# Patient Record
Sex: FEMALE | Race: WHITE | NOT HISPANIC OR LATINO | Employment: OTHER | URBAN - METROPOLITAN AREA
[De-identification: names, ages, dates, MRNs, and addresses within clinical notes are randomized per-mention and may not be internally consistent; named-entity substitution may affect disease eponyms.]

---

## 2017-01-30 ENCOUNTER — OFFICE VISIT (OUTPATIENT)
Dept: MEDICAL GROUP | Facility: MEDICAL CENTER | Age: 77
End: 2017-01-30
Payer: MEDICARE

## 2017-01-30 VITALS
HEIGHT: 62 IN | DIASTOLIC BLOOD PRESSURE: 74 MMHG | WEIGHT: 151 LBS | TEMPERATURE: 98.4 F | HEART RATE: 74 BPM | BODY MASS INDEX: 27.79 KG/M2 | SYSTOLIC BLOOD PRESSURE: 110 MMHG | OXYGEN SATURATION: 94 %

## 2017-01-30 DIAGNOSIS — H61.21 IMPACTED CERUMEN OF RIGHT EAR: ICD-10-CM

## 2017-01-30 DIAGNOSIS — H92.01 RIGHT EAR PAIN: ICD-10-CM

## 2017-01-30 DIAGNOSIS — H66.001 ACUTE SUPPURATIVE OTITIS MEDIA OF RIGHT EAR WITHOUT SPONTANEOUS RUPTURE OF TYMPANIC MEMBRANE, RECURRENCE NOT SPECIFIED: ICD-10-CM

## 2017-01-30 PROCEDURE — 1100F PTFALLS ASSESS-DOCD GE2>/YR: CPT | Performed by: FAMILY MEDICINE

## 2017-01-30 PROCEDURE — G8420 CALC BMI NORM PARAMETERS: HCPCS | Performed by: FAMILY MEDICINE

## 2017-01-30 PROCEDURE — 4040F PNEUMOC VAC/ADMIN/RCVD: CPT | Performed by: FAMILY MEDICINE

## 2017-01-30 PROCEDURE — G8432 DEP SCR NOT DOC, RNG: HCPCS | Performed by: FAMILY MEDICINE

## 2017-01-30 PROCEDURE — 0518F FALL PLAN OF CARE DOCD: CPT | Mod: 8P | Performed by: FAMILY MEDICINE

## 2017-01-30 PROCEDURE — 3288F FALL RISK ASSESSMENT DOCD: CPT | Performed by: FAMILY MEDICINE

## 2017-01-30 PROCEDURE — G8484 FLU IMMUNIZE NO ADMIN: HCPCS | Performed by: FAMILY MEDICINE

## 2017-01-30 PROCEDURE — 1036F TOBACCO NON-USER: CPT | Performed by: FAMILY MEDICINE

## 2017-01-30 PROCEDURE — 99213 OFFICE O/P EST LOW 20 MIN: CPT | Performed by: FAMILY MEDICINE

## 2017-01-30 RX ORDER — AMOXICILLIN 500 MG/1
1000 CAPSULE ORAL 2 TIMES DAILY
Qty: 28 CAP | Refills: 0 | Status: SHIPPED | OUTPATIENT
Start: 2017-01-30 | End: 2017-02-16

## 2017-01-30 NOTE — ASSESSMENT & PLAN NOTE
Patient is complaining of right ear pain for the last 6 weeks. She reports that the ear feels congested and she has decreased hearing. She recently went to [place] for a ski trip and caught a head cold. She says her symptoms have worsened since then with more pain. She's had nasal congestion and postnasal drainage. No fever or chills. No cough. She has had something similar to this in the past.

## 2017-01-30 NOTE — PROGRESS NOTES
Subjective:     Chief Complaint   Patient presents with   • Ear Fullness     right side    Katerina is a regular patient of Dr. Pickett.    Katerina Torres is a 76 y.o. female here today for evaluation and management of:    Right ear pain  Patient is complaining of right ear pain for the last 6 weeks. She reports that the ear feels congested and she has decreased hearing. She recently went to [place] for a ski trip and caught a head cold. She says her symptoms have worsened since then with more pain. She's had nasal congestion and postnasal drainage. No fever or chills. No cough. She has had something similar to this in the past.         Allergies   Allergen Reactions   • Vicodin [Hydrocodone-Acetaminophen] Anaphylaxis     Nausea, dizziness       Current medicines (including changes today)  Current Outpatient Prescriptions   Medication Sig Dispense Refill   • amoxicillin (AMOXIL) 500 MG Cap Take 2 Caps by mouth 2 times a day. 28 Cap 0   • aspirin EC (ECOTRIN) 81 MG Tablet Delayed Response Take 81 mg by mouth every day.     • vitamin D (CHOLECALCIFEROL) 1000 UNIT Tab Take 1,000 Units by mouth every day.     • Magnesium 500 MG Tab Take  by mouth.     • Bimatoprost (LUMIGAN) 0.01 % SOLN 1 Drop by Ophthalmic route every day. (Patient taking differently: 1 Drop by Ophthalmic route every evening. Indications: Wide-Angle Glaucoma) 1 Application 6   • predniSONE (DELTASONE) 10 MG Tab Take 1 Tab by mouth every day. 30 Tab 0     No current facility-administered medications for this visit.       She  has a past medical history of breast cancer (6/3/2009); Osteopenia (6/3/2009); Dyslipidemia (6/3/2009); Neutropenia (6/3/2009); Preventative health care (6/3/2009); Cardiac murmur (6/3/2009); [place] [place] [place]; Breath shortness; Unspecified cataract; Chronic kidney disease, stage III (moderate) (8/20/2015); Hepatitis A; Cold; Snoring; Cancer (CMS-HCC); and Glaucoma. She also has no past medical  "history of Congestive heart failure (CMS-HCC), CAD (coronary artery disease), Seizure disorder (CMS-HCC), Liver disease, Psychiatric disorder, COPD, Diabetes, Stroke (CMS-HCC), Hypertension, or ASTHMA.    Patient Active Problem List    Diagnosis Date Noted   • Right ear pain 01/30/2017   • Acute suppurative otitis media of right ear without spontaneous rupture of tympanic membrane 01/30/2017   • Chronic kidney disease, stage III (moderate) 08/20/2015   • History of polymyalgia rheumatica 07/28/2015   • Dyspnea 07/29/2014   • Abnormal cardiovascular stress test 03/24/2014   • MEDICAL HOME    • Knee pain, left 08/01/2013   • Glaucoma 06/29/2010   • Skin cancer, basal cell 06/29/2010   • History of breast cancer 06/03/2009   • Osteopenia 06/03/2009   • Dyslipidemia 06/03/2009   • Chronic neutropenia (CMS-HCC) 06/03/2009   • Preventative health care 06/03/2009   • Aortic stenosis 06/03/2009       ROS   No fever or chills.  No nausea or vomiting.  No chest pain or palpitations.  No cough or SOB.  No pain with urination or hematuria.  No black or bloody stools.       Objective:     Blood pressure 110/74, pulse 74, temperature 36.9 °C (98.4 °F), height 1.575 m (5' 2.01\"), weight 68.493 kg (151 lb), SpO2 94 %, not currently breastfeeding. Body mass index is 27.61 kg/(m^2).   Physical Exam:  Well developed, well nourished.  Alert, oriented in no acute distress.  Eye contact is good, speech goal directed, affect calm  Eyes: conjunctiva non-injected, sclera non-icteric.  Ears: Pinna normal. Right canal with impacted cerumen. After ear wash TM is visualized and is erythematous with purulent matter behind the eardrum.  Nose: Nares are patent. Erythematous mucosa with yellow discharge  Mouth: Oral mucous membranes pink and moist with no lesions. Yellow postnasal drainage  Neck Supple.  No adenopathy or masses in the neck or supraclavicular regions. No thyromegaly  Lungs: clear to auscultation bilaterally with good excursion. No " wheezes or rhonchi  CV: regular rate and rhythm. 3/6 DOROTEO            Assessment and Plan:   The following treatment plan was discussed    1. Right ear pain  Secondary to #2 and 3    2. Impacted cerumen of right ear  Cleared with ear wash.    3. Acute suppurative otitis media of right ear without spontaneous rupture of tympanic membrane, recurrence not specified  Amoxicillin as directed. Increase fluids and rest. Return if not improving.  - amoxicillin (AMOXIL) 500 MG Cap; Take 2 Caps by mouth 2 times a day.  Dispense: 28 Cap; Refill: 0    Any change or worsening of signs or symptoms, patient encouraged to follow-up or report to the emergency room for further evaluation. Patient understands and agrees.    Followup: Return if symptoms worsen or fail to improve.

## 2017-01-30 NOTE — MR AVS SNAPSHOT
"Katerina Graykamar   2017 10:40 AM   Office Visit   MRN: [id number]    Department:  [facility]   Dept Phone:  [phone]    Description:  Female : 1940   Provider:  Gabriela Corbin M.D.           Reason for Visit     Ear Fullness right side      Allergies as of 2017     Allergen Noted Reactions    Vicodin [Hydrocodone-Acetaminophen] 2015   Anaphylaxis    Nausea, dizziness      You were diagnosed with     Right ear pain   [214158]       Impacted cerumen of right ear   [[id number]]       Acute suppurative otitis media of right ear without spontaneous rupture of tympanic membrane, recurrence not specified   [[id number]]         Vital Signs     Blood Pressure Pulse Temperature Height Weight Body Mass Index    110/74 mmHg 74 36.9 °C (98.4 °F) 1.575 m (5' 2.01\") 68.493 kg (151 lb) 27.61 kg/m2    Oxygen Saturation Breastfeeding? Smoking Status             94% No Former Smoker         Basic Information     Date Of Birth Sex Race Ethnicity Preferred Language    1940 Female White Non- English      Problem List              ICD-10-CM Priority Class Noted - Resolved    History of breast cancer Z85.3   6/3/2009 - Present    Osteopenia (Chronic) M85.80   6/3/2009 - Present    Dyslipidemia (Chronic) E78.5   6/3/2009 - Present    Chronic neutropenia (CMS-HCC) (Chronic) D70.9   6/3/2009 - Present    Preventative health care (Chronic) Z00.00   6/3/2009 - Present    Aortic stenosis (Chronic) I35.0   6/3/2009 - Present    Glaucoma H40.9   2010 - Present    Skin cancer, basal cell C44.91   2010 - Present    Knee pain, left M25.562   2013 - Present    MEDICAL HOME    Unknown - Present    Abnormal cardiovascular stress test (Chronic) R94.39   3/24/2014 - Present    Dyspnea R06.00   2014 - Present    History of polymyalgia rheumatica Z87.39   2015 - Present    Chronic kidney disease, stage III (moderate) (Chronic) N18.3   2015 - Present    Right ear pain " H92.01   1/30/2017 - Present    Acute suppurative otitis media of right ear without spontaneous rupture of tympanic membrane H66.001   1/30/2017 - Present      Health Maintenance        Date Due Completion Dates    IMM INFLUENZA (1) 9/1/2016 ---    COLONOSCOPY 11/14/2016 11/14/2006 (Done)    Override on 11/14/2006: Done ([facility])    MAMMOGRAM 8/31/2017 8/31/2016, 8/19/2015, 8/18/2014, 8/6/2013, 8/2/2012, 7/22/2011, 7/8/2010, 7/8/2010, 6/25/2009, 6/25/2009, 3/11/2008, 3/11/2008, 9/28/2006, 9/28/2006, 9/1/2004    BONE DENSITY 8/19/2020 8/19/2015, 8/6/2013, 7/22/2011, 6/25/2009, 9/28/2006, 9/1/2004    IMM DTaP/Tdap/Td Vaccine (2 - Td) 7/23/2022 7/23/2012            Current Immunizations     13-VALENT PCV PREVNAR 8/6/2015    Pneumococcal polysaccharide vaccine (PPSV-23) 1/10/2013    SHINGLES VACCINE 7/29/2014    Tdap Vaccine 7/23/2012      Below and/or attached are the medications your provider expects you to take. Review all of your home medications and newly ordered medications with your provider and/or pharmacist. Follow medication instructions as directed by your provider and/or pharmacist. Please keep your medication list with you and share with your provider. Update the information when medications are discontinued, doses are changed, or new medications (including over-the-counter products) are added; and carry medication information at all times in the event of emergency situations     Allergies:  VICODIN - Anaphylaxis               Medications  Valid as of: January 30, 2017 - 11:15 AM    Generic Name Brand Name Tablet Size Instructions for use    Amoxicillin (Cap) AMOXIL 500 MG Take 2 Caps by mouth 2 times a day.        Aspirin (Tablet Delayed Response) ECOTRIN 81 MG Take 81 mg by mouth every day.        Bimatoprost (Solution) LUMIGAN 0.01 % 1 Drop by Ophthalmic route every day.        Cholecalciferol (Tab) cholecalciferol 1000 UNIT Take 1,000 Units by mouth every day.        Magnesium (Tab) Magnesium 500 MG Take   by mouth.        PredniSONE (Tab) DELTASONE 10 MG Take 1 Tab by mouth every day.        .                 Medicines prescribed today were sent to:     [facility] #[id number] - [place], [place] - [address]    [address] [place] [address]    Phone: [phone] Fax: [phone]    Open 24 Hours?: No      Medication refill instructions:       If your prescription bottle indicates you have medication refills left, it is not necessary to call your provider’s office. Please contact your pharmacy and they will refill your medication.    If your prescription bottle indicates you do not have any refills left, you may request refills at any time through one of the following ways: The online ProtoStar system (except Urgent Care), by calling your provider’s office, or by asking your pharmacy to contact your provider’s office with a refill request. Medication refills are processed only during regular business hours and may not be available until the next business day. Your provider may request additional information or to have a follow-up visit with you prior to refilling your medication.   *Please Note: Medication refills are assigned a new Rx number when refilled electronically. Your pharmacy may indicate that no refills were authorized even though a new prescription for the same medication is available at the pharmacy. Please request the medicine by name with the pharmacy before contacting your provider for a refill.        Other Notes About Your Plan       12/22/16 echo   12/22/16 colon cancer screening referral made                       ProtoStar Access Code: Activation code not generated  Current ProtoStar Status: Active

## 2017-02-01 PROBLEM — C44.319 BASAL CELL CARCINOMA OF SKIN OF OTHER PARTS OF FACE: Status: ACTIVE | Noted: 2017-02-01

## 2017-02-16 ENCOUNTER — OFFICE VISIT (OUTPATIENT)
Dept: MEDICAL GROUP | Facility: MEDICAL CENTER | Age: 77
End: 2017-02-16
Payer: MEDICARE

## 2017-02-16 ENCOUNTER — HOSPITAL ENCOUNTER (OUTPATIENT)
Dept: RADIOLOGY | Facility: MEDICAL CENTER | Age: 77
End: 2017-02-16
Attending: FAMILY MEDICINE
Payer: MEDICARE

## 2017-02-16 VITALS
RESPIRATION RATE: 12 BRPM | WEIGHT: 151.46 LBS | SYSTOLIC BLOOD PRESSURE: 126 MMHG | HEART RATE: 60 BPM | OXYGEN SATURATION: 98 % | HEIGHT: 62 IN | TEMPERATURE: 97.6 F | DIASTOLIC BLOOD PRESSURE: 70 MMHG | BODY MASS INDEX: 27.87 KG/M2

## 2017-02-16 DIAGNOSIS — M71.21 BAKER'S CYST, RIGHT: ICD-10-CM

## 2017-02-16 DIAGNOSIS — M25.561 ACUTE PAIN OF RIGHT KNEE: ICD-10-CM

## 2017-02-16 PROCEDURE — 3288F FALL RISK ASSESSMENT DOCD: CPT | Performed by: FAMILY MEDICINE

## 2017-02-16 PROCEDURE — 1036F TOBACCO NON-USER: CPT | Performed by: FAMILY MEDICINE

## 2017-02-16 PROCEDURE — G8484 FLU IMMUNIZE NO ADMIN: HCPCS | Performed by: FAMILY MEDICINE

## 2017-02-16 PROCEDURE — G8420 CALC BMI NORM PARAMETERS: HCPCS | Performed by: FAMILY MEDICINE

## 2017-02-16 PROCEDURE — 0518F FALL PLAN OF CARE DOCD: CPT | Mod: 8P | Performed by: FAMILY MEDICINE

## 2017-02-16 PROCEDURE — 73564 X-RAY EXAM KNEE 4 OR MORE: CPT | Mod: RT

## 2017-02-16 PROCEDURE — G8432 DEP SCR NOT DOC, RNG: HCPCS | Performed by: FAMILY MEDICINE

## 2017-02-16 PROCEDURE — 1100F PTFALLS ASSESS-DOCD GE2>/YR: CPT | Performed by: FAMILY MEDICINE

## 2017-02-16 PROCEDURE — 99214 OFFICE O/P EST MOD 30 MIN: CPT | Performed by: FAMILY MEDICINE

## 2017-02-16 PROCEDURE — 4040F PNEUMOC VAC/ADMIN/RCVD: CPT | Performed by: FAMILY MEDICINE

## 2017-02-16 NOTE — MR AVS SNAPSHOT
"        Katerina Sims Brian   2017 11:40 AM   Office Visit   MRN: [id number]    Department:  [place] [address]   Dept Phone:  [phone]    Description:  Female : 1940   Provider:  Radha Barfield M.D.           Reason for Visit     Knee Pain x2-3 wk      Allergies as of 2017     Allergen Noted Reactions    Vicodin [Hydrocodone-Acetaminophen] 2015   Anaphylaxis    Nausea, dizziness      You were diagnosed with     Acute pain of right knee   [[id number]]       Baker's cyst, right   [[id number]]         Vital Signs     Blood Pressure Pulse Temperature Respirations Height Weight    126/70 mmHg 60 36.4 °C (97.6 °F) 12 1.575 m (5' 2\") 68.7 kg (151 lb 7.3 oz)    Body Mass Index Oxygen Saturation Smoking Status             27.69 kg/m2 98% Former Smoker         Basic Information     Date Of Birth Sex Race Ethnicity Preferred Language    1940 Female White Non- English      Problem List              ICD-10-CM Priority Class Noted - Resolved    History of breast cancer Z85.3   6/3/2009 - Present    Osteopenia (Chronic) M85.80   6/3/2009 - Present    Dyslipidemia (Chronic) E78.5   6/3/2009 - Present    Chronic neutropenia (CMS-HCC) (Chronic) D70.9   6/3/2009 - Present    Preventative health care (Chronic) Z00.00   6/3/2009 - Present    Aortic stenosis (Chronic) I35.0   6/3/2009 - Present    Glaucoma H40.9   2010 - Present    Skin cancer, basal cell C44.91   2010 - Present    Knee pain, left M25.562   2013 - Present    MEDICAL HOME    Unknown - Present    Abnormal cardiovascular stress test (Chronic) R94.39   3/24/2014 - Present    Dyspnea R06.00   2014 - Present    History of polymyalgia rheumatica Z87.39   2015 - Present    Chronic kidney disease, stage III (moderate) (Chronic) N18.3   2015 - Present    Right ear pain H92.01   2017 - Present    Acute suppurative otitis media of right ear without spontaneous rupture of tympanic membrane H66.001   " 1/30/2017 - Present      Health Maintenance        Date Due Completion Dates    IMM INFLUENZA (1) 9/1/2016 ---    COLONOSCOPY 11/14/2016 11/14/2006 (Done)    Override on 11/14/2006: Done ([facility])    MAMMOGRAM 8/31/2017 8/31/2016, 8/19/2015, 8/18/2014, 8/6/2013, 8/2/2012, 7/22/2011, 7/8/2010, 7/8/2010, 6/25/2009, 6/25/2009, 3/11/2008, 3/11/2008, 9/28/2006, 9/28/2006, 9/1/2004    BONE DENSITY 8/19/2020 8/19/2015, 8/6/2013, 7/22/2011, 6/25/2009, 9/28/2006, 9/1/2004    IMM DTaP/Tdap/Td Vaccine (2 - Td) 7/23/2022 7/23/2012            Current Immunizations     13-VALENT PCV PREVNAR 8/6/2015    Pneumococcal polysaccharide vaccine (PPSV-23) 1/10/2013    SHINGLES VACCINE 7/29/2014    Tdap Vaccine 7/23/2012      Below and/or attached are the medications your provider expects you to take. Review all of your home medications and newly ordered medications with your provider and/or pharmacist. Follow medication instructions as directed by your provider and/or pharmacist. Please keep your medication list with you and share with your provider. Update the information when medications are discontinued, doses are changed, or new medications (including over-the-counter products) are added; and carry medication information at all times in the event of emergency situations     Allergies:  VICODIN - Anaphylaxis               Medications  Valid as of: February 16, 2017 - 11:53 AM    Generic Name Brand Name Tablet Size Instructions for use    Aspirin (Tablet Delayed Response) ECOTRIN 81 MG Take 81 mg by mouth every day.        Bimatoprost (Solution) LUMIGAN 0.01 % 1 Drop by Ophthalmic route every day.        Cholecalciferol (Tab) cholecalciferol 1000 UNIT Take 1,000 Units by mouth every day.        Magnesium (Tab) Magnesium 500 MG Take  by mouth.        .                 Medicines prescribed today were sent to:     [facility] #[id number] - [place], [place] - [address]    [address] [place] [address]    Phone: [phone]  Fax: [phone]    Open 24 Hours?: No      Medication refill instructions:       If your prescription bottle indicates you have medication refills left, it is not necessary to call your provider’s office. Please contact your pharmacy and they will refill your medication.    If your prescription bottle indicates you do not have any refills left, you may request refills at any time through one of the following ways: The online Popcorn5 system (except Urgent Care), by calling your provider’s office, or by asking your pharmacy to contact your provider’s office with a refill request. Medication refills are processed only during regular business hours and may not be available until the next business day. Your provider may request additional information or to have a follow-up visit with you prior to refilling your medication.   *Please Note: Medication refills are assigned a new Rx number when refilled electronically. Your pharmacy may indicate that no refills were authorized even though a new prescription for the same medication is available at the pharmacy. Please request the medicine by name with the pharmacy before contacting your provider for a refill.        Your To Do List     Future Labs/Procedures Complete By Expires    DX-KNEE COMPLETE 4+ RIGHT  As directed 8/19/2017      Other Notes About Your Plan       12/22/16 echo   12/22/16 colon cancer screening referral made                       Popcorn5 Access Code: Activation code not generated  Current Popcorn5 Status: Active

## 2017-02-16 NOTE — PROGRESS NOTES
Subjective:   Katerina Torres is a 76 y.o. female here today for   Chief Complaint   Patient presents with   • Knee Pain     x2-3 wk       1. Acute pain of right knee  This is a new problem. It started about 3 months ago. She does not remember an injury occurring. She does ski regularly. The pain is worse when going up and down stairs or getting out of a car. Skiing does not hurt the knee significantly. It does not give out on her. It has become swollen and feels tight behind the knee. There is some clicking deep in the knee. She has never had surgery on the knee. She did have an injury about 55 years ago while skiing and never had this repaired. She is unsure of what happened exactly. The pain also worsens with internal rotation of the knee. She does not have any hip pain or ankle pain. There is no redness, fevers, chills, myalgias. She does have a history of polymyalgia rheumatica treated with steroids which the patient is no longer on. She is not taking any other medication for pain other than Tylenol. Her last GFR was 50      Current medicines (including changes today)  Current Outpatient Prescriptions   Medication Sig Dispense Refill   • aspirin EC (ECOTRIN) 81 MG Tablet Delayed Response Take 81 mg by mouth every day.     • vitamin D (CHOLECALCIFEROL) 1000 UNIT Tab Take 1,000 Units by mouth every day.     • Magnesium 500 MG Tab Take  by mouth.     • Bimatoprost (LUMIGAN) 0.01 % SOLN 1 Drop by Ophthalmic route every day. (Patient taking differently: 1 Drop by Ophthalmic route every evening. Indications: Wide-Angle Glaucoma) 1 Application 6     No current facility-administered medications for this visit.     She  has a past medical history of breast cancer (6/3/2009); Osteopenia (6/3/2009); Dyslipidemia (6/3/2009); Neutropenia (6/3/2009); Preventative health care (6/3/2009); Cardiac murmur (6/3/2009); Breast cancer (CMS-[facility]); MEDICAL HOME; Breath shortness; Unspecified cataract; Chronic kidney disease,  "stage III (moderate) (8/20/2015); Hepatitis A; Cold; Snoring; Cancer (CMS-HCC); and Glaucoma. She also has no past medical history of Congestive heart failure (CMS-HCC), CAD (coronary artery disease), Seizure disorder (CMS-HCC), Liver disease, Psychiatric disorder, COPD, Diabetes, Stroke (CMS-[facility]), Hypertension, or ASTHMA.    ROS   No fevers  No bowel changes  No LE edema       Objective:     Blood pressure 126/70, pulse 60, temperature 36.4 °C (97.6 °F), resp. rate 12, height 1.575 m (5' 2\"), weight 68.7 kg (151 lb 7.3 oz), SpO2 98 %. Body mass index is 27.69 kg/(m^2).   Physical Exam:  Constitutional: Alert, no distress.  Skin: Warm, dry, good turgor, no rashes in visible areas.  Eye: conjunctiva clear, lids normal.  ENMT: Lips without lesions, good dentition.  Neck: Trachea midline  Respiratory: Unlabored respiratory effort  Cardiovascular: Regular rate, no edema.  Psych: Alert and oriented x3, normal affect and mood.  MSK: There is significant fluid collection around the right knee with positive ballottement. There is no joint line tenderness. The integrity of the joint is intact with possible slight laxity with varus stress but is not associated with pain. There is a Baker's cyst on the right side. There is no erythema or warmth of the knee There is full range of motion of the knee without significant crepitus. Hip exam is negative for fevers and exhibits full range of motion without pain.      Assessment and Plan:   The following treatment plan was discussed    1. Acute pain of right knee  2. Baker's cyst, right  This is a new problem. I do suspect that there is likely arthritis and a possible ligamentous injury and that is causing the swelling although she did not have any acute injury. We will start with x-ray and if no significant findings on this, we will proceed with MRI. We did discuss the use of NSAIDs. Her GFR is 50 and thus we should limit the use, but I did inform her that it would be okay to take up " to 800 mg of ibuprofen daily. Follow-up in 2-4 weeks  - DX-KNEE COMPLETE 4+ RIGHT; Future          Followup: Return in about 4 weeks (around 3/16/2017) for knee pain .       This note was created using voice recognition software. I have made every reasonable attempt to correct errors, however, I do anticipate some grammatical errors.

## 2017-03-03 ENCOUNTER — HOSPITAL ENCOUNTER (OUTPATIENT)
Dept: RADIOLOGY | Facility: MEDICAL CENTER | Age: 77
End: 2017-03-03
Attending: FAMILY MEDICINE
Payer: MEDICARE

## 2017-03-03 DIAGNOSIS — M71.21 BAKER'S CYST, RIGHT: ICD-10-CM

## 2017-03-03 DIAGNOSIS — M25.561 ACUTE PAIN OF RIGHT KNEE: ICD-10-CM

## 2017-03-03 PROCEDURE — 73721 MRI JNT OF LWR EXTRE W/O DYE: CPT | Mod: RT

## 2017-03-05 DIAGNOSIS — S83.209A MENISCUS TEAR: ICD-10-CM

## 2017-03-07 PROBLEM — S83.209A MENISCUS TEAR: Status: RESOLVED | Noted: 2017-03-05 | Resolved: 2017-03-07

## 2017-03-07 PROBLEM — H92.01 RIGHT EAR PAIN: Status: RESOLVED | Noted: 2017-01-30 | Resolved: 2017-03-07

## 2017-03-07 PROBLEM — H66.001 ACUTE SUPPURATIVE OTITIS MEDIA OF RIGHT EAR WITHOUT SPONTANEOUS RUPTURE OF TYMPANIC MEMBRANE: Status: RESOLVED | Noted: 2017-01-30 | Resolved: 2017-03-07

## 2017-03-13 PROBLEM — K63.5 COLON POLYP: Status: ACTIVE | Noted: 2017-03-13

## 2017-06-14 ENCOUNTER — PATIENT OUTREACH (OUTPATIENT)
Dept: HEALTH INFORMATION MANAGEMENT | Facility: OTHER | Age: 77
End: 2017-06-14

## 2017-06-14 NOTE — PROGRESS NOTES
Attempt #:1    WebIZ Checked & Epic Updated: no  HealthConnect Verified: yes  Verify PCP: yes    Communication Preference Obtained: yes     Review Care Team: yes    Annual Wellness Visit Scheduling  1. Scheduling Status:Scheduled          Care Gap Scheduling (Attempt to Schedule EACH Overdue Care Gap!)     There are no preventive care reminders to display for this patient.   Unable to offer online tools. Pt. Was in rush to get phone.      [employer] Activation: already active  [employer] Huong: no  Virtual Visits: no  Opt In to Text Messages: no

## 2017-06-20 ENCOUNTER — OFFICE VISIT (OUTPATIENT)
Dept: MEDICAL GROUP | Facility: MEDICAL CENTER | Age: 77
End: 2017-06-20
Payer: MEDICARE

## 2017-06-20 VITALS
BODY MASS INDEX: 25.1 KG/M2 | HEIGHT: 64 IN | WEIGHT: 147 LBS | TEMPERATURE: 98.4 F | SYSTOLIC BLOOD PRESSURE: 138 MMHG | DIASTOLIC BLOOD PRESSURE: 74 MMHG | HEART RATE: 75 BPM | OXYGEN SATURATION: 98 %

## 2017-06-20 DIAGNOSIS — I35.0 AORTIC VALVE STENOSIS, UNSPECIFIED ETIOLOGY: Chronic | ICD-10-CM

## 2017-06-20 DIAGNOSIS — M25.561 RIGHT KNEE PAIN, UNSPECIFIED CHRONICITY: ICD-10-CM

## 2017-06-20 DIAGNOSIS — Z00.00 MEDICARE ANNUAL WELLNESS VISIT, SUBSEQUENT: ICD-10-CM

## 2017-06-20 DIAGNOSIS — D70.9 CHRONIC NEUTROPENIA (HCC): Chronic | ICD-10-CM

## 2017-06-20 DIAGNOSIS — E78.5 DYSLIPIDEMIA: Chronic | ICD-10-CM

## 2017-06-20 DIAGNOSIS — R94.4 DECREASED GFR: ICD-10-CM

## 2017-06-20 DIAGNOSIS — K63.5 POLYP OF COLON, UNSPECIFIED PART OF COLON, UNSPECIFIED TYPE: ICD-10-CM

## 2017-06-20 DIAGNOSIS — C44.91 SKIN CANCER, BASAL CELL: ICD-10-CM

## 2017-06-20 DIAGNOSIS — Z87.39 HISTORY OF POLYMYALGIA RHEUMATICA: ICD-10-CM

## 2017-06-20 DIAGNOSIS — M85.80 OSTEOPENIA, UNSPECIFIED LOCATION: Chronic | ICD-10-CM

## 2017-06-20 DIAGNOSIS — E55.9 HYPOVITAMINOSIS D: ICD-10-CM

## 2017-06-20 PROCEDURE — G0439 PPPS, SUBSEQ VISIT: HCPCS | Performed by: INTERNAL MEDICINE

## 2017-06-20 ASSESSMENT — PATIENT HEALTH QUESTIONNAIRE - PHQ9: CLINICAL INTERPRETATION OF PHQ2 SCORE: 0

## 2017-06-20 NOTE — MR AVS SNAPSHOT
"Katerina Torres   2017 1:20 PM   Office Visit   MRN: [id number]    Department:  [facility]   Dept Phone:  [phone]    Description:  Female : 1940   Provider:  Master Suarez M.D.; [facility]            Reason for Visit     Annual Wellness Visit           Allergies as of 2017     Allergen Noted Reactions    Vicodin [Hydrocodone-Acetaminophen] 2015   Anaphylaxis    Nausea, dizziness      You were diagnosed with     Right knee pain, unspecified chronicity   [0110347]       Osteopenia, unspecified location   [7939360]       Dyslipidemia   [597408]       Chronic neutropenia (CMS-HCC)   [545861]       Aortic valve stenosis, unspecified etiology   [0226653]       Skin cancer, basal cell   [073945]       History of polymyalgia rheumatica   [098469]       Decreased GFR   [352313]       Polyp of colon, unspecified part of colon, unspecified type   [7030671]       Hypovitaminosis D   [918956]       Medicare annual wellness visit, subsequent   [220750]         Vital Signs     Blood Pressure Pulse Temperature Height Weight Body Mass Index    138/74 mmHg 75 36.9 °C (98.4 °F) 1.626 m (5' 4.02\") 66.679 kg (147 lb) 25.22 kg/m2    Oxygen Saturation Smoking Status                98% Former Smoker          Basic Information     Date Of Birth Sex Race Ethnicity Preferred Language    1940 Female White Non- English      Problem List              ICD-10-CM Priority Class Noted - Resolved    History of breast cancer Z85.3   6/3/2009 - Present    Osteopenia (Chronic) M85.80   6/3/2009 - Present    Dyslipidemia (Chronic) E78.5   6/3/2009 - Present    Chronic neutropenia (CMS-HCC) (Chronic) D70.9   6/3/2009 - Present    Preventative health care (Chronic) Z00.00   6/3/2009 - Present    Aortic stenosis (Chronic) I35.0   6/3/2009 - Present    Glaucoma H40.9   2010 - Present    Skin cancer, basal cell C44.91   2010 - Present    Knee pain, left M25.562   " 8/1/2013 - Present    MEDICAL HOME    Unknown - Present    Abnormal cardiovascular stress test (Chronic) R94.39   3/24/2014 - Present    Dyspnea R06.00   7/29/2014 - Present    History of polymyalgia rheumatica Z87.39   7/28/2015 - Present    Decreased GFR R94.4   8/20/2015 - Present    Colon polyp K63.5   3/13/2017 - Present    Knee pain, right M25.561   6/20/2017 - Present      Health Maintenance        Date Due Completion Dates    MAMMOGRAM 8/31/2017 8/31/2016, 8/19/2015, 8/18/2014, 8/6/2013, 8/2/2012, 7/22/2011, 7/8/2010, 7/8/2010, 6/25/2009, 6/25/2009, 3/11/2008, 3/11/2008, 9/28/2006, 9/28/2006, 9/1/2004    BONE DENSITY 8/19/2020 8/19/2015, 8/6/2013, 7/22/2011, 6/25/2009, 9/28/2006, 9/1/2004    COLONOSCOPY 3/7/2022 3/7/2017    IMM DTaP/Tdap/Td Vaccine (2 - Td) 7/23/2022 7/23/2012            Current Immunizations     13-VALENT PCV PREVNAR 8/6/2015    Pneumococcal polysaccharide vaccine (PPSV-23) 1/10/2013    SHINGLES VACCINE 7/29/2014    Tdap Vaccine 7/23/2012      Below and/or attached are the medications your provider expects you to take. Review all of your home medications and newly ordered medications with your provider and/or pharmacist. Follow medication instructions as directed by your provider and/or pharmacist. Please keep your medication list with you and share with your provider. Update the information when medications are discontinued, doses are changed, or new medications (including over-the-counter products) are added; and carry medication information at all times in the event of emergency situations     Allergies:  VICODIN - Anaphylaxis               Medications  Valid as of: June 20, 2017 -  2:29 PM    Generic Name Brand Name Tablet Size Instructions for use    Aspirin (Tablet Delayed Response) ECOTRIN 81 MG Take 81 mg by mouth every day.        Bimatoprost (Solution) LUMIGAN 0.01 % 1 Drop by Ophthalmic route every day.        Cholecalciferol (Tab) cholecalciferol 1000 UNIT Take 1,000 Units by  mouth every day.        Magnesium (Tab) Magnesium 500 MG Take  by mouth.        .                 Medicines prescribed today were sent to:     [facility] #[id number] - [place], [place] - [address]    [address] [place] [address]    Phone: [phone] Fax: [phone]    Open 24 Hours?: No      Medication refill instructions:       If your prescription bottle indicates you have medication refills left, it is not necessary to call your provider’s office. Please contact your pharmacy and they will refill your medication.    If your prescription bottle indicates you do not have any refills left, you may request refills at any time through one of the following ways: The online [facility] system (except Urgent Care), by calling your provider’s office, or by asking your pharmacy to contact your provider’s office with a refill request. Medication refills are processed only during regular business hours and may not be available until the next business day. Your provider may request additional information or to have a follow-up visit with you prior to refilling your medication.   *Please Note: Medication refills are assigned a new Rx number when refilled electronically. Your pharmacy may indicate that no refills were authorized even though a new prescription for the same medication is available at the pharmacy. Please request the medicine by name with the pharmacy before contacting your provider for a refill.        Your To Do List     Future Labs/Procedures Complete By Expires    CBC WITH DIFFERENTIAL  As directed 6/21/2018    COMP METABOLIC PANEL  As directed 6/21/2018    CREATINE KINASE  As directed 6/21/2018    CRP QUANTITIVE (NON-CARDIAC)  As directed 6/21/2018    ECHOCARDIOGRAM COMP W/O CONT  As directed 6/21/2018    LIPID PROFILE  As directed 6/21/2018    TSH  As directed 6/21/2018    VITAMIN D,25 HYDROXY  As directed 6/21/2018    WESTERGREN SED RATE  As directed 6/21/2018      Other Notes About Your  Plan     9/17 need dexa and mammogram  6/20/17 echo and labs ordered                             MyChart Access Code: Activation code not generated  Current MyChart Status: Active

## 2017-06-20 NOTE — PROGRESS NOTES
Chief Complaint   Patient presents with   • Annual Wellness Visit         HPI:  Katerina Torres is a 76 y.o. female here for Medicare Annual Wellness Visit  Eye exam every four months  no vision changes  Followed by Dr. Tidwell orthopedics right knee meniscus tear has had injections  Sees dermatology, uses sunscreen  No falls, no cane or walker for ambulation  Medications, allergies, medical history, surgical history, social history, family history reviewed and updated  No tobacco  Social history     No anxiety, no depression  No insomnia  No memory loss      Medications, allergies, medical history, surgical history, social history, family history reviewed and updated    Patient Active Problem List    Diagnosis Date Noted   • Colon polyp 03/13/2017   • Decreased GFR 08/20/2015   • History of polymyalgia rheumatica 07/28/2015   • Dyspnea 07/29/2014   • Abnormal cardiovascular stress test 03/24/2014   • MEDICAL HOME    • Knee pain, left 08/01/2013   • Glaucoma 06/29/2010   • Skin cancer, basal cell 06/29/2010   • History of breast cancer 06/03/2009   • Osteopenia 06/03/2009   • Dyslipidemia 06/03/2009   • Chronic neutropenia (CMS-HCC) 06/03/2009   • Preventative health care 06/03/2009   • Aortic stenosis 06/03/2009       Current Outpatient Prescriptions   Medication Sig Dispense Refill   • aspirin EC (ECOTRIN) 81 MG Tablet Delayed Response Take 81 mg by mouth every day.     • vitamin D (CHOLECALCIFEROL) 1000 UNIT Tab Take 1,000 Units by mouth every day.     • Magnesium 500 MG Tab Take  by mouth.     • Bimatoprost (LUMIGAN) 0.01 % SOLN 1 Drop by Ophthalmic route every day. (Patient taking differently: 1 Drop by Ophthalmic route every evening. Indications: Wide-Angle Glaucoma) 1 Application 6     No current facility-administered medications for this visit.        Patient is taking medications as noted in medication list.  Current supplements as per medication list.   Chronic narcotic pain medicines:  no    Allergies: Vicodin    Current social contact/activities: Pt keeps herself busy with daily activities.     Is patient current with immunizations?  Yes.     She  reports that she quit smoking about 23 years ago. Her smoking use included Cigarettes. She has never used smokeless tobacco. She reports that she drinks alcohol. She reports that she does not use illicit drugs.  Counseling given: Not Answered      Skin cancer basal cell    Preventative health  7/23/12 tdap   1/10/13 pneumovax  7/29/14 zoster vaccine  8/6/14 FIT negative  8/6/15 [facility]nar at [facility]  8/19/15 dexa LS -1.4,hip -1.3;FRAX 40% major,27% hip; only on prednisone one month does not need bisphosphonate therapy  8/19/15 vit d 43  6/21/16 vit d 50  9/1/16 mammogram heterogeneously dense breast tissue recommend screening breast ultrasound  9/1/16 sonocine negative  3/7/17 colon per [facility] 2 polyps, grade 2 internal hemorrhoids, angioectasias ascending colon,pathology one hyperplastic polyp and onesessile serrated polyp, repeat colonoscopy 5 years     Osteopenia  9/06 dexa LS -1.2,hip -1.1  6/09 dexa LS -1.4,hip -1.0  7/11 vit d 43  7/11 dexa LS -1.1,hip -0.9  712 vit d 30 start 1000 units  7/24/13 vit d 72 on 1000 units  8/6/13 dexa LS -1.1,hip -1.1  8/5/14 vit d 67  8/19/15 dexa LS -1.4,hip -1.3;FRAX 40% major,27% hip only on prednisone one month, does not need bisphosphonate therapy  8/19/15 vit d 43  6/16/16 off prednisone x 3 weeks  6/21/16 vit d 50    Knee pain  8/1/13 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, lateral meniscus mildly extruded, ruptured hopper's cyst  8/12/13  ortho note pain improved on followup, consider injection if pain recurs    History polymyalgia  4/20/15 physical therapy, trial celebrex and zanaflex  5/7/15 physical therapy evaluation today recommended right hip x-ray and pelvic x-ray, ordered in computer  5/8/15 xray hip negative  6/29/15 seen by bridgette diagnosed with polymyalgia rheumatica  6/29/15 CRP  9.4,ESR 32 started on prednisone 20 mg    7/28/15 on prednisone 10 mg will continue 10 mg x 2 weeks, then decrease to 5 mg daily  8/19/15 hgb 14,hct 43, CRP 0.3, ESR 14, tapered off prednisone but developed mouth irritation, has resumed prednisone 5 mg daily, will continue x2 weeks then taper  11/17/15 refill prednisone 5 mg #30 tabs x one  6/21/16 off prednisone; CRP 0.1,ESR 17    History of breast cancer  1980s left sided s/p lumpectomy and radiation therapy, no old records  7/11 mammogram  8/12 mammogram  8/13 mammogram  8/18/14 mammogram  9/1/16 mammogram heterogeneously dense breast tissue recommend screening breast ultrasound  9/1/16 sonocine negative    Glaucoma    Dyspnea  7/24/13 normal LV function, EF 60%, mild LVH, mild aortic stenosis, peak gradient 25  3/20/14 cxr negative  3/31/14 cardiac catheterization; left main mild lacking, LAD patent, circumflex free of disease, RCA free of disease, normal LV function, mild aortic stenosis  8/1/14 PFT FEV 2.8 L (144% predicted), FVC 3.6 (138% predicted), FEV/FVC 78, no bronchodilator response, DLCO 119% predicted    Dyslipidemia  3/08 chol 175,trig 73,hdl 45,ldl 115  6/09 chol 174,trig 89,hdl 47,ldl 109  7/10 chol 182,trig 61,hdl 55,ldl 114  7/11 chol 175,trig 69,hdl 45,ldl 116  7/12 chol 164,trig 64,hdl 43,ldl 108  7/24/13 chol 186,trig 66,hdl 54,ldl 119 no meds  8/5/14 chol 165,trig 93,hdl 48,ldl 98  8/19/15 chol 192,trig 109,hdl 58,ldl 112; 10 year risk 20% declines statin therapy  6/21/16 chol 137,trig 75,hdl 47,ldl 75     Decrease GFR  7/7/11 bun 16,creat 1.0,GFR 50  7/24/13 bun 14,creat 1.1,GFR 45  3/20/14 bun 15,creat 0.9,GFR 59  8/5/14 bun 14,creat 1.1,GFR 46  2/19/15 bun 13,creat 0.8,GFR >60  8/19/15 bun 10,creat 1.1,GFR 48  6/21/16 bun 19,creat 1.0,GFR 50    Chronic neutropenia  8/06 wbc 3.4  3/08 wbc 3.9  6/09 wbc 3.9  7/10 wbc 4.4  7/11 wbc 4.5  7/12 wbc 3.8 (55%N,35%L)  7/24/13 wbc 4.3  3/20/14 wbc 3.1 (52%N,36%L)  3/31/14 wbc 3.7  8/19/15 wbc  5.3  6/21/16 wbc 4.4 (50%N,40%L)    Aortic stenosis  10/06 echo mild mr, normal LV  6/09 stress echo negative  8/12 echo normal LV function, EF 65%, mild aortic stenosis, peak gradient 24  7/24/13 normal LV function, EF 60%, mild LVH, mild aortic stenosis, peak gradient 25  3/31/14 cardiac catheterization; left main mild lacking, LAD patent, circumflex free of disease, RCA free of disease, normal LV function, mild aortic stenosis    Abnormal cardiovascular test  10/06 echo mild mitral regurgitation, normal LV  6/09 stress echo negative  8/12 echo normal LV function, EF 65%, mild aortic stenosis, peak gradient 24  7/24/13 normal LV function, EF 60%, mild LVH, mild aortic stenosis, peak gradient 25  3/20/14 seen ER palpitations, atypical chest pain  3/24/14 exercise treadmill carla protocol 5 minutes 5 seconds, 1 mm mild upsloping ST segment depression in V6, flat ST segment depression V4 and V5 compatible with ischemia, no arrhythmia noted  3/28/14 cardiology note, scheduled for cardiac catheterization, cont asa, metoprolol 25 mg bid, crestor 5 mg samples  3/31/14 cardiac catheterization; left main mild plaquing, LAD patent, circumflex free of disease, RCA free of disease, normal LV function, mild aortic stenosis      DPA/Advanced Directive:  Patient does not have an Advanced Directive.  A packet and workshop information was given on Advanced Directives.    ROS:    Gait: Uses no assistive device    Ostomy: no    Other tubes: no    Amputations: no    Chronic oxygen use: no    Last eye exam: 3 weeks ago    Wears hearing aids: no    : Denies incontinence.         Depression Screening    Little interest or pleasure in doing things?  0 - not at all  Feeling down, depressed, or hopeless?  0 - not at all  Patient Health Questionnaire Score: 0  If depressive symptoms identified deferred to follow up visit unless specifically addressed in assessment and plan.    Interpretation of PHQ-9 Total Score   Score Severity   1-4  Minimal Depression   5-9 Mild Depression   10-14 Moderate Depression   15-19 Moderately Severe Depression   20-27 Severe Depression    Screening for Cognitive Impairment    Three Minute Recall (banana, sunrise, fence)  3 object recall 2/3   Draw clock face with all 12 numbers set to the hand to show 10 minutes past 11 o'clock  1 3/5  If cognitive concerns identified deferred to follow up visit unless specifically addressed in assessment and plan.    Fall Risk Assessment    Has the patient had two or more falls in the last year or any fall with injury in the last year?  No  If Fall Risk identified deferred to follow up visit unless specifically addressed in assessment and plan.    Safety Assessment    Throw rugs on floor.  Yes  Handrails on all stairs.  Yes  Good lighting in all hallways.  Yes  Difficulty hearing.  No  Patient counseled about all safety risks that were identified.    Functional Assessment ADLs    Are there any barriers preventing you from cooking for yourself or meeting nutritional needs?  No.    Are there any barriers preventing you from driving safely or obtaining transportation?  No.    Are there any barriers preventing you from using a telephone or calling for help?  No.    Are there any barriers preventing you from shopping?  No.    Are there any barriers preventing you from taking care of your own finances?  No.    Are there any barriers preventing you from managing your medications?  No.    Are currently engaging any exercise or physical activity?  No.       Health Maintenance Summary                Annual Wellness Visit Overdue 6/17/2017      Done 6/16/2016 Visit Dx: Medicare annual wellness visit, subsequent     Patient has more history with this topic...    MAMMOGRAM Next Due 8/31/2017      Done 8/31/2016 MA-SCREEN MAMMO W/CAD-BILAT     Patient has more history with this topic...    BONE DENSITY Next Due 8/19/2020      Done 8/19/2015 DS-BONE DENSITY STUDY (DEXA)     Patient has more history  with this topic...    COLONOSCOPY Next Due 3/7/2022      Done 3/7/2017 REFERRAL TO GI FOR COLONOSCOPY    IMM DTaP/Tdap/Td Vaccine Next Due 7/23/2022      Done 7/23/2012 Imm Admin: Tdap Vaccine          Patient Care Team:  Master Suarez M.D. as PCP - General  Gera Breaux M.D. as Consulting Physician (Ophthalmology)  Amanda Rodriguez M.D. as Consulting Physician (Ophthalmology)  Manoj Tidwell M.D. as Consulting Physician (Orthopaedics)    Social History   Substance Use Topics   • Smoking status: Former Smoker     Types: Cigarettes     Quit date: 06/16/1994   • Smokeless tobacco: Never Used   • Alcohol Use: 0.0 oz/week     0 Standard drinks or equivalent per week      Comment: 1 glass wine/day     Family History   Problem Relation Age of Onset   • Heart Disease Father      CAD MI   • Stroke Father    • Cancer Sister      breast   • Cancer Mother      breast     She  has a past medical history of breast cancer (6/3/2009); Osteopenia (6/3/2009); Dyslipidemia (6/3/2009); Neutropenia (6/3/2009); Preventative health care (6/3/2009); Cardiac murmur (6/3/2009); Breast cancer (CMS-HCC); MEDICAL HOME; Breath shortness; Unspecified cataract; Chronic kidney disease, stage III (moderate) (8/20/2015); Hepatitis A; Cold; Snoring; Cancer (CMS-HCC); and Glaucoma. She also has no past medical history of Congestive heart failure (CMS-HCC), CAD (coronary artery disease), Seizure disorder (CMS-HCC), Liver disease, Psychiatric disorder, COPD, Diabetes, Stroke (CMS-HCC), Hypertension, or ASTHMA.   Past Surgical History   Procedure Laterality Date   • Other     • Cataract phaco with iol  5/14/2009     Performed by GERA BREAUX at [facility]   • Cataract phaco with iol  5/28/2009     Performed by GERA BREAUX at [facility]   • Lumpectomy     • Pr radiation therapy plan simple     • Recovery  3/31/2014     Performed by Cath-Recovery Surgery at [facility]   • Vitrectomy  posterior Left 10/25/2016     Procedure: VITRECTOMY POSTERIOR membrane peel cryotherapy infusion of SF6 intraocular gas;  Surgeon: Amanda Rodriguez M.D.;  [facility]: [facility];  Service:            Exam:      Hearing fair  Dentition fair  Alert, oriented in no acute distress.  Eye contact is good, speech goal directed, affect calm  Lungs clear  Cv s1s2 with systolic ejection murmur  Lower extremity is no edema  Right knee mild edema, no tenderness to palpation, normal flexion and extension    Assessment and Plan. The following treatment and monitoring plan is recommended:    Assessment  #1 wellness assessment    #2 Osteopenia 8/19/15 dexa LS -1.4,hip -1.3;FRAX 40% major,27% hip, off prednisone, does not need bisphosphonate therapy, vitamin D level 50     #3 History polymyalgia 6/21/16 off prednisone; CRP 0.1,ESR 17 no recurrence of symptoms off prednisone    #4 History of breast cancer 1980s left sided s/p lumpectomy and radiation therapy, no old records regarding treatment, last mammogram one year ago negative no evidence of recurrence    #5 Glaucoma Followed by ophthalmology    #6 Dyslipidemia 6/21/16 chol 137,trig 75,hdl 47,ldl 75 diet-controlled     #7 Decrease GFR 6/21/16 bun 19,creat 1.0,GFR 50 stable, no anti-inflammatories, no fluid overload    #8 Chronic neutropenia 6/21/16 wbc 4.4 (50%N,40%L) no recurrent infections, stable, no anemia, no thrombocytopenia    #9 Aortic stenosis 7/24/13 normal LV function, EF 60%, mild LVH, mild aortic stenosis, peak gradient 25 and cath done 3/31/14 cardiac catheterization; left main mild lacking, LAD patent, circumflex free of disease, RCA free of disease, normal LV function, mild aortic stenosis    #10 Abnormal cardiovascular test 3/31/14 cardiac catheterization; left main mild plaquing, LAD patent, circumflex free of disease, RCA free of disease, normal LV function, mild aortic stenosis    #11 Knee pain followed by orthopedics, most recent MRI 3/3/17  MRI right knee abnormal right lateral meniscus with peripheral extrusion, maceration, and complex tear involving primarily the posterior horn and body but also the anterior horn, abnormal medial meniscus with vertical tear of the peripheral zone of body and posterior horn, probable meniscal root tear, and degenerative fraying of the free edge, severe lateral femorotibial compartment osteoarthritis with significant cartilage loss and moderate to severe subchondral edema within the lateral femoral condyle and lateral tibial plateau, mild medial femorotibial and the patellofemoral compartment osteoarthritis, moderate sized joint effusion with associated popliteal cyst, no NSAIDs, no falls    #12 Skin cancer basal cell followed by dermatology    #13 Preventative health  7/23/12 tdap   1/10/13 pneumovax  7/29/14 zoster vaccine  8/6/15 prevnar at [facility]  8/19/15 dexa LS -1.4,hip -1.3;FRAX 40% major,27% hip; only on prednisone one month does not need bisphosphonate therapy  6/21/16 vit d 50  9/1/16 mammogram heterogeneously dense breast tissue recommend screening breast ultrasound  9/1/16 sonocine negative  3/7/17 colon per [facility] 2 polyps, grade 2 internal hemorrhoids, angioectasias ascending colon,pathology one hyperplastic polyp and onesessile serrated polyp, repeat colonoscopy 5 years     Services suggested:   Health Care Screening recommendations as per orders if indicated.  Referrals offered: PT/OT/Nutrition counseling/Behavioral Health/Smoking cessation as per orders if indicated.    Discussion today about general wellness and lifestyle habits:    · Prevent falls and reduce trip hazards; Cautioned about securing or removing rugs.  · Have a working fire alarm and carbon monoxide detector;   · Engage in regular physical activity and social activities       Follow-up  Plan  #! Health maintenance reviewed and updated, has had influenza vaccine last year, pneumococcal 13, 23, shingles, tdap, most recent colonoscopy in  March repeat 5 years    #2 mammogram and dexa in september    #3 labs    #4 echo follow-up uric stenosis    #5 fall precautions knee pain    #6 declines physical therapy for knee pain    #7 declines hearing test    #8 follow up orthopedics  for knee pain    #9 follow up 6 months    #10 use sunscreen, follow-up dermatology, follow-up ophthalmology    #11 continue no NSAIDs if possible ckd    #12 follow-up 6 months    #13 no further workup for chronic neutropenia, no further prednisone for history polymyalgia since no recurrence

## 2017-06-27 ENCOUNTER — TELEPHONE (OUTPATIENT)
Dept: MEDICAL GROUP | Facility: MEDICAL CENTER | Age: 77
End: 2017-06-27

## 2017-06-27 ENCOUNTER — HOSPITAL ENCOUNTER (OUTPATIENT)
Dept: LAB | Facility: MEDICAL CENTER | Age: 77
End: 2017-06-27
Attending: INTERNAL MEDICINE
Payer: MEDICARE

## 2017-06-27 DIAGNOSIS — E78.5 DYSLIPIDEMIA: Chronic | ICD-10-CM

## 2017-06-27 DIAGNOSIS — E55.9 HYPOVITAMINOSIS D: ICD-10-CM

## 2017-06-27 DIAGNOSIS — Z87.39 HISTORY OF POLYMYALGIA RHEUMATICA: ICD-10-CM

## 2017-06-27 LAB
25(OH)D3 SERPL-MCNC: 48 NG/ML (ref 30–100)
ALBUMIN SERPL BCP-MCNC: 3.8 G/DL (ref 3.2–4.9)
ALBUMIN/GLOB SERPL: 1.5 G/DL
ALP SERPL-CCNC: 44 U/L (ref 30–99)
ALT SERPL-CCNC: 14 U/L (ref 2–50)
ANION GAP SERPL CALC-SCNC: 5 MMOL/L (ref 0–11.9)
AST SERPL-CCNC: 19 U/L (ref 12–45)
BASOPHILS # BLD AUTO: 0.7 % (ref 0–1.8)
BASOPHILS # BLD: 0.03 K/UL (ref 0–0.12)
BILIRUB SERPL-MCNC: 0.7 MG/DL (ref 0.1–1.5)
BUN SERPL-MCNC: 14 MG/DL (ref 8–22)
CALCIUM SERPL-MCNC: 9.2 MG/DL (ref 8.5–10.5)
CHLORIDE SERPL-SCNC: 108 MMOL/L (ref 96–112)
CHOLEST SERPL-MCNC: 153 MG/DL (ref 100–199)
CK SERPL-CCNC: 66 U/L (ref 0–154)
CO2 SERPL-SCNC: 29 MMOL/L (ref 20–33)
CREAT SERPL-MCNC: 1.05 MG/DL (ref 0.5–1.4)
CRP SERPL HS-MCNC: 0.09 MG/DL (ref 0–0.75)
EOSINOPHIL # BLD AUTO: 0.07 K/UL (ref 0–0.51)
EOSINOPHIL NFR BLD: 1.7 % (ref 0–6.9)
ERYTHROCYTE [DISTWIDTH] IN BLOOD BY AUTOMATED COUNT: 45 FL (ref 35.9–50)
ERYTHROCYTE [SEDIMENTATION RATE] IN BLOOD BY WESTERGREN METHOD: 12 MM/HOUR (ref 0–30)
GFR SERPL CREATININE-BSD FRML MDRD: 51 ML/MIN/1.73 M 2
GLOBULIN SER CALC-MCNC: 2.5 G/DL (ref 1.9–3.5)
GLUCOSE SERPL-MCNC: 100 MG/DL (ref 65–99)
HCT VFR BLD AUTO: 41.9 % (ref 37–47)
HDLC SERPL-MCNC: 54 MG/DL
HGB BLD-MCNC: 13.9 G/DL (ref 12–16)
IMM GRANULOCYTES # BLD AUTO: 0.01 K/UL (ref 0–0.11)
IMM GRANULOCYTES NFR BLD AUTO: 0.2 % (ref 0–0.9)
LDLC SERPL CALC-MCNC: 80 MG/DL
LYMPHOCYTES # BLD AUTO: 1.48 K/UL (ref 1–4.8)
LYMPHOCYTES NFR BLD: 35.2 % (ref 22–41)
MCH RBC QN AUTO: 31.3 PG (ref 27–33)
MCHC RBC AUTO-ENTMCNC: 33.2 G/DL (ref 33.6–35)
MCV RBC AUTO: 94.4 FL (ref 81.4–97.8)
MONOCYTES # BLD AUTO: 0.29 K/UL (ref 0–0.85)
MONOCYTES NFR BLD AUTO: 6.9 % (ref 0–13.4)
NEUTROPHILS # BLD AUTO: 2.33 K/UL (ref 2–7.15)
NEUTROPHILS NFR BLD: 55.3 % (ref 44–72)
NRBC # BLD AUTO: 0 K/UL
NRBC BLD AUTO-RTO: 0 /100 WBC
PLATELET # BLD AUTO: 197 K/UL (ref 164–446)
PMV BLD AUTO: 10.8 FL (ref 9–12.9)
POTASSIUM SERPL-SCNC: 4.1 MMOL/L (ref 3.6–5.5)
PROT SERPL-MCNC: 6.3 G/DL (ref 6–8.2)
RBC # BLD AUTO: 4.44 M/UL (ref 4.2–5.4)
SODIUM SERPL-SCNC: 142 MMOL/L (ref 135–145)
TRIGL SERPL-MCNC: 94 MG/DL (ref 0–149)
TSH SERPL DL<=0.005 MIU/L-ACNC: 1.1 UIU/ML (ref 0.3–3.7)
WBC # BLD AUTO: 4.2 K/UL (ref 4.8–10.8)

## 2017-06-27 PROCEDURE — 80053 COMPREHEN METABOLIC PANEL: CPT

## 2017-06-27 PROCEDURE — 82306 VITAMIN D 25 HYDROXY: CPT

## 2017-06-27 PROCEDURE — 82550 ASSAY OF CK (CPK): CPT

## 2017-06-27 PROCEDURE — 84443 ASSAY THYROID STIM HORMONE: CPT

## 2017-06-27 PROCEDURE — 36415 COLL VENOUS BLD VENIPUNCTURE: CPT

## 2017-06-27 PROCEDURE — 85652 RBC SED RATE AUTOMATED: CPT

## 2017-06-27 PROCEDURE — 86140 C-REACTIVE PROTEIN: CPT

## 2017-06-27 PROCEDURE — 85025 COMPLETE CBC W/AUTO DIFF WBC: CPT

## 2017-06-27 PROCEDURE — 80061 LIPID PANEL: CPT

## 2017-07-09 ENCOUNTER — RESOLUTE PROFESSIONAL BILLING HOSPITAL PROF FEE (OUTPATIENT)
Dept: HOSPITALIST | Facility: MEDICAL CENTER | Age: 77
End: 2017-07-09
Payer: MEDICARE

## 2017-07-09 ENCOUNTER — APPOINTMENT (OUTPATIENT)
Dept: RADIOLOGY | Facility: MEDICAL CENTER | Age: 77
End: 2017-07-09
Attending: INTERNAL MEDICINE
Payer: MEDICARE

## 2017-07-09 ENCOUNTER — APPOINTMENT (OUTPATIENT)
Dept: RADIOLOGY | Facility: MEDICAL CENTER | Age: 77
End: 2017-07-09
Attending: EMERGENCY MEDICINE
Payer: MEDICARE

## 2017-07-09 ENCOUNTER — HOSPITAL ENCOUNTER (OUTPATIENT)
Facility: MEDICAL CENTER | Age: 77
End: 2017-07-10
Attending: EMERGENCY MEDICINE | Admitting: HOSPITALIST
Payer: MEDICARE

## 2017-07-09 PROBLEM — R07.9 CHEST PAIN: Status: ACTIVE | Noted: 2017-07-09

## 2017-07-09 PROBLEM — R13.10 DYSPHAGIA: Status: ACTIVE | Noted: 2017-07-09

## 2017-07-09 LAB
ALBUMIN SERPL BCP-MCNC: 4.1 G/DL (ref 3.2–4.9)
ALBUMIN/GLOB SERPL: 1.5 G/DL
ALP SERPL-CCNC: 42 U/L (ref 30–99)
ALT SERPL-CCNC: 17 U/L (ref 2–50)
ANION GAP SERPL CALC-SCNC: 9 MMOL/L (ref 0–11.9)
AST SERPL-CCNC: 24 U/L (ref 12–45)
BASOPHILS # BLD AUTO: 0.6 % (ref 0–1.8)
BASOPHILS # BLD: 0.03 K/UL (ref 0–0.12)
BILIRUB SERPL-MCNC: 0.6 MG/DL (ref 0.1–1.5)
BUN SERPL-MCNC: 17 MG/DL (ref 8–22)
CALCIUM SERPL-MCNC: 9.1 MG/DL (ref 8.4–10.2)
CHLORIDE SERPL-SCNC: 104 MMOL/L (ref 96–112)
CHOLEST SERPL-MCNC: 173 MG/DL (ref 100–199)
CO2 SERPL-SCNC: 25 MMOL/L (ref 20–33)
CREAT SERPL-MCNC: 1.11 MG/DL (ref 0.5–1.4)
EOSINOPHIL # BLD AUTO: 0.11 K/UL (ref 0–0.51)
EOSINOPHIL NFR BLD: 2.2 % (ref 0–6.9)
ERYTHROCYTE [DISTWIDTH] IN BLOOD BY AUTOMATED COUNT: 45.1 FL (ref 35.9–50)
GFR SERPL CREATININE-BSD FRML MDRD: 47 ML/MIN/1.73 M 2
GLOBULIN SER CALC-MCNC: 2.8 G/DL (ref 1.9–3.5)
GLUCOSE SERPL-MCNC: 121 MG/DL (ref 65–99)
HCT VFR BLD AUTO: 42.1 % (ref 37–47)
HDLC SERPL-MCNC: 57 MG/DL
HGB BLD-MCNC: 14.3 G/DL (ref 12–16)
IMM GRANULOCYTES # BLD AUTO: 0.02 K/UL (ref 0–0.11)
IMM GRANULOCYTES NFR BLD AUTO: 0.4 % (ref 0–0.9)
LDLC SERPL CALC-MCNC: 101 MG/DL
LV EJECT FRACT  99904: 60
LV EJECT FRACT MOD 2C 99903: 71.03
LV EJECT FRACT MOD 4C 99902: 67.65
LV EJECT FRACT MOD BP 99901: 70.73
LYMPHOCYTES # BLD AUTO: 2.17 K/UL (ref 1–4.8)
LYMPHOCYTES NFR BLD: 43 % (ref 22–41)
MAGNESIUM SERPL-MCNC: 2 MG/DL (ref 1.5–2.5)
MCH RBC QN AUTO: 31.7 PG (ref 27–33)
MCHC RBC AUTO-ENTMCNC: 34 G/DL (ref 33.6–35)
MCV RBC AUTO: 93.3 FL (ref 81.4–97.8)
MONOCYTES # BLD AUTO: 0.42 K/UL (ref 0–0.85)
MONOCYTES NFR BLD AUTO: 8.3 % (ref 0–13.4)
NEUTROPHILS # BLD AUTO: 2.3 K/UL (ref 2–7.15)
NEUTROPHILS NFR BLD: 45.5 % (ref 44–72)
NRBC # BLD AUTO: 0 K/UL
NRBC BLD AUTO-RTO: 0 /100 WBC
PLATELET # BLD AUTO: 186 K/UL (ref 164–446)
PMV BLD AUTO: 9.7 FL (ref 9–12.9)
POTASSIUM SERPL-SCNC: 3.8 MMOL/L (ref 3.6–5.5)
PROT SERPL-MCNC: 6.9 G/DL (ref 6–8.2)
RBC # BLD AUTO: 4.51 M/UL (ref 4.2–5.4)
SODIUM SERPL-SCNC: 138 MMOL/L (ref 135–145)
TRIGL SERPL-MCNC: 73 MG/DL (ref 0–149)
TROPONIN I SERPL-MCNC: <0.02 NG/ML (ref 0–0.04)
TSH SERPL DL<=0.005 MIU/L-ACNC: 3.22 UIU/ML (ref 0.35–5.5)
WBC # BLD AUTO: 5.1 K/UL (ref 4.8–10.8)

## 2017-07-09 PROCEDURE — 93306 TTE W/DOPPLER COMPLETE: CPT | Mod: 26 | Performed by: INTERNAL MEDICINE

## 2017-07-09 PROCEDURE — 700102 HCHG RX REV CODE 250 W/ 637 OVERRIDE(OP): Performed by: INTERNAL MEDICINE

## 2017-07-09 PROCEDURE — 85025 COMPLETE CBC W/AUTO DIFF WBC: CPT

## 2017-07-09 PROCEDURE — 83735 ASSAY OF MAGNESIUM: CPT

## 2017-07-09 PROCEDURE — 94760 N-INVAS EAR/PLS OXIMETRY 1: CPT

## 2017-07-09 PROCEDURE — 80053 COMPREHEN METABOLIC PANEL: CPT

## 2017-07-09 PROCEDURE — 700111 HCHG RX REV CODE 636 W/ 250 OVERRIDE (IP): Performed by: INTERNAL MEDICINE

## 2017-07-09 PROCEDURE — G0378 HOSPITAL OBSERVATION PER HR: HCPCS

## 2017-07-09 PROCEDURE — 99220 PR INITIAL OBSERVATION CARE,LEVL III: CPT | Performed by: INTERNAL MEDICINE

## 2017-07-09 PROCEDURE — 700111 HCHG RX REV CODE 636 W/ 250 OVERRIDE (IP)

## 2017-07-09 PROCEDURE — 93005 ELECTROCARDIOGRAM TRACING: CPT | Performed by: EMERGENCY MEDICINE

## 2017-07-09 PROCEDURE — A9270 NON-COVERED ITEM OR SERVICE: HCPCS | Performed by: INTERNAL MEDICINE

## 2017-07-09 PROCEDURE — 80061 LIPID PANEL: CPT

## 2017-07-09 PROCEDURE — 83036 HEMOGLOBIN GLYCOSYLATED A1C: CPT

## 2017-07-09 PROCEDURE — 71010 DX-CHEST-PORTABLE (1 VIEW): CPT

## 2017-07-09 PROCEDURE — 700105 HCHG RX REV CODE 258: Performed by: INTERNAL MEDICINE

## 2017-07-09 PROCEDURE — 84443 ASSAY THYROID STIM HORMONE: CPT

## 2017-07-09 PROCEDURE — 84484 ASSAY OF TROPONIN QUANT: CPT | Mod: 91

## 2017-07-09 PROCEDURE — 93306 TTE W/DOPPLER COMPLETE: CPT

## 2017-07-09 PROCEDURE — A9502 TC99M TETROFOSMIN: HCPCS

## 2017-07-09 PROCEDURE — 99285 EMERGENCY DEPT VISIT HI MDM: CPT

## 2017-07-09 PROCEDURE — 36415 COLL VENOUS BLD VENIPUNCTURE: CPT

## 2017-07-09 RX ORDER — ASPIRIN 81 MG/1
324 TABLET, CHEWABLE ORAL DAILY
Status: DISCONTINUED | OUTPATIENT
Start: 2017-07-09 | End: 2017-07-10 | Stop reason: HOSPADM

## 2017-07-09 RX ORDER — ASPIRIN 81 MG/1
324 TABLET, CHEWABLE ORAL ONCE
Status: DISCONTINUED | OUTPATIENT
Start: 2017-07-09 | End: 2017-07-09

## 2017-07-09 RX ORDER — ONDANSETRON 2 MG/ML
4 INJECTION INTRAMUSCULAR; INTRAVENOUS EVERY 4 HOURS PRN
Status: DISCONTINUED | OUTPATIENT
Start: 2017-07-09 | End: 2017-07-10 | Stop reason: HOSPADM

## 2017-07-09 RX ORDER — ASPIRIN 325 MG
325 TABLET ORAL DAILY
Status: DISCONTINUED | OUTPATIENT
Start: 2017-07-09 | End: 2017-07-10 | Stop reason: HOSPADM

## 2017-07-09 RX ORDER — REGADENOSON 0.08 MG/ML
INJECTION, SOLUTION INTRAVENOUS
Status: COMPLETED
Start: 2017-07-09 | End: 2017-07-09

## 2017-07-09 RX ORDER — HEPARIN SODIUM 5000 [USP'U]/ML
5000 INJECTION, SOLUTION INTRAVENOUS; SUBCUTANEOUS EVERY 8 HOURS
Status: DISCONTINUED | OUTPATIENT
Start: 2017-07-09 | End: 2017-07-10 | Stop reason: HOSPADM

## 2017-07-09 RX ORDER — POLYETHYLENE GLYCOL 3350 17 G/17G
1 POWDER, FOR SOLUTION ORAL
Status: DISCONTINUED | OUTPATIENT
Start: 2017-07-09 | End: 2017-07-10 | Stop reason: HOSPADM

## 2017-07-09 RX ORDER — ONDANSETRON 4 MG/1
4 TABLET, ORALLY DISINTEGRATING ORAL EVERY 4 HOURS PRN
Status: DISCONTINUED | OUTPATIENT
Start: 2017-07-09 | End: 2017-07-10 | Stop reason: HOSPADM

## 2017-07-09 RX ORDER — LATANOPROST 50 UG/ML
1 SOLUTION/ DROPS OPHTHALMIC EVERY EVENING
Status: DISCONTINUED | OUTPATIENT
Start: 2017-07-09 | End: 2017-07-10 | Stop reason: HOSPADM

## 2017-07-09 RX ORDER — ACETAMINOPHEN 500 MG
1000 TABLET ORAL EVERY 6 HOURS PRN
COMMUNITY
End: 2018-04-26

## 2017-07-09 RX ORDER — NITROGLYCERIN 0.4 MG/1
0.4 TABLET SUBLINGUAL
Status: DISCONTINUED | OUTPATIENT
Start: 2017-07-09 | End: 2017-07-10 | Stop reason: HOSPADM

## 2017-07-09 RX ORDER — AMOXICILLIN 250 MG
2 CAPSULE ORAL 2 TIMES DAILY
Status: DISCONTINUED | OUTPATIENT
Start: 2017-07-09 | End: 2017-07-10 | Stop reason: HOSPADM

## 2017-07-09 RX ORDER — SODIUM CHLORIDE 9 MG/ML
INJECTION, SOLUTION INTRAVENOUS CONTINUOUS
Status: ACTIVE | OUTPATIENT
Start: 2017-07-09 | End: 2017-07-10

## 2017-07-09 RX ORDER — BISACODYL 10 MG
10 SUPPOSITORY, RECTAL RECTAL
Status: DISCONTINUED | OUTPATIENT
Start: 2017-07-09 | End: 2017-07-10 | Stop reason: HOSPADM

## 2017-07-09 RX ORDER — LISINOPRIL 5 MG/1
5 TABLET ORAL
Status: DISCONTINUED | OUTPATIENT
Start: 2017-07-09 | End: 2017-07-10 | Stop reason: HOSPADM

## 2017-07-09 RX ORDER — ASPIRIN 600 MG/1
300 SUPPOSITORY RECTAL DAILY
Status: DISCONTINUED | OUTPATIENT
Start: 2017-07-09 | End: 2017-07-10 | Stop reason: HOSPADM

## 2017-07-09 RX ORDER — ACETAMINOPHEN 500 MG
1000 TABLET ORAL EVERY 6 HOURS PRN
Status: DISCONTINUED | OUTPATIENT
Start: 2017-07-09 | End: 2017-07-10 | Stop reason: HOSPADM

## 2017-07-09 RX ADMIN — LATANOPROST 1 DROP: 50 SOLUTION OPHTHALMIC at 22:28

## 2017-07-09 RX ADMIN — SODIUM CHLORIDE: 9 INJECTION, SOLUTION INTRAVENOUS at 09:15

## 2017-07-09 RX ADMIN — HEPARIN SODIUM 5000 UNITS: 5000 INJECTION, SOLUTION INTRAVENOUS; SUBCUTANEOUS at 22:28

## 2017-07-09 RX ADMIN — HEPARIN SODIUM 5000 UNITS: 5000 INJECTION, SOLUTION INTRAVENOUS; SUBCUTANEOUS at 09:16

## 2017-07-09 RX ADMIN — REGADENOSON 0.4 MG: 0.08 INJECTION, SOLUTION INTRAVENOUS at 12:12

## 2017-07-09 ASSESSMENT — LIFESTYLE VARIABLES: EVER_SMOKED: YES

## 2017-07-09 ASSESSMENT — PAIN SCALES - GENERAL
PAINLEVEL_OUTOF10: 5
PAINLEVEL_OUTOF10: 0
PAINLEVEL_OUTOF10: 0
PAINLEVEL_OUTOF10: 2
PAINLEVEL_OUTOF10: 0
PAINLEVEL_OUTOF10: 5

## 2017-07-09 NOTE — ED NOTES
"Chief Complaint   Patient presents with   • Chest Pressure     Started 4 days ago, epigastric region, does not radiate   • Shortness of Breath     With exertion   • Nausea     Describes pressure as \"a brick on my chest.\"       "

## 2017-07-09 NOTE — IP AVS SNAPSHOT
7/10/2017    Katerina Torres  [address]  [place] [address]    Dear Katerina:    [facility] wants to ensure your discharge home is safe and you or your loved ones have had all of your questions answered regarding your care after you leave the hospital.    Below is a list of resources and contact information should you have any questions regarding your hospital stay, follow-up instructions, or active medical symptoms.    Questions or Concerns Regarding… Contact   Medical Questions Related to Your Discharge  (7 days a week, 8am-5pm) Contact a Nurse Care Coordinator   [phone]   Medical Questions Not Related to Your Discharge  (24 hours a day / 7 days a week)  Contact the Nurse Health Line   [phone]    Medications or Discharge Instructions Refer to your discharge packet   or contact your [facility] Primary Care Provider   [phone]   Follow-up Appointment(s) Schedule your appointment via Getonic   or contact Scheduling [phone]   Billing Review your statement via Getonic  or contact Billing [phone]   Medical Records Review your records via Getonic   or contact Medical Records [phone]     You may receive a telephone call within two days of discharge. This call is to make certain you understand your discharge instructions and have the opportunity to have any questions answered. You can also easily access your medical information, test results and upcoming appointments via the Getonic free online health management tool. You can learn more and sign up at [employer]/Getonic. For assistance setting up your Getonic account, please call [phone].    Once again, we want to ensure your discharge home is safe and that you have a clear understanding of any next steps in your care. If you have any questions or concerns, please do not hesitate to contact us, we are here for you. Thank you for choosing [facility] for your healthcare needs.    Sincerely,    Your [facility] Healthcare Team

## 2017-07-09 NOTE — PROGRESS NOTES
Bedside report given to Donald RN. Plan of care discussed. Pt resting comfortably in bed with safety precautions in place.

## 2017-07-09 NOTE — PROGRESS NOTES
"Med rec updated and complete  Allergies reviewed  Pt states \"No antibiotics in the last 30 days\".  Pt states \"My  is going to bring in my LUMIGAN 0.01% drop today (7/9/2017)\".  Pt states \"I take an ASPRIN 81MG every night, I took 1 tablet at 2300 on 7/8/2017\".  \"I was not feeling right so I took 2 more tablets 7/9/2017 @ 0300\".    "

## 2017-07-09 NOTE — IP AVS SNAPSHOT
Takeacoder Access Code: Activation code not generated  Current Takeacoder Status: Active    [employer]  A secure, online tool to manage your health information     [employer]’s Takeacoder® is a secure, online tool that connects you to your personalized health information from the privacy of your home -- day or night - making it very easy for you to manage your healthcare. Once the activation process is completed, you can even access your medical information using the Takeacoder huong, which is available for free in the Apple Huong store or Google Play store.     Takeacoder provides the following levels of access (as shown below):   My Chart Features   [facility] Primary Care Doctor [facility]  Specialists [facility]  Urgent  Care Non-[facility]  Primary Care  Doctor   Email your healthcare team securely and privately 24/7 X X X X   Manage appointments: schedule your next appointment; view details of past/upcoming appointments X      Request prescription refills. X      View recent personal medical records, including lab and immunizations X X X X   View health record, including health history, allergies, medications X X X X   Read reports about your outpatient visits, procedures, consult and ER notes X X X X   See your discharge summary, which is a recap of your hospital and/or ER visit that includes your diagnosis, lab results, and care plan. X X       How to register for Takeacoder:  1. Go to  [email or web address].  2. Click on the Sign Up Now box, which takes you to the New Member Sign Up page. You will need to provide the following information:  a. Enter your Takeacoder Access Code exactly as it appears at the top of this page. (You will not need to use this code after you’ve completed the sign-up process. If you do not sign up before the expiration date, you must request a new code.)   b. Enter your date of birth.   c. Enter your home email address.   d. Click Submit, and follow the next screen’s instructions.  3. Create a Takeacoder ID. This will  be your DooBop login ID and cannot be changed, so think of one that is secure and easy to remember.  4. Create a DooBop password. You can change your password at any time.  5. Enter your Password Reset Question and Answer. This can be used at a later time if you forget your password.   6. Enter your e-mail address. This allows you to receive e-mail notifications when new information is available in DooBop.  7. Click Sign Up. You can now view your health information.    For assistance activating your DooBop account, call [phone]

## 2017-07-09 NOTE — ED PROVIDER NOTES
ED Provider Note    CHIEF COMPLAINT  Chief Complaint   Patient presents with   • Chest Pressure     Started 4 days ago, epigastric region, does not radiate   • Shortness of Breath     With exertion   • Nausea       HPI  Katerina Torres is a 77 y.o. female who presents here for evaluation of chest pressure. The patient states that her symptoms began 4 days ago. The patient describes them as intermittent in nature, feeling as though she has a large stone sitting on top of her chest. The patient states that these symptoms do not necessarily correlate well with exertion, but the patient states that she has been intermittently ignoring the symptoms over the last 4 days but secondary to waking up with it this evening at 2:00 this morning, the patient was concerned, and secondary to this decided to present here for evaluation. She describes some associated shortness of breath, but describes it more as a chest tightness making it feel as though it's difficult to breathe though she does feel like she can get a full breath. The patient denies association of the pain with radiation to any extremity, jaw, neck, or other location however she does endorse associated nausea without emesis.    REVIEW OF SYSTEMS   Patient denies fever, vision change, sore throat, endorses chest pain, shortness of breath, nausea, denies dysuria, focal muscle pain, rashes, or neurologic deficits     PAST MEDICAL HISTORY   has a past medical history of breast cancer (6/3/2009); Osteopenia (6/3/2009); Dyslipidemia (6/3/2009); Neutropenia (6/3/2009); Preventative health care (6/3/2009); Cardiac murmur (6/3/2009); [place] [place]; Breath shortness; Unspecified cataract; Chronic kidney disease, stage III (moderate) (8/20/2015); Hepatitis A; Cold; Snoring; Cancer ([facility]); and Glaucoma.    SOCIAL HISTORY  Social History     Social History Main Topics   • Smoking status: Former Smoker     Types: Cigarettes     Quit date:  "06/16/1994   • Smokeless tobacco: Never Used   • Alcohol Use: 0.0 oz/week     0 Standard drinks or equivalent per week      Comment: 1 glass wine/day   • Drug Use: No   • Sexual Activity: Not Currently       SURGICAL HISTORY   has past surgical history that includes other; cataract phaco with iol (5/14/2009); cataract phaco with iol (5/28/2009); lumpectomy; radiation therapy plan simple; recovery (3/31/2014); and vitrectomy posterior (Left, 10/25/2016).    CURRENT MEDICATIONS  Home Medications     Reviewed by Sumi Vallecillo R.N. (Registered Nurse) on 07/09/17 at 0431  Med List Status: Complete    Medication Last Dose Status    aspirin EC (ECOTRIN) 81 MG Tablet Delayed Response 7/9/2017 Active    Bimatoprost (LUMIGAN) 0.01 % SOLN 7/8/2017 Active    Magnesium 500 MG Tab 7/8/2017 Active    vitamin D (CHOLECALCIFEROL) 1000 UNIT Tab 7/8/2017 Active                ALLERGIES  Allergies   Allergen Reactions   • Vicodin [Hydrocodone-Acetaminophen] Anaphylaxis     Nausea, dizziness       PHYSICAL EXAM  VITAL SIGNS: /85 mmHg  Pulse 62  Temp(Src) 36.2 °C (97.1 °F)  Resp 16  Ht 1.6 m (5' 3\")  Wt 67.4 kg (148 lb 9.4 oz)  BMI 26.33 kg/m2  SpO2 96%   Pulse ox interpretation: I interpret this pulse ox as normal.  Constitutional: Alert in no apparent distress.  HENT: Head normocephalic, atraumatic, Bilateral external ears normal, Nose normal.  Eyes: Pupils are equal, extraocular movements intact, Conjunctiva normal, Non-icteric.   Neck: Normal range of motion, Supple, No stridor.   Lymphatic: No lymphadenopathy noted.   Cardiovascular: Regular rate and rhythm, holosystolic heard best at the left 2nd intercostal space   Thorax & Lungs: Lungs clear to auscultation bilaterally, No acute respiratory distress, No wheezing, No increased work of breathing.   Abdomen: Non-distended   Skin: Warm, Dry, No erythema, No rash.   Back: No bony tenderness, No CVA tenderness.   Extremities: Intact distal pulses, No edema, No " tenderness, No cyanosis   Musculoskeletal: Good range of motion in all major joints. No tenderness to palpation or major deformities noted.   Neurologic: Alert , Normal motor function, Normal sensory function, No focal deficits noted.   Psychiatric: Affect normal, Judgment normal, Mood normal.       DIAGNOSTIC STUDIES / PROCEDURES    EKG  First-degree heart block with a ND interval of 212, otherwise sinus rhythm, otherwise normal intervals, no ST or T-wave changes consistent with ischemia  General impression: First-degree heart block otherwise normal    LABS  Labs Reviewed   CBC WITH DIFFERENTIAL - Abnormal; Notable for the following:     Lymphocytes 43.00 (*)     All other components within normal limits   COMP METABOLIC PANEL - Abnormal; Notable for the following:     Glucose 121 (*)     All other components within normal limits   ESTIMATED GFR - Abnormal; Notable for the following:     GFR If  57 (*)     GFR If Non  47 (*)     All other components within normal limits   TROPONIN       RADIOLOGY  DX-CHEST-PORTABLE (1 VIEW)   Final Result      No acute cardiopulmonary abnormality identified.          COURSE & MEDICAL DECISION MAKING  Pertinent Labs & Imaging studies reviewed. (See chart for details)    5:20 AM Spoke with Dr. Collins regarding the patient he will accept the patient for admission.     Patient presenting here for evaluation of chest pain and shortness of breath. The patient here is fairly well-appearing however considerations included acute MI, pericarditis, gastritis, and costochondritis. Patient has no pain with palpation of the sternum making costochondritis less likely, and the patient has no ST segment elevation on EKG to suggest pericarditis. The patient may have gastritis however given the fact that I cannot at this time rule out unstable angina, I think the patient would benefit from admission for further troponin trending and provocative testing. The patient  here has a heart score of 5, making her risk of major adverse cardiac event in the next 6 weeks not insignificant. The patient here however has a EKG and troponin within normal limits, though again think that she would benefit from the aforementioned workup. Given this, the patient will be admitted for further troponin trending and provocative testing on an inpatient basis.    The patient will return for worsening symptoms and is stable at the time of discharge. The patient verbalizes understanding.    The patient is referred to a primary physician for blood pressure management, diabetic screening, and for all other preventative health concerns should they be present.     FINAL IMPRESSION  1. Chest pain, other  2.   3.          Electronically signed by: Mo Car, 7/9/2017 4:37 AM    This record was made with a voice recognition software. I have tried to correct any grammar, spelling or context errors to the best of my ability, but errors may still remain. Interpretation of this chart should be taken in this context.

## 2017-07-09 NOTE — PROGRESS NOTES
Nursing care plan includes knowledge deficit, potential for discomfort, potential for compromised cardiac output. POC includes teaching, comfort measures and reassurance, and access to code cart, cardiology stand by and availability of rapid response team. Pt verbalizes good understanding of benefits and risks of pharmacological cardiac stress test. Informed consent obtained. Lexiscan given, pt developed the following symptoms light headedness and HA. VS stable, major symptoms resolved. To waiting room, caffeinated fluids and/or snacks given, awaiting second scan. Nursing goals met.

## 2017-07-09 NOTE — IP AVS SNAPSHOT
" <p align=\"LEFT\"><IMG SRC=\"//[email or web address]$/[email or web address]\" alt=\"Image\" WIDTH=\"50%\" HEIGHT=\"200\" BORDER=\"\"></p>                   Name:Katerina Torres  Medical Record Number:[id number]  CSN: [id number]    YOB: 1940   Age: 77 y.o.  Sex: female  HT:1.6 m (5' 3\") WT: 67.4 kg (148 lb 9.4 oz)          Admit Date: 7/9/2017     Discharge Date:   Today's Date: 7/10/2017  Attending Doctor:  Beltran Perrin M.D.                  Allergies:  Vicodin          Your appointments     Jul 13, 2017  9:30 AM   CARE MANAGER TELEPHONE VISIT with CARE MANAGER   [address] [address] [phone]           ***IMPORTANT**** This is a phone visit only. Do not come into the office. The Care Manager will call you at the scheduled time for Care Manager Telephone Visit.            Jul 14, 2017  1:00 PM   Established Patient with ELVIS Wilkinson   [address] [id number] [phone]           You will be receiving a confirmation call a few days before your appointment from our automated call confirmation system.            Jul 26, 2017  9:45 AM   ECHO with ECHO [facility]   ECHOCARDIOLOGY [facility])    [address] [facility] [id number] [phone]              Follow-up Information     1. Follow up with Master Suarez M.D. In 2 weeks.    Specialty:  Internal Medicine    Why:  Admission for chest pain and swallowing issues    Contact information    [address]  [facility] [id number] [phone]           Medication List      Take these Medications        Instructions    acetaminophen 500 MG Tabs   Commonly known as:  TYLENOL    Take 1,000 mg by mouth every 6 hours as needed for Moderate Pain.   Dose:  1000 mg       aspirin EC 81 MG Tbec   Commonly known as:  ECOTRIN    Take 81 mg by mouth every day.   Dose:  81 mg       lisinopril 5 MG Tabs   Commonly known as:  " PRINIVIL    Take 1 Tab by mouth every day.   Dose:  5 mg       LUMIGAN 0.01 % Soln   Generic drug:  bimatoprost    Place 1 Drop in both eyes every bedtime. Indications: Wide-Angle Glaucoma   Dose:  1 Drop       Magnesium 500 MG Tabs    Take 1 Cap by mouth every day.   Dose:  1 Cap       nitroglycerin 0.4 MG Subl   Commonly known as:  NITROSTAT    Place 1 Tab under tongue as needed for Chest Pain.   Dose:  0.4 mg       vitamin D 1000 UNIT Tabs   Commonly known as:  cholecalciferol    Take 1,000 Units by mouth every day.   Dose:  1000 Units

## 2017-07-09 NOTE — IP AVS SNAPSHOT
" Home Care Instructions                                                                                                                  Name:Katerina Torres  Medical Record Number:[id number]  CSN: [id number]    YOB: 1940   Age: 77 y.o.  Sex: female  HT:1.6 m (5' 3\") WT: 67.4 kg (148 lb 9.4 oz)          Admit Date: 7/9/2017     Discharge Date:   Today's Date: 7/10/2017  Attending Doctor:  Beltran Perrin M.D.                  Allergies:  Vicodin            Discharge Instructions       Discharge Instructions    Discharged to home by car with relative. Discharged via walking, hospital escort: Yes.  Special equipment needed: Not Applicable    Be sure to schedule a follow-up appointment with your primary care doctor or any specialists as instructed.     Discharge Plan:   Diet Plan: Discussed  Activity Level: Discussed  Confirmed Follow up Appointment: Patient to Call and Schedule Appointment  Confirmed Symptoms Management: Discussed  Medication Reconciliation Updated: Yes    I understand that a diet low in cholesterol, fat, and sodium is recommended for good health. Unless I have been given specific instructions below for another diet, I accept this instruction as my diet prescription.   Other diet: keep yourself hydrated.    Special Instructions:   Managing Your High Blood Pressure  Blood pressure is a measurement of how forceful your blood is pressing against the walls of the arteries. Arteries are muscular tubes within the circulatory system. Blood pressure does not stay the same. Blood pressure rises when you are active, excited, or nervous; and it lowers during sleep and relaxation. If the numbers measuring your blood pressure stay above normal most of the time, you are at risk for health problems. High blood pressure (hypertension) is a long-term (chronic) condition in which blood pressure is elevated.  A blood pressure reading is recorded as two numbers, such as 120 over 80 (or 120/80). The " first, higher number is called the systolic pressure. It is a measure of the pressure in your arteries as the heart beats. The second, lower number is called the diastolic pressure. It is a measure of the pressure in your arteries as the heart relaxes between beats.   Keeping your blood pressure in a normal range is important to your overall health and prevention of health problems, such as heart disease and stroke. When your blood pressure is uncontrolled, your heart has to work harder than normal. High blood pressure is a very common condition in adults because blood pressure tends to rise with age. Men and women are equally likely to have hypertension but at different times in life. Before age 45, men are more likely to have hypertension. After 65 years of age, women are more likely to have it. Hypertension is especially common in  Americans. This condition often has no signs or symptoms. The cause of the condition is usually not known. Your caregiver can help you come up with a plan to keep your blood pressure in a normal, healthy range.  BLOOD PRESSURE STAGES  Blood pressure is classified into four stages: normal, prehypertension, stage 1, and stage 2. Your blood pressure reading will be used to determine what type of treatment, if any, is necessary. Appropriate treatment options are tied to these four stages:   Normal  · Systolic pressure (mm Hg): below 120.  · Diastolic pressure (mm Hg): below 80.  Prehypertension  · Systolic pressure (mm Hg): 120 to 139.  · Diastolic pressure (mm Hg): 80 to 89.  Stage 1  · Systolic pressure (mm Hg): 140 to 159.  · Diastolic pressure (mm Hg): 90 to 99.  Stage 2  · Systolic pressure (mm Hg): 160 or above.  · Diastolic pressure (mm Hg): 100 or above.  RISKS RELATED TO HIGH BLOOD PRESSURE  Managing your blood pressure is an important responsibility. Uncontrolled high blood pressure can lead to:  · A heart attack.  · A stroke.  · A weakened blood vessel  (aneurysm).  · Heart failure.  · Kidney damage.  · Eye damage.  · Metabolic syndrome.  · Memory and concentration problems.  HOW TO MANAGE YOUR BLOOD PRESSURE  Blood pressure can be managed effectively with lifestyle changes and medicines (if needed). Your caregiver will help you come up with a plan to bring your blood pressure within a normal range. Your plan should include the following:  Education  · Read all information provided by your caregivers about how to control blood pressure.  · Educate yourself on the latest guidelines and treatment recommendations. New research is always being done to further define the risks and treatments for high blood pressure.  Lifestyle changes  · Control your weight.  · Avoid smoking.  · Stay physically active.  · Reduce the amount of salt in your diet.  · Reduce stress.  · Control any chronic conditions, such as high cholesterol or diabetes.  · Reduce your alcohol intake.  Medicines  · Several medicines (antihypertensive medicines) are available, if needed, to bring blood pressure within a normal range.  Communication  · Review all the medicines you take with your caregiver because there may be side effects or interactions.  · Talk with your caregiver about your diet, exercise habits, and other lifestyle factors that may be contributing to high blood pressure.  · See your caregiver regularly. Your caregiver can help you create and adjust your plan for managing high blood pressure.  RECOMMENDATIONS FOR TREATMENT AND FOLLOW-UP   The following recommendations are based on current guidelines for managing high blood pressure in nonpregnant adults. Use these recommendations to identify the proper follow-up period or treatment option based on your blood pressure reading. You can discuss these options with your caregiver.  · Systolic pressure of 120 to 139 or diastolic pressure of 80 to 89: Follow up with your caregiver as directed.  · Systolic pressure of 140 to 160 or diastolic  pressure of 90 to 100: Follow up with your caregiver within 2 months.  · Systolic pressure above 160 or diastolic pressure above 100: Follow up with your caregiver within 1 month.  · Systolic pressure above 180 or diastolic pressure above 110: Consider antihypertensive therapy; follow up with your caregiver within 1 week.  · Systolic pressure above 200 or diastolic pressure above 120: Begin antihypertensive therapy; follow up with your caregiver within 1 week.     This information is not intended to replace advice given to you by your health care provider. Make sure you discuss any questions you have with your health care provider.     Document Released: 09/11/2013 Document Reviewed: 09/11/2013  [employer] Interactive Patient Education ©2016 [employer]    Please purchase a blood pressure cuff and take your blood pressure twice a day (once in the morning and once at bedtime) and bring this record to your appointment.    Modified Barium Swallow  A modified barium swallow, which is also referred to as a swallowing study, is an X-ray exam that is used to help find the cause of swallowing problems. For this exam, you will eat or drink various forms of food that are mixed with a white chalky substance called barium. While you are eating these foods, X-ray images are used to view the areas of your body that are involved in the process of chewing and swallowing. The barium that is mixed with the foods makes it easier for your health care provider to see possible problems on the X-rays. For example, the health care provider can see if any food or liquid is inhaled (aspirated) into your windpipe.  A modified barium swallow may be performed to help diagnose various medical conditions that can cause swallowing problems. By identifying problems with specific parts of the swallowing process, the procedure can also be used to help determine the best treatment for swallowing disorders. Based on the results, your health care  provider may also recommend changes to your techniques for chewing and swallowing.  LET YOUR HEALTH CARE PROVIDER KNOW ABOUT:  · Any allergies you have, especially allergies to contrast material.  · All medicines you are taking, including vitamins, herbs, eye drops, creams, and over-the-counter medicines.  · Any blood disorders you have.  · Previous surgeries you have had.  · Any medical conditions you may have.  · If you are pregnant or you think that you may be pregnant.  RISKS AND COMPLICATIONS  Generally, this is a safe procedure. However, problems may occur, including:  · Constipation.  · Fecal impaction.  · Exposure to radiation (a small amount).  · Allergic reaction to the barium. This is rare.  BEFORE THE PROCEDURE  · Follow your health care provider's instructions about eating or drinking restrictions.  · Ask your health care provider about changing or stopping your regular medicines. This is especially important if you are taking diabetes medicines or blood thinners.  PROCEDURE  · You will be positioned sitting upright in a chair or you will be standing.  · You will be given different foods and liquids to chew and swallow. Each food item will be covered with or contain barium.  · You may be asked to turn your head, sit back, hold your breath, cough, or take small bites during the test.  · Using a type of X-ray called videofluoroscopy, the health care provider will watch the act of swallowing as you eat the food items. Video images of the swallowing process will be displayed on a monitor and will be stored for later viewing.  The procedure may vary among health care providers and hospitals.  AFTER THE PROCEDURE  · Return to your normal activities and your normal diet as directed by your health care provider.  · Your stool (feces) may be white or gray for 2-3 days until all of the barium has passed out of your body in your stool. You may be given a laxative to take to help remove the barium from your  body.  · Your health care provider may recommend other things to help prevent constipation after this procedure, including:  ¨ Drinking enough fluid to keep your urine clear or pale yellow.  ¨ Eating foods that are high in fiber, such as fruits, vegetables, whole grains, and beans.  · Call your health care provider if:  ¨ You have difficulty having bowel movements, or you are unable to have a bowel movement or to pass gas.  ¨ You have belly (abdominal) pain or swelling of your abdomen.  ¨ You have a fever.  · It is your responsibility to obtain your test results. Ask your health care provider or the department performing the test when and how you will get your results.     This information is not intended to replace advice given to you by your health care provider. Make sure you discuss any questions you have with your health care provider.     Document Released: 05/06/2008 Document Revised: 01/08/2016 Document Reviewed: 09/29/2015  [employer] Interactive Patient Education ©2016 [employer]    · Is patient discharged on Warfarin / Coumadin?   No     · Is patient Post Blood Transfusion?  No    Depression / Suicide Risk    As you are discharged from this [facility] facility, it is important to learn how to keep safe from harming yourself.    Recognize the warning signs:  · Abrupt changes in personality, positive or negative- including increase in energy   · Giving away possessions  · Change in eating patterns- significant weight changes-  positive or negative  · Change in sleeping patterns- unable to sleep or sleeping all the time   · Unwillingness or inability to communicate  · Depression  · Unusual sadness, discouragement and loneliness  · Talk of wanting to die  · Neglect of personal appearance   · Rebelliousness- reckless behavior  · Withdrawal from people/activities they love  · Confusion- inability to concentrate     If you or a loved one observes any of these behaviors or has concerns about self-harm, here's  what you can do:  · Talk about it- your feelings and reasons for harming yourself  · Remove any means that you might use to hurt yourself (examples: pills, rope, extension cords, firearm)  · Get professional help from the community (Mental Health, Substance Abuse, psychological counseling)  · Do not be alone:Call your Safe Contact- someone whom you trust who will be there for you.  · Call your local CRISIS HOTLINE 245-6034 or 703-860-2751  · Call your local Children's Mobile Crisis Response Team [place] (964) 123-5230 or [employer]  · Call the toll free National Suicide Prevention Hotlines   · National Suicide Prevention Lifeline 952-446-HRKZ (0604)  · [employer] 800-SUICIDE (438-3093)        Your appointments     Jul 13, 2017  9:30 AM   CARE MANAGER TELEPHONE VISIT with CARE MANAGER   [address]  [facility] [address] [phone]           ***IMPORTANT**** This is a phone visit only. Do not come into the office. The Care Manager will call you at the scheduled time for Care Manager Telephone Visit.            Jul 14, 2017  1:00 PM   Established Patient with ELVIS Wilkinson   [facility]    [address] [place] [address] [address]  [facility] [address] [phone]           You will be receiving a confirmation call a few days before your appointment from our automated call confirmation system.            Jul 26, 2017  9:45 AM   ECHO with ECHO [facility]   ECHOCARDIOLOGY [facility])    [address] [facility] [place] [id number] [phone]              Follow-up Information     1. Follow up with Master Suarez M.D. In 2 weeks.    Specialty:  Internal Medicine    Why:  Admission for chest pain and swallowing issues    Contact information    [address] [facility] [id number]  [facility] [id number] [phone]           Discharge Medication Instructions:    Below are the medications your physician expects you to take upon  discharge:    Review all your home medications and newly ordered medications with your doctor and/or pharmacist. Follow medication instructions as directed by your doctor and/or pharmacist.    Please keep your medication list with you and share with your physician.               Medication List      START taking these medications        Instructions    Morning Afternoon Evening Bedtime    lisinopril 5 MG Tabs   Last time this was given:  5 mg on 7/10/2017 10:57 AM   Commonly known as:  PRINIVIL        Take 1 Tab by mouth every day.   Dose:  5 mg                        nitroglycerin 0.4 MG Subl   Commonly known as:  NITROSTAT        Place 1 Tab under tongue as needed for Chest Pain.   Dose:  0.4 mg                          CONTINUE taking these medications        Instructions    Morning Afternoon Evening Bedtime    acetaminophen 500 MG Tabs   Commonly known as:  TYLENOL        Take 1,000 mg by mouth every 6 hours as needed for Moderate Pain.   Dose:  1000 mg                        aspirin EC 81 MG Tbec   Commonly known as:  ECOTRIN        Take 81 mg by mouth every day.   Dose:  81 mg                        LUMIGAN 0.01 % Soln   Generic drug:  bimatoprost        Place 1 Drop in both eyes every bedtime. Indications: Wide-Angle Glaucoma   Dose:  1 Drop                        Magnesium 500 MG Tabs        Take 1 Cap by mouth every day.   Dose:  1 Cap                        vitamin D 1000 UNIT Tabs   Last time this was given:  1,000 Units on 7/10/2017 10:57 AM   Commonly known as:  cholecalciferol        Take 1,000 Units by mouth every day.   Dose:  1000 Units                             Where to Get Your Medications      These medications were sent to \[facility]\"" [facility] #[id number] - [place] - [address]  [address] [facility] [place] [address]     Phone:  [phone]    - lisinopril 5 MG Tabs  - nitroglycerin 0.4 MG Subl            Instructions           Diet / Nutrition:    Follow any diet instructions given  to you by your doctor or the dietician, including how much salt (sodium) you are allowed each day.    If you are overweight, talk to your doctor about a weight reduction plan.    Activity:    Remain physically active following your doctor's instructions about exercise and activity.    Rest often.     Any time you become even a little tired or short of breath, SIT DOWN and rest.    Worsening Symptoms:    Report any of the following signs and symptoms to the doctor's office immediately:    *Pain of jaw, arm, or neck  *Chest pain not relieved by medication                               *Dizziness or loss of consciousness  *Difficulty breathing even when at rest   *More tired than usual                                       *Bleeding drainage or swelling of surgical site  *Swelling of feet, ankles, legs or stomach                 *Fever (>100ºF)  *Pink or blood tinged sputum  *Weight gain (3lbs/day or 5lbs /week)           *Shock from internal defibrillator (if applicable)  *Palpitations or irregular heartbeats                *Cool and/or numb extremities    Stroke Awareness    Common Risk Factors for Stroke include:    Age  Atrial Fibrillation  Carotid Artery Stenosis  Diabetes Mellitus  Excessive alcohol consumption  High blood pressure  Overweight   Physical inactivity  Smoking    Warning signs and symptoms of a stroke include:    *Sudden numbness or weakness of the face, arm or leg (especially on one side of the body).  *Sudden confusion, trouble speaking or understanding.  *Sudden trouble seeing in one or both eyes.  *Sudden trouble walking, dizziness, loss of balance or coordination.Sudden severe headache with no known cause.    It is very important to get treatment quickly when a stroke occurs. If you experience any of the above warning signs, call 911 immediately.                   Disclaimer         Quit Smoking / Tobacco Use:    I understand the use of any tobacco products increases my chance of suffering from  future heart disease or stroke and could cause other illnesses which may shorten my life. Quitting the use of tobacco products is the single most important thing I can do to improve my health. For further information on smoking / tobacco cessation call a Toll Free Quit Line at 1-458.923.6114 (*National Cancer [place]) or 1-974.763.4628 (American Lung Association) or you can access the web based program at www.lungusa.org.    [place] Tobacco Users Help Line:  (323) 279-5534       Toll Free: 1-410.350.6844  Quit Tobacco Program [facility] (146)693-9533    Crisis Hotline:    [place] Crisis Hotline:  8-810-LEXOSQF or 1-827.450.3856    [place] Crisis Hotline:    1-474.276.5598 or 396-535-8484    Discharge Survey:   Thank you for choosing [facility]. We hope we did everything we could to make your hospital stay a pleasant one. You may be receiving a phone survey and we would appreciate your time and participation in answering the questions. Your input is very valuable to us in our efforts to improve our service to our patients and their families.        My signature on this form indicates that:    1. I have reviewed and understand the above information.  2. My questions regarding this information have been answered to my satisfaction.  3. I have formulated a plan with my discharge nurse to obtain my prescribed medications for home.                  Disclaimer         __________________________________                     __________       ________                       Patient Signature                                                 Date                    Time

## 2017-07-09 NOTE — DISCHARGE PLANNING
Patient discussed in morning rounds.  Patient reportedly lives at home with her spouse and she plans to return home when medically able.  No current SS needs noted.

## 2017-07-09 NOTE — H&P
"PCP: Master Suarez M.D.    CC:  Chest pain and pressure    HPI: This is a 77 y.o. female with h/o CP and anxiety presents with above chief complaint. Patient has had on and off chest pain/pressure for the last 4 days. She describes it as a \"brick sitting on her chest\" over the mid sternal region that does not radiate. Each episode lasts several hours and she is unsure what makes it worst or better. She has tried extra strength APAP and full dose ASA without any improvement. It got so bad last night that she got concerned and finally came to the hospital for further evaluation. She has some mild associated nausea, but no vomiting, diaphoresis, dizziness or near syncope. She has never had symptoms like this before. Denies any positional nature to it. Of note she has similar sensations when she eats apples and feels difficulty in swallowing them sometimes. Claims it does not get worse with exertion. She currently has no chest pain. Of note, she is remodeling her kitchen at home, which has been very stressful and she recently mowed the grass since she didn't like how her  was doing and might have \"over strained\" herself while doing so.     ROS: As above. The remainder of a complete review of systems is negative in all systems except as noted.    PMHx:  Active Ambulatory Problems     Diagnosis Date Noted   • History of breast cancer 06/03/2009   • Osteopenia 06/03/2009   • Dyslipidemia 06/03/2009   • Chronic neutropenia (CMS-HCC) 06/03/2009   • Preventative health care 06/03/2009   • Aortic stenosis 06/03/2009   • Glaucoma 06/29/2010   • Skin cancer, basal cell 06/29/2010   • Knee pain, left 08/01/2013   • [facility]    • Abnormal cardiovascular stress test 03/24/2014   • Dyspnea 07/29/2014   • History of polymyalgia rheumatica 07/28/2015   • Decreased GFR 08/20/2015   • Colon polyp 03/13/2017   • Knee pain, right 06/20/2017     Resolved Ambulatory Problems     Diagnosis Date Noted   • MEDICAL HOME 08/29/2012 "   • Angina, preinfarctional (CMS-HCC) 03/28/2014   • Other nonspecific abnormal cardiovascular system function study 03/31/2014   • Bilateral chronic knee pain 06/29/2015   • Chronic pain of both shoulders 06/29/2015   • Left epiretinal membrane 10/25/2016   • Right ear pain 01/30/2017   • Acute suppurative otitis media of right ear without spontaneous rupture of tympanic membrane 01/30/2017   • Meniscus tear 03/05/2017     Past Medical History   Diagnosis Date   • Hx of breast cancer 6/3/2009   • Neutropenia 6/3/2009   • Cardiac murmur 6/3/2009   • Breast cancer (CMS-HCC)    • Breath shortness    • Unspecified cataract    • Chronic kidney disease, stage III (moderate) 8/20/2015   • Hepatitis A    • Cold    • Snoring    • Cancer (CMS-HCC)        SHx:  Social History     Social History   • Marital Status:      Spouse Name: N/A   • Number of Children: N/A   • Years of Education: N/A     Occupational History   • Not on file.     Social History Main Topics   • Smoking status: Former Smoker     Types: Cigarettes     Quit date: 06/16/1994   • Smokeless tobacco: Never Used   • Alcohol Use: 0.0 oz/week     0 Standard drinks or equivalent per week      Comment: 1 glass wine/day   • Drug Use: No   • Sexual Activity: Not Currently     Other Topics Concern   • Not on file     Social History Narrative   1 glass wine nightly, no T/D    FHx:  family history includes Cancer in her mother and sister; Heart Disease in her father; Stroke in her father.    Allergies:  Allergies   Allergen Reactions   • Vicodin [Hydrocodone-Acetaminophen] Nausea     Nausea, dizziness       Medications:  No current facility-administered medications on file prior to encounter.     Current Outpatient Prescriptions on File Prior to Encounter   Medication Sig Dispense Refill   • aspirin EC (ECOTRIN) 81 MG Tablet Delayed Response Take 81 mg by mouth every day.     • vitamin D (CHOLECALCIFEROL) 1000 UNIT Tab Take 1,000 Units by mouth every day.     •  "Magnesium 500 MG Tab Take 1 Cap by mouth every day.         Objective Exam:  Filed Vitals:    07/09/17 0433 07/09/17 0458 07/09/17 0558 07/09/17 0629   BP: 197/85   150/72   Pulse: 62 56 57 56   Temp: 36.2 °C (97.1 °F)   36.4 °C (97.6 °F)   Resp: 16 18 18 18   Height: 1.6 m (5' 3\")      Weight: 67.4 kg (148 lb 9.4 oz)      SpO2: 96% 96% 92% 92%     General: NAD, very pleasant, appears younger than stated age  HEENT: PERRL, EOMI, MMM, no cervical or supraclavicular LAD, supple, no TM  Chest: CTAB, no c/w, good effort, good air entry, mild pain that is TTP over the lower sternal region  CV: RRR no r/g, 3/6 DOROTEO over the RUSB, No heave. No bruits.  Abd: S/NT/ND/+BS no HSM no mass  Ext: No c/c/e. 2+ pulses.   MSK-FROM all extremities, 5/5 strength throughout  Neuro: A&O x 4. No sensory deficits. No focal deficits. CNII-XII intact.  Skin: Warm/dry, no rashes/lesions/excoriations  Psych-appropriate affect, good mood, interactive    Laboratory--reviewed personally and are as follows:  Lab Results   Component Value Date/Time    WBC 5.1 07/09/2017 04:33 AM    RBC 4.51 07/09/2017 04:33 AM    HEMOGLOBIN 14.3 07/09/2017 04:33 AM    HEMATOCRIT 42.1 07/09/2017 04:33 AM    MCV 93.3 07/09/2017 04:33 AM    MCH 31.7 07/09/2017 04:33 AM    MCHC 34.0 07/09/2017 04:33 AM    MPV 9.7 07/09/2017 04:33 AM    NEUTROPHILS-POLYS 45.50 07/09/2017 04:33 AM    LYMPHOCYTES 43.00* 07/09/2017 04:33 AM    MONOCYTES 8.30 07/09/2017 04:33 AM    EOSINOPHILS 2.20 07/09/2017 04:33 AM    BASOPHILS 0.60 07/09/2017 04:33 AM      Lab Results   Component Value Date/Time    SODIUM 138 07/09/2017 04:33 AM    POTASSIUM 3.8 07/09/2017 04:33 AM    CHLORIDE 104 07/09/2017 04:33 AM    CO2 25 07/09/2017 04:33 AM    GLUCOSE 121* 07/09/2017 04:33 AM    BUN 17 07/09/2017 04:33 AM    CREATININE 1.11 07/09/2017 04:33 AM    BUN-CREATININE RATIO 11 07/02/2010 08:56 AM    GLOM FILT RATE, EST 50* 07/02/2010 08:56 AM      Lab Results   Component Value Date/Time    PT 13.1 " 03/31/2014 08:05 AM    INR 1.00 03/31/2014 08:05 AM        Radiology  CXR-  No acute cardiopulmonary abnormality identified.    EKG-personally reviewed by me, SR, HR 61, no e/o ST segment changes, good R-wave progression    MDM:  77 y.o. female here with CP who be admitted and observed for workup and therapy for CP    Assessment/Plan:  76 y/o female with CP    CP-unclear etiology, differential includes cardiac, esophageal, MSK, somewhat atypical, but does have multiple risk factors, CATH 2013 was negative for obstructive disease  -admit to tele  -trend trops  -NM stress test  -ECHO given h/o AS and strong sounding murmur on exam  -possible mSK as well  -start low dose ACE given elevated BP  - mg daily  -nitro tabs PRN    Dysphagia-only to apples  -get barium swallow    H/O PMR-well controlled  -no steroids for now    H/O breast cancer-no obvious recurrence at this time  -monitor    AS-as above  -ECHO    DLD-check lipids, no statin for now  -monitor    Osteopenia-vit D/CA++ supplementation    DVT-heparin 5K units Q8 hours  Code-FC/FC  Diet-NPO for NM stress test    Core Measures

## 2017-07-09 NOTE — ED NOTES
Pt assessed, chart reviewed. ERP at BS. Call light within reach. EKG complete, IV in place and blood sent to lab.

## 2017-07-10 ENCOUNTER — PATIENT OUTREACH (OUTPATIENT)
Dept: HEALTH INFORMATION MANAGEMENT | Facility: OTHER | Age: 77
End: 2017-07-10

## 2017-07-10 ENCOUNTER — APPOINTMENT (OUTPATIENT)
Dept: RADIOLOGY | Facility: MEDICAL CENTER | Age: 77
End: 2017-07-10
Attending: INTERNAL MEDICINE
Payer: MEDICARE

## 2017-07-10 VITALS
HEIGHT: 63 IN | OXYGEN SATURATION: 98 % | HEART RATE: 58 BPM | RESPIRATION RATE: 18 BRPM | TEMPERATURE: 97.8 F | WEIGHT: 148.59 LBS | BODY MASS INDEX: 26.33 KG/M2 | SYSTOLIC BLOOD PRESSURE: 157 MMHG | DIASTOLIC BLOOD PRESSURE: 57 MMHG

## 2017-07-10 PROBLEM — R07.9 CHEST PAIN: Status: RESOLVED | Noted: 2017-07-09 | Resolved: 2017-07-10

## 2017-07-10 LAB
ANION GAP SERPL CALC-SCNC: 9 MMOL/L (ref 0–11.9)
BASOPHILS # BLD AUTO: 0.8 % (ref 0–1.8)
BASOPHILS # BLD: 0.03 K/UL (ref 0–0.12)
BUN SERPL-MCNC: 10 MG/DL (ref 8–22)
CALCIUM SERPL-MCNC: 8.7 MG/DL (ref 8.4–10.2)
CHLORIDE SERPL-SCNC: 109 MMOL/L (ref 96–112)
CO2 SERPL-SCNC: 25 MMOL/L (ref 20–33)
CREAT SERPL-MCNC: 1.11 MG/DL (ref 0.5–1.4)
EOSINOPHIL # BLD AUTO: 0.12 K/UL (ref 0–0.51)
EOSINOPHIL NFR BLD: 3 % (ref 0–6.9)
ERYTHROCYTE [DISTWIDTH] IN BLOOD BY AUTOMATED COUNT: 45.6 FL (ref 35.9–50)
EST. AVERAGE GLUCOSE BLD GHB EST-MCNC: 108 MG/DL
GFR SERPL CREATININE-BSD FRML MDRD: 47 ML/MIN/1.73 M 2
GLUCOSE SERPL-MCNC: 93 MG/DL (ref 65–99)
HBA1C MFR BLD: 5.4 % (ref 0–5.6)
HCT VFR BLD AUTO: 39.7 % (ref 37–47)
HGB BLD-MCNC: 13.2 G/DL (ref 12–16)
IMM GRANULOCYTES # BLD AUTO: 0 K/UL (ref 0–0.11)
IMM GRANULOCYTES NFR BLD AUTO: 0 % (ref 0–0.9)
LYMPHOCYTES # BLD AUTO: 1.5 K/UL (ref 1–4.8)
LYMPHOCYTES NFR BLD: 37.8 % (ref 22–41)
MCH RBC QN AUTO: 31.2 PG (ref 27–33)
MCHC RBC AUTO-ENTMCNC: 33.2 G/DL (ref 33.6–35)
MCV RBC AUTO: 93.9 FL (ref 81.4–97.8)
MONOCYTES # BLD AUTO: 0.36 K/UL (ref 0–0.85)
MONOCYTES NFR BLD AUTO: 9.1 % (ref 0–13.4)
NEUTROPHILS # BLD AUTO: 1.96 K/UL (ref 2–7.15)
NEUTROPHILS NFR BLD: 49.3 % (ref 44–72)
NRBC # BLD AUTO: 0 K/UL
NRBC BLD AUTO-RTO: 0 /100 WBC
PLATELET # BLD AUTO: 173 K/UL (ref 164–446)
PMV BLD AUTO: 10.7 FL (ref 9–12.9)
POTASSIUM SERPL-SCNC: 4.5 MMOL/L (ref 3.6–5.5)
RBC # BLD AUTO: 4.23 M/UL (ref 4.2–5.4)
SODIUM SERPL-SCNC: 143 MMOL/L (ref 135–145)
WBC # BLD AUTO: 4 K/UL (ref 4.8–10.8)

## 2017-07-10 PROCEDURE — 700112 HCHG RX REV CODE 229: Performed by: INTERNAL MEDICINE

## 2017-07-10 PROCEDURE — A9270 NON-COVERED ITEM OR SERVICE: HCPCS | Performed by: INTERNAL MEDICINE

## 2017-07-10 PROCEDURE — 99217 PR OBSERVATION CARE DISCHARGE: CPT | Performed by: INTERNAL MEDICINE

## 2017-07-10 PROCEDURE — 700101 HCHG RX REV CODE 250: Performed by: INTERNAL MEDICINE

## 2017-07-10 PROCEDURE — 85025 COMPLETE CBC W/AUTO DIFF WBC: CPT

## 2017-07-10 PROCEDURE — 700105 HCHG RX REV CODE 258: Performed by: INTERNAL MEDICINE

## 2017-07-10 PROCEDURE — G0378 HOSPITAL OBSERVATION PER HR: HCPCS

## 2017-07-10 PROCEDURE — 700102 HCHG RX REV CODE 250 W/ 637 OVERRIDE(OP): Performed by: INTERNAL MEDICINE

## 2017-07-10 PROCEDURE — 74220 X-RAY XM ESOPHAGUS 1CNTRST: CPT

## 2017-07-10 PROCEDURE — 80048 BASIC METABOLIC PNL TOTAL CA: CPT

## 2017-07-10 RX ORDER — NITROGLYCERIN 0.4 MG/1
0.4 TABLET SUBLINGUAL PRN
Qty: 25 TAB | Refills: 0 | Status: SHIPPED | OUTPATIENT
Start: 2017-07-10 | End: 2018-04-26

## 2017-07-10 RX ORDER — LISINOPRIL 5 MG/1
5 TABLET ORAL DAILY
Qty: 30 TAB | Refills: 1 | Status: SHIPPED | OUTPATIENT
Start: 2017-07-10 | End: 2017-07-18

## 2017-07-10 RX ADMIN — VITAMIN D, TAB 1000IU (100/BT) 1000 UNITS: 25 TAB at 10:57

## 2017-07-10 RX ADMIN — ANTACID/ANTIFLATULENT 1 PACKET: 380; 550; 10; 10 GRANULE, EFFERVESCENT ORAL at 09:20

## 2017-07-10 RX ADMIN — ASPIRIN 325 MG ORAL TABLET 325 MG: 325 PILL ORAL at 10:58

## 2017-07-10 RX ADMIN — LISINOPRIL 5 MG: 5 TABLET ORAL at 10:57

## 2017-07-10 RX ADMIN — SODIUM CHLORIDE: 9 INJECTION, SOLUTION INTRAVENOUS at 05:09

## 2017-07-10 RX ADMIN — BARIUM SULFATE 700 MG: 700 TABLET ORAL at 09:20

## 2017-07-10 ASSESSMENT — PAIN SCALES - GENERAL: PAINLEVEL_OUTOF10: 2

## 2017-07-10 NOTE — PROGRESS NOTES
"Received report from day shift RN.  Patient states mid chest pressure is \"Better,\" states it is now 5/10 and it \"comes and goes.\"  When asked \"How bad was the pain before from 0 to 10?\"  Patient stated \"Well..\" and did not answer.  Patient refusing any pain medication at the moment.  Patient stating \"My mouth is so dry.\"  Offered patient mouth swabs and mouth moisturizer.  Patient communicated fear of getting dehydrated blessing to NPO status.  Provided patient comfort and reminded patient she is receiving IV fluids.  Patient alert and oriented, respirations regular and unlabored, and clear.    "

## 2017-07-10 NOTE — DISCHARGE INSTRUCTIONS
Discharge Instructions    Discharged to home by car with relative. Discharged via walking, hospital escort: Yes.  Special equipment needed: Not Applicable    Be sure to schedule a follow-up appointment with your primary care doctor or any specialists as instructed.     Discharge Plan:   Diet Plan: Discussed  Activity Level: Discussed  Confirmed Follow up Appointment: Patient to Call and Schedule Appointment  Confirmed Symptoms Management: Discussed  Medication Reconciliation Updated: Yes    I understand that a diet low in cholesterol, fat, and sodium is recommended for good health. Unless I have been given specific instructions below for another diet, I accept this instruction as my diet prescription.   Other diet: keep yourself hydrated.    Special Instructions:   Managing Your High Blood Pressure  Blood pressure is a measurement of how forceful your blood is pressing against the walls of the arteries. Arteries are muscular tubes within the circulatory system. Blood pressure does not stay the same. Blood pressure rises when you are active, excited, or nervous; and it lowers during sleep and relaxation. If the numbers measuring your blood pressure stay above normal most of the time, you are at risk for health problems. High blood pressure (hypertension) is a long-term (chronic) condition in which blood pressure is elevated.  A blood pressure reading is recorded as two numbers, such as 120 over 80 (or 120/80). The first, higher number is called the systolic pressure. It is a measure of the pressure in your arteries as the heart beats. The second, lower number is called the diastolic pressure. It is a measure of the pressure in your arteries as the heart relaxes between beats.   Keeping your blood pressure in a normal range is important to your overall health and prevention of health problems, such as heart disease and stroke. When your blood pressure is uncontrolled, your heart has to work harder than normal. High  blood pressure is a very common condition in adults because blood pressure tends to rise with age. Men and women are equally likely to have hypertension but at different times in life. Before age 45, men are more likely to have hypertension. After 65 years of age, women are more likely to have it. Hypertension is especially common in  Americans. This condition often has no signs or symptoms. The cause of the condition is usually not known. Your caregiver can help you come up with a plan to keep your blood pressure in a normal, healthy range.  BLOOD PRESSURE STAGES  Blood pressure is classified into four stages: normal, prehypertension, stage 1, and stage 2. Your blood pressure reading will be used to determine what type of treatment, if any, is necessary. Appropriate treatment options are tied to these four stages:   Normal  · Systolic pressure (mm Hg): below 120.  · Diastolic pressure (mm Hg): below 80.  Prehypertension  · Systolic pressure (mm Hg): 120 to 139.  · Diastolic pressure (mm Hg): 80 to 89.  Stage 1  · Systolic pressure (mm Hg): 140 to 159.  · Diastolic pressure (mm Hg): 90 to 99.  Stage 2  · Systolic pressure (mm Hg): 160 or above.  · Diastolic pressure (mm Hg): 100 or above.  RISKS RELATED TO HIGH BLOOD PRESSURE  Managing your blood pressure is an important responsibility. Uncontrolled high blood pressure can lead to:  · A heart attack.  · A stroke.  · A weakened blood vessel (aneurysm).  · Heart failure.  · Kidney damage.  · Eye damage.  · Metabolic syndrome.  · Memory and concentration problems.  HOW TO MANAGE YOUR BLOOD PRESSURE  Blood pressure can be managed effectively with lifestyle changes and medicines (if needed). Your caregiver will help you come up with a plan to bring your blood pressure within a normal range. Your plan should include the following:  Education  · Read all information provided by your caregivers about how to control blood pressure.  · Educate yourself on the latest  guidelines and treatment recommendations. New research is always being done to further define the risks and treatments for high blood pressure.  Lifestyle changes  · Control your weight.  · Avoid smoking.  · Stay physically active.  · Reduce the amount of salt in your diet.  · Reduce stress.  · Control any chronic conditions, such as high cholesterol or diabetes.  · Reduce your alcohol intake.  Medicines  · Several medicines (antihypertensive medicines) are available, if needed, to bring blood pressure within a normal range.  Communication  · Review all the medicines you take with your caregiver because there may be side effects or interactions.  · Talk with your caregiver about your diet, exercise habits, and other lifestyle factors that may be contributing to high blood pressure.  · See your caregiver regularly. Your caregiver can help you create and adjust your plan for managing high blood pressure.  RECOMMENDATIONS FOR TREATMENT AND FOLLOW-UP   The following recommendations are based on current guidelines for managing high blood pressure in nonpregnant adults. Use these recommendations to identify the proper follow-up period or treatment option based on your blood pressure reading. You can discuss these options with your caregiver.  · Systolic pressure of 120 to 139 or diastolic pressure of 80 to 89: Follow up with your caregiver as directed.  · Systolic pressure of 140 to 160 or diastolic pressure of 90 to 100: Follow up with your caregiver within 2 months.  · Systolic pressure above 160 or diastolic pressure above 100: Follow up with your caregiver within 1 month.  · Systolic pressure above 180 or diastolic pressure above 110: Consider antihypertensive therapy; follow up with your caregiver within 1 week.  · Systolic pressure above 200 or diastolic pressure above 120: Begin antihypertensive therapy; follow up with your caregiver within 1 week.     This information is not intended to replace advice given to you  by your health care provider. Make sure you discuss any questions you have with your health care provider.     Document Released: 09/11/2013 Document Reviewed: 09/11/2013  [employer] Interactive Patient Education ©2016 [employer]    Please purchase a blood pressure cuff and take your blood pressure twice a day (once in the morning and once at bedtime) and bring this record to your appointment.    Modified Barium Swallow  A modified barium swallow, which is also referred to as a swallowing study, is an X-ray exam that is used to help find the cause of swallowing problems. For this exam, you will eat or drink various forms of food that are mixed with a white chalky substance called barium. While you are eating these foods, X-ray images are used to view the areas of your body that are involved in the process of chewing and swallowing. The barium that is mixed with the foods makes it easier for your health care provider to see possible problems on the X-rays. For example, the health care provider can see if any food or liquid is inhaled (aspirated) into your windpipe.  A modified barium swallow may be performed to help diagnose various medical conditions that can cause swallowing problems. By identifying problems with specific parts of the swallowing process, the procedure can also be used to help determine the best treatment for swallowing disorders. Based on the results, your health care provider may also recommend changes to your techniques for chewing and swallowing.  LET YOUR HEALTH CARE PROVIDER KNOW ABOUT:  · Any allergies you have, especially allergies to contrast material.  · All medicines you are taking, including vitamins, herbs, eye drops, creams, and over-the-counter medicines.  · Any blood disorders you have.  · Previous surgeries you have had.  · Any medical conditions you may have.  · If you are pregnant or you think that you may be pregnant.  RISKS AND COMPLICATIONS  Generally, this is a safe  procedure. However, problems may occur, including:  · Constipation.  · Fecal impaction.  · Exposure to radiation (a small amount).  · Allergic reaction to the barium. This is rare.  BEFORE THE PROCEDURE  · Follow your health care provider's instructions about eating or drinking restrictions.  · Ask your health care provider about changing or stopping your regular medicines. This is especially important if you are taking diabetes medicines or blood thinners.  PROCEDURE  · You will be positioned sitting upright in a chair or you will be standing.  · You will be given different foods and liquids to chew and swallow. Each food item will be covered with or contain barium.  · You may be asked to turn your head, sit back, hold your breath, cough, or take small bites during the test.  · Using a type of X-ray called videofluoroscopy, the health care provider will watch the act of swallowing as you eat the food items. Video images of the swallowing process will be displayed on a monitor and will be stored for later viewing.  The procedure may vary among health care providers and hospitals.  AFTER THE PROCEDURE  · Return to your normal activities and your normal diet as directed by your health care provider.  · Your stool (feces) may be white or gray for 2-3 days until all of the barium has passed out of your body in your stool. You may be given a laxative to take to help remove the barium from your body.  · Your health care provider may recommend other things to help prevent constipation after this procedure, including:  ¨ Drinking enough fluid to keep your urine clear or pale yellow.  ¨ Eating foods that are high in fiber, such as fruits, vegetables, whole grains, and beans.  · Call your health care provider if:  ¨ You have difficulty having bowel movements, or you are unable to have a bowel movement or to pass gas.  ¨ You have belly (abdominal) pain or swelling of your abdomen.  ¨ You have a fever.  · It is your  responsibility to obtain your test results. Ask your health care provider or the department performing the test when and how you will get your results.     This information is not intended to replace advice given to you by your health care provider. Make sure you discuss any questions you have with your health care provider.     Document Released: 05/06/2008 Document Revised: 01/08/2016 Document Reviewed: 09/29/2015  [employer] Interactive Patient Education ©2016 [employer]    · Is patient discharged on Warfarin / Coumadin?   No     · Is patient Post Blood Transfusion?  No    Depression / Suicide Risk    As you are discharged from this [facility] facility, it is important to learn how to keep safe from harming yourself.    Recognize the warning signs:  · Abrupt changes in personality, positive or negative- including increase in energy   · Giving away possessions  · Change in eating patterns- significant weight changes-  positive or negative  · Change in sleeping patterns- unable to sleep or sleeping all the time   · Unwillingness or inability to communicate  · Depression  · Unusual sadness, discouragement and loneliness  · Talk of wanting to die  · Neglect of personal appearance   · Rebelliousness- reckless behavior  · Withdrawal from people/activities they love  · Confusion- inability to concentrate     If you or a loved one observes any of these behaviors or has concerns about self-harm, here's what you can do:  · Talk about it- your feelings and reasons for harming yourself  · Remove any means that you might use to hurt yourself (examples: pills, rope, extension cords, firearm)  · Get professional help from the community (Mental Health, Substance Abuse, psychological counseling)  · Do not be alone:Call your Safe Contact- someone whom you trust who will be there for you.  · Call your local CRISIS HOTLINE 130-2724 or 916-657-9309  · Call your local Children's Mobile Crisis Response Team [facility] (179)  013-6444 or www.[employer].Targovax  · Call the toll free National Suicide Prevention Hotlines   · National Suicide Prevention Lifeline 960-622-RDNM (1036)  · National [employer] Line Network 800-SUICIDE (755-8457)

## 2017-07-10 NOTE — PROGRESS NOTES
"Patient reporting she has had difficulty taking deep breaths since earlier today, states she might be a little anxious ; states her kitchen is currently being remodeled and she is nervous.  Per patient: has never taken any medications for anxiety.  Patient put on 1 L NC, patient stated \"It feels good.\"  Provided comfort to patient and .  Patient applied mouth moisturizer, states \"It feels much better.\"  Instructed patient to notify staff immediately if she begins to have chest pain, patient stated verbal understanding.  Will continue to monitor.   "

## 2017-07-10 NOTE — PROGRESS NOTES
Spoke with Coral from x-ray regarding patient's barium swallow.  Patient to be picked up at 0900 for barium swallow.  Patient was updated; states she will wait until after the barium swallow to eat anything.

## 2017-07-10 NOTE — PROGRESS NOTES
"Patient reporting she is frustrated because she has not had anything to eat.  Patient denies any pain at the moment.  Patient stating if she has to wait until 10 am for the barium swallow without eating, she will want to leave.  Patient requesting to have something to eat, states \"I am very frustrated.\"  Patient has been explained about the barium swallow and its significance several times.  Patient requesting to have barium swallow done as soon as possible.  Patient was notified nuc med is not here yet. Will notify hospitalist.    "

## 2017-07-10 NOTE — CARE PLAN
Problem: Safety  Goal: Will remain free from injury  Intervention: Provide assistance with mobility  Encouraged patient to call for assistance with call light   Call light and belongings within reach          Problem: Knowledge Deficit  Goal: Knowledge of the prescribed therapeutic regimen will improve  Intervention: Discuss information regarding therpeutic regimen and document in education  Implemented medication teaching, patient states verbal understanding          Problem: Pain Management  Goal: Pain level will decrease to patient’s comfort goal  Intervention: Follow pain managment plan developed in collaboration with patient and Interdisciplinary Team  Work with pt on comfort goal; pain management per MD orders.

## 2017-07-10 NOTE — PROGRESS NOTES
Notified Dr. Collins regarding patient's request to eat; patient has difficulty swallowing apples.  Patient states she has only had issues with swallowing apples and nothing else.  Per Dr. Clolins: Ok for patient to have a regular snack now and let day shift decide what kind of diet they want to put her on.  Patient updated, also reminded that barium swallow will detect if aspiration is an issue.  Patient stating she will wait until [facility] med comes in and once she is notified of an exact time for the swallow procedure she will decide if she will have the snack or not.

## 2017-07-10 NOTE — PROGRESS NOTES
Patient reporting she feels fine, denies any pain.  Patient removed NC, states she already has enough tubing around her.  Patient resting in bed, no distress noted.  Patient respirations regular and unlabored.  Bed rails up X 2, call light and belongings within reach, patient encouraged to call for assistance.  No needs at this time.  Will continue to monitor.

## 2017-07-10 NOTE — PROGRESS NOTES
Test results up about 1330 and texted [facility] hospitalist RN.  Dr. Perrin wanted to talk to her about the results before discharge.  Dr. Perrin spoke to her in the hallways and gave the ok to discharge pt.  Discharging Patient home per physician order.  Discharged with neighbor.  Demonstrated understanding of discharge instructions, follow up appointments, home medications, prescriptions, and nursing care instructions for high blood pressure.  Ambulating without assistance, voiding without difficulty, pain well controlled, tolerating oral medications, oxygen saturation greater than 90% , tolerating diet.   Educational handouts  given and discussed.  Verbalized understanding of discharge instructions and educational handouts.  All questions answered.  Belongings with patient at time of discharge.

## 2017-07-10 NOTE — PROGRESS NOTES
Report received at bedside with rafael May very anxious about her barium swallow as she has been npo for about 2 days now.  Transport at bedside about 0855 to take pt for her barium swallow.  Pt returned about 1015 and Dr. Perrin spoke to her and her , diet was advanced and kitchen called but no tray arrived.  Doctor also is going to discharge her once her barium swallow test results are back.  Pt had yogurt in the fridge and pt ate about 1/2.  Morning medications given and spoke to her that she had 2 prescriptions sent electronically to [facility].  Pt complains of upper epigastric pain that comes and goes.  Called radiology about 1130 to ask when results would be back and the tech said its on his workstation to do the report but he has been busy with other test this morning.  Pt very anxious about discharge and has moved her belongings to the family room and is dressed ready to go.

## 2017-07-10 NOTE — DISCHARGE SUMMARY
CHIEF COMPLAINT ON ADMISSION  Chief Complaint   Patient presents with   • Chest Pressure     Started 4 days ago, epigastric region, does not radiate   • Shortness of Breath     With exertion   • Nausea       CODE STATUS  Full Code    HPI & HOSPITAL COURSE  This is a 77 y.o. female here with chest pain and mild dysphagia. Patient was admitted to the telemetry unit. Cardiac markers were negative times three. Cardiac NM stress test was negative as well. Her ECHO showed mild worsening of her underlying aortic stenosis, which is now moderate in severity. Patient had a barium swallow which showed moderate esophageal dysmotility and small silent aspiration. Her chest pain resolved and she was ambulating the hallway without issue. Patient will need to see a GI doctor and undergo likely EGD and possible esophageal manometry. She can swallow food without difficulty. Unclear etiology at this time. Her lipids are well controlled at this time.    Therefore, she is discharged in fair and stable condition with close outpatient follow-up.    SPECIFIC OUTPATIENT FOLLOW-UP  -F/U with her PCP and receive referral to the GI doctor for her dysphagia    DISCHARGE PROBLEM LIST  Principal Problem (Resolved):    Chest pain POA: Yes  Active Problems:    Osteopenia (Chronic) POA: Yes      Overview: 9/06 dexa LS -1.2,hip -1.1      6/09 dexa LS -1.4,hip -1.0      7/11 vit d 43      7/11 dexa LS -1.1,hip -0.9      712 vit d 30 start 1000 units      7/24/13 vit d 72 on 1000 units      8/6/13 dexa LS -1.1,hip -1.1      8/5/14 vit d 67      8/19/15 dexa LS -1.4,hip -1.3;FRAX 40% major,27% hip only on prednisone       one month, does not need bisphosphonate therapy      8/19/15 vit d 43      6/16/16 off prednisone x 3 weeks      6/21/16 vit d 50      6/27/17 vit d 48          Dyslipidemia (Chronic) POA: Yes      Overview: 3/08 chol 175,trig 73,hdl 45,ldl 115      6/09 chol 174,trig 89,hdl 47,ldl 109      7/10 chol 182,trig 61,hdl 55,ldl 114       7/11 chol 175,trig 69,hdl 45,ldl 116      7/12 chol 164,trig 64,hdl 43,ldl 108      7/24/13 chol 186,trig 66,hdl 54,ldl 119 no meds      8/5/14 chol 165,trig 93,hdl 48,ldl 98      8/19/15 chol 192,trig 109,hdl 58,ldl 112; 10 year risk 20% declines statin       therapy      6/21/16 chol 137,trig 75,hdl 47,ldl 75       6/27/17 hcol 153,trig 94,hdl 54,ldl 80                      Chronic neutropenia (CMS-HCC) (Chronic) POA: Yes      Overview: 8/06 wbc 3.4      3/08 wbc 3.9      6/09 wbc 3.9      7/10 wbc 4.4      7/11 wbc 4.5      7/12 wbc 3.8 (55%N,35%L)      7/24/13 wbc 4.3      3/20/14 wbc 3.1 (52%N,36%L)      3/31/14 wbc 3.7      8/19/15 wbc 5.3      6/21/16 wbc 4.4 (50%N,40%L)      6/27/17 wbc 4.2    Aortic stenosis (Chronic) POA: Yes      Overview: 10/06 echo mild mr, normal LV      6/09 stress echo negative      8/12 echo normal LV function, EF 65%, mild aortic stenosis, peak gradient       24      7/24/13 normal LV function, EF 60%, mild LVH, mild aortic stenosis, peak       gradient 25      3/31/14 cardiac catheterization; left main mild lacking, LAD patent,       circumflex free of disease, RCA free of disease, normal LV function, mild       aortic stenosis    History of polymyalgia rheumatica POA: Yes      Overview: 4/20/15 physical therapy, trial celebrex and zanaflex      5/7/15 physical therapy evaluation today recommended right hip x-ray and       pelvic x-ray, ordered in computer      5/8/15 xray hip negative      6/29/15 seen by bridgette diagnosed with polymyalgia rheumatica      6/29/15 CRP 9.4,ESR 32 started on prednisone 20 mg       7/28/15 on prednisone 10 mg will continue 10 mg x 2 weeks, then decrease       to 5 mg daily      8/19/15 hgb 14,hct 43, CRP 0.3, ESR 14, tapered off prednisone but       developed mouth irritation, has resumed prednisone 5 mg daily, will       continue x2 weeks then taper      11/17/15 refill prednisone 5 mg #30 tabs x one      6/21/16 off prednisone; CRP 0.1,ESR 17       6/27/17 CRP 0.09,ESR 12,cpk 66                      Dysphagia POA: Yes  Resolved Problems:    History of breast cancer POA: Yes      Overview: 1980s left sided s/p lumpectomy and radiation therapy, no old records      7/11 mammogram      8/12 mammogram      8/13 mammogram      8/18/14 mammogram      9/1/16 mammogram heterogeneously dense breast tissue recommend screening       breast ultrasound      9/1/16 sonocine negative      FOLLOW UP  Future Appointments  Date Time Provider Department Center   7/13/2017 9:30 AM CARE MANAGER [facility]   7/14/2017 1:00 PM ELVIS Wilkinson [facility]   7/26/2017 9:45 AM ECHO [facility] [address] CYRUS [address]     Master BONIFACIO Suarez M.D.  [address]  [place] [address]  [phone]    In 2 weeks  Admission for chest pain and swallowing issues      MEDICATIONS ON DISCHARGE   Katerina Torres   Home Medication Instructions KAITY:[id number]    Printed on:07/10/17 1503   Medication Information                      acetaminophen (TYLENOL) 500 MG Tab  Take 1,000 mg by mouth every 6 hours as needed for Moderate Pain.             aspirin EC (ECOTRIN) 81 MG Tablet Delayed Response  Take 81 mg by mouth every day.             bimatoprost (LUMIGAN) 0.01 % Solution  Place 1 Drop in both eyes every bedtime. Indications: Wide-Angle Glaucoma             lisinopril (PRINIVIL) 5 MG Tab  Take 1 Tab by mouth every day.             Magnesium 500 MG Tab  Take 1 Cap by mouth every day.             nitroglycerin (NITROSTAT) 0.4 MG SL Tab  Place 1 Tab under tongue as needed for Chest Pain.             vitamin D (CHOLECALCIFEROL) 1000 UNIT Tab  Take 1,000 Units by mouth every day.                 DIET  Orders Placed This Encounter   Procedures   • DIET ORDER     Standing Status: Standing      Number of Occurrences: 1      Standing Expiration Date:      Order Specific Question:  Diet:     Answer:  Cardiac [6]       ACTIVITY  As tolerated.  Weight bearing as  tolerated      CONSULTATIONS  -None    PROCEDURES  NM stress test-  A small, mild fixed perfusion abnormality is noted in the distal anterior and      anteroapical segments. No ischemia is noted and wall motion is normal. This    could represent a mild amount of prior infarction or be a variant of normal    apical thinning.   Normal left ventricular size, ejection fraction, and wall motion.   ECG INTERPRETATION   Negative stress ECG for ischemia.    ECHO-  Normal left ventricular chamber size.  Mild concentric left ventricular hypertrophy.  Left ventricular ejection fraction is visually estimated to be 60%.  Grade I diastolic dysfunction.  Enlarged right atrium.  Calcified aortic valve leaflets.  Moderate aortic stenosis.    Moderate aortic insufficiency.  Mitral annular calcification.  Mild tricuspid regurgitation.  Trace pulmonic insufficiency.  Normal pericardium without effusion.    Barium swallow-    1.  Moderate esophageal dysmotility    2.  Small volume silent aspiration    CXR-  No acute cardiopulmonary abnormality identified.    LABORATORY  Lab Results   Component Value Date/Time    SODIUM 143 07/10/2017 04:52 AM    POTASSIUM 4.5 07/10/2017 04:52 AM    CHLORIDE 109 07/10/2017 04:52 AM    CO2 25 07/10/2017 04:52 AM    GLUCOSE 93 07/10/2017 04:52 AM    BUN 10 07/10/2017 04:52 AM    CREATININE 1.11 07/10/2017 04:52 AM    GLOM FILT RATE, EST 50* 07/02/2010 08:56 AM        Lab Results   Component Value Date/Time    WBC 4.0* 07/10/2017 04:52 AM    HEMOGLOBIN 13.2 07/10/2017 04:52 AM    HEMATOCRIT 39.7 07/10/2017 04:52 AM    PLATELET COUNT 173 07/10/2017 04:52 AM        Total time of the discharge process exceeds 40 minutes

## 2017-07-10 NOTE — DISCHARGE PLANNING
Care Transition Team Assessment    Information Source  Orientation : Oriented x 4  Who is responsible for making decisions for patient? : Patient    Readmission Evaluation  Is this a readmission?: No    Elopement Risk  Legal Hold: No  Ambulatory or Self Mobile in Wheelchair: Yes  Disoriented: No  Psychiatric Symptoms: None  History of Wandering: No  Elopement this Admit: No  Vocalizing Wanting to Leave: No  Displays Behaviors, Body Language Wanting to Leave: No-Not at Risk for Elopement  Elopement Risk: Not at Risk for Elopement    Interdisciplinary Discharge Planning  Primary Care Physician: Master Suarez  Lives with - Patient's Self Care Capacity: Spouse  Mobility Issues: No    Discharge Preparedness  What is your plan after discharge?: Home with help  What are your discharge supports?: Spouse  Prior Functional Level: Independent with Activities of Daily Living    Functional Assesment  Prior Functional Level: Independent with Activities of Daily Living    Finances  Financial Barriers to Discharge: No  Prescription Coverage: Yes    Vision / Hearing Impairment  Right Eye Vision: Wears Glasses  Left Eye Vision: Wears Glasses         Advance Directive  Advance Directive?: None    Domestic Abuse  Have you ever been the victim of abuse or violence?: No    Psychological Assessment  History of Substance Abuse: None  History of Psychiatric Problems: No    Discharge Risks or Barriers  Discharge risks or barriers?: No    Anticipated Discharge Information  Anticipated discharge disposition: Home

## 2017-07-10 NOTE — PROGRESS NOTES
Tele Note:    Rhythm: Sinus rhythm/ sinus shan  Rate: 50-60s  AL: 0.18  QRS: 0.08  QT: 0.40  Ectopy: Rare PVCs    Primary pt care not given. Interpretation of strip only.

## 2017-07-15 ENCOUNTER — TELEPHONE (OUTPATIENT)
Dept: MEDICAL GROUP | Facility: MEDICAL CENTER | Age: 77
End: 2017-07-15

## 2017-07-15 PROBLEM — R13.10 DYSPHAGIA: Status: RESOLVED | Noted: 2017-07-09 | Resolved: 2017-07-15

## 2017-07-18 ENCOUNTER — OFFICE VISIT (OUTPATIENT)
Dept: MEDICAL GROUP | Facility: MEDICAL CENTER | Age: 77
End: 2017-07-18
Payer: MEDICARE

## 2017-07-18 VITALS
TEMPERATURE: 98.2 F | OXYGEN SATURATION: 97 % | SYSTOLIC BLOOD PRESSURE: 138 MMHG | BODY MASS INDEX: 25.69 KG/M2 | WEIGHT: 145 LBS | HEIGHT: 63 IN | DIASTOLIC BLOOD PRESSURE: 72 MMHG | HEART RATE: 75 BPM

## 2017-07-18 DIAGNOSIS — T17.908D ASPIRATION INTO AIRWAY, SUBSEQUENT ENCOUNTER: ICD-10-CM

## 2017-07-18 DIAGNOSIS — K22.4 ESOPHAGEAL DYSMOTILITY: ICD-10-CM

## 2017-07-18 DIAGNOSIS — R07.9 CHEST PAIN AT REST: ICD-10-CM

## 2017-07-18 DIAGNOSIS — I35.0 AORTIC VALVE STENOSIS, UNSPECIFIED ETIOLOGY: Chronic | ICD-10-CM

## 2017-07-18 DIAGNOSIS — R07.89 ATYPICAL CHEST PAIN: ICD-10-CM

## 2017-07-18 DIAGNOSIS — I10 ESSENTIAL HYPERTENSION: Chronic | ICD-10-CM

## 2017-07-18 DIAGNOSIS — R94.39 ABNORMAL CARDIOVASCULAR STRESS TEST: Chronic | ICD-10-CM

## 2017-07-18 PROCEDURE — 99214 OFFICE O/P EST MOD 30 MIN: CPT | Performed by: INTERNAL MEDICINE

## 2017-07-18 RX ORDER — DILTIAZEM HYDROCHLORIDE 120 MG/1
120 CAPSULE, EXTENDED RELEASE ORAL DAILY
Qty: 30 CAP | Refills: 6 | Status: SHIPPED | OUTPATIENT
Start: 2017-07-18 | End: 2018-04-26

## 2017-07-18 NOTE — PROGRESS NOTES
Subjective:      Katerina Torres is a 77 y.o. female who presents with Hospital Follow-up            HPI     Here for follow up hospital admit chest pain, had chest pain off and on prior to admit, typically occurring at night, was anterior chest wall no radiation with occasional nausea, and some palpitations, no melena or hematochezia, no cough or sob went to ER for evaluation. Hospital records reveiwed    7/9/17 hospital admission, 7/10/17 hospital discharge admission chest pain, troponins negative, stress test negative, echo mild worsening aortic stenosis which is now moderate in severity, barium swallow moderate esophageal dysmotility and small silent aspiration will need follow-up GI  7/9/17 cxr negative  7/9/17 echo normal LV size and function, EF 60%, grade 1 diastolic dysfunction, moderate aortic stenosis  7/9/17 persantine thallium small fixed perfusion abnormality distal anterior and anteroapical segments, no ischemia is noted to represent mild prior infarct or variant normal apical thinning  7/9/17 barium swallow moderate esophageal dysmotility, small volume silent aspiration  7/9/17 chol 173,trig 73,hdl 57,ldl 101  7/9/17 A1c 5.4%    Since discharge, provided lisinopril for elevated blood pressure but she did not take medication.  Has noticed more pain and food sticking with swallowing apples does not occur with other foods, not occur with liquids, no abdominal pain, no gerd symptoms, no weight loss  Leg cramps at night only, relieved with movement and stretching, no low back pain, no sensory changes, no weakness of strength feet and legs, no diabetes  No history of aspiration, no fevers, chills, cough, productive sputum, shortness of breath  No recurrent chest pain since discharge  Not worse with coffee, or etoh  No tobacco, no etoh   No nsaids         Current Outpatient Prescriptions   Medication Sig Dispense Refill   • nitroglycerin (NITROSTAT) 0.4 MG SL Tab Place 1 Tab under tongue as needed  for Chest Pain. 25 Tab 0   • acetaminophen (TYLENOL) 500 MG Tab Take 1,000 mg by mouth every 6 hours as needed for Moderate Pain.     • bimatoprost (LUMIGAN) 0.01 % Solution Place 1 Drop in both eyes every bedtime. Indications: Wide-Angle Glaucoma     • aspirin EC (ECOTRIN) 81 MG Tablet Delayed Response Take 81 mg by mouth every day.     • vitamin D (CHOLECALCIFEROL) 1000 UNIT Tab Take 1,000 Units by mouth every day.     • Magnesium 500 MG Tab Take 1 Cap by mouth every day.     • lisinopril (PRINIVIL) 5 MG Tab Take 1 Tab by mouth every day. 30 Tab 1     No current facility-administered medications for this visit.     Patient Active Problem List    Diagnosis Date Noted   • Knee pain, right 06/20/2017   • Colon polyp 03/13/2017   • Decreased GFR 08/20/2015   • History of polymyalgia rheumatica 07/28/2015   • Dyspnea 07/29/2014   • Abnormal cardiovascular stress test 03/24/2014   • MEDICAL HOME    • Knee pain, left 08/01/2013   • Glaucoma 06/29/2010   • Skin cancer, basal cell 06/29/2010   • Osteopenia 06/03/2009   • Dyslipidemia 06/03/2009   • Chronic neutropenia (CMS-HCC) 06/03/2009   • Preventative health care 06/03/2009   • Aortic stenosis 06/03/2009       Skin cancer basal cell    Preventative health  7/23/12 tdap    1/10/13 pneumovax  7/29/14 zoster vaccine  8/6/14 FIT negative  8/6/15 [facility]nar at [facility]  8/19/15 dexa LS -1.4,hip -1.3;FRAX 40% major,27% hip; only on prednisone one month does not need bisphosphonate therapy  8/19/15 vit d 43  6/21/16 vit d 50  9/1/16 mammogram heterogeneously dense breast tissue recommend screening breast ultrasound  9/1/16 sonocine negative  3/7/17 colon per [facility] 2 polyps, grade 2 internal hemorrhoids, angioectasias ascending colon,pathology one hyperplastic polyp and onesessile serrated polyp, repeat colonoscopy 5 years    6/27/17 vit d 48    Osteopenia  9/06 dexa LS -1.2,hip -1.1  6/09 dexa LS -1.4,hip -1.0  7/11 vit d 43  7/11 dexa LS -1.1,hip -0.9  712 vit d 30 start 1000  units  7/24/13 vit d 72 on 1000 units  8/6/13 dexa LS -1.1,hip -1.1  8/5/14 vit d 67  8/19/15 dexa LS -1.4,hip -1.3;FRAX 40% major,27% hip only on prednisone one month, does not need bisphosphonate therapy  8/19/15 vit d 43  6/16/16 off prednisone x 3 weeks  6/21/16 vit d 50  6/27/17 vit d 48    Knee pain  8/1/13 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, lateral meniscus mildly extruded, ruptured hopper's cyst  8/12/13  ortho note pain improved on followup, consider injection if pain recurs  3/3/17 MRI right knee abnormal right lateral meniscus with peripheral extrusion, maceration, and complex tear involving primarily the posterior horn and body but also the anterior horn, abnormal medial meniscus with vertical tear of the peripheral zone of body and posterior horn, probable meniscal root tear, and degenerative fraying of the free edge, severe lateral femorotibial compartment osteoarthritis with significant cartilage loss and moderate to severe subchondral edema within the lateral femoral condyle and lateral tibial plateau, mild medial femorotibial and the patellofemoral compartment osteoarthritis, moderate sized joint effusion with associated popliteal cyst  6/20/17 sees  orthopedics    History polymyalgia  4/20/15 physical therapy, trial celebrex and zanaflex  5/7/15 physical therapy evaluation today recommended right hip x-ray and pelvic x-ray, ordered in computer  5/8/15 xray hip negative  6/29/15 seen by bridgette diagnosed with polymyalgia rheumatica  6/29/15 CRP 9.4,ESR 32 started on prednisone 20 mg    7/28/15 on prednisone 10 mg will continue 10 mg x 2 weeks, then decrease to 5 mg daily  8/19/15 hgb 14,hct 43, CRP 0.3, ESR 14, tapered off prednisone but developed mouth irritation, has resumed prednisone 5 mg daily, will continue x2 weeks then taper  11/17/15 refill prednisone 5 mg #30 tabs x one  6/21/16 off prednisone; CRP 0.1,ESR 17  6/27/17 CRP 0.09,ESR 12,cpk 66    History of  breast cancer  1980s left sided s/p lumpectomy and radiation therapy, no old records  7/11 mammogram  8/12 mammogram  8/13 mammogram  8/18/14 mammogram  9/1/16 mammogram heterogeneously dense breast tissue recommend screening breast ultrasound  9/1/16 sonocine negative    Glaucoma    Dyspnea  7/24/13 normal LV function, EF 60%, mild LVH, mild aortic stenosis, peak gradient 25  3/20/14 cxr negative  3/31/14 cardiac catheterization; left main mild lacking, LAD patent, circumflex free of disease, RCA free of disease, normal LV function, mild aortic stenosis  8/1/14 PFT FEV 2.8 L (144% predicted), FVC 3.6 (138% predicted), FEV/FVC 78, no bronchodilator response, DLCO 119% predicted    Dyslipidemia  3/08 chol 175,trig 73,hdl 45,ldl 115  6/09 chol 174,trig 89,hdl 47,ldl 109  7/10 chol 182,trig 61,hdl 55,ldl 114  7/11 chol 175,trig 69,hdl 45,ldl 116  7/12 chol 164,trig 64,hdl 43,ldl 108  7/24/13 chol 186,trig 66,hdl 54,ldl 119 no meds  8/5/14 chol 165,trig 93,hdl 48,ldl 98  8/19/15 chol 192,trig 109,hdl 58,ldl 112; 10 year risk 20% declines statin therapy  6/21/16 chol 137,trig 75,hdl 47,ldl 75   6/27/17 hcol 153,trig 94,hdl 54,ldl 80    Decrease GFR  7/7/11 bun 16,creat 1.0,GFR 50  7/24/13 bun 14,creat 1.1,GFR 45  3/20/14 bun 15,creat 0.9,GFR 59  8/5/14 bun 14,creat 1.1,GFR 46  2/19/15 bun 13,creat 0.8,GFR >60  8/19/15 bun 10,creat 1.1,GFR 48  6/21/16 bun 19,creat 1.0,GFR 50  6/27/17 bun 14,creat 0.5,GFR 51    Chronic neutropenia  8/06 wbc 3.4  3/08 wbc 3.9  6/09 wbc 3.9  7/10 wbc 4.4  7/11 wbc 4.5  7/12 wbc 3.8 (55%N,35%L)  7/24/13 wbc 4.3  3/20/14 wbc 3.1 (52%N,36%L)  3/31/14 wbc 3.7  8/19/15 wbc 5.3  6/21/16 wbc 4.4 (50%N,40%L)  6/27/17 wbc 4.2     Aortic stenosis  10/06 echo mild mr, normal LV  6/09 stress echo negative  8/12 echo normal LV function, EF 65%, mild aortic stenosis, peak gradient 24  7/24/13 normal LV function, EF 60%, mild LVH, mild aortic stenosis, peak gradient 25  3/31/14 cardiac catheterization;  "left main mild lacking, LAD patent, circumflex free of disease, RCA free of disease, normal LV function, mild aortic stenosis    Abnormal cardiovascular test  10/06 echo mild mitral regurgitation, normal LV  6/09 stress echo negative  8/12 echo normal LV function, EF 65%, mild aortic stenosis, peak gradient 24  7/24/13 normal LV function, EF 60%, mild LVH, mild aortic stenosis, peak gradient 25  3/20/14 seen ER palpitations, atypical chest pain  3/24/14 exercise treadmill carla protocol 5 minutes 5 seconds, 1 mm mild upsloping ST segment depression in V6, flat ST segment depression V4 and V5 compatible with ischemia, no arrhythmia noted  3/28/14 cardiology note, scheduled for cardiac catheterization, cont asa, metoprolol 25 mg bid, crestor 5 mg samples  3/31/14 cardiac catheterization; left main mild plaquing, LAD patent, circumflex free of disease, RCA free of disease, normal LV function, mild aortic stenosis  7/9/17 persantine thallium small fixed perfusion abnormality distal anterior and anteroapical segments, no ischemia is noted to represent mild prior infarct or variant normal apical thinning              ROS       Objective:     /72 mmHg  Pulse 75  Temp(Src) 36.8 °C (98.2 °F)  Ht 1.6 m (5' 3\")  Wt 65.772 kg (145 lb)  BMI 25.69 kg/m2  SpO2 97%     Physical Exam   Constitutional: She appears well-developed and well-nourished. No distress.   HENT:   Head: Normocephalic and atraumatic.   Eyes: Conjunctivae are normal. Right eye exhibits no discharge. Left eye exhibits no discharge.   Neck: Neck supple. No JVD present.   Cardiovascular: Normal rate and regular rhythm.    No murmur heard.  Pulmonary/Chest: No respiratory distress. She has no wheezes.   Abdominal: She exhibits no distension.   Musculoskeletal: She exhibits no edema.   Neurological: She is alert.   Skin: Skin is warm. She is not diaphoretic.   Psychiatric: She has a normal mood and affect. Her behavior is normal.   Nursing note and vitals " reviewed.              Assessment/Plan:     Assessment  #1 atypical chest pain recent hospitalization, troponins negative, negative Persantine thallium, chest x-ray and echo negative, likely related to esophageal dysmotility    #2 esophageal dysmotility 7/9/17 barium swallow moderate esophageal dysmotility, small volume silent aspiration, does have some dysphagia with apples    #3 small amount of aspiration seen on barium swallow, patient denies cough, productive sputum, shortness of breath, or previous aspiration    #4 elevated blood pressure was given lisinopril on discharge, did not start medication    #5 aortic stenosis 7/9/17 echo normal LV size and function, EF 60%, grade 1 diastolic dysfunction, moderate aortic stenosis, no current first of breath, chest pain, lightheadedness, palpitations      Plan  #1 reviewed hospital records    #2 repeat echo one year since asymptomatic    #3 referral GI evaluation esophageal dysmotility and dysphagia    #4 speech pathology evaluation swallowing evaluation with evidence of small amount of aspiration on barium swallow    #5 start diltiazem 120 mg by mouth daily, check blood pressure daily and record monitor for low blood pressure, lightheadedness, constipation, lower extremity edema, notify us if side effects     #6 aspiration precautions    #7 follow-up 3 months

## 2017-07-18 NOTE — MR AVS SNAPSHOT
"Katerina Torres   2017 2:20 PM   Office Visit   MRN: [id number]    Department:  [facility]   Dept Phone:  [phone]    Description:  Female : 1940   Provider:  Master Suarez M.D.           Reason for Visit     Hospital Follow-up           Allergies as of 2017     Allergen Noted Reactions    Vicodin [Hydrocodone-Acetaminophen] 2015   Nausea    Nausea, dizziness      You were diagnosed with     Aortic valve stenosis, unspecified etiology   [0966950]       Esophageal dysmotility   [437461]       Atypical chest pain   [475294]       Abnormal cardiovascular stress test   [031607]       Chest pain at rest   [743071]       Aspiration into airway, subsequent encounter   [1679513]         Vital Signs     Blood Pressure Pulse Temperature Height Weight Body Mass Index    138/72 mmHg 75 36.8 °C (98.2 °F) 1.6 m (5' 3\") 65.772 kg (145 lb) 25.69 kg/m2    Oxygen Saturation Smoking Status                97% Former Smoker          Basic Information     Date Of Birth Sex Race Ethnicity Preferred Language    1940 Female White Non- English      Problem List              ICD-10-CM Priority Class Noted - Resolved    Osteopenia (Chronic) M85.80   6/3/2009 - Present    Dyslipidemia (Chronic) E78.5   6/3/2009 - Present    Chronic neutropenia (CMS-HCC) (Chronic) D70.9   6/3/2009 - Present    Preventative health care (Chronic) Z00.00   6/3/2009 - Present    Aortic stenosis (Chronic) I35.0   6/3/2009 - Present    Glaucoma H40.9   2010 - Present    Skin cancer, basal cell C44.91   2010 - Present    Knee pain, left M25.562   2013 - Present    MEDICAL HOME    Unknown - Present    Abnormal cardiovascular stress test (Chronic) R94.39   3/24/2014 - Present    Dyspnea R06.00   2014 - Present    History of polymyalgia rheumatica Z87.39   2015 - Present    Decreased GFR R94.4   2015 - Present    Colon polyp K63.5   3/13/2017 - Present    Knee pain, right " M25.561   6/20/2017 - Present    Esophageal dysmotility K22.4   7/18/2017 - Present      Health Maintenance        Date Due Completion Dates    MAMMOGRAM 8/31/2017 8/31/2016, 8/19/2015, 8/18/2014, 8/6/2013, 8/2/2012, 7/22/2011, 7/8/2010, 7/8/2010, 6/25/2009, 6/25/2009, 3/11/2008, 3/11/2008, 9/28/2006, 9/28/2006, 9/1/2004    IMM INFLUENZA (1) 9/1/2017 ---    BONE DENSITY 8/19/2020 8/19/2015, 8/6/2013, 7/22/2011, 6/25/2009, 9/28/2006, 9/1/2004    COLONOSCOPY 3/7/2022 3/7/2017    IMM DTaP/Tdap/Td Vaccine (2 - Td) 7/23/2022 7/23/2012            Current Immunizations     13-VALENT PCV PREVNAR 8/6/2015    Pneumococcal polysaccharide vaccine (PPSV-23) 1/10/2013    SHINGLES VACCINE 7/29/2014    Tdap Vaccine 7/23/2012      Below and/or attached are the medications your provider expects you to take. Review all of your home medications and newly ordered medications with your provider and/or pharmacist. Follow medication instructions as directed by your provider and/or pharmacist. Please keep your medication list with you and share with your provider. Update the information when medications are discontinued, doses are changed, or new medications (including over-the-counter products) are added; and carry medication information at all times in the event of emergency situations     Allergies:  VICODIN - Nausea               Medications  Valid as of: July 18, 2017 -  3:02 PM    Generic Name Brand Name Tablet Size Instructions for use    Acetaminophen (Tab) TYLENOL 500 MG Take 1,000 mg by mouth every 6 hours as needed for Moderate Pain.        Aspirin (Tablet Delayed Response) ECOTRIN 81 MG Take 81 mg by mouth every day.        Bimatoprost (Solution) LUMIGAN 0.01 % Place 1 Drop in both eyes every bedtime. Indications: Wide-Angle Glaucoma        Cholecalciferol (Tab) cholecalciferol 1000 UNIT Take 1,000 Units by mouth every day.        DilTIAZem HCl (CAPSULE SR 24 HR) CARDIZEM  MG Take 1 Cap by mouth every day.        Magnesium  (Tab) Magnesium 500 MG Take 1 Cap by mouth every day.        Nitroglycerin (SL Tab) NITROSTAT 0.4 MG Place 1 Tab under tongue as needed for Chest Pain.        .                 Medicines prescribed today were sent to:     [facility] #[id number] - [place], [place] - [address]    [address] [place] [address]    Phone: [phone] Fax: [phone]    Open 24 Hours?: No      Medication refill instructions:       If your prescription bottle indicates you have medication refills left, it is not necessary to call your provider’s office. Please contact your pharmacy and they will refill your medication.    If your prescription bottle indicates you do not have any refills left, you may request refills at any time through one of the following ways: The online GROU.PS system (except Urgent Care), by calling your provider’s office, or by asking your pharmacy to contact your provider’s office with a refill request. Medication refills are processed only during regular business hours and may not be available until the next business day. Your provider may request additional information or to have a follow-up visit with you prior to refilling your medication.   *Please Note: Medication refills are assigned a new Rx number when refilled electronically. Your pharmacy may indicate that no refills were authorized even though a new prescription for the same medication is available at the pharmacy. Please request the medicine by name with the pharmacy before contacting your provider for a refill.        Referral     A referral request has been sent to our patient care coordination department. Please allow 3-5 business days for us to process this request and contact you either by phone or mail. If you do not hear from us by the 5th business day, please call us at [phone].        Other Notes About Your Plan     9/17 need dexa and mammogram  7/18/17 referral [facility],   7/18 repeat echo                            Syncapse Access Code: Activation code not generated  Current Syncapse Status: Active

## 2017-07-23 LAB — EKG IMPRESSION: NORMAL

## 2017-07-27 DIAGNOSIS — I35.0 AORTIC VALVE STENOSIS, UNSPECIFIED ETIOLOGY: Chronic | ICD-10-CM

## 2017-07-27 DIAGNOSIS — I10 ESSENTIAL HYPERTENSION: Chronic | ICD-10-CM

## 2017-07-28 ENCOUNTER — TELEPHONE (OUTPATIENT)
Dept: CARDIOLOGY | Facility: MEDICAL CENTER | Age: 77
End: 2017-07-28

## 2017-09-25 ENCOUNTER — TELEPHONE (OUTPATIENT)
Dept: MEDICAL GROUP | Facility: MEDICAL CENTER | Age: 77
End: 2017-09-25

## 2017-09-25 ENCOUNTER — PATIENT MESSAGE (OUTPATIENT)
Dept: MEDICAL GROUP | Facility: MEDICAL CENTER | Age: 77
End: 2017-09-25

## 2017-09-25 NOTE — TELEPHONE ENCOUNTER
From: Katerinarenay Torres  To: Master Suarez M.D.  Sent: 9/25/2017 2:52 PM PDT  Subject: Test Result Question    ,  My Wife Katerina is in excruciating pain in her left leg above the knee since 1 month, nothing helps, can you   please advice us.  We are thinking of a herniated Disk, or Syatic Nerve, we would really appreciate your help to relieve Katerina's pain. Hoping to hear from you, Thank you  sincerely  Aristeo Torres

## 2017-09-26 NOTE — TELEPHONE ENCOUNTER
Please call patient, she sent me a my chart message tonight, complaining of leg pain, and I tentatively scheduled an appointment for Friday at 4:00.    Please check to see if there are other sooner availabilities with another provider in our office before Friday, and check with the patient.

## 2017-09-27 ENCOUNTER — OFFICE VISIT (OUTPATIENT)
Dept: MEDICAL GROUP | Facility: MEDICAL CENTER | Age: 77
End: 2017-09-27
Payer: MEDICARE

## 2017-09-27 VITALS
RESPIRATION RATE: 16 BRPM | TEMPERATURE: 98.1 F | DIASTOLIC BLOOD PRESSURE: 64 MMHG | HEIGHT: 63 IN | HEART RATE: 57 BPM | WEIGHT: 145 LBS | SYSTOLIC BLOOD PRESSURE: 106 MMHG | OXYGEN SATURATION: 95 % | BODY MASS INDEX: 25.69 KG/M2

## 2017-09-27 DIAGNOSIS — M53.86 SCIATICA ASSOCIATED WITH DISORDER OF LUMBAR SPINE: ICD-10-CM

## 2017-09-27 PROCEDURE — 99214 OFFICE O/P EST MOD 30 MIN: CPT | Performed by: FAMILY MEDICINE

## 2017-09-27 RX ORDER — METHYLPREDNISOLONE 4 MG/1
TABLET ORAL
Qty: 21 TAB | Refills: 0 | Status: SHIPPED | OUTPATIENT
Start: 2017-09-27 | End: 2018-04-26

## 2017-09-27 NOTE — ASSESSMENT & PLAN NOTE
"Back Pain: Location/quality of pain: left lower leg  Radiation? yes - left thigh  Course of symptoms: They were renovating their kitchen     Initial precipitant of symptoms: 3 weeks ago. Time of day when symptoms are worst: all day. Alleviating factors identifiable by patient: recumbency. Exacerbating factors identifiable by patient: standing, walking. Treatments so far initiated by patient: warm baths and tylenol    Previous back problems: none. Previous workup: none. Previous treatment: none    CURRENT LIMITATIONS OF FUNCTION:  Sitting, standing, walking:   Lifting weights:  Activities limited to this degree since significant    No \"RED FLAGS’ FOR FRACTURE: (Recent major trauma, Minor trauma or strenuous lifting in older or potentially osteoporotic patient, History of chronic corticosteroid therapy)  No \"RED FLAGS\" FOR NEOPLASM OR INFECTION: (Age over 50 or under 20, History of cancer, yenni. breast, lung, prostate, Recent fever/chills, Recent unexplained weight loss,  Recent bacterial infection, current IV drug use, Immunosuppression (from meds, HIV, etc.), Pain worse when supine or at night)  No \"RED FLAGS’ FOR CAUDA EQUINA SYNDROME: (Saddle anesthesia, urine retention, fecal incontinence, Severe or progressive LE neurologic deficit)        "

## 2017-09-27 NOTE — PATIENT INSTRUCTIONS
Back Exercises  Back exercises help treat and prevent back injuries. The goal of back exercises is to increase the strength of your abdominal and back muscles and the flexibility of your back. These exercises should be started when you no longer have back pain. Back exercises include:  · Pelvic Tilt. Lie on your back with your knees bent. Tilt your pelvis until the lower part of your back is against the floor. Hold this position 5 to 10 sec and repeat 5 to 10 times.  · Knee to Chest. Pull first 1 knee up against your chest and hold for 20 to 30 seconds, repeat this with the other knee, and then both knees. This may be done with the other leg straight or bent, whichever feels better.  · Sit-Ups or Curl-Ups. Bend your knees 90 degrees. Start with tilting your pelvis, and do a partial, slow sit-up, lifting your trunk only 30 to 45 degrees off the floor. Take at least 2 to 3 seconds for each sit-up. Do not do sit-ups with your knees out straight. If partial sit-ups are difficult, simply do the above but with only tightening your abdominal muscles and holding it as directed.  · Hip-Lift. Lie on your back with your knees flexed 90 degrees. Push down with your feet and shoulders as you raise your hips a couple inches off the floor; hold for 10 seconds, repeat 5 to 10 times.  · Back arches. Lie on your stomach, propping yourself up on bent elbows. Slowly press on your hands, causing an arch in your low back. Repeat 3 to 5 times. Any initial stiffness and discomfort should lessen with repetition over time.  · Shoulder-Lifts. Lie face down with arms beside your body. Keep hips and torso pressed to floor as you slowly lift your head and shoulders off the floor.  Do not overdo your exercises, especially in the beginning. Exercises may cause you some mild back discomfort which lasts for a few minutes; however, if the pain is more severe, or lasts for more than 15 minutes, do not continue exercises until you see your caregiver.  Improvement with exercise therapy for back problems is slow.   See your caregivers for assistance with developing a proper back exercise program.     This information is not intended to replace advice given to you by your health care provider. Make sure you discuss any questions you have with your health care provider.     Document Released: 01/25/2006 Document Revised: 03/11/2013 Document Reviewed: 02/11/2016  [employer] Interactive Patient Education ©2016 [employer]

## 2017-10-04 NOTE — PROGRESS NOTES
"Subjective:     Chief Complaint   Patient presents with   • Pain     leg       Katerina Torres is a 77 y.o. female here today for evaluation and management of:    Sciatica associated with disorder of lumbar spine  Back Pain: Location/quality of pain: left lower leg  Radiation? yes - left thigh  Course of symptoms: They were renovating their kitchen     Initial precipitant of symptoms: 3 weeks ago. Time of day when symptoms are worst: all day. Alleviating factors identifiable by patient: recumbency. Exacerbating factors identifiable by patient: standing, walking. Treatments so far initiated by patient: warm baths and tylenol    Previous back problems: none. Previous workup: none. Previous treatment: none    CURRENT LIMITATIONS OF FUNCTION:  Sitting, standing, walking:   Lifting weights:  Activities limited to this degree since significant    No \"RED FLAGS’ FOR FRACTURE: (Recent major trauma, Minor trauma or strenuous lifting in older or potentially osteoporotic patient, History of chronic corticosteroid therapy)  No \"RED FLAGS\" FOR NEOPLASM OR INFECTION: (Age over 50 or under 20, History of cancer, yenni. breast, lung, prostate, Recent fever/chills, Recent unexplained weight loss,  Recent bacterial infection, current IV drug use, Immunosuppression (from meds, HIV, etc.), Pain worse when supine or at night)  No \"RED FLAGS’ FOR CAUDA EQUINA SYNDROME: (Saddle anesthesia, urine retention, fecal incontinence, Severe or progressive LE neurologic deficit)             Allergies   Allergen Reactions   • Vicodin [Hydrocodone-Acetaminophen] Nausea     Nausea, dizziness       Current medicines (including changes today)  Current Outpatient Prescriptions   Medication Sig Dispense Refill   • MethylPREDNISolone (MEDROL DOSEPAK) 4 MG Tablet Therapy Pack As directed on the packaging label. 21 Tab 0   • diltiazem (CARDIZEM CD) 120 MG XR capsule Take 1 Cap by mouth every day. 30 Cap 6   • nitroglycerin (NITROSTAT) 0.4 MG SL Tab " Place 1 Tab under tongue as needed for Chest Pain. 25 Tab 0   • acetaminophen (TYLENOL) 500 MG Tab Take 1,000 mg by mouth every 6 hours as needed for Moderate Pain.     • bimatoprost (LUMIGAN) 0.01 % Solution Place 1 Drop in both eyes every bedtime. Indications: Wide-Angle Glaucoma     • aspirin EC (ECOTRIN) 81 MG Tablet Delayed Response Take 81 mg by mouth every day.     • vitamin D (CHOLECALCIFEROL) 1000 UNIT Tab Take 1,000 Units by mouth every day.     • Magnesium 500 MG Tab Take 1 Cap by mouth every day.       No current facility-administered medications for this visit.        She  has a past medical history of Breast cancer (CMS-HCC); Breath shortness; Cancer (CMS-HCC); Cardiac murmur (6/3/2009); Chronic kidney disease, stage III (moderate) (8/20/2015); Cold; Dyslipidemia (6/3/2009); Glaucoma; Hepatitis A; breast cancer (6/3/2009); Hypertension (7/18/2017); MEDICAL HOME; Neutropenia (6/3/2009); Osteopenia (6/3/2009); Preventative health care (6/3/2009); Snoring; and Unspecified cataract. She also has no past medical history of ASTHMA; CAD (coronary artery disease); Congestive heart failure (CMS-HCC); COPD; Diabetes; Liver disease; Psychiatric disorder; Seizure disorder (CMS-HCC); or Stroke (CMS-HCC).    Patient Active Problem List    Diagnosis Date Noted   • Sciatica associated with disorder of lumbar spine 09/27/2017   • Esophageal dysmotility 07/18/2017   • Hypertension 07/18/2017   • Knee pain, right 06/20/2017   • Colon polyp 03/13/2017   • Decreased GFR 08/20/2015   • History of polymyalgia rheumatica 07/28/2015   • Dyspnea 07/29/2014   • Abnormal cardiovascular stress test 03/24/2014   • MEDICAL HOME    • Knee pain, left 08/01/2013   • Glaucoma 06/29/2010   • Skin cancer, basal cell 06/29/2010   • Osteopenia 06/03/2009   • Dyslipidemia 06/03/2009   • Chronic neutropenia (CMS-HCC) 06/03/2009   • Preventative health care 06/03/2009   • Aortic stenosis 06/03/2009       ROS   No fever or chills.  No nausea or  "vomiting.  No chest pain or palpitations.  No cough or SOB.  No pain with urination or hematuria.  No black or bloody stools.       Objective:     Blood pressure 106/64, pulse (!) 57, temperature 36.7 °C (98.1 °F), resp. rate 16, height 1.6 m (5' 3\"), weight 65.8 kg (145 lb), SpO2 95 %. Body mass index is 25.69 kg/m².   Physical Exam:  Well developed, well nourished.  Alert, oriented in no acute distress.  Eye contact is good, speech goal directed, affect calm  Eyes: conjunctiva non-injected, sclera non-icteric.  Neck Supple.  No adenopathy or masses in the neck or supraclavicular regions. No thyromegaly  Lungs: clear to auscultation bilaterally with good excursion. No wheezes or rhonchi  CV: regular rate and rhythm. No murmur  Abdomen: soft, nontender, no masses or organomegaly.  No rebound or guarding  Ext: no edema, color normal, vascularity normal, temperature normal  SPINE: No significant spinal curvature on forward bend. Mild tenderness in paraspinous muscles lumbar spine without current spasm. SLT negative. DTR 2+ patella, 1+ achilles bilaterally. Strength 5/5 proximal and distal LE.  No pain with stress of SI. Full hip ROM. Poor hamstring flexibility. No symptoms with axial loading.           Assessment and Plan:   The following treatment plan was discussed    1. Sciatica associated with disorder of lumbar spine  Refer to physical therapy. Patient education material given on strengthening. Patient does not want pain medications at this time. We will start Medrol to decrease inflammation. She'll follow up with Dr. Suarez is not improving  - MethylPREDNISolone (MEDROL DOSEPAK) 4 MG Tablet Therapy Pack; As directed on the packaging label.  Dispense: 21 Tab; Refill: 0  - REFERRAL TO PHYSICAL THERAPY Reason for Therapy: Eval/Treat/Report    Any change or worsening of signs or symptoms, patient encouraged to follow-up or report to the emergency room for further evaluation. Patient understands and " agrees.    Followup: Return if symptoms worsen or fail to improve.

## 2017-10-05 ENCOUNTER — HOSPITAL ENCOUNTER (OUTPATIENT)
Dept: PHYSICAL THERAPY | Facility: REHABILITATION | Age: 77
End: 2017-10-05
Attending: FAMILY MEDICINE
Payer: MEDICARE

## 2017-10-05 PROCEDURE — 97161 PT EVAL LOW COMPLEX 20 MIN: CPT

## 2017-10-12 ENCOUNTER — PHYSICAL THERAPY (OUTPATIENT)
Dept: PHYSICAL THERAPY | Facility: REHABILITATION | Age: 77
End: 2017-10-12
Attending: FAMILY MEDICINE
Payer: MEDICARE

## 2017-10-12 DIAGNOSIS — M54.42 ACUTE LEFT-SIDED LOW BACK PAIN WITH LEFT-SIDED SCIATICA: ICD-10-CM

## 2017-10-12 PROCEDURE — 97110 THERAPEUTIC EXERCISES: CPT

## 2017-10-12 PROCEDURE — 97012 MECHANICAL TRACTION THERAPY: CPT

## 2017-10-12 PROCEDURE — 97140 MANUAL THERAPY 1/> REGIONS: CPT

## 2017-10-12 ASSESSMENT — ENCOUNTER SYMPTOMS: PAIN SCALE: 5

## 2017-10-12 NOTE — OP THERAPY DAILY TREATMENT
Outpatient Physical Therapy  DAILY TREATMENT     [facility] [address]  [address]  [place] [address]  Phone:  [phone]  Fax:  [phone]    Date: 10/12/2017    Patient: Katerina Torres  YOB: 1940  MRN: [id number]     Time Calculation  Start time: 1345  Stop time: 1440 Time Calculation (min): 55 minutes     Chief Complaint: Low Back Pain    Visit #: 2    Subjective:   History of Present Illness:     Mechanism of injury:  S: overall, slightly less pain and sleeping better. Pt. Still reports pain if pt. Sits > 1hr.  Pain:     Current pain ratin    Location:  Ant. L thigh above the knee      Objective  There ex: x20 min.    Manual Rx : x 15min.   E-stim: 15 min.    Treatments:    Treatment Summary: O:    Palpation: noted sig. Guarding L multifidus and erector group    -prone//abolished thigh pain immediately--> JONATHAN w/ stm to L l/s  -prone L seg stm and jt. Mobs gd;II-III w/ ASIS moment arm  -R s/l cfm to L ILL and s1-L5 a/p mobs gd: III-IV  -R s/l gapping w/ L QL hold/relax// no leg pain  Tall wall--  Core stab level I w/ BFB:   --ball in/out   --marching   --BKFO//better, less leg symptoms              - body mechanics trg. W/ supine-sit(log roll)  Mechanical traction:  70/45/x 60/20 x 15' w/ MHP      Assessment, Response and Plan:   Assessment details:  A:   Poor volitional  core stab and poor hip dissociation .  Abolished symptoms with manual gapping. Noted signficant TTP L ILL  --no pioan in supine but more leg pain after traction  P:   Increase core II, cfm, mods w/ gapping, tx?

## 2017-10-17 ENCOUNTER — PHYSICAL THERAPY (OUTPATIENT)
Dept: PHYSICAL THERAPY | Facility: REHABILITATION | Age: 77
End: 2017-10-17
Attending: FAMILY MEDICINE
Payer: MEDICARE

## 2017-10-17 DIAGNOSIS — M54.42 ACUTE LEFT-SIDED LOW BACK PAIN WITH LEFT-SIDED SCIATICA: ICD-10-CM

## 2017-10-17 PROCEDURE — 97110 THERAPEUTIC EXERCISES: CPT

## 2017-10-17 PROCEDURE — 97014 ELECTRIC STIMULATION THERAPY: CPT

## 2017-10-17 ASSESSMENT — ENCOUNTER SYMPTOMS: PAIN SCALE: 2

## 2017-10-17 NOTE — OP THERAPY DAILY TREATMENT
"Outpatient Physical Therapy  DAILY TREATMENT     [facility] [address]  [address]  [place] [address]  Phone:  [phone]  Fax:  [phone]    Date: 10/17/2017    Patient: Katerina Torres  YOB: 1940  MRN: [id number]     Time Calculation  Start time: 0845  Stop time: 0932 Time Calculation (min): 47 minutes     Chief Complaint: No chief complaint on file.    Visit #: 3    Subjective:   Pain:     Current pain ratin    Location:  Ant. L thigh above the knee    Pain Comments::  \" worse for two days after last visit but now better than last week      Objective    There ex: x30 min.    E-stim: 15 min.    Treatments:    Treatment Summary:     --Core stab level I w/ BFB:   --ball in/out   --marching   --BKFO     --BALL bridges w/ 60\" holds x 3    --ball squats//limited due to R knee pain              - body mechanics trg. W/ supine-sit(log roll)  Stateless bilateral l/s multifidi w/ MHP         Assessment, Response and Plan:   Assessment details:  A:  Hold traction at for today  Continued poor core co-contraction and misunderstanding of posture with patient focussed on TOO Much extension and no abdominals during standing.  Improved awareness with limited hip dissociation--limited low abdominal recruitment  No pain after treatment--feels good  P:   Increase core II, cfm         "

## 2017-10-19 ENCOUNTER — PHYSICAL THERAPY (OUTPATIENT)
Dept: PHYSICAL THERAPY | Facility: REHABILITATION | Age: 77
End: 2017-10-19
Attending: FAMILY MEDICINE
Payer: MEDICARE

## 2017-10-19 DIAGNOSIS — M54.42 ACUTE LEFT-SIDED LOW BACK PAIN WITH LEFT-SIDED SCIATICA: ICD-10-CM

## 2017-10-19 PROCEDURE — 97110 THERAPEUTIC EXERCISES: CPT

## 2017-10-19 PROCEDURE — 97014 ELECTRIC STIMULATION THERAPY: CPT

## 2017-10-19 PROCEDURE — 97140 MANUAL THERAPY 1/> REGIONS: CPT

## 2017-10-19 NOTE — OP THERAPY DAILY TREATMENT
"Outpatient Physical Therapy  DAILY TREATMENT     [facility] [address].  [address]  [place] [address]  Phone:  [phone]  Fax:  [phone]    Date: 10/19/2017    Patient: Katerina Torres  YOB: 1940  MRN: [id number]       Subjective      Time Calculation  Start time: 1320  Stop time: 1410 Time Calculation (min): 50 minutes     Chief Complaint: No chief complaint on file.    Visit #: 3    Subjective: less thigh pain but some L buittock pain  Pain:     Current pain ratin/10 L buttock    Location: L buttock    week      Objective    There ex: x10 min.    Manual: 20  E-stim: 15 min.    Treatments:    Treatment Summary:   Iastm: l/s  l4-s1 mobs w/ asis as lever  Fd:3-4  iastm : R ant tibialis, distal hams insertion and distal itb  //full ball squat w/o pain in R knee     --BALL bridges w/ 60\" holds     --ball bridge hamstring x 5      Maced[place] bilateral l/s multifidi w/ MHP and Mexican R ant tibilais w/ nhp x 15'         Assessment, Response and Plan:   Assessment details:    A:  significant decrease in L leg pain over the past week.  Noted decrease in R knee pain with squat after iastm, no buttock pain        P: increase posterior chain strength, iastm   Increase core II, cfm         Objective    Exercises/Treatment    Assessment/Response/Plan      "

## 2017-10-19 NOTE — OP THERAPY DAILY TREATMENT
"Outpatient Physical Therapy  DAILY TREATMENT     [facility] [address]  [address]  [place] [address]  Phone:  [phone]  Fax:  [phone]    Date: 10/19/2017    Patient: Katerina Torres  YOB: 1940  MRN: [id number]       Subjective      Time Calculation  Start time: 1318  Stop time: 1350 Time Calculation (min): 32 minutes     Chief Complaint: No chief complaint on file.    Visit #: 3    Subjective:\" much better,  less thigh pain but some L buittock pain\"   Pain:     Current pain ratin/10 L buttock    Location: L buttock    week      Objective    There ex: x30 min.    E-stim: 15 min.    Treatments:    Treatment Summary:     --Core stab level I w/ BFB:   --ball in/out   --marching   --BKFO     --BALL bridges w/ 60\" holds x 3    --ball squats//limited due to R knee pain              - body mechanics trg. W/ supine-sit(log roll)  Citizen of [place] bilateral l/s multifidi w/ MHP         Assessment, Response and Plan:   Assessment details:  A:  Hold traction at for today  Continued poor core co-contraction and misunderstanding of posture with patient focussed on TOO Much extension and no abdominals during standing.  Improved awareness with limited hip dissociation--limited low abdominal recruitment  No pain after treatment--feels good  P:   Increase core II, cfm         Objective    Exercises/Treatment    Assessment/Response/Plan      "

## 2017-10-24 ENCOUNTER — APPOINTMENT (OUTPATIENT)
Dept: PHYSICAL THERAPY | Facility: REHABILITATION | Age: 77
End: 2017-10-24
Attending: FAMILY MEDICINE
Payer: MEDICARE

## 2017-10-26 ENCOUNTER — APPOINTMENT (OUTPATIENT)
Dept: PHYSICAL THERAPY | Facility: REHABILITATION | Age: 77
End: 2017-10-26
Attending: FAMILY MEDICINE
Payer: MEDICARE

## 2017-10-31 ENCOUNTER — APPOINTMENT (OUTPATIENT)
Dept: PHYSICAL THERAPY | Facility: REHABILITATION | Age: 77
End: 2017-10-31
Attending: FAMILY MEDICINE
Payer: MEDICARE

## 2017-11-02 ENCOUNTER — APPOINTMENT (OUTPATIENT)
Dept: PHYSICAL THERAPY | Facility: REHABILITATION | Age: 77
End: 2017-11-02
Attending: FAMILY MEDICINE
Payer: MEDICARE

## 2017-11-28 ENCOUNTER — TELEPHONE (OUTPATIENT)
Dept: MEDICAL GROUP | Facility: MEDICAL CENTER | Age: 77
End: 2017-11-28

## 2017-11-28 NOTE — TELEPHONE ENCOUNTER
----- Message from Rut Escalera, Med Ass't sent at 11/28/2017 11:36 AM PST -----  Regarding: Physical Therapy   PT came in today, her knee surgery that was scheduled for Friday 12/1/17 was canceled and she would rather continue physical therapy with Issac Dykes

## 2017-12-12 ENCOUNTER — PHYSICAL THERAPY (OUTPATIENT)
Dept: PHYSICAL THERAPY | Facility: REHABILITATION | Age: 77
End: 2017-12-12
Attending: INTERNAL MEDICINE
Payer: MEDICARE

## 2017-12-12 ENCOUNTER — TELEPHONE (OUTPATIENT)
Dept: MEDICAL GROUP | Facility: MEDICAL CENTER | Age: 77
End: 2017-12-12

## 2017-12-12 DIAGNOSIS — M23.306 MENISCUS DEGENERATION, RIGHT: ICD-10-CM

## 2017-12-12 DIAGNOSIS — G89.29 CHRONIC BILATERAL LOW BACK PAIN WITH LEFT-SIDED SCIATICA: ICD-10-CM

## 2017-12-12 DIAGNOSIS — M54.42 CHRONIC BILATERAL LOW BACK PAIN WITH LEFT-SIDED SCIATICA: ICD-10-CM

## 2017-12-12 DIAGNOSIS — M25.562 LEFT KNEE PAIN, UNSPECIFIED CHRONICITY: ICD-10-CM

## 2017-12-12 PROCEDURE — 97161 PT EVAL LOW COMPLEX 20 MIN: CPT

## 2017-12-12 PROCEDURE — 97140 MANUAL THERAPY 1/> REGIONS: CPT

## 2017-12-12 PROCEDURE — 97014 ELECTRIC STIMULATION THERAPY: CPT

## 2017-12-12 SDOH — ECONOMIC STABILITY: GENERAL: QUALITY OF LIFE: FAIR

## 2017-12-12 ASSESSMENT — ENCOUNTER SYMPTOMS
PAIN SCALE AT LOWEST: 0
PAIN SCALE: 0
PAIN SCALE AT HIGHEST: 6
QUALITY: ACHING
PAIN LOCATION: LATERAL KNEE

## 2017-12-12 NOTE — TELEPHONE ENCOUNTER
1. Caller Name: Bridget @ [facility]                      Call Back Number: [phone]    2. Message: Pt needs new REF for PT on L Knee. Please notify Bridget when done.     3. Patient approves office to leave a detailed voicemail/MyChart message: no

## 2017-12-12 NOTE — OP THERAPY EVALUATION
"  Outpatient Physical Therapy  INITIAL EVALUATION    [facility] Physical Therapy [address].  [address]  [place] [address]  Phone:  [phone]  Fax:  [phone]    Date of Evaluation: 2017    Patient: Katerina Torres  YOB: 1940  MRN: [id number]     Referring Provider: Master Suarez M.D.  [address]  [address]  [place] [address]   Referring Diagnosis Low back pain, unspecified back pain laterality, unspecified chronicity, with sciatica presence unspecified [M54.5]     Time Calculation  Start time: 1122  Stop time: 1430 Time Calculation (min): 188 minutes     Physical Therapy Occurrence Codes    Date of onset of impairment:  8/10/17   Date physical therapy care plan established or reviewed:  10/5/17   Date physical therapy treatment started:  10/5/17          Chief Complaint: No chief complaint on file.    Visit Diagnoses     ICD-10-CM   1. 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 Chronic bilateral low back pain with left-sided sciatica M54.42    G89.29         Subjective:   History of Present Illness:     Mechanism of injury:  6 months R knee pain started hurting after last ski season.  Worse over the summer.  Slowly improving over the past two months. Still limited with activities.  Patient reports that her knee \"pops\" with turning to the right while skiing    Quality of life:  Fair  Prior level of function:  Retired// daily home routine for core strength. pt. skis 2-3/wk during the winter  Pain:     Current pain ratin    At best pain ratin    At worst pain ratin    Location:  Lateral knee    Quality:  Aching    Pain Comments::  AGGS:  Squat to low seat/toilet  In/out car  Steps 1-2  Skiing every turn in R knee pain  Patient Goals:     Patient goals for therapy:  Return to sport/leisure activities    Other patient goals:  Patient wants to ski daily      Past Medical History:   Diagnosis Date   • Breast cancer (CMS-HCC)    • Breath shortness "    • Cancer (CMS-HCC)     breast left side [id number]   • Cardiac murmur 6/3/2009   • Chronic kidney disease, stage III (moderate) 8/20/2015   • Cold    • Dyslipidemia 6/3/2009   • Glaucoma     both eyes   • Hepatitis A    • Hx of breast cancer 6/3/2009   • Hypertension 7/18/2017   • MEDICAL HOME    • Neutropenia 6/3/2009   • Osteopenia 6/3/2009   • Preventative health care 6/3/2009   • Snoring    • Unspecified cataract      Past Surgical History:   Procedure Laterality Date   • VITRECTOMY POSTERIOR Left 10/25/2016    Procedure: VITRECTOMY POSTERIOR membrane peel cryotherapy infusion of SF6 intraocular gas;  Surgeon: Amanda Rodriguez M.D.;  Location: SURGERY SAME DAY [facility];  Service:    • RECOVERY  3/31/2014    Performed by [facility] at SURGERY SAME DAY [facility] ORS   • CATARACT PHACO WITH IOL  5/28/2009    Performed by GERA BREAUX at SURGERY SAME DAY [facility] ORS   • CATARACT PHACO WITH IOL  5/14/2009    Performed by [facility] BREAUX at SURGERY [facility]   • LUMPECTOMY     • OTHER     • PB RADIATION THERAPY PLAN SIMPLE       Social History   Substance Use Topics   • Smoking status: Former Smoker     Types: Cigarettes     Quit date: 6/16/1994   • Smokeless tobacco: Never Used   • Alcohol use 0.0 oz/week      Comment: 1 glass wine/day     Family and Occupational History     Social History   • Marital status:      Spouse name: N/A   • Number of children: N/A   • Years of education: N/A       Objective     Active Range of Motion     Additional Active Range of Motion Details  0-140      Palpation:  TTP anterior tib/VL, FL and lateral patella  R patella  Ballottement sign (+)        Therapeutic Treatments and Modalities:     Therapeutic Treatment and Modalities Summary:     There ex: x2 min.   Manual Rx : x 10min.    E-stim: 15 min. EVAL: 30    IASTM: VL,FL, ant. Tib.  Bhutanese to FL,ant. Tib, ,VL      Assessment, Response and Plan:   Assessment details:  Patient presents w/  PFPS w/ noted TFL,TP ant. Tib, VL and lateral gastroc    0/10  Prognosis: good    Goals:   Short Term Goals:   AGGS:  Squat to low seat/toilet w/o pain  In/out car w/o pain  Steps > 4 w/o pain  Ski 5 minutes w/o pain  LEFS> 55      Long Term Goals:    AGGS:  In/out car  Steps >15 w/o pain  Ski > 45' w/o pain  LEFS > 65    Plan:   Therapy options:  Physical therapy treatment to continue  Planned therapy interventions:  Therapeutic Activities (CPT 99725), Therapeutic Exercise (CPT 39873), Gait Training (CPT 30165), E Stim Unattended (CPT 77384) and Manual Therapy (CPT 91617)  Other planned therapy interventions:  Dry needle  Frequency:  2x week  Duration in weeks:  8  Duration in visits:  16  Plan details:  Posterior chain strenght, BOBYN, IAStm      Referring provider co-signature:  I have reviewed this plan of care and my co-signature certifies the need for services.      Physician Signature: ________________________________ Date: ______________

## 2017-12-13 NOTE — TELEPHONE ENCOUNTER
Physical therapy referral for left knee pain has been placed, please notify physical therapy at [phone]

## 2017-12-14 ENCOUNTER — PHYSICAL THERAPY (OUTPATIENT)
Dept: PHYSICAL THERAPY | Facility: REHABILITATION | Age: 77
End: 2017-12-14
Attending: INTERNAL MEDICINE
Payer: MEDICARE

## 2017-12-14 DIAGNOSIS — G89.29 CHRONIC PAIN OF RIGHT KNEE: ICD-10-CM

## 2017-12-14 DIAGNOSIS — M25.561 CHRONIC PAIN OF RIGHT KNEE: ICD-10-CM

## 2017-12-14 PROCEDURE — 97110 THERAPEUTIC EXERCISES: CPT

## 2017-12-14 PROCEDURE — 97140 MANUAL THERAPY 1/> REGIONS: CPT

## 2017-12-14 PROCEDURE — 97014 ELECTRIC STIMULATION THERAPY: CPT

## 2017-12-14 NOTE — OP THERAPY DAILY TREATMENT
"Outpatient Physical Therapy  DAILY TREATMENT     [facility] Physical [address].  [address]  [place] [address]  Phone:  [phone]  Fax:  [phone]    Date: 2017    Patient: Katerina Torres  YOB: 1940  MRN: [id number]       Subjective      Time Calculation  Start time: 0915  Stop time: 1000 Time Calculation (min): 45 minutes     Chief Complaint: No chief complaint on file.    Visit #: 3    Subjective: no pain with walking but really bad while skiing  Pain:     Current pain ratin/10   Objective    Exercises/Treatment  Objective  Patella ballottement sign(-)  There ex: x10 min.    Manual: 20  E-stim: 15 min.    Treatments:    Treatment Summary:   Iastm: ant. Tibialis, VL and lateral jt. line    full ball squat w/o pain in R knee     --BALL bridges w/ 60\" holds     --ball bridge hamstring x 5  -bridge w/ alt leg lifts x   S[place] Arabian bilateral l/s multifidi w/ MHP and Ivorian R ant tibilais w/ nhp x 15'    Assessment/Response/Plan  Assessment, Response and Plan:   Weak post. Chain but tolerated ex. To fatigue w/o increased pain  Pain limiting skiing but no pain w/ adls    P: increase posterior chain strength, iastm       "

## 2017-12-19 ENCOUNTER — PHYSICAL THERAPY (OUTPATIENT)
Dept: PHYSICAL THERAPY | Facility: REHABILITATION | Age: 77
End: 2017-12-19
Attending: INTERNAL MEDICINE
Payer: MEDICARE

## 2017-12-19 DIAGNOSIS — M25.561 CHRONIC PAIN OF RIGHT KNEE: ICD-10-CM

## 2017-12-19 DIAGNOSIS — G89.29 CHRONIC PAIN OF RIGHT KNEE: ICD-10-CM

## 2017-12-19 PROCEDURE — 97014 ELECTRIC STIMULATION THERAPY: CPT

## 2017-12-19 PROCEDURE — 97140 MANUAL THERAPY 1/> REGIONS: CPT

## 2017-12-19 NOTE — OP THERAPY DAILY TREATMENT
Outpatient Physical Therapy  DAILY TREATMENT     [facility] Physical [address].  [address]  [place] [address]  Phone:  [phone]  Fax:  [phone]    Date: 2017    Patient: Katerina Torres  YOB: 1940  MRN: [id number]       Subjective      Time Calculation  Start time: 0947  Stop time: 1032 Time Calculation (min): 45 minutes     Chief Complaint: No chief complaint on file.    Visit #: 3  Skied ten runs yesterday  w/ very little pain  Pain:     Current pain ratin-1/10   Objective    Exercises/Treatment  Objective  There ex: x2 min.    Manual: 25  E-stim: 15 min.    Treatments:    Treatment Summary:   Iastm: ant. Tibialis, VL and lateral jt. line  --a/p fib mobs at end range flexion--gd 4  --lateral jt line cfm w/ tool  Full squat x 30+//less pain after cfm  Micro current lateral jt line with heat 10 hz (-) 300 ma MHP x 15'  0/10  Assessment/Response/Plan     Assessment, Response and Plan:   Tolerated skiing w/o increased pain--note ttp lateral jt. line    P: increase posterior chain strength, iastm

## 2017-12-21 ENCOUNTER — PHYSICAL THERAPY (OUTPATIENT)
Dept: PHYSICAL THERAPY | Facility: REHABILITATION | Age: 77
End: 2017-12-21
Attending: INTERNAL MEDICINE
Payer: MEDICARE

## 2017-12-21 DIAGNOSIS — M25.561 CHRONIC PAIN OF RIGHT KNEE: ICD-10-CM

## 2017-12-21 DIAGNOSIS — M23.306 MENISCUS DEGENERATION, RIGHT: ICD-10-CM

## 2017-12-21 DIAGNOSIS — G89.29 CHRONIC PAIN OF RIGHT KNEE: ICD-10-CM

## 2017-12-21 PROCEDURE — 97110 THERAPEUTIC EXERCISES: CPT

## 2017-12-21 PROCEDURE — 97140 MANUAL THERAPY 1/> REGIONS: CPT

## 2017-12-21 PROCEDURE — 97014 ELECTRIC STIMULATION THERAPY: CPT

## 2017-12-27 ENCOUNTER — PHYSICAL THERAPY (OUTPATIENT)
Dept: PHYSICAL THERAPY | Facility: REHABILITATION | Age: 77
End: 2017-12-27
Attending: INTERNAL MEDICINE
Payer: MEDICARE

## 2017-12-27 DIAGNOSIS — G89.29 CHRONIC PAIN OF RIGHT KNEE: ICD-10-CM

## 2017-12-27 DIAGNOSIS — M25.561 CHRONIC PAIN OF RIGHT KNEE: ICD-10-CM

## 2017-12-27 PROCEDURE — 97140 MANUAL THERAPY 1/> REGIONS: CPT

## 2017-12-27 PROCEDURE — 97110 THERAPEUTIC EXERCISES: CPT

## 2017-12-27 PROCEDURE — 97014 ELECTRIC STIMULATION THERAPY: CPT

## 2017-12-27 NOTE — OP THERAPY DAILY TREATMENT
"Outpatient Physical Therapy  DAILY TREATMENT     [facility] Physical [address].  [address]  [place] [address]  Phone:  [phone]  Fax:  [phone]    Date: 2017    Patient: Katerina Torres  YOB: 1940  MRN: [id number]       Subjective      Time Calculation  Start time: 0745  Stop time: 0835 Time Calculation (min): 50 minutes     Chief Complaint: No chief complaint on file.    Visit #: 3  No knee pain and sitll haven't skied lately... Walked a couple of hours but no pain  Pain:     Current pain ratin/10   Objective    Exercises/Treatment  Objective  There ex: x10min.    Manual: 16  E-stim: 15 min.    Treatments:    Treatment Summary:   Ball squat//x 10 increased pain lateral knee     CFM: pr ox fibular head and lateral tibia  Iastm: ant. Tibialis, VL and lateral jt. line  Core stab level II:  -ball bridge x 60\"  -ball bridge with hamstring curls  3x 20  --Maltese to vmo/ant tib. 10/10 mhp balloon bounce--microcurrent (-) 1- hz x 300ma   0/10 no pain  Assessment/Response/Plan     Assessment, Response and Plan:   -noted ttp lateral jt. Line, Tolerated post. Chain ex. W/ fair - endurance    P: increase posterior chain strength, iastm , cfm      "

## 2018-01-05 ENCOUNTER — PHYSICAL THERAPY (OUTPATIENT)
Dept: PHYSICAL THERAPY | Facility: REHABILITATION | Age: 78
End: 2018-01-05
Attending: INTERNAL MEDICINE
Payer: MEDICARE

## 2018-01-05 DIAGNOSIS — M25.561 CHRONIC PAIN OF RIGHT KNEE: ICD-10-CM

## 2018-01-05 DIAGNOSIS — G89.29 CHRONIC PAIN OF RIGHT KNEE: ICD-10-CM

## 2018-01-05 DIAGNOSIS — R26.2 DIFFICULTY IN WALKING: ICD-10-CM

## 2018-01-05 PROCEDURE — 97140 MANUAL THERAPY 1/> REGIONS: CPT

## 2018-01-05 PROCEDURE — 97014 ELECTRIC STIMULATION THERAPY: CPT

## 2018-01-05 NOTE — OP THERAPY DAILY TREATMENT
Outpatient Physical Therapy  DAILY TREATMENT     [facility] Physical Therapy [address]  [address]  [place] [address]  Phone:  [phone]  Fax:  [phone]    Date: 2018    Patient: Katerina Torres  YOB: 1940  MRN: [id number]       Subjective      Time Calculation  Start time: 1319  Stop time: 1351 Time Calculation (min): 32 minutes     Visit #: 3  No pain with daily activity but still severe pain with skiing and some with stairs    Current pain ratin/10   Objective    Exercises/Treatment  Objective  Valgus stress--painful in 30 deg flexion, no pain at 0  Manual: 10  E-stim: 15 min.    Treatments:    Treatment Summary:     CFM: pr ox fibular head and lateral tibia  Reviewed HEP--bike 30'/day  --russian to vmo/ant tib. 10/10 mhp balloon bounce--microcurrent (-) 1- hz x 300ma   0/10 no pain  Assessment/Response/Plan   Plateau with treatment--pt. To see dr hyatt for possible surgery  P: d/c

## 2018-01-05 NOTE — OP THERAPY DISCHARGE SUMMARY
Outpatient Physical Therapy  DISCHARGE SUMMARY NOTE      [facility] Physical Therapy [address]  [address]  [place] [address]  Phone:  [phone]  Fax:  [phone]    Date of Visit: 01/05/2018    Patient: Katerina Torres  YOB: 1940  MRN: [id number]     Referring Provider: Master Suarez M.D.  [address]  [address]  [place] [address]   Referring Diagnosis Pain in left knee [M25.562]         Functional Limitation G-Codes and Severity Modifiers   no pain with daily activity but limited with stairs and skiing    Your patient is being discharged from Physical Therapy with the following comments:   · Goals partially met    Comments:   patient has reached a plateau and is independent with her HEP.    Pt. Is scheduled to see dr. Hall 1/19/18 to assess for surgery to R knee      Issac Dykes, PT, DPT, OCS    Date: 1/5/2018

## 2018-04-03 ENCOUNTER — HOSPITAL ENCOUNTER (OUTPATIENT)
Dept: RADIOLOGY | Facility: MEDICAL CENTER | Age: 78
End: 2018-04-03
Attending: INTERNAL MEDICINE
Payer: MEDICARE

## 2018-04-03 ENCOUNTER — APPOINTMENT (OUTPATIENT)
Dept: OTHER | Facility: IMAGING CENTER | Age: 78
End: 2018-04-03

## 2018-04-03 DIAGNOSIS — Z12.31 SCREENING MAMMOGRAM, ENCOUNTER FOR: ICD-10-CM

## 2018-04-03 PROCEDURE — 77063 BREAST TOMOSYNTHESIS BI: CPT

## 2018-04-04 ENCOUNTER — TELEPHONE (OUTPATIENT)
Dept: MEDICAL GROUP | Facility: MEDICAL CENTER | Age: 78
End: 2018-04-04

## 2018-04-05 ENCOUNTER — TELEPHONE (OUTPATIENT)
Dept: MEDICAL GROUP | Facility: MEDICAL CENTER | Age: 78
End: 2018-04-05

## 2018-04-05 NOTE — TELEPHONE ENCOUNTER
----- Message from Master Suarez M.D. sent at 4/4/2018  5:14 PM PDT -----  Please notify the patient that:  (1) her mammogram is negative but does show dense breast tissue which may decrease the accuracy of the mammogram  (2) she can get a screening ultrasound of her breast which may provide further detail  (3) her insurance will not cover a screening breast ultrasound, she would have to pay out of pocket, the cost would be approximately $125  (4) please let me know if she is interested

## 2018-04-24 PROBLEM — M53.86 SCIATICA ASSOCIATED WITH DISORDER OF LUMBAR SPINE: Status: RESOLVED | Noted: 2017-09-27 | Resolved: 2018-04-24

## 2018-04-26 ENCOUNTER — OFFICE VISIT (OUTPATIENT)
Dept: MEDICAL GROUP | Facility: MEDICAL CENTER | Age: 78
End: 2018-04-26
Payer: MEDICARE

## 2018-04-26 ENCOUNTER — APPOINTMENT (OUTPATIENT)
Dept: ADMISSIONS | Facility: MEDICAL CENTER | Age: 78
End: 2018-04-26
Attending: ORTHOPAEDIC SURGERY
Payer: MEDICARE

## 2018-04-26 ENCOUNTER — TELEPHONE (OUTPATIENT)
Dept: MEDICAL GROUP | Facility: MEDICAL CENTER | Age: 78
End: 2018-04-26

## 2018-04-26 VITALS
WEIGHT: 141 LBS | OXYGEN SATURATION: 96 % | SYSTOLIC BLOOD PRESSURE: 112 MMHG | HEART RATE: 70 BPM | HEIGHT: 63 IN | TEMPERATURE: 98.4 F | BODY MASS INDEX: 24.98 KG/M2 | DIASTOLIC BLOOD PRESSURE: 62 MMHG

## 2018-04-26 DIAGNOSIS — I10 ESSENTIAL HYPERTENSION: Chronic | ICD-10-CM

## 2018-04-26 DIAGNOSIS — H40.9 GLAUCOMA, UNSPECIFIED GLAUCOMA TYPE, UNSPECIFIED LATERALITY: ICD-10-CM

## 2018-04-26 DIAGNOSIS — R73.02 IMPAIRED GLUCOSE TOLERANCE: ICD-10-CM

## 2018-04-26 DIAGNOSIS — Z01.812 PRE-OPERATIVE LABORATORY EXAMINATION: ICD-10-CM

## 2018-04-26 DIAGNOSIS — Z01.818 PREOPERATIVE CLEARANCE: ICD-10-CM

## 2018-04-26 DIAGNOSIS — R94.39 ABNORMAL CARDIOVASCULAR STRESS TEST: Chronic | ICD-10-CM

## 2018-04-26 DIAGNOSIS — E78.5 DYSLIPIDEMIA: Chronic | ICD-10-CM

## 2018-04-26 DIAGNOSIS — I35.0 AORTIC VALVE STENOSIS, ETIOLOGY OF CARDIAC VALVE DISEASE UNSPECIFIED: Chronic | ICD-10-CM

## 2018-04-26 DIAGNOSIS — D70.9 CHRONIC NEUTROPENIA (HCC): Chronic | ICD-10-CM

## 2018-04-26 DIAGNOSIS — E55.9 HYPOVITAMINOSIS D: ICD-10-CM

## 2018-04-26 PROBLEM — Z86.79 HISTORY OF HYPERTENSION: Status: ACTIVE | Noted: 2017-07-18

## 2018-04-26 LAB
SCCMEC + MECA PNL NOSE NAA+PROBE: NEGATIVE
SCCMEC + MECA PNL NOSE NAA+PROBE: POSITIVE

## 2018-04-26 PROCEDURE — 87640 STAPH A DNA AMP PROBE: CPT | Mod: 59

## 2018-04-26 PROCEDURE — 99214 OFFICE O/P EST MOD 30 MIN: CPT | Performed by: INTERNAL MEDICINE

## 2018-04-26 PROCEDURE — 87641 MR-STAPH DNA AMP PROBE: CPT

## 2018-04-26 RX ORDER — NITROGLYCERIN 0.4 MG/1
0.4 TABLET SUBLINGUAL
COMMUNITY
End: 2023-09-19

## 2018-04-26 RX ORDER — ACETAMINOPHEN 500 MG
1000 TABLET ORAL EVERY 6 HOURS PRN
COMMUNITY

## 2018-04-26 NOTE — DISCHARGE PLANNING
DISCHARGE PLANNING NOTE - TOTAL JOINT     Procedure: Procedure(s):  KNEE ARTHROPLASTY TOTAL  Procedure Date: 5/8/2018  Insurance:  Payor: [employer]US / Plan: S[employer] PLUS / Product Type: *No Product type* /   Equipment currently available at home? front-wheel walker and shower chair  Steps into the home? 2  Steps within the home? 0  Toilet height? Standard  Type of shower? walk-in shower  Who will be with you during your recovery? spouse  Is Outpatient Physical Therapy set up after surgery? Yes   Did you take the Total Joint Class and where? Yes, at [facility]     Plan:

## 2018-04-26 NOTE — OR NURSING
"Preadmit appointment: \" Preparing for your Procedure information\" sheet given to patient with verbal and written instructions. Patient instructed to continue prescribed medications through the day before surgery, instructed to take the following medications the day of surgery per anesthesia protocol: none.   "

## 2018-04-26 NOTE — TELEPHONE ENCOUNTER
Thank you very much, please make sure the patient understands the date, time, location of the echocardiogram

## 2018-04-26 NOTE — TELEPHONE ENCOUNTER
1. Caller Name: Aristeo                      Call Back Number: [phone] (home)        2. Message: Spoke with Aristeo, advised ECHO appt on Fri @ 10 am [facility].     3. Patient approves office to leave a detailed voicemail/MyChart message: N\A

## 2018-04-26 NOTE — PROGRESS NOTES
Subjective:      Katerina Torres is a 77 y.o. female who presents with Medical Clearance            HPI   Preoperative clearance for right knee arthroplasty upcoming may 8 per  orthopedics. Has had chronic right knee pain, still skiing twenty times this winter, some swelling with skiing and pain using tylenol, no asa, no nsaid. At home keeps active in garden and weeds, does all ADLs but has help at home with .   Has shower bench and walk in shower, no stairs   No chest pain, no sob, no cough, no lightheadedness or dizziness, no leg edema. Blood pressure at home has been stable running 110-120/70 without medication still skiing went 20 times this season with no chest pain, shortness of breath, lightheadedness, syncope, palpitations with skiing.  Knee pain is manageable with skiing.  Also gardens without symptoms.  No previous blood clots and family history of blood clots  Allergies to vicodin due to nausea  No tobacco  SH , no tobacco, no etoh  FH reviewed and updated    Past medical history significant for esophageal dysmotility had been on diltiazem previously but off the medication, aortic stenosis most recent echocardiogram July 2017 and moderate aortic stenosis and aortic regurgitation, last cardiac catheterization 2014 with no significant disease, Persantine thallium July of last year no ischemia noted normal ejection fraction.  Past surgical history  macular pucker surgery  No history of MI, CHF, arrhythmia, TIA or stroke, no history of venous thromboembolic disease, DVT or pulmonary embolism, no history of seizure, no history of diabetes    No family history of venous thromboembolic event      Current Outpatient Prescriptions   Medication Sig Dispense Refill   • bimatoprost (LUMIGAN) 0.01 % Solution Place 1 Drop in both eyes every bedtime. Indications: Wide-Angle Glaucoma     • vitamin D (CHOLECALCIFEROL) 1000 UNIT Tab Take 1,000 Units by mouth every day.     •  Magnesium 500 MG Tab Take 1 Cap by mouth every day.     • MethylPREDNISolone (MEDROL DOSEPAK) 4 MG Tablet Therapy Pack As directed on the packaging label. 21 Tab 0   • diltiazem (CARDIZEM CD) 120 MG XR capsule Take 1 Cap by mouth every day. 30 Cap 6   • nitroglycerin (NITROSTAT) 0.4 MG SL Tab Place 1 Tab under tongue as needed for Chest Pain. 25 Tab 0   • acetaminophen (TYLENOL) 500 MG Tab Take 1,000 mg by mouth every 6 hours as needed for Moderate Pain.     • aspirin EC (ECOTRIN) 81 MG Tablet Delayed Response Take 81 mg by mouth every day.       No current facility-administered medications for this visit.      Patient Active Problem List   Diagnosis   • Osteopenia   • Dyslipidemia   • Chronic neutropenia (CMS-HCC)   • Preventative health care   • Aortic stenosis   • Glaucoma   • Skin cancer, basal cell   • Knee pain, left   • Abnormal cardiovascular stress test   • Dyspnea   • History of polymyalgia rheumatica   • Decreased GFR   • Colon polyp   • Knee pain, right   • Esophageal dysmotility   • Hypertension       Health Maintenance Summary                Annual Wellness Visit Next Due 6/21/2018      Done 6/20/2017 Visit Dx: Medicare annual wellness visit, subsequent     Patient has more history with this topic...    MAMMOGRAM Next Due 4/3/2019      Done 4/3/2018 MA-MAMMO SCREENING BILAT W/TOMOSYNTHESIS W/CAD     Patient has more history with this topic...    BONE DENSITY Next Due 8/19/2020      Done 8/19/2015 DS-BONE DENSITY STUDY (DEXA)     Patient has more history with this topic...    COLONOSCOPY Next Due 3/7/2022      Done 3/7/2017 REFERRAL TO GI FOR COLONOSCOPY    IMM DTaP/Tdap/Td Vaccine Next Due 7/23/2022      Done 7/23/2012 Imm Admin: Tdap Vaccine          Patient Care Team:  Master Suarez M.D. as PCP - General  Chris Bernstein M.D. as Consulting Physician (Ophthalmology)  Amanda Rodriguez M.D. as Consulting Physician (Ophthalmology)  Manoj Tidwell M.D. as Consulting Physician (Orthopaedics)  Issac  NICHO Dykes, PT, DPT, OCS as Physical Therapy (Physical Therapy)      Skin cancer basal cell     Preventative health  7/23/12 tdap   1/10/13 pneumovax  7/29/14 zoster vaccine  8/6/15 prevnar at [facility]  8/19/15 dexa LS -1.4,hip -1.3;FRAX 40% major,27% hip; only on prednisone one month does not need bisphosphonate therapy  9/1/16 sonocine negative  3/7/17 colon per [facility] 2 polyps, grade 2 internal hemorrhoids, angioectasias ascending colon,pathology one hyperplastic polyp and onesessile serrated polyp, repeat colonoscopy 5 years   6/27/17 vit d 48  4/4/18 mammogram heterogeneously dense breast tissue recommend screening breast ultrasound  4/5/18 declines sonocine     Osteopenia  9/06 dexa LS -1.2,hip -1.1  6/09 dexa LS -1.4,hip -1.0  7/11 vit d 43  7/11 dexa LS -1.1,hip -0.9  712 vit d 30 start 1000 units  7/24/13 vit d 72 on 1000 units  8/6/13 dexa LS -1.1,hip -1.1  8/5/14 vit d 67  8/19/15 dexa LS -1.4,hip -1.3;FRAX 40% major,27% hip only on prednisone one month, does not need bisphosphonate therapy  8/19/15 vit d 43  6/16/16 off prednisone x 3 weeks  6/21/16 vit d 50  6/27/17 vit d 48     Knee pain  8/1/13 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, lateral meniscus mildly extruded, ruptured hopper's cyst  8/12/13  ortho note pain improved on followup, consider injection if pain recurs  3/3/17 MRI right knee abnormal right lateral meniscus with peripheral extrusion, maceration, and complex tear involving primarily the posterior horn and body but also the anterior horn, abnormal medial meniscus with vertical tear of the peripheral zone of body and posterior horn, probable meniscal root tear, and degenerative fraying of the free edge, severe lateral femorotibial compartment osteoarthritis with significant cartilage loss and moderate to severe subchondral edema within the lateral femoral condyle and lateral tibial plateau, mild medial femorotibial and the patellofemoral compartment osteoarthritis,  moderate sized joint effusion with associated popliteal cyst  6/20/17 sees  orthopedics  1/18/18  orthopedic note right knee end-stage arthritis, x-rays bone-on-bone right knee arthritis lateral compartment, right knee steroid injection performed, planned for total right knee arthroplasty after winter    hypertension  3/31/14 cardiac catheterization; left main mild plaquing, LAD patent, circumflex free of disease, RCA free of disease, normal LV function, mild aortic stenosis  7/9/17 echo normal LV size and function, EF 60%, grade 1 diastolic dysfunction, moderate aortic stenosis peak gradient 50, mean 29, valve area 0.8-1.0 and aortic regurgitation  7/9/17 persantine thallium small fixed perfusion abnormality distal anterior and anteroapical segments, no ischemia is noted to represent mild prior infarct or variant normal apical thinning  7/18/17 start diltiazem 120 mg daily (also has esophageal dysmotility by barium swallow)  4/26/18 off diltiazem     History polymyalgia  4/20/15 physical therapy, trial celebrex and zanaflex  5/7/15 physical therapy evaluation today recommended right hip x-ray and pelvic x-ray, ordered in computer  5/8/15 xray hip negative  6/29/15 seen by bridgette diagnosed with polymyalgia rheumatica  6/29/15 CRP 9.4,ESR 32 started on prednisone 20 mg    7/28/15 on prednisone 10 mg will continue 10 mg x 2 weeks, then decrease to 5 mg daily  8/19/15 hgb 14,hct 43, CRP 0.3, ESR 14, tapered off prednisone but developed mouth irritation, has resumed prednisone 5 mg daily, will continue x2 weeks then taper  11/17/15 refill prednisone 5 mg #30 tabs x one  6/21/16 off prednisone; CRP 0.1,ESR 17  6/27/17 CRP 0.09,ESR 12,cpk 66     History of breast cancer  1980s left sided s/p lumpectomy and radiation therapy, no old records  7/11 mammogram  8/12 mammogram  8/13 mammogram  8/18/14 mammogram  9/1/16 mammogram heterogeneously dense breast tissue recommend screening breast ultrasound  9/1/16  sonocine negative     Glaucoma    Esophageal dysmotility  7/9/17 barium swallow moderate esophageal dysmotility, small volume silent aspiration  7/18/17 start diltiazem 120 mg daily  7/18/17 referral [facility], speech pathology for esophageal dysmotility, small amount of aspiration on barium swallow, try low-dose diltiazem 120 mg for esophageal dysmotility and elevated blood pressure  7/26/17 [facility] evaluation dyspepsia, obtain cardiology clearance and then proceed EGD, initiate pantoprazole 20 mg, trial of miralax and colace     Dyspnea  7/24/13 normal LV function, EF 60%, mild LVH, mild aortic stenosis, peak gradient 25  3/20/14 cxr negative  3/31/14 cardiac catheterization; left main mild lacking, LAD patent, circumflex free of disease, RCA free of disease, normal LV function, mild aortic stenosis  8/1/14 PFT FEV 2.8 L (144% predicted), FVC 3.6 (138% predicted), FEV/FVC 78, no bronchodilator response, DLCO 119% predicted     Dyslipidemia  3/08 chol 175,trig 73,hdl 45,ldl 115  6/09 chol 174,trig 89,hdl 47,ldl 109  7/10 chol 182,trig 61,hdl 55,ldl 114  7/11 chol 175,trig 69,hdl 45,ldl 116  7/12 chol 164,trig 64,hdl 43,ldl 108  7/24/13 chol 186,trig 66,hdl 54,ldl 119 no meds  8/5/14 chol 165,trig 93,hdl 48,ldl 98  8/19/15 chol 192,trig 109,hdl 58,ldl 112; 10 year risk 20% declines statin therapy  6/21/16 chol 137,trig 75,hdl 47,ldl 75   6/27/17 hcol 153,trig 94,hdl 54,ldl 80     Decrease GFR  7/7/11 bun 16,creat 1.0,GFR 50  7/24/13 bun 14,creat 1.1,GFR 45  3/20/14 bun 15,creat 0.9,GFR 59  8/5/14 bun 14,creat 1.1,GFR 46  2/19/15 bun 13,creat 0.8,GFR >60  8/19/15 bun 10,creat 1.1,GFR 48  6/21/16 bun 19,creat 1.0,GFR 50  6/27/17 bun 14,creat 0.5,GFR 51     Chronic neutropenia  8/06 wbc 3.4  3/08 wbc 3.9  6/09 wbc 3.9  7/10 wbc 4.4  7/11 wbc 4.5  7/12 wbc 3.8 (55%N,35%L)  7/24/13 wbc 4.3  3/20/14 wbc 3.1 (52%N,36%L)  3/31/14 wbc 3.7  8/19/15 wbc 5.3  6/21/16 wbc 4.4 (50%N,40%L)  6/27/17 wbc 4.2      Aortic stenosis  10/06 echo  mild mr, normal LV  6/09 stress echo negative  8/12 echo normal LV function, EF 65%, mild aortic stenosis, peak gradient 24  7/24/13 normal LV function, EF 60%, mild LVH, mild aortic stenosis, peak gradient 25  3/31/14 cardiac catheterization; left main mild lacking, LAD patent, circumflex free of disease, RCA free of disease, normal LV function, mild aortic stenosis  7/9/17 echo normal LV size and function, EF 60%, grade 1 diastolic dysfunction, moderate aortic stenosis peak gradient 50, mean 29, valve area 0.8-1.0 and aortic regurgitation  7/9/17 persantine thallium small fixed perfusion abnormality distal anterior and anteroapical segments, no ischemia is noted to represent mild prior infarct or variant normal apical thinning     Abnormal cardiovascular test  10/06 echo mild mitral regurgitation, normal LV  6/09 stress echo negative  8/12 echo normal LV function, EF 65%, mild aortic stenosis, peak gradient 24  7/24/13 normal LV function, EF 60%, mild LVH, mild aortic stenosis, peak gradient 25  3/20/14 seen ER palpitations, atypical chest pain  3/24/14 exercise treadmill carla protocol 5 minutes 5 seconds, 1 mm mild upsloping ST segment depression in V6, flat ST segment depression V4 and V5 compatible with ischemia, no arrhythmia noted  3/28/14 cardiology note, scheduled for cardiac catheterization, cont asa, metoprolol 25 mg bid, crestor 5 mg samples  3/31/14 cardiac catheterization; left main mild plaquing, LAD patent, circumflex free of disease, RCA free of disease, normal LV function, mild aortic stenosis  7/9/17 echo normal LV size and function, EF 60%, grade 1 diastolic dysfunction, moderate aortic stenosis peak gradient 50, mean 29, valve area 0.8-1.0 and aortic regurgitation  7/9/17 persantine thallium small fixed perfusion abnormality distal anterior and anteroapical segments, no ischemia is noted to represent mild prior infarct or variant normal apical thinning            ROS       Objective:     BP  "112/62   Pulse 70   Temp 36.9 °C (98.4 °F)   Ht 1.6 m (5' 3\")   Wt 64 kg (141 lb)   SpO2 96%   BMI 24.98 kg/m²      Physical Exam   Constitutional: She appears well-developed and well-nourished. No distress.   HENT:   Head: Normocephalic and atraumatic.   Eyes: Conjunctivae are normal. Right eye exhibits no discharge. Left eye exhibits no discharge.   Neck: Neck supple. No JVD present.   Cardiovascular: Normal rate and regular rhythm.    Murmur heard.  Pulmonary/Chest: Effort normal and breath sounds normal. No respiratory distress. She has no wheezes.   Abdominal: She exhibits no distension.   Musculoskeletal: She exhibits no edema.   Skin: Skin is warm. She is not diaphoretic.   Psychiatric: She has a normal mood and affect. Her behavior is normal.   Nursing note and vitals reviewed.     Carotid no bruits bilateral    Extremities no edema    Normal affect, insight, judgment          Assessment/Plan:     Assessment  #1 preoperative clearance for upcoming right knee arthroplasty by  orthopedics, no history of MI, CHF, arrhythmia, TIA, stroke, venous thromboembolic disease, diabetes, renal insufficiency, seizures, asthma, COPD    #2 aortic stenosis moderate nature by echocardiogram in July of last year, peak gradient 50, valve area 0.8-1.0, moderate aortic stenosis with aortic regurgitation, Persantine thallium no ischemia last July, asymptomatic with regards to chest pain, shortness of breath, lightheadedness, syncope, palpitations    #3 history of hypertension stable off diltiazem, has had normal renal function      Plan  #1 Revised cardiac risk index, having knee arthroplasty (not high-risk surgery), no history of ischemic heart disease based upon catheterization and Persantine thallium, no history of heart failure, no history of cerebrovascular disease, no history of diabetes, creatinine has been normal previously    #2 given moderate aortic stenosis by echocardiogram last year although " asymptomatic, recommend repeat echocardiogram, this will be done prior to surgery, imaging was called to schedule the appointment sooner    #3 EKG sinus rhythm    #4 preoperative labs ordered    #5 check blood pressure periodically and record    #6 preoperative clearance pending echocardiogram and labs

## 2018-04-27 ENCOUNTER — HOSPITAL ENCOUNTER (OUTPATIENT)
Dept: RADIOLOGY | Facility: MEDICAL CENTER | Age: 78
End: 2018-04-27
Attending: INTERNAL MEDICINE
Payer: MEDICARE

## 2018-04-27 ENCOUNTER — HOSPITAL ENCOUNTER (OUTPATIENT)
Dept: LAB | Facility: MEDICAL CENTER | Age: 78
End: 2018-04-27
Attending: INTERNAL MEDICINE
Payer: MEDICARE

## 2018-04-27 ENCOUNTER — HOSPITAL ENCOUNTER (OUTPATIENT)
Dept: CARDIOLOGY | Facility: MEDICAL CENTER | Age: 78
End: 2018-04-27
Attending: INTERNAL MEDICINE
Payer: MEDICARE

## 2018-04-27 DIAGNOSIS — I35.0 AORTIC VALVE STENOSIS, ETIOLOGY OF CARDIAC VALVE DISEASE UNSPECIFIED: Chronic | ICD-10-CM

## 2018-04-27 DIAGNOSIS — E55.9 HYPOVITAMINOSIS D: ICD-10-CM

## 2018-04-27 DIAGNOSIS — I10 ESSENTIAL HYPERTENSION: Chronic | ICD-10-CM

## 2018-04-27 DIAGNOSIS — E78.5 DYSLIPIDEMIA: Chronic | ICD-10-CM

## 2018-04-27 DIAGNOSIS — R73.02 IMPAIRED GLUCOSE TOLERANCE: ICD-10-CM

## 2018-04-27 PROBLEM — I35.1 AORTIC REGURGITATION: Status: ACTIVE | Noted: 2018-04-27

## 2018-04-27 LAB
25(OH)D3 SERPL-MCNC: 53 NG/ML (ref 30–100)
ALBUMIN SERPL BCP-MCNC: 3.9 G/DL (ref 3.2–4.9)
ALBUMIN/GLOB SERPL: 1.4 G/DL
ALP SERPL-CCNC: 45 U/L (ref 30–99)
ALT SERPL-CCNC: 12 U/L (ref 2–50)
ANION GAP SERPL CALC-SCNC: 6 MMOL/L (ref 0–11.9)
APPEARANCE UR: ABNORMAL
AST SERPL-CCNC: 18 U/L (ref 12–45)
BACTERIA #/AREA URNS HPF: NEGATIVE /HPF
BASOPHILS # BLD AUTO: 1.1 % (ref 0–1.8)
BASOPHILS # BLD: 0.05 K/UL (ref 0–0.12)
BILIRUB SERPL-MCNC: 0.7 MG/DL (ref 0.1–1.5)
BILIRUB UR QL STRIP.AUTO: NEGATIVE
BUN SERPL-MCNC: 15 MG/DL (ref 8–22)
CALCIUM SERPL-MCNC: 9.3 MG/DL (ref 8.5–10.5)
CHLORIDE SERPL-SCNC: 105 MMOL/L (ref 96–112)
CHOLEST SERPL-MCNC: 162 MG/DL (ref 100–199)
CO2 SERPL-SCNC: 28 MMOL/L (ref 20–33)
COLOR UR: YELLOW
CREAT SERPL-MCNC: 0.91 MG/DL (ref 0.5–1.4)
EOSINOPHIL # BLD AUTO: 0.13 K/UL (ref 0–0.51)
EOSINOPHIL NFR BLD: 3 % (ref 0–6.9)
EPI CELLS #/AREA URNS HPF: NEGATIVE /HPF
ERYTHROCYTE [DISTWIDTH] IN BLOOD BY AUTOMATED COUNT: 47.6 FL (ref 35.9–50)
EST. AVERAGE GLUCOSE BLD GHB EST-MCNC: 108 MG/DL
GLOBULIN SER CALC-MCNC: 2.7 G/DL (ref 1.9–3.5)
GLUCOSE SERPL-MCNC: 105 MG/DL (ref 65–99)
GLUCOSE UR STRIP.AUTO-MCNC: NEGATIVE MG/DL
HBA1C MFR BLD: 5.4 % (ref 0–5.6)
HCT VFR BLD AUTO: 41.8 % (ref 37–47)
HDLC SERPL-MCNC: 52 MG/DL
HGB BLD-MCNC: 13.9 G/DL (ref 12–16)
HYALINE CASTS #/AREA URNS LPF: NORMAL /LPF
IMM GRANULOCYTES # BLD AUTO: 0.01 K/UL (ref 0–0.11)
IMM GRANULOCYTES NFR BLD AUTO: 0.2 % (ref 0–0.9)
KETONES UR STRIP.AUTO-MCNC: NEGATIVE MG/DL
LDLC SERPL CALC-MCNC: 90 MG/DL
LEUKOCYTE ESTERASE UR QL STRIP.AUTO: NEGATIVE
LV EJECT FRACT  99904: 65
LV EJECT FRACT MOD 2C 99903: 63.88
LV EJECT FRACT MOD 4C 99902: 62.43
LV EJECT FRACT MOD BP 99901: 62.68
LYMPHOCYTES # BLD AUTO: 1.45 K/UL (ref 1–4.8)
LYMPHOCYTES NFR BLD: 33.3 % (ref 22–41)
MCH RBC QN AUTO: 32.1 PG (ref 27–33)
MCHC RBC AUTO-ENTMCNC: 33.3 G/DL (ref 33.6–35)
MCV RBC AUTO: 96.5 FL (ref 81.4–97.8)
MICRO URNS: ABNORMAL
MONOCYTES # BLD AUTO: 0.38 K/UL (ref 0–0.85)
MONOCYTES NFR BLD AUTO: 8.7 % (ref 0–13.4)
NEUTROPHILS # BLD AUTO: 2.33 K/UL (ref 2–7.15)
NEUTROPHILS NFR BLD: 53.7 % (ref 44–72)
NITRITE UR QL STRIP.AUTO: NEGATIVE
NRBC # BLD AUTO: 0 K/UL
NRBC BLD-RTO: 0 /100 WBC
PH UR STRIP.AUTO: 5.5 [PH]
PLATELET # BLD AUTO: 215 K/UL (ref 164–446)
PMV BLD AUTO: 10.2 FL (ref 9–12.9)
POTASSIUM SERPL-SCNC: 4.4 MMOL/L (ref 3.6–5.5)
PROT SERPL-MCNC: 6.6 G/DL (ref 6–8.2)
PROT UR QL STRIP: NEGATIVE MG/DL
RBC # BLD AUTO: 4.33 M/UL (ref 4.2–5.4)
RBC # URNS HPF: NORMAL /HPF
RBC UR QL AUTO: NEGATIVE
SODIUM SERPL-SCNC: 139 MMOL/L (ref 135–145)
SP GR UR STRIP.AUTO: 1.03
TRIGL SERPL-MCNC: 98 MG/DL (ref 0–149)
TSH SERPL DL<=0.005 MIU/L-ACNC: 1.11 UIU/ML (ref 0.38–5.33)
UROBILINOGEN UR STRIP.AUTO-MCNC: 0.2 MG/DL
WBC # BLD AUTO: 4.4 K/UL (ref 4.8–10.8)
WBC #/AREA URNS HPF: NORMAL /HPF

## 2018-04-27 PROCEDURE — 80053 COMPREHEN METABOLIC PANEL: CPT

## 2018-04-27 PROCEDURE — 71046 X-RAY EXAM CHEST 2 VIEWS: CPT

## 2018-04-27 PROCEDURE — 81001 URINALYSIS AUTO W/SCOPE: CPT

## 2018-04-27 PROCEDURE — 93306 TTE W/DOPPLER COMPLETE: CPT

## 2018-04-27 PROCEDURE — 83036 HEMOGLOBIN GLYCOSYLATED A1C: CPT

## 2018-04-27 PROCEDURE — 36415 COLL VENOUS BLD VENIPUNCTURE: CPT

## 2018-04-27 PROCEDURE — 82306 VITAMIN D 25 HYDROXY: CPT

## 2018-04-27 PROCEDURE — 80061 LIPID PANEL: CPT

## 2018-04-27 PROCEDURE — 93306 TTE W/DOPPLER COMPLETE: CPT | Mod: 26 | Performed by: INTERNAL MEDICINE

## 2018-04-27 PROCEDURE — 84443 ASSAY THYROID STIM HORMONE: CPT

## 2018-04-27 PROCEDURE — 85025 COMPLETE CBC W/AUTO DIFF WBC: CPT

## 2018-04-28 ENCOUNTER — TELEPHONE (OUTPATIENT)
Dept: MEDICAL GROUP | Facility: MEDICAL CENTER | Age: 78
End: 2018-04-28

## 2018-04-29 NOTE — TELEPHONE ENCOUNTER
Called patient and left message yesterday and today  Chest x-ray negative, echo moderate aortic stenosis and regurgitation no change from previous, Persantine thallium last July no ischemia, asymptomatic, cleared for orthopedic surgery  Letter to orthopedics Dr. Hall  Will fax letter, chest x-ray, EKG to their office

## 2018-05-08 ENCOUNTER — APPOINTMENT (OUTPATIENT)
Dept: RADIOLOGY | Facility: MEDICAL CENTER | Age: 78
End: 2018-05-08
Attending: ORTHOPAEDIC SURGERY
Payer: MEDICARE

## 2018-05-08 ENCOUNTER — HOSPITAL ENCOUNTER (OUTPATIENT)
Facility: MEDICAL CENTER | Age: 78
End: 2018-05-09
Attending: ORTHOPAEDIC SURGERY | Admitting: ORTHOPAEDIC SURGERY
Payer: MEDICARE

## 2018-05-08 DIAGNOSIS — M25.561 CHRONIC PAIN OF RIGHT KNEE: Primary | ICD-10-CM

## 2018-05-08 DIAGNOSIS — G89.29 CHRONIC PAIN OF RIGHT KNEE: Primary | ICD-10-CM

## 2018-05-08 PROCEDURE — A9270 NON-COVERED ITEM OR SERVICE: HCPCS | Performed by: ORTHOPAEDIC SURGERY

## 2018-05-08 PROCEDURE — 502000 HCHG MISC OR IMPLANTS RC 0278: Performed by: ORTHOPAEDIC SURGERY

## 2018-05-08 PROCEDURE — 700102 HCHG RX REV CODE 250 W/ 637 OVERRIDE(OP): Performed by: ORTHOPAEDIC SURGERY

## 2018-05-08 PROCEDURE — 160029 HCHG SURGERY MINUTES - 1ST 30 MINS LEVEL 4: Performed by: ORTHOPAEDIC SURGERY

## 2018-05-08 PROCEDURE — 73560 X-RAY EXAM OF KNEE 1 OR 2: CPT | Mod: RT

## 2018-05-08 PROCEDURE — 160022 HCHG BLOCK: Performed by: ORTHOPAEDIC SURGERY

## 2018-05-08 PROCEDURE — 700101 HCHG RX REV CODE 250

## 2018-05-08 PROCEDURE — 160002 HCHG RECOVERY MINUTES (STAT): Performed by: ORTHOPAEDIC SURGERY

## 2018-05-08 PROCEDURE — 501480 HCHG STOCKINETTE: Performed by: ORTHOPAEDIC SURGERY

## 2018-05-08 PROCEDURE — 700111 HCHG RX REV CODE 636 W/ 250 OVERRIDE (IP)

## 2018-05-08 PROCEDURE — 96374 THER/PROPH/DIAG INJ IV PUSH: CPT

## 2018-05-08 PROCEDURE — 700105 HCHG RX REV CODE 258: Performed by: ORTHOPAEDIC SURGERY

## 2018-05-08 PROCEDURE — 700111 HCHG RX REV CODE 636 W/ 250 OVERRIDE (IP): Performed by: ORTHOPAEDIC SURGERY

## 2018-05-08 PROCEDURE — L8699 PROSTHETIC IMPLANT NOS: HCPCS | Performed by: ORTHOPAEDIC SURGERY

## 2018-05-08 PROCEDURE — 160009 HCHG ANES TIME/MIN: Performed by: ORTHOPAEDIC SURGERY

## 2018-05-08 PROCEDURE — 500423 HCHG DRESSING, ABD COMBINE: Performed by: ORTHOPAEDIC SURGERY

## 2018-05-08 PROCEDURE — 160048 HCHG OR STATISTICAL LEVEL 1-5: Performed by: ORTHOPAEDIC SURGERY

## 2018-05-08 PROCEDURE — 700112 HCHG RX REV CODE 229: Performed by: ORTHOPAEDIC SURGERY

## 2018-05-08 PROCEDURE — 502579 HCHG PACK, TOTAL KNEE: Performed by: ORTHOPAEDIC SURGERY

## 2018-05-08 PROCEDURE — 160036 HCHG PACU - EA ADDL 30 MINS PHASE I: Performed by: ORTHOPAEDIC SURGERY

## 2018-05-08 PROCEDURE — 160035 HCHG PACU - 1ST 60 MINS PHASE I: Performed by: ORTHOPAEDIC SURGERY

## 2018-05-08 PROCEDURE — G0378 HOSPITAL OBSERVATION PER HR: HCPCS

## 2018-05-08 PROCEDURE — 700101 HCHG RX REV CODE 250: Performed by: ORTHOPAEDIC SURGERY

## 2018-05-08 PROCEDURE — A9270 NON-COVERED ITEM OR SERVICE: HCPCS

## 2018-05-08 PROCEDURE — 501838 HCHG SUTURE GENERAL: Performed by: ORTHOPAEDIC SURGERY

## 2018-05-08 PROCEDURE — 700102 HCHG RX REV CODE 250 W/ 637 OVERRIDE(OP)

## 2018-05-08 PROCEDURE — 94760 N-INVAS EAR/PLS OXIMETRY 1: CPT

## 2018-05-08 PROCEDURE — 160041 HCHG SURGERY MINUTES - EA ADDL 1 MIN LEVEL 4: Performed by: ORTHOPAEDIC SURGERY

## 2018-05-08 DEVICE — IMPLANTABLE DEVICE: Type: IMPLANTABLE DEVICE | Site: KNEE | Status: FUNCTIONAL

## 2018-05-08 DEVICE — CEMENT BONE ANTIBIOTIC SMRTST - HV 40G (20EA/CA): Type: IMPLANTABLE DEVICE | Site: KNEE | Status: FUNCTIONAL

## 2018-05-08 RX ORDER — CELECOXIB 200 MG/1
CAPSULE ORAL
Status: COMPLETED
Start: 2018-05-08 | End: 2018-05-08

## 2018-05-08 RX ORDER — ENEMA 19; 7 G/133ML; G/133ML
1 ENEMA RECTAL
Status: DISCONTINUED | OUTPATIENT
Start: 2018-05-08 | End: 2018-05-09 | Stop reason: HOSPADM

## 2018-05-08 RX ORDER — ONDANSETRON 2 MG/ML
4 INJECTION INTRAMUSCULAR; INTRAVENOUS EVERY 4 HOURS PRN
Status: DISCONTINUED | OUTPATIENT
Start: 2018-05-08 | End: 2018-05-09 | Stop reason: HOSPADM

## 2018-05-08 RX ORDER — HALOPERIDOL 5 MG/ML
1 INJECTION INTRAMUSCULAR EVERY 6 HOURS PRN
Status: DISCONTINUED | OUTPATIENT
Start: 2018-05-08 | End: 2018-05-09 | Stop reason: HOSPADM

## 2018-05-08 RX ORDER — SCOLOPAMINE TRANSDERMAL SYSTEM 1 MG/1
PATCH, EXTENDED RELEASE TRANSDERMAL
Status: COMPLETED
Start: 2018-05-08 | End: 2018-05-08

## 2018-05-08 RX ORDER — ACETAMINOPHEN 500 MG
1000 TABLET ORAL EVERY 6 HOURS
Status: DISCONTINUED | OUTPATIENT
Start: 2018-05-08 | End: 2018-05-09 | Stop reason: HOSPADM

## 2018-05-08 RX ORDER — BISACODYL 10 MG
10 SUPPOSITORY, RECTAL RECTAL
Status: DISCONTINUED | OUTPATIENT
Start: 2018-05-08 | End: 2018-05-09 | Stop reason: HOSPADM

## 2018-05-08 RX ORDER — OXYCODONE HYDROCHLORIDE 5 MG/1
2.5 TABLET ORAL EVERY 4 HOURS PRN
Status: DISCONTINUED | OUTPATIENT
Start: 2018-05-08 | End: 2018-05-09 | Stop reason: HOSPADM

## 2018-05-08 RX ORDER — POLYETHYLENE GLYCOL 3350 17 G/17G
1 POWDER, FOR SOLUTION ORAL 2 TIMES DAILY PRN
Status: DISCONTINUED | OUTPATIENT
Start: 2018-05-08 | End: 2018-05-09 | Stop reason: HOSPADM

## 2018-05-08 RX ORDER — DOCUSATE SODIUM 100 MG/1
100 CAPSULE, LIQUID FILLED ORAL 2 TIMES DAILY
Status: DISCONTINUED | OUTPATIENT
Start: 2018-05-08 | End: 2018-05-09 | Stop reason: HOSPADM

## 2018-05-08 RX ORDER — MORPHINE SULFATE 0.5 MG/ML
INJECTION, SOLUTION EPIDURAL; INTRATHECAL; INTRAVENOUS
Status: DISCONTINUED | OUTPATIENT
Start: 2018-05-08 | End: 2018-05-08 | Stop reason: HOSPADM

## 2018-05-08 RX ORDER — AMOXICILLIN 250 MG
1 CAPSULE ORAL NIGHTLY
Status: DISCONTINUED | OUTPATIENT
Start: 2018-05-08 | End: 2018-05-09 | Stop reason: HOSPADM

## 2018-05-08 RX ORDER — OXYCODONE HYDROCHLORIDE 5 MG/1
5 TABLET ORAL EVERY 4 HOURS PRN
Status: DISCONTINUED | OUTPATIENT
Start: 2018-05-08 | End: 2018-05-09 | Stop reason: HOSPADM

## 2018-05-08 RX ORDER — CELECOXIB 200 MG/1
200 CAPSULE ORAL
Status: DISCONTINUED | OUTPATIENT
Start: 2018-05-09 | End: 2018-05-09 | Stop reason: HOSPADM

## 2018-05-08 RX ORDER — ONDANSETRON 2 MG/ML
INJECTION INTRAMUSCULAR; INTRAVENOUS
Status: COMPLETED
Start: 2018-05-08 | End: 2018-05-08

## 2018-05-08 RX ORDER — DIPHENHYDRAMINE HYDROCHLORIDE 50 MG/ML
25 INJECTION INTRAMUSCULAR; INTRAVENOUS EVERY 6 HOURS PRN
Status: DISCONTINUED | OUTPATIENT
Start: 2018-05-08 | End: 2018-05-09 | Stop reason: HOSPADM

## 2018-05-08 RX ORDER — EPINEPHRINE 1 MG/ML(1)
AMPUL (ML) INJECTION
Status: DISCONTINUED | OUTPATIENT
Start: 2018-05-08 | End: 2018-05-08 | Stop reason: HOSPADM

## 2018-05-08 RX ORDER — LIDOCAINE HYDROCHLORIDE 10 MG/ML
INJECTION, SOLUTION EPIDURAL; INFILTRATION; INTRACAUDAL; PERINEURAL
Status: COMPLETED
Start: 2018-05-08 | End: 2018-05-08

## 2018-05-08 RX ORDER — MORPHINE SULFATE 4 MG/ML
2 INJECTION, SOLUTION INTRAMUSCULAR; INTRAVENOUS
Status: DISCONTINUED | OUTPATIENT
Start: 2018-05-08 | End: 2018-05-09 | Stop reason: HOSPADM

## 2018-05-08 RX ORDER — TRAMADOL HYDROCHLORIDE 50 MG/1
100 TABLET ORAL EVERY 6 HOURS
Status: DISCONTINUED | OUTPATIENT
Start: 2018-05-08 | End: 2018-05-09 | Stop reason: HOSPADM

## 2018-05-08 RX ORDER — OXYCODONE HCL 5 MG/5 ML
SOLUTION, ORAL ORAL
Status: COMPLETED
Start: 2018-05-08 | End: 2018-05-08

## 2018-05-08 RX ORDER — ROPIVACAINE HYDROCHLORIDE 5 MG/ML
INJECTION, SOLUTION EPIDURAL; INFILTRATION; PERINEURAL
Status: DISCONTINUED | OUTPATIENT
Start: 2018-05-08 | End: 2018-05-08 | Stop reason: HOSPADM

## 2018-05-08 RX ORDER — DEXAMETHASONE SODIUM PHOSPHATE 4 MG/ML
4 INJECTION, SOLUTION INTRA-ARTICULAR; INTRALESIONAL; INTRAMUSCULAR; INTRAVENOUS; SOFT TISSUE
Status: DISCONTINUED | OUTPATIENT
Start: 2018-05-08 | End: 2018-05-09 | Stop reason: HOSPADM

## 2018-05-08 RX ORDER — NITROGLYCERIN 0.4 MG/1
0.4 TABLET SUBLINGUAL
Status: DISCONTINUED | OUTPATIENT
Start: 2018-05-08 | End: 2018-05-09 | Stop reason: HOSPADM

## 2018-05-08 RX ORDER — SODIUM CHLORIDE 9 MG/ML
INJECTION, SOLUTION INTRAVENOUS CONTINUOUS
Status: DISCONTINUED | OUTPATIENT
Start: 2018-05-08 | End: 2018-05-09 | Stop reason: HOSPADM

## 2018-05-08 RX ORDER — OXYCODONE HCL 10 MG/1
TABLET, FILM COATED, EXTENDED RELEASE ORAL
Status: COMPLETED
Start: 2018-05-08 | End: 2018-05-08

## 2018-05-08 RX ORDER — SCOLOPAMINE TRANSDERMAL SYSTEM 1 MG/1
1 PATCH, EXTENDED RELEASE TRANSDERMAL
Status: DISCONTINUED | OUTPATIENT
Start: 2018-05-08 | End: 2018-05-09 | Stop reason: HOSPADM

## 2018-05-08 RX ORDER — DIPHENHYDRAMINE HYDROCHLORIDE 50 MG/ML
INJECTION INTRAMUSCULAR; INTRAVENOUS
Status: COMPLETED
Start: 2018-05-08 | End: 2018-05-08

## 2018-05-08 RX ORDER — SODIUM CHLORIDE, SODIUM LACTATE, POTASSIUM CHLORIDE, CALCIUM CHLORIDE 600; 310; 30; 20 MG/100ML; MG/100ML; MG/100ML; MG/100ML
INJECTION, SOLUTION INTRAVENOUS
Status: DISCONTINUED | OUTPATIENT
Start: 2018-05-08 | End: 2018-05-09 | Stop reason: HOSPADM

## 2018-05-08 RX ORDER — ACETAMINOPHEN 500 MG
TABLET ORAL
Status: COMPLETED
Start: 2018-05-08 | End: 2018-05-08

## 2018-05-08 RX ORDER — LATANOPROST 50 UG/ML
1 SOLUTION/ DROPS OPHTHALMIC EVERY EVENING
Status: DISCONTINUED | OUTPATIENT
Start: 2018-05-08 | End: 2018-05-09 | Stop reason: HOSPADM

## 2018-05-08 RX ORDER — AMOXICILLIN 250 MG
1 CAPSULE ORAL
Status: DISCONTINUED | OUTPATIENT
Start: 2018-05-08 | End: 2018-05-09 | Stop reason: HOSPADM

## 2018-05-08 RX ADMIN — DIPHENHYDRAMINE HYDROCHLORIDE 12.5 MG: 50 INJECTION, SOLUTION INTRAMUSCULAR; INTRAVENOUS at 12:19

## 2018-05-08 RX ADMIN — ACETAMINOPHEN 1000 MG: 500 TABLET, COATED ORAL at 10:15

## 2018-05-08 RX ADMIN — CELECOXIB 200 MG: 200 CAPSULE ORAL at 10:15

## 2018-05-08 RX ADMIN — ONDANSETRON 4 MG: 2 INJECTION INTRAMUSCULAR; INTRAVENOUS at 12:04

## 2018-05-08 RX ADMIN — ASPIRIN 81 MG: 81 TABLET, COATED ORAL at 21:58

## 2018-05-08 RX ADMIN — DOCUSATE SODIUM 100 MG: 100 CAPSULE, LIQUID FILLED ORAL at 21:58

## 2018-05-08 RX ADMIN — OXYCODONE HYDROCHLORIDE 5 MG: 5 SOLUTION ORAL at 10:30

## 2018-05-08 RX ADMIN — STANDARDIZED SENNA CONCENTRATE AND DOCUSATE SODIUM 1 TABLET: 8.6; 5 TABLET, FILM COATED ORAL at 21:58

## 2018-05-08 RX ADMIN — SCOPALAMINE 1 PATCH: 1 PATCH, EXTENDED RELEASE TRANSDERMAL at 12:10

## 2018-05-08 RX ADMIN — TRAMADOL HYDROCHLORIDE 50 MG: 50 TABLET, COATED ORAL at 17:31

## 2018-05-08 RX ADMIN — LATANOPROST 1 DROP: 50 SOLUTION/ DROPS OPHTHALMIC at 21:58

## 2018-05-08 RX ADMIN — ACETAMINOPHEN 1000 MG: 500 TABLET ORAL at 17:31

## 2018-05-08 RX ADMIN — WATER 2 G: 100 INJECTION, SOLUTION INTRAVENOUS at 17:29

## 2018-05-08 RX ADMIN — LIDOCAINE HYDROCHLORIDE 0.1 ML: 10 INJECTION, SOLUTION EPIDURAL; INFILTRATION; INTRACAUDAL; PERINEURAL at 10:00

## 2018-05-08 RX ADMIN — SODIUM CHLORIDE: 9 INJECTION, SOLUTION INTRAVENOUS at 21:56

## 2018-05-08 ASSESSMENT — LIFESTYLE VARIABLES
ON A TYPICAL DAY WHEN YOU DRINK ALCOHOL HOW MANY DRINKS DO YOU HAVE: 1
EVER_SMOKED: NEVER
HAVE PEOPLE ANNOYED YOU BY CRITICIZING YOUR DRINKING: NO
AVERAGE NUMBER OF DAYS PER WEEK YOU HAVE A DRINK CONTAINING ALCOHOL: 7
TOTAL SCORE: 0
TOTAL SCORE: 0
HAVE YOU EVER FELT YOU SHOULD CUT DOWN ON YOUR DRINKING: NO
EVER HAD A DRINK FIRST THING IN THE MORNING TO STEADY YOUR NERVES TO GET RID OF A HANGOVER: NO
TOTAL SCORE: 0
ALCOHOL_USE: YES
HOW MANY TIMES IN THE PAST YEAR HAVE YOU HAD 5 OR MORE DRINKS IN A DAY: 0
CONSUMPTION TOTAL: NEGATIVE
EVER FELT BAD OR GUILTY ABOUT YOUR DRINKING: NO
EVER_SMOKED: NEVER

## 2018-05-08 ASSESSMENT — PATIENT HEALTH QUESTIONNAIRE - PHQ9
2. FEELING DOWN, DEPRESSED, IRRITABLE, OR HOPELESS: NOT AT ALL
1. LITTLE INTEREST OR PLEASURE IN DOING THINGS: NOT AT ALL
SUM OF ALL RESPONSES TO PHQ9 QUESTIONS 1 AND 2: 0

## 2018-05-08 ASSESSMENT — PAIN SCALES - GENERAL
PAINLEVEL_OUTOF10: ASSUMED PAIN PRESENT
PAINLEVEL_OUTOF10: ASSUMED PAIN PRESENT
PAINLEVEL_OUTOF10: 0
PAINLEVEL_OUTOF10: ASSUMED PAIN PRESENT
PAINLEVEL_OUTOF10: 4

## 2018-05-08 NOTE — OR NURSING
1255 Dozing at intervals. C/O dry mouth, tolerating sips of water without nausea. 1325 Appears comfortable, resting with eyes closed. Pain tolerable, no nausea. Pt meets criteria for discharge and transfer to floor.

## 2018-05-08 NOTE — OR NURSING
Patient to preop, allergies and NPO status verified, home medications reconciled, belongings secured, verbalizes understanding of pain scale, surgical site verified, IV access established, SCDs/ ELENA hose in place.

## 2018-05-08 NOTE — PROGRESS NOTES
Admitted into room 213-2 from PACU via her bed,sleepy but rouses easily when name called.Tolerable pain verbalized,oriented to room and call light,discussed POC and verbalized understanding. at bed side.Instructed to call for any needs call light with in reach.

## 2018-05-08 NOTE — OP REPORT
DATE OF SERVICE:  05/08/2018    PREOPERATIVE DIAGNOSIS:  Right knee end-stage osteoarthritis.    POSTOPERATIVE DIAGNOSIS:  Right knee end-stage osteoarthritis.    PROCEDURE:  Right total knee arthroplasty using a DePuy cemented Attune with a   size 5 femur, size 4 tibia, 6 poly insert, 38 all poly patella.    SURGEON:  Thanh Hall MD    ASSISTANT:  Ganesh Weldon MD    ANESTHESIA:  General plus block.    ANESTHESIOLOGIST:  Arthur Sneed MD    TOURNIQUET TIME:  34 minutes.    BLOOD LOSS:  Minimal.    COMPLICATIONS:  None.    DISPOSITION:  PACU.    CONDITION:  Stable.    INDICATIONS:  Patient is a 77-year-old female with ongoing right knee pain   limiting her activities of daily living.  She has had injections using   over-the-counter medications.  She is nonobese with normal alignment,   continued to have knee pain with failed conservative measures.  We discussed   risks, benefits, rationale of total knee replacement including but not limited   to infection, neurovascular injury, incomplete symptoms, DVT, PE,   complications of anesthesia, need for postoperative physical therapy, possible   need for further surgery in future.  Informed consent was signed and placed   in the chart.  All of her questions were answered.  No guarantees implied or   given.    TECHNIQUE:  Both patient and I agreed the correct operative extremity.  The   right knee was signed and marked in preoperative holding.  She was 2 g IV   Ancef prophylaxis.  I consulted Dr. Arthru Sneed of anesthesia regarding   perioperative pain management, he consented patient for adductor canal block,   which was carried out under sterile technique without complications.  She was   taken to the operative suite.  After adequate anesthesia, time-out was taken   by all in room to identify the correct patient, limb, and procedure.  Right   limb was sterilely prepped and draped in standard fashion.  Limb was   exsanguinated and tourniquet  inflated to 250 mmHg pressure.  Midline incision   was made with a medial parapatellar arthrotomy.  Findings were that of   tricompartmental osteoarthritis with bone-on-bone findings.  Intramedullary   guide was used for the femoral cut.  DePuy system femur was prepped following   the system to a size 5.  Tibia was then prepped matching the patient's native   slope to a size 4, this was posterior stabilized knee, box cut was made to   remove the posterior cruciate ligament, trials were done with a size 5 poly   insert and the knee achieved good extension and flexion with good range of   motion.  Patella was then prepped in standard fashion.  Components removed.    Excess debris was removed.  Bone was lost.  Anesthetic was injected in the   posterior aspect of the knee, followed by cement with antibiotics mixed at the   back table.  Implants were placed with the final trial.  Excess cement was   removed.  Wound was then irrigated with dilute Betadine solution, 3 liters   sterile saline.  Cement was allowed to harden.  Excess debris was removed.    Tourniquet was let down at 34 minutes.  Hemostasis was observed.  Arthrotomy   was closed with interrupted figure-of-eight #1 Vicryl followed by 2-0 Vicryl   for the subcutaneous tissue, staples for the skin, more anesthetic was   involved.  Silver based dressing was applied with compressive dressing and   PolarCare.  Patient transferred to recovery in stable condition.  Counts were   correct.  No apparent complications.  In recovery, patient was able to   dorsiflex, plantar flex, intact to light sensory touch.  Toes pink, warm,   brisk cap refill.    Ganesh Weldon MD, was essential for successful completion of the case.    It could not have been done without him.       ____________________________________     MD LUH Manuel / SARAH    DD:  05/08/2018 12:05:19  DT:  05/08/2018 12:42:35    D#:  [id number]  Job#:  [id number]

## 2018-05-08 NOTE — OR SURGEON
Immediate Post OP Note    PreOp Diagnosis: right knee end stage OA    PostOp Diagnosis: same     Procedure(s):  KNEE ARTHROPLASTY TOTAL - Wound Class: Clean    Surgeon(s):  ESTRELLA Manuel M.D.    Anesthesiologist/Type of Anesthesia:  Anesthesiologist: Arthur Sneed M.D./General    Surgical Staff:  Circulator: Stephanie Bonds R.N.  Limb Travis: Tejas Mar  Relief Scrub: Jeanna Can  Scrub Person: Silvano Talavera  Count [place]: Pavel Rubin    Specimens removed if any:  * No specimens in log *    Estimated Blood Loss: min    Findings: right knee end stage OA    Complications: no apparent         5/8/2018 12:01 PM Thanh Hall M.D.

## 2018-05-08 NOTE — OR NURSING
1149 To PACU from OR via bed, sleeping, respirations spontaneous and non-labored via LMA.Icepack applied over c/d/i right knee surgical dressings.  1200 LMA dc'd at this time. VSS.  Pt denies pain but is having mild nausea. Plan to medicate. See MAR   1215 No change   1230 Pt sleeping. VSS. When roused pt states pain is tolerable.   1236 Report given to MOMO Henry who assumed care of pt

## 2018-05-08 NOTE — OR NURSING
1236 Received report from Tere BARR, assumed care of pt. Pt arousable on calling, reports mild nausea and tolerable pain with non-labored and spontaneous respirations via 2L NC, VSS.  1249 Report to MOMO Reyna

## 2018-05-09 VITALS
HEART RATE: 68 BPM | BODY MASS INDEX: 24.13 KG/M2 | OXYGEN SATURATION: 98 % | DIASTOLIC BLOOD PRESSURE: 57 MMHG | RESPIRATION RATE: 16 BRPM | WEIGHT: 141.31 LBS | HEIGHT: 64 IN | SYSTOLIC BLOOD PRESSURE: 135 MMHG | TEMPERATURE: 97.8 F

## 2018-05-09 LAB
HCT VFR BLD AUTO: 37.7 % (ref 37–47)
HGB BLD-MCNC: 12.5 G/DL (ref 12–16)

## 2018-05-09 PROCEDURE — G8978 MOBILITY CURRENT STATUS: HCPCS | Mod: CJ

## 2018-05-09 PROCEDURE — 700101 HCHG RX REV CODE 250: Performed by: ORTHOPAEDIC SURGERY

## 2018-05-09 PROCEDURE — 85014 HEMATOCRIT: CPT

## 2018-05-09 PROCEDURE — G8980 MOBILITY D/C STATUS: HCPCS | Mod: CJ

## 2018-05-09 PROCEDURE — 85018 HEMOGLOBIN: CPT

## 2018-05-09 PROCEDURE — A9270 NON-COVERED ITEM OR SERVICE: HCPCS | Performed by: ORTHOPAEDIC SURGERY

## 2018-05-09 PROCEDURE — 97161 PT EVAL LOW COMPLEX 20 MIN: CPT

## 2018-05-09 PROCEDURE — G0378 HOSPITAL OBSERVATION PER HR: HCPCS

## 2018-05-09 PROCEDURE — 96376 TX/PRO/DX INJ SAME DRUG ADON: CPT

## 2018-05-09 PROCEDURE — 700102 HCHG RX REV CODE 250 W/ 637 OVERRIDE(OP): Performed by: ORTHOPAEDIC SURGERY

## 2018-05-09 PROCEDURE — 36415 COLL VENOUS BLD VENIPUNCTURE: CPT

## 2018-05-09 PROCEDURE — 700111 HCHG RX REV CODE 636 W/ 250 OVERRIDE (IP): Performed by: ORTHOPAEDIC SURGERY

## 2018-05-09 PROCEDURE — 97110 THERAPEUTIC EXERCISES: CPT

## 2018-05-09 PROCEDURE — G8979 MOBILITY GOAL STATUS: HCPCS | Mod: CJ

## 2018-05-09 RX ADMIN — ACETAMINOPHEN 1000 MG: 500 TABLET ORAL at 01:10

## 2018-05-09 RX ADMIN — ACETAMINOPHEN 1000 MG: 500 TABLET ORAL at 05:36

## 2018-05-09 RX ADMIN — ASPIRIN 81 MG: 81 TABLET, COATED ORAL at 10:02

## 2018-05-09 RX ADMIN — TRAMADOL HYDROCHLORIDE 50 MG: 50 TABLET, COATED ORAL at 05:36

## 2018-05-09 RX ADMIN — WATER 2 G: 100 INJECTION, SOLUTION INTRAVENOUS at 01:12

## 2018-05-09 RX ADMIN — TRAMADOL HYDROCHLORIDE 50 MG: 50 TABLET, COATED ORAL at 01:11

## 2018-05-09 ASSESSMENT — GAIT ASSESSMENTS
GAIT LEVEL OF ASSIST: STAND BY ASSIST
DISTANCE (FEET): 100
ASSISTIVE DEVICE: FRONT WHEEL WALKER
DEVIATION: DECREASED HEEL STRIKE;DECREASED TOE OFF

## 2018-05-09 ASSESSMENT — PAIN SCALES - GENERAL
PAINLEVEL_OUTOF10: 5
PAINLEVEL_OUTOF10: 0
PAINLEVEL_OUTOF10: 2

## 2018-05-09 NOTE — PROGRESS NOTES
Received report from Linda BARR assumed care of pt. Pt up in chair. SCD's on, Polar ice in place. States 3/10 pain w/ no nausea. CMS intact. 2+ pulses bilaterally. Dressing cdi. Educated to call for assistance.

## 2018-05-09 NOTE — CARE PLAN
Problem: Discharge Barriers/Planning  Goal: Patient's continuum of care needs will be met    Intervention: Explain discharge instructions and medication reconcilliation to patient and significant other/support system  1200  Meets all criteria for discharge, dressing CDI to Rt Knee with neuro vasc status intact, pt and  verbalize understanding of total knee care for home.

## 2018-05-09 NOTE — PROGRESS NOTES
Total Joint Replacement Program Rounding     Inpatient bedside rounding completed to address quality of care provided by total joint replacement program IDT and overall patient experience at [facility].    Performance Measures addressed: Patient completed mupirocin as instructed.   Patient Satisfaction addressed: Pain well controlled. Very pleased with her whole experience.   Additional notes: Patient/family updated on POC during hospital stay. Pt worked with PT; awaiting OT then anticipates DC to home w/ spouse and OP PT. RN aware.

## 2018-05-09 NOTE — PROGRESS NOTES
Progress Note               Author: Thanh Hall Date & Time created: 2018  5:25 AM     Interval History:  POD1 right tka doing well - no issues     Review of Systems:  ROS    Physical Exam:  Physical Exam right Ankle/toes dorsiflex and plantar flex, intact to light touch, good cap refill, good pulses, no pain with passive range of motion , calves soft      Labs:        Invalid input(s): [id number]      No results for input(s): SODIUM, POTASSIUM, CHLORIDE, CO2, BUN, CREATININE, MAGNESIUM, PHOSPHORUS, CALCIUM in the last 72 hours.  No results for input(s): ALTSGPT, ASTSGOT, ALKPHOSPHAT, TBILIRUBIN, DBILIRUBIN, GAMMAGT, AMYLASE, LIPASE, ALB, PREALBUMIN, GLUCOSE in the last 72 hours.  No results for input(s): RBC, HEMOGLOBIN, HEMATOCRIT, PLATELETCT, PROTHROMBTM, APTT, INR, IRON, FERRITIN, TOTIRONBC in the last 72 hours.      Hemodynamics:  Temp (24hrs), Av.3 °C (97.4 °F), Min:36.2 °C (97.1 °F), Max:36.8 °C (98.2 °F)  Temperature: 36.5 °C (97.7 °F)  Pulse  Av.3  Min: 54  Max: 74Heart Rate (Monitored): 74  Blood Pressure : 128/60     Respiratory:    Respiration: 16, Pulse Oximetry: 96 %, O2 Daily Delivery Respiratory : Silicone Nasal Cannula        RUL Breath Sounds: Clear, RML Breath Sounds: Clear, RLL Breath Sounds: Diminished, MIKALA Breath Sounds: Clear, LLL Breath Sounds: Diminished  Fluids:    Intake/Output Summary (Last 24 hours) at 18 0525  Last data filed at 18 0400   Gross per 24 hour   Intake             3375 ml   Output             1100 ml   Net             2275 ml        GI/Nutrition:  Orders Placed This Encounter   Procedures   • DIET ORDER     Standing Status:   Standing     Number of Occurrences:   1     Order Specific Question:   Diet:     Answer:   Regular [1]     Medical Decision Making, by Problem:  There are no active hospital problems to display for this patient.      Plan:  POD 1 right TKA   Home after PT today   ASA    Quality-Core Measures

## 2018-05-09 NOTE — THERAPY
"Physical Therapy Evaluation completed.   Bed Mobility:  Supine to Sit: Supervised  Transfers: Sit to Stand: Stand by Assist  Gait: Level Of Assist: Stand by Assist with Front-Wheel Walker       Plan of Care: Patient with no further skilled PT needs in the acute care setting at this time. Pt is moving well POD#1 s/p R TKA, steady with FWW and ready for DC home when medically cleared.  Pt has supportive spouse at home.  Discharge Recommendations: Equipment:Raised toilet seat. Post-acute therapy Discharge to home with outpatient or home health for additional skilled therapy services.    See \"Rehab Therapy-Acute\" Patient Summary Report for complete documentation.     "

## 2018-05-09 NOTE — DISCHARGE INSTRUCTIONS
Discharge Instructions    Discharged to home by car with relative. Discharged via wheelchair, hospital escort: Yes.  Special equipment needed: Walker    Be sure to schedule a follow-up appointment with your primary care doctor or any specialists as instructed.     Discharge Plan:   Diet Plan: Discussed  Activity Level: Discussed  Confirmed Follow up Appointment: Appointment Scheduled  Confirmed Symptoms Management: Discussed  Medication Reconciliation Updated: Yes  Influenza Vaccine Indication: Not indicated: Previously immunized this influenza season and > 8 years of age    I understand that a diet low in cholesterol, fat, and sodium is recommended for good health. Unless I have been given specific instructions below for another diet, I accept this instruction as my diet prescription.   Other diet: regular    Special Instructions: Discharge instructions for the Orthopedic Patient    Follow up with Primary Care Physician within 2 weeks of discharge to home, regarding:  Review of medications and diagnostic testing.  Surveillance for medical complications.  Workup and treatment of osteoporosis, if appropriate.     -Is this a Joint Replacement patient? Yes Total Joint Knee Replacement Discharge Instructions    Pain  - The goal is to slowly wean off the prescription pain medicine.  - Ice can be used for pain control.  20 minutes at a time is recommended, and never directly against your skin or incision.  - Most patients are off the pain pills by 3 weeks; others may require a low level of pain medications for many months.  If your pain continues to be severe, follow up with your physician.  Infection    Knee joint infections; occur in fewer than 2% of patients. The most common causes of infection following total knee replacement surgery are from bacteria that enter the bloodstream during dental procedures, urinary tract infections, or skin infections. These bacteria can lodge around your knee replacement and cause an  infection.  - Keep the incision as clean and dry as possible.  - Always wash your hands before touching your incision.  - Skin infections tend to develop around 7-10 days after surgery; most can be treated with oral antibiotics.  - Dental Care should be delayed for 3 months after surgery, your surgeon recommends taking a dose of antibiotics 1 hour prior to any dental procedure. After 2 years, most surgeons recommend antibiotics only before an extensive procedure.  Ask your surgeon what he recommends.  - Signs and symptoms of infection can include:  low grade fever, redness, pain, swelling and drainage from your incision.  Notify your surgeon immediately if you develop any of these symptoms.  Other instructions  - Bowel habits - constipation is extremely common and is caused by a combination of anesthesia, lack of mobility and pain medicine.  Use stool softeners or laxatives if necessary. It is important not to ignore this problem, as bowel obstructions can be a serious complication after joint replacement surgery.  - Mood/Energy Level - Many patients experience a lack of energy and endurance for up to 2-3 months after surgery.  Some may also feel down and can even become depressed.  This is likely due to the postoperative anemia, change in activity level, lack of sleep, pain medicine and just the emotional reaction to the surgery itself that is a big disruption in a person’s life.  This usually passes.  If symptoms persist, follow up with your primary physician.  - Returning to work - Your surgeon will give you more specific instructions. Depending on the type of activities you perform, it may be 6 to 8 weeks before you return to work.   Generally, if you work a sedentary job requiring little standing or walking, most patients may return within 2-6 weeks.  Manual labor jobs involving walking, lifting and standing may take longer. Your surgeon’s office can provide a release to part-time or light duty work early on in  your recovery and progress you to full duty as able.    - Driving - If your left knee was replaced and you have an automatic transmission, you may be able to begin driving in a week or so, provided you are no longer taking narcotic pain medication. If your right knee was replaced, avoid driving for 6 to 8 weeks. Remember that your reflexes may not be as sharp as before your surgery. Ask your surgeon for specific instructions.   - Avoiding falls - A fall during the first few weeks after surgery can damage your new knee and may result in a need for further surgery.   throw rugs and tack down loose carpeting.  Be aware of floor hazards such as pets, small objects or uneven surfaces.    - [employer] Metal Detectors - The sensitivity of metal detectors varies and it is likely that your prosthesis will cause an alarm.  Inform the  of your artificial joint.  Diet  - Resume your normal diet as tolerated.  - It is important to achieve a healthy nutritional status by eating a well balanced diet on a regular basis.  - Your physician may recommend that you take iron and vitamin supplements.   - Continue to drink plenty of fluids.  Shower/Bathing  - You may shower as soon as you get home from the hospital unless otherwise instructed.  - Keep your incision out of water.  To keep the incision dry when showering, cover it with a plastic bag or plastic wrap.  - Pat incision dry if it gets wet.  Don’t rub.  - Do not submerge in a bath until staples are out and the incision is completely healed. (Approximately 6-8 weeks)  Dressing Change:  Procedure (if recommended by your physician)  - Wash hands.  - Open all new dressing change materials.  - Remove old dressing and discard.  - Inspect incision for redness, increase in clear drainage, yellow/green drainage, odor and surrounding skin hot to touch.  -  ABD (large gauze) pad or “island dressing” by one corner and lay over the incision.  Be careful not to touch  the inside of the dressing that will lay over the incision.  - Secure in place as instructed (Ace wrap or tape).    Swelling/Bruising    - Swelling can last from 3-6 months.  - Elevate your leg higher than your heart while reclining.   The first week you are home you should elevate your leg an equal amount of time, as you are active.    - Anti-inflammatory pills can be taken once you have stopped the blood thinners.  - The swelling is usually worse after you go home since you are upright for longer periods of time.  - Bruising is common and can involve the entire leg including the thigh, calf and even foot. Bruising often does not appear until after you arrive home and it can be quite dramatic- purple, black, and green.  The bruising you can see is not usually concerning and will subside without any treatment.      Blood Clot Prevention  Blood clots in the legs and the less common, but frightening, clots that travel to the lungs are a real focus of our preventative. Most patients are at standard risk for them, but those patients who are at higher risk include people who have had previous clots, a family history of clotting, smoking, diabetes, obesity, advanced age, use of estrogen and a sedentary lifestyle.    - Signs of blood clots in legs - Swelling in thigh, calf or ankle that does not go down with elevation.  Pain, heat and tenderness in calf, back of calf or groin area.  NOTE: blood clots can occur in either leg.  - You have been receiving anticoagulant therapy (blood thinners) in the hospital and you may be instructed to continue at home depending on your risk factors.  - Your risk for developing a clot continues for up to 2-3 months after surgery.  You should avoid prolonged sitting and dehydration during that time (long air trips and car trips).  If you do take a trip during this time, please get up and move around every 1- 1.5 hours.  - If you are prescribed blood-thinning medication for home, follow  instructions as directed. (Handouts provided if applicable).      Activity  Once home, you should continue to stay active. The key is to remember not to overdo it! While you can expect some good days and some bad days, you should notice a gradual improvement and a gradual increase in your endurance over the next 6 to 12 months. Exercise is a critical component of home care, particularly during the first few weeks after surgery.     - Normal activities of daily living You should be able to resume most within 3 to 6 weeks following surgery. Some pain with activity and at night is common for several weeks after surgery  -  Physical Therapy Exercises - Continue to do the exercises prescribed for at least two months after surgery. Riding a stationary bicycle can help maintain muscle tone and keep your knee flexible. Try to achieve the maximum degree of bending and extension possible. (handout provided by Therapist).  - Sexual Activity -. Your surgeon can tell you when it safe to resume sexual activity.    - Sleeping Positions - You can safely sleep on your back, on either side, or on your stomach.   - Other Activities - Walk as much as you like, but remember that walking is no substitute for the exercises your doctor and physical therapist will prescribe. Lower impact activities are preferred.  If you have specific questions, consult your Surgeon.    When to Call the Doctor   Call the physician if:   - Fever over 100.5? F  - Increased pain, drainage, redness, odor or heat around the incision area  - Shaking chills  - Increased knee pain with activity and rest  - Increased pain in calf, tenderness or redness above or below the knee  - Increased swelling of calf, ankle, foot  - Sudden increased shortness of breath, sudden onset of chest pain, localized chest pain with coughing  - Incision opening  Or, if there are any questions or concerns about medications or care.       -Is this patient being discharged with medication  to prevent blood clots?  Yes, Aspirin Aspirin, ASA oral tablets  What is this medicine?  ASPIRIN (AS pir in) is a pain reliever. It is used to treat mild pain and fever. This medicine is also used as directed by a doctor to prevent and to treat heart attacks, to prevent strokes, and to treat arthritis or inflammation.  This medicine may be used for other purposes; ask your health care provider or pharmacist if you have questions.  COMMON BRAND NAME(S): Aspir-Low, Aspir-Gayb, Aspirtab, Ayah Advanced Aspirin, Ayah Aspirin, Ayah Aspirin Extra Strength, Ayah Aspirin Plus, Ayah Extra Strength, Ayah Extra Strength Plus, Ayah Genuine Aspirin, Ayah Womens Aspirin, Bufferin, Bufferin Extra Strength, Bufferin Low Dose  What should I tell my health care provider before I take this medicine?  They need to know if you have any of these conditions:  -anemia  -asthma  -bleeding problems  -child with chickenpox, the flu, or other viral infection  -diabetes  -gout  -if you frequently drink alcohol containing drinks  -kidney disease  -liver disease  -low level of vitamin K  -lupus  -smoke tobacco  -stomach ulcers or other problems  -an unusual or allergic reaction to aspirin, tartrazine dye, other medicines, dyes, or preservatives  -pregnant or trying to get pregnant  -breast-feeding  How should I use this medicine?  Take this medicine by mouth with a glass of water. Follow the directions on the package or prescription label. You can take this medicine with or without food. If it upsets your stomach, take it with food. Do not take your medicine more often than directed.  Talk to your pediatrician regarding the use of this medicine in children. While this drug may be prescribed for children as young as 12 years of age for selected conditions, precautions do apply. Children and teenagers should not use this medicine to treat chicken pox or flu symptoms unless directed by a doctor.  Patients over 65 years old may have a stronger  reaction and need a smaller dose.  Overdosage: If you think you have taken too much of this medicine contact a poison control center or emergency room at once.  NOTE: This medicine is only for you. Do not share this medicine with others.  What if I miss a dose?  If you are taking this medicine on a regular schedule and miss a dose, take it as soon as you can. If it is almost time for your next dose, take only that dose. Do not take double or extra doses.  What may interact with this medicine?  Do not take this medicine with any of the following medications:  -cidofovir  -ketorolac  -probenecid  This medicine may also interact with the following medications:  -alcohol  -alendronate  -bismuth subsalicylate  -flavocoxid  -herbal supplements like feverfew, garlic, pop, ginkgo biloba, horse chestnut  -medicines for diabetes or glaucoma like acetazolamide, methazolamide  -medicines for gout  -medicines that treat or prevent blood clots like enoxaparin, heparin, ticlopidine, warfarin  -other aspirin and aspirin-like medicines  -NSAIDs, medicines for pain and inflammation, like ibuprofen or naproxen  -pemetrexed  -sulfinpyrazone  -varicella live vaccine  This list may not describe all possible interactions. Give your health care provider a list of all the medicines, herbs, non-prescription drugs, or dietary supplements you use. Also tell them if you smoke, drink alcohol, or use illegal drugs. Some items may interact with your medicine.  What should I watch for while using this medicine?  If you are treating yourself for pain, tell your doctor or health care professional if the pain lasts more than 10 days, if it gets worse, or if there is a new or different kind of pain. Tell your doctor if you see redness or swelling. Also, check with your doctor if you have a fever that lasts for more than 3 days. Only take this medicine to prevent heart attacks or blood clotting if prescribed by your doctor or health care  professional.  Do not take aspirin or aspirin-like medicines with this medicine. Too much aspirin can be dangerous. Always read the labels carefully.  This medicine can irritate your stomach or cause bleeding problems. Do not smoke cigarettes or drink alcohol while taking this medicine. Do not lie down for 30 minutes after taking this medicine to prevent irritation to your throat.  If you are scheduled for any medical or dental procedure, tell your healthcare provider that you are taking this medicine. You may need to stop taking this medicine before the procedure.  This medicine may be used to treat migraines. If you take migraine medicines for 10 or more days a month, your migraines may get worse. Keep a diary of headache days and medicine use. Contact your healthcare professional if your migraine attacks occur more frequently.  What side effects may I notice from receiving this medicine?  Side effects that you should report to your doctor or health care professional as soon as possible:  -allergic reactions like skin rash, itching or hives, swelling of the face, lips, or tongue  -breathing problems  -changes in hearing, ringing in the ears  -confusion  -general ill feeling or flu-like symptoms  -pain on swallowing  -redness, blistering, peeling or loosening of the skin, including inside the mouth or nose  -signs and symptoms of bleeding such as bloody or black, tarry stools; red or dark-brown urine; spitting up blood or brown material that looks like coffee grounds; red spots on the skin; unusual bruising or bleeding from the eye, gums, or nose  -trouble passing urine or change in the amount of urine  -unusually weak or tired  -yellowing of the eyes or skin  Side effects that usually do not require medical attention (report to your doctor or health care professional if they continue or are bothersome):  -diarrhea or constipation  -headache  -nausea, vomiting  -stomach gas, heartburn  This list may not describe  all possible side effects. Call your doctor for medical advice about side effects. You may report side effects to FDA at 5-399-UEO-9466.  Where should I keep my medicine?  Keep out of the reach of children.  Store at room temperature between 15 and 30 degrees C (59 and 86 degrees F). Protect from heat and moisture. Do not use this medicine if it has a strong vinegar smell. Throw away any unused medicine after the expiration date.  NOTE: This sheet is a summary. It may not cover all possible information. If you have questions about this medicine, talk to your doctor, pharmacist, or health care provider.  © 2018 [employer] Standard (2014-08-19 11:30:31)      · Is patient discharged on Warfarin / Coumadin?   No     Depression / Suicide Risk    As you are discharged from this [facility] facility, it is important to learn how to keep safe from harming yourself.    Recognize the warning signs:  · Abrupt changes in personality, positive or negative- including increase in energy   · Giving away possessions  · Change in eating patterns- significant weight changes-  positive or negative  · Change in sleeping patterns- unable to sleep or sleeping all the time   · Unwillingness or inability to communicate  · Depression  · Unusual sadness, discouragement and loneliness  · Talk of wanting to die  · Neglect of personal appearance   · Rebelliousness- reckless behavior  · Withdrawal from people/activities they love  · Confusion- inability to concentrate     If you or a loved one observes any of these behaviors or has concerns about self-harm, here's what you can do:  · Talk about it- your feelings and reasons for harming yourself  · Remove any means that you might use to hurt yourself (examples: pills, rope, extension cords, firearm)  · Get professional help from the community (Mental Health, Substance Abuse, psychological counseling)  · Do not be alone:Call your Safe Contact- someone whom you trust who will be there for  you.  · Call your local CRISIS HOTLINE 964-0711 or 155-230-5193  · Call your local Children's Mobile Crisis Response Team [place] (600) 633-0331 or www.[employer]  · Call the toll free National Suicide Prevention Hotlines   · National Suicide Prevention Lifeline 102-541-PBFV (7649)  · National [facility] 800-SUICIDE (462-7629)

## 2018-05-09 NOTE — DISCHARGE SUMMARY
ADMIT DIAGNOSIS:  Right knee end-stage osteoarthritis.    DISCHARGE DIAGNOSIS:  Right knee end-stage osteoarthritis.    PROCEDURE PERFORMED:  Right total knee arthroplasty.    ADMIT DATE:  05/08/2018    DISCHARGE DATE:  05/09/2018    HOSPITAL COURSE:  Patient underwent an uncomplicated right total knee   arthroplasty, had normal postoperative exam, worked well with physical   therapy, discharged home on postop day #1.  Aspirin for DVT prophylaxis.    Follow up with me in 2 weeks' time.       ____________________________________     MD [facility] Manuel / NTS    DD:  05/09/2018 05:30:39  DT:  05/09/2018 05:45:21    D#:  [id number]  Job#:  [id number]

## 2018-05-09 NOTE — CARE PLAN
Problem: Venous Thromboembolism (VTW)/Deep Vein Thrombosis (DVT) Prevention:  Goal: Patient will participate in Venous Thrombosis (VTE)/Deep Vein Thrombosis (DVT)Prevention Measures  Outcome: PROGRESSING AS EXPECTED   05/08/18 2200   OTHER   Risk Assessment Score 2   VTE RISK Moderate   Pharmacologic Prophylaxis Used (ASA)   Mechanical/VTE Prophylaxis   Mechanical Prophylaxis  SCDs, Sequential Compression Device   SCDs, Sequential Compression Device On       Problem: Pain Management  Goal: Pain level will decrease to patient's comfort goal  Outcome: PROGRESSING AS EXPECTED   05/08/18 1921   OTHER   Nurse Pain Scale 0 - 10  0   Comfort Goal Comfort at Rest;Comfort with Movement;Sleep Comfortably

## 2018-05-15 ENCOUNTER — PHYSICAL THERAPY (OUTPATIENT)
Dept: PHYSICAL THERAPY | Facility: MEDICAL CENTER | Age: 78
End: 2018-05-15
Attending: ORTHOPAEDIC SURGERY
Payer: MEDICARE

## 2018-05-15 DIAGNOSIS — M17.11 PRIMARY OSTEOARTHRITIS OF RIGHT KNEE: ICD-10-CM

## 2018-05-15 DIAGNOSIS — Z96.651 TOTAL KNEE REPLACEMENT STATUS, RIGHT: ICD-10-CM

## 2018-05-15 PROCEDURE — 97162 PT EVAL MOD COMPLEX 30 MIN: CPT

## 2018-05-15 PROCEDURE — 97110 THERAPEUTIC EXERCISES: CPT

## 2018-05-15 ASSESSMENT — BALANCE ASSESSMENTS
BALANCE - SITTING STATIC: NORMAL
BALANCE - STANDING DYNAMIC: FAIR +
BALANCE - SITTING DYNAMIC: NORMAL
BALANCE - STANDING STATIC: GOOD

## 2018-05-15 ASSESSMENT — ENCOUNTER SYMPTOMS
PAIN SCALE: 10
PAIN SCALE AT HIGHEST: 10
QUALITY: ACHING
PAIN TIMING: ALL DAY
PAIN SCALE AT LOWEST: 10

## 2018-05-15 NOTE — OP THERAPY EVALUATION
Outpatient Physical Therapy  INITIAL EVALUATION    [facility] Outpatient Physical Therapy  [address]  [place] [address]  Phone:  [phone]  Fax:  [phone]    Date of Evaluation: 05/15/2018    Patient: Katerina Torres  YOB: 1940  MRN: [id number]     Referring Provider: Thanh Hall M.D.  [address]  [address]  [place] [address]   Referring Diagnosis Unilateral primary osteoarthritis, right knee [M17.11]     Time Calculation  Start time: 0730  Stop time: 0830 Time Calculation (min): 60 minutes     Physical Therapy Occurrence Codes    Date of onset of impairment:  5/8/18   Date physical therapy care plan established or reviewed:  5/15/18   Date physical therapy treatment started:  5/15/18          Chief Complaint: Post-Op Pain and Knee Problem    Visit Diagnoses     ICD-10-CM   1. Primary osteoarthritis of right knee M17.11   2. Total knee replacement status, right Z96.651         Subjective   History of Present Illness:     Date of surgery:  5/8/2018    History of chief complaint:  Katerina presents today for evaluation after R total knee replacement DOS 5/8/18. She presents today using a fww. She wants to get back to gardening and skiing. She has a follow up with her surgeon in a week or so.     Pain:     Current pain rating:  10    At best pain rating:  10    At worst pain rating:  10    Quality:  Aching    Pain timing:  All day    Aggravating factors:  Movement. Post surgical pain. Walking     Relieving factors:  Tylenol, CPM, ice machine.     Activity Tolerance:     Current activity tolerance / Recreational activities:  Ski.   Gardening.     She is not doing anything really at this moment.     Social Support:     Lives in:  One-story house    Lives with:  Spouse    Treatments:     Treatments to date:  CPM for several hours a day. Ice machine Previous Physical therapy         Past Medical History:   Diagnosis Date   • Breast cancer  "(HCC)    • Breath shortness     with exertion \"walking up a hill\"   • Bruises easily    • Cancer (HCC) 1989    breast left side lumpectomy   • Cardiac murmur 6/3/2009   • Chronic kidney disease, stage III (moderate) 8/20/2015   • Dyslipidemia 6/3/2009   • Glaucoma     both eyes   • Hepatitis A 1993   • Hiatus hernia syndrome    • Hx of breast cancer 6/3/2009   • Hypertension 7/18/2017    currently not taking medications   • MEDICAL HOME    • Neutropenia 6/3/2009   • Osteopenia 6/3/2009   • Preventative health care 6/3/2009   • Snoring    • Unspecified cataract     bilateral IOLs     Past Surgical History:   Procedure Laterality Date   • KNEE ARTHROPLASTY TOTAL Right 5/8/2018    Procedure: KNEE ARTHROPLASTY TOTAL;  Surgeon: Thanh Hall M.D.;  Location: SURGERY [facility];  Service: Orthopedics   • VITRECTOMY POSTERIOR Left 10/25/2016    Procedure: VITRECTOMY POSTERIOR membrane peel cryotherapy infusion of SF6 intraocular gas;  Surgeon: Amanda Rodriguez M.D.;  Location: SURGERY SAME [facility];  Service:    • RECOVERY  3/31/2014    Performed by Cath-Recovery Surgery at SURGERY SAME DAY [facility]   • CATARACT PHACO WITH IOL  5/28/2009    Performed by GERA BREAUX at SURGERY SAME DAY [facility]   • CATARACT PHACO WITH IOL  5/14/2009    Performed by GERA BREAUX at SURGERY SAME DAY [facility]   • LUMPECTOMY     • OTHER     • PB RADIATION THERAPY PLAN SIMPLE         Precautions:       Objective   Observation and functional movement:  Using FWW has antalgic limp. Short stance on the R. Very heavy stance on the L side.     Range of motion and strength:    Active range of motion is within functional limits.    Active ext in supine -8 deg  Active flexion in supine 90 deg    MMT for strength not assessed. During supine SAQ and seated LAQ she struggles with quad initiation and lift.     Sensation and reflexes:     Sensation is intact.    ~8/10 2 point discrimination on both sides (5 points " "above and below knee)     Palpation and joint mobility:     Tender around joint line. She still presents with dressing on.     Balance:     Sitting (static): Normal    Sitting (dynamic): Normal    Standing (static): Good    Standing (dynamic): Fair +    Due to hesitancy in step. Balance seems guarded.     Coordination and tone:     Coordination is intact.    Basic self care and IADL's:     Independent with all self care.    Cognition and visual perception:     No cognition deficits noted.    Additional objective details:      Swelling   L knee 14.5\"   10 cm above: 16\"  10 cm below: 13'    R knee: 15.25\"  10 cm above 18.5\"  10 cm below: 14.25\"        Therapeutic Exercises (CPT 09255):     1. Develop HEP , See Below       Therapeutic Exercise Summary: Access Code: [email or web address]   URL: [email or web address]   Date: 05/15/2018   Prepared by: Gian Caro     Exercises  · Supine Knee Extension Stretch on Towel Roll - 10 Reps - 3 Sets - 1x daily - 7x weekly  · Supine Heel Slide with Strap - 10 Reps - 3 Sets - 1x daily - 7x weekly  · Supine Short Arc Quad - 10 Reps - 3 Sets - 1x daily - 7x weekly  · Supine Hamstring Stretch with Strap - 10 Reps - 3 Sets - 1x daily - 7x weekly  · Seated Long Arc Quad - 10 Reps - 3 Sets - 1x daily - 7x weekly  · Sit to Stand without Arm Support - 10 Reps - 3 Sets - 1x daily - 7x weekly      Therapeutic Treatments and Modalities:     1. E Stim Unattended (CPT 59376), R knee, Russian stim with TKE green ball     Time-based treatments/modalities:  Therapeutic exercise minutes (CPT 99212): 15 minutes       Assessment, Response and Plan:   Impairments: abnormal gait, abnormal or restricted ROM, activity intolerance, limited mobility, pain with function, swelling and weight-bearing intolerance    Assessment details:  Katerina is a 77 status post R TKA. She is doing well,b ut has what she describes as severe pain. She is 1 week out. Focus with be on gait mechanics and improving range and " strength. Also improving function and endurance. She would benefit from a structure therapy program to assist with rehabilitation.   Goals:   Short Term Goals:   1. Establish HEP  2. Pt able to to show improved knee flexion AROM by 15 degrees from date of eval.  3. Pt able to walk without assistive device on all surfaces safely.  4. Pt to transfer in and out of standard chair without hha x 1 or less  5. Patient to show AROM knee ext of -5 deg from date of eval  Short term goal time span:  2-4 weeks      Long Term Goals:      1.  Pt with ROM right knee 0-120 degrees   2.  Strength of right leg 4 to 4+/5  3.  Pt able to ambulate with symmetrical stride on all surfaces.  4. Pt able to consecutively step both up and down stairs with little need for rail.  5.  Womac score 20 or less.  Long term goal time span:  6-8 weeks    Plan:   Therapy options:  Physical therapy treatment to continue  Planned therapy interventions:  E Stim Unattended (CPT 62920), Functional Training, Self Care (CPT 15158), Gait Training (CPT 74651), Manual Therapy (CPT 18474), Neuromuscular Re-education (CPT 73007), Therapeutic Activities (CPT 92026) and Therapeutic Exercise (CPT 63394)  Frequency:  2x week  Duration in weeks:  8  Duration in visits:  16      Functional Limitation G-Codes and Severity Modifiers  WOMAC Grand Total: 57.29   Current:     Goal:       Referring provider co-signature:  I have reviewed this plan of care and my co-signature certifies the need for services.  Certification Dates:   From 5/15/18     To 7/15/18    Physician Signature: ________________________________ Date: ______________

## 2018-05-17 ENCOUNTER — PHYSICAL THERAPY (OUTPATIENT)
Dept: PHYSICAL THERAPY | Facility: MEDICAL CENTER | Age: 78
End: 2018-05-17
Attending: ORTHOPAEDIC SURGERY
Payer: MEDICARE

## 2018-05-17 DIAGNOSIS — M17.11 PRIMARY OSTEOARTHRITIS OF RIGHT KNEE: ICD-10-CM

## 2018-05-17 DIAGNOSIS — Z96.651 TOTAL KNEE REPLACEMENT STATUS, RIGHT: ICD-10-CM

## 2018-05-17 PROCEDURE — 97110 THERAPEUTIC EXERCISES: CPT

## 2018-05-17 PROCEDURE — 97140 MANUAL THERAPY 1/> REGIONS: CPT

## 2018-05-17 PROCEDURE — 97014 ELECTRIC STIMULATION THERAPY: CPT

## 2018-05-17 NOTE — OP THERAPY DAILY TREATMENT
Outpatient Physical Therapy  DAILY TREATMENT     [facility] Outpatient Physical Therapy  [address]  [place] [address]  Phone:  [phone]  Fax:  [phone]    Date: 05/17/2018    Patient: Katerina Torres  YOB: 1940  MRN: [id number]     Time Calculation    1000  1045  45 mins     Chief Complaint: Post-Op Pain and Knee Problem    Visit #: 2    SUBJECTIVE:  Patient seen for follow up after evaluation. She is doing much better today than eval as far as pain goes.     OBJECTIVE:  Current objective measures:           Therapeutic Exercises (CPT 02341):     1. Nu step , Level 4 x 7 mins , for stretching     2. Gastroc Stretching, Slant board    3. Seated High table , Iso knee ext, LAQ, iso end range hold    4. Walking without AD    Therapeutic Treatments and Modalities:     1. Manual Therapy (CPT 22351), IASTM to quad and hs, gastroc    2. E Stim Unattended (CPT 63385), R LE, Greek with TKE green ball     Time-based treatments/modalities:          Pain rating before treatment: 0  Pain rating after treatment: 0    ASSESSMENT:   Response to treatment: Did well. Will continue to progress as able.     PLAN/RECOMMENDATIONS:   Plan for treatment: therapy treatment to continue next visit.  Planned interventions for next visit: continue with current treatment.

## 2018-05-23 ENCOUNTER — APPOINTMENT (OUTPATIENT)
Dept: PHYSICAL THERAPY | Facility: MEDICAL CENTER | Age: 78
End: 2018-05-23
Attending: ORTHOPAEDIC SURGERY
Payer: MEDICARE

## 2018-05-23 DIAGNOSIS — M17.11 PRIMARY OSTEOARTHRITIS OF RIGHT KNEE: ICD-10-CM

## 2018-05-23 DIAGNOSIS — Z96.651 TOTAL KNEE REPLACEMENT STATUS, RIGHT: ICD-10-CM

## 2018-05-23 PROCEDURE — 97140 MANUAL THERAPY 1/> REGIONS: CPT

## 2018-05-23 PROCEDURE — 97110 THERAPEUTIC EXERCISES: CPT

## 2018-05-23 NOTE — OP THERAPY DAILY TREATMENT
Outpatient Physical Therapy  DAILY TREATMENT     [facility] Outpatient Physical Therapy  [address]  [place] [address]  Phone:  [phone]  Fax:  [phone]    Date: 05/23/2018    Patient: Katerina Torres  YOB: 1940  MRN: [id number]     Time Calculation  Start time: 1530  Stop time: 1630 Time Calculation (min): 60 minutes     Chief Complaint: Post-Op Pain and Knee Problem    Visit #: 3    SUBJECTIVE:  Patient seen for follow up after evaluation. She is not using AD at all. She went for a follow up with her surgeon and had her staples removed.   OBJECTIVE:  Current objective measures: DOS 5/8/18    Measurement taken 5/17/18: 100 deg flexion, -5-7 deg ext           Therapeutic Exercises (CPT 98951):     1. Nu step , Level 4 x 7 mins , Ave 45 steps per min seat 8 arms 9    2. Gastroc Stretching, Slant board    3. Uptight bike , 5 mins , Just for stretching     4. Ball exercises , gentle hs curls and bridges , See below       Therapeutic Exercise Summary: Access Code: [email or web address]   URL: [email or web address]   Date: 05/23/2018   Prepared by: Gian Caro     Exercises  · Supine Knees to Chest with Swiss Ball - 10 reps - 2 sets - 1x daily - 3x weekly  · Bridge with Heels on Swiss Ball - 10 reps - 2 sets - 1x daily - 3x weekly      Therapeutic Treatments and Modalities:     1. Manual Therapy (CPT 30779), R quad , IASTM soft tissue/scar tissue work.     2. E Stim Unattended (CPT 30227), R LE, Ecuadorean with TKE green ball  with ice pack    Time-based treatments/modalities:  Manual therapy minutes (CPT 35100): 15 minutes  Therapeutic exercise minutes (CPT 38670): 30 minutes       Pain rating before treatment: 0  Pain rating after treatment: 0    ASSESSMENT:   Response to treatment: Did well. Will continue to progress as able. She has a personality that wants to push and do more than she should at times. Therapist reminded her to be patient. Overall she is doing  great    PLAN/RECOMMENDATIONS:   Plan for treatment: therapy treatment to continue next visit.  Planned interventions for next visit: continue with current treatment.

## 2018-05-24 ENCOUNTER — PHYSICAL THERAPY (OUTPATIENT)
Dept: PHYSICAL THERAPY | Facility: MEDICAL CENTER | Age: 78
End: 2018-05-24
Attending: ORTHOPAEDIC SURGERY
Payer: MEDICARE

## 2018-05-24 DIAGNOSIS — M17.11 PRIMARY OSTEOARTHRITIS OF RIGHT KNEE: ICD-10-CM

## 2018-05-24 DIAGNOSIS — Z96.651 TOTAL KNEE REPLACEMENT STATUS, RIGHT: ICD-10-CM

## 2018-05-24 PROBLEM — Z96.659 S/P KNEE REPLACEMENT: Status: ACTIVE | Noted: 2017-06-20

## 2018-05-24 PROCEDURE — 97014 ELECTRIC STIMULATION THERAPY: CPT

## 2018-05-24 PROCEDURE — 97110 THERAPEUTIC EXERCISES: CPT

## 2018-05-24 PROCEDURE — 97124 MASSAGE THERAPY: CPT

## 2018-05-24 NOTE — OP THERAPY DAILY TREATMENT
Outpatient Physical Therapy  DAILY TREATMENT     [facility] Outpatient Physical Therapy  [address]  [place] [address]  Phone:  [phone]  Fax:  [phone]    Date: 05/24/2018    Patient: Katerina Torres  YOB: 1940  MRN: [id number]     Time Calculation  Start time: 1009  Stop time: 1125 Time Calculation (min): 76 minutes     Chief Complaint: Knee Problem    Visit #: 4    SUBJECTIVE:  I gave my walker away and use the cane very limited.  I feel the nights are the worst.  I am only taking tylenol now.  I saw Dr Hall and he took out the staples and I will see him in 6 weeks.    OBJECTIVE:  Current objective measures: knee ext lag 21 degrees  Knee flex sitting 102 degrees  Knee ext 6-7 degrees  Gait without assistive device with minimal heel toe gait pattern, pt difficulty with knee flexion on swing phase, minimal UE movement with gait.            Therapeutic Exercises (CPT 42117):     1. Nu step , Level 4 x 7 mins , Ave 48 steps per min seat 8 arms 9    2. Gastroc Stretching, Slant board    3. Uptight bike , 5 mins , Just for stretching     4. Ball exercises , gentle hs curls and bridges , See below     5. SLR , 5/3    6. SAQ, 5/2    7. Step ups , 10, 2 inch step in II bars    8. Step downs , 10, 2 inch step in II bars    9. Sides step up and over L and R , 10, 2 inch step in II bars    10. Gait sequence training and symmetry work with encouraging arm swing    11. Side step 5 feet to left and right, 3 each    Therapeutic Treatments and Modalities:     1. Manual Therapy (CPT 88808), R quad, gr I patella mobs, gr I AP mobs at knee     2. E Stim Unattended (CPT 39683), R LE, American with TKE green ball  with ice pack    Time-based treatments/modalities:  Massage therapy minutes (CPT 69459): 15 minutes  Therapeutic exercise minutes (CPT 17031): 40 minutes       Pain rating before treatment: 4, at worst 8  Pain rating after treatment: 4    ASSESSMENT:   Response  to treatment: pt continuing to work control of quad muscles with ext lag present, some difficulty with SLR, continuing to work ROM and gait symmetry.      PLAN/RECOMMENDATIONS:   Plan for treatment: therapy treatment to continue next visit.  Planned interventions for next visit: continue with current treatment.

## 2018-05-29 ENCOUNTER — PHYSICAL THERAPY (OUTPATIENT)
Dept: PHYSICAL THERAPY | Facility: MEDICAL CENTER | Age: 78
End: 2018-05-29
Attending: ORTHOPAEDIC SURGERY
Payer: MEDICARE

## 2018-05-29 DIAGNOSIS — R26.2 DIFFICULTY IN WALKING: ICD-10-CM

## 2018-05-29 DIAGNOSIS — M25.561 CHRONIC PAIN OF RIGHT KNEE: ICD-10-CM

## 2018-05-29 DIAGNOSIS — G89.29 CHRONIC PAIN OF RIGHT KNEE: ICD-10-CM

## 2018-05-29 DIAGNOSIS — M17.11 PRIMARY OSTEOARTHRITIS OF RIGHT KNEE: ICD-10-CM

## 2018-05-29 DIAGNOSIS — Z96.651 TOTAL KNEE REPLACEMENT STATUS, RIGHT: ICD-10-CM

## 2018-05-29 PROCEDURE — 97014 ELECTRIC STIMULATION THERAPY: CPT

## 2018-05-29 PROCEDURE — 97110 THERAPEUTIC EXERCISES: CPT

## 2018-05-29 PROCEDURE — 97140 MANUAL THERAPY 1/> REGIONS: CPT

## 2018-05-31 ENCOUNTER — PHYSICAL THERAPY (OUTPATIENT)
Dept: PHYSICAL THERAPY | Facility: MEDICAL CENTER | Age: 78
End: 2018-05-31
Attending: ORTHOPAEDIC SURGERY
Payer: MEDICARE

## 2018-05-31 DIAGNOSIS — G89.29 CHRONIC PAIN OF RIGHT KNEE: ICD-10-CM

## 2018-05-31 DIAGNOSIS — M25.561 CHRONIC PAIN OF RIGHT KNEE: ICD-10-CM

## 2018-05-31 DIAGNOSIS — Z96.651 TOTAL KNEE REPLACEMENT STATUS, RIGHT: ICD-10-CM

## 2018-05-31 DIAGNOSIS — R26.2 DIFFICULTY IN WALKING: ICD-10-CM

## 2018-05-31 DIAGNOSIS — M17.11 PRIMARY OSTEOARTHRITIS OF RIGHT KNEE: ICD-10-CM

## 2018-05-31 PROCEDURE — 97014 ELECTRIC STIMULATION THERAPY: CPT

## 2018-05-31 PROCEDURE — 97110 THERAPEUTIC EXERCISES: CPT

## 2018-05-31 PROCEDURE — 97140 MANUAL THERAPY 1/> REGIONS: CPT

## 2018-05-31 NOTE — OP THERAPY DAILY TREATMENT
Outpatient Physical Therapy  DAILY TREATMENT     [facility] Outpatient Physical Therapy  [address]  [place] [address]  Phone:  [phone]  Fax:  [phone]    Date: 05/31/2018    Patient: Katerina Torres  YOB: 1940  MRN: [id number]     Time Calculation  Start time: 0944  Stop time: 1100 Time Calculation (min): 76 minutes     Chief Complaint: Knee Problem and Knee Injury    Visit #: 6    SUBJECTIVE:  I am doing better except sleeping.  I see Dr Hall in 2 weeks.  I can tell that I am moving better.      OBJECTIVE:  Current objective measures: 0/1- 116 flexion           Therapeutic Exercises (CPT 69548):     1. Nu step , Level 5 seat 9 arms 12 x 7 mins , Ave 47 steps per min     2. Gastroc Stretching, Slant board    3. Uptight bike , 15 mins , Just for stretching     4. Ball exercises , gentle hs curls and bridges , See below     5. SLR , 10/1    6. Lunges in II bars, 10    7. Step ups , 10, 4, 6 inch step in II bars    8. Step downs , 10, 4, 6 inch step in II bars    9. Sides step up and over L and R , 10, 4, 6 inch step in II bars    10. Gait sequence training and symmetry work with encouraging arm swing    11. Closed chain squats to low stool with UE  help, 10/2    12. TG leg press, 10/3, L8    13. TG heel raises, 10/3, L8    14. Stool scooting , 50 ft    15. Step back with opp UE resistance , 10  each, L1    16. Step forward with opp UE resistance , 10 each, L1    Therapeutic Treatments and Modalities:     1. Manual Therapy (CPT 74731), R quad, gr I patella mobs, gr I AP mobs at knee , MFR at calf, quad and hamstring, tissue mob near healing incision, not on incision    2. E Stim Unattended (CPT 66233), R LE, IFC and ice    Time-based treatments/modalities:  Manual therapy minutes (CPT 03661): 10 minutes  Therapeutic exercise minutes (CPT 82257): 50 minutes       Pain rating before treatment: 0 at rest, up/down stairs  Pain rating after treatment:  1-2    ASSESSMENT:   Response to treatment: pt's overall strength and function improving, gait continues to be stiff and assymetrical, but is improving within each visit.  PT continues to speak with pt about overdoing activities and being mindful about how much activity performing at home.    PLAN/RECOMMENDATIONS:   Plan for treatment: therapy treatment to continue next visit.  Planned interventions for next visit: continue with current treatment.

## 2018-06-04 ENCOUNTER — PHYSICAL THERAPY (OUTPATIENT)
Dept: PHYSICAL THERAPY | Facility: MEDICAL CENTER | Age: 78
End: 2018-06-04
Attending: ORTHOPAEDIC SURGERY
Payer: MEDICARE

## 2018-06-04 DIAGNOSIS — G89.29 CHRONIC PAIN OF RIGHT KNEE: ICD-10-CM

## 2018-06-04 DIAGNOSIS — Z96.651 TOTAL KNEE REPLACEMENT STATUS, RIGHT: ICD-10-CM

## 2018-06-04 DIAGNOSIS — R26.2 DIFFICULTY IN WALKING: ICD-10-CM

## 2018-06-04 DIAGNOSIS — M25.561 CHRONIC PAIN OF RIGHT KNEE: ICD-10-CM

## 2018-06-04 DIAGNOSIS — M17.11 PRIMARY OSTEOARTHRITIS OF RIGHT KNEE: ICD-10-CM

## 2018-06-04 PROCEDURE — 97140 MANUAL THERAPY 1/> REGIONS: CPT

## 2018-06-04 PROCEDURE — 97110 THERAPEUTIC EXERCISES: CPT

## 2018-06-04 PROCEDURE — 97014 ELECTRIC STIMULATION THERAPY: CPT

## 2018-06-04 NOTE — OP THERAPY DAILY TREATMENT
Outpatient Physical Therapy  DAILY TREATMENT     [facility] Outpatient Physical Therapy  [address]  [place] [address]  Phone:  [phone]  Fax:  [phone]    Date: 06/04/2018    Patient: Katerina Torres  YOB: 1940  MRN: [id number]     Time Calculation  Start time: 1140  Stop time: 1247 Time Calculation (min): 67 minutes     Chief Complaint: Knee Injury and Knee Problem    Visit #: 7    SUBJECTIVE:  I got on my own bike at home and pedaled without thinking and I felt pain, it has hurt for 2 days, but its getting better.    OBJECTIVE:  Current objective measures: difficulty with pedaling bike at high seat level first 20 rotations or so.          Therapeutic Exercises (CPT 22568):     1. Nu step , Level 5 seat 9 arms 12 x 5 mins , Ave 53 steps per min     2. Gastroc Stretching, Slant board    3. Uptight bike , 5 mins , Just for stretching     4. Ball exercises , 10/2 each, gentle hs curls and bridges     5. SLR , 10/1    6. Lunges in II bars, 10    7. Step ups , 10, 4, 6 inch step in II bars    8. Step downs , 10, 4, 6 inch step in II bars    9. Sides step up and over L and R , 10, 4, 6 inch step in II bars    10. Gait sequence training and symmetry work with encouraging arm swing    11. Closed chain squats to low stool with UE  help, 10/2    12. TG leg press, 15/3, L8    13. Heel raises, 10/3    14. Stool scooting , 80 ft x 2    15. Step back with opp UE resistance , 10  each, L2    16. Step forward with opp UE resistance , 10 each, L2    Therapeutic Treatments and Modalities:     1. E Stim Unattended (CPT 23710), IFC and ice    Time-based treatments/modalities:  Therapeutic exercise minutes (CPT 96024): 45 minutes       Pain rating before treatment: 5  Pain rating after treatment: 3-4    ASSESSMENT:   Response to treatment: 2-128  Ext lag 12    PLAN/RECOMMENDATIONS:   Plan for treatment: therapy treatment to continue next visit.  Planned interventions for  next visit: continue with current treatment.

## 2018-06-05 ENCOUNTER — APPOINTMENT (OUTPATIENT)
Dept: PHYSICAL THERAPY | Facility: MEDICAL CENTER | Age: 78
End: 2018-06-05
Payer: MEDICARE

## 2018-06-06 ENCOUNTER — PHYSICAL THERAPY (OUTPATIENT)
Dept: PHYSICAL THERAPY | Facility: MEDICAL CENTER | Age: 78
End: 2018-06-06
Attending: ORTHOPAEDIC SURGERY
Payer: MEDICARE

## 2018-06-06 DIAGNOSIS — M17.11 PRIMARY OSTEOARTHRITIS OF RIGHT KNEE: ICD-10-CM

## 2018-06-06 DIAGNOSIS — R26.2 DIFFICULTY IN WALKING: ICD-10-CM

## 2018-06-06 DIAGNOSIS — G89.29 CHRONIC PAIN OF RIGHT KNEE: ICD-10-CM

## 2018-06-06 DIAGNOSIS — Z96.651 TOTAL KNEE REPLACEMENT STATUS, RIGHT: ICD-10-CM

## 2018-06-06 DIAGNOSIS — M25.561 CHRONIC PAIN OF RIGHT KNEE: ICD-10-CM

## 2018-06-06 PROCEDURE — 97014 ELECTRIC STIMULATION THERAPY: CPT

## 2018-06-06 PROCEDURE — 97110 THERAPEUTIC EXERCISES: CPT

## 2018-06-06 PROCEDURE — 97140 MANUAL THERAPY 1/> REGIONS: CPT

## 2018-06-06 NOTE — OP THERAPY DAILY TREATMENT
Outpatient Physical Therapy  DAILY TREATMENT     [facility] Outpatient Physical Therapy  [address]  [place] [address]  Phone:  [phone]  Fax:  [phone]    Date: 06/06/2018    Patient: Katerina Torres  YOB: 1940  MRN: [id number]     Time Calculation  Start time: 0845  Stop time: 1005 Time Calculation (min): 80 minutes     Chief Complaint: Knee Problem and Knee Injury    Visit #: 8    SUBJECTIVE:  I am still having trouble with it feeling stiff with bending, but every day its getting better.  I think the pain is better than the other day and I was not sore.      OBJECTIVE:  Current objective measures: seat with 2 holes showing able to pedal on bike first time around  Knee flex 125  Sitting knee flex 123  Ext lag 5  Ext 3          Therapeutic Exercises (CPT 53164):     1. Nu step , Level 5 seat 9 arms 12 x 7 mins , Ave 53 steps per min     2. Gastroc Stretching, Slant board    3. Uptight bike , 8 mins , Just for stretching     4. Ball exercises , review    5. SLR , review    6. Stair training, 2 flights    7. Step ups , 10, 6 inch step     8. Step downs , 10, 6 inch step    9. Sides step up and over L and R , 10, 6 inch step    10. Gait sequence training and symmetry work with encouraging arm swing    11. Closed chain squats to low stool with UE  help, 10/2    12. TG leg press, 20/2, L8    13. Heel raises, 10/3    14. Stool scooting , 80 ft x 2    15. Deep knee flex closed chain with support of rail, 10/2    Therapeutic Treatments and Modalities:     1. E Stim Unattended (CPT 71528), IFC and ice    2. Manual Therapy (CPT 36231), 15 min, patella mobs, AP mobs into extension, scar mobs, STM to quads, MFR to hams and calf    Time-based treatments/modalities:  Manual therapy minutes (CPT 70592): 15 minutes  Therapeutic exercise minutes (CPT 15766): 45 minutes       Pain rating before treatment: 3-4  Pain rating after treatment: 3-4    ASSESSMENT:   Response  to treatment: knee ext 0-1     PLAN/RECOMMENDATIONS:   Plan for treatment: therapy treatment to continue next visit.  Planned interventions for next visit: continue with current treatment.

## 2018-06-07 ENCOUNTER — APPOINTMENT (OUTPATIENT)
Dept: PHYSICAL THERAPY | Facility: MEDICAL CENTER | Age: 78
End: 2018-06-07
Payer: MEDICARE

## 2018-06-11 ENCOUNTER — PHYSICAL THERAPY (OUTPATIENT)
Dept: PHYSICAL THERAPY | Facility: MEDICAL CENTER | Age: 78
End: 2018-06-11
Attending: ORTHOPAEDIC SURGERY
Payer: MEDICARE

## 2018-06-11 DIAGNOSIS — M17.11 PRIMARY OSTEOARTHRITIS OF RIGHT KNEE: ICD-10-CM

## 2018-06-11 DIAGNOSIS — R26.2 DIFFICULTY IN WALKING: ICD-10-CM

## 2018-06-11 DIAGNOSIS — Z96.651 TOTAL KNEE REPLACEMENT STATUS, RIGHT: ICD-10-CM

## 2018-06-11 DIAGNOSIS — M25.561 CHRONIC PAIN OF RIGHT KNEE: ICD-10-CM

## 2018-06-11 DIAGNOSIS — G89.29 CHRONIC PAIN OF RIGHT KNEE: ICD-10-CM

## 2018-06-11 PROCEDURE — 97110 THERAPEUTIC EXERCISES: CPT

## 2018-06-11 PROCEDURE — 97140 MANUAL THERAPY 1/> REGIONS: CPT

## 2018-06-11 PROCEDURE — 97014 ELECTRIC STIMULATION THERAPY: CPT

## 2018-06-11 NOTE — OP THERAPY DAILY TREATMENT
Outpatient Physical Therapy  DAILY TREATMENT     [facility] Outpatient Physical Therapy  [address]  [place] [address]  Phone:  [phone]  Fax:  [phone]    Date: 06/11/2018    Patient: Katerina Torres  YOB: 1940  MRN: [id number]     Time Calculation  Start time: 0842  Stop time: 1001 Time Calculation (min): 79 minutes     Chief Complaint: Knee Problem and Knee Injury    Visit #: 9    SUBJECTIVE:  Overall my knee is doing great.  I rarely sit as that is the worst position.  My knee still hurts with bending.  I also still have some pain at night with difficulty sleeping at times.  I see Dr Hall on 6/20 (next week).  I am not sure how much more PT I will need.    OBJECTIVE:  Current objective measures: 1/2-125 degrees.  Pt arrived with some stiffness and first few minutes on the bike was slow and methodical.          Therapeutic Exercises (CPT 45953):     1. Nu step , Level 5 seat 9 arms 12 x 8 mins , Ave 53 steps per min     2. Gastroc Stretching, Slant board    3. Uptight bike , 8 mins , Just for stretching     4. Bridge on ball, 15/2    5. Ham curls on ball, 15/2    6. Stair training, 2 flights    7. Step ups , 10, 6, 8 inch step II bars    8. Step downs , 10, 6, 8 inch step II bars    9. Sides step up and over L and R , 10, 6, 8  inch step  II bars    10. Gait sequence training and symmetry work with encouraging arm swing    11. Closed chain squats to low stool with UE  help, 10/2    12. TG leg press, 20/2, L8    13. Heel raises, review    14. Stool scooting , 80 ft x 2    15. Deep knee flex closed chain with support of rail, 10/2    16. Closed chain flexion on high box, 10    17. Obstacle course in valentine without UE support    Therapeutic Treatments and Modalities:     1. E Stim Unattended (CPT 00630), IFC and ice    2. Manual Therapy (CPT 35281), 15 min, patella mobs, AP mobs into extension, scar mobs, STM to quads, MFR to hams and calf    Time-based  treatments/modalities:  Manual therapy minutes (CPT 10469): 15 minutes  Therapeutic exercise minutes (CPT 91534): 48 minutes       Pain rating before treatment: 3  Pain rating after treatment: 2    ASSESSMENT:   Response to treatment: Pt's continues to have some assymetries with gait, but when focuses on heel toe gait and easy arm swing it does improve.  Her ROM always quickly improves with warm up.  This PT did discuss with Pt that she would benefit from better control and ROM with stairs with both up and down as well as improving gait symmetry.   0-131    PLAN/RECOMMENDATIONS:   Plan for treatment: therapy treatment to continue next visit.  Planned interventions for next visit: continue with current treatment.

## 2018-06-12 ENCOUNTER — APPOINTMENT (OUTPATIENT)
Dept: PHYSICAL THERAPY | Facility: MEDICAL CENTER | Age: 78
End: 2018-06-12
Payer: MEDICARE

## 2018-06-14 ENCOUNTER — PHYSICAL THERAPY (OUTPATIENT)
Dept: PHYSICAL THERAPY | Facility: MEDICAL CENTER | Age: 78
End: 2018-06-14
Attending: ORTHOPAEDIC SURGERY
Payer: MEDICARE

## 2018-06-14 DIAGNOSIS — Z96.651 TOTAL KNEE REPLACEMENT STATUS, RIGHT: ICD-10-CM

## 2018-06-14 DIAGNOSIS — M17.11 PRIMARY OSTEOARTHRITIS OF RIGHT KNEE: ICD-10-CM

## 2018-06-14 PROCEDURE — 97014 ELECTRIC STIMULATION THERAPY: CPT

## 2018-06-14 PROCEDURE — 97140 MANUAL THERAPY 1/> REGIONS: CPT

## 2018-06-14 PROCEDURE — 97110 THERAPEUTIC EXERCISES: CPT

## 2018-06-14 NOTE — OP THERAPY DAILY TREATMENT
"  Outpatient Physical Therapy  DAILY TREATMENT     [facility] Outpatient Physical Therapy  [address]  [place] [address]  Phone:  [phone]  Fax:  [phone]    Date: 06/14/2018    Patient: Katerina Torres  YOB: 1940  MRN: [id number]     Time Calculation  Start time: 0800  Stop time: 0900 Time Calculation (min): 60 minutes     Chief Complaint: Post-Op Pain and Knee Problem    Visit #: 10    SUBJECTIVE:  Overall my knee is doing great.  She has been suffering from a mild cold and cough. Last night she thought that she had a slight fever or night sweats (she had taken some medication before bed) not sure if it was from med or not,     OBJECTIVE:  Current objective measures: Measurements taken after treatment and exercise -1 deg ext-135 flexion (130 intially but an extra 3-5 deg with therapist overpressure)        Therapeutic Exercises (CPT 16221):     1. Nu step , Level 5 seat 9 arms 12 x 8 mins , Ave 53 steps per min     2. Gastroc Stretching, Slant board    3. Uptight bike , 8 mins , Just for stretching     4. Standing banded TKE, Pink band , 15 reps cues for tall trunk and finishing hip ext     5. Supported lunge, Airex pad and 2\" riser, No sensitivity to placing knee on pad     6. Sit to stand, USe of Mosque band for added densesitization and to help with sliding gliding surfaces, 15 with UE support via upright    Therapeutic Treatments and Modalities:     1. E Stim Unattended (CPT 48746), IFC and ice    2. Manual Therapy (CPT 70357), IASTM to R knee, Patella mobs, Belted Flexion mobs.     Time-based treatments/modalities:  Manual therapy minutes (CPT 52222): 15 minutes  Therapeutic exercise minutes (CPT 29243): 30 minutes       Pain rating before treatment: 3  Pain rating after treatment: 2    ASSESSMENT:   Response to treatment: She is doing well. Still stiff and guarded at tiems when asked to walk. At times she tends to cross over or to midline with " her feet. Continue to progress as able  PLAN/RECOMMENDATIONS:   Plan for treatment: therapy treatment to continue next visit.  Planned interventions for next visit: continue with current treatment.

## 2018-06-19 ENCOUNTER — PHYSICAL THERAPY (OUTPATIENT)
Dept: PHYSICAL THERAPY | Facility: MEDICAL CENTER | Age: 78
End: 2018-06-19
Attending: ORTHOPAEDIC SURGERY
Payer: MEDICARE

## 2018-06-19 DIAGNOSIS — Z96.651 TOTAL KNEE REPLACEMENT STATUS, RIGHT: ICD-10-CM

## 2018-06-19 DIAGNOSIS — M17.11 PRIMARY OSTEOARTHRITIS OF RIGHT KNEE: ICD-10-CM

## 2018-06-19 PROCEDURE — 97110 THERAPEUTIC EXERCISES: CPT

## 2018-06-19 PROCEDURE — 97014 ELECTRIC STIMULATION THERAPY: CPT

## 2018-06-19 PROCEDURE — 97112 NEUROMUSCULAR REEDUCATION: CPT

## 2018-06-19 PROCEDURE — 97140 MANUAL THERAPY 1/> REGIONS: CPT

## 2018-06-19 NOTE — OP THERAPY DAILY TREATMENT
Outpatient Physical Therapy  DAILY TREATMENT     [facility] Outpatient Physical Therapy  [address]  [place] [address]  Phone:  [phone]  Fax:  [phone]    Date: 06/19/2018    Patient: Katerina Torres  YOB: 1940  MRN: [id number]     Time Calculation  Start time: 0930  Stop time: 1030 Time Calculation (min): 60 minutes     Chief Complaint: Knee Problem and Post-Op Pain    Visit #: 11    SUBJECTIVE: DOS 5/8/18  She is seen for follow up for her knee. She reports she was a little tired sore. After hosting people at her house.     OBJECTIVE:  Current objective measures: 130 deg flexion today.   Therapeutic Exercises (CPT 03672):     1. Nu step , Level 5 seat 9 arms 12 x 8 mins , Ave 53 steps per min     2. Gastroc Stretching, Slant board    3. Upright bike , 8 mins , Just for stretching     4. Seated stool scoot, 50 ft    5. Walk to emergency room and back, up [employer] stair case, Approx 600 feet with flight of stairs     Therapeutic Treatments and Modalities:     1. E Stim Unattended (CPT 44911), IFC and ice    2. Manual Therapy (CPT 61815), IASTM to R knee, Patella mobs, Belted Flexion mobs.     3. Neuromuscular Re-education (CPT 73465), Trunk/LE, Sport cord 1 band 4 direction 5 reps each. She has trouble with eccentric control. Balance board lateral shifts with 2 dowels     Time-based treatments/modalities:  Manual therapy minutes (CPT 37502): 15 minutes  Therapeutic exercise minutes (CPT 36263): 15 minutes  Neuromusc re-ed, balance, coor, post minutes (CPT 52028): 15 minutes       Pain rating before treatment: 3  Pain rating after treatment: 2    ASSESSMENT:   Response to treatment: She is doing well. She will follow up with her surgeon tomorrow. She is fairly adamant that she will only have a couple more weeks of therapy and then she would like to be done. Advised her to consult with her surgeon and that she still has some endurance, control, and  range goals to meet.   Will walk outside on even terrain to prep for [place] trip   PLAN/RECOMMENDATIONS:   Plan for treatment: therapy treatment to continue next visit.  Planned interventions for next visit: continue with current treatment.

## 2018-06-20 ENCOUNTER — APPOINTMENT (OUTPATIENT)
Dept: PHYSICAL THERAPY | Facility: MEDICAL CENTER | Age: 78
End: 2018-06-20
Attending: ORTHOPAEDIC SURGERY
Payer: MEDICARE

## 2018-06-21 ENCOUNTER — PHYSICAL THERAPY (OUTPATIENT)
Dept: PHYSICAL THERAPY | Facility: MEDICAL CENTER | Age: 78
End: 2018-06-21
Attending: ORTHOPAEDIC SURGERY
Payer: MEDICARE

## 2018-06-21 DIAGNOSIS — M17.11 PRIMARY OSTEOARTHRITIS OF RIGHT KNEE: ICD-10-CM

## 2018-06-21 DIAGNOSIS — Z96.651 TOTAL KNEE REPLACEMENT STATUS, RIGHT: ICD-10-CM

## 2018-06-21 PROCEDURE — 97140 MANUAL THERAPY 1/> REGIONS: CPT

## 2018-06-21 PROCEDURE — 97014 ELECTRIC STIMULATION THERAPY: CPT

## 2018-06-21 PROCEDURE — 97116 GAIT TRAINING THERAPY: CPT

## 2018-06-21 PROCEDURE — 97110 THERAPEUTIC EXERCISES: CPT

## 2018-06-21 NOTE — OP THERAPY DAILY TREATMENT
Outpatient Physical Therapy  DAILY TREATMENT     [facility] Outpatient Physical Therapy  [address]  [place] [address]  Phone:  [phone]  Fax:  [phone]    Date: 06/21/2018    Patient: Katerina Torres  YOB: 1940  MRN: [id number]     Time Calculation  Start time: 1000  Stop time: 1100 Time Calculation (min): 60 minutes     Chief Complaint: Post-Op Pain and Knee Problem    Visit #: 12    SUBJECTIVE: DOS 5/8/18  She is seen for follow up for her knee. She saw her surgeon yesterday who reports she is doing very well. Cleared her to swim and she went with friends. She had a a hard time treading water. And as a result she is more sore today.     OBJECTIVE:  Current objective measures: 130 deg flexion today.   Therapeutic Exercises (CPT 61193):     1. Nu step , Level 5 seat 9 arms 12 x 12 mins , Ave 53 steps per min     2. Gastroc Stretching, Slant board    3. Upright bike , 5 mins, Just for stretching     4. Seated stool scoot, 50 ft    5. Hip CARS, Hip ext, ab ER/IR and flexion    6. Step up on bosu ball, use of 2 dowels    Therapeutic Treatments and Modalities:     1. E Stim Unattended (CPT 53120), IFC and ice    2. Manual Therapy (CPT 77537), IASTM to R knee, Patella mobs, Belted Flexion mobs.     3. Gait Training (CPT 00157), LE's, Walking outside on uneven ground aprox 1/4 mile     Time-based treatments/modalities:  Manual therapy minutes (CPT 05851): 15 minutes  Gait training minutes (CPT 64656): 15 minutes  Therapeutic exercise minutes (CPT 46585): 15 minutes       Pain rating before treatment: 3  Pain rating after treatment: 2    ASSESSMENT:   Response to treatment: She is doing well. She did well on walking on uneven terrain without issue. I am more worried about her sitting prolonged in an airplane or car during travel. She is fairly adamant that she will only have a couple more weeks of therapy and then she would like to be done. Advised her to  consult with her surgeon and that she still has some endurance, control, and range goals to meet.      PLAN/RECOMMENDATIONS:   Plan for treatment: therapy treatment to continue next visit.  Planned interventions for next visit: continue with current treatment.

## 2018-06-26 ENCOUNTER — APPOINTMENT (OUTPATIENT)
Dept: PHYSICAL THERAPY | Facility: MEDICAL CENTER | Age: 78
End: 2018-06-26
Payer: MEDICARE

## 2018-06-26 ENCOUNTER — PHYSICAL THERAPY (OUTPATIENT)
Dept: PHYSICAL THERAPY | Facility: MEDICAL CENTER | Age: 78
End: 2018-06-26
Attending: ORTHOPAEDIC SURGERY
Payer: MEDICARE

## 2018-06-26 DIAGNOSIS — M25.561 CHRONIC PAIN OF RIGHT KNEE: ICD-10-CM

## 2018-06-26 DIAGNOSIS — Z96.651 TOTAL KNEE REPLACEMENT STATUS, RIGHT: ICD-10-CM

## 2018-06-26 DIAGNOSIS — M17.11 PRIMARY OSTEOARTHRITIS OF RIGHT KNEE: ICD-10-CM

## 2018-06-26 DIAGNOSIS — G89.29 CHRONIC PAIN OF RIGHT KNEE: ICD-10-CM

## 2018-06-26 DIAGNOSIS — R26.2 DIFFICULTY IN WALKING: ICD-10-CM

## 2018-06-26 PROCEDURE — 97140 MANUAL THERAPY 1/> REGIONS: CPT

## 2018-06-26 PROCEDURE — 97014 ELECTRIC STIMULATION THERAPY: CPT

## 2018-06-26 PROCEDURE — 97110 THERAPEUTIC EXERCISES: CPT

## 2018-06-26 NOTE — OP THERAPY DAILY TREATMENT
Outpatient Physical Therapy  DAILY TREATMENT     [facility] Outpatient Physical Therapy  [address]  [place] [address]  Phone:  [phone]  Fax:  [phone]    Date: 06/26/2018    Patient: Katerina Torres  YOB: 1940  MRN: [id number]     Time Calculation  Start time: 1045  Stop time: 1159 Time Calculation (min): 74 minutes     Chief Complaint: Knee Injury and Knee Problem    Visit #: 13    SUBJECTIVE:  Saw Dr Hall, he seemed pleased.  We are walking 45 min/day on all surfaces in preparation for [place] trip.      OBJECTIVE:  Current objective measures: able to sit to stand with minimal to no UE help multiple reps          Therapeutic Exercises (CPT 94677):     1. Bike, 7 min    2. TG leg press, 15/3, L8    3. Scar work/ mobs see below    4. Seated stool scoot, 50 ft x 2    5. Bike, 4 min    6. Step up on bosu ball, in II bars    7. Rocker board both directions , in II bars    8. Bosu ball down, squats and tips all directions in II bars    9. Squats , in II bars    10. Lunges , in II bars    11. Gait work    Therapeutic Treatments and Modalities:     1. E Stim Unattended (CPT 23085), IFC and ice    2. Manual Therapy (CPT 22997), CFM to scar, MFR and STM to Quad, hamstring and calf, jt mobs gr IV- at patella and AP into ext, PA into flexion    Time-based treatments/modalities:  Manual therapy minutes (CPT 48470): 20 minutes  Therapeutic exercise minutes (CPT 02432): 40 minutes       Pain rating before treatment: 3  Pain rating after treatment: 2    ASSESSMENT:   Response to treatment: pt continues to improve in strength, ROM and endurance.  Pt still limited with end range flex and end range ext and with some swelling.  Discussed continuing PT until trip to [place], but pt stated she knows her exercises.  Discussed with pt returning to PT if after trip pt continues to have any difficulties.    PLAN/RECOMMENDATIONS:   Plan for treatment: therapy treatment to  continue next visit.  Planned interventions for next visit: continue with current treatment.

## 2018-06-28 ENCOUNTER — PHYSICAL THERAPY (OUTPATIENT)
Dept: PHYSICAL THERAPY | Facility: MEDICAL CENTER | Age: 78
End: 2018-06-28
Attending: ORTHOPAEDIC SURGERY
Payer: MEDICARE

## 2018-06-28 DIAGNOSIS — Z96.651 TOTAL KNEE REPLACEMENT STATUS, RIGHT: ICD-10-CM

## 2018-06-28 DIAGNOSIS — M17.11 PRIMARY OSTEOARTHRITIS OF RIGHT KNEE: ICD-10-CM

## 2018-06-28 PROCEDURE — 97110 THERAPEUTIC EXERCISES: CPT

## 2018-06-28 PROCEDURE — 97140 MANUAL THERAPY 1/> REGIONS: CPT

## 2018-06-28 NOTE — OP THERAPY DISCHARGE SUMMARY
Outpatient Physical Therapy  DISCHARGE SUMMARY NOTE      [facility] Outpatient Physical Therapy  [address]  [place] [address]  Phone:  [phone]  Fax:  [phone]    Date of Visit: 06/28/2018    Patient: Katerina Torres  YOB: 1940  MRN: [id number]     Referring Provider: Thanh Hall M.D.  [address]  [address]  [place] [address]   Referring Diagnosis Unilateral primary osteoarthritis, right knee [M17.11]     Physical Therapy Occurrence Codes    Date of onset of impairment:  5/8/18   Date physical therapy care plan established or reviewed:  5/15/18   Date physical therapy treatment started:  5/15/18          Functional Limitation G-Codes and Severity Modifiers  WOMAC Grand Total: 15.62   Goal:     Discharge:         Your patient is being discharged from Physical Therapy with the following comments:   · Goals met  · Goals partially met  · Patient has failed to schedule or reschedule follow-up visits    Comments:  Katerina is a 77 status post R TKA. She was seen consistently seen for ROM, proprioception and strengthening. She is doing well and still has some range, strength and eccentric control issues. She will be traveling in the next few weeks and wants to self discharge at this time.     Short Term Goals:   1. Establish HEP GOAL MET  2. Pt able to to show improved knee flexion AROM by 15 degrees from date of eval. GOAL MET  3. Pt able to walk without assistive device on all surfaces safely. GOAL MET  4. Pt to transfer in and out of standard chair without hha x 1 or less GOAL MET  5. Patient to show AROM knee ext of -5 deg from date of eval GOAL MET    Long Term Goals:       1.  Pt with ROM right knee 0-120 degrees GOAL MET  2.  Strength of right leg 4 to 4+/5 GOAL MET  3.  Pt able to ambulate with symmetrical stride on all surfaces. GOAL MET  4. Pt able to consecutively step both up and down stairs with little need for rail. GOAL  MET  5.  Womac score 20 or less. GOAL MET (15.62)    AROM at last visit flexion 125 and 135 PROM with overpressure    R LE ext -2-3 degs     Limitations Remaining:  Still with some mild pain, and very mild limp when fatigued. Some eccentric control issues and trust when descending steps     Recommendations:  Active and HEP as able. Follow up with primary care and return to therapy if needed when she returns from her trip     Gian Caro, PT, DPT    Date: 6/28/2018

## 2018-06-28 NOTE — LETTER
June 28, 2018    Thanh Hall M.D.  [address]  [facility] [address]  [facility] [address]      Re:  Katerina Torres          Dear Dr. Hall,    It was a pleasure seeing your patient, Katerina Torres, on 6/28/2018 for her final therapy visit.     Please find enclosed a copy of the patient's discharge summary from outpatient physical therapy services.          On behalf of the staff at [facility], we would like to thank you for your referrals.  We appreciate your confidence in our clinic and will continue to work hard to provide the best outcomes for your patients.    We sincerely enjoy treating your patients and hope that you have been happy with their care.  Thank you again, and please call with any questions, concerns or ways that we can best meet your needs.        Sincerely,          Gian Caro, PT, DPT    No Recipients              Outpatient Physical Therapy  DISCHARGE SUMMARY NOTE      [facility] Outpatient Physical Therapy  [address]  [place] [address]  Phone:  [phone]  Fax:  [phone]    Date of Visit: 06/28/2018    Patient: Katerina Torres  YOB: 1940  MRN: [id number]     Referring Provider: Thanh Hall M.D.  [address]  [facility] [address]  [place] [address]   Referring Diagnosis Unilateral primary osteoarthritis, right knee [M17.11]     Physical Therapy Occurrence Codes    Date of onset of impairment:  5/8/18   Date physical therapy care plan established or reviewed:  5/15/18   Date physical therapy treatment started:  5/15/18          Functional Limitation G-Codes and Severity Modifiers  WOMAC Grand Total: 15.62   Goal:     Discharge:         Your patient is being discharged from Physical Therapy with the following comments:   · Goals met  · Goals partially met  · Patient has failed to schedule or reschedule follow-up visits    Comments:  Katerina is a 77 status post R TKA. She was seen  consistently seen for ROM, proprioception and strengthening. She is doing well and still has some range, strength and eccentric control issues. She will be traveling in the next few weeks and wants to self discharge at this time.     Short Term Goals:   1. Establish HEP GOAL MET  2. Pt able to to show improved knee flexion AROM by 15 degrees from date of eval. GOAL MET  3. Pt able to walk without assistive device on all surfaces safely. GOAL MET  4. Pt to transfer in and out of standard chair without hha x 1 or less GOAL MET  5. Patient to show AROM knee ext of -5 deg from date of eval GOAL MET    Long Term Goals:       1.  Pt with ROM right knee 0-120 degrees GOAL MET  2.  Strength of right leg 4 to 4+/5 GOAL MET  3.  Pt able to ambulate with symmetrical stride on all surfaces. GOAL MET  4. Pt able to consecutively step both up and down stairs with little need for rail. GOAL MET  5.  Womac score 20 or less. GOAL MET (15.62)    AROM at last visit flexion 125 and 135 PROM with overpressure    R LE ext -2-3 degs     Limitations Remaining:  Still with some mild pain, and very mild limp when fatigued. Some eccentric control issues and trust when descending steps     Recommendations:  Active and HEP as able. Follow up with primary care and return to therapy if needed when she returns from her trip     Gian Caro, PT, DPT    Date: 6/28/2018

## 2018-06-28 NOTE — OP THERAPY DAILY TREATMENT
"  Outpatient Physical Therapy  DAILY TREATMENT     [facility] Outpatient Physical Therapy  [address]  [place] [address]  Phone:  [phone]  Fax:  [phone]    Date: 06/28/2018    Patient: Katerina Torres  YOB: 1940  MRN: [id number]     Time Calculation  Start time: 0900  Stop time: 0950 Time Calculation (min): 50 minutes     Chief Complaint: Knee Problem and Post-Op Pain    Visit #: 14    SUBJECTIVE: DOS 5/8/18  She is seen for follow up for her knee. This is the last visit she has scheduled as [place] will travel [place] to [place] and feels like she is 80-90% better and feels fine on her own.     OBJECTIVE:  Current objective measures: 125 AROM...135 PROM with overpressure  deg flexion today.   Therapeutic Exercises (CPT 71966):     1. Upright bike, Level 2 x 7 mins     2. Gastroc Stretching, Slant board    3. Review HEP    4. Review Travel triage ideas, Airplane break, ROM     5. Stress test , Large steps up to 8\"-10\" to mimic bus steps     Therapeutic Treatments and Modalities:     2. Manual Therapy (CPT 54981), IASTM to R knee, Patella mobs, Belted Flexion mobs.     Time-based treatments/modalities:  Manual therapy minutes (CPT 26345): 15 minutes  Therapeutic exercise minutes (CPT 85190): 30 minutes       Pain rating before treatment: 3  Pain rating after treatment: 2    ASSESSMENT:   Response to treatment: She is doing well. She did well on walking on uneven terrain without issue. I am more worried about her sitting prolonged in an airplane or car during travel. She is fairly adamant that she will only have a couple more weeks of therapy and then she would like to be done. Advised her to consult with her surgeon and that she still has some endurance, control, and range goals to meet.      PLAN/RECOMMENDATIONS:   Plan for treatment: therapy treatment to continue next visit.  Planned interventions for next visit: continue with current treatment.      "

## 2019-02-11 ENCOUNTER — OFFICE VISIT (OUTPATIENT)
Dept: MEDICAL GROUP | Facility: MEDICAL CENTER | Age: 79
End: 2019-02-11
Payer: MEDICARE

## 2019-02-11 VITALS
DIASTOLIC BLOOD PRESSURE: 68 MMHG | WEIGHT: 151 LBS | OXYGEN SATURATION: 97 % | SYSTOLIC BLOOD PRESSURE: 156 MMHG | RESPIRATION RATE: 16 BRPM | BODY MASS INDEX: 25.92 KG/M2 | HEART RATE: 76 BPM | TEMPERATURE: 97.4 F

## 2019-02-11 DIAGNOSIS — H61.21 RIGHT EAR IMPACTED CERUMEN: ICD-10-CM

## 2019-02-11 DIAGNOSIS — R00.2 HEART PALPITATIONS: ICD-10-CM

## 2019-02-11 PROCEDURE — 99214 OFFICE O/P EST MOD 30 MIN: CPT | Performed by: FAMILY MEDICINE

## 2019-02-11 ASSESSMENT — PATIENT HEALTH QUESTIONNAIRE - PHQ9: CLINICAL INTERPRETATION OF PHQ2 SCORE: 0

## 2019-02-12 NOTE — PROGRESS NOTES
Subjective:   Katerina Torres is a 78 y.o. female here today for heart palpitations    Patient normally sees Dr. Suarez, this is the first time I am seeing her.    Patient has been having heart palpitations only at night when she lays down, before sleeping.  She states it has been occurring for about the last week.  She is not sure why.  She has been skiing a lot and she was drinking slightly more alcohol, which she has reduced alcohol intake.  The last couple nights the heart palpitations have not been present.    She also complains of right ear feeling full and decreased hearing.    Current medicines (including changes today)  Current Outpatient Prescriptions   Medication Sig Dispense Refill   • aspirin EC (ECOTRIN) 81 MG Tablet Delayed Response Take 81 mg by mouth every day.     • acetaminophen (TYLENOL) 500 MG Tab Take 1,000 mg by mouth every 6 hours as needed.     • nitroglycerin (NITROSTAT) 0.4 MG SL Tab Place 0.4 mg under tongue every 5 minutes as needed for Chest Pain.     • bimatoprost (LUMIGAN) 0.01 % Solution Place 1 Drop in both eyes every bedtime. Indications: Wide-Angle Glaucoma     • vitamin D (CHOLECALCIFEROL) 1000 UNIT Tab Take 1,000 Units by mouth every day.     • Magnesium 500 MG Tab Take 1 Cap by mouth every day.       No current facility-administered medications for this visit.      She  has a past medical history of Breast cancer ([facility]); Breath shortness; Bruises easily; Cancer ([facility]) (1989); Cardiac murmur (6/3/2009); Chronic kidney disease, stage III (moderate) ([facility]) (8/20/2015); Dyslipidemia (6/3/2009); Glaucoma; Hepatitis A (1993); Hiatus hernia syndrome; breast cancer (6/3/2009); Hypertension (7/18/2017); MEDICAL HOME; Neutropenia (6/3/2009); Osteopenia (6/3/2009); Preventative health care (6/3/2009); Snoring; and Unspecified cataract. She also has no past medical history of CAD (coronary artery disease); COPD; Liver disease; or Seizure disorder ([facility]).    ROS   No chest pain, no  shortness of breath       Objective:     Blood pressure 156/68, pulse 76, temperature 36.3 °C (97.4 °F), resp. rate 16, weight 68.5 kg (151 lb), SpO2 97 %, not currently breastfeeding. Body mass index is 25.92 kg/m².   Physical Exam:  Constitutional: Alert, no distress.  Skin: Warm, dry, good turgor, no rashes in visible areas.  Ear: Right cerumen impaction, left TM pearly gray.  Eye: Equal, round and reactive, conjunctiva clear, lids normal.  Cardiovascular: Normal S1, S2, positive holosystolic murmur.  Psych: Alert and oriented x3, normal affect and mood.        Assessment and Plan:   The following treatment plan was discussed    1. Heart palpitations  New problem to me.  Zio patch ordered.    2. Right ear impacted cerumen  Lavaged in clinic.      Followup: Return if symptoms worsen or fail to improve.

## 2019-02-26 ENCOUNTER — PATIENT OUTREACH (OUTPATIENT)
Dept: HEALTH INFORMATION MANAGEMENT | Facility: OTHER | Age: 79
End: 2019-02-26

## 2019-02-26 NOTE — PROGRESS NOTES
Outcome: Left Message to complete pre-planning.    Please transfer to Patient Outreach Team at [phone] when patient returns call.

## 2019-04-24 ENCOUNTER — TELEPHONE (OUTPATIENT)
Dept: MEDICAL GROUP | Facility: MEDICAL CENTER | Age: 79
End: 2019-04-24

## 2019-04-25 NOTE — TELEPHONE ENCOUNTER
Left message for patient to call back regarding pre-visit planning. Please transfer call to [phone].

## 2019-04-25 NOTE — TELEPHONE ENCOUNTER
Future Appointments       Provider Department Center    4/29/2019 1:00 PM Master BONIFACIO Suarez M.D.; [facility]          ANNUAL WELLNESS VISIT PRE-VISIT PLANNING WITHOUT OUTREACH    1.  Reviewed note from last office visit with PCP: YES    2.  If any orders were placed at last visit, do we have Results/Consult Notes?        •  Labs - Labs ordered, completed on 4/27/18 and results are in chart.  Note: If patient appointment is for lab review and patient did not complete labs, check with provider if OK to reschedule patient until labs completed.       •  Imaging - Imaging ordered, completed and results are in chart.       •  Referrals - No referrals were ordered at last office visit.    3.  Immunizations were updated in Epic using WebIZ?: Epic matches WebIZ       •  WebIZ Recommendations: TD, SHINGRIX (Shingles) and CPOX        •  Is patient due for Tdap? NO       •  Is patient due for Shingrix? YES. Patient was not notified of copay/out of pocket cost.     4.  Patient is due for the following Health Maintenance Topics:   Health Maintenance Due   Topic Date Due   • IMM ZOSTER VACCINES (2 of 3) 09/23/2014   • Annual Wellness Visit  06/21/2018   • MAMMOGRAM  04/03/2019       5.  Reviewed/Updated the following with patient:       •   Preferred Pharmacy? NO       •   Preferred Lab? NO       •   Preferred Communication? NO       •   Allergies? NO       •   Medications? NO       •   Social History? NO       •   Family History (document living status of immediate family members and if + hx of  cancer, diabetes, hypertension, hyperlipidemia, heart attack, stroke) NO    6.  Care Team Updated:       •   DME Company (gait device, O2, CPAP, etc.): NO       •   Other Specialists (eye doctor, derm, GYN, cardiology, endo, etc): NO    7. Orders for overdue Health Maintenance topics pended in Pre-Charting? NO    8.  Patient has the following Care Path diagnoses on Problem  List:  NONE    9.  Patient was not advised: “This is a free wellness visit. The provider will screen for medical conditions to help you stay healthy. If you have other concerns to address you may be asked to discuss these at a separate visit or there may be an additional fee.”     10.  Patient was NOT informed to arrive 15 min prior to their scheduled appointment and bring in their medication bottles.

## 2019-04-26 NOTE — TELEPHONE ENCOUNTER
Patient left a voicemail on Annie, Dr Suarez medical assistants voicemail for Elaine to call her back and the message was given to me as Elaine is OOTO today. I returned the patients call and left her a voicemail to call me today and if she cant then to call Elaine on Monday and left both of our phone numbers on her voicemail.

## 2019-04-29 ENCOUNTER — OFFICE VISIT (OUTPATIENT)
Dept: MEDICAL GROUP | Facility: MEDICAL CENTER | Age: 79
End: 2019-04-29
Payer: MEDICARE

## 2019-04-29 VITALS
BODY MASS INDEX: 24.92 KG/M2 | DIASTOLIC BLOOD PRESSURE: 62 MMHG | HEIGHT: 64 IN | WEIGHT: 146 LBS | HEART RATE: 66 BPM | SYSTOLIC BLOOD PRESSURE: 118 MMHG | OXYGEN SATURATION: 97 % | TEMPERATURE: 98.2 F

## 2019-04-29 DIAGNOSIS — E78.5 DYSLIPIDEMIA: Chronic | ICD-10-CM

## 2019-04-29 DIAGNOSIS — I35.0 AORTIC VALVE STENOSIS, ETIOLOGY OF CARDIAC VALVE DISEASE UNSPECIFIED: Chronic | ICD-10-CM

## 2019-04-29 DIAGNOSIS — Z12.31 ENCOUNTER FOR SCREENING MAMMOGRAM FOR BREAST CANCER: ICD-10-CM

## 2019-04-29 DIAGNOSIS — D70.9 CHRONIC NEUTROPENIA (HCC): Chronic | ICD-10-CM

## 2019-04-29 DIAGNOSIS — Z00.00 MEDICARE ANNUAL WELLNESS VISIT, SUBSEQUENT: Primary | ICD-10-CM

## 2019-04-29 DIAGNOSIS — Z00.00 PREVENTATIVE HEALTH CARE: Chronic | ICD-10-CM

## 2019-04-29 DIAGNOSIS — E55.9 HYPOVITAMINOSIS D: ICD-10-CM

## 2019-04-29 DIAGNOSIS — M81.0 POSTMENOPAUSAL BONE LOSS: ICD-10-CM

## 2019-04-29 DIAGNOSIS — R06.00 DYSPNEA, UNSPECIFIED TYPE: ICD-10-CM

## 2019-04-29 PROCEDURE — G0439 PPPS, SUBSEQ VISIT: HCPCS | Performed by: INTERNAL MEDICINE

## 2019-04-29 ASSESSMENT — PATIENT HEALTH QUESTIONNAIRE - PHQ9: CLINICAL INTERPRETATION OF PHQ2 SCORE: 0

## 2019-04-29 ASSESSMENT — ACTIVITIES OF DAILY LIVING (ADL): BATHING_REQUIRES_ASSISTANCE: 0

## 2019-04-29 ASSESSMENT — ENCOUNTER SYMPTOMS: GENERAL WELL-BEING: EXCELLENT

## 2019-04-29 ASSESSMENT — PAIN SCALES - GENERAL: PAINLEVEL: NO PAIN

## 2019-04-29 NOTE — PROGRESS NOTES
Chief Complaint   Patient presents with   • Annual Wellness Visit         HPI:  Katerina is a 78 y.o. here for Medicare Annual Wellness Visit  meds and allergies reviewed and updated, med and surgical history and social history reviewd  S/p knee replacement one year ago last year   Sees  orthopedics  Sees  eye exam  Sees dermatology       Patient Active Problem List    Diagnosis Date Noted   • Aortic regurgitation 04/27/2018   • Esophageal dysmotility 07/18/2017   • History of hypertension 07/18/2017   • S/P knee replacement 06/20/2017   • Colon polyp 03/13/2017   • Macular pucker 10/25/2016   • Decreased GFR 08/20/2015   • History of polymyalgia rheumatica 07/28/2015   • Dyspnea 07/29/2014   • Abnormal cardiovascular stress test 03/24/2014   • Knee pain, left 08/01/2013   • Glaucoma 06/29/2010   • Skin cancer, basal cell 06/29/2010   • Osteopenia 06/03/2009   • Dyslipidemia 06/03/2009   • Chronic neutropenia (HCC) 06/03/2009   • Preventative health care 06/03/2009   • Aortic stenosis 06/03/2009         s/p knee replacement  3/3/17 MRI right knee abnormal right lateral meniscus with peripheral extrusion, maceration, and complex tear involving primarily the posterior horn and body but also the anterior horn, abnormal medial meniscus with vertical tear of the peripheral zone of body and posterior horn, probable meniscal root tear, and degenerative fraying of the free edge, severe lateral femorotibial compartment osteoarthritis with significant cartilage loss and moderate to severe subchondral edema within the lateral femoral condyle and lateral tibial plateau, mild medial femorotibial and the patellofemoral compartment osteoarthritis, moderate sized joint effusion with associated popliteal cyst  6/20/17 sees  orthopedics  1/18/18  orthopedic note right knee end-stage arthritis, x-rays bone-on-bone right knee arthritis lateral compartment, right knee steroid injection performed,  planned for total right knee arthroplasty after winter  5/2/18  orthopedic note, right knee osteoathritis, failed conservative measures, scheduled for right knee surgery  5/8/18  operative note right knee arthroplasty   5/23/18  orthopedic note 2 weeks s/p right total knee  8/6/18  orthopedic note 3 months status post right knee replacement doing well, x-ray shows stable right total knee    Skin cancer basal cell     Preventative health  7/23/12 tdap   1/10/13 pneumovax  7/29/14 zoster vaccine  8/6/15 prevnar at [facility]  8/19/15 dexa LS -1.4,hip -1.3;FRAX 40% major,27% hip; only on prednisone one month does not need bisphosphonate therapy  9/1/16 sonocine negative  3/7/17 colon per [facility] 2 polyps, grade 2 internal hemorrhoids, angioectasias ascending colon,pathology one hyperplastic polyp and onesessile serrated polyp, repeat colonoscopy 5 years   4/4/18 mammogram heterogeneously dense breast tissue recommend screening breast ultrasound  4/5/18 declines sonocine  4/27/18 vit d 53      Osteopenia  9/06 dexa LS -1.2,hip -1.1  6/09 dexa LS -1.4,hip -1.0  7/11 vit d 43  7/11 dexa LS -1.1,hip -0.9  712 vit d 30 start 1000 units  7/24/13 vit d 72 on 1000 units  8/6/13 dexa LS -1.1,hip -1.1  8/5/14 vit d 67  8/19/15 dexa LS -1.4,hip -1.3;FRAX 40% major,27% hip only on prednisone one month, does not need bisphosphonate therapy  8/19/15 vit d 43  6/16/16 off prednisone x 3 weeks  6/21/16 vit d 50  6/27/17 vit d 48  4/27/18 vit d 53      Knee pain  8/1/13 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, lateral meniscus mildly extruded, ruptured hopper's cyst  8/12/13  ortho note pain improved on followup, consider injection if pain recurs  3/3/17 MRI right knee abnormal right lateral meniscus with peripheral extrusion, maceration, and complex tear involving primarily the posterior horn and body but also the anterior horn, abnormal medial meniscus with vertical tear of the  peripheral zone of body and posterior horn, probable meniscal root tear, and degenerative fraying of the free edge, severe lateral femorotibial compartment osteoarthritis with significant cartilage loss and moderate to severe subchondral edema within the lateral femoral condyle and lateral tibial plateau, mild medial femorotibial and the patellofemoral compartment osteoarthritis, moderate sized joint effusion with associated popliteal cyst  6/20/17 sees  orthopedics  1/18/18  orthopedic note right knee end-stage arthritis, x-rays bone-on-bone right knee arthritis lateral compartment, right knee steroid injection performed, planned for total right knee arthroplasty after winter     History polymyalgia  4/20/15 physical therapy, trial celebrex and zanaflex  5/7/15 physical therapy evaluation today recommended right hip x-ray and pelvic x-ray, ordered in computer  5/8/15 xray hip negative  6/29/15 seen by bridgette diagnosed with polymyalgia rheumatica  6/29/15 CRP 9.4,ESR 32 started on prednisone 20 mg    7/28/15 on prednisone 10 mg will continue 10 mg x 2 weeks, then decrease to 5 mg daily  8/19/15 hgb 14,hct 43, CRP 0.3, ESR 14, tapered off prednisone but developed mouth irritation, has resumed prednisone 5 mg daily, will continue x2 weeks then taper  11/17/15 refill prednisone 5 mg #30 tabs x one  6/21/16 off prednisone; CRP 0.1,ESR 17  6/27/17 CRP 0.09,ESR 12,cpk 66    histroy hypertension  3/31/14 cardiac catheterization; left main mild plaquing, LAD patent, circumflex free of disease, RCA free of disease, normal LV function, mild aortic stenosis  7/9/17 echo normal LV size and function, EF 60%, grade 1 diastolic dysfunction, moderate aortic stenosis peak gradient 50, mean 29, valve area 0.8-1.0 and aortic regurgitation  7/9/17 persantine thallium small fixed perfusion abnormality distal anterior and anteroapical segments, no ischemia is noted to represent mild prior infarct or variant normal apical  thinning  7/18/17 start diltiazem 120 mg daily (also has esophageal dysmotility by barium swallow)  4/26/18 off diltiazem  4/27/18 echo normal LV size and function, EF 65%, moderate aortic stenosis peak gradient 46, valve area 0.9,, moderate aortic insufficiency, no change compared to previous     History of breast cancer  1980s left sided s/p lumpectomy and radiation therapy, no old records  7/11 mammogram  8/12 mammogram  8/13 mammogram  8/18/14 mammogram  9/1/16 mammogram heterogeneously dense breast tissue recommend screening breast ultrasound  9/1/16 sonocine negative     Glaucoma     Esophageal dysmotility  7/9/17 barium swallow moderate esophageal dysmotility, small volume silent aspiration  7/18/17 start diltiazem 120 mg daily  7/18/17 referral [facility], speech pathology for esophageal dysmotility, small amount of aspiration on barium swallow, try low-dose diltiazem 120 mg for esophageal dysmotility and elevated blood pressure  7/26/17 [facility] evaluation dyspepsia, obtain cardiology clearance and then proceed EGD, initiate pantoprazole 20 mg, trial of miralax and colace     Dyspnea  7/24/13 normal LV function, EF 60%, mild LVH, mild aortic stenosis, peak gradient 25  3/20/14 cxr negative  3/31/14 cardiac catheterization; left main mild lacking, LAD patent, circumflex free of disease, RCA free of disease, normal LV function, mild aortic stenosis  8/1/14 PFT FEV 2.8 L (144% predicted), FVC 3.6 (138% predicted), FEV/FVC 78, no bronchodilator response, DLCO 119% predicted     Dyslipidemia  3/08 chol 175,trig 73,hdl 45,ldl 115  6/09 chol 174,trig 89,hdl 47,ldl 109  7/10 chol 182,trig 61,hdl 55,ldl 114  7/11 chol 175,trig 69,hdl 45,ldl 116  7/12 chol 164,trig 64,hdl 43,ldl 108  7/24/13 chol 186,trig 66,hdl 54,ldl 119 no meds  8/5/14 chol 165,trig 93,hdl 48,ldl 98  8/19/15 chol 192,trig 109,hdl 58,ldl 112; 10 year risk 20% declines statin therapy  6/21/16 chol 137,trig 75,hdl 47,ldl 75   6/27/17 hcol 153,trig 94,hdl 54,ldl  80     Decrease GFR  7/7/11 bun 16,creat 1.0,GFR 50  7/24/13 bun 14,creat 1.1,GFR 45  3/20/14 bun 15,creat 0.9,GFR 59  8/5/14 bun 14,creat 1.1,GFR 46  2/19/15 bun 13,creat 0.8,GFR >60  8/19/15 bun 10,creat 1.1,GFR 48  6/21/16 bun 19,creat 1.0,GFR 50  6/27/17 bun 14,creat 0.5,GFR 51  4/28/18 bun 15,creat 0.9,GFR 60     Chronic neutropenia  8/06 wbc 3.4  3/08 wbc 3.9  6/09 wbc 3.9  7/10 wbc 4.4  7/11 wbc 4.5  7/12 wbc 3.8 (55%N,35%L)  7/24/13 wbc 4.3  3/20/14 wbc 3.1 (52%N,36%L)  3/31/14 wbc 3.7  8/19/15 wbc 5.3  6/21/16 wbc 4.4 (50%N,40%L)  6/27/17 wbc 4.2  4/27/18 wbc 4.4      Aortic stenosis  10/06 echo mild mr, normal LV  6/09 stress echo negative  8/12 echo normal LV function, EF 65%, mild aortic stenosis, peak gradient 24  7/24/13 normal LV function, EF 60%, mild LVH, mild aortic stenosis, peak gradient 25  3/31/14 cardiac catheterization; left main mild lacking, LAD patent, circumflex free of disease, RCA free of disease, normal LV function, mild aortic stenosis  7/9/17 echo normal LV size and function, EF 60%, grade 1 diastolic dysfunction, moderate aortic stenosis peak gradient 50, mean 29, valve area 0.8-1.0 and aortic regurgitation  7/9/17 persantine thallium small fixed perfusion abnormality distal anterior and anteroapical segments, no ischemia is noted to represent mild prior infarct or variant normal apical thinning  4/27/18 echo normal LV size and function, EF 65%, moderate aortic stenosis peak gradient 46, valve area 0.9,, moderate aortic insufficiency, no change compared to previous    Aortic regurgitation  10/06 echo mild mitral regurgitation, normal LV  6/09 stress echo negative  8/12 echo normal LV function, EF 65%, mild aortic stenosis, peak gradient 24  7/24/13 normal LV function, EF 60%, mild LVH, mild aortic stenosis, peak gradient 25  3/31/14 cardiac catheterization; left main and LAD patent, circumflex free of disease, RCA free of disease, normal LV function, mild aortic stenosis  7/9/17  echo normal LV size and function, EF 60%, grade 1 diastolic dysfunction, moderate aortic stenosis peak gradient 50, mean 29, valve area 0.8-1.0 and aortic regurgitation  7/9/17 persantine thallium small fixed perfusion abnormality distal anterior and anteroapical segments, no ischemia is noted to represent mild prior infarct or variant normal apical thinning  4/27/18 echo normal LV size and function, EF 65%, moderate aortic stenosis peak gradient 46, valve area 0.9,, moderate aortic insufficiency, no change compared to previous     Abnormal cardiovascular test  10/06 echo mild mitral regurgitation, normal LV  6/09 stress echo negative  8/12 echo normal LV function, EF 65%, mild aortic stenosis, peak gradient 24  7/24/13 normal LV function, EF 60%, mild LVH, mild aortic stenosis, peak gradient 25  3/20/14 seen ER palpitations, atypical chest pain  3/24/14 exercise treadmill carla protocol 5 minutes 5 seconds, 1 mm mild upsloping ST segment depression in V6, flat ST segment depression V4 and V5 compatible with ischemia, no arrhythmia noted  3/28/14 cardiology note, scheduled for cardiac catheterization, cont asa, metoprolol 25 mg bid, crestor 5 mg samples  3/31/14 cardiac catheterization; left main mild plaquing, LAD patent, circumflex free of disease, RCA free of disease, normal LV function, mild aortic stenosis  7/9/17 echo normal LV size and function, EF 60%, grade 1 diastolic dysfunction, moderate aortic stenosis peak gradient 50, mean 29, valve area 0.8-1.0 and aortic regurgitation  7/9/17 persantine thallium small fixed perfusion abnormality distal anterior and anteroapical segments, no ischemia is noted to represent mild prior infarct or variant normal apical thinning  4/27/18 echo normal LV size and function, EF 65%, moderate aortic stenosis peak gradient 46, valve area 0.9,, moderate aortic insufficiency, no change compared to previous                         Current Outpatient Prescriptions   Medication Sig  Dispense Refill   • aspirin EC (ECOTRIN) 81 MG Tablet Delayed Response Take 81 mg by mouth every day.     • bimatoprost (LUMIGAN) 0.01 % Solution Place 1 Drop in both eyes every bedtime. Indications: Wide-Angle Glaucoma     • vitamin D (CHOLECALCIFEROL) 1000 UNIT Tab Take 1,000 Units by mouth every day.     • acetaminophen (TYLENOL) 500 MG Tab Take 1,000 mg by mouth every 6 hours as needed.     • nitroglycerin (NITROSTAT) 0.4 MG SL Tab Place 0.4 mg under tongue every 5 minutes as needed for Chest Pain.     • Magnesium 500 MG Tab Take 1 Cap by mouth every day.       No current facility-administered medications for this visit.         Patient is taking medications as noted in medication list.  Current supplements as per medication list.     Allergies: Vicodin [hydrocodone-acetaminophen]    Current social contact/activities: Pt goes skiing. Pt spends time with friends. Pt enjoys gardening and painting     Is patient current with immunizations? No, due for SHINGRIX (Shingles). Patient is interested in receiving NONE today.    She  reports that she quit smoking about 20 years ago. Her smoking use included Cigarettes. She has a 5.00 pack-year smoking history. She has never used smokeless tobacco. She reports that she drinks alcohol. She reports that she does not use drugs.  Counseling given: No        DPA/Advanced directive: Pt doesn't want an Advance Directive at this time.    ROS:    Gait: Uses no assistive device    Ostomy: No   Other tubes: No    Amputations: No   Chronic oxygen use No    Last eye exam 1 month ago    Wears hearing aids: No    : Reports urinary leakage during the last 6 months that has not interfered at all with their daily activities or sleep.         Depression Screening    Little interest or pleasure in doing things?  0 - not at all  Feeling down, depressed, or hopeless? 0 - not at all  Patient Health Questionnaire Score: 0    If depressive symptoms identified deferred to follow up visit unless  specifically addressed in assessment and plan.    Interpretation of PHQ-9 Total Score   Score Severity   1-4 No Depression   5-9 Mild Depression   10-14 Moderate Depression   15-19 Moderately Severe Depression   20-27 Severe Depression    Screening for Cognitive Impairment    Three Minute Recall (village, kitchen, baby)  2/3 Leader season table   Marvin clock face with all 12 numbers and set the hands to show 10 past 10.  Yes 5/5  10 past 11  If cognitive concerns identified, deferred for follow up unless specifically addressed in assessment and plan.    Fall Risk Assessment    Has the patient had two or more falls in the last year or any fall with injury in the last year?  No  If fall risk identified, deferred for follow up unless specifically addressed in assessment and plan.    Safety Assessment    Throw rugs on floor.  No  Handrails on all stairs.  No  Good lighting in all hallways.  Yes  Difficulty hearing.  No  Patient counseled about all safety risks that were identified.    Functional Assessment ADLs    Are there any barriers preventing you from cooking for yourself or meeting nutritional needs?  No.    Are there any barriers preventing you from driving safely or obtaining transportation?  No.    Are there any barriers preventing you from using a telephone or calling for help?  No.    Are there any barriers preventing you from shopping?  No.    Are there any barriers preventing you from taking care of your own finances?  No.    Are there any barriers preventing you from managing your medications?  No.    Are there any barriers preventing you from showering, bathing or dressing yourself?  No.    Are you currently engaging in any exercise or physical activity?  Yes.  Pt gardens and skiis often in the winter and summer months  What is your perception of your health?  Excellent.    Health Maintenance Summary                IMM ZOSTER VACCINES Overdue 9/23/2014      Done 7/29/2014 Imm Admin: Zoster Vaccine Live  (ZVL) (Zostavax)    Annual Wellness Visit Overdue 6/21/2018      Done 6/20/2017 Visit Dx: Medicare annual wellness visit, subsequent     Patient has more history with this topic...    MAMMOGRAM Overdue 4/3/2019      Done 4/3/2018 MA-MAMMO SCREENING BILAT W/TOMOSYNTHESIS W/CAD     Patient has more history with this topic...    BONE DENSITY Next Due 8/19/2020      Done 8/19/2015 DS-BONE DENSITY STUDY (DEXA)     Patient has more history with this topic...    COLONOSCOPY Next Due 3/7/2022      Done 3/7/2017 REFERRAL TO GI FOR COLONOSCOPY    IMM DTaP/Tdap/Td Vaccine Next Due 7/23/2022      Done 7/23/2012 Imm Admin: Tdap Vaccine          Patient Care Team:  Master Suarez M.D. as PCP - General  Chris Bernstein M.D. as Consulting Physician (Ophthalmology)  Amanda Rodriguez M.D. as Consulting Physician (Ophthalmology)  Manoj Tidwell M.D. as Consulting Physician (Orthopaedics)  Gian Caro, PT, DPT as Physical Therapy (Physical Therapy)    Social History   Substance Use Topics   • Smoking status: Former Smoker     Packs/day: 0.50     Years: 10.00     Types: Cigarettes     Quit date: 1/1/1999   • Smokeless tobacco: Never Used   • Alcohol use 0.0 oz/week      Comment: 1 glass wine/day     Family History   Problem Relation Age of Onset   • Heart Disease Father         CAD MI   • Stroke Father    • Cancer Sister         breast   • Cancer Mother         breast     She  has a past medical history of Breast cancer (HCC); Breath shortness; Bruises easily; Cancer (HCC) (1989); Cardiac murmur (6/3/2009); Chronic kidney disease, stage III (moderate) (HCC) (8/20/2015); Dyslipidemia (6/3/2009); Glaucoma; Hepatitis A (1993); Hiatus hernia syndrome; breast cancer (6/3/2009); Hypertension (7/18/2017); MEDICAL HOME; Neutropenia (6/3/2009); Osteopenia (6/3/2009); Preventative health care (6/3/2009); Snoring; and Unspecified cataract. She also has no past medical history of CAD (coronary artery disease); COPD; Liver disease; or  "Seizure disorder (HCC).   Past Surgical History:   Procedure Laterality Date   • KNEE ARTHROPLASTY TOTAL Right 5/8/2018    Procedure: KNEE ARTHROPLASTY TOTAL;  Surgeon: Thanh Hall M.D.;  Location: SURGERY [facility];  Service: Orthopedics   • VITRECTOMY POSTERIOR Left 10/25/2016    Procedure: VITRECTOMY POSTERIOR membrane peel cryotherapy infusion of SF6 intraocular gas;  Surgeon: Amanda Rodriguez M.D.;  Location: SURGERY SAME DAY [facility];  Service:    • RECOVERY  3/31/2014    Performed by Cath-Recovery Surgery at SURGERY SAME DAY [facility]   • CATARACT PHACO WITH IOL  5/28/2009    Performed by GERA BREAUX at SURGERY SAME DAY [facility]   • CATARACT PHACO WITH IOL  5/14/2009    Performed by GERA BREAUX at SURGERY SAME DAY [facility]   • LUMPECTOMY     • OTHER     • PB RADIATION THERAPY PLAN SIMPLE             Exam:     /62 (BP Location: Left arm, Patient Position: Sitting, BP Cuff Size: Adult)   Pulse 66   Temp 36.8 °C (98.2 °F) (Temporal)   Ht 1.626 m (5' 4\")   Wt 66.2 kg (146 lb)   SpO2 97%  Body mass index is 25.06 kg/m².    Hearing and dentition fair   Alert, oriented in no acute distress.  Eye contact is good, speech goal directed, affect calm  Lungs clear  Cv s1s2 reg    Assessment and Plan. The following treatment and monitoring plan is recommended:   Assessment  #! Wellness assessment     #2 aortic stenosis last echo 4/27/2018 normal size and function LV, EF 65%, moderate aortic stenosis peak gradient 46, valve area 0.9 with moderate aortic insufficiency, no change compared to previous, last Persantine thallium 2017 no ischemia, patient denies chest pain, shortness of breath at rest, lightheadedness, syncope, possibly some shortness of breath with skiing but no chest pain    #3 History of ckd stage 3 last GFR 64/28/18, no regular NSAIDs    #4 dyslipidemia diet controlled    #5 hypovitamin d    #6 chronic neutropenia WBC 4.4 no recurrent infections    #7 status " post right knee arthroplasty 2018 recovering well    #8 history of basal cell skin cancer followed by dermatology    #9 preventative health  7/23/12 tdap   1/10/13 pneumovax  7/29/14 zoster vaccine  8/6/15 prevnar at [facility]  8/19/15 dexa LS -1.4,hip -1.3;FRAX 40% major,27% hip; only on prednisone one month does not need bisphosphonate therapy  9/1/16 sonocine negative  3/7/17 colon per [facility] 2 polyps, grade 2 internal hemorrhoids, angioectasias ascending colon,pathology one hyperplastic polyp and onesessile serrated polyp, repeat colonoscopy 5 years   4/4/18 mammogram heterogeneously dense breast tissue recommend screening breast ultrasound  4/5/18 declines sonocine  4/27/18 vit d 53     Services suggested   Health Care Screening recommendations as per orders if indicated.  Referrals offered: PT/OT/Nutrition counseling/Behavioral Health/Smoking cessation as per orders if indicated.    Discussion today about general wellness and lifestyle habits:    · Prevent falls and reduce trip hazards; Cautioned about securing or removing rugs.  · Have a working fire alarm and carbon monoxide detector;   · Engage in regular physical activity and social activities       Follow-up:   Plan  #! No need for hearing test    #2 mammogram    #3 dexa because of postmenopausal bone loss no HRT at risk for fragility fractures    #4 old records skin cancer dermatology     #5 labs    #6 echo follow-up aortic stenosis, consider cardiology evaluation if worsens    #7 nutrition, diet, exercise discussed    #8 has had pneumococcal 13, pneumococcal 23, first shingles vaccination, tdap    #9 health maintenance reviewed and updated    #10 follow-up 6 months

## 2019-05-13 ENCOUNTER — HOSPITAL ENCOUNTER (OUTPATIENT)
Dept: LAB | Facility: MEDICAL CENTER | Age: 79
End: 2019-05-13
Attending: INTERNAL MEDICINE
Payer: MEDICARE

## 2019-05-13 DIAGNOSIS — E78.5 DYSLIPIDEMIA: Chronic | ICD-10-CM

## 2019-05-13 DIAGNOSIS — E55.9 HYPOVITAMINOSIS D: ICD-10-CM

## 2019-05-13 LAB
25(OH)D3 SERPL-MCNC: 46 NG/ML (ref 30–100)
ALBUMIN SERPL BCP-MCNC: 4 G/DL (ref 3.2–4.9)
ALBUMIN/GLOB SERPL: 1.6 G/DL
ALP SERPL-CCNC: 80 U/L (ref 30–99)
ALT SERPL-CCNC: 12 U/L (ref 2–50)
ANION GAP SERPL CALC-SCNC: 9 MMOL/L (ref 0–11.9)
AST SERPL-CCNC: 20 U/L (ref 12–45)
BASOPHILS # BLD AUTO: 0.9 % (ref 0–1.8)
BASOPHILS # BLD: 0.04 K/UL (ref 0–0.12)
BILIRUB SERPL-MCNC: 0.6 MG/DL (ref 0.1–1.5)
BUN SERPL-MCNC: 18 MG/DL (ref 8–22)
CALCIUM SERPL-MCNC: 9.1 MG/DL (ref 8.5–10.5)
CHLORIDE SERPL-SCNC: 109 MMOL/L (ref 96–112)
CHOLEST SERPL-MCNC: 164 MG/DL (ref 100–199)
CO2 SERPL-SCNC: 25 MMOL/L (ref 20–33)
CREAT SERPL-MCNC: 1.13 MG/DL (ref 0.5–1.4)
EOSINOPHIL # BLD AUTO: 0.15 K/UL (ref 0–0.51)
EOSINOPHIL NFR BLD: 3.5 % (ref 0–6.9)
ERYTHROCYTE [DISTWIDTH] IN BLOOD BY AUTOMATED COUNT: 46.8 FL (ref 35.9–50)
FASTING STATUS PATIENT QL REPORTED: NORMAL
GLOBULIN SER CALC-MCNC: 2.5 G/DL (ref 1.9–3.5)
GLUCOSE SERPL-MCNC: 98 MG/DL (ref 65–99)
HCT VFR BLD AUTO: 45.4 % (ref 37–47)
HDLC SERPL-MCNC: 48 MG/DL
HGB BLD-MCNC: 14.4 G/DL (ref 12–16)
IMM GRANULOCYTES # BLD AUTO: 0.01 K/UL (ref 0–0.11)
IMM GRANULOCYTES NFR BLD AUTO: 0.2 % (ref 0–0.9)
LDLC SERPL CALC-MCNC: 100 MG/DL
LYMPHOCYTES # BLD AUTO: 1.81 K/UL (ref 1–4.8)
LYMPHOCYTES NFR BLD: 42.2 % (ref 22–41)
MCH RBC QN AUTO: 30.5 PG (ref 27–33)
MCHC RBC AUTO-ENTMCNC: 31.7 G/DL (ref 33.6–35)
MCV RBC AUTO: 96.2 FL (ref 81.4–97.8)
MONOCYTES # BLD AUTO: 0.36 K/UL (ref 0–0.85)
MONOCYTES NFR BLD AUTO: 8.4 % (ref 0–13.4)
NEUTROPHILS # BLD AUTO: 1.92 K/UL (ref 2–7.15)
NEUTROPHILS NFR BLD: 44.8 % (ref 44–72)
NRBC # BLD AUTO: 0 K/UL
NRBC BLD-RTO: 0 /100 WBC
PLATELET # BLD AUTO: 217 K/UL (ref 164–446)
PMV BLD AUTO: 10.9 FL (ref 9–12.9)
POTASSIUM SERPL-SCNC: 4.2 MMOL/L (ref 3.6–5.5)
PROT SERPL-MCNC: 6.5 G/DL (ref 6–8.2)
RBC # BLD AUTO: 4.72 M/UL (ref 4.2–5.4)
SODIUM SERPL-SCNC: 143 MMOL/L (ref 135–145)
TRIGL SERPL-MCNC: 82 MG/DL (ref 0–149)
TSH SERPL DL<=0.005 MIU/L-ACNC: 1.51 UIU/ML (ref 0.38–5.33)
WBC # BLD AUTO: 4.3 K/UL (ref 4.8–10.8)

## 2019-05-13 PROCEDURE — 82306 VITAMIN D 25 HYDROXY: CPT

## 2019-05-13 PROCEDURE — 84443 ASSAY THYROID STIM HORMONE: CPT

## 2019-05-13 PROCEDURE — 80053 COMPREHEN METABOLIC PANEL: CPT

## 2019-05-13 PROCEDURE — 80061 LIPID PANEL: CPT

## 2019-05-13 PROCEDURE — 85025 COMPLETE CBC W/AUTO DIFF WBC: CPT

## 2019-05-13 PROCEDURE — 36415 COLL VENOUS BLD VENIPUNCTURE: CPT

## 2019-05-14 ENCOUNTER — TELEPHONE (OUTPATIENT)
Dept: MEDICAL GROUP | Facility: MEDICAL CENTER | Age: 79
End: 2019-05-14

## 2019-05-24 ENCOUNTER — HOSPITAL ENCOUNTER (OUTPATIENT)
Dept: RADIOLOGY | Facility: MEDICAL CENTER | Age: 79
End: 2019-05-24
Attending: INTERNAL MEDICINE
Payer: MEDICARE

## 2019-05-24 ENCOUNTER — TELEPHONE (OUTPATIENT)
Dept: MEDICAL GROUP | Facility: MEDICAL CENTER | Age: 79
End: 2019-05-24

## 2019-05-24 DIAGNOSIS — M81.0 POSTMENOPAUSAL BONE LOSS: ICD-10-CM

## 2019-05-24 DIAGNOSIS — Z12.31 ENCOUNTER FOR SCREENING MAMMOGRAM FOR BREAST CANCER: ICD-10-CM

## 2019-05-24 PROCEDURE — 77080 DXA BONE DENSITY AXIAL: CPT

## 2019-05-24 PROCEDURE — 77063 BREAST TOMOSYNTHESIS BI: CPT

## 2019-05-25 NOTE — TELEPHONE ENCOUNTER
Please notify the patient that:  (1) her mammogram is negative but does show dense breast tissue which may decrease the accuracy of the mammogram  (2) she can get a screening ultrasound of her breast which may provide further detail  (3) her insurance will not cover a screening breast ultrasound, she would have to pay out of pocket, the cost would be approximately $125  (4) please let me know if she is interested

## 2019-05-27 ENCOUNTER — HOSPITAL ENCOUNTER (OUTPATIENT)
Dept: CARDIOLOGY | Facility: MEDICAL CENTER | Age: 79
End: 2019-05-27
Attending: INTERNAL MEDICINE
Payer: MEDICARE

## 2019-05-27 DIAGNOSIS — I35.0 AORTIC VALVE STENOSIS, ETIOLOGY OF CARDIAC VALVE DISEASE UNSPECIFIED: Chronic | ICD-10-CM

## 2019-05-27 LAB
LV EJECT FRACT  99904: 65
LV EJECT FRACT MOD 2C 99903: 79.12
LV EJECT FRACT MOD 4C 99902: 75.93
LV EJECT FRACT MOD BP 99901: 76.3

## 2019-05-27 PROCEDURE — 93306 TTE W/DOPPLER COMPLETE: CPT | Mod: 26 | Performed by: INTERNAL MEDICINE

## 2019-05-27 PROCEDURE — 93306 TTE W/DOPPLER COMPLETE: CPT

## 2019-05-28 ENCOUNTER — TELEPHONE (OUTPATIENT)
Dept: MEDICAL GROUP | Facility: MEDICAL CENTER | Age: 79
End: 2019-05-28

## 2019-05-28 NOTE — TELEPHONE ENCOUNTER
----- Message from Master Suarez M.D. sent at 5/24/2019  6:07 PM PDT -----  Please notify the patient that:  (1) her mammogram is negative but does show dense breast tissue which may decrease the accuracy of the mammogram  (2) she can get a screening ultrasound of her breast which may provide further detail  (3) her insurance will not cover a screening breast ultrasound, she would have to pay out of pocket, the cost would be approximately $125  (4) please let me know if she is interested

## 2019-05-29 ENCOUNTER — TELEPHONE (OUTPATIENT)
Dept: MEDICAL GROUP | Facility: MEDICAL CENTER | Age: 79
End: 2019-05-29

## 2019-05-29 NOTE — TELEPHONE ENCOUNTER
----- Message from Master Suarez M.D. sent at 5/28/2019  5:08 PM PDT -----  Please notify patient that her bone density compared to 2015 shows slight improvement of her back, she still has osteopenia of her hip which is decreased bone strength but not osteoporosis.  Have her continue calcium supplementation and regular weightbearing exercise, we can repeat the bone density test in 2 years.  No medications are necessary.

## 2019-05-29 NOTE — TELEPHONE ENCOUNTER
Please notify patient that her bone density compared to 2015 shows slight improvement of her back, she still has osteopenia of her hip which is decreased bone strength but not osteoporosis.  Have her continue calcium supplementation and regular weightbearing exercise, we can repeat the bone density test in 2 years.  No medications are necessary.

## 2019-07-09 ENCOUNTER — TELEPHONE (OUTPATIENT)
Dept: MEDICAL GROUP | Facility: MEDICAL CENTER | Age: 79
End: 2019-07-09

## 2019-07-09 NOTE — TELEPHONE ENCOUNTER
ESTABLISHED PATIENT PRE-VISIT PLANNING     Patient was NOT contacted to complete PVP.     Note: Patient will not be contacted if there is no indication to call.     1.  Reviewed notes from the last few office visits within the medical group: Yes    2.  If any orders were placed at last visit or intended to be done for this visit (i.e. 6 mos follow-up), do we have Results/Consult Notes?        •  Labs - Labs ordered, completed on 5/13/19 and results are in chart.   Note: If patient appointment is for lab review and patient did not complete labs, check with provider if OK to reschedule patient until labs completed.       •  Imaging - Imaging ordered, completed and results are in chart.       •  Referrals - No referrals were ordered at last office visit.    3. Is this appointment scheduled as a Hospital Follow-Up? No    4.  Immunizations were updated in [facility] using Web[employer]?: Yes       •  Web Iz Recommendations: SHINGRIX (Shingles)    5.  Patient is due for the following Health Maintenance Topics:   Health Maintenance Due   Topic Date Due   • IMM ZOSTER VACCINES (2 of 3) 09/23/2014       6. Orders for overdue Health Maintenance topics pended in Pre-Charting? NO    7.  AHA (MDX) form printed for Provider? No, already completed    8.  Patient was NOT informed to arrive 15 min prior to their scheduled appointment and bring in their medication bottles.

## 2019-07-11 ENCOUNTER — OFFICE VISIT (OUTPATIENT)
Dept: MEDICAL GROUP | Facility: MEDICAL CENTER | Age: 79
End: 2019-07-11
Payer: MEDICARE

## 2019-07-11 VITALS
OXYGEN SATURATION: 94 % | HEART RATE: 76 BPM | WEIGHT: 146 LBS | DIASTOLIC BLOOD PRESSURE: 64 MMHG | TEMPERATURE: 98.9 F | BODY MASS INDEX: 24.92 KG/M2 | HEIGHT: 64 IN | SYSTOLIC BLOOD PRESSURE: 128 MMHG

## 2019-07-11 DIAGNOSIS — I35.0 AORTIC VALVE STENOSIS, ETIOLOGY OF CARDIAC VALVE DISEASE UNSPECIFIED: Chronic | ICD-10-CM

## 2019-07-11 DIAGNOSIS — R00.2 PALPITATIONS: ICD-10-CM

## 2019-07-11 PROCEDURE — 99213 OFFICE O/P EST LOW 20 MIN: CPT | Performed by: INTERNAL MEDICINE

## 2019-07-11 RX ORDER — BUSPIRONE HYDROCHLORIDE 10 MG/1
10 TABLET ORAL EVERY EVENING
Qty: 30 TAB | Refills: 3 | Status: SHIPPED | OUTPATIENT
Start: 2019-07-11 | End: 2020-06-09

## 2019-07-12 NOTE — PROGRESS NOTES
Subjective:      Katerina Torres is a 79 y.o. female who presents with heart pounding sensation Follow-Up            HPI     Patient states that the last few months she has noticed that when laying down at night will feel her heart beating and will hear her heart beating more loudly and feel her heart beating more noticeably, no pain but just a more intense heartbeat, she states that he can skip a beat at times but does not really raise significantly, no associated chest pain, lightheadedness, dizziness, shortness of breath.  This only happens when she goes to bed at night. Patient will go to bed at 11 pm and symptoms will last for 1 to 2 hours before she can go to bed.  This does not happen at home during the day, no palpitations or intense heartbeat or hearing her heartbeat when she is active, doing exercise or working in the yard.  No increased stress at home over the last 2 months.  No insomnia normally, denies anxiety or depression in general. Patient drinks wine with dinner small amount, no caffeine late at night, worse if she has more wine at dinner.   Aortic stenosis most recent echo 5/27/19 moderate aortic stenosis gradient 53, moderate aortic insufficiency, has seen cardiology previously, stress test 2 years ago no ischemia, cardiac catheterization in 2014 clean coronaries, normal LV function.                 Current Outpatient Prescriptions   Medication Sig Dispense Refill   • aspirin EC (ECOTRIN) 81 MG Tablet Delayed Response Take 81 mg by mouth every day.     • bimatoprost (LUMIGAN) 0.01 % Solution Place 1 Drop in both eyes every bedtime. Indications: Wide-Angle Glaucoma     • vitamin D (CHOLECALCIFEROL) 1000 UNIT Tab Take 1,000 Units by mouth every day.     • acetaminophen (TYLENOL) 500 MG Tab Take 1,000 mg by mouth every 6 hours as needed.     • nitroglycerin (NITROSTAT) 0.4 MG SL Tab Place 0.4 mg under tongue every 5 minutes as needed for Chest Pain.     • Magnesium 500 MG Tab Take 1 Cap by  mouth every day.       No current facility-administered medications for this visit.      Patient Active Problem List   Diagnosis   • Osteopenia   • Dyslipidemia   • Chronic neutropenia (HCC)   • Preventative health care   • Aortic stenosis   • Glaucoma   • Skin cancer, basal cell   • Knee pain, left   • Abnormal cardiovascular stress test   • Dyspnea   • History of polymyalgia rheumatica   • Decreased GFR   • Macular pucker   • Colon polyp   • S/P knee replacement   • Esophageal dysmotility   • History of hypertension   • Aortic regurgitation          Health Maintenance Summary                IMM ZOSTER VACCINES Overdue 9/23/2014      Done 7/29/2014 Imm Admin: Zoster Vaccine Live (ZVL) (Zostavax)    IMM INFLUENZA Next Due 9/1/2019      Done 10/5/2018 Imm Admin: Influenza Vaccine Adult HD     Patient has more history with this topic...    Annual Wellness Visit Next Due 4/29/2020      Done 4/29/2019 Visit Dx: Medicare annual wellness visit, subsequent     Patient has more history with this topic...    MAMMOGRAM Next Due 5/24/2020      Done 5/24/2019 MA-SCREENING MAMMO BILAT W/TOMOSYNTHESIS W/CAD     Patient has more history with this topic...    COLONOSCOPY Next Due 3/7/2022      Done 3/7/2017 REFERRAL TO GI FOR COLONOSCOPY    IMM DTaP/Tdap/Td Vaccine Next Due 7/23/2022      Done 7/23/2012 Imm Admin: Tdap Vaccine    BONE DENSITY Next Due 5/24/2024      Done 5/24/2019 DS-BONE DENSITY STUDY (DEXA)     Patient has more history with this topic...          Patient Care Team:  Master Suarez M.D. as PCP - General  Chris Bernstein M.D. as Consulting Physician (Ophthalmology)  Amanda Rodriguez M.D. as Consulting Physician (Ophthalmology)  Manoj Tidwell M.D. as Consulting Physician (Orthopaedics)  Gian Caro, PT, DPT as Physical Therapy (Physical Therapy)      ROS       Objective:     /64 (BP Location: Right arm, Patient Position: Sitting, BP Cuff Size: Adult)   Pulse 76   Temp 37.2 °C (98.9 °F)   Ht 1.626 m  "(5' 4\")   Wt 66.2 kg (146 lb)   SpO2 94%   BMI 25.06 kg/m²      Physical Exam   Constitutional: She appears well-developed and well-nourished. No distress.   HENT:   Head: Normocephalic and atraumatic.   Eyes: Conjunctivae are normal. Right eye exhibits no discharge. Left eye exhibits no discharge.   Neck: Neck supple. No JVD present.   Cardiovascular: Normal rate and regular rhythm.    Pulmonary/Chest: Effort normal and breath sounds normal.   Abdominal: She exhibits no distension.   Musculoskeletal: She exhibits no edema.   Neurological: She is alert.   Skin: Skin is warm. She is not diaphoretic.   Psychiatric: She has a normal mood and affect. Her behavior is normal.   Nursing note and vitals reviewed.              Assessment/Plan:       Assessment  #!  Atypical sensation of hearing her heartbeat only at night, feeling her heart beat more intensely with occasional skipped beat that only happens when she lays down to sleep, this has been going on for last few months but never occurs during the day, no associated lightheadedness, syncope, chest pain, shortness of breath, no extra caffeine intake, does notice increased wine intake may worsen symptoms, possibly stress related although she denies stress or anxiety or depression.  No extra caffeine at night.    #2 aortic stenosis and regurgitation moderate in nature still able to ski and do activities without chest pain, shortness of breath, lightheadedness      Plan  #1 EKG    #2 24-hour Holter monitor    #3 referral back to cardiology    #4 trial of buspirone 10 mg at night only, can cause lightheadedness, dizziness, we will see if this may alleviate some anxiety induced symptoms or if there is an anxiety component, also could consider trazodone or SSRI, medication needs to be taken nightly, minimize alcohol, avoid caffeine    #5 follow-up 3 months    #6 call if symptoms intensify or start to occur during the day      "

## 2019-07-18 ENCOUNTER — NON-PROVIDER VISIT (OUTPATIENT)
Dept: CARDIOLOGY | Facility: MEDICAL CENTER | Age: 79
End: 2019-07-18
Payer: MEDICARE

## 2019-07-18 DIAGNOSIS — I35.0 AORTIC VALVE STENOSIS, ETIOLOGY OF CARDIAC VALVE DISEASE UNSPECIFIED: ICD-10-CM

## 2019-07-18 DIAGNOSIS — R00.2 PALPITATIONS: ICD-10-CM

## 2019-07-18 DIAGNOSIS — I49.3 FREQUENT PVCS: ICD-10-CM

## 2019-07-18 PROCEDURE — 93224 XTRNL ECG REC UP TO 48 HRS: CPT | Performed by: INTERNAL MEDICINE

## 2019-07-25 ENCOUNTER — TELEPHONE (OUTPATIENT)
Dept: CARDIOLOGY | Facility: MEDICAL CENTER | Age: 79
End: 2019-07-25

## 2019-07-25 DIAGNOSIS — R00.2 PALPITATIONS: ICD-10-CM

## 2019-07-25 DIAGNOSIS — I35.0 AORTIC VALVE STENOSIS, ETIOLOGY OF CARDIAC VALVE DISEASE UNSPECIFIED: ICD-10-CM

## 2019-07-25 LAB — EKG IMPRESSION: NORMAL

## 2019-07-25 NOTE — TELEPHONE ENCOUNTER
Vangie Morocho, Med Ass't  Anayeli Blevins RBenNBen             To ADD - BE     Dr Master Suarez has ordered 24 hour holter, dx: palps, aortic valve stenosis     Thank you      Order entered

## 2019-07-26 ENCOUNTER — TELEPHONE (OUTPATIENT)
Dept: MEDICAL GROUP | Facility: MEDICAL CENTER | Age: 79
End: 2019-07-26

## 2019-07-26 NOTE — TELEPHONE ENCOUNTER
24-hour Holter result reviewed, asymptomatic, no significant arrhythmias.  Patient states her symptoms have been improved on BuSpar.  She will continue that medication, follow-up cardiology as scheduled for aortic stenosis.

## 2019-10-22 ENCOUNTER — APPOINTMENT (RX ONLY)
Dept: URBAN - METROPOLITAN AREA CLINIC 4 | Facility: CLINIC | Age: 79
Setting detail: DERMATOLOGY
End: 2019-10-22

## 2019-10-22 DIAGNOSIS — L82.1 OTHER SEBORRHEIC KERATOSIS: ICD-10-CM

## 2019-10-22 DIAGNOSIS — L57.0 ACTINIC KERATOSIS: ICD-10-CM

## 2019-10-22 DIAGNOSIS — Z85.828 PERSONAL HISTORY OF OTHER MALIGNANT NEOPLASM OF SKIN: ICD-10-CM

## 2019-10-22 DIAGNOSIS — Z71.89 OTHER SPECIFIED COUNSELING: ICD-10-CM

## 2019-10-22 PROCEDURE — ? COUNSELING

## 2019-10-22 PROCEDURE — 17003 DESTRUCT PREMALG LES 2-14: CPT

## 2019-10-22 PROCEDURE — 17000 DESTRUCT PREMALG LESION: CPT

## 2019-10-22 PROCEDURE — 99213 OFFICE O/P EST LOW 20 MIN: CPT | Mod: 25

## 2019-10-22 PROCEDURE — ? LIQUID NITROGEN

## 2019-10-22 ASSESSMENT — LOCATION DETAILED DESCRIPTION DERM
LOCATION DETAILED: LEFT FOREHEAD
LOCATION DETAILED: LEFT ANTIHELIX
LOCATION DETAILED: LEFT CENTRAL MALAR CHEEK
LOCATION DETAILED: RIGHT INFERIOR LATERAL FOREHEAD
LOCATION DETAILED: RIGHT FOREHEAD
LOCATION DETAILED: LEFT SUPERIOR NASAL CHEEK
LOCATION DETAILED: RIGHT UPPER CUTANEOUS LIP
LOCATION DETAILED: LEFT INFERIOR FOREHEAD
LOCATION DETAILED: RIGHT LATERAL BUCCAL CHEEK
LOCATION DETAILED: RIGHT PROXIMAL DORSAL FOREARM
LOCATION DETAILED: RIGHT CENTRAL BUCCAL CHEEK
LOCATION DETAILED: LEFT PROXIMAL DORSAL FOREARM
LOCATION DETAILED: RIGHT INFERIOR FOREHEAD
LOCATION DETAILED: RIGHT ANTITRAGUS
LOCATION DETAILED: LEFT INFERIOR CENTRAL MALAR CHEEK

## 2019-10-22 ASSESSMENT — LOCATION ZONE DERM
LOCATION ZONE: ARM
LOCATION ZONE: EAR
LOCATION ZONE: LIP
LOCATION ZONE: FACE

## 2019-10-22 ASSESSMENT — LOCATION SIMPLE DESCRIPTION DERM
LOCATION SIMPLE: LEFT FOREHEAD
LOCATION SIMPLE: RIGHT CHEEK
LOCATION SIMPLE: RIGHT FOREHEAD
LOCATION SIMPLE: RIGHT FOREARM
LOCATION SIMPLE: RIGHT EAR
LOCATION SIMPLE: LEFT EAR
LOCATION SIMPLE: RIGHT LIP
LOCATION SIMPLE: LEFT CHEEK
LOCATION SIMPLE: LEFT FOREARM

## 2019-10-22 NOTE — PROCEDURE: LIQUID NITROGEN
Detail Level: Detailed
Post-Care Instructions: I reviewed with the patient in detail post-care instructions. Patient is to wear sunprotection, and avoid picking at any of the treated lesions. Pt may apply Vaseline  or Aquaphor to crusted or scabbing areas.
Render Note In Bullet Format When Appropriate: No
Consent: The patient's consent was obtained including but not limited to risks of crusting, scabbing, blistering, scarring, darker or lighter pigmentary change, recurrence, incomplete removal and infection.
Duration Of Freeze Thaw-Cycle (Seconds): 0

## 2019-11-26 ENCOUNTER — PATIENT OUTREACH (OUTPATIENT)
Dept: HEALTH INFORMATION MANAGEMENT | Facility: OTHER | Age: 79
End: 2019-11-26

## 2019-11-26 NOTE — PROGRESS NOTES
Called to introduce myself to the member as their SCP . She let me know that she is not interested in the program and does not need it. I removed myself from her care team. If she has any other questions please transfer to [phone]

## 2020-05-20 ENCOUNTER — PATIENT OUTREACH (OUTPATIENT)
Dept: HEALTH INFORMATION MANAGEMENT | Facility: OTHER | Age: 80
End: 2020-05-20

## 2020-05-20 NOTE — PROGRESS NOTES
Outcome: Left Message to call back so I can schedule over due AWV    Please transfer to Patient Outreach Team at [phone] when patient returns call.    [facility] Verified: yes    Attempt # 1

## 2020-06-05 PROBLEM — Z87.898 HISTORY OF PALPITATIONS: Status: ACTIVE | Noted: 2020-06-05

## 2020-06-09 ENCOUNTER — OFFICE VISIT (OUTPATIENT)
Dept: MEDICAL GROUP | Facility: MEDICAL CENTER | Age: 80
End: 2020-06-09
Payer: MEDICARE

## 2020-06-09 VITALS
HEART RATE: 68 BPM | BODY MASS INDEX: 24.75 KG/M2 | OXYGEN SATURATION: 98 % | DIASTOLIC BLOOD PRESSURE: 74 MMHG | SYSTOLIC BLOOD PRESSURE: 118 MMHG | TEMPERATURE: 98.6 F | WEIGHT: 145 LBS | HEIGHT: 64 IN

## 2020-06-09 DIAGNOSIS — E55.9 VITAMIN D DEFICIENCY: ICD-10-CM

## 2020-06-09 DIAGNOSIS — I35.0 AORTIC VALVE STENOSIS, ETIOLOGY OF CARDIAC VALVE DISEASE UNSPECIFIED: Chronic | ICD-10-CM

## 2020-06-09 DIAGNOSIS — E78.5 DYSLIPIDEMIA: Chronic | ICD-10-CM

## 2020-06-09 DIAGNOSIS — N18.30 CKD (CHRONIC KIDNEY DISEASE) STAGE 3, GFR 30-59 ML/MIN: ICD-10-CM

## 2020-06-09 DIAGNOSIS — Z00.00 MEDICARE ANNUAL WELLNESS VISIT, SUBSEQUENT: ICD-10-CM

## 2020-06-09 DIAGNOSIS — Z00.00 PREVENTATIVE HEALTH CARE: Chronic | ICD-10-CM

## 2020-06-09 DIAGNOSIS — Z20.822 EXPOSURE TO COVID-19 VIRUS: ICD-10-CM

## 2020-06-09 DIAGNOSIS — Z12.31 ENCOUNTER FOR SCREENING MAMMOGRAM FOR BREAST CANCER: ICD-10-CM

## 2020-06-09 DIAGNOSIS — D70.9 CHRONIC NEUTROPENIA (HCC): ICD-10-CM

## 2020-06-09 PROCEDURE — G0439 PPPS, SUBSEQ VISIT: HCPCS | Performed by: INTERNAL MEDICINE

## 2020-06-09 ASSESSMENT — FIBROSIS 4 INDEX: FIB4 SCORE: 2.16

## 2020-06-09 ASSESSMENT — ENCOUNTER SYMPTOMS: GENERAL WELL-BEING: GOOD

## 2020-06-09 ASSESSMENT — PATIENT HEALTH QUESTIONNAIRE - PHQ9: CLINICAL INTERPRETATION OF PHQ2 SCORE: 0

## 2020-06-09 ASSESSMENT — ACTIVITIES OF DAILY LIVING (ADL): BATHING_REQUIRES_ASSISTANCE: 0

## 2020-06-09 NOTE — PROGRESS NOTES
Chief Complaint   Patient presents with   • Annual Wellness Visit         HPI:  Katerina is a 79 y.o. here for Medicare Annual Wellness Visit  meds and allergies reviewed, medical history, surgical history, social history, family history reviewed and updated  Sees dermatology uses sunscreen  Sees ophthalmology  SH , lives with , keeps active, no tobacco, still skis during the winter, goes walking or hiking on occasion but mostly enjoys working in her yard uses sunscreen but not a hat  No chest pain, shortness of breath, cough  Practicing social distancing and using a mask when in crowds indoors      Patient Active Problem List    Diagnosis Date Noted   • History of palpitations 06/05/2020   • Aortic regurgitation 04/27/2018   • Esophageal dysmotility 07/18/2017   • History of hypertension 07/18/2017   • S/P knee replacement 06/20/2017   • Colon polyp 03/13/2017   • Macular pucker 10/25/2016   • Decreased GFR 08/20/2015   • History of polymyalgia rheumatica 07/28/2015   • Abnormal cardiovascular stress test 03/24/2014   • Knee pain, left 08/01/2013   • Glaucoma 06/29/2010   • Skin cancer, basal cell 06/29/2010   • Osteopenia 06/03/2009   • Dyslipidemia 06/03/2009   • Chronic neutropenia (HCC) 06/03/2009   • Preventative health care 06/03/2009   • Aortic stenosis 06/03/2009                s/p knee replacement  3/3/17 MRI right knee abnormal right lateral meniscus with peripheral extrusion, maceration, and complex tear involving primarily the posterior horn and body but also the anterior horn, abnormal medial meniscus with vertical tear of the peripheral zone of body and posterior horn, probable meniscal root tear, and degenerative fraying of the free edge, severe lateral femorotibial compartment osteoarthritis with significant cartilage loss and moderate to severe subchondral edema within the lateral femoral condyle and lateral tibial plateau, mild medial femorotibial and the patellofemoral compartment  osteoarthritis, moderate sized joint effusion with associated popliteal cyst  6/20/17 sees  orthopedics  1/18/18  orthopedic note right knee end-stage arthritis, x-rays bone-on-bone right knee arthritis lateral compartment, right knee steroid injection performed, planned for total right knee arthroplasty after winter  5/2/18  orthopedic note, right knee osteoathritis, failed conservative measures, scheduled for right knee surgery  5/8/18  operative note right knee arthroplasty   5/23/18  orthopedic note 2 weeks s/p right total knee  8/6/18  orthopedic note 3 months status post right knee replacement doing well, x-ray shows stable right total knee  5/22/19  orthopedic note x-ray stable total knee replacement, follow-up 1 year    Skin cancer basal cell     Preventative health  7/23/12 tdap   1/10/13 pneumovax  7/29/14 zoster vaccine  8/6/15 prevnar at [facility]  9/1/16 sonocine negative  3/7/17 colon per [facility] 2 polyps, grade 2 internal hemorrhoids, angioectasias ascending colon,pathology one hyperplastic polyp and onesessile serrated polyp, repeat colonoscopy 5 years   4/5/18 declines sonocine  5/14/19 vit d 46  5/24/19 mammogram heterogeneously dense breast tissue recommend screening breast ultrasound  5/28/19 dexa LS-1.0,hip-1.4     Osteopenia  9/06 dexa LS -1.2,hip -1.1  6/09 dexa LS -1.4,hip -1.0  7/11 vit d 43  7/11 dexa LS -1.1,hip -0.9  712 vit d 30 start 1000 units  7/24/13 vit d 72 on 1000 units  8/6/13 dexa LS -1.1,hip -1.1  8/5/14 vit d 67  8/19/15 dexa LS -1.4,hip -1.3;FRAX 40% major,27% hip only on prednisone one month, does not need bisphosphonate therapy  8/19/15 vit d 43  6/16/16 off prednisone x 3 weeks  6/21/16 vit d 50  6/27/17 vit d 48  4/27/18 vit d 53   5/14/19 vit d 46  5/28/19 dexa LS-1.0,hip-1.4     knee pain left  8/1/13 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, lateral meniscus mildly extruded, ruptured baker's cyst  8/12/13   ortho note pain improved on followup, consider injection if pain recurs     History polymyalgia  4/20/15 physical therapy, trial celebrex and zanaflex  5/7/15 physical therapy evaluation today recommended right hip x-ray and pelvic x-ray, ordered in computer  5/8/15 xray hip negative  6/29/15 seen by bridgette diagnosed with polymyalgia rheumatica  6/29/15 CRP 9.4,ESR 32 started on prednisone 20 mg    7/28/15 on prednisone 10 mg will continue 10 mg x 2 weeks, then decrease to 5 mg daily  8/19/15 hgb 14,hct 43, CRP 0.3, ESR 14, tapered off prednisone but developed mouth irritation, has resumed prednisone 5 mg daily, will continue x2 weeks then taper  11/17/15 refill prednisone 5 mg #30 tabs x one  6/21/16 off prednisone; CRP 0.1,ESR 17  6/27/17 CRP 0.09,ESR 12,cpk 66     histroy hypertension  3/31/14 cardiac catheterization; left main mild plaquing, LAD patent, circumflex free of disease, RCA free of disease, normal LV function, mild aortic stenosis  7/9/17 echo normal LV size and function, EF 60%, grade 1 diastolic dysfunction, moderate aortic stenosis peak gradient 50, mean 29, valve area 0.8-1.0 and aortic regurgitation  7/9/17 persantine thallium small fixed perfusion abnormality distal anterior and anteroapical segments, no ischemia is noted to represent mild prior infarct or variant normal apical thinning  7/18/17 start diltiazem 120 mg daily (also has esophageal dysmotility by barium swallow)  4/26/18 off diltiazem  4/27/18 echo normal LV size and function, EF 65%, moderate aortic stenosis peak gradient 46, valve area 0.9,, moderate aortic insufficiency, no change compared to previous  5/27/19 echo normal LV size and function, EF 65%, normal diastolic function, calcified aortic valve moderate aortic stenosis peak gradient 53, moderate aortic insufficiency     History of breast cancer  1980s left sided s/p lumpectomy and radiation therapy, no old records  7/11 mammogram  8/12 mammogram  8/13  mammogram  8/18/14 mammogram  9/1/16 mammogram heterogeneously dense breast tissue recommend screening breast ultrasound  9/1/16 sonocine negative     Glaucoma     Esophageal dysmotility  7/9/17 barium swallow moderate esophageal dysmotility, small volume silent aspiration  7/18/17 start diltiazem 120 mg daily  7/18/17 referral [facility], speech pathology for esophageal dysmotility, small amount of aspiration on barium swallow, try low-dose diltiazem 120 mg for esophageal dysmotility and elevated blood pressure  7/26/17 [facility] evaluation dyspepsia, obtain cardiology clearance and then proceed EGD, initiate pantoprazole 20 mg, trial of miralax and colace     Dyspnea  7/24/13 normal LV function, EF 60%, mild LVH, mild aortic stenosis, peak gradient 25  3/20/14 cxr negative  3/31/14 cardiac catheterization; left main mild lacking, LAD patent, circumflex free of disease, RCA free of disease, normal LV function, mild aortic stenosis  8/1/14 PFT FEV 2.8 L (144% predicted), FVC 3.6 (138% predicted), FEV/FVC 78, no bronchodilator response, DLCO 119% predicted     Dyslipidemia  3/08 chol 175,trig 73,hdl 45,ldl 115  6/09 chol 174,trig 89,hdl 47,ldl 109  7/10 chol 182,trig 61,hdl 55,ldl 114  7/11 chol 175,trig 69,hdl 45,ldl 116  7/12 chol 164,trig 64,hdl 43,ldl 108  7/24/13 chol 186,trig 66,hdl 54,ldl 119 no meds  8/5/14 chol 165,trig 93,hdl 48,ldl 98  8/19/15 chol 192,trig 109,hdl 58,ldl 112; 10 year risk 20% declines statin therapy  6/21/16 chol 137,trig 75,hdl 47,ldl 75   6/27/17 hcol 153,trig 94,hdl 54,ldl 80  5/14/19 chol 164,trig 82,hdl 48,ldl 100      Decrease GFR  7/7/11 bun 16,creat 1.0,GFR 50  7/24/13 bun 14,creat 1.1,GFR 45  3/20/14 bun 15,creat 0.9,GFR 59  8/5/14 bun 14,creat 1.1,GFR 46  2/19/15 bun 13,creat 0.8,GFR >60  8/19/15 bun 10,creat 1.1,GFR 48  6/21/16 bun 19,creat 1.0,GFR 50  6/27/17 bun 14,creat 0.5,GFR 51  4/28/18 bun 15,creat 0.9,GFR 60  5/14/19 bun 18,creat 1.13,GFR 46    colon polyp  3/7/17 colon per [facility] 2  polyps, grade 2 internal hemorrhoids, angioectasias ascending colon,pathology one hyperplastic polyp and onesessile serrated polyp, repeat colonoscopy 5 years      Chronic neutropenia  8/06 wbc 3.4  3/08 wbc 3.9  6/09 wbc 3.9  7/10 wbc 4.4  7/11 wbc 4.5  7/12 wbc 3.8 (55%N,35%L)  7/24/13 wbc 4.3  3/20/14 wbc 3.1 (52%N,36%L)  3/31/14 wbc 3.7  8/19/15 wbc 5.3  6/21/16 wbc 4.4 (50%N,40%L)  6/27/17 wbc 4.2  4/27/18 wbc 4.4   5/14/19 wbc 4.5     Aortic stenosis  10/06 echo mild mr, normal LV  6/09 stress echo negative  8/12 echo normal LV function, EF 65%, mild aortic stenosis, peak gradient 24  7/24/13 normal LV function, EF 60%, mild LVH, mild aortic stenosis, peak gradient 25  3/31/14 cardiac catheterization; left main mild lacking, LAD patent, circumflex free of disease, RCA free of disease, normal LV function, mild aortic stenosis  7/9/17 echo normal LV size and function, EF 60%, grade 1 diastolic dysfunction, moderate aortic stenosis peak gradient 50, mean 29, valve area 0.8-1.0 and aortic regurgitation  7/9/17 persantine thallium small fixed perfusion abnormality distal anterior and anteroapical segments, no ischemia is noted to represent mild prior infarct or variant normal apical thinning  4/27/18 echo normal LV size and function, EF 65%, moderate aortic stenosis peak gradient 46, valve area 0.9,, moderate aortic insufficiency, no change compared to previous  5/27/19 echo normal LV size and function, EF 65%, normal diastolic function, calcified aortic valve moderate aortic stenosis peak gradient 53, moderate aortic insufficiency     Aortic regurgitation  10/06 echo mild mitral regurgitation, normal LV  6/09 stress echo negative  8/12 echo normal LV function, EF 65%, mild aortic stenosis, peak gradient 24  7/24/13 normal LV function, EF 60%, mild LVH, mild aortic stenosis, peak gradient 25  3/31/14 cardiac catheterization; left main and LAD patent, circumflex free of disease, RCA free of disease, normal LV  function, mild aortic stenosis  7/9/17 echo normal LV size and function, EF 60%, grade 1 diastolic dysfunction, moderate aortic stenosis peak gradient 50, mean 29, valve area 0.8-1.0 and aortic regurgitation  7/9/17 persantine thallium small fixed perfusion abnormality distal anterior and anteroapical segments, no ischemia is noted to represent mild prior infarct or variant normal apical thinning  4/27/18 echo normal LV size and function, EF 65%, moderate aortic stenosis peak gradient 46, valve area 0.9,, moderate aortic insufficiency, no change compared to previous  5/27/19 echo normal LV size and function, EF 65%, normal diastolic function, calcified aortic valve moderate aortic stenosis peak gradient 53, moderate aortic insufficiency     Abnormal cardiovascular test  10/06 echo mild mitral regurgitation, normal LV  6/09 stress echo negative  8/12 echo normal LV function, EF 65%, mild aortic stenosis, peak gradient 24  7/24/13 normal LV function, EF 60%, mild LVH, mild aortic stenosis, peak gradient 25  3/20/14 seen ER palpitations, atypical chest pain  3/24/14 exercise treadmill carla protocol 5 minutes 5 seconds, 1 mm mild upsloping ST segment depression in V6, flat ST segment depression V4 and V5 compatible with ischemia, no arrhythmia noted  3/28/14 cardiology note, scheduled for cardiac catheterization, cont asa, metoprolol 25 mg bid, crestor 5 mg samples  3/31/14 cardiac catheterization; left main mild plaquing, LAD patent, circumflex free of disease, RCA free of disease, normal LV function, mild aortic stenosis  7/9/17 echo normal LV size and function, EF 60%, grade 1 diastolic dysfunction, moderate aortic stenosis peak gradient 50, mean 29, valve area 0.8-1.0 and aortic regurgitation  7/9/17 persantine thallium small fixed perfusion abnormality distal anterior and anteroapical segments, no ischemia is noted to represent mild prior infarct or variant normal apical thinning  4/27/18 echo normal LV size and  function, EF 65%, moderate aortic stenosis peak gradient 46, valve area 0.9,, moderate aortic insufficiency, no change compared to previous  5/27/19 echo normal LV size and function, EF 65%, normal diastolic function, calcified aortic valve moderate aortic stenosis peak gradient 53, moderate aortic insufficiency      Current Outpatient Medications   Medication Sig Dispense Refill   • busPIRone (BUSPAR) 10 MG Tab tablet Take 1 Tab by mouth every evening. 30 Tab 3   • aspirin EC (ECOTRIN) 81 MG Tablet Delayed Response Take 81 mg by mouth every day.     • acetaminophen (TYLENOL) 500 MG Tab Take 1,000 mg by mouth every 6 hours as needed.     • nitroglycerin (NITROSTAT) 0.4 MG SL Tab Place 0.4 mg under tongue every 5 minutes as needed for Chest Pain.     • bimatoprost (LUMIGAN) 0.01 % Solution Place 1 Drop in both eyes every bedtime. Indications: Wide-Angle Glaucoma     • vitamin D (CHOLECALCIFEROL) 1000 UNIT Tab Take 1,000 Units by mouth every day.     • Magnesium 500 MG Tab Take 1 Cap by mouth every day.       No current facility-administered medications for this visit.         Patient is taking medications as noted in medication list.  Current supplements as per medication list.     Allergies: Vicodin [hydrocodone-acetaminophen]    Current social contact/activities: Patient reports painting, gardening      Is patient current with immunizations? No, due for SHINGRIX (Shingles). Patient is interested in receiving NONE today.    She  reports that she quit smoking about 21 years ago. Her smoking use included cigarettes. She has a 5.00 pack-year smoking history. She has never used smokeless tobacco. She reports current alcohol use. She reports that she does not use drugs.  Counseling given: Not Answered        DPA/Advanced directive: Patient does not have an Advanced Directive.  A packet and workshop information was given on Advanced Directives.    ROS:    Gait: Uses no assistive device    Ostomy: No   Other tubes:  no  Amputations: No   Chronic oxygen use No   Last eye exam Patient reports going every 4 months so she has an appt next week    Wears hearing aids: No    : Reports urinary leakage during the last 6 months that has somewhat interfered with their daily activities or sleep.           Depression Screening    Little interest or pleasure in doing things?  0 - not at all  Feeling down, depressed, or hopeless? 0 - not at all  Patient Health Questionnaire Score: 0    If depressive symptoms identified deferred to follow up visit unless specifically addressed in assessment and plan.    Interpretation of PHQ-9 Total Score   Score Severity   1-4 No Depression   5-9 Mild Depression   10-14 Moderate Depression   15-19 Moderately Severe Depression   20-27 Severe Depression    Screening for Cognitive Impairment    Three Minute Recall (village, kitchen, baby)  2/3    Marvin clock face with all 12 numbers and set the hands to show 10 past 10.  No 4/5  If cognitive concerns identified, deferred for follow up unless specifically addressed in assessment and plan.    Fall Risk Assessment    Has the patient had two or more falls in the last year or any fall with injury in the last year?  No  If fall risk identified, deferred for follow up unless specifically addressed in assessment and plan.    Safety Assessment    Throw rugs on floor.  Yes  Handrails on all stairs.  Yes  Good lighting in all hallways.  Yes  Difficulty hearing.  No  Patient counseled about all safety risks that were identified.    Functional Assessment ADLs    Are there any barriers preventing you from cooking for yourself or meeting nutritional needs?  No.    Are there any barriers preventing you from driving safely or obtaining transportation?  No.    Are there any barriers preventing you from using a telephone or calling for help?  No.    Are there any barriers preventing you from shopping?  No.    Are there any barriers preventing you from taking care of your own  finances?  No.    Are there any barriers preventing you from managing your medications?  No.    Are there any barriers preventing you from showering, bathing or dressing yourself?  No.    Are you currently engaging in any exercise or physical activity?  Yes.  Patient reports doing 100 times touching her head to her knees in the tub  What is your perception of your health?  Good.    Health Maintenance Summary                IMM ZOSTER VACCINES Overdue 2014      Done 2014 Imm Admin: Zoster Vaccine Live (ZVL) (Zostavax)    Annual Wellness Visit Overdue 2020      Done 2019 Visit Dx: Medicare annual wellness visit, subsequent     Patient has more history with this topic...    MAMMOGRAM Overdue 2020      Done 2019 MA-SCREENING MAMMO BILAT W/TOMOSYNTHESIS W/CAD     Patient has more history with this topic...    IMM INFLUENZA Next Due 2020      Done 10/5/2018 Imm Admin: Influenza Vaccine Adult HD     Patient has more history with this topic...    COLONOSCOPY Next Due 3/7/2022      Done 3/7/2017 REFERRAL TO GI FOR COLONOSCOPY    IMM DTaP/Tdap/Td Vaccine Next Due 2022      Done 2012 Imm Admin: Tdap Vaccine    BONE DENSITY Next Due 2024      Done 2019 DS-BONE DENSITY STUDY (DEXA)     Patient has more history with this topic...          Patient Care Team:  Master Suarez M.D. as PCP - General  Chris Bernstein M.D. as Consulting Physician (Ophthalmology)  Amanda Rodriguez M.D. as Consulting Physician (Ophthalmology)  Manoj Tidwell M.D. as Consulting Physician (Orthopaedics)  Gian Caro, PT, DPT as Physical Therapy (Physical Therapy)    Social History     Tobacco Use   • Smoking status: Former Smoker     Packs/day: 0.50     Years: 10.00     Pack years: 5.00     Types: Cigarettes     Last attempt to quit: 1999     Years since quittin.4   • Smokeless tobacco: Never Used   Substance Use Topics   • Alcohol use: Yes     Alcohol/week: 0.0 oz     Comment: 1 glass  "wine/day   • Drug use: No     Family History   Problem Relation Age of Onset   • Heart Disease Father         CAD MI   • Stroke Father    • Cancer Sister         breast   • Cancer Mother         breast     She  has a past medical history of Breast cancer ([facility]), Breath shortness, Bruises easily, Cancer (HCC) (1989), Cardiac murmur (6/3/2009), Chronic kidney disease, stage III (moderate) ([facility]) (8/20/2015), Dyslipidemia (6/3/2009), Glaucoma, Hepatitis A (1993), Hiatus hernia syndrome, breast cancer (6/3/2009), Hypertension (7/18/2017), MEDICAL HOME, Neutropenia (6/3/2009), Osteopenia (6/3/2009), Preventative health care (6/3/2009), Snoring, and Unspecified cataract. She also has no past medical history of CAD (coronary artery disease), COPD, Liver disease, or Seizure disorder ([facility]).   Past Surgical History:   Procedure Laterality Date   • KNEE ARTHROPLASTY TOTAL Right 5/8/2018    Procedure: KNEE ARTHROPLASTY TOTAL;  Surgeon: Thanh Hall M.D.;  Location: SURGERY [facility];  Service: Orthopedics   • VITRECTOMY POSTERIOR Left 10/25/2016    Procedure: VITRECTOMY POSTERIOR membrane peel cryotherapy infusion of SF6 intraocular gas;  Surgeon: Amanda Rodriguez M.D.;  Location: SURGERY SAME [facility];  Service:    • RECOVERY  3/31/2014    Performed by [facility] at SURGERY SAME DAY [facility]   • CATARACT PHACO WITH IOL  5/28/2009    Performed by [facility]AUX at SURGERY SAME DAY [facility]   • CATARACT PHACO WITH IOL  5/14/2009    Performed by GERA BREAUX at SURGERY SAME DAY [facility]   • LUMPECTOMY     • OTHER     • PB RADIATION THERAPY PLAN SIMPLE             Exam:     /74   Pulse 68   Temp 37 °C (98.6 °F) (Temporal)   Ht 1.626 m (5' 4\")   Wt 65.8 kg (145 lb)   SpO2 98%  Body mass index is 24.89 kg/m².    Hearing and dentition fair   Alert, oriented in no acute distress.  Eye contact is good, speech goal directed, affect calm  Lungs clear  Cardiovascular S1-S2 " systolic ejection murmur  Extremities no edema    Assessment and Plan. The following treatment and monitoring plan is recommended:    Assessment  #! Medicare wellness assessment    #2 history of basal cell skin cancer followed by dermatology uses sunscreen    #3 aortic stenosis last echo 1 year ago moderate stenosis, currently asymptomatic    #4 dyslipidemia diet controlled    #5 neutropenia chronic no recurrent infections    #6 CKD stage III stable no fluid overload    Services suggested:    Health Care Screening recommendations as per orders if indicated.  Referrals offered: PT/OT/Nutrition counseling/Behavioral Health/Smoking cessation as per orders if indicated.    Discussion today about general wellness and lifestyle habits:    · Prevent falls and reduce trip hazards; Cautioned about securing or removing rugs.  · Have a working fire alarm and carbon monoxide detector;   · Engage in regular physical activity and social activities       Follow-up:   Plan  #! Health maintenance reviewed and updated     #2 echo, signs or symptoms of aortic stenosis discussed, if develops chest pain, shortness of breath, lightheadedness to call us    #3 labs    #4 mammogram    #5 dexa next year    #6 will get shingrix today at [facility]    #7  Continue no NSAIDs    #8 nutrition, diet, exercise discussed    #9 COVID antibody    #10 continue practicing social distancing measures    #11 continue activity, falling precautions, use sunscreen, wear a hat when outdoors, follow-up dermatology    #12 follow-up 1 year

## 2020-06-12 ENCOUNTER — HOSPITAL ENCOUNTER (OUTPATIENT)
Dept: RADIOLOGY | Facility: MEDICAL CENTER | Age: 80
End: 2020-06-12
Attending: INTERNAL MEDICINE
Payer: MEDICARE

## 2020-06-12 ENCOUNTER — HOSPITAL ENCOUNTER (OUTPATIENT)
Dept: LAB | Facility: MEDICAL CENTER | Age: 80
End: 2020-06-12
Attending: INTERNAL MEDICINE
Payer: MEDICARE

## 2020-06-12 DIAGNOSIS — Z12.31 ENCOUNTER FOR SCREENING MAMMOGRAM FOR BREAST CANCER: ICD-10-CM

## 2020-06-12 DIAGNOSIS — Z20.822 EXPOSURE TO COVID-19 VIRUS: ICD-10-CM

## 2020-06-12 DIAGNOSIS — E78.5 DYSLIPIDEMIA: Chronic | ICD-10-CM

## 2020-06-12 DIAGNOSIS — E55.9 VITAMIN D DEFICIENCY: ICD-10-CM

## 2020-06-12 LAB
25(OH)D3 SERPL-MCNC: 88 NG/ML (ref 30–100)
ALBUMIN SERPL BCP-MCNC: 4.3 G/DL (ref 3.2–4.9)
ALBUMIN/GLOB SERPL: 1.7 G/DL
ALP SERPL-CCNC: 53 U/L (ref 30–99)
ALT SERPL-CCNC: 18 U/L (ref 2–50)
ANION GAP SERPL CALC-SCNC: 11 MMOL/L (ref 7–16)
AST SERPL-CCNC: 25 U/L (ref 12–45)
BASOPHILS # BLD AUTO: 1.1 % (ref 0–1.8)
BASOPHILS # BLD: 0.05 K/UL (ref 0–0.12)
BILIRUB SERPL-MCNC: 0.6 MG/DL (ref 0.1–1.5)
BUN SERPL-MCNC: 15 MG/DL (ref 8–22)
CALCIUM SERPL-MCNC: 9.8 MG/DL (ref 8.5–10.5)
CHLORIDE SERPL-SCNC: 103 MMOL/L (ref 96–112)
CHOLEST SERPL-MCNC: 176 MG/DL (ref 100–199)
CO2 SERPL-SCNC: 25 MMOL/L (ref 20–33)
CREAT SERPL-MCNC: 1.07 MG/DL (ref 0.5–1.4)
EOSINOPHIL # BLD AUTO: 0.08 K/UL (ref 0–0.51)
EOSINOPHIL NFR BLD: 1.8 % (ref 0–6.9)
ERYTHROCYTE [DISTWIDTH] IN BLOOD BY AUTOMATED COUNT: 47.5 FL (ref 35.9–50)
FASTING STATUS PATIENT QL REPORTED: NORMAL
GLOBULIN SER CALC-MCNC: 2.5 G/DL (ref 1.9–3.5)
GLUCOSE SERPL-MCNC: 98 MG/DL (ref 65–99)
HCT VFR BLD AUTO: 44.8 % (ref 37–47)
HDLC SERPL-MCNC: 57 MG/DL
HGB BLD-MCNC: 14.6 G/DL (ref 12–16)
IMM GRANULOCYTES # BLD AUTO: 0.01 K/UL (ref 0–0.11)
IMM GRANULOCYTES NFR BLD AUTO: 0.2 % (ref 0–0.9)
LDLC SERPL CALC-MCNC: 103 MG/DL
LYMPHOCYTES # BLD AUTO: 1.39 K/UL (ref 1–4.8)
LYMPHOCYTES NFR BLD: 31.2 % (ref 22–41)
MCH RBC QN AUTO: 31.5 PG (ref 27–33)
MCHC RBC AUTO-ENTMCNC: 32.6 G/DL (ref 33.6–35)
MCV RBC AUTO: 96.6 FL (ref 81.4–97.8)
MONOCYTES # BLD AUTO: 0.3 K/UL (ref 0–0.85)
MONOCYTES NFR BLD AUTO: 6.7 % (ref 0–13.4)
NEUTROPHILS # BLD AUTO: 2.63 K/UL (ref 2–7.15)
NEUTROPHILS NFR BLD: 59 % (ref 44–72)
NRBC # BLD AUTO: 0 K/UL
NRBC BLD-RTO: 0 /100 WBC
PLATELET # BLD AUTO: 200 K/UL (ref 164–446)
PMV BLD AUTO: 10.6 FL (ref 9–12.9)
POTASSIUM SERPL-SCNC: 4.7 MMOL/L (ref 3.6–5.5)
PROT SERPL-MCNC: 6.8 G/DL (ref 6–8.2)
RBC # BLD AUTO: 4.64 M/UL (ref 4.2–5.4)
SODIUM SERPL-SCNC: 139 MMOL/L (ref 135–145)
TRIGL SERPL-MCNC: 80 MG/DL (ref 0–149)
TSH SERPL DL<=0.005 MIU/L-ACNC: 1.29 UIU/ML (ref 0.38–5.33)
WBC # BLD AUTO: 4.5 K/UL (ref 4.8–10.8)

## 2020-06-12 PROCEDURE — 80061 LIPID PANEL: CPT

## 2020-06-12 PROCEDURE — 82306 VITAMIN D 25 HYDROXY: CPT

## 2020-06-12 PROCEDURE — 85025 COMPLETE CBC W/AUTO DIFF WBC: CPT

## 2020-06-12 PROCEDURE — 36415 COLL VENOUS BLD VENIPUNCTURE: CPT

## 2020-06-12 PROCEDURE — 86769 SARS-COV-2 COVID-19 ANTIBODY: CPT

## 2020-06-12 PROCEDURE — 84443 ASSAY THYROID STIM HORMONE: CPT

## 2020-06-12 PROCEDURE — 80053 COMPREHEN METABOLIC PANEL: CPT

## 2020-06-12 PROCEDURE — 77067 SCR MAMMO BI INCL CAD: CPT

## 2020-06-13 ENCOUNTER — TELEPHONE (OUTPATIENT)
Dept: MEDICAL GROUP | Facility: MEDICAL CENTER | Age: 80
End: 2020-06-13

## 2020-06-13 NOTE — TELEPHONE ENCOUNTER
Notified with labs, she will decrease vitamin D intake from 1000 units daily to 1000 units every other day

## 2020-06-15 ENCOUNTER — TELEPHONE (OUTPATIENT)
Dept: MEDICAL GROUP | Facility: MEDICAL CENTER | Age: 80
End: 2020-06-15

## 2020-06-15 DIAGNOSIS — R92.8 ABNORMAL MAMMOGRAM OF RIGHT BREAST: ICD-10-CM

## 2020-06-15 LAB — SARS-COV-2 IGG+IGM SERPL QL IA: NORMAL

## 2020-06-15 NOTE — TELEPHONE ENCOUNTER
----- Message from Master Suarez M.D. sent at 6/15/2020 12:48 PM PDT -----  Please notify patient that her COVID antibody test is negative

## 2020-06-18 ENCOUNTER — HOSPITAL ENCOUNTER (OUTPATIENT)
Dept: RADIOLOGY | Facility: MEDICAL CENTER | Age: 80
End: 2020-06-18
Attending: INTERNAL MEDICINE
Payer: MEDICARE

## 2020-06-18 ENCOUNTER — TELEPHONE (OUTPATIENT)
Dept: MEDICAL GROUP | Facility: MEDICAL CENTER | Age: 80
End: 2020-06-18

## 2020-06-18 DIAGNOSIS — R92.8 ABNORMAL MAMMOGRAM OF RIGHT BREAST: ICD-10-CM

## 2020-06-18 PROCEDURE — 77065 DX MAMMO INCL CAD UNI: CPT | Mod: RT

## 2020-06-18 NOTE — TELEPHONE ENCOUNTER
----- Message from Master Suarez M.D. sent at 6/18/2020 12:37 PM PDT -----  Please notify the patient that her diagnostic mammogram is negative.  The radiologist recommends continuing annual mammography in 1 year.

## 2020-06-18 NOTE — TELEPHONE ENCOUNTER
Please notify the patient that her diagnostic mammogram is negative.  The radiologist recommends continuing annual mammography in 1 year.

## 2020-06-23 ENCOUNTER — PATIENT OUTREACH (OUTPATIENT)
Dept: HEALTH INFORMATION MANAGEMENT | Facility: OTHER | Age: 80
End: 2020-06-23

## 2020-06-23 NOTE — PROGRESS NOTES
Called and wish the member a happy birthday. There was nothing the member needed at this time. If the member calls back, please transfer to [phone].

## 2020-06-24 ENCOUNTER — TELEPHONE (OUTPATIENT)
Dept: HEALTH INFORMATION MANAGEMENT | Facility: OTHER | Age: 80
End: 2020-06-24

## 2020-06-24 NOTE — TELEPHONE ENCOUNTER
1. Caller Name: Katerina Torres                Call Back Number: [phone]  [facility] PCP or Specialty Provider: Yes Mastershai Suarez M.D.        2.  In the last two weeks, has the patient had any new or worsening symptoms (not explained by alternative diagnosis)? No.  Pt is having chronic headaches for 3 weeks usually lasts just a while but is severe enough to take medication. Affecting whole head, gets shooting pain behind L ear, then makes face/teeth and front of forehead hurt.     3.  Does patient have any comoribidities? None     4.  Has the patient traveled in the last 14 days OR had any known contact with someone who is suspected or confirmed to have COVID-19?  No.    5. Disposition: Cleared by RN Triage as potential is low for COVID-19; OK to keep/schedule appointment  Cleared by triage RN to keep appointment on 6/25.     Note routed to [facility] Provider: [facility] only.

## 2020-06-25 ENCOUNTER — OFFICE VISIT (OUTPATIENT)
Dept: MEDICAL GROUP | Facility: MEDICAL CENTER | Age: 80
End: 2020-06-25
Payer: MEDICARE

## 2020-06-25 VITALS
TEMPERATURE: 98.6 F | HEART RATE: 78 BPM | OXYGEN SATURATION: 97 % | SYSTOLIC BLOOD PRESSURE: 138 MMHG | BODY MASS INDEX: 25.87 KG/M2 | WEIGHT: 146 LBS | DIASTOLIC BLOOD PRESSURE: 72 MMHG | HEIGHT: 63 IN

## 2020-06-25 DIAGNOSIS — G44.209 TENSION-TYPE HEADACHE, NOT INTRACTABLE, UNSPECIFIED CHRONICITY PATTERN: ICD-10-CM

## 2020-06-25 PROCEDURE — 99213 OFFICE O/P EST LOW 20 MIN: CPT | Performed by: INTERNAL MEDICINE

## 2020-06-25 RX ORDER — SUMATRIPTAN 100 MG/1
100 TABLET, FILM COATED ORAL
Qty: 10 TAB | Refills: 3 | Status: SHIPPED | OUTPATIENT
Start: 2020-06-25 | End: 2020-06-26 | Stop reason: SDUPTHER

## 2020-06-25 ASSESSMENT — FIBROSIS 4 INDEX: FIB4 SCORE: 2.415948169054037375

## 2020-06-25 NOTE — PROGRESS NOTES
Subjective:      Katerina Torres is a 80 y.o. female who presents with Follow-Up (headaches/ SEVERE)            HPI   Has been having headaches for the past three weeks right back of head then radiation to bilateral front teeth area and frontal area and is like a pounding hammer, will also see wiggly lines in front of eyes, no visual loss, no emesis, but does have nausea, no recent head injury and trauma, no weakness of arms or legs, has tried aleve two for the headache no benefit, takes tylenol and does help.  No history of tooth grinding, no history of temporal arteritis, no cervical radiculopathy.  Headaches occur 2-3 times per week.  No history recurrent concussions, no seizures.  No tooth grinding.  No recurrent sinus infections or history of sinus infections.  No fevers, cough, shortness of breath, no neck stiffness.  No covert exposures.  No depression.  Good social support with .  Has history of migraine headaches when younger but has not had a migraine in many years        Current Outpatient Medications   Medication Sig Dispense Refill   • acetaminophen (TYLENOL) 500 MG Tab Take 1,000 mg by mouth every 6 hours as needed.     • bimatoprost (LUMIGAN) 0.01 % Solution Place 1 Drop in both eyes every bedtime. Indications: Wide-Angle Glaucoma     • vitamin D (CHOLECALCIFEROL) 1000 UNIT Tab Take 1,000 Units by mouth.     • nitroglycerin (NITROSTAT) 0.4 MG SL Tab Place 0.4 mg under tongue every 5 minutes as needed for Chest Pain.     • Magnesium 500 MG Tab Take 1 Cap by mouth every day.       No current facility-administered medications for this visit.      Patient Active Problem List   Diagnosis   • History of breast cancer   • Osteopenia   • Dyslipidemia   • Neutropenia (HCC)   • Preventative health care   • Aortic stenosis   • Glaucoma   • Skin cancer, basal cell   • Knee pain, left   • Abnormal cardiovascular stress test   • History of polymyalgia rheumatica   • Decreased GFR   • Macular pucker  "  • Colon polyp   • S/P knee replacement   • Esophageal dysmotility   • History of hypertension   • Aortic regurgitation   • History of palpitations           Health Maintenance Summary                IMM ZOSTER VACCINES Next Due 8/4/2020      Done 6/9/2020 Imm Admin: Recombinant Zoster Vaccine (RZV) (Shingrix)     Patient has more history with this topic...    IMM INFLUENZA Next Due 9/1/2020      Done 10/5/2018 Imm Admin: Influenza Vaccine Adult HD     Patient has more history with this topic...    Annual Wellness Visit Next Due 6/10/2021      Done 6/9/2020 Visit Dx: Medicare annual wellness visit, subsequent     Patient has more history with this topic...    MAMMOGRAM Next Due 6/12/2021      Done 6/12/2020 MA-SCREENING MAMMO BILAT W/TOMOSYNTHESIS W/CAD     Patient has more history with this topic...    COLONOSCOPY Next Due 3/7/2022      Done 3/7/2017 REFERRAL TO GI FOR COLONOSCOPY    IMM DTaP/Tdap/Td Vaccine Next Due 7/23/2022      Done 7/23/2012 Imm Admin: Tdap Vaccine    BONE DENSITY Next Due 5/24/2024      Done 5/24/2019 DS-BONE DENSITY STUDY (DEXA)     Patient has more history with this topic...          Patient Care Team:  Master Suarez M.D. as PCP - General  Chris Bernstein M.D. as Consulting Physician (Ophthalmology)  Amanda Rodriguez M.D. as Consulting Physician (Ophthalmology)  Manoj Tidwell M.D. as Consulting Physician (Orthopaedics)  Gian Caro, PT, DPT as Physical Therapy (Physical Therapy)      ROS       Objective:     /72 (BP Location: Right arm, Patient Position: Sitting, BP Cuff Size: Adult)   Pulse 78   Temp 37 °C (98.6 °F)   Ht 1.6 m (5' 3\")   Wt 66.2 kg (146 lb)   SpO2 97%   BMI 25.86 kg/m²      Physical Exam  Vitals signs and nursing note reviewed.   Constitutional:       Appearance: Normal appearance.   HENT:      Head: Normocephalic and atraumatic.      Right Ear: Tympanic membrane and external ear normal.      Left Ear: Tympanic membrane and external ear normal.      " Mouth/Throat:      Pharynx: No oropharyngeal exudate.   Eyes:      General:         Right eye: No discharge.   Neck:      Musculoskeletal: Neck supple.   Cardiovascular:      Rate and Rhythm: Normal rate and regular rhythm.   Pulmonary:      Effort: Pulmonary effort is normal.      Breath sounds: Normal breath sounds.   Abdominal:      General: There is no distension.   Musculoskeletal:         General: No swelling.   Skin:     General: Skin is warm.   Neurological:      General: No focal deficit present.      Mental Status: She is alert.      Cranial Nerves: No cranial nerve deficit.      Sensory: No sensory deficit.      Motor: No weakness.      Gait: Gait normal.   Psychiatric:         Mood and Affect: Mood normal.         Behavior: Behavior normal.       Cranial nerves intact, normal cervical range of motion  No mastoid tenderness, no parotid tenderness  No neck adenopathy  Normal finger-to-nose  Normal affect, insight, judgment  No palpable temporal artery bruit          Assessment/Plan:     Assessment  #! Migraine headache, no evidence of cervical radiculopathy, no evidence of sinusitis or mastoiditis, do not suspect temporal arteritis, no polymyalgia        Plan  #! CT head    #2 imitrex as needed      #3 offered pamelor low dose but she declines for now     #4 we will call with results, call me next week for update on symptoms

## 2020-06-26 ENCOUNTER — HOSPITAL ENCOUNTER (OUTPATIENT)
Dept: RADIOLOGY | Facility: MEDICAL CENTER | Age: 80
End: 2020-06-26
Attending: INTERNAL MEDICINE
Payer: MEDICARE

## 2020-06-26 DIAGNOSIS — G44.209 TENSION-TYPE HEADACHE, NOT INTRACTABLE, UNSPECIFIED CHRONICITY PATTERN: ICD-10-CM

## 2020-06-26 PROCEDURE — 70450 CT HEAD/BRAIN W/O DYE: CPT

## 2020-06-26 RX ORDER — SUMATRIPTAN 100 MG/1
100 TABLET, FILM COATED ORAL
Qty: 10 TAB | Refills: 3 | Status: SHIPPED
Start: 2020-06-26 | End: 2021-01-05

## 2020-06-27 ENCOUNTER — TELEPHONE (OUTPATIENT)
Dept: MEDICAL GROUP | Facility: MEDICAL CENTER | Age: 80
End: 2020-06-27

## 2020-11-30 ENCOUNTER — OFFICE VISIT (OUTPATIENT)
Dept: MEDICAL GROUP | Facility: MEDICAL CENTER | Age: 80
End: 2020-11-30
Payer: MEDICARE

## 2020-11-30 ENCOUNTER — HOSPITAL ENCOUNTER (OUTPATIENT)
Dept: RADIOLOGY | Facility: MEDICAL CENTER | Age: 80
End: 2020-11-30
Attending: INTERNAL MEDICINE
Payer: MEDICARE

## 2020-11-30 VITALS
SYSTOLIC BLOOD PRESSURE: 144 MMHG | BODY MASS INDEX: 25.44 KG/M2 | HEIGHT: 64 IN | DIASTOLIC BLOOD PRESSURE: 76 MMHG | HEART RATE: 81 BPM | OXYGEN SATURATION: 96 % | TEMPERATURE: 98 F | WEIGHT: 149 LBS

## 2020-11-30 DIAGNOSIS — M54.50 LOW BACK PAIN, UNSPECIFIED BACK PAIN LATERALITY, UNSPECIFIED CHRONICITY, UNSPECIFIED WHETHER SCIATICA PRESENT: ICD-10-CM

## 2020-11-30 DIAGNOSIS — Z86.79 HISTORY OF HYPERTENSION: ICD-10-CM

## 2020-11-30 PROCEDURE — 99213 OFFICE O/P EST LOW 20 MIN: CPT | Performed by: INTERNAL MEDICINE

## 2020-11-30 PROCEDURE — 72100 X-RAY EXAM L-S SPINE 2/3 VWS: CPT

## 2020-11-30 RX ORDER — NAPROXEN 500 MG/1
500 TABLET ORAL 2 TIMES DAILY PRN
Qty: 60 TAB | Refills: 0 | Status: SHIPPED | OUTPATIENT
Start: 2020-11-30 | End: 2020-12-02

## 2020-11-30 RX ORDER — NAPROXEN 500 MG/1
500 TABLET ORAL 2 TIMES DAILY WITH MEALS
Qty: 60 TAB | Refills: 0 | Status: SHIPPED
Start: 2020-11-30 | End: 2020-11-30

## 2020-11-30 ASSESSMENT — FIBROSIS 4 INDEX: FIB4 SCORE: 2.415948169054037375

## 2020-11-30 NOTE — PROGRESS NOTES
Subjective:      Katerina Torres is a 80 y.o. female who presents with leg pain          HPI     Patient here with her  complaining of right buttock pain.  Medication, allergies, medical history, surgical history, social history reviewed and updated. Has had right buttock and low back pain since sat down on the ground to work in the yard, happened three weeks ago, initially started right buttock sharp pain radiation to the right knee, no sensory changes feet, no other trauma. Has been worse with sitting or moving around and better with leaning forward, takes tylenol 500 mg up to four per day. Tried heat no benefit  Has been seeing chiropractor this in the past    Current Outpatient Medications   Medication Sig Dispense Refill   • sumatriptan (IMITREX) 100 MG tablet Take 1 Tab by mouth Once PRN for Migraine (max one dose per day) for up to 1 dose. 10 Tab 3   • acetaminophen (TYLENOL) 500 MG Tab Take 1,000 mg by mouth every 6 hours as needed.     • nitroglycerin (NITROSTAT) 0.4 MG SL Tab Place 0.4 mg under tongue every 5 minutes as needed for Chest Pain.     • bimatoprost (LUMIGAN) 0.01 % Solution Place 1 Drop in both eyes every bedtime. Indications: Wide-Angle Glaucoma     • vitamin D (CHOLECALCIFEROL) 1000 UNIT Tab Take 1,000 Units by mouth.     • Magnesium 500 MG Tab Take 1 Cap by mouth every day.       No current facility-administered medications for this visit.        s/p knee replacement  3/3/17 MRI right knee abnormal right lateral meniscus with peripheral extrusion, maceration, and complex tear involving primarily the posterior horn and body but also the anterior horn, abnormal medial meniscus with vertical tear of the peripheral zone of body and posterior horn, probable meniscal root tear, and degenerative fraying of the free edge, severe lateral femorotibial compartment osteoarthritis with significant cartilage loss and moderate to severe subchondral edema within the lateral femoral condyle  and lateral tibial plateau, mild medial femorotibial and the patellofemoral compartment osteoarthritis, moderate sized joint effusion with associated popliteal cyst  6/20/17 sees  orthopedics  1/18/18  orthopedic note right knee end-stage arthritis, x-rays bone-on-bone right knee arthritis lateral compartment, right knee steroid injection performed, planned for total right knee arthroplasty after winter  5/2/18  orthopedic note, right knee osteoathritis, failed conservative measures, scheduled for right knee surgery  5/8/18  operative note right knee arthroplasty   5/23/18  orthopedic note 2 weeks s/p right total knee  8/6/18  orthopedic note 3 months status post right knee replacement doing well, x-ray shows stable right total knee  5/22/19  orthopedic note x-ray stable total knee replacement, follow-up 1 year     Skin cancer basal cell     Preventative health  7/23/12 tdap   1/10/13 pneumovax  7/29/14 zoster vaccine  8/6/15 prevnar at [facility]  9/1/16 sonocine negative  3/7/17 colon per [facility] 2 polyps, grade 2 internal hemorrhoids, angioectasias ascending colon,pathology one hyperplastic polyp and one sessile serrated polyp, repeat colonoscopy 5 years   4/5/18 declines sonocine  5/28/19 dexa LS-1.0,hip-1.4  6/12/20 vit d 88 on 1000 units decrease to qod   6/15/20 covid igg negative  6/25/20 mammogram heterogeneously dense breast tissue, asymmetry right breast, diagnostic mammogram and ultrasound ordered  6/20/20 mammogram diagnostic right negative, recommend annual screening mammography     Osteopenia  9/06 dexa LS -1.2,hip -1.1  6/09 dexa LS -1.4,hip -1.0  7/11 vit d 43  7/11 dexa LS -1.1,hip -0.9  712 vit d 30 start 1000 units  7/24/13 vit d 72 on 1000 units  8/6/13 dexa LS -1.1,hip -1.1  8/5/14 vit d 67  8/19/15 dexa LS -1.4,hip -1.3;FRAX 40% major,27% hip only on prednisone one month, does not need bisphosphonate therapy  8/19/15 vit d 43  6/16/16 off  prednisone x 3 weeks  6/21/16 vit d 50  6/27/17 vit d 48  4/27/18 vit d 53   5/14/19 vit d 46  5/28/19 dexa LS-1.0,hip-1.4  6/12/20 vit d 88 on 1000 units decrease to qod     Neutropenia  8/06 wbc 3.4  3/08 wbc 3.9  6/09 wbc 3.9  7/10 wbc 4.4  7/11 wbc 4.5  7/12 wbc 3.8 (55%N,35%L)  7/24/13 wbc 4.3  3/20/14 wbc 3.1 (52%N,36%L)  3/31/14 wbc 3.7  8/19/15 wbc 5.3  6/21/16 wbc 4.4 (50%N,40%L)  6/27/17 wbc 4.2   4/27/18 wbc 4.4  5/14/19 wbc 4.5  6/12/20 wbc 4.5     knee pain left  8/1/13 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, lateral meniscus mildly extruded, ruptured hopper's cyst  8/12/13  ortho note pain improved on followup, consider injection if pain recurs     History polymyalgia  4/20/15 physical therapy, trial celebrex and zanaflex  5/7/15 physical therapy evaluation today recommended right hip x-ray and pelvic x-ray, ordered in computer  5/8/15 xray hip negative  6/29/15 seen by bridgette diagnosed with polymyalgia rheumatica  6/29/15 CRP 9.4,ESR 32 started on prednisone 20 mg    7/28/15 on prednisone 10 mg will continue 10 mg x 2 weeks, then decrease to 5 mg daily  8/19/15 hgb 14,hct 43, CRP 0.3, ESR 14, tapered off prednisone but developed mouth irritation, has resumed prednisone 5 mg daily, will continue x2 weeks then taper  11/17/15 refill prednisone 5 mg #30 tabs x one  6/21/16 off prednisone; CRP 0.1,ESR 17  6/27/17 CRP 0.09,ESR 12,cpk 66     histroy hypertension  3/31/14 cardiac catheterization; left main mild plaquing, LAD patent, circumflex free of disease, RCA free of disease, normal LV function, mild aortic stenosis  7/9/17 echo normal LV size and function, EF 60%, grade 1 diastolic dysfunction, moderate aortic stenosis peak gradient 50, mean 29, valve area 0.8-1.0 and aortic regurgitation  7/9/17 persantine thallium small fixed perfusion abnormality distal anterior and anteroapical segments, no ischemia is noted to represent mild prior infarct or variant normal apical  thinning  7/18/17 start diltiazem 120 mg daily (also has esophageal dysmotility by barium swallow)  4/26/18 off diltiazem  4/27/18 echo normal LV size and function, EF 65%, moderate aortic stenosis peak gradient 46, valve area 0.9,, moderate aortic insufficiency, no change compared to previous  5/27/19 echo normal LV size and function, EF 65%, normal diastolic function, calcified aortic valve moderate aortic stenosis peak gradient 53, moderate aortic insufficiency     History of breast cancer  1980s left sided s/p lumpectomy and radiation therapy, no old records  7/11 mammogram  8/12 mammogram  8/13 mammogram  8/18/14 mammogram  9/1/16 mammogram heterogeneously dense breast tissue recommend screening breast ultrasound  9/1/16 sonocine negative     Glaucoma     Esophageal dysmotility  7/9/17 barium swallow moderate esophageal dysmotility, small volume silent aspiration  7/18/17 start diltiazem 120 mg daily  7/18/17 referral [facility], speech pathology for esophageal dysmotility, small amount of aspiration on barium swallow, try low-dose diltiazem 120 mg for esophageal dysmotility and elevated blood pressure  7/26/17 [facility] evaluation dyspepsia, obtain cardiology clearance and then proceed EGD, initiate pantoprazole 20 mg, trial of miralax and colace     Dyspnea  7/24/13 normal LV function, EF 60%, mild LVH, mild aortic stenosis, peak gradient 25  3/20/14 cxr negative  3/31/14 cardiac catheterization; left main mild lacking, LAD patent, circumflex free of disease, RCA free of disease, normal LV function, mild aortic stenosis  8/1/14 PFT FEV 2.8 L (144% predicted), FVC 3.6 (138% predicted), FEV/FVC 78, no bronchodilator response, DLCO 119% predicted     Dyslipidemia  3/08 chol 175,trig 73,hdl 45,ldl 115  6/09 chol 174,trig 89,hdl 47,ldl 109  7/10 chol 182,trig 61,hdl 55,ldl 114  7/11 chol 175,trig 69,hdl 45,ldl 116  7/12 chol 164,trig 64,hdl 43,ldl 108  7/24/13 chol 186,trig 66,hdl 54,ldl 119 no meds  8/5/14 chol 165,trig  93,hdl 48,ldl 98  8/19/15 chol 192,trig 109,hdl 58,ldl 112; 10 year risk 20% declines statin therapy  6/21/16 chol 137,trig 75,hdl 47,ldl 75   6/27/17 hcol 153,trig 94,hdl 54,ldl 80  5/14/19 chol 164,trig 82,hdl 48,ldl 100   6/12/20 chol 176,trig 80,hdl 57,ldl 103      Decrease GFR  7/7/11 bun 16,creat 1.0,GFR 50  7/24/13 bun 14,creat 1.1,GFR 45  3/20/14 bun 15,creat 0.9,GFR 59  8/5/14 bun 14,creat 1.1,GFR 46  2/19/15 bun 13,creat 0.8,GFR >60  8/19/15 bun 10,creat 1.1,GFR 48  6/21/16 bun 19,creat 1.0,GFR 50  6/27/17 bun 14,creat 0.5,GFR 51  4/28/18 bun 15,creat 0.9,GFR 60  5/14/19 bun 18,creat 1.13,GFR 46  6/12/20 bun 15,creat 1.07,GFR 49     colon polyp  3/7/17 colon per [facility] 2 polyps, grade 2 internal hemorrhoids, angioectasias ascending colon,pathology one hyperplastic polyp and onesessile serrated polyp, repeat colonoscopy 5 years      Aortic stenosis  10/06 echo mild mr, normal LV  6/09 stress echo negative  8/12 echo normal LV function, EF 65%, mild aortic stenosis, peak gradient 24  7/24/13 normal LV function, EF 60%, mild LVH, mild aortic stenosis, peak gradient 25  3/31/14 cardiac catheterization; left main mild lacking, LAD patent, circumflex free of disease, RCA free of disease, normal LV function, mild aortic stenosis  7/9/17 echo normal LV size and function, EF 60%, grade 1 diastolic dysfunction, moderate aortic stenosis peak gradient 50, mean 29, valve area 0.8-1.0 and aortic regurgitation  7/9/17 persantine thallium small fixed perfusion abnormality distal anterior and anteroapical segments, no ischemia is noted to represent mild prior infarct or variant normal apical thinning  4/27/18 echo normal LV size and function, EF 65%, moderate aortic stenosis peak gradient 46, valve area 0.9,, moderate aortic insufficiency, no change compared to previous  5/27/19 echo normal LV size and function, EF 65%, normal diastolic function, calcified aortic valve moderate aortic stenosis peak gradient 53, moderate  aortic insufficiency     Aortic regurgitation  10/06 echo mild mitral regurgitation, normal LV  6/09 stress echo negative  8/12 echo normal LV function, EF 65%, mild aortic stenosis, peak gradient 24  7/24/13 normal LV function, EF 60%, mild LVH, mild aortic stenosis, peak gradient 25  3/31/14 cardiac catheterization; left main and LAD patent, circumflex free of disease, RCA free of disease, normal LV function, mild aortic stenosis  7/9/17 echo normal LV size and function, EF 60%, grade 1 diastolic dysfunction, moderate aortic stenosis peak gradient 50, mean 29, valve area 0.8-1.0 and aortic regurgitation  7/9/17 persantine thallium small fixed perfusion abnormality distal anterior and anteroapical segments, no ischemia is noted to represent mild prior infarct or variant normal apical thinning  4/27/18 echo normal LV size and function, EF 65%, moderate aortic stenosis peak gradient 46, valve area 0.9,, moderate aortic insufficiency, no change compared to previous  5/27/19 echo normal LV size and function, EF 65%, normal diastolic function, calcified aortic valve moderate aortic stenosis peak gradient 53, moderate aortic insufficiency  7/11/19 patient feels more intense heart rate and heartbeat at night, can hear her heartbeat, only occurs at night, question anxiety component trial of buspar, 24-hour holter, cardiology referral  7/18/19 24-hour holter monitor, sinus rhythm asymptomatic average heart rate 65 PAC burden 1.3%, PVC burden 1.8%, 2 atrial runs longest 7 beats maximal heart rate 105, fastest run 4 beats maximum heart rate 129     Abnormal cardiovascular test  10/06 echo mild mitral regurgitation, normal LV  6/09 stress echo negative  8/12 echo normal LV function, EF 65%, mild aortic stenosis, peak gradient 24  7/24/13 normal LV function, EF 60%, mild LVH, mild aortic stenosis, peak gradient 25  3/20/14 seen ER palpitations, atypical chest pain  3/24/14 exercise treadmill carla protocol 5 minutes 5 seconds,  1 mm mild upsloping ST segment depression in V6, flat ST segment depression V4 and V5 compatible with ischemia, no arrhythmia noted  3/28/14 cardiology note, scheduled for cardiac catheterization, cont asa, metoprolol 25 mg bid, crestor 5 mg samples  3/31/14 cardiac catheterization; left main mild plaquing, LAD patent, circumflex free of disease, RCA free of disease, normal LV function, mild aortic stenosis  7/9/17 echo normal LV size and function, EF 60%, grade 1 diastolic dysfunction, moderate aortic stenosis peak gradient 50, mean 29, valve area 0.8-1.0 and aortic regurgitation  7/9/17 persantine thallium small fixed perfusion abnormality distal anterior and anteroapical segments, no ischemia is noted to represent mild prior infarct or variant normal apical thinning  4/27/18 echo normal LV size and function, EF 65%, moderate aortic stenosis peak gradient 46, valve area 0.9,, moderate aortic insufficiency, no change compared to previous  5/27/19 echo normal LV size and function, EF 65%, normal diastolic function, calcified aortic valve moderate aortic stenosis peak gradient 53, moderate aortic insufficiency  7/11/19 patient feels more intense heart rate and heartbeat at night, can hear her heartbeat, only occurs at night, question anxiety component trial of buspar, 24-hour holter, cardiology referral  7/18/19 24-hour holter monitor, sinus rhythm asymptomatic average heart rate 65 PAC burden 1.3%, PVC burden 1.8%, 2 atrial runs longest 7 beats maximal heart rate 105, fastest run 4 beats maximum heart rate 129     Patient Active Problem List   Diagnosis   • History of breast cancer   • Osteopenia   • Dyslipidemia   • Neutropenia (HCC)   • Preventative health care   • Aortic stenosis   • Glaucoma   • Skin cancer, basal cell   • Knee pain, left   • Abnormal cardiovascular stress test   • History of polymyalgia rheumatica   • Decreased GFR   • Macular pucker   • Colon polyp   • S/P knee replacement   • Esophageal  dysmotility   • History of hypertension   • Aortic regurgitation   • History of palpitations          Health Maintenance Summary                Annual Wellness Visit Next Due 6/10/2021      Done 6/9/2020 Visit Dx: Medicare annual wellness visit, subsequent     Patient has more history with this topic...    MAMMOGRAM Next Due 6/12/2021      Done 6/12/2020 MA-SCREENING MAMMO BILAT W/TOMOSYNTHESIS W/CAD     Patient has more history with this topic...    COLONOSCOPY Next Due 3/7/2022      Done 3/7/2017 REFERRAL TO GI FOR COLONOSCOPY    IMM DTaP/Tdap/Td Vaccine Next Due 7/23/2022      Done 7/23/2012 Imm Admin: Tdap Vaccine    BONE DENSITY Next Due 5/24/2024      Done 5/24/2019 DS-BONE DENSITY STUDY (DEXA)     Patient has more history with this topic...          Patient Care Team:  Master Suarez M.D. as PCP - General  Chris Bernstein M.D. as Consulting Physician (Ophthalmology)  Amanda Rodriguez M.D. as Consulting Physician (Ophthalmology)  Manoj Tidwell M.D. as Consulting Physician (Orthopaedics)  Gian Caro, PT, DPT as Physical Therapy (Physical Therapy)      ROS       Objective:          Physical Exam  Vitals signs and nursing note reviewed.   Constitutional:       Appearance: Normal appearance.   HENT:      Head: Atraumatic.   Eyes:      Conjunctiva/sclera: Conjunctivae normal.   Neck:      Musculoskeletal: Neck supple.   Cardiovascular:      Rate and Rhythm: Normal rate and regular rhythm.      Heart sounds: Murmur present.   Pulmonary:      Effort: No respiratory distress.      Breath sounds: Normal breath sounds.   Abdominal:      General: There is no distension.   Musculoskeletal:         General: No swelling.   Skin:     General: Skin is warm.   Neurological:      General: No focal deficit present.      Mental Status: She is alert.   Psychiatric:         Behavior: Behavior normal.     No spinal tenderness, positive right gluteal tenderness, no SI joint tenderness, mild right IT band tenderness, no  right greater trochanter tenderness, normal right knee range of motion, no effusion or edema, no lower extremity edema.  Positive straight leg raise on right side.  Normal dorsiflexion, plantarflexion, knee flexion extension, hip flexion extension right side.            Assessment/Plan:        Assessment  #1  Low back pain with sciatica with some radiation right buttock to the right knee area    #2 aortic stenosis moderate in nature by echo 5/27/19, repeat ordered in June 2020, she has not followed up with that yet    #3 CKD stage III GFR 49    Plan  #!  Has had influenza vaccine    #2 x-ray lumbar spine    #3 MRI lumbar spine for sciatica, positive straight leg raise on right side    #4 increase acetaminophen to 1000 mg 3 times daily    #5 add naproxen 500 mg twice daily short-term monitor for GI upset, has had NSAIDs previously, has CKD stage III mild decrease, will need to monitor for fluid retention which would indicate worsening renal function, this would just be short-term since she would like to avoid narcotics has had drowsiness with narcotics in the past after her knee surgery, I believe short-term NSAIDs are reasonable with her CKD stage III mild in nature    #6  Referral to physiatry    #7 follow-up 3 weeks    #8 she will need to get echo done as well

## 2020-12-02 ENCOUNTER — HOSPITAL ENCOUNTER (OUTPATIENT)
Dept: RADIOLOGY | Facility: MEDICAL CENTER | Age: 80
End: 2020-12-02
Attending: INTERNAL MEDICINE
Payer: MEDICARE

## 2020-12-02 ENCOUNTER — TELEPHONE (OUTPATIENT)
Dept: MEDICAL GROUP | Facility: MEDICAL CENTER | Age: 80
End: 2020-12-02

## 2020-12-02 DIAGNOSIS — M54.50 LOW BACK PAIN, UNSPECIFIED BACK PAIN LATERALITY, UNSPECIFIED CHRONICITY, UNSPECIFIED WHETHER SCIATICA PRESENT: ICD-10-CM

## 2020-12-02 DIAGNOSIS — M54.40 ACUTE LOW BACK PAIN WITH SCIATICA, SCIATICA LATERALITY UNSPECIFIED, UNSPECIFIED BACK PAIN LATERALITY: ICD-10-CM

## 2020-12-02 PROCEDURE — 72148 MRI LUMBAR SPINE W/O DYE: CPT

## 2020-12-18 ENCOUNTER — HOSPITAL ENCOUNTER (OUTPATIENT)
Dept: RADIOLOGY | Facility: MEDICAL CENTER | Age: 80
End: 2020-12-18
Attending: PHYSICAL MEDICINE & REHABILITATION
Payer: MEDICARE

## 2020-12-18 ENCOUNTER — OFFICE VISIT (OUTPATIENT)
Dept: PHYSICAL MEDICINE AND REHAB | Facility: MEDICAL CENTER | Age: 80
End: 2020-12-18
Payer: MEDICARE

## 2020-12-18 VITALS
BODY MASS INDEX: 24.95 KG/M2 | DIASTOLIC BLOOD PRESSURE: 70 MMHG | OXYGEN SATURATION: 97 % | SYSTOLIC BLOOD PRESSURE: 130 MMHG | HEART RATE: 82 BPM | TEMPERATURE: 98 F | WEIGHT: 146.16 LBS | HEIGHT: 64 IN

## 2020-12-18 DIAGNOSIS — M53.3 SACRAL PAIN: ICD-10-CM

## 2020-12-18 DIAGNOSIS — M25.551 HIP PAIN, RIGHT: ICD-10-CM

## 2020-12-18 DIAGNOSIS — M48.061 NEURAL FORAMINAL STENOSIS OF LUMBAR SPINE: ICD-10-CM

## 2020-12-18 DIAGNOSIS — M48.061 SPINAL STENOSIS OF LUMBAR REGION, UNSPECIFIED WHETHER NEUROGENIC CLAUDICATION PRESENT: ICD-10-CM

## 2020-12-18 DIAGNOSIS — M54.16 RIGHT LUMBAR RADICULITIS: ICD-10-CM

## 2020-12-18 PROCEDURE — 73522 X-RAY EXAM HIPS BI 3-4 VIEWS: CPT

## 2020-12-18 PROCEDURE — 99205 OFFICE O/P NEW HI 60 MIN: CPT | Performed by: PHYSICAL MEDICINE & REHABILITATION

## 2020-12-18 PROCEDURE — 72220 X-RAY EXAM SACRUM TAILBONE: CPT

## 2020-12-18 RX ORDER — AMOXICILLIN 500 MG/1
CAPSULE ORAL
Status: ON HOLD | COMMUNITY
End: 2021-01-06

## 2020-12-18 ASSESSMENT — PAIN SCALES - GENERAL: PAINLEVEL: 10=SEVERE PAIN

## 2020-12-18 ASSESSMENT — FIBROSIS 4 INDEX: FIB4 SCORE: 2.415948169054037375

## 2020-12-18 ASSESSMENT — PATIENT HEALTH QUESTIONNAIRE - PHQ9: CLINICAL INTERPRETATION OF PHQ2 SCORE: 0

## 2020-12-18 NOTE — PATIENT INSTRUCTIONS
Your procedure will be at the [facility] special procedure suite.    [facility] [place] [address]         PRE-PROCEDURE INSTRUCTIONS    TYLENOL(ACETAMINOPHEN) IS FINE TO TAKE PRIOR TO THE INJECTION    You may take your regular medications except:   · No Anti-inflammatories 5 days prior to your procedure. Anti-inflammatories include medicines such as  ibuprofen (Motrin, Advil), Excedrin, Naproxen (Aleve, Anaprox, Naprelan, Naprosyn), Celecoxib (Celebrex), Diclofenac (Voltaren-XR tab), and Meloxicam (Mobic).   · No Glucophage or Metformin 24 hours before your procedure. You may resume next day after your procedure.  · Call the physiatry office if you are taking or prescribed anti-biotics within five days of procedure.  · Please ask provider if you are taking any new diabetes medication.  · CONTINUE TAKING BLOOD PRESSURE MEDICATIONS AS PRESCRIBED.  · Pain medications will not be prescribed on the procedure day. Procedural pain medication may be used by your provider   · Call your doctor's office performing the procedure if you have a fever, chills, rash or new illness prior to your procedure    Anticoagulation/antiplatelet medications  No Blood thinning medications such as Coumadin or Plavix 5 days prior to procedure unless your doctor said to continue these medications. Call your doctor if a new medication is prescribed in this class.     Restrictions for eating before procedure:   · If you are getting procedural sedation, then do not eat to for 8 hours prior to procedure appointment time. Do not drink fluids for four hours prior to your procedure time.   · If you are not having procedural sedation, then Skip the meal prior to your procedure. If you have a morning procedure then skip breakfast. If you have an afternoon procedure then skip lunch.   · You may drink clear liquids up to 2 hours prior to your procedure  · You must have a  the day of procedure to accompany you home.      POST  PROCEDURE INSTRUCTIONS   · No heavy lifting, strenuous bending or strenuous exercise for 3 days after your procedure.  · No hot tubs, baths, swimming for 3 days after your procedure  · You can remove the bandage the day after the procedure.  · IF YOU RECEIVED A STEROID INJECTION. PLEASE NOTE THAT THERE MAY BE A DELAY FOR THE INJECTION TO START WORKING, THE DELAY MAY BE UP TO TWO WEEKS. IF YOU HAVE DIABETES, PLEASE NOTE THAT YOUR SUGAR LEVELS MAY BE ELEVATED FOR 1-2 DAYS AFTER A STEROID INJECTION.  THE STEROID MAY CAUSE TEMPORARY SYMPTOMS WHICH USUALLY RESOLVE ON THEIR OWN WITHIN 1 TO 2 DAYS INCLUDING FACIAL FLUSHING OR A FEELING OF WARMTH ON THE FACE, TEMPORARY INCREASES IN BLOOD SUGAR, INSOMNIA, INCREASED HUNGER  · IF YOU RECEIVED A DIAGNOSTIC PROCEDURE (SUCH AS A MEDIAL BRANCH BLOCK), PLEASE NOTE THAT WE DO EXPECT THIS INJECTION TO WEAR OFF.  IT IS IMPORTANT TO COMPLETE THE PAIN DIARY AND LIST THE PAIN SCORE ONLY FOR THE REGION WHERE THE PROCEDURE WAS AND BRING THIS TO YOUR FOLLOW UP VISIT.  · IF YOU RECEIVED A RADIOFREQUENCY PROCEDURE, THERE MAY BE SOME SORENESS AFTER THE PROCEDURE.  THIS IS NORMAL.  · IF YOU EXPERIENCE PROLONGED WEAKNESS LONGER THAN ONE DAY, BOWEL OR BLADDER INCONTINENCE THEN PLEASE CALL THE PHYSIATRY OFFICE.  · Your leg may feel heavy, weak and numb for up to 1-2 days. Be very careful walking.   ·  You may resume normal activities 3 days after procedure.

## 2020-12-18 NOTE — PROGRESS NOTES
New patient note    Physiatry (physical medicine and  Rehabilitation), interventional spine and sports medicine    Date of Service: 12/18/2020    Chief complaint:   Chief Complaint   Patient presents with   • New Patient     Back pain       HISTORY    HPI: Katerina Torres 80 y.o. female who presents today for evaluation of low back pain. He reports that she started having pain in her low back radiating into the right leg.  She reports that she has pain that radiates from the back down the leg and right lateral leg into the top of her foot.  No left sided symptoms.    From what she reports, she has a big garden and has had pain in her right lateral flank, which usually would go away with time and tylenol.    Two months ago, she was in the garden and reports that she has not been able to kneel since right knee replacement.  She was sitting and bending forward and she kept going and then had significant pain.    No difficulty with bowel or bladder changes.  She is sleeping without difficulty, although moving in bed is difficult.    She has difficulty weight-bearing on exam and in discussion, she did have a hard fall on the right gluteal region when she fell in the garden, thought she was closer to the ground that she was.    Coughing and sneezing makes her pain worse.  No history of this kind of pain.    She has pain that is 10/10 on the NRS.     She has started going to physical therapy.   She has started going to see Leslie Cohen PT.   This has not really helped yet      Medical records review:  I reviewed the note from the referring provider Master Suarez M.D.     Previous treatments:    Physical Therapy: Yes    Medications the patient is tried: tylenol    Previous interventions: none    Previous surgeries to relieve the above pain:  none      ROS:   CV: aortic stenosis    Red Flags ROS:   Fever, Chills, Sweats: Denies  Involuntary Weight Loss: Denies  Bladder Incontinence: Denies  Bowel  "Incontinence: Denies  Saddle Anesthesia: Denies    All other systems reviewed and negative.       PMHx:   Past Medical History:   Diagnosis Date   • Breast cancer (HCC)    • Breath shortness     with exertion \"walking up a hill\"   • Bruises easily    • Cancer (HCC) 1989    breast left side lumpectomy   • Cardiac murmur 6/3/2009   • Chronic kidney disease, stage III (moderate) 8/20/2015   • Dyslipidemia 6/3/2009   • Glaucoma     both eyes   • Hepatitis A 1993   • Hiatus hernia syndrome    • Hx of breast cancer 6/3/2009   • Hypertension 7/18/2017    currently not taking medications   • MEDICAL HOME    • Neutropenia 6/3/2009   • Osteopenia 6/3/2009   • Preventative health care 6/3/2009   • Snoring    • Unspecified cataract     bilateral IOLs       PSHx:   Past Surgical History:   Procedure Laterality Date   • KNEE ARTHROPLASTY TOTAL Right 5/8/2018    Procedure: KNEE ARTHROPLASTY TOTAL;  Surgeon: Thanh Hall M.D.;  Location: SURGERY [facility];  Service: Orthopedics   • VITRECTOMY POSTERIOR Left 10/25/2016    Procedure: VITRECTOMY POSTERIOR membrane peel cryotherapy infusion of SF6 intraocular gas;  Surgeon: Amanda Rodriguez M.D.;  Location: SURGERY SAME DAY [facility];  Service:    • RECOVERY  3/31/2014    Performed by Cath-Recovery Surgery at SURGERY SAME DAY [facility] ORS   • CATARACT PHACO WITH IOL  5/28/2009    Performed by GERA BREAUX at SURGERY SAME DAY [facility] ORS   • CATARACT PHACO WITH IOL  5/14/2009    Performed by GERA BREAUX at SURGERY SAME DAY [facility] ORS   • LUMPECTOMY     • OTHER     • PB RADIATION THERAPY PLAN SIMPLE         Family history   Family History   Problem Relation Age of Onset   • Heart Disease Father         CAD MI   • Stroke Father    • Cancer Sister         breast   • Cancer Mother         breast         Medications:   Current Outpatient Medications   Medication   • acetaminophen (TYLENOL) 500 MG Tab   • nitroglycerin (NITROSTAT) 0.4 MG SL Tab   • bimatoprost " (LUMIGAN) 0.01 % Solution   • Magnesium 500 MG Tab   • amoxicillin (AMOXIL) 500 MG Cap   • sumatriptan (IMITREX) 100 MG tablet   • vitamin D (CHOLECALCIFEROL) 1000 UNIT Tab     No current facility-administered medications for this visit.        Allergies:   Allergies   Allergen Reactions   • Naproxen      GI upset   • Vicodin [Hydrocodone-Acetaminophen] Nausea     Nausea, dizziness       Social Hx:   Social History     Socioeconomic History   • Marital status:      Spouse name: Not on file   • Number of children: Not on file   • Years of education: Not on file   • Highest education level: Not on file   Occupational History   • Not on file   Social Needs   • Financial resource strain: Not on file   • Food insecurity     Worry: Not on file     Inability: Not on file   • Transportation needs     Medical: Not on file     Non-medical: Not on file   Tobacco Use   • Smoking status: Former Smoker     Packs/day: 0.50     Years: 10.00     Pack years: 5.00     Types: Cigarettes     Quit date: 1999     Years since quittin.9   • Smokeless tobacco: Never Used   Substance and Sexual Activity   • Alcohol use: Yes     Alcohol/week: 0.0 oz     Comment: 1 glass wine/day   • Drug use: No   • Sexual activity: Not Currently     Partners: Male   Lifestyle   • Physical activity     Days per week: Not on file     Minutes per session: Not on file   • Stress: Not on file   Relationships   • Social connections     Talks on phone: Not on file     Gets together: Not on file     Attends Gnosticist service: Not on file     Active member of club or organization: Not on file     Attends meetings of clubs or organizations: Not on file     Relationship status: Not on file   • Intimate partner violence     Fear of current or ex partner: Not on file     Emotionally abused: Not on file     Physically abused: Not on file     Forced sexual activity: Not on file   Other Topics Concern   •  Service No   • Blood Transfusions No   •  "Caffeine Concern No   • Occupational Exposure No   • Hobby Hazards Yes   • Sleep Concern No   • Stress Concern Yes   • Weight Concern No   • Special Diet No   • Back Care No   • Exercise No   • Bike Helmet No   • Seat Belt Yes   • Self-Exams Yes   Social History Narrative   • Not on file         EXAMINATION     Physical Exam:   Vitals: /70 (BP Location: Right arm, Patient Position: Sitting, BP Cuff Size: Small adult)   Pulse 82   Temp 36.7 °C (98 °F) (Temporal)   Ht 1.626 m (5' 4\")   Wt 66.3 kg (146 lb 2.6 oz)   SpO2 97%     Constitutional:   Body Habitus: Body mass index is 25.09 kg/m².  Cooperation: Fully cooperates with exam  Appearance: Well-groomed, well-nourished, not disheveled, in no acute distress    Eyes: No scleral icterus, no proptosis     ENT -no obvious auditory deficits, no acute distress    Skin -no rashes or lesions noted     Respiratory-  breathing comfortable on room air, no audible wheezing    Cardiovascular- capillary refills less than 2 seconds. No lower extremity edema is noted.     Gastrointestinal - no obvious abdominal masses, No tenderness to palpation in the abdomen    Psychiatric- alert and oriented ×3. Normal affect.     Gait - abnormal, antalgic gait, no use of ambulatory device, short step length on the right    Musculoskeletal -   Cervical spine   Inspection: No deformities of the skin over the cervical spine. No rashes or lesions.    Slightly decreased  A/P ROM in all directions, without  pain      Spurling’s sign: negative bilaterally    No signs of asymmetric muscular atrophy in bilateral upper extremities     Thoracic/Lumbar Spine/Sacral Spine/Hips   Inspection: No evidence of atrophy in bilateral lower extremities throughout     ROM: full  AROM with flexion, decreased extension, decreased lateral flexion with pain    Palpation:   No tenderness to palpation in midline at T1-T12 levels.  POSITIVE for tenderness to palpation to the para-midline region in the lower lumbar " levels.  palpation over SI joint: positive right, negative left    palpation over buttock: positive right, negative left    palpation in hip or over the greater trochanters: mildly positive bilaterally      Lumbar spine Special tests  Neuro tension  Straight leg test positive right, negative left       HIP  Range of motion in the hips is mildly painful on the right with mild groin and back pain in Bishop's position.  No limitation to internal and external rotation.  Functional, nonpainful ROM in internal and external rotation on the left    SI joint tests  Observation patient sits on one buttocks: Negative    Neuro     Key points for the international standards for neurological classification of spinal cord injury (ISNCSCI) to light touch.     Dermatome R L   C4 2 2   C5 2 2   C6 2 2   C7 2 2   C8 2 2   T1 2 2   T2 2 2   L2 2 2   L3 2 2   L4 2 2   L5 2 2   S1 2 2   S2 2 2           Motor Exam Upper Extremities   ? Myotome R L   Shoulder flexion C5 5 5   Elbow flexion C5 5 5   Wrist extension C6 5 5   Elbow extension C7 5 5   Finger flexion C8 5 5   Finger abduction T1 5 5         Motor Exam Lower Extremities    ? Myotome R L   Hip flexion L2 4* 5   Knee extension L3 5 5   Ankle dorsiflexion L4 5 5   Toe extension L5 5 5   Ankle plantarflexion S1 5 5   *pain limited    Galeana’s sign positive right, negative left   Babinski sign negative bilaterally   Clonus of the ankle negative bilaterally     Reflexes  ?  R L   Biceps  2+ 2+   Brachioradialis  2+ 2+   Patella  NT** 2+   Achilles   absent 2+     **s/p right TKA  MEDICAL DECISION MAKING    Medical records review: see under HPI section.     DATA    Labs:   Lab Results   Component Value Date/Time    SODIUM 139 06/12/2020 10:07 AM    POTASSIUM 4.7 06/12/2020 10:07 AM    CHLORIDE 103 06/12/2020 10:07 AM    CO2 25 06/12/2020 10:07 AM    ANION 11.0 06/12/2020 10:07 AM    GLUCOSE 98 06/12/2020 10:07 AM    BUN 15 06/12/2020 10:07 AM    CREATININE 1.07 06/12/2020 10:07 AM     CREATININE 1.08 (H) 07/02/2010 08:56 AM    CALCIUM 9.8 06/12/2020 10:07 AM    ASTSGOT 25 06/12/2020 10:07 AM    ALTSGPT 18 06/12/2020 10:07 AM    TBILIRUBIN 0.6 06/12/2020 10:07 AM    ALBUMIN 4.3 06/12/2020 10:07 AM    TOTPROTEIN 6.8 06/12/2020 10:07 AM    GLOBULIN 2.5 06/12/2020 10:07 AM    AGRATIO 1.7 06/12/2020 10:07 AM       Lab Results   Component Value Date/Time    PROTHROMBTM 13.1 03/31/2014 08:05 AM    INR 1.00 03/31/2014 08:05 AM        Lab Results   Component Value Date/Time    WBC 4.5 (L) 06/12/2020 10:07 AM    WBC 4.4 07/02/2010 08:56 AM    RBC 4.64 06/12/2020 10:07 AM    RBC 4.45 07/02/2010 08:56 AM    HEMOGLOBIN 14.6 06/12/2020 10:07 AM    HEMATOCRIT 44.8 06/12/2020 10:07 AM    MCV 96.6 06/12/2020 10:07 AM    MCV 95 07/02/2010 08:56 AM    MCH 31.5 06/12/2020 10:07 AM    MCH 31.5 07/02/2010 08:56 AM    MCHC 32.6 (L) 06/12/2020 10:07 AM    MPV 10.6 06/12/2020 10:07 AM    NEUTSPOLYS 59.00 06/12/2020 10:07 AM    LYMPHOCYTES 31.20 06/12/2020 10:07 AM    MONOCYTES 6.70 06/12/2020 10:07 AM    EOSINOPHILS 1.80 06/12/2020 10:07 AM    BASOPHILS 1.10 06/12/2020 10:07 AM        Lab Results   Component Value Date/Time    HBA1C 5.4 04/27/2018 07:52 AM        Imaging: I personally reviewed following images, these are my reads  MRI lumbar spine 12/02/2020  There is dextroscoliosis  At T12-L1, no central or foraminal stenosis  At L1-2, there is a disc bulge with facet arthropathy.  No central canal stenosis.  Mild foraminal stenosis bilaterally  At L2-3, retrolisthesis with superior endplate chronic compression fracture at L3, moderate central canal stenosis.  Moderate right and severe left foraminal stenosis  At L3-4, posterior disc osteophyte with moderate central canal stenosis and facet arthropathy.  Moderate foraminal stenosis  At L4-5, posterior disc osteophyte with severe central canal stenosis, severe right and moderate left foraminal stenosis  At L5-S1, disc bulge with facet arthropathy and no central canal  stenosis.  Moderate foraminal stenosis    IMAGING radiology reads. I reviewed the following radiology reads                                          Results for orders placed during the hospital encounter of 12/02/20   MR-LUMBAR SPINE-W/O    Impression 1.  Multilevel degenerative disc disease and facet degeneration. There is moderate central canal narrowing at L2-3 and L3-4 with moderate to severe central canal narrowing at L4-5.    2.  Very degrees of neural foraminal narrowing at levels as specifically described above.    3.  Mild chronic compression fracture at L3.    4.  Scoliosis.                    Results for orders placed during the hospital encounter of 11/30/20   DX-LUMBAR SPINE-2 OR 3 VIEWS    Impression Moderate spondylosis with L2/3 asymmetric disc height loss on the left resulting in slight apex right curvature and degenerative retrolisthesis                                           Diagnosis   Visit Diagnoses     ICD-10-CM   1. Right lumbar radiculitis  M54.16   2. Neural foraminal stenosis of lumbar spine  M99.73   3. Spinal stenosis of lumbar region, unspecified whether neurogenic claudication present  M48.061   4. Sacral pain  M53.3   5. Hip pain, right  M25.551           ASSESSMENT:  Katerina Sims Brian 80 y.o. female seen for above     Katerina was seen today for new patient.    Diagnoses and all orders for this visit:    Right lumbar radiculitis  -     REFERRAL TO PAIN CLINIC    Neural foraminal stenosis of lumbar spine  -     REFERRAL TO PAIN CLINIC    Spinal stenosis of lumbar region, unspecified whether neurogenic claudication present  -     REFERRAL TO PAIN CLINIC    Sacral pain  -     DX-SACRUM AND COCCYX 2+; Future    Hip pain, right  -     DX-HIP-BILATERAL-WITH PELVIS-3/4 VIEWS; Future    1. Discussed xrays of the hips and sacrum.  She does report that she had a fall and given limited weight bearing and pain, ordered these studies.  2. Reviewed findings on MRI lumbar spine with  patient.  3. Discussed right lumbar four and lumbar five transforaminal epidural steroid injections.  The risks benefits and alternatives to this procedure were discussed and the patient wishes to proceed with the procedure. Risks include but are not limited to damage to surrounding structures, infection, bleeding, worsening of pain which can be permanent, weakness which can be permanent. Benefits include pain relief, improved function. Alternatives includes not doing the procedure.        Follow-up: Return for Hospital injection.    Thank you very much for asking me to participate in Katerina Torres's care.  Please contact me with any questions or concerns.      Please note that this dictation was created using voice recognition software. I have made every reasonable attempt to correct obvious errors but there may be errors of grammar and content that I may have overlooked prior to finalization of this note.      Roni Choi MD  Physical Medicine and Rehabilitation  Interventional Spine and Sports Physiatry  [facility]       Master Liz M.D.

## 2020-12-18 NOTE — H&P (VIEW-ONLY)
New patient note    Physiatry (physical medicine and  Rehabilitation), interventional spine and sports medicine    Date of Service: 12/18/2020    Chief complaint:   Chief Complaint   Patient presents with   • New Patient     Back pain       HISTORY    HPI: Katerina Torres 80 y.o. female who presents today for evaluation of low back pain. He reports that she started having pain in her low back radiating into the right leg.  She reports that she has pain that radiates from the back down the leg and right lateral leg into the top of her foot.  No left sided symptoms.    From what she reports, she has a big garden and has had pain in her right lateral flank, which usually would go away with time and tylenol.    Two months ago, she was in the garden and reports that she has not been able to kneel since right knee replacement.  She was sitting and bending forward and she kept going and then had significant pain.    No difficulty with bowel or bladder changes.  She is sleeping without difficulty, although moving in bed is difficult.    She has difficulty weight-bearing on exam and in discussion, she did have a hard fall on the right gluteal region when she fell in the garden, thought she was closer to the ground that she was.    Coughing and sneezing makes her pain worse.  No history of this kind of pain.    She has pain that is 10/10 on the NRS.     She has started going to physical therapy.   She has started going to see Leslie Cohen PT.   This has not really helped yet      Medical records review:  I reviewed the note from the referring provider Master Suarez M.D.     Previous treatments:    Physical Therapy: Yes    Medications the patient is tried: tylenol    Previous interventions: none    Previous surgeries to relieve the above pain:  none      ROS:   CV: aortic stenosis    Red Flags ROS:   Fever, Chills, Sweats: Denies  Involuntary Weight Loss: Denies  Bladder Incontinence: Denies  Bowel  "Incontinence: Denies  Saddle Anesthesia: Denies    All other systems reviewed and negative.       PMHx:   Past Medical History:   Diagnosis Date   • Breast cancer (HCC)    • Breath shortness     with exertion \"walking up a hill\"   • Bruises easily    • Cancer (HCC) 1989    breast left side lumpectomy   • Cardiac murmur 6/3/2009   • Chronic kidney disease, stage III (moderate) 8/20/2015   • Dyslipidemia 6/3/2009   • Glaucoma     both eyes   • Hepatitis A 1993   • Hiatus hernia syndrome    • Hx of breast cancer 6/3/2009   • Hypertension 7/18/2017    currently not taking medications   • MEDICAL HOME    • Neutropenia 6/3/2009   • Osteopenia 6/3/2009   • Preventative health care 6/3/2009   • Snoring    • Unspecified cataract     bilateral IOLs       PSHx:   Past Surgical History:   Procedure Laterality Date   • KNEE ARTHROPLASTY TOTAL Right 5/8/2018    Procedure: KNEE ARTHROPLASTY TOTAL;  Surgeon: Thanh Hall M.D.;  Location: SURGERY [facility];  Service: Orthopedics   • VITRECTOMY POSTERIOR Left 10/25/2016    Procedure: VITRECTOMY POSTERIOR membrane peel cryotherapy infusion of SF6 intraocular gas;  Surgeon: Amanda Rodriguez M.D.;  Location: SURGERY SAME DAY [facility];  Service:    • RECOVERY  3/31/2014    Performed by Cath-Recovery Surgery at SURGERY SAME DAY [facility]   • CATARACT PHACO WITH IOL  5/28/2009    Performed by GERA BREAUX at SURGERY SAME DAY [facility] OR[facility]   • CATARACT PHACO WITH IOL  5/14/2009    Performed by GERA BREAUX at SURGERY SAME DAY [facility] OR[facility]   • LUMPECTOMY     • OTHER     • PB RADIATION THERAPY PLAN SIMPLE         Family history   Family History   Problem Relation Age of Onset   • Heart Disease Father         CAD MI   • Stroke Father    • Cancer Sister         breast   • Cancer Mother         breast         Medications:   Current Outpatient Medications   Medication   • acetaminophen (TYLENOL) 500 MG Tab   • nitroglycerin (NITROSTAT) 0.4 MG SL Tab   • bimatoprost " (LUMIGAN) 0.01 % Solution   • Magnesium 500 MG Tab   • amoxicillin (AMOXIL) 500 MG Cap   • sumatriptan (IMITREX) 100 MG tablet   • vitamin D (CHOLECALCIFEROL) 1000 UNIT Tab     No current facility-administered medications for this visit.        Allergies:   Allergies   Allergen Reactions   • Naproxen      GI upset   • Vicodin [Hydrocodone-Acetaminophen] Nausea     Nausea, dizziness       Social Hx:   Social History     Socioeconomic History   • Marital status:      Spouse name: Not on file   • Number of children: Not on file   • Years of education: Not on file   • Highest education level: Not on file   Occupational History   • Not on file   Social Needs   • Financial resource strain: Not on file   • Food insecurity     Worry: Not on file     Inability: Not on file   • Transportation needs     Medical: Not on file     Non-medical: Not on file   Tobacco Use   • Smoking status: Former Smoker     Packs/day: 0.50     Years: 10.00     Pack years: 5.00     Types: Cigarettes     Quit date: 1999     Years since quittin.9   • Smokeless tobacco: Never Used   Substance and Sexual Activity   • Alcohol use: Yes     Alcohol/week: 0.0 oz     Comment: 1 glass wine/day   • Drug use: No   • Sexual activity: Not Currently     Partners: Male   Lifestyle   • Physical activity     Days per week: Not on file     Minutes per session: Not on file   • Stress: Not on file   Relationships   • Social connections     Talks on phone: Not on file     Gets together: Not on file     Attends Sikh service: Not on file     Active member of club or organization: Not on file     Attends meetings of clubs or organizations: Not on file     Relationship status: Not on file   • Intimate partner violence     Fear of current or ex partner: Not on file     Emotionally abused: Not on file     Physically abused: Not on file     Forced sexual activity: Not on file   Other Topics Concern   •  Service No   • Blood Transfusions No   •  "Caffeine Concern No   • Occupational Exposure No   • Hobby Hazards Yes   • Sleep Concern No   • Stress Concern Yes   • Weight Concern No   • Special Diet No   • Back Care No   • Exercise No   • Bike Helmet No   • Seat Belt Yes   • Self-Exams Yes   Social History Narrative   • Not on file         EXAMINATION     Physical Exam:   Vitals: /70 (BP Location: Right arm, Patient Position: Sitting, BP Cuff Size: Small adult)   Pulse 82   Temp 36.7 °C (98 °F) (Temporal)   Ht 1.626 m (5' 4\")   Wt 66.3 kg (146 lb 2.6 oz)   SpO2 97%     Constitutional:   Body Habitus: Body mass index is 25.09 kg/m².  Cooperation: Fully cooperates with exam  Appearance: Well-groomed, well-nourished, not disheveled, in no acute distress    Eyes: No scleral icterus, no proptosis     ENT -no obvious auditory deficits, no acute distress    Skin -no rashes or lesions noted     Respiratory-  breathing comfortable on room air, no audible wheezing    Cardiovascular- capillary refills less than 2 seconds. No lower extremity edema is noted.     Gastrointestinal - no obvious abdominal masses, No tenderness to palpation in the abdomen    Psychiatric- alert and oriented ×3. Normal affect.     Gait - abnormal, antalgic gait, no use of ambulatory device, short step length on the right    Musculoskeletal -   Cervical spine   Inspection: No deformities of the skin over the cervical spine. No rashes or lesions.    Slightly decreased  A/P ROM in all directions, without  pain      Spurling’s sign: negative bilaterally    No signs of asymmetric muscular atrophy in bilateral upper extremities     Thoracic/Lumbar Spine/Sacral Spine/Hips   Inspection: No evidence of atrophy in bilateral lower extremities throughout     ROM: full  AROM with flexion, decreased extension, decreased lateral flexion with pain    Palpation:   No tenderness to palpation in midline at T1-T12 levels.  POSITIVE for tenderness to palpation to the para-midline region in the lower lumbar " levels.  palpation over SI joint: positive right, negative left    palpation over buttock: positive right, negative left    palpation in hip or over the greater trochanters: mildly positive bilaterally      Lumbar spine Special tests  Neuro tension  Straight leg test positive right, negative left       HIP  Range of motion in the hips is mildly painful on the right with mild groin and back pain in Bishop's position.  No limitation to internal and external rotation.  Functional, nonpainful ROM in internal and external rotation on the left    SI joint tests  Observation patient sits on one buttocks: Negative    Neuro     Key points for the international standards for neurological classification of spinal cord injury (ISNCSCI) to light touch.     Dermatome R L   C4 2 2   C5 2 2   C6 2 2   C7 2 2   C8 2 2   T1 2 2   T2 2 2   L2 2 2   L3 2 2   L4 2 2   L5 2 2   S1 2 2   S2 2 2           Motor Exam Upper Extremities   ? Myotome R L   Shoulder flexion C5 5 5   Elbow flexion C5 5 5   Wrist extension C6 5 5   Elbow extension C7 5 5   Finger flexion C8 5 5   Finger abduction T1 5 5         Motor Exam Lower Extremities    ? Myotome R L   Hip flexion L2 4* 5   Knee extension L3 5 5   Ankle dorsiflexion L4 5 5   Toe extension L5 5 5   Ankle plantarflexion S1 5 5   *pain limited    Galeana’s sign positive right, negative left   Babinski sign negative bilaterally   Clonus of the ankle negative bilaterally     Reflexes  ?  R L   Biceps  2+ 2+   Brachioradialis  2+ 2+   Patella  NT** 2+   Achilles   absent 2+     **s/p right TKA  MEDICAL DECISION MAKING    Medical records review: see under HPI section.     DATA    Labs:   Lab Results   Component Value Date/Time    SODIUM 139 06/12/2020 10:07 AM    POTASSIUM 4.7 06/12/2020 10:07 AM    CHLORIDE 103 06/12/2020 10:07 AM    CO2 25 06/12/2020 10:07 AM    ANION 11.0 06/12/2020 10:07 AM    GLUCOSE 98 06/12/2020 10:07 AM    BUN 15 06/12/2020 10:07 AM    CREATININE 1.07 06/12/2020 10:07 AM     CREATININE 1.08 (H) 07/02/2010 08:56 AM    CALCIUM 9.8 06/12/2020 10:07 AM    ASTSGOT 25 06/12/2020 10:07 AM    ALTSGPT 18 06/12/2020 10:07 AM    TBILIRUBIN 0.6 06/12/2020 10:07 AM    ALBUMIN 4.3 06/12/2020 10:07 AM    TOTPROTEIN 6.8 06/12/2020 10:07 AM    GLOBULIN 2.5 06/12/2020 10:07 AM    AGRATIO 1.7 06/12/2020 10:07 AM       Lab Results   Component Value Date/Time    PROTHROMBTM 13.1 03/31/2014 08:05 AM    INR 1.00 03/31/2014 08:05 AM        Lab Results   Component Value Date/Time    WBC 4.5 (L) 06/12/2020 10:07 AM    WBC 4.4 07/02/2010 08:56 AM    RBC 4.64 06/12/2020 10:07 AM    RBC 4.45 07/02/2010 08:56 AM    HEMOGLOBIN 14.6 06/12/2020 10:07 AM    HEMATOCRIT 44.8 06/12/2020 10:07 AM    MCV 96.6 06/12/2020 10:07 AM    MCV 95 07/02/2010 08:56 AM    MCH 31.5 06/12/2020 10:07 AM    MCH 31.5 07/02/2010 08:56 AM    MCHC 32.6 (L) 06/12/2020 10:07 AM    MPV 10.6 06/12/2020 10:07 AM    NEUTSPOLYS 59.00 06/12/2020 10:07 AM    LYMPHOCYTES 31.20 06/12/2020 10:07 AM    MONOCYTES 6.70 06/12/2020 10:07 AM    EOSINOPHILS 1.80 06/12/2020 10:07 AM    BASOPHILS 1.10 06/12/2020 10:07 AM        Lab Results   Component Value Date/Time    HBA1C 5.4 04/27/2018 07:52 AM        Imaging: I personally reviewed following images, these are my reads  MRI lumbar spine 12/02/2020  There is dextroscoliosis  At T12-L1, no central or foraminal stenosis  At L1-2, there is a disc bulge with facet arthropathy.  No central canal stenosis.  Mild foraminal stenosis bilaterally  At L2-3, retrolisthesis with superior endplate chronic compression fracture at L3, moderate central canal stenosis.  Moderate right and severe left foraminal stenosis  At L3-4, posterior disc osteophyte with moderate central canal stenosis and facet arthropathy.  Moderate foraminal stenosis  At L4-5, posterior disc osteophyte with severe central canal stenosis, severe right and moderate left foraminal stenosis  At L5-S1, disc bulge with facet arthropathy and no central canal  stenosis.  Moderate foraminal stenosis    IMAGING radiology reads. I reviewed the following radiology reads                                          Results for orders placed during the hospital encounter of 12/02/20   MR-LUMBAR SPINE-W/O    Impression 1.  Multilevel degenerative disc disease and facet degeneration. There is moderate central canal narrowing at L2-3 and L3-4 with moderate to severe central canal narrowing at L4-5.    2.  Very degrees of neural foraminal narrowing at levels as specifically described above.    3.  Mild chronic compression fracture at L3.    4.  Scoliosis.                    Results for orders placed during the hospital encounter of 11/30/20   DX-LUMBAR SPINE-2 OR 3 VIEWS    Impression Moderate spondylosis with L2/3 asymmetric disc height loss on the left resulting in slight apex right curvature and degenerative retrolisthesis                                           Diagnosis   Visit Diagnoses     ICD-10-CM   1. Right lumbar radiculitis  M54.16   2. Neural foraminal stenosis of lumbar spine  M99.73   3. Spinal stenosis of lumbar region, unspecified whether neurogenic claudication present  M48.061   4. Sacral pain  M53.3   5. Hip pain, right  M25.551           ASSESSMENT:  Katerina Sims Brian 80 y.o. female seen for above     Katerina was seen today for new patient.    Diagnoses and all orders for this visit:    Right lumbar radiculitis  -     REFERRAL TO PAIN CLINIC    Neural foraminal stenosis of lumbar spine  -     REFERRAL TO PAIN CLINIC    Spinal stenosis of lumbar region, unspecified whether neurogenic claudication present  -     REFERRAL TO PAIN CLINIC    Sacral pain  -     DX-SACRUM AND COCCYX 2+; Future    Hip pain, right  -     DX-HIP-BILATERAL-WITH PELVIS-3/4 VIEWS; Future    1. Discussed xrays of the hips and sacrum.  She does report that she had a fall and given limited weight bearing and pain, ordered these studies.  2. Reviewed findings on MRI lumbar spine with  patient.  3. Discussed right lumbar four and lumbar five transforaminal epidural steroid injections.  The risks benefits and alternatives to this procedure were discussed and the patient wishes to proceed with the procedure. Risks include but are not limited to damage to surrounding structures, infection, bleeding, worsening of pain which can be permanent, weakness which can be permanent. Benefits include pain relief, improved function. Alternatives includes not doing the procedure.        Follow-up: Return for Hospital injection.    Thank you very much for asking me to participate in Katerina Torres's care.  Please contact me with any questions or concerns.      Please note that this dictation was created using voice recognition software. I have made every reasonable attempt to correct obvious errors but there may be errors of grammar and content that I may have overlooked prior to finalization of this note.      Roni Choi MD  Physical Medicine and Rehabilitation  Interventional Spine and Sports Physiatry  [facility]       Master Liz M.D.

## 2020-12-21 ENCOUNTER — APPOINTMENT (OUTPATIENT)
Dept: MEDICAL GROUP | Facility: MEDICAL CENTER | Age: 80
End: 2020-12-21
Payer: MEDICARE

## 2021-01-04 NOTE — PREPROCEDURE INSTRUCTIONS
PAT call made 01/04/2021 at 1303. No answer at number provided. Message with call back number provided. Pt was informed that it is necessary that he/she returns this call to review pre-procedure instructions and to do the pre-screening before 3 PM on Tuesday (01/05/2021), Failure to return this call will result in the appt being rescheduled.

## 2021-01-05 NOTE — PREPROCEDURE INSTRUCTIONS
PAT note: PAT return call made 01/05/2021 at 1027. Spoke with Katerina. Health history, allergies, medications and pre-procedure/sedation instructions reviewed with patient. Pre-procedure respiratory screening completed. Pt denies being sick/symptomatic(fever, cough, shortness of breath)  within 72 hours or having been in close contact with symptomatic individuals in the past 2 weeks.Pt was informed to contact his/her physician should he/she or any close personal contact become symptomatic prior to the procedure. Pt was told to bring a mask as he/she would be wearing it during the entire stay. It was recommended that the pt self isolate 72 hrs before the procedure and  recommend that his/her  remain on site til pt is d/tami. Pt verbalized understanding of instructions.

## 2021-01-06 ENCOUNTER — APPOINTMENT (OUTPATIENT)
Dept: RADIOLOGY | Facility: REHABILITATION | Age: 81
End: 2021-01-06
Attending: PHYSICAL MEDICINE & REHABILITATION
Payer: MEDICARE

## 2021-01-06 ENCOUNTER — HOSPITAL ENCOUNTER (OUTPATIENT)
Facility: REHABILITATION | Age: 81
End: 2021-01-06
Attending: PHYSICAL MEDICINE & REHABILITATION | Admitting: PHYSICAL MEDICINE & REHABILITATION
Payer: MEDICARE

## 2021-01-06 VITALS
HEART RATE: 67 BPM | BODY MASS INDEX: 25.1 KG/M2 | SYSTOLIC BLOOD PRESSURE: 178 MMHG | WEIGHT: 147.05 LBS | RESPIRATION RATE: 18 BRPM | OXYGEN SATURATION: 97 % | TEMPERATURE: 98.6 F | DIASTOLIC BLOOD PRESSURE: 82 MMHG | HEIGHT: 64 IN

## 2021-01-06 PROCEDURE — 64483 NJX AA&/STRD TFRM EPI L/S 1: CPT

## 2021-01-06 PROCEDURE — 64484 NJX AA&/STRD TFRM EPI L/S EA: CPT

## 2021-01-06 PROCEDURE — 700101 HCHG RX REV CODE 250

## 2021-01-06 PROCEDURE — 700111 HCHG RX REV CODE 636 W/ 250 OVERRIDE (IP)

## 2021-01-06 PROCEDURE — A9585 GADOBUTROL INJECTION: HCPCS

## 2021-01-06 PROCEDURE — 700117 HCHG RX CONTRAST REV CODE 255

## 2021-01-06 RX ORDER — DEXAMETHASONE SODIUM PHOSPHATE 10 MG/ML
INJECTION, SOLUTION INTRAMUSCULAR; INTRAVENOUS
Status: COMPLETED
Start: 2021-01-06 | End: 2021-01-06

## 2021-01-06 RX ORDER — LIDOCAINE HYDROCHLORIDE 20 MG/ML
INJECTION, SOLUTION EPIDURAL; INFILTRATION; INTRACAUDAL; PERINEURAL
Status: COMPLETED
Start: 2021-01-06 | End: 2021-01-06

## 2021-01-06 RX ORDER — GADOBUTROL 604.72 MG/ML
INJECTION INTRAVENOUS
Status: COMPLETED
Start: 2021-01-06 | End: 2021-01-06

## 2021-01-06 RX ADMIN — GADOBUTROL 2 ML: 604.72 INJECTION INTRAVENOUS at 11:03

## 2021-01-06 RX ADMIN — DEXAMETHASONE SODIUM PHOSPHATE 20 MG: 10 INJECTION, SOLUTION INTRAMUSCULAR; INTRAVENOUS at 11:05

## 2021-01-06 RX ADMIN — LIDOCAINE HYDROCHLORIDE 5 ML: 20 INJECTION, SOLUTION EPIDURAL; INFILTRATION; INTRACAUDAL; PERINEURAL at 11:05

## 2021-01-06 ASSESSMENT — FIBROSIS 4 INDEX: FIB4 SCORE: 2.415948169054037375

## 2021-01-06 NOTE — INTERVAL H&P NOTE
"H&P reviewed. The patient was examined and there are no changes to the H&P      BP (!) 164/70   Pulse 60   Temp 37 °C (98.6 °F) (Temporal)   Resp 16   Ht 1.626 m (5' 4\")   Wt 66.7 kg (147 lb 0.8 oz)   SpO2 97%     CV: RRR, DOROTEO III/VI  RESP: Clear to ascultation bilaterally    Continue with injection as planned.    Roni Choi MD  Physical Medicine and Rehabilitation  Interventional Spine and Sports Physiatry  [facility]      "

## 2021-01-06 NOTE — PROGRESS NOTES
Pt ambulated to recovery by procedure room RN, eating cookies and drinking juice w/o n/v, assessed by MD, adhesive coverlet to right low back is cdi, VSS, alert, pt d/c'd per order and ambulated,CGA, to vehicle by CNA.

## 2021-01-06 NOTE — NON-PROVIDER
Pre-procedure assessment completed  and pertinent health information  communicated to physician and staff during timeout. Pt positionned pre-procedure on table by RN, and xray tech. Pillow placed under feet for support.

## 2021-01-06 NOTE — OP REPORT
Pre-operative Diagnosis: Right lumbar radiculitis(M54.16), Neural foraminal stenosis of lumbar spine (M99.73), spinal stenosis of lumbar region(M48.061)     Post-operative Diagnosis:Right lumbar radiculitis(M54.16), Neural foraminal stenosis of lumbar spine (M99.73), spinal stenosis of lumbar region(M48.061)     Procedure: Right Lumbar Transforaminal Epidural Steroid at the L4-5 and L5-S1 levels.      Type of Anesthesia: Local anesthesia  Blood Loss: None  Physician: Roni Choi MD     Description of procedure:     The risks, benefits, and alternatives of the procedure were reviewed and discussed with the patient.  Written informed consent was freely obtained. A pre-procedural time-out was conducted by the physician verifying patient’s identity, procedure to be performed, procedure site and side, and allergy verification. Appropriate equipment was determined to be in place for the procedure.      Method of Procedure: The patient signed an informed consent form in the pre-op area after all risks and complications were discussed and all questions were answered.     The patient was prepped and draped in a sterile fashion in the prone position.  The patient's spine was surveyed under fluoroscopic visualization and anatomical landmarks were identified.     The patient's vital signs were carefully monitored before, throughout, and after the procedure.      Right L4 transforaminal epidural steroid injection     The fluoroscope was placed over the lumbar spine and adjusted into the proper AP/Oblique view to enter the transforaminal space at the levels below. The targets for injection were then marked at the right L4-5. A 25g 1.5 inch needle was placed into the marked site, and approx 1.5cc of 1% Lidocaine was injected subcutaneously into the epidermal and dermal layers. The needle was removed. A 25g 3.5 inch spinal needle was then placed and advanced under fluoroscopic guidance in an oblique view towards the subpedicular  "epidural space of the levels noted below. The needle position was confirmed to not be past the 6 o'clock position in the AP view and it was in the neural foramen and the lateral view. Under live fluoroscopic guidance in the AP view, contrast dye was used to highlight the epidural space spread.  Following negative aspiration, approx 1mL of 2% lidocaine preservative free with 1 cc of dexamethasone(10mg/cc) and 0.5ml of preservative-free normal saline was then injected at each level, and the needle was removed leaving a trail of 1% lidocaine. The patient's back was covered with a 4x4 gauze, the area was cleansed with sterile normal saline, and a dressing was applied. There were no complications noted.      Perisheathogram and Spot Film:  After Omnipaque was injected, a right L4 \"perisheathogram\" occurred without evidence of subarachnoid or vascular flow.  A spot films was ordered in AP and lateral to confirm the correct needle tip placement.     Right L5 transforaminal epidural steroid injection     The fluoroscope was placed over the lumbar spine and adjusted into the proper AP/Oblique view to enter the transforaminal space at the levels below. The targets for injection were then marked at the right L5-S1. A 25g 1.5 inch needle was placed into the marked site, and approx 2cc of 1% Lidocaine was injected subcutaneously into the epidermal and dermal layers. The needle was removed. A 25g 3.5 inch spinal needle was then placed and advanced under fluoroscopic guidance in an oblique view towards the subpedicular epidural space of the levels noted below. The needle position was confirmed to not be past the 6 o'clock position in the AP view and it was in the neural foramen and the lateral view. Under live fluoroscopic guidance in the AP view, contrast dye was used to highlight the epidural space spread.  Following negative aspiration, approx 1cc of 2% lidocaine preservative free with 1cc of dexamethasone(10mg/cc) and 0.5cc of " "normal saline was then injected at each level, and the needles were removed intact after restyleted. The patient's back was covered with a 4x4 gauze, the area was cleansed with sterile normal saline, and a dressing was applied. There were no complications noted.      Perisheathogram and Spot Film:  After Omnipaque was injected, a right L5 \"perisheathogram\" occurred without evidence of subarachnoid or vascular flow.  A spot films was ordered in AP and lateral to confirm the correct needle tip placement.     The patient was then evaluated post-procedure, and was hemodynamically stable prior to leaving the post-operative care unit.      Roni Choi MD  Physical Medicine and Rehabilitation  Interventional Spine and Sports Physiatry  [facility]     CPT: [id number], [id number]  "

## 2021-01-07 ENCOUNTER — TELEPHONE (OUTPATIENT)
Dept: PHYSICAL MEDICINE AND REHAB | Facility: MEDICAL CENTER | Age: 81
End: 2021-01-07

## 2021-01-07 NOTE — TELEPHONE ENCOUNTER
"Procedure: Right lumbar four and lumbar five transforaminal epidural steroid injection      Called patient to follow up after special procedure on 01/06/2021. Spoke with patient. Patient states  that she is \"doing great and feels very, very well\". She is not having any bladder or bowel issues. She does not have any questions or concerns at this time. She is not using ice. Pain after the procedure was 10/2, Today 10/0. -   "

## 2021-01-11 DIAGNOSIS — Z23 NEED FOR VACCINATION: ICD-10-CM

## 2021-01-26 ENCOUNTER — OFFICE VISIT (OUTPATIENT)
Dept: PHYSICAL MEDICINE AND REHAB | Facility: MEDICAL CENTER | Age: 81
End: 2021-01-26
Payer: MEDICARE

## 2021-01-26 VITALS
TEMPERATURE: 97.9 F | HEIGHT: 64 IN | HEART RATE: 74 BPM | DIASTOLIC BLOOD PRESSURE: 82 MMHG | BODY MASS INDEX: 25.63 KG/M2 | SYSTOLIC BLOOD PRESSURE: 130 MMHG | OXYGEN SATURATION: 97 % | WEIGHT: 150.13 LBS

## 2021-01-26 DIAGNOSIS — M46.1 SACROILIITIS (HCC): ICD-10-CM

## 2021-01-26 DIAGNOSIS — M54.16 RIGHT LUMBAR RADICULITIS: ICD-10-CM

## 2021-01-26 DIAGNOSIS — M54.50 LUMBOSACRAL PAIN: ICD-10-CM

## 2021-01-26 DIAGNOSIS — M48.061 NEURAL FORAMINAL STENOSIS OF LUMBAR SPINE: ICD-10-CM

## 2021-01-26 DIAGNOSIS — M48.061 SPINAL STENOSIS OF LUMBAR REGION, UNSPECIFIED WHETHER NEUROGENIC CLAUDICATION PRESENT: ICD-10-CM

## 2021-01-26 PROCEDURE — 99213 OFFICE O/P EST LOW 20 MIN: CPT | Performed by: PHYSICAL MEDICINE & REHABILITATION

## 2021-01-26 ASSESSMENT — FIBROSIS 4 INDEX: FIB4 SCORE: 2.415948169054037375

## 2021-01-26 ASSESSMENT — PATIENT HEALTH QUESTIONNAIRE - PHQ9: CLINICAL INTERPRETATION OF PHQ2 SCORE: 0

## 2021-01-26 ASSESSMENT — PAIN SCALES - GENERAL: PAINLEVEL: NO PAIN

## 2021-02-08 ENCOUNTER — PHYSICAL THERAPY (OUTPATIENT)
Dept: PHYSICAL THERAPY | Facility: MEDICAL CENTER | Age: 81
End: 2021-02-08
Attending: PHYSICAL MEDICINE & REHABILITATION
Payer: MEDICARE

## 2021-02-08 DIAGNOSIS — M46.1 SACROILIITIS (HCC): ICD-10-CM

## 2021-02-08 DIAGNOSIS — M54.50 LUMBOSACRAL PAIN: ICD-10-CM

## 2021-02-08 PROCEDURE — 97161 PT EVAL LOW COMPLEX 20 MIN: CPT

## 2021-02-08 ASSESSMENT — ENCOUNTER SYMPTOMS
PAIN TIMING: INTERMITTENT
PAIN SCALE AT LOWEST: 1
PAIN SCALE: 5
QUALITY: ACHING
PAIN SCALE AT HIGHEST: 7

## 2021-02-08 NOTE — OP THERAPY EVALUATION
Outpatient Physical Therapy  INITIAL EVALUATION    [facility] Outpatient Physical Therapy  [address]  [place] [address]  Phone:  [phone]  Fax:  [phone]    Date of Evaluation: 2021    Patient: Katerina Torres  YOB: 1940  MRN: [id number]     Referring Provider: Roni Choi M.D.  [address]  [address]  [place] [address]   Referring Diagnosis Sacroiliitis (HCC) [M46.1];Lumbosacral pain [M54.5]     Time Calculation    Start time: 0200  Stop time: 0300 Time Calculation (min): 60 minutes             Chief Complaint: Hip Problem and Back Problem    Visit Diagnoses     ICD-10-CM   1. Sacroiliitis (HCC)  M46.1   2. Lumbosacral pain  M54.5         Subjective   History of Present Illness:     Date of onset:  10/8/2020    History of chief complaint:  Pt states she had a fall in october when she was gardening. Pt had an epidural in january. It helped her back pain, but now she's having right anterior hip/groin pain and occasional pain in right lateral thigh. Pt gets sharp pain in groin at times    Pain:     Current pain ratin    At best pain ratin    At worst pain ratin    Quality:  Aching    Pain timing:  Intermittent    Aggravating factors:  Pt has pain with sit to stand, walking > 20 minutes, crossing legs, left sidelying hurts right side, twisting.    Relieving factors:  Tylenol, flexing hips and knees.     Progression:  Unchanged    Activity Tolerance:     Current activity tolerance / Recreational activities:  Pt is skiing 2-3x/week. She usually does 5-6 runs. Pt stretches some. She loves to garden.     Work:  Retired    Social Support:     Lives in:  One-story house    Lives with:  Spouse    Hand Dominance:     Hand dominance:  Right    Diagnostic Tests:     X-ray: abnormal           1.  Multilevel degenerative disc disease and facet degeneration. There is moderate central canal narrowing at L2-3 and L3-4 with moderate to  "severe central canal narrowing at L4-5.     2.  Very degrees of neural foraminal narrowing at levels as specifically described above.     3.  Mild chronic compression fracture at L3.     4.  Scoliosis.        IMPRESSION:     1.  Mild osteoarthritis of the hip joints. Productive bony change is slightly greater on the left. Findings may be associated with femoral acetabular impingement in the appropriate clinical setting.     2.  Small osteophytes and sclerosis in the sacroiliac joints. Questionable erosive change on the right. Findings suggest sacroiliitis.            Past Medical History:   Diagnosis Date   • Breast cancer (HCC)    • Breath shortness     with exertion \"walking up a hill\"   • Bruises easily    • Cancer (HCC) 1989    breast left side lumpectomy   • Cardiac murmur 6/3/2009   • Chronic kidney disease, stage III (moderate) 8/20/2015   • Dyslipidemia 6/3/2009   • Glaucoma     both eyes   • Hepatitis A 1993   • Hiatus hernia syndrome    • Hx of breast cancer 6/3/2009   • Hypertension 7/18/2017    currently not taking medications   • MEDICAL HOME    • Neutropenia 6/3/2009   • Osteopenia 6/3/2009   • Preventative health care 6/3/2009   • Snoring    • Unspecified cataract     bilateral IOLs     Past Surgical History:   Procedure Laterality Date   • LUMBAR TRANSFORAMINAL EPIDURAL STEROID INJECTION Right 1/6/2021    Procedure: INJECTION, STEROID, SPINE, LUMBAR, EPIDURAL, TRANSFORAMINAL APPROACH;  Surgeon: Roni Choi M.D.;  [facility];  Service: Pain Management   • KNEE ARTHROPLASTY TOTAL Right 5/8/2018    Procedure: KNEE ARTHROPLASTY TOTAL;  Surgeon: Thanh Hall M.D.;  Location: SURGERY [facility];  Service: Orthopedics   • VITRECTOMY POSTERIOR Left 10/25/2016    Procedure: VITRECTOMY POSTERIOR membrane peel cryotherapy infusion of SF6 intraocular gas;  Surgeon: Amanda Rodriguez M.D.;  Location: SURGERY [facility] DAY [facility];  Service:    • RECOVERY  3/31/2014 "    Performed by Cath-Recovery Surgery at SURGERY SAME DAY [facility]   • CATARACT PHACO WITH IOL  5/28/2009    Performed by GERA BREAUX at SURGERY SAME DAY [facility]   • CATARACT PHACO WITH IOL  5/14/2009    Performed by GERA BREAUX at SURGERY SAME DAY [facility]   • LUMPECTOMY     • OTHER     • PB RADIATION THERAPY PLAN SIMPLE             Objective   Observation and functional movement:  Pt has normal gait.     Range of motion and strength:    Pt has decent mobility in her right hip, however, she gets a deep sharp pain with active hip flexion in supine or sitting. IR provokes some anterior groin pain.    MMT: hip flexion painful and mildly weak.     Sensation and reflexes:     Sensation is intact.      Reflexes are normal and symmetrical.    Palpation and joint mobility:     Pt has local tenderness posterior to greater trochanter and mild tenderness right greater trochanteric bursae.    Special tests:      Mildly positive hip quadrant on right side.  Bishop test is negative.    SLR and slump don't reproduce hip pain.   SI tests negative.     Balance:     No balance deficits noted.    Gait:      Normal pattern gait.            Therapeutic Exercises (CPT 03685):     1. Pt started on hip abduction, bridging, piriformis stretch      Therapeutic Exercise Summary: Access Code: [email or web address]   URL: [email or web address]   Date: 02/08/2021   Prepared by: Jacki Pickett      Exercises Hooklying Clamshell with Resistance- 10 reps- 3 sets- 1x daily- 7x weekly   Supine Bridge with Resistance Band- 10 reps- 3 sets- 1x daily- 7x weekly   Child's Pose Stretch- 3 reps- 1 sets- 20 seconds hold- 1x daily- 7x weekly   Supine Piriformis Stretch with Foot on Ground- 3 reps- 1 sets- 20 secinds hold- 1x daily- 7x weekly         Time-based treatments/modalities:           Assessment, Response and Plan:   Impairments: lacks appropriate home exercise program    Assessment details:  Katerina is a pleasant 80 year old female with  hx of stenosis: chronic back pain and right radicular pain at times. Pt appears to have a mechanical hip dysfunction component also that is causing sharp intermittent pains. Her pain is reproduced with hip flexion from sitting or supine position. Pt may benefit from skilled PT to address new symptoms and to develop core strengthening program.   Barriers to therapy:  None  Prognosis: good    Goals:   Short Term Goals:   Pt will be compliant with daily HEP.  Pt will be able to sit for 30 minutes without having hip pain upon rising.   Short term goal time span:  2-4 weeks      Long Term Goals:    Pt will be able to get in and out of the car without sharp pains.   Pt will be able to tolerate 30 minutes of core strengthening exercises 3x/week.   Pt will be able to sleep on her sides.   WOMAC will improve 50%.     Long term goal time span:  6-8 weeks    Plan:   Planned therapy interventions:  Manual Therapy (CPT 76663), E Stim Unattended (CPT 53737) and Therapeutic Exercise (CPT 17992)  Frequency:  2x week  Duration in weeks:  8  Discussed with:  Patient      Functional Assessment Used  WOMAC Grand Total: 35.42     Referring provider co-signature:  I have reviewed this plan of care and my co-signature certifies the need for services.    Certification Period: 02/08/2021 to  4/8/2021    Physician Signature: ________________________________ Date: ______________

## 2021-02-17 ENCOUNTER — PHYSICAL THERAPY (OUTPATIENT)
Dept: PHYSICAL THERAPY | Facility: MEDICAL CENTER | Age: 81
End: 2021-02-17
Attending: PHYSICAL MEDICINE & REHABILITATION
Payer: MEDICARE

## 2021-02-17 DIAGNOSIS — M46.1 SACROILIITIS (HCC): ICD-10-CM

## 2021-02-17 DIAGNOSIS — M54.50 LUMBOSACRAL PAIN: ICD-10-CM

## 2021-02-17 PROCEDURE — 97140 MANUAL THERAPY 1/> REGIONS: CPT

## 2021-02-17 PROCEDURE — 97110 THERAPEUTIC EXERCISES: CPT

## 2021-02-17 NOTE — OP THERAPY DAILY TREATMENT
Outpatient Physical Therapy  DAILY TREATMENT     [facility] Outpatient Physical Therapy  [address]  [place] [address]  Phone:  [phone]  Fax:  [phone]    Date: 02/17/2021    Patient: Katerina Torres  YOB: 1940  MRN: [id number]     Time Calculation    Start time: 1500  Stop time: 1530 Time Calculation (min): 30 minutes         Chief Complaint: Hip Problem    Visit #: 2    SUBJECTIVE:  Patient reports continued pain and states that pain is present the most when sitting if she tries to lift her leg. Reports that she has not yet been consistent with her HEP.    OBJECTIVE:  Current objective measures:           Therapeutic Exercises (CPT 40321):     1. Piriformis stretch 2 ways, [id number]    2. Clamshells, 2x10 bilaterally    3. Bridges, x15    4. Supine Abduction, x10      Therapeutic Exercise Summary: Access Code: [email or web address]   URL: [email or web address]   Date: 02/08/2021   Prepared by: Jacki Pickett      Exercises Hooklying Clamshell with Resistance- 10 reps- 3 sets- 1x daily- 7x weekly   Supine Bridge with Resistance Band- 10 reps- 3 sets- 1x daily- 7x weekly   Child's Pose Stretch- 3 reps- 1 sets- 20 seconds hold- 1x daily- 7x weekly   Supine Piriformis Stretch with Foot on Ground- 3 reps- 1 sets- 20 secinds hold- 1x daily- 7x weekly       Therapeutic Treatments and Modalities:     1. Manual Therapy (CPT 39384), STM/DTM to right piriformis, manual piriformis stretch, long axis distraction with gentle hip mobs    Time-based treatments/modalities:    Physical Therapy Timed Treatment Charges  Manual therapy minutes (CPT 11614): 13 minutes  Therapeutic exercise minutes (CPT 64346): 14 minutes          ASSESSMENT:   Response to treatment: Patient reported slight decrease in pain following session. No new additions to HEP as patient has not yet consistently performed current HEP. Patient to return next week to seen primary PT. Progress as tolerated.      PLAN/RECOMMENDATIONS:   Plan for treatment: therapy treatment to continue next visit.  Planned interventions for next visit: continue with current treatment.

## 2021-04-05 NOTE — PROGRESS NOTES
Member called in stating she keeps getting bills from physical therapy. Verified HIPPA. I looked up the claim for DOS 02/17/2021 claim # [id number]. I advised this was a specialist office visit and not just a standard physical therapy visit and for this she has a $40 specialist office copay for the evaluation. She also asked for [employer] number for other claims questions.She understood and had no other questions or concerns. Used Epic and [employer].

## 2021-06-22 ENCOUNTER — PATIENT OUTREACH (OUTPATIENT)
Dept: MEDICAL GROUP | Facility: MEDICAL CENTER | Age: 81
End: 2021-06-22

## 2021-06-22 NOTE — PROGRESS NOTES
ANNUAL WELLNESS VISIT PRE-VISIT PLANNING    Patient NOT contacted to complete Annual Wellness Visit Pre-Visit Planning. Information was reviewed in chart.    1.  Reviewed notes from the last office visit: Yes    2.  If any orders were ordered or intended to be done prior to visit (i.e. 6 mos follow-up), do we have results/consult notes or has patient scheduled?   · Labs - Labs were not ordered at last office visit.   Note: If patient appointment is for lab review and patient did not complete labs, check with provider if OK to reschedule patient until labs completed.  · Imaging - Imaging ordered, not completed. Appointment scheduled with [facility]  · Referrals - Referral ordered, patient was seen and consult notes are in chart. Care Teams updated  YES.    3.  Immunizations were updated in Epic using Reconcile Outside Information activity? Yes. Immunizations reconciled through [employer] and outside sources. Immunization records are up to date.       •  Is patient due for Tdap? NO       •  Is patient due for Shingrix? NO    4.  Patient is due for the following Health Maintenance Topics:   Health Maintenance Due   Topic Date Due   • Annual Wellness Visit  06/10/2021   • MAMMOGRAM  06/12/2021     5.  Reviewed/Updated the following:       •   Preferred Pharmacy? Yes       •   Preferred Lab? Yes       •   Preferred Communication? Yes       •   Allergies? Yes       •   Medications? YES. Was Abstract Encounter opened and chart updated? NO       •   Social History? Yes       •   Family History (document living status of immediate family members and if + hx of  cancer, diabetes, hypertension, hyperlipidemia, heart attack, stroke) No    6.  Care Team Updated:       •   DME Company (gait device, O2, CPAP, etc.): N\A       •   Other Specialists (eye doctor, derm, GYN, cardiology, endo, etc): YES    7.  Patient was not advised: “This is a free wellness visit. The provider will screen for medical conditions to help you stay healthy. If  you have other concerns to address you may be asked to discuss these at a separate visit or there may be an additional fee.”       ---------------------------------------------------------------------------------------------  This Pre-Visit Planning note has been created for the Provider and Medical Assistant to review prior to the patient's office appointment.   Patient is NOT REQUIRED to follow-up on this note.

## 2021-06-24 ENCOUNTER — PATIENT MESSAGE (OUTPATIENT)
Dept: HEALTH INFORMATION MANAGEMENT | Facility: OTHER | Age: 81
End: 2021-06-24

## 2021-06-28 ENCOUNTER — PATIENT OUTREACH (OUTPATIENT)
Dept: HEALTH INFORMATION MANAGEMENT | Facility: OTHER | Age: 81
End: 2021-06-28

## 2021-06-28 NOTE — PROGRESS NOTES
Called pt to schedule PATRICIA. Verified HIPAA. PT declined and was not interested in the exam.    Attempt # 2

## 2021-06-29 ENCOUNTER — TELEPHONE (OUTPATIENT)
Dept: MEDICAL GROUP | Facility: MEDICAL CENTER | Age: 81
End: 2021-06-29

## 2021-06-29 ENCOUNTER — OFFICE VISIT (OUTPATIENT)
Dept: MEDICAL GROUP | Facility: MEDICAL CENTER | Age: 81
End: 2021-06-29
Payer: MEDICARE

## 2021-06-29 VITALS
DIASTOLIC BLOOD PRESSURE: 64 MMHG | OXYGEN SATURATION: 97 % | WEIGHT: 144 LBS | BODY MASS INDEX: 26.5 KG/M2 | SYSTOLIC BLOOD PRESSURE: 116 MMHG | TEMPERATURE: 99.1 F | HEIGHT: 62 IN | HEART RATE: 90 BPM

## 2021-06-29 DIAGNOSIS — E78.5 DYSLIPIDEMIA: ICD-10-CM

## 2021-06-29 DIAGNOSIS — Z00.00 MEDICARE ANNUAL WELLNESS VISIT, SUBSEQUENT: ICD-10-CM

## 2021-06-29 DIAGNOSIS — I35.1 AORTIC VALVE INSUFFICIENCY, ETIOLOGY OF CARDIAC VALVE DISEASE UNSPECIFIED: ICD-10-CM

## 2021-06-29 DIAGNOSIS — R94.4 DECREASED GFR: ICD-10-CM

## 2021-06-29 DIAGNOSIS — M81.0 POSTMENOPAUSAL BONE LOSS: ICD-10-CM

## 2021-06-29 DIAGNOSIS — I35.0 NONRHEUMATIC AORTIC VALVE STENOSIS: ICD-10-CM

## 2021-06-29 DIAGNOSIS — E55.9 VITAMIN D DEFICIENCY: ICD-10-CM

## 2021-06-29 DIAGNOSIS — N18.31 STAGE 3A CHRONIC KIDNEY DISEASE: ICD-10-CM

## 2021-06-29 DIAGNOSIS — D70.9 CHRONIC NEUTROPENIA (HCC): ICD-10-CM

## 2021-06-29 PROCEDURE — G0439 PPPS, SUBSEQ VISIT: HCPCS | Performed by: INTERNAL MEDICINE

## 2021-06-29 ASSESSMENT — ACTIVITIES OF DAILY LIVING (ADL): BATHING_REQUIRES_ASSISTANCE: 0

## 2021-06-29 ASSESSMENT — PATIENT HEALTH QUESTIONNAIRE - PHQ9: CLINICAL INTERPRETATION OF PHQ2 SCORE: 0

## 2021-06-29 ASSESSMENT — ENCOUNTER SYMPTOMS: GENERAL WELL-BEING: GOOD

## 2021-06-29 ASSESSMENT — FIBROSIS 4 INDEX: FIB4 SCORE: 2.45

## 2021-06-29 NOTE — NON-PROVIDER
Annual Health Assessment Questions:    1.  Are you currently engaging in any exercise or physical activity? No    2.  How would you describe your mood or emotional well-being today? good    3.  Have you had any falls in the last year? Yes    4.  Have you noticed any problems with your balance or had difficulty walking? Yes     5.  In the last six months have you experienced any leakage of urine? Yes    6. DPA/Advanced Directive: Patient does not have an Advanced Directive.  A packet and workshop information was given on Advanced Directives.

## 2021-06-29 NOTE — LETTER
[facility]  Master Suarez M.D.  [address]  [place] [address]  Fax: [phone]   Authorization for Release/Disclosure of   Protected Health Information   Name: GAEL DIALLO : 1940 SSN: [id number]   Address: [address] [place] [address]  [place] [address] Phone:    [phone] (home) [phone] (work)   I authorize the entity listed below to release/disclose the PHI below to:   [facility]/Master Suarez M.D. and Master Suarez M.D.   Provider or Entity Name:                                                         [facility]   Address   City, [facility], [facility]   Phone:      Fax:                [phone]   Reason for request: continuity of care   Information to be released: Past 1 yr of records   [  ] LAST COLONOSCOPY,  including any PATH REPORT and follow-up  [  ] LAST FIT/COLOGUARD RESULT [  ] LAST DEXA  [  ] LAST MAMMOGRAM  [  ] LAST PAP  [  ] LAST LABS [  ] RETINA EXAM REPORT  [  ] IMMUNIZATION RECORDS  [ x ] Release all info      [  ] Check here and initial the line next to each item to release ALL health information INCLUDING  _____ Care and treatment for drug and / or alcohol abuse  _____ HIV testing, infection status, or AIDS  _____ Genetic Testing    DATES OF SERVICE OR TIME PERIOD TO BE DISCLOSED: _____________  I understand and acknowledge that:  * This Authorization may be revoked at any time by you in writing, except if your health information has already been used or disclosed.  * Your health information that will be used or disclosed as a result of you signing this authorization could be re-disclosed by the recipient. If this occurs, your re-disclosed health information may no longer be protected by State or Federal laws.  * You may refuse to sign this Authorization. Your refusal will not affect your ability to obtain treatment.  * This Authorization becomes effective upon signing and will  on (date) __________.      If no date is indicated, this Authorization  will  one (1) year from the signature date.    Name: Katerina Olga Brian    Signature:   Continuity of Care   Date:     2021       PLEASE FAX REQUESTED RECORDS BACK TO: [phone]

## 2021-06-29 NOTE — PROGRESS NOTES
Subjective:      Katerina Torres is a 81 y.o. female who presents with medicare wellness          HPI     CC:  Health Risk Assessment Exam.    HPI: wellness visit   Katerina is a 81 y.o. here for Health Risk Assessment.     PCP: Master Suarez M.D.  Medications, allergies, medical history, surgical history, social history, family history  reviewed and updated  SH , works in the yard tries to use sunscreen, under more stress with  who has memory loss, keeps active with swiss groups and friends, keeps working in garden and skiing. No family in the area and is working on medical advanced directive and power of  since  has dementia and patient does get stress with  not doing work around the house and being more forgetful  She is working on an advanced directive herself and her  as they have no family or relatives in town, remote family is in [place].  They do have good friends in town and she tries to keep active socially.  She does get discouraged with her 's memory loss they are not as active.  She denies depression.  She keeps working in her garden, socializing with friends, and skiing for activity.  She states her  does get irritable at times because of his dementia and this does also irritate her but she understands it is his disease  Tries to eat healthy diet  No tobacco, minimal alcohol  Sees dermatology and uses sunscreen      Patient Active Problem List    Diagnosis Date Noted   • Low back pain 12/02/2020   • History of palpitations 06/05/2020   • Aortic regurgitation 04/27/2018   • Esophageal dysmotility 07/18/2017   • History of hypertension 07/18/2017   • S/P knee replacement 06/20/2017   • Colon polyp 03/13/2017   • Decreased GFR 08/20/2015   • History of polymyalgia rheumatica 07/28/2015   • Abnormal cardiovascular stress test 03/24/2014   • Knee pain, left 08/01/2013   • Glaucoma 06/29/2010   • Skin cancer, basal cell 06/29/2010   •  History of breast cancer 06/03/2009   • Osteopenia 06/03/2009   • Dyslipidemia 06/03/2009   • Neutropenia (HCC) 06/03/2009   • Preventative health care 06/03/2009   • Aortic stenosis 06/03/2009     s/p knee replacement  3/3/17 MRI right knee abnormal right lateral meniscus with peripheral extrusion, maceration, and complex tear involving primarily the posterior horn and body but also the anterior horn, abnormal medial meniscus with vertical tear of the peripheral zone of body and posterior horn, probable meniscal root tear, and degenerative fraying of the free edge, severe lateral femorotibial compartment osteoarthritis with significant cartilage loss and moderate to severe subchondral edema within the lateral femoral condyle and lateral tibial plateau, mild medial femorotibial and the patellofemoral compartment osteoarthritis, moderate sized joint effusion with associated popliteal cyst  6/20/17 sees  orthopedics  1/18/18  orthopedic note right knee end-stage arthritis, x-rays bone-on-bone right knee arthritis lateral compartment, right knee steroid injection performed, planned for total right knee arthroplasty after winter  5/2/18  orthopedic note, right knee osteoathritis, failed conservative measures, scheduled for right knee surgery  5/8/18  operative note right knee arthroplasty   5/23/18  orthopedic note 2 weeks s/p right total knee  8/6/18  orthopedic note 3 months status post right knee replacement doing well, x-ray shows stable right total knee  5/22/19  orthopedic note x-ray stable total knee replacement, follow-up 1 year     Skin cancer basal cell     Preventative health  7/23/12 tdap   1/10/13 pneumovax  7/29/14 zoster vaccine  8/6/15 prevnar at [facility]  9/1/16 sonocine negative  3/7/17 colon per [facility] 2 polyps, grade 2 internal hemorrhoids, angioectasias ascending colon,pathology one hyperplastic polyp and one sessile serrated polyp, repeat  colonoscopy 5 years   4/5/18 declines sonocine  5/28/19 dexa LS-1.0,hip-1.4  6/12/20 vit d 88 on 1000 units decrease to qod   6/25/20 mammogram heterogeneously dense breast tissue, asymmetry right breast, diagnostic mammogram and ultrasound ordered  6/20/20 mammogram diagnostic right negative, recommend annual screening mammography  9/19/20 shingrix second  2/6/21 covid moderna second     Osteopenia  9/06 dexa LS -1.2,hip -1.1  6/09 dexa LS -1.4,hip -1.0  7/11 vit d 43  7/11 dexa LS -1.1,hip -0.9  712 vit d 30 start 1000 units  7/24/13 vit d 72 on 1000 units  8/6/13 dexa LS -1.1,hip -1.1  8/5/14 vit d 67  8/19/15 dexa LS -1.4,hip -1.3;FRAX 40% major,27% hip only on prednisone one month, does not need bisphosphonate therapy  8/19/15 vit d 43  6/16/16 off prednisone x 3 weeks  6/21/16 vit d 50  6/27/17 vit d 48  4/27/18 vit d 53   5/14/19 vit d 46  5/28/19 dexa LS-1.0,hip-1.4  6/12/20 vit d 88 on 1000 units decrease to qod      Neutropenia  8/06 wbc 3.4  3/08 wbc 3.9  6/09 wbc 3.9  7/10 wbc 4.4  7/11 wbc 4.5  7/12 wbc 3.8 (55%N,35%L)  7/24/13 wbc 4.3  3/20/14 wbc 3.1 (52%N,36%L)  3/31/14 wbc 3.7  8/19/15 wbc 5.3  6/21/16 wbc 4.4 (50%N,40%L)  6/27/17 wbc 4.2   4/27/18 wbc 4.4  5/14/19 wbc 4.5  6/12/20 wbc 4.5    low back pain  11/30/20 right-sided buttock pain with radiation, referral to physiatry, x-ray, MRI lumbar spine, trial of naproxen short-term plus acetaminophen follow-up 3 weeks  11/30/20 x-ray lumbar spine moderate spondylosis, L2-L3 asymmetric disc height loss on the left resulting in slight right curvature and degenerative retrolisthesis  12/2/20 MRI lumbar spine mild superior endplate compression fracture at L3, no marrow edema consistent with chronic process, L2-L3 moderate central narrowing, moderate right and severe left neuroforaminal narrowing, L3-4 moderate central narrowing, moderate bilateral neuroforaminal narrowing, L4-5 moderate to severe central canal narrowing with severe right and moderate  left neuroforaminal narrowing, L5-S1 moderate bilateral neuroforaminal narrowing  1/6/21  physiatry procedure note right lumbar epidural steroid injection L4-S1 under fluoroscopy  1/26/21  physiatry note good response to right lumbar L4-L5 epidural steroid injections, she has returned to activities such as skiing, consider physical therapy and home program for residual pain, consider right SI joint versus repeat lumbar steroid injection if necessary, follow-up 2 months  2/8/21 [facility] note   2/17/21 [facility] note      knee pain left  8/1/13 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, lateral meniscus mildly extruded, ruptured hopper's cyst  8/12/13  ortho note pain improved on followup, consider injection if pain recurs     History polymyalgia  4/20/15 physical therapy, trial celebrex and zanaflex  5/7/15 physical therapy evaluation today recommended right hip x-ray and pelvic x-ray, ordered in computer  5/8/15 xray hip negative  6/29/15 seen by bridgette diagnosed with polymyalgia rheumatica  6/29/15 CRP 9.4,ESR 32 started on prednisone 20 mg    7/28/15 on prednisone 10 mg will continue 10 mg x 2 weeks, then decrease to 5 mg daily  8/19/15 hgb 14,hct 43, CRP 0.3, ESR 14, tapered off prednisone but developed mouth irritation, has resumed prednisone 5 mg daily, will continue x2 weeks then taper  11/17/15 refill prednisone 5 mg #30 tabs x one  6/21/16 off prednisone; CRP 0.1,ESR 17  6/27/17 CRP 0.09,ESR 12,cpk 66     histroy hypertension  3/31/14 cardiac catheterization; left main mild plaquing, LAD patent, circumflex free of disease, RCA free of disease, normal LV function, mild aortic stenosis  7/9/17 echo normal LV size and function, EF 60%, grade 1 diastolic dysfunction, moderate aortic stenosis peak gradient 50, mean 29, valve area 0.8-1.0 and aortic regurgitation  7/9/17 persantine thallium small fixed perfusion abnormality distal anterior and  anteroapical segments, no ischemia is noted to represent mild prior infarct or variant normal apical thinning  7/18/17 start diltiazem 120 mg daily (also has esophageal dysmotility by barium swallow)  4/26/18 off diltiazem  4/27/18 echo normal LV size and function, EF 65%, moderate aortic stenosis peak gradient 46, valve area 0.9,, moderate aortic insufficiency, no change compared to previous  5/27/19 echo normal LV size and function, EF 65%, normal diastolic function, calcified aortic valve moderate aortic stenosis peak gradient 53, moderate aortic insufficiency     History of breast cancer  1980s left sided s/p lumpectomy and radiation therapy, no old records  7/11 mammogram  8/12 mammogram  8/13 mammogram  8/18/14 mammogram  9/1/16 mammogram heterogeneously dense breast tissue recommend screening breast ultrasound  9/1/16 sonocine negative     Glaucoma  4/29/21  eye exam   surgical history  macular pucker surgery     Esophageal dysmotility  7/9/17 barium swallow moderate esophageal dysmotility, small volume silent aspiration  7/18/17 start diltiazem 120 mg daily  7/18/17 referral [facility], speech pathology for esophageal dysmotility, small amount of aspiration on barium swallow, try low-dose diltiazem 120 mg for esophageal dysmotility and elevated blood pressure  7/26/17 [facility] evaluation dyspepsia, obtain cardiology clearance and then proceed EGD, initiate pantoprazole 20 mg, trial of miralax and colace     Dyspnea  7/24/13 normal LV function, EF 60%, mild LVH, mild aortic stenosis, peak gradient 25  3/20/14 cxr negative  3/31/14 cardiac catheterization; left main mild lacking, LAD patent, circumflex free of disease, RCA free of disease, normal LV function, mild aortic stenosis  8/1/14 PFT FEV 2.8 L (144% predicted), FVC 3.6 (138% predicted), FEV/FVC 78, no bronchodilator response, DLCO 119% predicted     Dyslipidemia  3/08 chol 175,trig 73,hdl 45,ldl 115  6/09 chol 174,trig 89,hdl 47,ldl 109  7/10  chol 182,trig 61,hdl 55,ldl 114  7/11 chol 175,trig 69,hdl 45,ldl 116  7/12 chol 164,trig 64,hdl 43,ldl 108  7/24/13 chol 186,trig 66,hdl 54,ldl 119 no meds  8/5/14 chol 165,trig 93,hdl 48,ldl 98  8/19/15 chol 192,trig 109,hdl 58,ldl 112; 10 year risk 20% declines statin therapy  6/21/16 chol 137,trig 75,hdl 47,ldl 75   6/27/17 hcol 153,trig 94,hdl 54,ldl 80  5/14/19 chol 164,trig 82,hdl 48,ldl 100   6/12/20 chol 176,trig 80,hdl 57,ldl 103      Decrease GFR  7/7/11 bun 16,creat 1.0,GFR 50  7/24/13 bun 14,creat 1.1,GFR 45  3/20/14 bun 15,creat 0.9,GFR 59  8/5/14 bun 14,creat 1.1,GFR 46  2/19/15 bun 13,creat 0.8,GFR >60  8/19/15 bun 10,creat 1.1,GFR 48  6/21/16 bun 19,creat 1.0,GFR 50  6/27/17 bun 14,creat 0.5,GFR 51  4/28/18 bun 15,creat 0.9,GFR 60  5/14/19 bun 18,creat 1.13,GFR 46  6/12/20 bun 15,creat 1.07,GFR 49     colon polyp  3/7/17 colon per [facility] 2 polyps, grade 2 internal hemorrhoids, angioectasias ascending colon,pathology one hyperplastic polyp and onesessile serrated polyp, repeat colonoscopy 5 years      Aortic stenosis  10/06 echo mild mr, normal LV  6/09 stress echo negative  8/12 echo normal LV function, EF 65%, mild aortic stenosis, peak gradient 24  7/24/13 normal LV function, EF 60%, mild LVH, mild aortic stenosis, peak gradient 25  3/31/14 cardiac catheterization; left main mild lacking, LAD patent, circumflex free of disease, RCA free of disease, normal LV function, mild aortic stenosis  7/9/17 echo normal LV size and function, EF 60%, grade 1 diastolic dysfunction, moderate aortic stenosis peak gradient 50, mean 29, valve area 0.8-1.0 and aortic regurgitation  7/9/17 persantine thallium small fixed perfusion abnormality distal anterior and anteroapical segments, no ischemia is noted to represent mild prior infarct or variant normal apical thinning  4/27/18 echo normal LV size and function, EF 65%, moderate aortic stenosis peak gradient 46, valve area 0.9,, moderate aortic insufficiency, no change  compared to previous  5/27/19 echo normal LV size and function, EF 65%, normal diastolic function, calcified aortic valve moderate aortic stenosis peak gradient 53, moderate aortic insufficiency     Aortic regurgitation  10/06 echo mild mitral regurgitation, normal LV  6/09 stress echo negative  8/12 echo normal LV function, EF 65%, mild aortic stenosis, peak gradient 24  7/24/13 normal LV function, EF 60%, mild LVH, mild aortic stenosis, peak gradient 25  3/31/14 cardiac catheterization; left main and LAD patent, circumflex free of disease, RCA free of disease, normal LV function, mild aortic stenosis  7/9/17 echo normal LV size and function, EF 60%, grade 1 diastolic dysfunction, moderate aortic stenosis peak gradient 50, mean 29, valve area 0.8-1.0 and aortic regurgitation  7/9/17 persantine thallium small fixed perfusion abnormality distal anterior and anteroapical segments, no ischemia is noted to represent mild prior infarct or variant normal apical thinning  4/27/18 echo normal LV size and function, EF 65%, moderate aortic stenosis peak gradient 46, valve area 0.9,, moderate aortic insufficiency, no change compared to previous  5/27/19 echo normal LV size and function, EF 65%, normal diastolic function, calcified aortic valve moderate aortic stenosis peak gradient 53, moderate aortic insufficiency  7/11/19 patient feels more intense heart rate and heartbeat at night, can hear her heartbeat, only occurs at night, question anxiety component trial of buspar, 24-hour holter, cardiology referral  7/18/19 24-hour holter monitor, sinus rhythm asymptomatic average heart rate 65 PAC burden 1.3%, PVC burden 1.8%, 2 atrial runs longest 7 beats maximal heart rate 105, fastest run 4 beats maximum heart rate 129     Abnormal cardiovascular test  10/06 echo mild mitral regurgitation, normal LV  6/09 stress echo negative  8/12 echo normal LV function, EF 65%, mild aortic stenosis, peak gradient 24  7/24/13 normal LV  function, EF 60%, mild LVH, mild aortic stenosis, peak gradient 25  3/20/14 seen ER palpitations, atypical chest pain  3/24/14 exercise treadmill carla protocol 5 minutes 5 seconds, 1 mm mild upsloping ST segment depression in V6, flat ST segment depression V4 and V5 compatible with ischemia, no arrhythmia noted  3/28/14 cardiology note, scheduled for cardiac catheterization, cont asa, metoprolol 25 mg bid, crestor 5 mg samples  3/31/14 cardiac catheterization; left main mild plaquing, LAD patent, circumflex free of disease, RCA free of disease, normal LV function, mild aortic stenosis  7/9/17 echo normal LV size and function, EF 60%, grade 1 diastolic dysfunction, moderate aortic stenosis peak gradient 50, mean 29, valve area 0.8-1.0 and aortic regurgitation  7/9/17 persantine thallium small fixed perfusion abnormality distal anterior and anteroapical segments, no ischemia is noted to represent mild prior infarct or variant normal apical thinning  4/27/18 echo normal LV size and function, EF 65%, moderate aortic stenosis peak gradient 46, valve area 0.9,, moderate aortic insufficiency, no change compared to previous  5/27/19 echo normal LV size and function, EF 65%, normal diastolic function, calcified aortic valve moderate aortic stenosis peak gradient 53, moderate aortic insufficiency  7/11/19 patient feels more intense heart rate and heartbeat at night, can hear her heartbeat, only occurs at night, question anxiety component trial of buspar, 24-hour holter, cardiology referral  7/18/19 24-hour holter monitor, sinus rhythm asymptomatic average heart rate 65 PAC burden 1.3%, PVC burden 1.8%, 2 atrial runs longest 7 beats maximal heart rate 105, fastest run 4 beats maximum heart rate 129          Current Outpatient Medications   Medication Sig Dispense Refill   • acetaminophen (TYLENOL) 500 MG Tab Take 1,000 mg by mouth every 6 hours as needed.     • nitroglycerin (NITROSTAT) 0.4 MG SL Tab Place 0.4 mg under tongue  every 5 minutes as needed for Chest Pain.     • bimatoprost (LUMIGAN) 0.01 % Solution Place 1 Drop in both eyes every bedtime. Indications: Wide-Angle Glaucoma     • vitamin D (CHOLECALCIFEROL) 1000 UNIT Tab Take 1,000 Units by mouth.     • Magnesium 500 MG Tab Take 1 Cap by mouth every day.       No current facility-administered medications for this visit.      Current supplements: reviewed  Chronic narcotic pain medicines: no  Allergies: Naproxen and Vicodin [hydrocodone-acetaminophen]    Screening:reviewed  Depressive Symptoms: Denies feeling down, depressed or hopeless. Denies loss of interest or pleasure in doing things  ADLs: Denies needing help with using telephone, transportation, shopping, preparing meals, housework, laundry, or managing medication or money.   Independent with bathing, hygiene, feeding, toileting, dressing   Memory concerns: Denies difficulty remembering details of conversations, events and upcoming appointments.  Hearing problems: Denies.   Recent falls no    Current social contact/activities: reviewed  Social History     Tobacco Use   • Smoking status: Former Smoker     Packs/day: 0.50     Years: 10.00     Pack years: 5.00     Types: Cigarettes     Quit date: 1999     Years since quittin.5   • Smokeless tobacco: Never Used   Substance Use Topics   • Alcohol use: Yes     Alcohol/week: 0.0 oz     Comment: 1 glass wine/day   • Drug use: No       Family History   Problem Relation Age of Onset   • Heart Disease Father         CAD MI   • Stroke Father    • Cancer Sister         breast   • Cancer Mother         breast     Past Surgical History:   Procedure Laterality Date   • LUMBAR TRANSFORAMINAL EPIDURAL STEROID INJECTION Right 2021    Procedure: INJECTION, STEROID, SPINE, LUMBAR, EPIDURAL, TRANSFORAMINAL APPROACH;  Surgeon: Roni Choi M.D.;  [facility];  Service: Pain Management   • KNEE ARTHROPLASTY TOTAL Right 2018    Procedure: KNEE  ARTHROPLASTY TOTAL;  Surgeon: Thanh Hall M.D.;  Location: [facility];  Service: Orthopedics   • VITRECTOMY POSTERIOR Left 10/25/2016    Procedure: VITRECTOMY POSTERIOR membrane peel cryotherapy infusion of SF6 intraocular gas;  Surgeon: Amanda Rodriguez M.D.;  Location: SURGERY [facility];  Service:    • RECOVERY  3/31/2014    Performed by [facility] at SURGERY [facility]   • CATARACT PHACO WITH IOL  5/28/2009    Performed by GERA BREAUX at SURGERY [facility]   • CATARACT PHACO WITH IOL  5/14/2009    Performed by GERA BREAUX at [facility]ERY [facility]   • LUMPECTOMY     • OTHER     • PB RADIATION THERAPY PLAN SIMPLE         Depression Screening    Little interest or pleasure in doing things?  0 - not at all  Feeling down, depressed , or hopeless? 0 - not at all  Patient Health Questionnaire Score: 0     If depressive symptoms identified deferred to follow up visit unless specifically addressed in assessment and plan.    Interpretation of PHQ-9 Total Score   Score Severity   1-4 No Depression   5-9 Mild Depression   10-14 Moderate Depression   15-19 Moderately Severe Depression   20-27 Severe Depression    Screening for Cognitive Impairment    Three Minute Recall (captain, garden, picture) 1/3    Marvin clock face with all 12 numbers and set the hands to show 5 past 8.  No    Cognitive concerns identified deferred for follow up unless specifically addressed in assessment and plan.    Fall Risk Assessment    Has the patient had two or more falls in the last year or any fall with injury in the last year?  Yes    Safety Assessment    Throw rugs on floor.  Yes  Handrails on all stairs.  No  Good lighting in all hallways.  Yes  Difficulty hearing.  No  Patient counseled about all safety risks that were identified.    Functional Assessment ADLs    Are there any barriers preventing you from cooking for yourself or meeting nutritional needs?   No.    Are there any barriers preventing you from driving safely or obtaining transportation?  No.    Are there any barriers preventing you from using a telephone or calling for help?  No.    Are there any barriers preventing you from shopping?  No.    Are there any barriers preventing you from taking care of your own finances?  No.    Are there any barriers preventing you from managing your medications?  No.    Are there any barriers preventing you from showering, bathing or dressing yourself?  No.    Are you currently engaging in any exercise or physical activity?  No.     What is your perception of your health?  Good.      Health Maintenance Summary                MAMMOGRAM Overdue 6/12/2021      Done 6/12/2020 MA-SCREENING MAMMO BILAT W/TOMOSYNTHESIS W/CAD     Patient has more history with this topic...    COLONOSCOPY Next Due 3/7/2022      Done 3/7/2017 REFERRAL TO GI FOR COLONOSCOPY    Annual Wellness Visit Next Due 6/30/2022      Done 6/29/2021 Visit Dx: Medicare annual wellness visit, subsequent     Patient has more history with this topic...    IMM DTaP/Tdap/Td Vaccine Next Due 7/23/2022      Done 7/23/2012 Imm Admin: Tdap Vaccine    BONE DENSITY Next Due 5/24/2024      Done 5/24/2019 DS-BONE DENSITY STUDY (DEXA)     Patient has more history with this topic...          Patient Care Team:  Master Suarez M.D. as PCP - General  Chris Bernstein M.D. as Consulting Physician (Ophthalmology)  Amanda Rodriguez M.D. as Consulting Physician (Ophthalmology)  Manoj Tidwell M.D. (Inactive) as Consulting Physician (Orthopaedics)  Gian Caro, PT, DPT as Physical Therapist (Physical Therapy)  Jacki Pickett PT as Physical Therapist (Physical Therapy)      ROS:    Ostomy or other tubes or amputations no  Chronic oxygen use no     Gait: normal. Uses assistive device :no    Health Care Screening recommendations reviewed with patient today and updated or ordered.  DPA/Advanced directive: Completed/Information  "provided.    Referrals for PT/OT/Nutrition counseling/Behavioral Health/Smoking cessation as above if indicated  Discussion today about general wellness and lifestyle habits:    · Prevent falls and reduce trip hazards;   · Have a working fire alarm and carbon monoxide detector;   · Engage in regular physical activity and social activities;   · Use sun protection when outdoors.        ROS       Objective:     /64 (BP Location: Right arm, Patient Position: Sitting, BP Cuff Size: Adult)   Pulse 90   Temp 37.3 °C (99.1 °F)   Ht 1.575 m (5' 2\")   Wt 65.3 kg (144 lb)   SpO2 97%   BMI 26.34 kg/m²      Physical Exam  Vitals and nursing note reviewed.   Constitutional:       Appearance: Normal appearance.   HENT:      Head: Normocephalic and atraumatic.      Right Ear: External ear normal.      Left Ear: External ear normal.   Eyes:      Conjunctiva/sclera: Conjunctivae normal.   Cardiovascular:      Rate and Rhythm: Normal rate and regular rhythm.      Heart sounds: Murmur heard.     Pulmonary:      Effort: No respiratory distress.   Abdominal:      General: There is no distension.   Musculoskeletal:         General: No swelling.      Cervical back: Neck supple.   Skin:     General: Skin is warm.   Neurological:      General: No focal deficit present.      Mental Status: She is alert.   Psychiatric:         Mood and Affect: Mood normal.         Behavior: Behavior normal.                        Assessment/Plan:        Assessment  #! Medicare wellness    #2 Dyslipidemia 6/12/20 chol 176,trig 80,hdl 57,ldl 103 has declined statin therapy     #3 ckd 3b 6/12/20 bun 15,creat 1.07,GFR 49, no regular nsaids    #4 Neutropenia 6/12/20 wbc 4.5 no recurrent infections    #5 Aortic stenosis and regurgitation 5/27/19 echo normal LV size and function, EF 65%, normal diastolic function, calcified aortic valve moderate aortic stenosis peak gradient 53, moderate aortic insufficiency    #6 Osteopenia 5/28/19 dexa " LS-1.0,hip-1.4    #7 History basal cell uses sunscreen and sees dermatology    #8 Prev health  7/23/12 tdap   1/10/13 pneumovax  7/29/14 zoster vaccine  8/6/15 prevnar at [facility]  9/1/16 sonocine negative  3/7/17 colon per [facility] 2 polyps, grade 2 internal hemorrhoids, angioectasias ascending colon,pathology one hyperplastic polyp and one sessile serrated polyp, repeat colonoscopy 5 years   4/5/18 declines sonocine  5/28/19 dexa LS-1.0,hip-1.4  6/12/20 vit d 88 on 1000 units decrease to qod   6/25/20 mammogram heterogeneously dense breast tissue, asymmetry right breast, diagnostic mammogram and ultrasound ordered  6/20/20 mammogram diagnostic right negative, recommend annual screening mammography  9/19/20 shingrix second  2/6/21 covid moderna second    Plan   #1 health maintenance reviewed and updated     #2 echo follow up aortic stenosis    #3 labs     #4 advanced directive paperwork already provided and will have  check if they need help for advanced, I have reached out with a Elance message to  to assist if necessary    #5 cardiology referral aortic stenosis and regurgitation    #6 dermatology old records     #7 mammogram already ordered     #8 dexa ordered for postmenopausal bone loss    #9 use sunscreen and hat outdoors, keep well-hydrated in the outdoors    #10 has had covid vaccine     #11 continue avoid NSAIDs    #12 follow-up 4 months    #13 spent some time reassuring her that she is doing a wonderful job caring for her , spouses taking care of those with Alzheimer's tend to have burnout, she needs to continue to work on herself, exercising, keeping active in her garden, socializing with friends as much as possible, she understands and will continue to work on this as well as preparing the advance directive paperwork

## 2021-06-30 ENCOUNTER — HOSPITAL ENCOUNTER (OUTPATIENT)
Dept: LAB | Facility: MEDICAL CENTER | Age: 81
End: 2021-06-30
Attending: INTERNAL MEDICINE
Payer: MEDICARE

## 2021-06-30 DIAGNOSIS — E78.5 DYSLIPIDEMIA: ICD-10-CM

## 2021-06-30 DIAGNOSIS — E55.9 VITAMIN D DEFICIENCY: ICD-10-CM

## 2021-06-30 DIAGNOSIS — R94.4 DECREASED GFR: ICD-10-CM

## 2021-06-30 LAB
25(OH)D3 SERPL-MCNC: 67 NG/ML (ref 30–100)
ALBUMIN SERPL BCP-MCNC: 3.9 G/DL (ref 3.2–4.9)
ALBUMIN/GLOB SERPL: 1.4 G/DL
ALP SERPL-CCNC: 53 U/L (ref 30–99)
ALT SERPL-CCNC: 16 U/L (ref 2–50)
ANION GAP SERPL CALC-SCNC: 8 MMOL/L (ref 7–16)
APPEARANCE UR: CLEAR
AST SERPL-CCNC: 21 U/L (ref 12–45)
BASOPHILS # BLD AUTO: 0.5 % (ref 0–1.8)
BASOPHILS # BLD: 0.02 K/UL (ref 0–0.12)
BILIRUB SERPL-MCNC: 0.4 MG/DL (ref 0.1–1.5)
BILIRUB UR QL STRIP.AUTO: NEGATIVE
BUN SERPL-MCNC: 14 MG/DL (ref 8–22)
CALCIUM SERPL-MCNC: 9.3 MG/DL (ref 8.5–10.5)
CHLORIDE SERPL-SCNC: 106 MMOL/L (ref 96–112)
CHOLEST SERPL-MCNC: 170 MG/DL (ref 100–199)
CO2 SERPL-SCNC: 27 MMOL/L (ref 20–33)
COLOR UR: YELLOW
CREAT SERPL-MCNC: 1.07 MG/DL (ref 0.5–1.4)
CREAT UR-MCNC: 54.98 MG/DL
EOSINOPHIL # BLD AUTO: 0.09 K/UL (ref 0–0.51)
EOSINOPHIL NFR BLD: 2.1 % (ref 0–6.9)
ERYTHROCYTE [DISTWIDTH] IN BLOOD BY AUTOMATED COUNT: 46.3 FL (ref 35.9–50)
FASTING STATUS PATIENT QL REPORTED: NORMAL
GLOBULIN SER CALC-MCNC: 2.8 G/DL (ref 1.9–3.5)
GLUCOSE SERPL-MCNC: 95 MG/DL (ref 65–99)
GLUCOSE UR STRIP.AUTO-MCNC: NEGATIVE MG/DL
HCT VFR BLD AUTO: 43 % (ref 37–47)
HDLC SERPL-MCNC: 43 MG/DL
HGB BLD-MCNC: 14 G/DL (ref 12–16)
IMM GRANULOCYTES # BLD AUTO: 0.01 K/UL (ref 0–0.11)
IMM GRANULOCYTES NFR BLD AUTO: 0.2 % (ref 0–0.9)
KETONES UR STRIP.AUTO-MCNC: NEGATIVE MG/DL
LDLC SERPL CALC-MCNC: 107 MG/DL
LEUKOCYTE ESTERASE UR QL STRIP.AUTO: NEGATIVE
LYMPHOCYTES # BLD AUTO: 1.67 K/UL (ref 1–4.8)
LYMPHOCYTES NFR BLD: 38.8 % (ref 22–41)
MCH RBC QN AUTO: 31.6 PG (ref 27–33)
MCHC RBC AUTO-ENTMCNC: 32.6 G/DL (ref 33.6–35)
MCV RBC AUTO: 97.1 FL (ref 81.4–97.8)
MICRO URNS: ABNORMAL
MICROALBUMIN UR-MCNC: <1.2 MG/DL
MICROALBUMIN/CREAT UR: NORMAL MG/G (ref 0–30)
MONOCYTES # BLD AUTO: 0.38 K/UL (ref 0–0.85)
MONOCYTES NFR BLD AUTO: 8.8 % (ref 0–13.4)
NEUTROPHILS # BLD AUTO: 2.13 K/UL (ref 2–7.15)
NEUTROPHILS NFR BLD: 49.6 % (ref 44–72)
NITRITE UR QL STRIP.AUTO: NEGATIVE
NRBC # BLD AUTO: 0 K/UL
NRBC BLD-RTO: 0 /100 WBC
PH UR STRIP.AUTO: 8.5 [PH] (ref 5–8)
PLATELET # BLD AUTO: 188 K/UL (ref 164–446)
PMV BLD AUTO: 10.6 FL (ref 9–12.9)
POTASSIUM SERPL-SCNC: 4.6 MMOL/L (ref 3.6–5.5)
PROT SERPL-MCNC: 6.7 G/DL (ref 6–8.2)
PROT UR QL STRIP: NEGATIVE MG/DL
PTH-INTACT SERPL-MCNC: 35.7 PG/ML (ref 14–72)
RBC # BLD AUTO: 4.43 M/UL (ref 4.2–5.4)
RBC UR QL AUTO: NEGATIVE
SODIUM SERPL-SCNC: 141 MMOL/L (ref 135–145)
SP GR UR STRIP.AUTO: 1.01
TRIGL SERPL-MCNC: 100 MG/DL (ref 0–149)
TSH SERPL DL<=0.005 MIU/L-ACNC: 1.6 UIU/ML (ref 0.38–5.33)
UROBILINOGEN UR STRIP.AUTO-MCNC: 0.2 MG/DL
WBC # BLD AUTO: 4.3 K/UL (ref 4.8–10.8)

## 2021-06-30 PROCEDURE — 82043 UR ALBUMIN QUANTITATIVE: CPT

## 2021-06-30 PROCEDURE — 80061 LIPID PANEL: CPT

## 2021-06-30 PROCEDURE — 36415 COLL VENOUS BLD VENIPUNCTURE: CPT

## 2021-06-30 PROCEDURE — 83970 ASSAY OF PARATHORMONE: CPT

## 2021-06-30 PROCEDURE — 80053 COMPREHEN METABOLIC PANEL: CPT

## 2021-06-30 PROCEDURE — 82306 VITAMIN D 25 HYDROXY: CPT

## 2021-06-30 PROCEDURE — 84155 ASSAY OF PROTEIN SERUM: CPT | Mod: XU

## 2021-06-30 PROCEDURE — 81003 URINALYSIS AUTO W/O SCOPE: CPT

## 2021-06-30 PROCEDURE — 82570 ASSAY OF URINE CREATININE: CPT

## 2021-06-30 PROCEDURE — 84165 PROTEIN E-PHORESIS SERUM: CPT

## 2021-06-30 PROCEDURE — 84443 ASSAY THYROID STIM HORMONE: CPT

## 2021-06-30 PROCEDURE — 85025 COMPLETE CBC W/AUTO DIFF WBC: CPT

## 2021-07-01 ENCOUNTER — TELEPHONE (OUTPATIENT)
Dept: MEDICAL GROUP | Facility: MEDICAL CENTER | Age: 81
End: 2021-07-01

## 2021-07-02 NOTE — TELEPHONE ENCOUNTER
Notified with test results no changes.  Cholesterol stable, blood sugar, liver function, kidney function stable, chronic neutropenia stable.  No changes.

## 2021-07-03 LAB
ALBUMIN SERPL ELPH-MCNC: 3.84 G/DL (ref 3.75–5.01)
ALPHA1 GLOB SERPL ELPH-MCNC: 0.25 G/DL (ref 0.19–0.46)
ALPHA2 GLOB SERPL ELPH-MCNC: 0.74 G/DL (ref 0.48–1.05)
B-GLOBULIN SERPL ELPH-MCNC: 0.59 G/DL (ref 0.48–1.1)
GAMMA GLOB SERPL ELPH-MCNC: 0.78 G/DL (ref 0.62–1.51)
INTERPRETATION SERPL IFE-IMP: ABNORMAL
MONOCLON BAND OBS SERPL: ABNORMAL
PATHOLOGY STUDY: ABNORMAL
PROT SERPL-MCNC: 6.2 G/DL (ref 6.3–8.2)

## 2021-07-07 ENCOUNTER — TELEPHONE (OUTPATIENT)
Dept: CARDIOLOGY | Facility: MEDICAL CENTER | Age: 81
End: 2021-07-07

## 2021-07-07 NOTE — TELEPHONE ENCOUNTER
Called patient to schedule structural heart consultation. Patient requires echo prior to consultation. Echo already scheduled on 9/24/21. Scheduled for consultation with 10/1/21 at 1000. All instructions on place of consultation given to patient. No symptoms of AS at this time per patient. SC

## 2021-08-10 ENCOUNTER — APPOINTMENT (OUTPATIENT)
Dept: DERMATOLOGY | Facility: IMAGING CENTER | Age: 81
End: 2021-08-10

## 2021-08-23 ENCOUNTER — HOSPITAL ENCOUNTER (OUTPATIENT)
Dept: RADIOLOGY | Facility: MEDICAL CENTER | Age: 81
End: 2021-08-23
Attending: INTERNAL MEDICINE
Payer: MEDICARE

## 2021-08-23 ENCOUNTER — TELEPHONE (OUTPATIENT)
Dept: MEDICAL GROUP | Facility: MEDICAL CENTER | Age: 81
End: 2021-08-23

## 2021-08-23 DIAGNOSIS — Z12.31 VISIT FOR SCREENING MAMMOGRAM: ICD-10-CM

## 2021-08-23 DIAGNOSIS — M81.0 POSTMENOPAUSAL BONE LOSS: ICD-10-CM

## 2021-08-23 PROCEDURE — 77063 BREAST TOMOSYNTHESIS BI: CPT

## 2021-08-23 PROCEDURE — 77080 DXA BONE DENSITY AXIAL: CPT

## 2021-08-23 RX ORDER — TIMOLOL MALEATE 5 MG/ML
1 SOLUTION/ DROPS OPHTHALMIC DAILY
COMMUNITY
Start: 2021-07-14 | End: 2023-11-02

## 2021-08-24 ENCOUNTER — TELEPHONE (OUTPATIENT)
Dept: MEDICAL GROUP | Facility: MEDICAL CENTER | Age: 81
End: 2021-08-24

## 2021-08-24 DIAGNOSIS — Z00.00 PREVENTATIVE HEALTH CARE: Chronic | ICD-10-CM

## 2021-08-24 NOTE — TELEPHONE ENCOUNTER
----- Message from Master Suarez M.D. sent at 8/23/2021  5:56 PM PDT -----  Please notify the patient that her bone density test shows normal bone strength of her back, and slightly decreased bone strength of her hip which is unchanged.  She does not have osteoporosis.  No medications are necessary.  Have her continue her weightbearing exercise, continue vitamin D at her current levels, take calcium 1000 mg total per day, and we can repeat the bone density test in 2 years.    Please notify the patient that:  (1) her mammogram is negative but does show dense breast tissue which may decrease the accuracy of the mammogram  (2) she may obtain a screening breast ultrasound which could provide further detail  (3) her insurance may not cover the screening breast ultrasound, if that is the case, then the out of pocket cost is approximately $125  (4) please let me know if she is interested in obtaining the screening breast ultrasound

## 2021-08-24 NOTE — TELEPHONE ENCOUNTER
Please notify the patient that her bone density test shows normal bone strength of her back, and slightly decreased bone strength of her hip which is unchanged.  She does not have osteoporosis.  No medications are necessary.  Have her continue her weightbearing exercise, continue vitamin D at her current levels, take calcium 1000 mg total per day, and we can repeat the bone density test in 2 years.

## 2021-08-26 ENCOUNTER — TELEPHONE (OUTPATIENT)
Dept: MEDICAL GROUP | Facility: MEDICAL CENTER | Age: 81
End: 2021-08-26

## 2021-08-26 NOTE — TELEPHONE ENCOUNTER
----- Message from Master Suarez M.D. sent at 8/24/2021  1:59 PM PDT -----  Please notify the patient that:  (1) her mammogram is negative but does show dense breast tissue which may decrease the accuracy of the mammogram  (2) she may obtain a screening breast ultrasound which could provide further detail  (3) her insurance may not cover the screening breast ultrasound, if that is the case, then the out of pocket cost is approximately $125  (4) please let me know if she is interested in obtaining the screening breast ultrasound

## 2021-08-31 ENCOUNTER — APPOINTMENT (RX ONLY)
Dept: URBAN - METROPOLITAN AREA CLINIC 4 | Facility: CLINIC | Age: 81
Setting detail: DERMATOLOGY
End: 2021-08-31

## 2021-08-31 DIAGNOSIS — L82.1 OTHER SEBORRHEIC KERATOSIS: ICD-10-CM

## 2021-08-31 DIAGNOSIS — L57.0 ACTINIC KERATOSIS: ICD-10-CM

## 2021-08-31 DIAGNOSIS — Z71.89 OTHER SPECIFIED COUNSELING: ICD-10-CM

## 2021-08-31 DIAGNOSIS — L81.4 OTHER MELANIN HYPERPIGMENTATION: ICD-10-CM

## 2021-08-31 PROBLEM — D48.5 NEOPLASM OF UNCERTAIN BEHAVIOR OF SKIN: Status: ACTIVE | Noted: 2021-08-31

## 2021-08-31 PROCEDURE — ? LIQUID NITROGEN

## 2021-08-31 PROCEDURE — 17000 DESTRUCT PREMALG LESION: CPT | Mod: 59

## 2021-08-31 PROCEDURE — 17003 DESTRUCT PREMALG LES 2-14: CPT

## 2021-08-31 PROCEDURE — ? COUNSELING

## 2021-08-31 PROCEDURE — 99213 OFFICE O/P EST LOW 20 MIN: CPT | Mod: 25

## 2021-08-31 PROCEDURE — ? BIOPSY BY SHAVE METHOD

## 2021-08-31 PROCEDURE — ? SUNSCREEN TREATMENT REGIMEN

## 2021-08-31 PROCEDURE — 11102 TANGNTL BX SKIN SINGLE LES: CPT

## 2021-08-31 ASSESSMENT — LOCATION DETAILED DESCRIPTION DERM
LOCATION DETAILED: RIGHT SUPERIOR LATERAL FOREHEAD
LOCATION DETAILED: LEFT CENTRAL MALAR CHEEK
LOCATION DETAILED: RIGHT NASAL ROOT
LOCATION DETAILED: RIGHT INFERIOR CENTRAL MALAR CHEEK
LOCATION DETAILED: RIGHT SUPERIOR FOREHEAD
LOCATION DETAILED: RIGHT CENTRAL BUCCAL CHEEK
LOCATION DETAILED: LEFT FOREHEAD
LOCATION DETAILED: RIGHT FOREHEAD

## 2021-08-31 ASSESSMENT — LOCATION SIMPLE DESCRIPTION DERM
LOCATION SIMPLE: LEFT CHEEK
LOCATION SIMPLE: NOSE
LOCATION SIMPLE: RIGHT FOREHEAD
LOCATION SIMPLE: RIGHT CHEEK
LOCATION SIMPLE: LEFT FOREHEAD

## 2021-08-31 ASSESSMENT — LOCATION ZONE DERM
LOCATION ZONE: NOSE
LOCATION ZONE: FACE

## 2021-08-31 NOTE — PROCEDURE: LIQUID NITROGEN
Show Applicator Variable?: Yes
Render Post-Care Instructions In Note?: no
Detail Level: Detailed
Consent: The patient's consent was obtained including but not limited to risks of crusting, scabbing, blistering, scarring, darker or lighter pigmentary change, recurrence, incomplete removal and infection.
Post-Care Instructions: I reviewed with the patient in detail post-care instructions. Patient is to wear sunprotection, and avoid picking at any of the treated lesions. Pt may apply Vaseline to crusted or scabbing areas.
Duration Of Freeze Thaw-Cycle (Seconds): 3

## 2021-09-10 PROBLEM — H26.9 CATARACT: Status: ACTIVE | Noted: 2021-09-10

## 2021-09-24 ENCOUNTER — TELEPHONE (OUTPATIENT)
Dept: MEDICAL GROUP | Facility: MEDICAL CENTER | Age: 81
End: 2021-09-24

## 2021-09-24 ENCOUNTER — HOSPITAL ENCOUNTER (OUTPATIENT)
Dept: CARDIOLOGY | Facility: MEDICAL CENTER | Age: 81
End: 2021-09-24
Attending: INTERNAL MEDICINE
Payer: MEDICARE

## 2021-09-24 DIAGNOSIS — I35.0 NONRHEUMATIC AORTIC VALVE STENOSIS: ICD-10-CM

## 2021-09-24 LAB
LV EJECT FRACT  99904: 65
LV EJECT FRACT MOD 2C 99903: 79.74
LV EJECT FRACT MOD 4C 99902: 38.8
LV EJECT FRACT MOD BP 99901: 65.63

## 2021-09-24 PROCEDURE — 93306 TTE W/DOPPLER COMPLETE: CPT | Mod: 26 | Performed by: INTERNAL MEDICINE

## 2021-09-24 PROCEDURE — 93306 TTE W/DOPPLER COMPLETE: CPT

## 2021-10-01 ENCOUNTER — OFFICE VISIT (OUTPATIENT)
Dept: CARDIOLOGY | Facility: MEDICAL CENTER | Age: 81
End: 2021-10-01
Payer: MEDICARE

## 2021-10-01 VITALS
WEIGHT: 141 LBS | HEIGHT: 62 IN | RESPIRATION RATE: 14 BRPM | SYSTOLIC BLOOD PRESSURE: 116 MMHG | DIASTOLIC BLOOD PRESSURE: 62 MMHG | HEART RATE: 68 BPM | BODY MASS INDEX: 25.95 KG/M2 | OXYGEN SATURATION: 96 %

## 2021-10-01 DIAGNOSIS — I35.0 AORTIC VALVE STENOSIS, ETIOLOGY OF CARDIAC VALVE DISEASE UNSPECIFIED: Chronic | ICD-10-CM

## 2021-10-01 LAB — EKG IMPRESSION: NORMAL

## 2021-10-01 PROCEDURE — 93000 ELECTROCARDIOGRAM COMPLETE: CPT | Performed by: INTERNAL MEDICINE

## 2021-10-01 PROCEDURE — 99214 OFFICE O/P EST MOD 30 MIN: CPT | Performed by: INTERNAL MEDICINE

## 2021-10-01 ASSESSMENT — FIBROSIS 4 INDEX: FIB4 SCORE: 2.32

## 2021-10-01 NOTE — PROGRESS NOTES
"    Cardiology Initial Consultation Note    Date of note:    10/1/2021    Primary Care Provider: Master Suarez M.D.  Referring Provider: No ref. provider found     Patient Name: Katerina Torres   YOB: 1940  MRN:              [id number]    Chief Complaint: Establish care for aortic stenosis    Katerina Torres is a 81 y.o. female  patient referred by Dr. Suarez.  She has dyslipidemia, aortic stenosis, hypertension.  She complains of occasional dyspnea on exertion when she goes uphill otherwise feels well denies chest pain dizziness lightheadedness or fainting spells.    ROS    All other systems reviewed and discussed using a comprehensive questionnaire and are negative.     Past medical history, family history, social history, allergies and labs are reviewed and updated as needed as documented below.    Past Medical History:   Diagnosis Date   • Breast cancer (HCC)    • Breath shortness     with exertion \"walking up a hill\"   • Bruises easily    • Cancer (HCC) 1989    breast left side lumpectomy   • Cardiac murmur 6/3/2009   • Chronic kidney disease, stage III (moderate) (HCC) 8/20/2015   • Dyslipidemia 6/3/2009   • Glaucoma     both eyes   • Hepatitis A 1993   • Hiatus hernia syndrome    • Hx of breast cancer 6/3/2009   • Hypertension 7/18/2017    currently not taking medications   • MEDICAL HOME    • Neutropenia 6/3/2009   • Osteopenia 6/3/2009   • Preventative health care 6/3/2009   • Snoring    • Unspecified cataract     bilateral IOLs         Past Surgical History:   Procedure Laterality Date   • LUMBAR TRANSFORAMINAL EPIDURAL STEROID INJECTION Right 1/6/2021    Procedure: INJECTION, STEROID, SPINE, LUMBAR, EPIDURAL, TRANSFORAMINAL APPROACH;  Surgeon: Roni Choi M.D.;  [facility];  Service: Pain Management   • KNEE ARTHROPLASTY TOTAL Right 5/8/2018    Procedure: KNEE ARTHROPLASTY TOTAL;  Surgeon: Thanh Hall M.D.;  Location: SURGERY " [facility];  Service: Orthopedics   • VITRECTOMY POSTERIOR Left 10/25/2016    Procedure: VITRECTOMY POSTERIOR membrane peel cryotherapy infusion of SF6 intraocular gas;  Surgeon: Amanda Rodriguez M.D.;  Location: SURGERY SAME DAY [facility];  Service:    • RECOVERY  3/31/2014    Performed by [facility] at SURGERY SAME DAY [facility]   • CATARACT PHACO WITH IOL  5/28/2009    Performed by [facility] at SURGERY SAME DAY [facility]   • CATARACT PHACO WITH IOL  5/14/2009    Performed by [facility] at SURGERY SAME DAY [facility]   • LUMPECTOMY     • OTHER     • PB RADIATION THERAPY PLAN SIMPLE           Current Outpatient Medications   Medication Sig Dispense Refill   • timolol (TIMOPTIC) 0.5 % Solution Administer 1 Drop into the left eye every day.     • acetaminophen (TYLENOL) 500 MG Tab Take 1,000 mg by mouth every 6 hours as needed.     • nitroglycerin (NITROSTAT) 0.4 MG SL Tab Place 0.4 mg under tongue every 5 minutes as needed for Chest Pain.     • bimatoprost (LUMIGAN) 0.01 % Solution Place 1 Drop in both eyes every bedtime. Indications: Wide-Angle Glaucoma     • vitamin D (CHOLECALCIFEROL) 1000 UNIT Tab Take 1,000 Units by mouth.     • Magnesium 500 MG Tab Take 1 Capsule by mouth every day.       No current facility-administered medications for this visit.         Allergies   Allergen Reactions   • Naproxen      GI upset   • Vicodin [Hydrocodone-Acetaminophen] Nausea     Nausea, dizziness         Family History   Problem Relation Age of Onset   • Heart Disease Father         CAD MI   • Stroke Father    • Cancer Sister         breast   • Cancer Mother         breast         Social History     Socioeconomic History   • Marital status:      Spouse name: Not on file   • Number of children: Not on file   • Years of education: Not on file   • Highest education level: Not on file   Occupational History   • Not on file   Tobacco Use   • Smoking status: Former Smoker     Packs/day:  "0.50     Years: 10.00     Pack years: 5.00     Types: Cigarettes     Quit date: 1999     Years since quittin.7   • Smokeless tobacco: Never Used   Substance and Sexual Activity   • Alcohol use: Yes     Alcohol/week: 0.0 oz     Comment: 1 glass wine/day   • Drug use: No   • Sexual activity: Not Currently     Partners: Male   Other Topics Concern   •  Service No   • Blood Transfusions No   • Caffeine Concern No   • Occupational Exposure No   • Hobby Hazards Yes   • Sleep Concern No   • Stress Concern Yes   • Weight Concern No   • Special Diet No   • Back Care No   • Exercise No   • Bike Helmet No   • Seat Belt Yes   • Self-Exams Yes   Social History Narrative   • Not on file     Social Determinants of Health     Financial Resource Strain:    • Difficulty of Paying Living Expenses:    Food Insecurity:    • Worried About Running Out of Food in the Last Year:    • Ran Out of Food in the Last Year:    Transportation Needs:    • Lack of Transportation (Medical):    • Lack of Transportation (Non-Medical):    Physical Activity:    • Days of Exercise per Week:    • Minutes of Exercise per Session:    Stress:    • Feeling of Stress :    Social Connections:    • Frequency of Communication with Friends and Family:    • Frequency of Social Gatherings with Friends and Family:    • Attends Sikhism Services:    • Active Member of Clubs or Organizations:    • Attends Club or Organization Meetings:    • Marital Status:    Intimate Partner Violence:    • Fear of Current or Ex-Partner:    • Emotionally Abused:    • Physically Abused:    • Sexually Abused:          Physical Exam:  Ambulatory Vitals  /62 (BP Location: Left arm, Patient Position: Sitting, BP Cuff Size: Adult)   Pulse 68   Resp 14   Ht 1.575 m (5' 2\")   Wt 64 kg (141 lb)   SpO2 96%    Oxygen Therapy:  Pulse Oximetry: 96 %  BP Readings from Last 4 Encounters:   10/01/21 116/62   21 116/64   21 130/82   21 (!) 178/82 "       Weight/BMI: Body mass index is 25.79 kg/m².  Wt Readings from Last 4 Encounters:   10/01/21 64 kg (141 lb)   06/29/21 65.3 kg (144 lb)   01/26/21 68.1 kg (150 lb 2.1 oz)   01/06/21 66.7 kg (147 lb 0.8 oz)         General: Well appearing and in no apparent distress  Head: atrumatic  Eyes: No conjunctival pallor   ENT: normal external appearance of nose and ears  Neck: JVD absent, carotid bruits absent  Lungs: respiratory sounds  normal, additional breath sounds absent  Heart: Regular rhythm,   No palpable thrills on palpation, 3 x 6 systolic, 2 x 6 diastolic murmur aortic area, no rubs,   Lower extremity edema absent.   Pedal pulses normal  Abdomen: soft, non tender, non distended.  Extremities/MSK: no clubbing, no cyanosis  Neurological: normal orientation, Gait normal   Psychiatric: Appropriate affect, intact judgement and insight  Skin: Warm extremities      Lab Data Review:  Lab Results   Component Value Date/Time    CHOLSTRLTOT 170 06/30/2021 07:16 AM     (H) 06/30/2021 07:16 AM    HDL 43 06/30/2021 07:16 AM    TRIGLYCERIDE 100 06/30/2021 07:16 AM       Lab Results   Component Value Date/Time    SODIUM 141 06/30/2021 07:16 AM    POTASSIUM 4.6 06/30/2021 07:16 AM    CHLORIDE 106 06/30/2021 07:16 AM    CO2 27 06/30/2021 07:16 AM    GLUCOSE 95 06/30/2021 07:16 AM    BUN 14 06/30/2021 07:16 AM    CREATININE 1.07 06/30/2021 07:16 AM    CREATININE 1.08 (H) 07/02/2010 08:56 AM    BUNCREATRAT 11 07/02/2010 08:56 AM    GLOMRATE 50 (L) 07/02/2010 08:56 AM     Lab Results   Component Value Date/Time    ALKPHOSPHAT 53 06/30/2021 07:16 AM    ASTSGOT 21 06/30/2021 07:16 AM    ALTSGPT 16 06/30/2021 07:16 AM    TBILIRUBIN 0.4 06/30/2021 07:16 AM      Lab Results   Component Value Date/Time    WBC 4.3 (L) 06/30/2021 07:16 AM    WBC 4.4 07/02/2010 08:56 AM     Echocardiogram 9/24/2021 reviewed, personally interpreted:  Normal left ventricular systolic function.   Calcified aortic valve leaflets. Moderate aortic  stenosis. Vmax is 3.76   m/s. Transvalvular gradients are - Peak: 56.50 mmHg, Mean: 34.03 mmHg.   Moderate aortic insufficiency.      Medical Decision Makin-year-old female patient with dyslipidemia, moderate to severe aortic stenosis.  I suspect she will need valve replacement in the next 1 to 2 years.  Counseled on symptoms related to severe aortic stenosis.  Briefly explained TAVR procedure.  I will see her back in 6 months.    Return in about 6 months (around 2022).    This note was dictated using Dragon speech recognition software.    Jeremi ROSALES  Interventional cardiologist  [facility], [address]  [address] [address]  Phone: [phone]  Fax: [phone]

## 2021-10-18 ENCOUNTER — NON-PROVIDER VISIT (OUTPATIENT)
Dept: MEDICAL GROUP | Facility: MEDICAL CENTER | Age: 81
End: 2021-10-18
Payer: MEDICARE

## 2021-10-18 DIAGNOSIS — Z23 NEED FOR VACCINATION: ICD-10-CM

## 2021-10-18 PROCEDURE — G0008 ADMIN INFLUENZA VIRUS VAC: HCPCS | Performed by: INTERNAL MEDICINE

## 2021-10-18 PROCEDURE — 90662 IIV NO PRSV INCREASED AG IM: CPT | Performed by: INTERNAL MEDICINE

## 2021-10-18 NOTE — NON-PROVIDER
"Katerina Torres is a 81 y.o. female here for a non-provider visit for:   FLU    Reason for immunization: Annual Flu Vaccine  Immunization records indicate need for vaccine: Yes, confirmed with Epic  Minimum interval has been met for this vaccine: Yes  ABN completed: No    VIS Dated  8/6/21 was given to patient: Yes  All IAC Questionnaire questions were answered \"No.\"    Patient tolerated injection and no adverse effects were observed or reported: Yes    Pt scheduled for next dose in series: No    "

## 2021-10-21 ENCOUNTER — APPOINTMENT (OUTPATIENT)
Dept: DERMATOLOGY | Facility: IMAGING CENTER | Age: 81
End: 2021-10-21

## 2021-10-28 ENCOUNTER — OFFICE VISIT (OUTPATIENT)
Dept: MEDICAL GROUP | Facility: MEDICAL CENTER | Age: 81
End: 2021-10-28
Payer: MEDICARE

## 2021-10-28 VITALS
OXYGEN SATURATION: 95 % | SYSTOLIC BLOOD PRESSURE: 132 MMHG | TEMPERATURE: 98.3 F | WEIGHT: 144 LBS | BODY MASS INDEX: 26.34 KG/M2 | HEART RATE: 71 BPM | DIASTOLIC BLOOD PRESSURE: 64 MMHG

## 2021-10-28 DIAGNOSIS — E55.9 VITAMIN D DEFICIENCY: ICD-10-CM

## 2021-10-28 DIAGNOSIS — R94.4 DECREASED GFR: ICD-10-CM

## 2021-10-28 DIAGNOSIS — Z00.00 PREVENTATIVE HEALTH CARE: Chronic | ICD-10-CM

## 2021-10-28 PROCEDURE — 99213 OFFICE O/P EST LOW 20 MIN: CPT | Performed by: INTERNAL MEDICINE

## 2021-10-28 ASSESSMENT — FIBROSIS 4 INDEX: FIB4 SCORE: 2.32

## 2021-10-28 NOTE — PROGRESS NOTES
Subjective     Katerina Torres is a 81 y.o. female who presents with follow-up of aortic stenosis              HPI     Patient here for follow-up aortic stenosis and insufficiency, followed by cardiology, most recent echo September 24 moderate aortic stenosis, moderate aortic insufficiency, seen by cardiology this month, likely will need valve replacement next 1 to 2 years.  Currently she has no chest pain, no shortness of breath with regular activity although she does notice some more fatigue and shortness of breath when going up a hill, but no lightheadedness, no syncope and no palpitations.  No hypertension, no history of coronary disease, last cardiac catheterization 2014 with vessels free of disease.  CKD stage IIIa, GFR 49 has been stable over the last 6 to 7 years no regular NSAIDs.  No leg swelling.  Low-sodium diet.  Has had the flu shot and moderna booster this month as well      Current Outpatient Medications   Medication Sig Dispense Refill   • timolol (TIMOPTIC) 0.5 % Solution Administer 1 Drop into the left eye every day.     • acetaminophen (TYLENOL) 500 MG Tab Take 1,000 mg by mouth every 6 hours as needed.     • nitroglycerin (NITROSTAT) 0.4 MG SL Tab Place 0.4 mg under tongue every 5 minutes as needed for Chest Pain.     • bimatoprost (LUMIGAN) 0.01 % Solution Place 1 Drop in both eyes every bedtime. Indications: Wide-Angle Glaucoma     • vitamin D (CHOLECALCIFEROL) 1000 UNIT Tab Take 1,000 Units by mouth.     • Magnesium 500 MG Tab Take 1 Capsule by mouth every day.       No current facility-administered medications for this visit.     s/p knee replacement  3/3/17 MRI right knee abnormal right lateral meniscus with peripheral extrusion, maceration, and complex tear involving primarily the posterior horn and body but also the anterior horn, abnormal medial meniscus with vertical tear of the peripheral zone of body and posterior horn, probable meniscal root tear, and degenerative fraying of  the free edge, severe lateral femorotibial compartment osteoarthritis with significant cartilage loss and moderate to severe subchondral edema within the lateral femoral condyle and lateral tibial plateau, mild medial femorotibial and the patellofemoral compartment osteoarthritis, moderate sized joint effusion with associated popliteal cyst  6/20/17 sees  orthopedics  1/18/18  orthopedic note right knee end-stage arthritis, x-rays bone-on-bone right knee arthritis lateral compartment, right knee steroid injection performed, planned for total right knee arthroplasty after winter  5/2/18  orthopedic note, right knee osteoathritis, failed conservative measures, scheduled for right knee surgery  5/8/18  operative note right knee arthroplasty   5/23/18  orthopedic note 2 weeks s/p right total knee  8/6/18  orthopedic note 3 months status post right knee replacement doing well, x-ray shows stable right total knee  5/22/19  orthopedic note x-ray stable total knee replacement, follow-up 1 year     Skin cancer basal cell     Preventative health  7/23/12 tdap   1/10/13 pneumovax  7/29/14 zoster vaccine  8/6/15 prevnar at [facility]  9/1/16 sonocine negative  3/7/17 colon per [facility] 2 polyps, grade 2 internal hemorrhoids, angioectasias ascending colon,pathology one hyperplastic polyp and one sessile serrated polyp, repeat colonoscopy 5 years   4/5/18 declines sonocine  9/19/20 shingrix second  2/6/21 covid moderna second  6/30/21 vit d 67  8/23/21 dexa LS-0.9,hip-1.4  8/24/21 mammogram heterogeneously dense breast tissue recommend screening breast ultrasound, patient declines  10/18/21 flu     Osteopenia  9/06 dexa LS -1.2,hip -1.1  6/09 dexa LS -1.4,hip -1.0  7/11 vit d 43  7/11 dexa LS -1.1,hip -0.9  712 vit d 30 start 1000 units  7/24/13 vit d 72 on 1000 units  8/6/13 dexa LS -1.1,hip -1.1  8/5/14 vit d 67  8/19/15 dexa LS -1.4,hip -1.3;FRAX 40% major,27% hip only on  prednisone one month, does not need bisphosphonate therapy  8/19/15 vit d 43  6/16/16 off prednisone x 3 weeks  6/21/16 vit d 50  6/27/17 vit d 48  4/27/18 vit d 53   5/14/19 vit d 46  5/28/19 dexa LS-1.0,hip-1.4  6/12/20 vit d 88 on 1000 units decrease to qod   8/23/21 dexa LS-0.9,hip-1.4     Neutropenia  8/06 wbc 3.4  3/08 wbc 3.9  6/09 wbc 3.9  7/10 wbc 4.4  7/11 wbc 4.5  7/12 wbc 3.8 (55%N,35%L)  7/24/13 wbc 4.3  3/20/14 wbc 3.1 (52%N,36%L)  3/31/14 wbc 3.7  8/19/15 wbc 5.3  6/21/16 wbc 4.4 (50%N,40%L)  6/27/17 wbc 4.2   4/27/18 wbc 4.4  5/14/19 wbc 4.5  6/12/20 wbc 4.5  6/30/21 wbc 4.3     low back pain  11/30/20 right-sided buttock pain with radiation, referral to physiatry, x-ray, MRI lumbar spine, trial of naproxen short-term plus acetaminophen follow-up 3 weeks  11/30/20 x-ray lumbar spine moderate spondylosis, L2-L3 asymmetric disc height loss on the left resulting in slight right curvature and degenerative retrolisthesis  12/2/20 MRI lumbar spine mild superior endplate compression fracture at L3, no marrow edema consistent with chronic process, L2-L3 moderate central narrowing, moderate right and severe left neuroforaminal narrowing, L3-4 moderate central narrowing, moderate bilateral neuroforaminal narrowing, L4-5 moderate to severe central canal narrowing with severe right and moderate left neuroforaminal narrowing, L5-S1 moderate bilateral neuroforaminal narrowing  1/6/21  physiatry procedure note right lumbar epidural steroid injection L4-S1 under fluoroscopy  1/26/21  physiatry note good response to right lumbar L4-L5 epidural steroid injections, she has returned to activities such as skiing, consider physical therapy and home program for residual pain, consider right SI joint versus repeat lumbar steroid injection if necessary, follow-up 2 months  2/8/21 [facility] note   2/17/21 [facility] note      knee pain left  8/1/13 MRI left knee DJD lateral  femorotibial articulation, lateral meniscus mildly extruded, ruptured hopper's cyst  8/12/13  ortho note pain improved on followup, consider injection if pain recurs     History polymyalgia  4/20/15 physical therapy, trial celebrex and zanaflex  5/7/15 physical therapy evaluation today recommended right hip x-ray and pelvic x-ray, ordered in computer  5/8/15 xray hip negative  6/29/15 seen by bridgette diagnosed with polymyalgia rheumatica  6/29/15 CRP 9.4,ESR 32 started on prednisone 20 mg    7/28/15 on prednisone 10 mg will continue 10 mg x 2 weeks, then decrease to 5 mg daily  8/19/15 hgb 14,hct 43, CRP 0.3, ESR 14, tapered off prednisone but developed mouth irritation, has resumed prednisone 5 mg daily, will continue x2 weeks then taper  11/17/15 refill prednisone 5 mg #30 tabs x one  6/21/16 off prednisone; CRP 0.1,ESR 17  6/27/17 CRP 0.09,ESR 12,cpk 66    History of palpitations  7/11/19 patient feels more intense heart rate and heartbeat at night, can hear her heartbeat, only occurs at night, question anxiety component trial of buspar, 24-hour holter, cardiology referral  7/18/19 24-hour holter monitor, sinus rhythm asymptomatic average heart rate 65 PAC burden 1.3%, PVC burden 1.8%, 2 atrial runs longest 7 beats maximal heart rate 105, fastest run 4 beats maximum heart rate 129     histroy hypertension  3/31/14 cardiac catheterization; left main mild plaquing, LAD patent, circumflex free of disease, RCA free of disease, normal LV function, mild aortic stenosis  7/9/17 echo normal LV size and function, EF 60%, grade 1 diastolic dysfunction, moderate aortic stenosis peak gradient 50, mean 29, valve area 0.8-1.0 and aortic regurgitation  7/9/17 persantine thallium small fixed perfusion abnormality distal anterior and anteroapical segments, no ischemia is noted to represent mild prior infarct or variant normal apical thinning  7/18/17 start diltiazem 120 mg daily (also has esophageal dysmotility by barium  swallow)  4/26/18 off diltiazem  4/27/18 echo normal LV size and function, EF 65%, moderate aortic stenosis peak gradient 46, valve area 0.9,, moderate aortic insufficiency, no change compared to previous  5/27/19 echo normal LV size and function, EF 65%, normal diastolic function, calcified aortic valve moderate aortic stenosis peak gradient 53, moderate aortic insufficiency  10/1/21 cardiology note will likely need valve replacement next 1 to 2 years, follow-up 6 months     History of breast cancer  1980s left sided s/p lumpectomy and radiation therapy, no old records  7/11 mammogram  8/12 mammogram  8/13 mammogram  8/18/14 mammogram  9/1/16 mammogram heterogeneously dense breast tissue recommend screening breast ultrasound  9/1/16 sonocine negative     Glaucoma  4/29/21  eye exam   surgical history  macular pucker surgery     Esophageal dysmotility  7/9/17 barium swallow moderate esophageal dysmotility, small volume silent aspiration  7/18/17 start diltiazem 120 mg daily  7/18/17 referral [facility], speech pathology for esophageal dysmotility, small amount of aspiration on barium swallow, try low-dose diltiazem 120 mg for esophageal dysmotility and elevated blood pressure  7/26/17 [facility] evaluation dyspepsia, obtain cardiology clearance and then proceed EGD, initiate pantoprazole 20 mg, trial of miralax and colace     Dyspnea  7/24/13 normal LV function, EF 60%, mild LVH, mild aortic stenosis, peak gradient 25  3/20/14 cxr negative  3/31/14 cardiac catheterization; left main mild lacking, LAD patent, circumflex free of disease, RCA free of disease, normal LV function, mild aortic stenosis  8/1/14 PFT FEV 2.8 L (144% predicted), FVC 3.6 (138% predicted), FEV/FVC 78, no bronchodilator response, DLCO 119% predicted     Dyslipidemia  3/08 chol 175,trig 73,hdl 45,ldl 115  6/09 chol 174,trig 89,hdl 47,ldl 109  7/10 chol 182,trig 61,hdl 55,ldl 114  7/11 chol 175,trig 69,hdl 45,ldl 116  7/12 chol 164,trig 64,hdl  43,ldl 108  7/24/13 chol 186,trig 66,hdl 54,ldl 119 no meds  8/5/14 chol 165,trig 93,hdl 48,ldl 98  8/19/15 chol 192,trig 109,hdl 58,ldl 112; 10 year risk 20% declines statin therapy  6/21/16 chol 137,trig 75,hdl 47,ldl 75   6/27/17 hcol 153,trig 94,hdl 54,ldl 80  5/14/19 chol 164,trig 82,hdl 48,ldl 100   6/12/20 chol 176,trig 80,hdl 57,ldl 103   6/30/21 chol 170,trig 100,hdl 43,ldl 107     Decrease GFR  7/7/11 bun 16,creat 1.0,GFR 50  7/24/13 bun 14,creat 1.1,GFR 45  3/20/14 bun 15,creat 0.9,GFR 59  8/5/14 bun 14,creat 1.1,GFR 46  2/19/15 bun 13,creat 0.8,GFR >60  8/19/15 bun 10,creat 1.1,GFR 48  6/21/16 bun 19,creat 1.0,GFR 50  6/27/17 bun 14,creat 0.5,GFR 51  4/28/18 bun 15,creat 0.9,GFR 60  5/14/19 bun 18,creat 1.13,GFR 46  6/12/20 bun 15,creat 1.07,GFR 49  6/30/21 bun 14,creat 1.07,GFR 49,PTH 36,urine mac<1.2,SPEP negative     colon polyp  3/7/17 colon per [facility] 2 polyps, grade 2 internal hemorrhoids, angioectasias ascending colon,pathology one hyperplastic polyp and onesessile serrated polyp, repeat colonoscopy 5 years      Aortic stenosis  10/06 echo mild mr, normal LV  6/09 stress echo negative  8/12 echo normal LV function, EF 65%, mild aortic stenosis, peak gradient 24  7/24/13 normal LV function, EF 60%, mild LVH, mild aortic stenosis, peak gradient 25  3/31/14 cardiac catheterization; left main mild lacking, LAD patent, circumflex free of disease, RCA free of disease, normal LV function, mild aortic stenosis  7/9/17 echo normal LV size and function, EF 60%, grade 1 diastolic dysfunction, moderate aortic stenosis peak gradient 50, mean 29, valve area 0.8-1.0 and aortic regurgitation  7/9/17 persantine thallium small fixed perfusion abnormality distal anterior and anteroapical segments, no ischemia is noted to represent mild prior infarct or variant normal apical thinning  4/27/18 echo normal LV size and function, EF 65%, moderate aortic stenosis peak gradient 46, valve area 0.9,, moderate aortic  insufficiency, no change compared to previous  5/27/19 echo normal LV size and function, EF 65%, normal diastolic function, calcified aortic valve moderate aortic stenosis peak gradient 53, moderate aortic insufficiency  9/24/21 echo EF 65%, indeterminate diastolic function, moderate aortic stenosis, peak gradient 56, moderate aortic insufficiency  10/1/21 cardiology note will likely need valve replacement next 1 to 2 years, follow-up 6 months     Aortic regurgitation  10/06 echo mild mitral regurgitation, normal LV  6/09 stress echo negative  8/12 echo normal LV function, EF 65%, mild aortic stenosis, peak gradient 24  7/24/13 normal LV function, EF 60%, mild LVH, mild aortic stenosis, peak gradient 25  3/31/14 cardiac catheterization; left main and LAD patent, circumflex free of disease, RCA free of disease, normal LV function, mild aortic stenosis  7/9/17 echo normal LV size and function, EF 60%, grade 1 diastolic dysfunction, moderate aortic stenosis peak gradient 50, mean 29, valve area 0.8-1.0 and aortic regurgitation  7/9/17 persantine thallium small fixed perfusion abnormality distal anterior and anteroapical segments, no ischemia is noted to represent mild prior infarct or variant normal apical thinning  4/27/18 echo normal LV size and function, EF 65%, moderate aortic stenosis peak gradient 46, valve area 0.9,, moderate aortic insufficiency, no change compared to previous  5/27/19 echo normal LV size and function, EF 65%, normal diastolic function, calcified aortic valve moderate aortic stenosis peak gradient 53, moderate aortic insufficiency  7/11/19 patient feels more intense heart rate and heartbeat at night, can hear her heartbeat, only occurs at night, question anxiety component trial of buspar, 24-hour holter, cardiology referral  7/18/19 24-hour holter monitor, sinus rhythm asymptomatic average heart rate 65 PAC burden 1.3%, PVC burden 1.8%, 2 atrial runs longest 7 beats maximal heart rate 105,  fastest run 4 beats maximum heart rate 129  9/24/21 echo EF 65%, indeterminate diastolic function, moderate aortic stenosis, peak gradient 56, moderate aortic insufficiency  10/1/21 cardiology note will likely need valve replacement next 1 to 2 years, follow-up 6 months                    Patient Active Problem List   Diagnosis   • History of breast cancer   • Osteopenia   • Dyslipidemia   • Neutropenia (HCC)   • Preventative health care   • Aortic stenosis   • Glaucoma   • Skin cancer, basal cell   • Knee pain, left   • History of polymyalgia rheumatica   • Decreased GFR   • Colon polyp   • S/P knee replacement   • Esophageal dysmotility   • History of hypertension   • Aortic regurgitation   • History of palpitations   • Low back pain   • Cataract            Health Maintenance Summary                Annual Wellness Visit Next Due 6/30/2022      Done 6/29/2021 Visit Dx: Medicare annual wellness visit, subsequent     Patient has more history with this topic...    IMM DTaP/Tdap/Td Vaccine Next Due 7/23/2022      Done 7/23/2012 Imm Admin: Tdap Vaccine    MAMMOGRAM Next Due 8/23/2022      Done 8/23/2021 MA-SCREENING MAMMO BILAT W/TOMOSYNTHESIS W/CAD     Patient has more history with this topic...    BONE DENSITY Next Due 8/23/2026      Done 8/23/2021 DS-BONE DENSITY STUDY (DEXA)     Patient has more history with this topic...          Patient Care Team:  Master Suarez M.D. as PCP - General  Chris Bernstein M.D. as Consulting Physician (Ophthalmology)  Amanda Rodriguez M.D. as Consulting Physician (Ophthalmology)  Manoj Tidwell M.D. (Inactive) as Consulting Physician (Orthopedic Surgery)  Gian Caro, PT, DPT as Physical Therapist (Physical Therapy)  Jacki Pickett, BEE as Physical Therapist (Physical Therapy)        ROS           Objective          Physical Exam  Vitals and nursing note reviewed.   Constitutional:       Appearance: Normal appearance.   HENT:      Head: Normocephalic and atraumatic.      Left  Ear: External ear normal.   Eyes:      Conjunctiva/sclera: Conjunctivae normal.   Cardiovascular:      Rate and Rhythm: Normal rate and regular rhythm.      Heart sounds: Normal heart sounds.   Pulmonary:      Effort: Pulmonary effort is normal.      Breath sounds: Normal breath sounds.   Abdominal:      General: There is no distension.   Musculoskeletal:      Cervical back: Neck supple.   Skin:     General: Skin is warm.   Neurological:      General: No focal deficit present.      Mental Status: She is alert.   Psychiatric:         Mood and Affect: Mood normal.         Behavior: Behavior normal.                             Assessment & Plan        Assessment  #1 aortic stenosis and regurgitation most recent echo 9/24/21 echo EF 65%, indeterminate diastolic function, moderate aortic stenosis, peak gradient 56, moderate aortic insufficiency seen by cardiology 10/1/21 cardiology note will likely need valve replacement next 1 to 2 years, follow-up 6 months, fairly asymptomatic unless she is walking up hills.  Otherwise no chest pain, shortness of breath, lightheadedness, syncope, palpitations.    #2  CKD stage III GFR stable 49 no NSAIDs    #3 neutropenia WBC 4.3 no recurrent infections    #4 vitamin D deficiency      Plan  #1 follow up cardiology in March    #2 follow up six months with me    #3 has had flu and covid booster    #4 working on power of  for her and her     #5 continue regular exercise, if she develops more shortness of breath, lightheadedness, weakness to call us or cardiology    #6 continue no NSAIDs    #7 she understands that she likely will need a valve replacement at some point in the next 1 to 2 years    #8 repeat labs for December ordered

## 2021-12-30 ENCOUNTER — TELEPHONE (OUTPATIENT)
Dept: CARDIOLOGY | Facility: MEDICAL CENTER | Age: 81
End: 2021-12-30

## 2021-12-31 NOTE — TELEPHONE ENCOUNTER
Review of her chart recommends that she may benefit from statin therapy to reduce the bad cholesterol.    According to her lipid panel and risk factors the 10-year risk of heart attack or other complications of atherosclerotic heart disease is:    Please call her and see if she would like to take atorvastatin 10 mg daily to reduced this risk by at least 20%    This is of course in addition to heart healthy lifestyle, diet and exercise.  Work on at least 1.5 hours a week of moderate exercise (typical brisk walking or similar activity)  LOW SALT DIET  KEEP YOUR SODIUM EQUAL TO CALORIES AND NO MORE THAN DOUBLE THE CALORIES FOR A LOW SALT DIET  [employer] - great resource for [employer] on heart disease prevention and treatment    Thank you    Lab Results   Component Value Date/Time    CHOLSTRLTOT 170 06/30/2021 07:16 AM     (H) 06/30/2021 07:16 AM    HDL 43 06/30/2021 07:16 AM    TRIGLYCERIDE 100 06/30/2021 07:16 AM       Lab Results   Component Value Date/Time    SODIUM 141 06/30/2021 07:16 AM    POTASSIUM 4.6 06/30/2021 07:16 AM    CHLORIDE 106 06/30/2021 07:16 AM    CO2 27 06/30/2021 07:16 AM    GLUCOSE 95 06/30/2021 07:16 AM    BUN 14 06/30/2021 07:16 AM    CREATININE 1.07 06/30/2021 07:16 AM    CREATININE 1.08 (H) 07/02/2010 08:56 AM    BUNCREATRAT 11 07/02/2010 08:56 AM    GLOMRATE 50 (L) 07/02/2010 08:56 AM     Lab Results   Component Value Date/Time    ALKPHOSPHAT 53 06/30/2021 07:16 AM    ASTSGOT 21 06/30/2021 07:16 AM    ALTSGPT 16 06/30/2021 07:16 AM    TBILIRUBIN 0.4 06/30/2021 07:16 AM

## 2022-01-13 PROBLEM — H26.9 CATARACT: Status: RESOLVED | Noted: 2021-09-10 | Resolved: 2022-01-13

## 2022-01-14 ENCOUNTER — HOSPITAL ENCOUNTER (OUTPATIENT)
Dept: LAB | Facility: MEDICAL CENTER | Age: 82
End: 2022-01-14
Attending: INTERNAL MEDICINE
Payer: MEDICARE

## 2022-01-14 DIAGNOSIS — R94.4 DECREASED GFR: ICD-10-CM

## 2022-01-14 LAB
25(OH)D3 SERPL-MCNC: 68 NG/ML (ref 30–100)
ALBUMIN SERPL BCP-MCNC: 4.1 G/DL (ref 3.2–4.9)
ALBUMIN/GLOB SERPL: 1.7 G/DL
ALP SERPL-CCNC: 48 U/L (ref 30–99)
ALT SERPL-CCNC: 14 U/L (ref 2–50)
ANION GAP SERPL CALC-SCNC: 9 MMOL/L (ref 7–16)
AST SERPL-CCNC: 16 U/L (ref 12–45)
BASOPHILS # BLD AUTO: 0.8 % (ref 0–1.8)
BASOPHILS # BLD: 0.04 K/UL (ref 0–0.12)
BILIRUB SERPL-MCNC: 0.4 MG/DL (ref 0.1–1.5)
BUN SERPL-MCNC: 19 MG/DL (ref 8–22)
CALCIUM SERPL-MCNC: 9.6 MG/DL (ref 8.5–10.5)
CHLORIDE SERPL-SCNC: 107 MMOL/L (ref 96–112)
CO2 SERPL-SCNC: 25 MMOL/L (ref 20–33)
CREAT SERPL-MCNC: 1.18 MG/DL (ref 0.5–1.4)
EOSINOPHIL # BLD AUTO: 0.08 K/UL (ref 0–0.51)
EOSINOPHIL NFR BLD: 1.5 % (ref 0–6.9)
ERYTHROCYTE [DISTWIDTH] IN BLOOD BY AUTOMATED COUNT: 46.8 FL (ref 35.9–50)
GLOBULIN SER CALC-MCNC: 2.4 G/DL (ref 1.9–3.5)
GLUCOSE SERPL-MCNC: 104 MG/DL (ref 65–99)
HCT VFR BLD AUTO: 43 % (ref 37–47)
HGB BLD-MCNC: 14.4 G/DL (ref 12–16)
IMM GRANULOCYTES # BLD AUTO: 0.02 K/UL (ref 0–0.11)
IMM GRANULOCYTES NFR BLD AUTO: 0.4 % (ref 0–0.9)
LYMPHOCYTES # BLD AUTO: 1.64 K/UL (ref 1–4.8)
LYMPHOCYTES NFR BLD: 30.9 % (ref 22–41)
MCH RBC QN AUTO: 32.4 PG (ref 27–33)
MCHC RBC AUTO-ENTMCNC: 33.5 G/DL (ref 33.6–35)
MCV RBC AUTO: 96.8 FL (ref 81.4–97.8)
MONOCYTES # BLD AUTO: 0.33 K/UL (ref 0–0.85)
MONOCYTES NFR BLD AUTO: 6.2 % (ref 0–13.4)
NEUTROPHILS # BLD AUTO: 3.19 K/UL (ref 2–7.15)
NEUTROPHILS NFR BLD: 60.2 % (ref 44–72)
NRBC # BLD AUTO: 0 K/UL
NRBC BLD-RTO: 0 /100 WBC
PHOSPHATE SERPL-MCNC: 3.7 MG/DL (ref 2.5–4.5)
PLATELET # BLD AUTO: 208 K/UL (ref 164–446)
PMV BLD AUTO: 10.7 FL (ref 9–12.9)
POTASSIUM SERPL-SCNC: 4.9 MMOL/L (ref 3.6–5.5)
PROT SERPL-MCNC: 6.5 G/DL (ref 6–8.2)
PTH-INTACT SERPL-MCNC: 25.6 PG/ML (ref 14–72)
RBC # BLD AUTO: 4.44 M/UL (ref 4.2–5.4)
SODIUM SERPL-SCNC: 141 MMOL/L (ref 135–145)
WBC # BLD AUTO: 5.3 K/UL (ref 4.8–10.8)

## 2022-01-14 PROCEDURE — 84100 ASSAY OF PHOSPHORUS: CPT

## 2022-01-14 PROCEDURE — 85025 COMPLETE CBC W/AUTO DIFF WBC: CPT

## 2022-01-14 PROCEDURE — 82306 VITAMIN D 25 HYDROXY: CPT

## 2022-01-14 PROCEDURE — 83970 ASSAY OF PARATHORMONE: CPT

## 2022-01-14 PROCEDURE — 36415 COLL VENOUS BLD VENIPUNCTURE: CPT

## 2022-01-14 PROCEDURE — 80053 COMPREHEN METABOLIC PANEL: CPT

## 2022-01-16 ENCOUNTER — TELEPHONE (OUTPATIENT)
Dept: MEDICAL GROUP | Facility: MEDICAL CENTER | Age: 82
End: 2022-01-16

## 2022-01-16 NOTE — TELEPHONE ENCOUNTER
Called the patient and left a message, please notify her that her test shows:  (1) her kidney function is stable, it is still slightly decreased but there is no significant change, her liver function is normal  (2) her blood counts are normal, her vitamin D level is normal  (3) based upon these labs no changes, continue to work on a good exercise program, continue to avoid Motrin, Aleve, Advil, just take Tylenol if she needs anything for pain

## 2022-01-17 ENCOUNTER — TELEPHONE (OUTPATIENT)
Dept: MEDICAL GROUP | Facility: MEDICAL CENTER | Age: 82
End: 2022-01-17

## 2022-01-17 NOTE — TELEPHONE ENCOUNTER
----- Message from Master Suarez M.D. sent at 1/16/2022  1:53 PM PST -----  Called the patient and left a message, please notify her that her test shows:  (1) her kidney function is stable, it is still slightly decreased but there is no significant change, her liver function is normal  (2) her blood counts are normal, her vitamin D level is normal  (3) based upon these labs no changes, continue to work on a good exercise program, continue to avoid Motrin, Aleve, Advil, just take Tylenol if she needs anything for pain

## 2022-01-21 ENCOUNTER — PATIENT MESSAGE (OUTPATIENT)
Dept: HEALTH INFORMATION MANAGEMENT | Facility: OTHER | Age: 82
End: 2022-01-21
Payer: MEDICARE

## 2022-02-14 ENCOUNTER — OFFICE VISIT (OUTPATIENT)
Dept: CARDIOLOGY | Facility: MEDICAL CENTER | Age: 82
End: 2022-02-14
Payer: MEDICARE

## 2022-02-14 VITALS
BODY MASS INDEX: 27.23 KG/M2 | RESPIRATION RATE: 16 BRPM | OXYGEN SATURATION: 99 % | SYSTOLIC BLOOD PRESSURE: 130 MMHG | HEIGHT: 62 IN | WEIGHT: 148 LBS | DIASTOLIC BLOOD PRESSURE: 64 MMHG | HEART RATE: 66 BPM

## 2022-02-14 DIAGNOSIS — Z86.79 HISTORY OF HYPERTENSION: ICD-10-CM

## 2022-02-14 DIAGNOSIS — E78.5 DYSLIPIDEMIA: Chronic | ICD-10-CM

## 2022-02-14 DIAGNOSIS — I35.0 AORTIC VALVE STENOSIS, ETIOLOGY OF CARDIAC VALVE DISEASE UNSPECIFIED: Chronic | ICD-10-CM

## 2022-02-14 DIAGNOSIS — K63.5 POLYP OF COLON, UNSPECIFIED PART OF COLON, UNSPECIFIED TYPE: ICD-10-CM

## 2022-02-14 DIAGNOSIS — Z87.39 HISTORY OF POLYMYALGIA RHEUMATICA: ICD-10-CM

## 2022-02-14 DIAGNOSIS — I35.1 NONRHEUMATIC AORTIC VALVE INSUFFICIENCY: ICD-10-CM

## 2022-02-14 DIAGNOSIS — R94.4 DECREASED GFR: ICD-10-CM

## 2022-02-14 PROCEDURE — 99215 OFFICE O/P EST HI 40 MIN: CPT | Performed by: INTERNAL MEDICINE

## 2022-02-14 ASSESSMENT — ENCOUNTER SYMPTOMS
CHILLS: 0
COUGH: 0
EYES NEGATIVE: 1
ORTHOPNEA: 0
NEUROLOGICAL NEGATIVE: 1
WEAKNESS: 0
SORE THROAT: 0
CONSTITUTIONAL NEGATIVE: 1
FEVER: 0
WHEEZING: 0
CLAUDICATION: 0
GASTROINTESTINAL NEGATIVE: 1
LOSS OF CONSCIOUSNESS: 0
RESPIRATORY NEGATIVE: 1
SPUTUM PRODUCTION: 0
BRUISES/BLEEDS EASILY: 0
MUSCULOSKELETAL NEGATIVE: 1
PALPITATIONS: 0
CARDIOVASCULAR NEGATIVE: 1
HEMOPTYSIS: 0
SHORTNESS OF BREATH: 0
STRIDOR: 0
PND: 0
DIZZINESS: 0

## 2022-02-14 ASSESSMENT — FIBROSIS 4 INDEX: FIB4 SCORE: 1.71

## 2022-02-14 NOTE — PROGRESS NOTES
"Chief Complaint   Patient presents with   • Aortic Stenosis     F/V Dx: Aortic valve stenosis, etiology of cardiac valve disease unspecified       Subjective     Katerina Torres is a 81 y.o. female who presents today as a follow-up from a prior provider for history of aortic stenosis.  She is a 81-year-old female with no significant past medical history with exception of aortic stenosis.  This was hurting because she was told she had a murmur for approximately 20 years.  An echocardiogram showed moderate to severe aortic stenosis with an aortic valve area of 0.8 with a gradient of 30.  She walked to the other day 4 miles with no functional imitations.  She is not getting chest pain palpitations syncope or lower extremity edema.  She does notice that sometimes is harder for her to ski at altitude but is not limiting her from doing so.  She is dealing with her  who is suffering from dementia.  They are planning a trip back to [place] in the fall.  She does not smoke drink or do drugs and has no premature family history of heart disease.    Past Medical History:   Diagnosis Date   • Breast cancer (HCC)    • Breath shortness     with exertion \"walking up a hill\"   • Bruises easily    • Cancer (HCC) 1989    breast left side lumpectomy   • Cardiac murmur 6/3/2009   • Chronic kidney disease, stage III (moderate) (HCC) 8/20/2015   • Dyslipidemia 6/3/2009   • Glaucoma     both eyes   • Hepatitis A 1993   • Hiatus hernia syndrome    • Hx of breast cancer 6/3/2009   • Hypertension 7/18/2017    currently not taking medications   • MEDICAL HOME    • Neutropenia 6/3/2009   • Osteopenia 6/3/2009   • Preventative health care 6/3/2009   • Snoring    • Unspecified cataract     bilateral IOLs     Past Surgical History:   Procedure Laterality Date   • LUMBAR TRANSFORAMINAL EPIDURAL STEROID INJECTION Right 1/6/2021    Procedure: INJECTION, STEROID, SPINE, LUMBAR, EPIDURAL, TRANSFORAMINAL APPROACH;  Surgeon: Roni LUX" ESTRELLA Choi;  [facility];  Service: Pain Management   • KNEE ARTHROPLASTY TOTAL Right 2018    Procedure: KNEE ARTHROPLASTY TOTAL;  Surgeon: Thanh Hall M.D.;  Location: SURGERY [facility];  Service: Orthopedics   • VITRECTOMY POSTERIOR Left 10/25/2016    Procedure: VITRECTOMY POSTERIOR membrane peel cryotherapy infusion of SF6 intraocular gas;  Surgeon: Amanda Rodriguez M.D.;  Location: [facility];  Service:    • RECOVERY  3/31/2014    Performed by Cath-Recovery Surgery at [facility]   • CATARACT PHACO WITH IOL  2009    Performed by GERA BREAUX at [facility]   • CATARACT PHACO WITH IOL  2009    Performed by GERA BREAUX at [facility]   • LUMPECTOMY     • OTHER     • CT RADIATION THERAPY PLAN SIMPLE       Family History   Problem Relation Age of Onset   • Heart Disease Father         CAD MI   • Stroke Father    • Cancer Sister         breast   • Cancer Mother         breast     Social History     Socioeconomic History   • Marital status:      Spouse name: Not on file   • Number of children: Not on file   • Years of education: Not on file   • Highest education level: Not on file   Occupational History   • Not on file   Tobacco Use   • Smoking status: Former Smoker     Packs/day: 0.50     Years: 10.00     Pack years: 5.00     Types: Cigarettes     Quit date: 1999     Years since quittin.1   • Smokeless tobacco: Never Used   Vaping Use   • Vaping Use: Not on file   Substance and Sexual Activity   • Alcohol use: Yes     Alcohol/week: 4.2 oz     Types: 7 Standard drinks or equivalent per week     Comment: 1 glass wine/day   • Drug use: No   • Sexual activity: Not Currently     Partners: Male   Other Topics Concern   •  Service No   • Blood Transfusions No   • Caffeine Concern No   • Occupational Exposure No   • Hobby Hazards Yes   • Sleep Concern No   •  Stress Concern Yes   • Weight Concern No   • Special Diet No   • Back Care No   • Exercise No   • Bike Helmet No   • Seat Belt Yes   • Self-Exams Yes   Social History Narrative   • Not on file     Social Determinants of Health     Financial Resource Strain:    • Difficulty of Paying Living Expenses: Not on file   Food Insecurity:    • Worried About Running Out of Food in the Last Year: Not on file   • Ran Out of Food in the Last Year: Not on file   Transportation Needs:    • Lack of Transportation (Medical): Not on file   • Lack of Transportation (Non-Medical): Not on file   Physical Activity:    • Days of Exercise per Week: Not on file   • Minutes of Exercise per Session: Not on file   Stress:    • Feeling of Stress : Not on file   Social Connections:    • Frequency of Communication with Friends and Family: Not on file   • Frequency of Social Gatherings with Friends and Family: Not on file   • Attends Jainism Services: Not on file   • Active Member of Clubs or Organizations: Not on file   • Attends Club or Organization Meetings: Not on file   • Marital Status: Not on file   Intimate Partner Violence:    • Fear of Current or Ex-Partner: Not on file   • Emotionally Abused: Not on file   • Physically Abused: Not on file   • Sexually Abused: Not on file   Housing Stability:    • Unable to Pay for Housing in the Last Year: Not on file   • Number of Places Lived in the Last Year: Not on file   • Unstable Housing in the Last Year: Not on file     Allergies   Allergen Reactions   • Naproxen      GI upset   • Vicodin [Hydrocodone-Acetaminophen] Nausea     Nausea, dizziness     Outpatient Encounter Medications as of 2/14/2022   Medication Sig Dispense Refill   • timolol (TIMOPTIC) 0.5 % Solution Administer 1 Drop into the left eye every day.     • acetaminophen (TYLENOL) 500 MG Tab Take 1,000 mg by mouth every 6 hours as needed.     • nitroglycerin (NITROSTAT) 0.4 MG SL Tab Place 0.4 mg under tongue every 5 minutes as  "needed for Chest Pain.     • bimatoprost (LUMIGAN) 0.01 % Solution Place 1 Drop in both eyes every bedtime. Indications: Wide-Angle Glaucoma     • vitamin D (CHOLECALCIFEROL) 1000 UNIT Tab Take 1,000 Units by mouth.     • Magnesium 500 MG Tab Take 1 Capsule by mouth every day.       No facility-administered encounter medications on file as of 2/14/2022.     Review of Systems   Constitutional: Negative.  Negative for chills, fever and malaise/fatigue.   HENT: Negative.  Negative for sore throat.    Eyes: Negative.    Respiratory: Negative.  Negative for cough, hemoptysis, sputum production, shortness of breath, wheezing and stridor.    Cardiovascular: Negative.  Negative for chest pain, palpitations, orthopnea, claudication, leg swelling and PND.   Gastrointestinal: Negative.    Genitourinary: Negative.    Musculoskeletal: Negative.    Skin: Negative.    Neurological: Negative.  Negative for dizziness, loss of consciousness and weakness.   Endo/Heme/Allergies: Negative.  Does not bruise/bleed easily.   All other systems reviewed and are negative.             Objective     /64 (BP Location: Left arm, Patient Position: Sitting, BP Cuff Size: Adult)   Pulse 66   Resp 16   Ht 1.575 m (5' 2\")   Wt 67.1 kg (148 lb)   SpO2 99%   BMI 27.07 kg/m²     Physical Exam  Vitals and nursing note reviewed.   Constitutional:       General: She is not in acute distress.     Appearance: She is well-developed. She is not diaphoretic.   HENT:      Head: Normocephalic and atraumatic.      Right Ear: External ear normal.      Left Ear: External ear normal.      Nose: Nose normal.      Mouth/Throat:      Pharynx: No oropharyngeal exudate.   Eyes:      General: No scleral icterus.        Right eye: No discharge.         Left eye: No discharge.      Conjunctiva/sclera: Conjunctivae normal.      Pupils: Pupils are equal, round, and reactive to light.   Neck:      Vascular: No JVD.   Cardiovascular:      Rate and Rhythm: Normal rate " and regular rhythm.      Heart sounds: Murmur heard.   No friction rub. No gallop.    Pulmonary:      Effort: Pulmonary effort is normal. No respiratory distress.      Breath sounds: No stridor. No wheezing or rales.   Chest:      Chest wall: No tenderness.   Abdominal:      General: There is no distension.      Palpations: Abdomen is soft.      Tenderness: There is no guarding.   Musculoskeletal:         General: No tenderness or deformity. Normal range of motion.      Cervical back: Neck supple.   Skin:     General: Skin is warm and dry.      Coloration: Skin is not pale.      Findings: No erythema or rash.   Neurological:      Mental Status: She is alert.      Cranial Nerves: No cranial nerve deficit.      Motor: No abnormal muscle tone.      Coordination: Coordination normal.      Deep Tendon Reflexes: Reflexes are normal and symmetric. Reflexes normal.   Psychiatric:         Behavior: Behavior normal.         Thought Content: Thought content normal.         Judgment: Judgment normal.            Echocardiogram: Dated 9/24/2021 personally viewed inter myself showing normal LV systolic function with aortic stenosis which is moderate to severe.  Mean gradient of 30 with a aortic valve area of 0.8.    EKG: Dated 10/1/2021 personally viewed under myself showing normal sinus rhythm otherwise normal ECG.    Lab Results   Component Value Date/Time    CHOLSTRLTOT 170 06/30/2021 07:16 AM     (H) 06/30/2021 07:16 AM    HDL 43 06/30/2021 07:16 AM    TRIGLYCERIDE 100 06/30/2021 07:16 AM       Lab Results   Component Value Date/Time    SODIUM 141 01/14/2022 09:04 AM    POTASSIUM 4.9 01/14/2022 09:04 AM    CHLORIDE 107 01/14/2022 09:04 AM    CO2 25 01/14/2022 09:04 AM    GLUCOSE 104 (H) 01/14/2022 09:04 AM    BUN 19 01/14/2022 09:04 AM    CREATININE 1.18 01/14/2022 09:04 AM    CREATININE 1.08 (H) 07/02/2010 08:56 AM    BUNCREATRAT 11 07/02/2010 08:56 AM    GLOMRATE 50 (L) 07/02/2010 08:56 AM     Lab Results   Component  Value Date/Time    ALKPHOSPHAT 48 01/14/2022 09:04 AM    ASTSGOT 16 01/14/2022 09:04 AM    ALTSGPT 14 01/14/2022 09:04 AM    TBILIRUBIN 0.4 01/14/2022 09:04 AM          Assessment & Plan     1. Dyslipidemia     2. Aortic valve stenosis, etiology of cardiac valve disease unspecified  EC-ECHOCARDIOGRAM COMPLETE W/O CONT   3. History of polymyalgia rheumatica  EC-ECHOCARDIOGRAM COMPLETE W/O CONT   4. Decreased GFR  EC-ECHOCARDIOGRAM COMPLETE W/O CONT   5. Polyp of colon, unspecified part of colon, unspecified type  EC-ECHOCARDIOGRAM COMPLETE W/O CONT   6. History of hypertension     7. Nonrheumatic aortic valve insufficiency         Medical Decision Making: Today's Assessment/Status/Plan:        81-year-old female with moderate to severe aortic stenosis without symptoms and able to walk 4 miles with no functional imitations.  I explained at the risk benefits and alternatives of waiting versus proceeding to a valve replacement.  She would like to wait and get a repeat echocardiogram which I ordered today.  I will see her back in 2 months.  We will discuss whether not she is symptomatic and possible referral for a treadmill or watchful waiting at that point.

## 2022-04-28 ENCOUNTER — OFFICE VISIT (OUTPATIENT)
Dept: MEDICAL GROUP | Facility: MEDICAL CENTER | Age: 82
End: 2022-04-28
Payer: MEDICARE

## 2022-04-28 VITALS
SYSTOLIC BLOOD PRESSURE: 128 MMHG | HEART RATE: 83 BPM | DIASTOLIC BLOOD PRESSURE: 70 MMHG | HEIGHT: 62 IN | TEMPERATURE: 98.2 F | OXYGEN SATURATION: 96 % | BODY MASS INDEX: 26.31 KG/M2 | WEIGHT: 143 LBS

## 2022-04-28 DIAGNOSIS — E55.9 VITAMIN D DEFICIENCY: ICD-10-CM

## 2022-04-28 DIAGNOSIS — E78.5 DYSLIPIDEMIA: ICD-10-CM

## 2022-04-28 DIAGNOSIS — Z86.79 HISTORY OF HYPERTENSION: ICD-10-CM

## 2022-04-28 DIAGNOSIS — Z23 NEED FOR TDAP VACCINATION: ICD-10-CM

## 2022-04-28 DIAGNOSIS — Z00.00 PREVENTATIVE HEALTH CARE: Chronic | ICD-10-CM

## 2022-04-28 DIAGNOSIS — Z91.89 AT RISK FOR BREAST CANCER: ICD-10-CM

## 2022-04-28 DIAGNOSIS — N18.31 STAGE 3A CHRONIC KIDNEY DISEASE: ICD-10-CM

## 2022-04-28 DIAGNOSIS — R94.4 DECREASED GFR: ICD-10-CM

## 2022-04-28 DIAGNOSIS — I35.0 AORTIC VALVE STENOSIS, ETIOLOGY OF CARDIAC VALVE DISEASE UNSPECIFIED: Chronic | ICD-10-CM

## 2022-04-28 PROCEDURE — 99214 OFFICE O/P EST MOD 30 MIN: CPT | Mod: 25 | Performed by: INTERNAL MEDICINE

## 2022-04-28 PROCEDURE — 90471 IMMUNIZATION ADMIN: CPT | Performed by: INTERNAL MEDICINE

## 2022-04-28 PROCEDURE — 90715 TDAP VACCINE 7 YRS/> IM: CPT | Performed by: INTERNAL MEDICINE

## 2022-04-28 ASSESSMENT — FIBROSIS 4 INDEX: FIB4 SCORE: 1.71

## 2022-04-28 NOTE — PROGRESS NOTES
Subjective     Katerina Torres is a 81 y.o. female who presents with aortic regurgitation Follow-Up            HPI         Patient with aortic regurgitation followed by cardiology last seen 2/14/22  cardiology note moderate to severe aortic stenosis, able to walk 4 miles with no limitations, will order repeat echo follow-up 2 months last echo 9/24/21 echo EF 65%, indeterminate diastolic function, moderate aortic stenosis, peak gradient 56, moderate aortic. Patient has some shortness of breath walking up inclines and no sob on flat surfaces.  She still keeps quite active skiing this past winter, working in her garden hours at a time.  She does admit to not drinking as much water as she could during the day.  No chest pain, no shortness of breath with standard activity, no palpitations, no lightheadedness, no dizziness.  No leg edema.  Tries to eat a healthy diet.  Does care for her  who has memory loss and that does cause her some stress.  No alcohol.      Current Outpatient Medications   Medication Sig Dispense Refill   • timolol (TIMOPTIC) 0.5 % Solution Administer 1 Drop into the left eye every day.     • acetaminophen (TYLENOL) 500 MG Tab Take 1,000 mg by mouth every 6 hours as needed.     • nitroglycerin (NITROSTAT) 0.4 MG SL Tab Place 0.4 mg under tongue every 5 minutes as needed for Chest Pain.     • bimatoprost (LUMIGAN) 0.01 % Solution Place 1 Drop in both eyes every bedtime. Indications: Wide-Angle Glaucoma     • vitamin D (CHOLECALCIFEROL) 1000 UNIT Tab Take 1,000 Units by mouth.     • Magnesium 500 MG Tab Take 1 Capsule by mouth every day.       No current facility-administered medications for this visit.                 s/p knee replacement  3/3/17 MRI right knee abnormal right lateral meniscus with peripheral extrusion, maceration, and complex tear involving primarily the posterior horn and body but also the anterior horn, abnormal medial meniscus with vertical tear of the  peripheral zone of body and posterior horn, probable meniscal root tear, and degenerative fraying of the free edge, severe lateral femorotibial compartment osteoarthritis with significant cartilage loss and moderate to severe subchondral edema within the lateral femoral condyle and lateral tibial plateau, mild medial femorotibial and the patellofemoral compartment osteoarthritis, moderate sized joint effusion with associated popliteal cyst  6/20/17 sees  orthopedics  1/18/18  orthopedic note right knee end-stage arthritis, x-rays bone-on-bone right knee arthritis lateral compartment, right knee steroid injection performed, planned for total right knee arthroplasty after winter  5/2/18  orthopedic note, right knee osteoathritis, failed conservative measures, scheduled for right knee surgery  5/8/18  operative note right knee arthroplasty   5/23/18  orthopedic note 2 weeks s/p right total knee  8/6/18  orthopedic note 3 months status post right knee replacement doing well, x-ray shows stable right total knee  5/22/19  orthopedic note x-ray stable total knee replacement, follow-up 1 year     Preventative health  7/23/12 tdap   1/10/13 pneumovax  7/29/14 zoster vaccine  8/6/15 prevnar at [facility]  9/1/16 sonocine negative  3/7/17 colon per [facility] 2 polyps, grade 2 internal hemorrhoids, angioectasias ascending colon,pathology one hyperplastic polyp and one sessile serrated polyp, repeat colonoscopy 5 years   4/5/18 declines sonocine  9/19/20 shingrix second  8/23/21 dexa LS-0.9,hip-1.4  8/24/21 mammogram heterogeneously dense breast tissue recommend screening breast ultrasound, patient declines  10/25/12 moderna booster  1/14/22 vit d 68     Osteopenia  9/06 dexa LS -1.2,hip -1.1  6/09 dexa LS -1.4,hip -1.0  7/11 vit d 43  7/11 dexa LS -1.1,hip -0.9  712 vit d 30 start 1000 units  7/24/13 vit d 72 on 1000 units  8/6/13 dexa LS -1.1,hip -1.1  8/5/14 vit d 67  8/19/15  dexa LS -1.4,hip -1.3;FRAX 40% major,27% hip only on prednisone one month, does not need bisphosphonate therapy  8/19/15 vit d 43  6/16/16 off prednisone x 3 weeks  6/21/16 vit d 50  6/27/17 vit d 48  4/27/18 vit d 53   5/14/19 vit d 46  5/28/19 dexa LS-1.0,hip-1.4  6/12/20 vit d 88 on 1000 units decrease to qod   8/23/21 dexa LS-0.9,hip-1.4  1/14/22 vit d 68     low back pain  11/30/20 right-sided buttock pain with radiation, referral to physiatry, x-ray, MRI lumbar spine, trial of naproxen short-term plus acetaminophen follow-up 3 weeks  11/30/20 x-ray lumbar spine moderate spondylosis, L2-L3 asymmetric disc height loss on the left resulting in slight right curvature and degenerative retrolisthesis  12/2/20 MRI lumbar spine mild superior endplate compression fracture at L3, no marrow edema consistent with chronic process, L2-L3 moderate central narrowing, moderate right and severe left neuroforaminal narrowing, L3-4 moderate central narrowing, moderate bilateral neuroforaminal narrowing, L4-5 moderate to severe central canal narrowing with severe right and moderate left neuroforaminal narrowing, L5-S1 moderate bilateral neuroforaminal narrowing  1/6/21  physiatry procedure note right lumbar epidural steroid injection L4-S1 under fluoroscopy  1/26/21  physiatry note good response to right lumbar L4-L5 epidural steroid injections, she has returned to activities such as skiing, consider physical therapy and home program for residual pain, consider right SI joint versus repeat lumbar steroid injection if necessary, follow-up 2 months  2/8/21 [facility] note   2/17/21 [facility] note   1/14/22 wbc 5.3     knee pain left  8/1/13 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, lateral meniscus mildly extruded, ruptured hopper's cyst  8/12/13  ortho note pain improved on followup, consider injection if pain recurs     History polymyalgia  4/20/15 physical therapy,  trial celebrex and zanaflex  5/7/15 physical therapy evaluation today recommended right hip x-ray and pelvic x-ray, ordered in computer  5/8/15 xray hip negative  6/29/15 seen by bridgette diagnosed with polymyalgia rheumatica  6/29/15 CRP 9.4,ESR 32 started on prednisone 20 mg    7/28/15 on prednisone 10 mg will continue 10 mg x 2 weeks, then decrease to 5 mg daily  8/19/15 hgb 14,hct 43, CRP 0.3, ESR 14, tapered off prednisone but developed mouth irritation, has resumed prednisone 5 mg daily, will continue x2 weeks then taper  11/17/15 refill prednisone 5 mg #30 tabs x one  6/21/16 off prednisone; CRP 0.1,ESR 17  6/27/17 CRP 0.09,ESR 12,cpk 66    history neutropenia  8/06 wbc 3.4  3/08 wbc 3.9  6/09 wbc 3.9  7/10 wbc 4.4  7/11 wbc 4.5  7/12 wbc 3.8 (55%N,35%L)  7/24/13 wbc 4.3  3/20/14 wbc 3.1 (52%N,36%L)  3/31/14 wbc 3.7  8/19/15 wbc 5.3  6/21/16 wbc 4.4 (50%N,40%L)  6/27/17 wbc 4.2   4/27/18 wbc 4.4  5/14/19 wbc 4.5  6/12/20 wbc 4.5  6/30/21 wbc 4.3     History of palpitations  7/11/19 patient feels more intense heart rate and heartbeat at night, can hear her heartbeat, only occurs at night, question anxiety component trial of buspar, 24-hour holter, cardiology referral  7/18/19 24-hour holter monitor, sinus rhythm asymptomatic average heart rate 65 PAC burden 1.3%, PVC burden 1.8%, 2 atrial runs longest 7 beats maximal heart rate 105, fastest run 4 beats maximum heart rate 129     histroy hypertension  3/31/14 cardiac catheterization; left main mild plaquing, LAD patent, circumflex free of disease, RCA free of disease, normal LV function, mild aortic stenosis  7/9/17 echo normal LV size and function, EF 60%, grade 1 diastolic dysfunction, moderate aortic stenosis peak gradient 50, mean 29, valve area 0.8-1.0 and aortic regurgitation  7/9/17 persantine thallium small fixed perfusion abnormality distal anterior and anteroapical segments, no ischemia is noted to represent mild prior infarct or variant normal apical  thinning  7/18/17 start diltiazem 120 mg daily (also has esophageal dysmotility by barium swallow)  4/26/18 off diltiazem  4/27/18 echo normal LV size and function, EF 65%, moderate aortic stenosis peak gradient 46, valve area 0.9,, moderate aortic insufficiency, no change compared to previous  5/27/19 echo normal LV size and function, EF 65%, normal diastolic function, calcified aortic valve moderate aortic stenosis peak gradient 53, moderate aortic insufficiency  10/1/21 cardiology note will likely need valve replacement next 1 to 2 years, follow-up 6 months  2/14/22  cardiology note moderate to severe aortic stenosis, able to walk 4 miles with no limitations, will order repeat echo follow-up 2 months     History of breast cancer  1980s left sided s/p lumpectomy and radiation therapy, no old records  7/11 mammogram  8/12 mammogram  8/13 mammogram  8/18/14 mammogram  9/1/16 mammogram heterogeneously dense breast tissue recommend screening breast ultrasound  9/1/16 sonocine negative    History of basal cell skin cancer     Glaucoma  4/29/21  eye exam   1/13/22  eye exam primary open-angle glaucoma  surgical history  macular pucker surgery     Esophageal dysmotility  7/9/17 barium swallow moderate esophageal dysmotility, small volume silent aspiration  7/18/17 start diltiazem 120 mg daily  7/18/17 referral [facility], speech pathology for esophageal dysmotility, small amount of aspiration on barium swallow, try low-dose diltiazem 120 mg for esophageal dysmotility and elevated blood pressure  7/26/17 [facility] evaluation dyspepsia, obtain cardiology clearance and then proceed EGD, initiate pantoprazole 20 mg, trial of miralax and colace     Dyspnea  7/24/13 normal LV function, EF 60%, mild LVH, mild aortic stenosis, peak gradient 25  3/20/14 cxr negative  3/31/14 cardiac catheterization; left main mild lacking, LAD patent, circumflex free of disease, RCA free of disease, normal LV function, mild  aortic stenosis  8/1/14 PFT FEV 2.8 L (144% predicted), FVC 3.6 (138% predicted), FEV/FVC 78, no bronchodilator response, DLCO 119% predicted     Dyslipidemia  3/08 chol 175,trig 73,hdl 45,ldl 115  6/09 chol 174,trig 89,hdl 47,ldl 109  7/10 chol 182,trig 61,hdl 55,ldl 114  7/11 chol 175,trig 69,hdl 45,ldl 116  7/12 chol 164,trig 64,hdl 43,ldl 108  7/24/13 chol 186,trig 66,hdl 54,ldl 119 no meds  8/5/14 chol 165,trig 93,hdl 48,ldl 98  8/19/15 chol 192,trig 109,hdl 58,ldl 112; 10 year risk 20% declines statin therapy  6/21/16 chol 137,trig 75,hdl 47,ldl 75   6/27/17 hcol 153,trig 94,hdl 54,ldl 80  5/14/19 chol 164,trig 82,hdl 48,ldl 100   6/12/20 chol 176,trig 80,hdl 57,ldl 103   6/30/21 chol 170,trig 100,hdl 43,ldl 107     Decrease GFR  7/7/11 bun 16,creat 1.0,GFR 50  7/24/13 bun 14,creat 1.1,GFR 45  3/20/14 bun 15,creat 0.9,GFR 59  8/5/14 bun 14,creat 1.1,GFR 46  2/19/15 bun 13,creat 0.8,GFR >60  8/19/15 bun 10,creat 1.1,GFR 48  6/21/16 bun 19,creat 1.0,GFR 50  6/27/17 bun 14,creat 0.5,GFR 51  4/28/18 bun 15,creat 0.9,GFR 60  5/14/19 bun 18,creat 1.13,GFR 46  6/12/20 bun 15,creat 1.07,GFR 49  6/30/21 bun 14,creat 1.07,GFR 49,PTH 36,urine mac<1.2,SPEP negative  1/14/22 bun 19,creat 1.18,GFR 44,PTH 25,calcium 9.6,phos 3.7     colon polyp  3/7/17 colon per [facility] 2 polyps, grade 2 internal hemorrhoids, angioectasias ascending colon,pathology one hyperplastic polyp and onesessile serrated polyp, repeat colonoscopy 5 years      Aortic stenosis  10/06 echo mild mr, normal LV  6/09 stress echo negative  8/12 echo normal LV function, EF 65%, mild aortic stenosis, peak gradient 24  7/24/13 normal LV function, EF 60%, mild LVH, mild aortic stenosis, peak gradient 25  3/31/14 cardiac catheterization; left main mild lacking, LAD patent, circumflex free of disease, RCA free of disease, normal LV function, mild aortic stenosis  7/9/17 echo normal LV size and function, EF 60%, grade 1 diastolic dysfunction, moderate aortic stenosis  peak gradient 50, mean 29, valve area 0.8-1.0 and aortic regurgitation  7/9/17 persantine thallium small fixed perfusion abnormality distal anterior and anteroapical segments, no ischemia is noted to represent mild prior infarct or variant normal apical thinning  4/27/18 echo normal LV size and function, EF 65%, moderate aortic stenosis peak gradient 46, valve area 0.9,, moderate aortic insufficiency, no change compared to previous  5/27/19 echo normal LV size and function, EF 65%, normal diastolic function, calcified aortic valve moderate aortic stenosis peak gradient 53, moderate aortic insufficiency  9/24/21 echo EF 65%, indeterminate diastolic function, moderate aortic stenosis, peak gradient 56, moderate aortic insufficiency  10/1/21 cardiology note will likely need valve replacement next 1 to 2 years, follow-up 6 months  2/14/22  cardiology note moderate to severe aortic stenosis, able to walk 4 miles with no limitations, will order repeat echo follow-up 2 months     Aortic regurgitation  10/06 echo mild mitral regurgitation, normal LV  6/09 stress echo negative  8/12 echo normal LV function, EF 65%, mild aortic stenosis, peak gradient 24  7/24/13 normal LV function, EF 60%, mild LVH, mild aortic stenosis, peak gradient 25  3/31/14 cardiac catheterization; left main and LAD patent, circumflex free of disease, RCA free of disease, normal LV function, mild aortic stenosis  7/9/17 echo normal LV size and function, EF 60%, grade 1 diastolic dysfunction, moderate aortic stenosis peak gradient 50, mean 29, valve area 0.8-1.0 and aortic regurgitation  7/9/17 persantine thallium small fixed perfusion abnormality distal anterior and anteroapical segments, no ischemia is noted to represent mild prior infarct or variant normal apical thinning  4/27/18 echo normal LV size and function, EF 65%, moderate aortic stenosis peak gradient 46, valve area 0.9,, moderate aortic insufficiency, no change compared to  previous  5/27/19 echo normal LV size and function, EF 65%, normal diastolic function, calcified aortic valve moderate aortic stenosis peak gradient 53, moderate aortic insufficiency  7/11/19 patient feels more intense heart rate and heartbeat at night, can hear her heartbeat, only occurs at night, question anxiety component trial of buspar, 24-hour holter, cardiology referral  7/18/19 24-hour holter monitor, sinus rhythm asymptomatic average heart rate 65 PAC burden 1.3%, PVC burden 1.8%, 2 atrial runs longest 7 beats maximal heart rate 105, fastest run 4 beats maximum heart rate 129  9/24/21 echo EF 65%, indeterminate diastolic function, moderate aortic stenosis, peak gradient 56, moderate aortic insufficiency  10/1/21 cardiology note will likely need valve replacement next 1 to 2 years, follow-up 6 months  10/1/21 cardiology note will likely need valve replacement next 1 to 2 years, follow-up 6 months  2/14/22  cardiology note moderate to severe aortic stenosis, able to walk 4 miles with no limitations, will order repeat echo follow-up 2 months                 Patient Active Problem List   Diagnosis   • History of breast cancer   • Osteopenia   • Dyslipidemia   • History of neutropenia   • Preventative health care   • Aortic stenosis   • Glaucoma   • History of basal cell carcinoma of skin   • Knee pain, left   • History of polymyalgia rheumatica   • Decreased GFR   • Colon polyp   • S/P knee replacement   • Esophageal dysmotility   • History of hypertension   • Aortic regurgitation   • History of palpitations   • Low back pain        Patient Care Team:  Master Suarez M.D. as PCP - General  Master Suarez M.D. as PCP - [facility]  Chris Bernstein M.D. as Consulting Physician (Ophthalmology)  Amanda Rodriguez M.D. as Consulting Physician (Ophthalmology)  Manoj Tidwell M.D. (Inactive) as Consulting Physician (Orthopedic Surgery)  Gian Caro, PT, DPT as Physical Therapist (Physical  Therapy)  Jacki Pickett, PT as Physical Therapist (Physical Therapy)        ROS           Objective          Physical Exam  Vitals and nursing note reviewed.   Constitutional:       Appearance: Normal appearance.   HENT:      Head: Normocephalic and atraumatic.      Right Ear: External ear normal.      Left Ear: External ear normal.   Eyes:      Conjunctiva/sclera: Conjunctivae normal.   Cardiovascular:      Rate and Rhythm: Normal rate and regular rhythm.      Heart sounds: Normal heart sounds.   Pulmonary:      Effort: Pulmonary effort is normal.      Breath sounds: Normal breath sounds.   Abdominal:      General: There is no distension.   Musculoskeletal:         General: No swelling.   Skin:     General: Skin is warm.   Neurological:      General: No focal deficit present.      Mental Status: She is alert.   Psychiatric:         Mood and Affect: Mood normal.         Behavior: Behavior normal.                             Assessment & Plan        Assessment  #1 aortic stenosis and regurgitation 9/24/21 echo EF 65%, indeterminate diastolic function, moderate aortic stenosis, peak gradient 56, moderate aortic insufficiency and recent cardiology visit 2/14/22  cardiology note moderate to severe aortic stenosis, able to walk 4 miles with no limitations, will order repeat echo follow-up 2 months    #2 CKD 3 a no regular NSAIDs 1/14/22 bun 19,creat 1.18,GFR 44,PTH 25,calcium 9.6,phos 3.7     #3 history of hypertension blood pressure stable no medications    #4 preventative health has had the third COVID shot    #5 vitamin D deficiency    #6 dyslipidemia diet controlled    Plan  #! Recommend the 4th covid shot      #2 tdap today    #3 check blood pressure periodically and record    #4 follow-up cardiology has echo pending    #5 Ensure adequate hydration, use sunscreen    #6 has advanced directives, continue regular activity, healthy diet limiting processed foods, sodium content in foods, limit sweets    #7  continue no NSAIDs    #8 follow-up 6 months    #9 repeat labs ordered for June    #10 continue no tobacco, no alcohol

## 2022-05-11 ENCOUNTER — TELEPHONE (OUTPATIENT)
Dept: HEALTH INFORMATION MANAGEMENT | Facility: OTHER | Age: 82
End: 2022-05-11

## 2022-05-11 ENCOUNTER — APPOINTMENT (OUTPATIENT)
Dept: CARDIOLOGY | Facility: MEDICAL CENTER | Age: 82
End: 2022-05-11
Attending: INTERNAL MEDICINE
Payer: MEDICARE

## 2022-05-11 DIAGNOSIS — R94.4 DECREASED GFR: ICD-10-CM

## 2022-05-11 DIAGNOSIS — Z87.39 HISTORY OF POLYMYALGIA RHEUMATICA: ICD-10-CM

## 2022-05-11 DIAGNOSIS — I35.0 AORTIC VALVE STENOSIS, ETIOLOGY OF CARDIAC VALVE DISEASE UNSPECIFIED: Chronic | ICD-10-CM

## 2022-05-11 DIAGNOSIS — K63.5 POLYP OF COLON, UNSPECIFIED PART OF COLON, UNSPECIFIED TYPE: ICD-10-CM

## 2022-05-11 LAB
LV EJECT FRACT  99904: 55
LV EJECT FRACT MOD 2C 99903: 56.13
LV EJECT FRACT MOD 4C 99902: 46.1
LV EJECT FRACT MOD BP 99901: 49.78

## 2022-05-11 PROCEDURE — 93306 TTE W/DOPPLER COMPLETE: CPT

## 2022-05-11 PROCEDURE — 93306 TTE W/DOPPLER COMPLETE: CPT | Mod: 26 | Performed by: INTERNAL MEDICINE

## 2022-05-12 ENCOUNTER — TELEPHONE (OUTPATIENT)
Dept: CARDIOLOGY | Facility: MEDICAL CENTER | Age: 82
End: 2022-05-12
Payer: MEDICARE

## 2022-05-12 NOTE — TELEPHONE ENCOUNTER
In reviewing [facility] surveillance spreadsheet, patient was recommended to FU with Dr. Gregory in April. Echo yesterday shows now sev AS.    Called patient and notified her of echo results. She would like to keep following up with Dr. Head at this time. She reports possibly worsening CHAIREZ and some palpiationts, but otherwise denies swelling, CP, dizziness/lightheadedness, syncope. She will notify the office prior to her follow up should her symptoms worsen.     She states she has a trip planned for August and would like to have her valve replacement prior to her trip. Discussed the TAVR process with her including consultations and workup and encouraged her to discuss with Dr. Head at her appointment.

## 2022-05-13 ENCOUNTER — OFFICE VISIT (OUTPATIENT)
Dept: CARDIOLOGY | Facility: MEDICAL CENTER | Age: 82
End: 2022-05-13
Payer: MEDICARE

## 2022-05-13 VITALS
HEIGHT: 62 IN | WEIGHT: 144 LBS | RESPIRATION RATE: 12 BRPM | BODY MASS INDEX: 26.5 KG/M2 | HEART RATE: 68 BPM | SYSTOLIC BLOOD PRESSURE: 118 MMHG | DIASTOLIC BLOOD PRESSURE: 70 MMHG | OXYGEN SATURATION: 97 %

## 2022-05-13 DIAGNOSIS — Z00.00 PREVENTATIVE HEALTH CARE: Chronic | ICD-10-CM

## 2022-05-13 DIAGNOSIS — E78.5 DYSLIPIDEMIA: Chronic | ICD-10-CM

## 2022-05-13 DIAGNOSIS — Z87.39 HISTORY OF POLYMYALGIA RHEUMATICA: ICD-10-CM

## 2022-05-13 DIAGNOSIS — I35.1 NONRHEUMATIC AORTIC VALVE INSUFFICIENCY: ICD-10-CM

## 2022-05-13 DIAGNOSIS — Z86.79 HISTORY OF HYPERTENSION: ICD-10-CM

## 2022-05-13 DIAGNOSIS — I35.0 AORTIC VALVE STENOSIS, ETIOLOGY OF CARDIAC VALVE DISEASE UNSPECIFIED: Chronic | ICD-10-CM

## 2022-05-13 PROCEDURE — 99214 OFFICE O/P EST MOD 30 MIN: CPT | Performed by: INTERNAL MEDICINE

## 2022-05-13 ASSESSMENT — ENCOUNTER SYMPTOMS
DIZZINESS: 0
MUSCULOSKELETAL NEGATIVE: 1
BRUISES/BLEEDS EASILY: 0
PND: 0
CHILLS: 0
NEUROLOGICAL NEGATIVE: 1
FEVER: 0
STRIDOR: 0
HEMOPTYSIS: 0
RESPIRATORY NEGATIVE: 1
WHEEZING: 0
SORE THROAT: 0
CLAUDICATION: 0
WEAKNESS: 0
CONSTITUTIONAL NEGATIVE: 1
EYES NEGATIVE: 1
SPUTUM PRODUCTION: 0
LOSS OF CONSCIOUSNESS: 0
SHORTNESS OF BREATH: 0
PALPITATIONS: 0
COUGH: 0
CARDIOVASCULAR NEGATIVE: 1
GASTROINTESTINAL NEGATIVE: 1
ORTHOPNEA: 0

## 2022-05-13 ASSESSMENT — FIBROSIS 4 INDEX: FIB4 SCORE: 1.71

## 2022-06-11 NOTE — OP THERAPY DAILY TREATMENT
Outpatient Physical Therapy  DAILY TREATMENT     [facility] Outpatient Physical Therapy  [address]  [place] [address]  Phone:  [phone]  Fax:  [phone]    Date: 05/29/2018    Patient: Katerina Torres  YOB: 1940  MRN: [id number]     Time Calculation  Start time: 1015  Stop time: 1130 Time Calculation (min): 75 minutes     Chief Complaint: Knee Injury and Knee Problem    Visit #: 5    SUBJECTIVE:  I did not do my exercises too well as we traveled by car to [place].  The worst is sitting and laying down for sleep.  I do not like to sit.    OBJECTIVE:  Current objective measures:   Knee flex sitting 115  Ext lag 10 degrees  Supine ROM 1/2- 100          Therapeutic Exercises (CPT 10016):     1. Nu step , Level 4 x 7 mins , Ave 40 steps per min seat 8 arms 9    2. Gastroc Stretching, Slant board    3. Uptight bike , 5 mins , Just for stretching     4. Ball exercises , gentle hs curls and bridges , See below     5. SLR , 10/1    6. LAQ, 5    7. Step ups , 10, 4 inch step in II bars    8. Step downs , 10, 4 inch step in II bars    9. Sides step up and over L and R , 10, 4 inch step in II bars    10. Gait sequence training and symmetry work with encouraging arm swing    11. Side step 5 feet to left and right, 3 each    12. TG leg press, 10/3, L8    13. TG heel raises, 10/3, L8    14. Stool scooting , 50 ft    15. Step back with opp UE resistance , 10  each, L1    16. Step forward with opp UE resistance , 10 each, L1    Therapeutic Treatments and Modalities:     1. Manual Therapy (CPT 06975), R quad, gr I patella mobs, gr I AP mobs at knee , MFR at calf, quad and hamstring    2. E Stim Unattended (CPT 89184), R LE, Citizen of [place] with TKE green ball  with ice pack    Time-based treatments/modalities:  Manual therapy minutes (CPT 92892): 15 minutes  Therapeutic exercise minutes (CPT 72053): 45 minutes       Pain rating before treatment: 4  Pain rating after  treatment: less than 4    ASSESSMENT:   Response to treatment: ROM improving, gait symmetry with heel toe pattern much improved after exercise.    PLAN/RECOMMENDATIONS:   Plan for treatment: therapy treatment to continue next visit.  Planned interventions for next visit: continue with current treatment.       None

## 2022-08-31 ENCOUNTER — OFFICE VISIT (OUTPATIENT)
Dept: PHYSICAL MEDICINE AND REHAB | Facility: MEDICAL CENTER | Age: 82
End: 2022-08-31
Payer: MEDICARE

## 2022-08-31 VITALS
HEIGHT: 63 IN | HEART RATE: 75 BPM | WEIGHT: 141.6 LBS | BODY MASS INDEX: 25.09 KG/M2 | OXYGEN SATURATION: 96 % | SYSTOLIC BLOOD PRESSURE: 132 MMHG | DIASTOLIC BLOOD PRESSURE: 82 MMHG | TEMPERATURE: 98.3 F

## 2022-08-31 DIAGNOSIS — M48.061 NEURAL FORAMINAL STENOSIS OF LUMBAR SPINE: ICD-10-CM

## 2022-08-31 DIAGNOSIS — M54.50 LUMBOSACRAL PAIN: ICD-10-CM

## 2022-08-31 DIAGNOSIS — M54.16 RIGHT LUMBAR RADICULITIS: ICD-10-CM

## 2022-08-31 DIAGNOSIS — M46.1 SACROILIITIS (HCC): ICD-10-CM

## 2022-08-31 DIAGNOSIS — M48.061 SPINAL STENOSIS OF LUMBAR REGION, UNSPECIFIED WHETHER NEUROGENIC CLAUDICATION PRESENT: ICD-10-CM

## 2022-08-31 DIAGNOSIS — M41.20 OTHER IDIOPATHIC SCOLIOSIS, UNSPECIFIED SPINAL REGION: ICD-10-CM

## 2022-08-31 PROCEDURE — 99214 OFFICE O/P EST MOD 30 MIN: CPT | Performed by: PHYSICAL MEDICINE & REHABILITATION

## 2022-08-31 RX ORDER — DICLOFENAC SODIUM 75 MG/1
TABLET, DELAYED RELEASE ORAL
COMMUNITY
Start: 2022-08-30 | End: 2022-09-26

## 2022-08-31 ASSESSMENT — FIBROSIS 4 INDEX: FIB4 SCORE: 1.726918793895665255

## 2022-08-31 ASSESSMENT — PATIENT HEALTH QUESTIONNAIRE - PHQ9
CLINICAL INTERPRETATION OF PHQ2 SCORE: 3
SUM OF ALL RESPONSES TO PHQ QUESTIONS 1-9: 8
5. POOR APPETITE OR OVEREATING: 1 - SEVERAL DAYS

## 2022-08-31 ASSESSMENT — PAIN SCALES - GENERAL: PAINLEVEL: 10=SEVERE PAIN

## 2022-08-31 NOTE — PROGRESS NOTES
"Follow up patient note  Pain Medicine, Interventional spine and sports physiatry, Physical medicine rehabilitation      Chief complaint:   Chief Complaint   Patient presents with    Follow-Up     Back pain          HISTORY    Please see new patient note dated 12/18/2020 by Dr Choi,  for more details.     HPI  Patient identification: Katerina Torres 82 y.o. female  presents for follow-up with complaint of low back pain    Interval history:   Katerina reports that she went to [facility] on vacation and came back \"a mess.\"  Last seen 01/26/2021, she had improved after right lumbar radiculopathy.  Improved with injection and she felt \"wonderful and had no problems\"    Currently, she reports that she has an area of pain that is up on the low back on the right.  This area is sometimes painful when she gardens.  This usually aggravates her pain.    She went to [facility] and reports that the first week was fine.  Then, she developed some of the upper back pain more severely.  Went to care in [facility] and had some treatment with chiropractor.  Nothing could relieve the symptoms.    Taking tylenol 2 pills about twice a day for pain.  Taking dafalgan while in [place].  She has had some help with axa nova.  This is a patch.  This has been somewhat helpful.    Yesterday, she went to PT.  They helped her to contact us here.  Amy at [facility] at [phone], she is not sure how she got this referral.    Walking, she has pain into the right foot and leg.     No change in bowel or bladder       ROS   GI: stomach burning  Red Flags :   Fever, Chills, Sweats: Denies  Involuntary Weight Loss: Denies  Bowel/Bladder Incontinence: Denies  Saddle Anesthesia: Denies    PMHx:   Past Medical History:   Diagnosis Date    Breast cancer (HCC)     Breath shortness     with exertion \"walking up a hill\"    Bruises easily     Cancer (HCC) 1989    breast left side lumpectomy    Cardiac murmur 6/3/2009    Chronic kidney " disease, stage III (moderate) (HCC) 8/20/2015    Dyslipidemia 6/3/2009    Glaucoma     both eyes    Hepatitis A 1993    Hiatus hernia syndrome     Hx of breast cancer 6/3/2009    Hypertension 7/18/2017    currently not taking medications    MEDICAL HOME     Neutropenia 6/3/2009    Osteopenia 6/3/2009    Preventative health care 6/3/2009    Snoring     Unspecified cataract     bilateral IOLs       PSHx:   Past Surgical History:   Procedure Laterality Date    LUMBAR TRANSFORAMINAL EPIDURAL STEROID INJECTION Right 1/6/2021    Procedure: INJECTION, STEROID, SPINE, LUMBAR, EPIDURAL, TRANSFORAMINAL APPROACH;  Surgeon: Roni Choi M.D.;  Location: [facility];  Service: Pain Management    KNEE ARTHROPLASTY TOTAL Right 5/8/2018    Procedure: KNEE ARTHROPLASTY TOTAL;  Surgeon: Thanh Hall M.D.;  Location: SURGERY [facility];  Service: Orthopedics    VITRECTOMY POSTERIOR Left 10/25/2016    Procedure: VITRECTOMY POSTERIOR membrane peel cryotherapy infusion of SF6 intraocular gas;  Surgeon: Amanda Rodriguez M.D.;  Location: SURGERY SAME DAY [facility];  Service:     RECOVERY  3/31/2014    Performed by [facility] at SURGERY SAME DAY [facility]    CATARACT PHACO WITH IOL  5/28/2009    Performed by GERA BREAUX at SURGERY SAME DAY [facility]    CATARACT PHACO WITH IOL  5/14/2009    Performed by GERA BREAUX at SURGERY SAME DAY [facility]    LUMPECTOMY      OTHER      [place] RADIATION THERAPY PLAN SIMPLE         Family history   Family History   Problem Relation Age of Onset    Heart Disease Father         CAD MI    Stroke Father     Cancer Sister         breast    Cancer Mother         breast       Medications:   Current Outpatient Medications   Medication    timolol (TIMOPTIC) 0.5 % Solution    acetaminophen (TYLENOL) 500 MG Tab    nitroglycerin (NITROSTAT) 0.4 MG SL Tab    bimatoprost (LUMIGAN) 0.01 % Solution    vitamin D (CHOLECALCIFEROL) 1000 UNIT Tab    Magnesium 500  "MG Tab    diclofenac DR (VOLTAREN) 75 MG Tablet Delayed Response     No current facility-administered medications for this visit.       Allergies:   Allergies   Allergen Reactions    Atorvastatin Unspecified     Does not qualify for primary prevention    Naproxen      GI upset    Vicodin [Hydrocodone-Acetaminophen] Nausea     Nausea, dizziness       Social Hx:   Social History     Socioeconomic History    Marital status:      Spouse name: Not on file    Number of children: Not on file    Years of education: Not on file    Highest education level: Not on file   Occupational History    Not on file   Tobacco Use    Smoking status: Former     Packs/day: 0.50     Years: 10.00     Pack years: 5.00     Types: Cigarettes     Quit date: 1999     Years since quittin.6    Smokeless tobacco: Never   Vaping Use    Vaping Use: Not on file   Substance and Sexual Activity    Alcohol use: Yes     Alcohol/week: 4.2 oz     Types: 7 Standard drinks or equivalent per week     Comment: 1 glass wine/day    Drug use: No    Sexual activity: Not Currently     Partners: Male   Other Topics Concern     Service No    Blood Transfusions No    Caffeine Concern No    Occupational Exposure No    Hobby Hazards Yes    Sleep Concern No    Stress Concern Yes    Weight Concern No    Special Diet No    Back Care No    Exercise No    Bike Helmet No    Seat Belt Yes    Self-Exams Yes   Social History Narrative    Not on file     Social Determinants of Health     Financial Resource Strain: Not on file   Food Insecurity: Not on file   Transportation Needs: Not on file   Physical Activity: Not on file   Stress: Not on file   Social Connections: Not on file   Intimate Partner Violence: Not on file   Housing Stability: Not on file         EXAMINATION     Physical Exam:   Vitals: /82 (BP Location: Left arm, Patient Position: Sitting, BP Cuff Size: Adult long)   Pulse 75   Temp 36.8 °C (98.3 °F) (Temporal)   Ht 1.6 m (5' 3\")   Wt " 64.2 kg (141 lb 9.6 oz)   SpO2 96%     Constitutional:   Body Habitus: Body mass index is 25.08 kg/m².  Cooperation: Fully cooperates with exam  Appearance: Well-groomed no disheveled     Respiratory-  breathing comfortable on room air, no audible wheezing  Cardiovascular- capillary refills less than 2 seconds. No lower extremity edema is noted.   Psychiatric- alert and oriented ×3. Normal affect.   Spine:   Tenderness to palpation over the right lumbar paraspinals and PSIS on the right.  Right gluteal tenderness  Gait is antalgic without any assist device  SLR is positive on the right and negative on the left  Bishop's test is positive on the right for back pain negative on the left  Functional range of motion in the hips in internal and external rotation bilaterally  No focal motor or sensory deficits in the lower extremities bilaterally  Reflexes are 2+ in the left patella and right patella(despite TKA) and 1+ in the Achilles bilaterally        MEDICAL DECISION MAKING    DATA    Labs:   Lab Results   Component Value Date/Time    SODIUM 141 01/14/2022 09:04 AM    POTASSIUM 4.9 01/14/2022 09:04 AM    CHLORIDE 107 01/14/2022 09:04 AM    CO2 25 01/14/2022 09:04 AM    GLUCOSE 104 (H) 01/14/2022 09:04 AM    BUN 19 01/14/2022 09:04 AM    CREATININE 1.18 01/14/2022 09:04 AM    CREATININE 1.08 (H) 07/02/2010 08:56 AM    BUNCREATRAT 11 07/02/2010 08:56 AM    GLOMRATE 50 (L) 07/02/2010 08:56 AM        Lab Results   Component Value Date/Time    PROTHROMBTM 13.1 03/31/2014 08:05 AM    INR 1.00 03/31/2014 08:05 AM        Lab Results   Component Value Date/Time    WBC 5.3 01/14/2022 09:04 AM    WBC 4.4 07/02/2010 08:56 AM    RBC 4.44 01/14/2022 09:04 AM    RBC 4.45 07/02/2010 08:56 AM    HEMOGLOBIN 14.4 01/14/2022 09:04 AM    HEMATOCRIT 43.0 01/14/2022 09:04 AM    MCV 96.8 01/14/2022 09:04 AM    MCV 95 07/02/2010 08:56 AM    MCH 32.4 01/14/2022 09:04 AM    MCH 31.5 07/02/2010 08:56 AM    MCHC 33.5 (L) 01/14/2022 09:04 AM    KAYLENV  10.7 01/14/2022 09:04 AM    NEUTSPOLYS 60.20 01/14/2022 09:04 AM    LYMPHOCYTES 30.90 01/14/2022 09:04 AM    MONOCYTES 6.20 01/14/2022 09:04 AM    EOSINOPHILS 1.50 01/14/2022 09:04 AM    BASOPHILS 0.80 01/14/2022 09:04 AM        Lab Results   Component Value Date/Time    HBA1C 5.4 04/27/2018 07:52 AM          Imaging: I personally reviewed following images    MRI lumbar spine dated 12/2/2020  Reviewed findings and reviewed with patient    I reviewed the following radiology reports  Results for orders placed during the hospital encounter of 12/02/20    MR-LUMBAR SPINE-W/O    Impression  1.  Multilevel degenerative disc disease and facet degeneration. There is moderate central canal narrowing at L2-3 and L3-4 with moderate to severe central canal narrowing at L4-5.    2.  Very degrees of neural foraminal narrowing at levels as specifically described above.    3.  Mild chronic compression fracture at L3.    4.  Scoliosis.                               DIAGNOSIS   Visit Diagnoses     ICD-10-CM   1. Right lumbar radiculitis  M54.16   2. Neural foraminal stenosis of lumbar spine  M48.061   3. Spinal stenosis of lumbar region, unspecified whether neurogenic claudication present  M48.061   4. Lumbosacral pain  M54.50   5. Sacroiliitis (HCC)  M46.1   6. Other idiopathic scoliosis, unspecified spinal region  M41.20         ASSESSMENT and PLAN:     Katerina Torres 82 y.o. female seen for above    Katerina was seen today for follow-up.    Diagnoses and all orders for this visit:    Right lumbar radiculitis  -     Referral to Pain Clinic  -     Referral to Physical Therapy  -     DX-LUMBAR SPINE-2 OR 3 VIEWS; Future    Neural foraminal stenosis of lumbar spine  -     Referral to Pain Clinic  -     Referral to Physical Therapy    Spinal stenosis of lumbar region, unspecified whether neurogenic claudication present  -     Referral to Physical Therapy    Lumbosacral pain  -     Referral to Physical Therapy    Sacroiliitis  (HCC)  -     Referral to Physical Therapy    Other idiopathic scoliosis, unspecified spinal region  -     DX-LUMBAR SPINE-2 OR 3 VIEWS; Future    Discussed exam findings and that this pain started without any trauma.  Reports that xrays were done in [place] without note of fracture by her report.  She had done well with right L4 and L5 transforaminal epidural steroid injection.  Plan for right lumbar four and lumbar five transforaminal epidural steroid injection.  The risks benefits and alternatives to this procedure were discussed and the patient wishes to proceed with the procedure. Risks include but are not limited to damage to surrounding structures, infection, bleeding, worsening of pain which can be permanent, weakness which can be permanent. Benefits include pain relief, improved function. Alternatives includes not doing the procedure.    Discussed that she is taking tylenol.  Avoid NSAIDs and aspirin.  She is not taking these.  Advised not to  the diclofenac that was ordered with her visit with Annette Davison.  Discussed that this is an NSAID.  She does not report signs or symptoms of GI bleed.  Plan for injection and then physical therapy as tolerated.  Follow-up with Dr. Suarez.    Follow up: Hospital injection    Thank you for allowing me to participate in the care of this patient. If you have any questions please not hesitate to contact me.    My total time spent caring for the patient on the day of the encounter was 34 minutes.   This does not include time spent on separately billable procedures/tests.      Please note that this dictation was created using voice recognition software. I have made every reasonable attempt to correct obvious errors but there may be errors of grammar and content that I may have overlooked prior to finalization of this note.      Roni Choi MD  Interventional Spine and Sports Physiatry  Physical Medicine and Rehabilitation  [facility]

## 2022-08-31 NOTE — H&P (VIEW-ONLY)
"Follow up patient note  Pain Medicine, Interventional spine and sports physiatry, Physical medicine rehabilitation      Chief complaint:   Chief Complaint   Patient presents with    Follow-Up     Back pain          HISTORY    Please see new patient note dated 12/18/2020 by Dr Choi,  for more details.     HPI  Patient identification: Katerina Torres 82 y.o. female  presents for follow-up with complaint of low back pain    Interval history:   Katerina reports that she went to [facility] on vacation and came back \"a mess.\"  Last seen 01/26/2021, she had improved after right lumbar radiculopathy.  Improved with injection and she felt \"wonderful and had no problems\"    Currently, she reports that she has an area of pain that is up on the low back on the right.  This area is sometimes painful when she gardens.  This usually aggravates her pain.    She went to [facility] and reports that the first week was fine.  Then, she developed some of the upper back pain more severely.  Went to care in [facility] and had some treatment with chiropractor.  Nothing could relieve the symptoms.    Taking tylenol 2 pills about twice a day for pain.  Taking dafalgan while in [place].  She has had some help with axa nova.  This is a patch.  This has been somewhat helpful.    Yesterday, she went to PT.  They helped her to contact us here.  Amy at [facility] at [phone], she is not sure how she got this referral.    Walking, she has pain into the right foot and leg.     No change in bowel or bladder       ROS   GI: stomach burning  Red Flags :   Fever, Chills, Sweats: Denies  Involuntary Weight Loss: Denies  Bowel/Bladder Incontinence: Denies  Saddle Anesthesia: Denies    PMHx:   Past Medical History:   Diagnosis Date    Breast cancer (HCC)     Breath shortness     with exertion \"walking up a hill\"    Bruises easily     Cancer (HCC) 1989    breast left side lumpectomy    Cardiac murmur 6/3/2009    Chronic kidney " disease, stage III (moderate) (HCC) 8/20/2015    Dyslipidemia 6/3/2009    Glaucoma     both eyes    Hepatitis A 1993    Hiatus hernia syndrome     Hx of breast cancer 6/3/2009    Hypertension 7/18/2017    currently not taking medications    MEDICAL HOME     Neutropenia 6/3/2009    Osteopenia 6/3/2009    Preventative health care 6/3/2009    Snoring     Unspecified cataract     bilateral IOLs       PSHx:   Past Surgical History:   Procedure Laterality Date    LUMBAR TRANSFORAMINAL EPIDURAL STEROID INJECTION Right 1/6/2021    Procedure: INJECTION, STEROID, SPINE, LUMBAR, EPIDURAL, TRANSFORAMINAL APPROACH;  Surgeon: Roni Choi M.D.;  Location: [facility];  Service: Pain Management    KNEE ARTHROPLASTY TOTAL Right 5/8/2018    Procedure: KNEE ARTHROPLASTY TOTAL;  Surgeon: Thanh Hall M.D.;  Location: SURGERY [facility];  Service: Orthopedics    VITRECTOMY POSTERIOR Left 10/25/2016    Procedure: VITRECTOMY POSTERIOR membrane peel cryotherapy infusion of SF6 intraocular gas;  Surgeon: Amanda Rodriguez M.D.;  Location: SURGERY SAME DAY [facility];  Service:     RECOVERY  3/31/2014    Performed by [facility] at SURGERY SAME DAY [facility]    CATARACT PHACO WITH IOL  5/28/2009    Performed by GERA BREAUX at SURGERY SAME DAY [facility]    CATARACT PHACO WITH IOL  5/14/2009    Performed by GERA BREAUX at SURGERY SAME DAY [facility]    LUMPECTOMY      OTHER      [place] RADIATION THERAPY PLAN SIMPLE         Family history   Family History   Problem Relation Age of Onset    Heart Disease Father         CAD MI    Stroke Father     Cancer Sister         breast    Cancer Mother         breast       Medications:   Current Outpatient Medications   Medication    timolol (TIMOPTIC) 0.5 % Solution    acetaminophen (TYLENOL) 500 MG Tab    nitroglycerin (NITROSTAT) 0.4 MG SL Tab    bimatoprost (LUMIGAN) 0.01 % Solution    vitamin D (CHOLECALCIFEROL) 1000 UNIT Tab    Magnesium 500  "MG Tab    diclofenac DR (VOLTAREN) 75 MG Tablet Delayed Response     No current facility-administered medications for this visit.       Allergies:   Allergies   Allergen Reactions    Atorvastatin Unspecified     Does not qualify for primary prevention    Naproxen      GI upset    Vicodin [Hydrocodone-Acetaminophen] Nausea     Nausea, dizziness       Social Hx:   Social History     Socioeconomic History    Marital status:      Spouse name: Not on file    Number of children: Not on file    Years of education: Not on file    Highest education level: Not on file   Occupational History    Not on file   Tobacco Use    Smoking status: Former     Packs/day: 0.50     Years: 10.00     Pack years: 5.00     Types: Cigarettes     Quit date: 1999     Years since quittin.6    Smokeless tobacco: Never   Vaping Use    Vaping Use: Not on file   Substance and Sexual Activity    Alcohol use: Yes     Alcohol/week: 4.2 oz     Types: 7 Standard drinks or equivalent per week     Comment: 1 glass wine/day    Drug use: No    Sexual activity: Not Currently     Partners: Male   Other Topics Concern     Service No    Blood Transfusions No    Caffeine Concern No    Occupational Exposure No    Hobby Hazards Yes    Sleep Concern No    Stress Concern Yes    Weight Concern No    Special Diet No    Back Care No    Exercise No    Bike Helmet No    Seat Belt Yes    Self-Exams Yes   Social History Narrative    Not on file     Social Determinants of Health     Financial Resource Strain: Not on file   Food Insecurity: Not on file   Transportation Needs: Not on file   Physical Activity: Not on file   Stress: Not on file   Social Connections: Not on file   Intimate Partner Violence: Not on file   Housing Stability: Not on file         EXAMINATION     Physical Exam:   Vitals: /82 (BP Location: Left arm, Patient Position: Sitting, BP Cuff Size: Adult long)   Pulse 75   Temp 36.8 °C (98.3 °F) (Temporal)   Ht 1.6 m (5' 3\")   Wt " 64.2 kg (141 lb 9.6 oz)   SpO2 96%     Constitutional:   Body Habitus: Body mass index is 25.08 kg/m².  Cooperation: Fully cooperates with exam  Appearance: Well-groomed no disheveled     Respiratory-  breathing comfortable on room air, no audible wheezing  Cardiovascular- capillary refills less than 2 seconds. No lower extremity edema is noted.   Psychiatric- alert and oriented ×3. Normal affect.   Spine:   Tenderness to palpation over the right lumbar paraspinals and PSIS on the right.  Right gluteal tenderness  Gait is antalgic without any assist device  SLR is positive on the right and negative on the left  Bishop's test is positive on the right for back pain negative on the left  Functional range of motion in the hips in internal and external rotation bilaterally  No focal motor or sensory deficits in the lower extremities bilaterally  Reflexes are 2+ in the left patella and right patella(despite TKA) and 1+ in the Achilles bilaterally        MEDICAL DECISION MAKING    DATA    Labs:   Lab Results   Component Value Date/Time    SODIUM 141 01/14/2022 09:04 AM    POTASSIUM 4.9 01/14/2022 09:04 AM    CHLORIDE 107 01/14/2022 09:04 AM    CO2 25 01/14/2022 09:04 AM    GLUCOSE 104 (H) 01/14/2022 09:04 AM    BUN 19 01/14/2022 09:04 AM    CREATININE 1.18 01/14/2022 09:04 AM    CREATININE 1.08 (H) 07/02/2010 08:56 AM    BUNCREATRAT 11 07/02/2010 08:56 AM    GLOMRATE 50 (L) 07/02/2010 08:56 AM        Lab Results   Component Value Date/Time    PROTHROMBTM 13.1 03/31/2014 08:05 AM    INR 1.00 03/31/2014 08:05 AM        Lab Results   Component Value Date/Time    WBC 5.3 01/14/2022 09:04 AM    WBC 4.4 07/02/2010 08:56 AM    RBC 4.44 01/14/2022 09:04 AM    RBC 4.45 07/02/2010 08:56 AM    HEMOGLOBIN 14.4 01/14/2022 09:04 AM    HEMATOCRIT 43.0 01/14/2022 09:04 AM    MCV 96.8 01/14/2022 09:04 AM    MCV 95 07/02/2010 08:56 AM    MCH 32.4 01/14/2022 09:04 AM    MCH 31.5 07/02/2010 08:56 AM    MCHC 33.5 (L) 01/14/2022 09:04 AM    KAYLENV  10.7 01/14/2022 09:04 AM    NEUTSPOLYS 60.20 01/14/2022 09:04 AM    LYMPHOCYTES 30.90 01/14/2022 09:04 AM    MONOCYTES 6.20 01/14/2022 09:04 AM    EOSINOPHILS 1.50 01/14/2022 09:04 AM    BASOPHILS 0.80 01/14/2022 09:04 AM        Lab Results   Component Value Date/Time    HBA1C 5.4 04/27/2018 07:52 AM          Imaging: I personally reviewed following images    MRI lumbar spine dated 12/2/2020  Reviewed findings and reviewed with patient    I reviewed the following radiology reports  Results for orders placed during the hospital encounter of 12/02/20    MR-LUMBAR SPINE-W/O    Impression  1.  Multilevel degenerative disc disease and facet degeneration. There is moderate central canal narrowing at L2-3 and L3-4 with moderate to severe central canal narrowing at L4-5.    2.  Very degrees of neural foraminal narrowing at levels as specifically described above.    3.  Mild chronic compression fracture at L3.    4.  Scoliosis.                               DIAGNOSIS   Visit Diagnoses     ICD-10-CM   1. Right lumbar radiculitis  M54.16   2. Neural foraminal stenosis of lumbar spine  M48.061   3. Spinal stenosis of lumbar region, unspecified whether neurogenic claudication present  M48.061   4. Lumbosacral pain  M54.50   5. Sacroiliitis (HCC)  M46.1   6. Other idiopathic scoliosis, unspecified spinal region  M41.20         ASSESSMENT and PLAN:     Katerina Torres 82 y.o. female seen for above    Katerina was seen today for follow-up.    Diagnoses and all orders for this visit:    Right lumbar radiculitis  -     Referral to Pain Clinic  -     Referral to Physical Therapy  -     DX-LUMBAR SPINE-2 OR 3 VIEWS; Future    Neural foraminal stenosis of lumbar spine  -     Referral to Pain Clinic  -     Referral to Physical Therapy    Spinal stenosis of lumbar region, unspecified whether neurogenic claudication present  -     Referral to Physical Therapy    Lumbosacral pain  -     Referral to Physical Therapy    Sacroiliitis  (HCC)  -     Referral to Physical Therapy    Other idiopathic scoliosis, unspecified spinal region  -     DX-LUMBAR SPINE-2 OR 3 VIEWS; Future    Discussed exam findings and that this pain started without any trauma.  Reports that xrays were done in [place] without note of fracture by her report.  She had done well with right L4 and L5 transforaminal epidural steroid injection.  Plan for right lumbar four and lumbar five transforaminal epidural steroid injection.  The risks benefits and alternatives to this procedure were discussed and the patient wishes to proceed with the procedure. Risks include but are not limited to damage to surrounding structures, infection, bleeding, worsening of pain which can be permanent, weakness which can be permanent. Benefits include pain relief, improved function. Alternatives includes not doing the procedure.    Discussed that she is taking tylenol.  Avoid NSAIDs and aspirin.  She is not taking these.  Advised not to  the diclofenac that was ordered with her visit with Annette Davison.  Discussed that this is an NSAID.  She does not report signs or symptoms of GI bleed.  Plan for injection and then physical therapy as tolerated.  Follow-up with Dr. Suarez.    Follow up: Hospital injection    Thank you for allowing me to participate in the care of this patient. If you have any questions please not hesitate to contact me.    My total time spent caring for the patient on the day of the encounter was 34 minutes.   This does not include time spent on separately billable procedures/tests.      Please note that this dictation was created using voice recognition software. I have made every reasonable attempt to correct obvious errors but there may be errors of grammar and content that I may have overlooked prior to finalization of this note.      Roni Choi MD  Interventional Spine and Sports Physiatry  Physical Medicine and Rehabilitation  [facility]

## 2022-08-31 NOTE — PATIENT INSTRUCTIONS
Your procedure will be at the [facility] special procedure suite.    [facility] [place] [address]       PRE-PROCEDURE INSTRUCTIONS  You may take your regular medications except:   No Anti-inflammatories 5 days prior to your procedure. Anti-inflammatories include medicines such as  ibuprofen (Motrin, Advil), Excedrin, Naproxen (Aleve, Anaprox, Naprelan, Naprosyn), Celecoxib (Celebrex), Diclofenac (Voltaren-XR tab), and Meloxicam (Mobic).   No Glucophage or Metformin 24 hours before your procedure. You may resume next day after your procedure.  Call the physiatry office if you are taking or prescribed anti-biotics within five days of procedure.  Please ask provider if you are taking any new diabetes medication.  CONTINUE TAKING BLOOD PRESSURE MEDICATIONS AS PRESCRIBED.  Pain medications will not be prescribed on the procedure day. Procedural pain medication may be used by your provider   Call your doctor's office performing the procedure if you have a fever, chills, rash or new illness prior to your procedure    Anticoagulation/antiplatelet medications  No Blood thinning medications such as Coumadin or Plavix 5 days prior to procedure unless your doctor said to continue these medications. Call your doctor if a new medication is prescribed in this class.     Restrictions for eating before procedure:   If you are getting procedural sedation, then do not eat to for 8 hours prior to procedure appointment time. Do not drink fluids for four hours prior to your procedure time.   If you are not having procedural sedation, then Skip the meal prior to your procedure. If you have a morning procedure then skip breakfast. If you have an afternoon procedure then skip lunch.   You may drink clear liquids up to 2 hours prior to your procedure  You must have a  the day of procedure to accompany you home.      POST PROCEDURE INSTRUCTIONS   No heavy lifting, strenuous bending or strenuous exercise for 3 days  after your procedure.  No hot tubs, baths, swimming for 3 days after your procedure  You can remove the bandage the day after the procedure.  IF YOU RECEIVED A STEROID INJECTION. PLEASE NOTE THAT THERE MAY BE A DELAY FOR THE INJECTION TO START WORKING, THE DELAY MAY BE UP TO TWO WEEKS. IF YOU HAVE DIABETES, PLEASE NOTE THAT YOUR SUGAR LEVELS MAY BE ELEVATED FOR 1-2 DAYS AFTER A STEROID INJECTION.  THE STEROID MAY CAUSE TEMPORARY SYMPTOMS WHICH USUALLY RESOLVE ON THEIR OWN WITHIN 1 TO 2 DAYS INCLUDING FACIAL FLUSHING OR A FEELING OF WARMTH ON THE FACE, TEMPORARY INCREASES IN BLOOD SUGAR, INSOMNIA, INCREASED HUNGER  IF YOU RECEIVED A DIAGNOSTIC PROCEDURE (SUCH AS A MEDIAL BRANCH BLOCK), PLEASE NOTE THAT WE DO EXPECT THIS INJECTION TO WEAR OFF.  IT IS IMPORTANT TO COMPLETE THE PAIN DIARY AND LIST THE PAIN SCORE ONLY FOR THE REGION WHERE THE PROCEDURE WAS AND BRING THIS TO YOUR FOLLOW UP VISIT.  IF YOU RECEIVED A RADIOFREQUENCY PROCEDURE, THERE MAY BE SOME SORENESS AFTER THE PROCEDURE.  THIS IS NORMAL.  IF YOU EXPERIENCE PROLONGED WEAKNESS LONGER THAN ONE DAY, BOWEL OR BLADDER INCONTINENCE THEN PLEASE CALL THE PHYSIATRY OFFICE.  Your leg may feel heavy, weak and numb for up to 1-2 days. Be very careful walking.    You may resume normal activities 3 days after procedure.

## 2022-09-01 ENCOUNTER — APPOINTMENT (OUTPATIENT)
Dept: RADIOLOGY | Facility: REHABILITATION | Age: 82
End: 2022-09-01
Attending: PHYSICAL MEDICINE & REHABILITATION
Payer: MEDICARE

## 2022-09-01 ENCOUNTER — HOSPITAL ENCOUNTER (OUTPATIENT)
Facility: REHABILITATION | Age: 82
End: 2022-09-01
Attending: PHYSICAL MEDICINE & REHABILITATION | Admitting: PHYSICAL MEDICINE & REHABILITATION
Payer: MEDICARE

## 2022-09-01 VITALS
DIASTOLIC BLOOD PRESSURE: 70 MMHG | HEART RATE: 52 BPM | TEMPERATURE: 97 F | BODY MASS INDEX: 25.47 KG/M2 | HEIGHT: 63 IN | WEIGHT: 143.74 LBS | SYSTOLIC BLOOD PRESSURE: 166 MMHG | OXYGEN SATURATION: 98 % | RESPIRATION RATE: 16 BRPM

## 2022-09-01 PROCEDURE — 700117 HCHG RX CONTRAST REV CODE 255

## 2022-09-01 PROCEDURE — 64484 NJX AA&/STRD TFRM EPI L/S EA: CPT

## 2022-09-01 PROCEDURE — 64483 NJX AA&/STRD TFRM EPI L/S 1: CPT

## 2022-09-01 PROCEDURE — 700111 HCHG RX REV CODE 636 W/ 250 OVERRIDE (IP)

## 2022-09-01 PROCEDURE — 700101 HCHG RX REV CODE 250

## 2022-09-01 RX ORDER — LIDOCAINE HYDROCHLORIDE 20 MG/ML
INJECTION, SOLUTION EPIDURAL; INFILTRATION; INTRACAUDAL; PERINEURAL
Status: COMPLETED
Start: 2022-09-01 | End: 2022-09-01

## 2022-09-01 RX ORDER — DEXAMETHASONE SODIUM PHOSPHATE 10 MG/ML
INJECTION, SOLUTION INTRAMUSCULAR; INTRAVENOUS
Status: COMPLETED
Start: 2022-09-01 | End: 2022-09-01

## 2022-09-01 RX ADMIN — DEXAMETHASONE SODIUM PHOSPHATE 10 MG: 10 INJECTION INTRAMUSCULAR; INTRAVENOUS at 09:20

## 2022-09-01 RX ADMIN — IOHEXOL 5 ML: 240 INJECTION, SOLUTION INTRATHECAL; INTRAVASCULAR; INTRAVENOUS; ORAL at 09:18

## 2022-09-01 RX ADMIN — LIDOCAINE HYDROCHLORIDE 5 ML: 20 INJECTION, SOLUTION EPIDURAL; INFILTRATION; INTRACAUDAL at 09:19

## 2022-09-01 ASSESSMENT — PAIN DESCRIPTION - PAIN TYPE
TYPE: CHRONIC PAIN

## 2022-09-01 ASSESSMENT — FIBROSIS 4 INDEX: FIB4 SCORE: 1.726918793895665255

## 2022-09-01 NOTE — INTERVAL H&P NOTE
"H&P reviewed. The patient was examined and there are no changes to the H&P      Pulse 60   Temp 36.1 °C (97 °F) (Temporal)   Resp 16   Ht 1.6 m (5' 3\")   Wt 65.2 kg (143 lb 11.8 oz)   SpO2 98%     CV: RRR, Normal S1, S2 with DOROTEO  RESP: Clear to auscultation bilaterally    Continue with injection as planned.    Roni Choi MD  Physical Medicine and Rehabilitation  Interventional Spine and Sports Physiatry  [facility]      "

## 2022-09-01 NOTE — OP REPORT
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      Type of Anesthesia: Local anesthesia  Blood Loss: None  Physician: Roni Choi MD     Description of procedure:     The risks, benefits, and alternatives of the procedure were reviewed and discussed with the patient.  Written informed consent was freely obtained. A pre-procedural time-out was conducted by the physician verifying patient’s identity, procedure to be performed, procedure site and side, and allergy verification. Appropriate equipment was determined to be in place for the procedure.      Method of Procedure: The patient signed an informed consent form in the pre-op area after all risks and complications were discussed and all questions were answered.     The patient was prepped and draped in a sterile fashion in the prone position.  The patient's spine was surveyed under fluoroscopic visualization and anatomical landmarks were identified.     The patient's vital signs were carefully monitored before, throughout, and after the procedure.      Right L4 transforaminal epidural steroid injection     The fluoroscope was placed over the lumbar spine and adjusted into the proper AP/Oblique view to enter the transforaminal space at the levels below. The targets for injection were then marked at the right L4-5. A 25g 1.5 inch needle was placed into the marked site, and approx 2cc of 1% Lidocaine was injected subcutaneously into the epidermal and dermal layers. The needle was removed. A 25 gauge 3.5 inch spinal needle was then placed and advanced under fluoroscopic guidance in an oblique view towards the subpedicular epidural space of the levels noted below. The needle position was confirmed to  "not be past the 6 o'clock position in the AP view and it was in the neural foramen and the lateral view. Under live fluoroscopic guidance in the AP view, contrast dye was used to highlight the epidural space spread.  Following negative aspiration, approx 1mL of 2% lidocaine preservative free with 1 cc of dexamethasone(10mg/cc) was then injected at each level, and the needle was removed leaving a trail of 1% lidocaine. The patient's back was covered with a 4x4 gauze, the area was cleansed with sterile normal saline, and a dressing was applied. There were no complications noted.      Perisheathogram and Spot Film:  After Omnipaque was injected, a right L4 \"perisheathogram\" occurred without evidence of subarachnoid or vascular flow.  A spot films was ordered in AP and lateral to confirm the correct needle tip placement.     Right L5 transforaminal epidural steroid injection     The fluoroscope was placed over the lumbar spine and adjusted into the proper AP/Oblique view to enter the transforaminal space at the levels below. The targets for injection were then marked at the right L5-S1. A 25g 1.5 inch needle was placed into the marked site, and approx 2cc of 1% Lidocaine was injected subcutaneously into the epidermal and dermal layers. The needle was removed. A 25 gauge 3.5 inch spinal needle was then placed and advanced under fluoroscopic guidance in an oblique view towards the subpedicular epidural space of the levels noted below. The needle position was confirmed to not be past the 6 o'clock position in the AP view and it was in the neural foramen and the lateral view. Under live fluoroscopic guidance in the AP view, contrast dye was used to highlight the epidural space spread.  Following negative aspiration, approx 1cc of 2% lidocaine preservative free with 1cc of dexamethasone(10mg/cc) was then injected at each level, and the needles were removed intact after restyleted. The patient's back was covered with a 4x4 " "gauze, the area was cleansed with sterile normal saline, and a dressing was applied. There were no complications noted.         Perisheathogram and Spot Film:  After Omnipaque was injected, a right L5 \"perisheathogram\" occurred without evidence of subarachnoid or vascular flow.  A spot films was ordered in AP and lateral to confirm the correct needle tip placement.     The patient was then evaluated post-procedure, and was hemodynamically stable prior to leaving the post-operative care unit.      Roni Choi MD  Physical Medicine and Rehabilitation  Interventional Spine and Sports Physiatry  [facility]     CPT: [id number], [id number]  "

## 2022-09-01 NOTE — PROGRESS NOTES
0906: Hand-off rec'd from pre-procedure RN, pre-assessment completed, pt placed onto procedure table and positioned prone and hands resting on stool, blood pressure and pulse ox connected by CNA and RN, medical history reviewed (CKD 3), allergies reviewed (Atorvastatin, naproxen, vicodin), no sedation given, not taking blood thinners,  patient identified, time out performed and team agreed.

## 2022-09-01 NOTE — PROGRESS NOTES
Pt admitted to pre-procedure room. Site and procedure confirmed. Pt history, medications and allergies reviewed. Designated  waiting in car. Pre-procedure assessment complete. VSS. Consents signed, DC instructions reviewed, all questions answered. Pertinent medical history (CKD III pt denies, breast cx 35 years ago) reviewed in Epic and communicated to procedure RN.  Dr. Choi in to see patient. Orders noted.

## 2022-09-01 NOTE — PROGRESS NOTES
Pt arrived from procedure room, report received from procedure RN. VSS, tolerating liquids with no nausea, pain assessed. Dressing CDI. Dr. Choi in to see patient. Pt ambulatory and meets DC criteria    Pt DC'd to designated

## 2022-09-02 ENCOUNTER — TELEPHONE (OUTPATIENT)
Dept: PHYSICAL MEDICINE AND REHAB | Facility: MEDICAL CENTER | Age: 82
End: 2022-09-02
Payer: MEDICARE

## 2022-09-02 NOTE — TELEPHONE ENCOUNTER
I spoke to Katerina Torres regarding special procedure Right lumbar four and lumbar five transforaminal epidural steroid injection that was done with Dr. Choi on 09/01/2022 and patient reported she is doing better and she is icing the area.

## 2022-09-06 ENCOUNTER — HOSPITAL ENCOUNTER (OUTPATIENT)
Dept: RADIOLOGY | Facility: MEDICAL CENTER | Age: 82
End: 2022-09-06
Attending: PHYSICAL MEDICINE & REHABILITATION
Payer: MEDICARE

## 2022-09-06 ENCOUNTER — OFFICE VISIT (OUTPATIENT)
Dept: PHYSICAL MEDICINE AND REHAB | Facility: MEDICAL CENTER | Age: 82
End: 2022-09-06
Payer: MEDICARE

## 2022-09-06 VITALS
HEIGHT: 63 IN | WEIGHT: 135.2 LBS | HEART RATE: 69 BPM | BODY MASS INDEX: 23.96 KG/M2 | OXYGEN SATURATION: 93 % | TEMPERATURE: 97.4 F | SYSTOLIC BLOOD PRESSURE: 124 MMHG | DIASTOLIC BLOOD PRESSURE: 60 MMHG

## 2022-09-06 DIAGNOSIS — M46.1 SACROILIITIS (HCC): ICD-10-CM

## 2022-09-06 DIAGNOSIS — M54.50 LUMBOSACRAL PAIN: ICD-10-CM

## 2022-09-06 DIAGNOSIS — M54.16 RIGHT LUMBAR RADICULITIS: ICD-10-CM

## 2022-09-06 DIAGNOSIS — M48.061 SPINAL STENOSIS OF LUMBAR REGION, UNSPECIFIED WHETHER NEUROGENIC CLAUDICATION PRESENT: ICD-10-CM

## 2022-09-06 DIAGNOSIS — R10.31 RIGHT INGUINAL PAIN: ICD-10-CM

## 2022-09-06 DIAGNOSIS — M48.061 NEURAL FORAMINAL STENOSIS OF LUMBAR SPINE: ICD-10-CM

## 2022-09-06 DIAGNOSIS — M99.04 SOMATIC DYSFUNCTION OF SACROILIAC JOINT: ICD-10-CM

## 2022-09-06 PROCEDURE — 73502 X-RAY EXAM HIP UNI 2-3 VIEWS: CPT | Mod: RT

## 2022-09-06 PROCEDURE — 72220 X-RAY EXAM SACRUM TAILBONE: CPT

## 2022-09-06 PROCEDURE — 98925 OSTEOPATH MANJ 1-2 REGIONS: CPT | Performed by: PHYSICAL MEDICINE & REHABILITATION

## 2022-09-06 PROCEDURE — 99214 OFFICE O/P EST MOD 30 MIN: CPT | Mod: 25 | Performed by: PHYSICAL MEDICINE & REHABILITATION

## 2022-09-06 RX ORDER — TRAMADOL HYDROCHLORIDE 50 MG/1
50 TABLET ORAL EVERY 6 HOURS PRN
Qty: 20 TABLET | Refills: 0 | Status: SHIPPED | OUTPATIENT
Start: 2022-09-06 | End: 2022-09-11

## 2022-09-06 ASSESSMENT — PATIENT HEALTH QUESTIONNAIRE - PHQ9
SUM OF ALL RESPONSES TO PHQ QUESTIONS 1-9: 24
CLINICAL INTERPRETATION OF PHQ2 SCORE: 6
5. POOR APPETITE OR OVEREATING: 3 - NEARLY EVERY DAY

## 2022-09-06 ASSESSMENT — FIBROSIS 4 INDEX: FIB4 SCORE: 1.726918793895665255

## 2022-09-06 ASSESSMENT — PAIN SCALES - GENERAL: PAINLEVEL: 9=SEVERE PAIN

## 2022-09-06 NOTE — PROGRESS NOTES
"Follow up patient note  Pain Medicine, Interventional spine and sports physiatry, Physical medicine rehabilitation      Chief complaint:   Chief Complaint   Patient presents with    Follow-Up     Back pain          HISTORY    Please see new patient note dated 12/18/2020 by Dr Choi,  for more details.     HPI  Patient identification: Katerina Torres 82 y.o. female  presents for follow-up with complaint of low back pain    Interval history:   Katerina returns for follow-up.  She is accompanied by her neighbor, Manuela.    After right L4 and L5 transforaminal epidural steroid injection on September 1, 2022, she reports that she had about 1 to 2 days of improved pain and then on Saturday her pain seemed to significantly increase.    She reports that she has pain in the gluteal region and groin.  Intermittently she has pain that radiates into the leg but not into the foot.  As we discussed things today it does sound that she had some manual adjustments in [place] as well.  Her neighbor also reports that she had been doing some small amount of bleeding in her garden over the weekend.    Taking Tylenol for pain     ROS   GI: stomach burning from NSAIDs which she has not been taking  Red Flags :   Fever, Chills, Sweats: Denies  Involuntary Weight Loss: Denies  Bowel/Bladder Incontinence: Denies  Saddle Anesthesia: Denies    PMHx:   Past Medical History:   Diagnosis Date    Breast cancer (HCC)     Breath shortness     with exertion \"walking up a hill\"    Bruises easily     Cancer (HCC) 1989    breast left side lumpectomy    Cardiac murmur 6/3/2009    Chronic kidney disease, stage III (moderate) (HCC) 8/20/2015    Dyslipidemia 6/3/2009    Glaucoma     both eyes    Hepatitis A 1993    Hiatus hernia syndrome     Hx of breast cancer 6/3/2009    Hypertension 7/18/2017    currently not taking medications    MEDICAL HOME     Neutropenia 6/3/2009    Osteopenia 6/3/2009    Preventative health care 6/3/2009    Snoring  "    Unspecified cataract     bilateral IOLs       PSHx:   Past Surgical History:   Procedure Laterality Date    LUMBAR TRANSFORAMINAL EPIDURAL STEROID INJECTION Right 9/1/2022    Procedure: RIGHT lumbar four and lumbar five transforaminal epidural steroid injection;  Surgeon: Roni Choi M.D.;  [facility];  Service: Pain Management    LUMBAR TRANSFORAMINAL EPIDURAL STEROID INJECTION Right 1/6/2021    Procedure: INJECTION, STEROID, SPINE, LUMBAR, EPIDURAL, TRANSFORAMINAL APPROACH;  Surgeon: Roni Choi M.D.;  Location: [facility];  Service: Pain Management    KNEE ARTHROPLASTY TOTAL Right 5/8/2018    Procedure: KNEE ARTHROPLASTY TOTAL;  Surgeon: Thanh Hall M.D.;  Location: SURGERY [facility];  Service: Orthopedics    VITRECTOMY POSTERIOR Left 10/25/2016    Procedure: VITRECTOMY POSTERIOR membrane peel cryotherapy infusion of SF6 intraocular gas;  Surgeon: Amanda Rodriguez M.D.;  Location: SURGERY SAME DAY [facility];  Service:     RECOVERY  3/31/2014    Performed by [facility] at SURGERY SAME DAY [facility]    CATARACT PHACO WITH IOL  5/28/2009    Performed by GERA BREAUX at SURGERY SAME DAY [facility]    CATARACT PHACO WITH IOL  5/14/2009    Performed by [facility] BREAUX at SURGERY SAME DAY [facility]    LUMPECTOMY      OTHER      TX RADIATION THERAPY PLAN SIMPLE         Family history   Family History   Problem Relation Age of Onset    Heart Disease Father         CAD MI    Stroke Father     Cancer Sister         breast    Cancer Mother         breast       Medications:   Current Outpatient Medications   Medication    traMADol (ULTRAM) 50 MG Tab    diclofenac DR (VOLTAREN) 75 MG Tablet Delayed Response    timolol (TIMOPTIC) 0.5 % Solution    acetaminophen (TYLENOL) 500 MG Tab    nitroglycerin (NITROSTAT) 0.4 MG SL Tab    bimatoprost (LUMIGAN) 0.01 % Solution    vitamin D (CHOLECALCIFEROL) 1000 UNIT Tab    Magnesium 500 MG  "Tab     No current facility-administered medications for this visit.       Allergies:   Allergies   Allergen Reactions    Atorvastatin Unspecified     Does not qualify for primary prevention    Naproxen      GI upset    Vicodin [Hydrocodone-Acetaminophen] Nausea     Nausea, dizziness       Social Hx:   Social History     Socioeconomic History    Marital status:      Spouse name: Not on file    Number of children: Not on file    Years of education: Not on file    Highest education level: Not on file   Occupational History    Not on file   Tobacco Use    Smoking status: Former     Packs/day: 0.50     Years: 10.00     Pack years: 5.00     Types: Cigarettes     Quit date: 1999     Years since quittin.6    Smokeless tobacco: Never   Vaping Use    Vaping Use: Not on file   Substance and Sexual Activity    Alcohol use: Yes     Alcohol/week: 4.2 oz     Types: 7 Standard drinks or equivalent per week     Comment: 1 glass wine/day    Drug use: No    Sexual activity: Not Currently     Partners: Male   Other Topics Concern     Service No    Blood Transfusions No    Caffeine Concern No    Occupational Exposure No    Hobby Hazards Yes    Sleep Concern No    Stress Concern Yes    Weight Concern No    Special Diet No    Back Care No    Exercise No    Bike Helmet No    Seat Belt Yes    Self-Exams Yes   Social History Narrative    Not on file     Social Determinants of Health     Financial Resource Strain: Not on file   Food Insecurity: Not on file   Transportation Needs: Not on file   Physical Activity: Not on file   Stress: Not on file   Social Connections: Not on file   Intimate Partner Violence: Not on file   Housing Stability: Not on file         EXAMINATION     Physical Exam:   Vitals: /60 (BP Location: Right arm, Patient Position: Sitting, BP Cuff Size: Adult long)   Pulse 69   Temp 36.3 °C (97.4 °F) (Temporal)   Ht 1.6 m (5' 3\")   Wt 61.3 kg (135 lb 3.2 oz)   SpO2 93%     Constitutional: "   Body Habitus: Body mass index is 23.95 kg/m².  Cooperation: Fully cooperates with exam  Appearance: Well-groomed no disheveled   Skin -no warmth erythema overlying the injection sites  Respiratory-  breathing comfortable on room air, no audible wheezing  Cardiovascular- capillary refills less than 2 seconds. No lower extremity edema is noted.   Psychiatric- alert and oriented ×3. Normal affect.   Spine:   Tenderness to palpation over the right lumbar paraspinals and PSIS on the right.   Gait is antalgic without any assist device  Straight leg raise is positive for back pain and not for radicular symptoms on the left and the right  Bishop's is positive for back pain bilaterally  Functional range of motion in the hips in internal and external rotation bilaterally  No focal motor or sensory deficits in the lower extremities bilaterally  Reflexes are 2+ in the left patella and right patella(despite TKA) and 1+ in the Achilles bilaterally  Tenderness over the pubic symphysis and right up slip innominate    OMT procedure note:  Using muscle energy technique after obtaining verbal consent the right upslip innominate was corrected.  The patient noted some small amount of improvement after the procedure and was discharged in good condition.  I encouraged her to drink extra water today.      MEDICAL DECISION MAKING    DATA    Labs:   Lab Results   Component Value Date/Time    SODIUM 141 01/14/2022 09:04 AM    POTASSIUM 4.9 01/14/2022 09:04 AM    CHLORIDE 107 01/14/2022 09:04 AM    CO2 25 01/14/2022 09:04 AM    GLUCOSE 104 (H) 01/14/2022 09:04 AM    BUN 19 01/14/2022 09:04 AM    CREATININE 1.18 01/14/2022 09:04 AM    CREATININE 1.08 (H) 07/02/2010 08:56 AM    BUNCREATRAT 11 07/02/2010 08:56 AM    GLOMRATE 50 (L) 07/02/2010 08:56 AM        Lab Results   Component Value Date/Time    PROTHROMBTM 13.1 03/31/2014 08:05 AM    INR 1.00 03/31/2014 08:05 AM        Lab Results   Component Value Date/Time    WBC 5.3 01/14/2022 09:04 AM     WBC 4.4 07/02/2010 08:56 AM    RBC 4.44 01/14/2022 09:04 AM    RBC 4.45 07/02/2010 08:56 AM    HEMOGLOBIN 14.4 01/14/2022 09:04 AM    HEMATOCRIT 43.0 01/14/2022 09:04 AM    MCV 96.8 01/14/2022 09:04 AM    MCV 95 07/02/2010 08:56 AM    MCH 32.4 01/14/2022 09:04 AM    MCH 31.5 07/02/2010 08:56 AM    MCHC 33.5 (L) 01/14/2022 09:04 AM    MPV 10.7 01/14/2022 09:04 AM    NEUTSPOLYS 60.20 01/14/2022 09:04 AM    LYMPHOCYTES 30.90 01/14/2022 09:04 AM    MONOCYTES 6.20 01/14/2022 09:04 AM    EOSINOPHILS 1.50 01/14/2022 09:04 AM    BASOPHILS 0.80 01/14/2022 09:04 AM        Lab Results   Component Value Date/Time    HBA1C 5.4 04/27/2018 07:52 AM          Imaging: I personally reviewed following images    MRI lumbar spine.  12/2/2020  Reviewed findings and reviewed with patient    I reviewed the following radiology reports  Results for orders placed during the hospital encounter of 12/02/20    MR-LUMBAR SPINE-W/O    Impression  1.  Multilevel degenerative disc disease and facet degeneration. There is moderate central canal narrowing at L2-3 and L3-4 with moderate to severe central canal narrowing at L4-5.    2.  Very degrees of neural foraminal narrowing at levels as specifically described above.    3.  Mild chronic compression fracture at L3.    4.  Scoliosis.                               DIAGNOSIS   Visit Diagnoses     ICD-10-CM   1. Sacroiliitis (HCC)  M46.1   2. Lumbosacral pain  M54.50   3. Neural foraminal stenosis of lumbar spine  M48.061   4. Spinal stenosis of lumbar region, unspecified whether neurogenic claudication present  M48.061   5. Right lumbar radiculitis  M54.16   6. Right inguinal pain  R10.31           ASSESSMENT and PLAN:     Katerina Torres 82 y.o. female seen for above    Katerina was seen today for follow-up.    Diagnoses and all orders for this visit:    Sacroiliitis (HCC)  -     DX-SACRUM AND COCCYX 2+; Future  -     traMADol (ULTRAM) 50 MG Tab; Take 1 Tablet by mouth every 6 hours as  needed for Severe Pain for up to 5 days.  -     Consent for Opiate Prescription  -     Controlled Substance Treatment Agreement    Lumbosacral pain  -     DX-SACRUM AND COCCYX 2+; Future  -     traMADol (ULTRAM) 50 MG Tab; Take 1 Tablet by mouth every 6 hours as needed for Severe Pain for up to 5 days.  -     Consent for Opiate Prescription  -     Controlled Substance Treatment Agreement    Neural foraminal stenosis of lumbar spine    Spinal stenosis of lumbar region, unspecified whether neurogenic claudication present    Right lumbar radiculitis    Right inguinal pain  -     DX-HIP-COMPLETE - UNILATERAL 2+ RIGHT; Future      Discussed that she has had some sacroiliac joint dysfunction and sacroiliitis in the past.  This does seem to be the most prominent feature of her symptoms today.  We discussed use of manual muscle techniques to try to address this.  She was agreeable.  See procedure note.  Discussed plans for x-rays of the lumbar spine as well as x-rays of the sacrum coccyx and right hip.  I suspect that her symptoms are related to sacroiliac joint dysfunction but she does report some history of activity and [place] that did seem to precipitate this and was somewhat jarring.  It is a bit like a similar version of a Ayo sled.  Discussed trial of tramadol 25-50 mg up to every 6 hours.  Discussed that she could take this with Tylenol.  Continue to avoid anti-inflammatories.  Encouraged her to follow-up with physical therapy      Follow up: 2 weeks    Thank you for allowing me to participate in the care of this patient. If you have any questions please not hesitate to contact me.    My total time spent caring for the patient on the day of the encounter was 34 minutes.   This does not include time spent on separately billable procedures/tests.      Please note that this dictation was created using voice recognition software. I have made every reasonable attempt to correct obvious errors but there may be errors  of grammar and content that I may have overlooked prior to finalization of this note.      Roni Choi MD  Interventional Spine and Sports Physiatry  Physical Medicine and Rehabilitation  [facility]

## 2022-09-14 NOTE — OP THERAPY EVALUATION
"  Outpatient Physical Therapy  INITIAL EVALUATION    [facility] Outpatient Physical Therapy  [address]  [place] [address]  Phone:  [phone]  Fax:  [phone]    Date of Evaluation: 09/15/2022    Patient: Katerina Torres  YOB: 1940  MRN: [id number]     Referring Provider: Roni Choi M.D.  [address]  [address]  [place] [address]   Referring Diagnosis Right lumbar radiculitis [M54.16];Neural foraminal stenosis of lumbar spine [M48.061];Spinal stenosis of lumbar region, unspecified whether neurogenic claudication present [M48.061];Lumbosacral pain [M54.50];Sacroiliitis (HCC) [M46.1]     Time Calculation  Start time: 1330  Stop time: 1437 Time Calculation (min): 67 minutes         Chief Complaint: Back Problem    Visit Diagnoses     ICD-10-CM   1. Right lumbar radiculitis  M54.16   2. Neural foraminal stenosis of lumbar spine  M48.061   3. Spinal stenosis of lumbar region with neurogenic claudication  M48.062   4. Lumbosacral pain  M54.50   5. Sacroiliitis (HCC)  M46.1       Date of onset of impairment: 8/17/2022    Subjective:   History of Present Illness:     Mechanism of injury:    Pt presents to PT with complaint of severe LBP and R knee pain. States she awoke with the pain while on vacation to [place] in Mid August. \"The pain just got worse and worse as the days went on.\" [place] was able [place] the [place] with assist from the wheelchair support in the airport. She had positive response from an epidural with Dr. Choi 4 yrs ago. Upon returning home, she followed up with Dr. Choi. A right L4 and L5 transforaminal epidural steroid injection on September 1, 2022, she reports that she had about 1 to 2 days of improved pain and then on Saturday her pain significantly increased. The pain is now constant and severe around the R lower back and \"C\" shaped around the R hip. The pain in the R LE comes and goes and is most present in the " "mornings. Describes sharp pain and 'zinging' in the thigh, lateral leg and ankle.     Prior level of function:  Active. Community ambulator without an AD. Gardens often, skiis in the winter.  Sleep disturbance:  Interrupted sleep  Pain:     Current pain ratin    At best pain ratin    At worst pain ratin    Alleviating factors: Sitting.    Exacerbated by: Walking, standing.    Progression:  Worsening  Social Support:     Lives in:  One-story house    Lives with:  Spouse  Diagnostic Tests:     Diagnostic Tests Comments:  22 xrays-     IMPRESSION:  1.  No displaced fracture of sacrum or coccyx.  2.  Degenerative change of the SI joints again seen, unchanged from prior.    IMPRESSION:  Stable mild bilateral hip osteoarthritis  Treatments:     None      Past Medical History:   Diagnosis Date    Breast cancer ([facility])     Breath shortness     with exertion \"walking up a hill\"    Bruises easily     Cancer ([facility])     breast left side lumpectomy    Cardiac murmur 6/3/2009    Chronic kidney disease, stage III (moderate) ([facility]) 2015    Dyslipidemia 6/3/2009    Glaucoma     both eyes    Hepatitis A     Hiatus hernia syndrome     Hx of breast cancer 6/3/2009    Hypertension 2017    currently not taking medications    MEDICAL HOME     Neutropenia 6/3/2009    Osteopenia 6/3/2009    Preventative health care 6/3/2009    Snoring     Unspecified cataract     bilateral IOLs     Past Surgical History:   Procedure Laterality Date    LUMBAR TRANSFORAMINAL EPIDURAL STEROID INJECTION Right 2022    Procedure: RIGHT lumbar four and lumbar five transforaminal epidural steroid injection;  Surgeon: Roni Choi M.D.;  [facility];  Service: Pain Management    LUMBAR TRANSFORAMINAL EPIDURAL STEROID INJECTION Right 2021    Procedure: INJECTION, STEROID, SPINE, LUMBAR, EPIDURAL, TRANSFORAMINAL APPROACH;  Surgeon: Roni Choi M.D.;  Location: [facility];  " Service: Pain Management    KNEE ARTHROPLASTY TOTAL Right 2018    Procedure: KNEE ARTHROPLASTY TOTAL;  Surgeon: Thanh Hall M.D.;  Location: SURGERY [facility];  Service: Orthopedics    VITRECTOMY POSTERIOR Left 10/25/2016    Procedure: VITRECTOMY POSTERIOR membrane peel cryotherapy infusion of SF6 intraocular gas;  Surgeon: Amanda Rodriguez M.D.;  Location: SURGERY SAME DAY [facility];  Service:     RECOVERY  3/31/2014    Performed by [facility] at SURGERY SAME DAY [facility]    CATARACT PHACO WITH IOL  2009    Performed by GERA BREAUX at SURGERY SAME DAY [facility]    CATARACT PHACO WITH IOL  2009    Performed by GERA BREAUX at SURGERY SAME DAY [facility]    LUMPECTOMY      OTHER      MT RADIATION THERAPY PLAN SIMPLE       Social History     Tobacco Use    Smoking status: Former     Packs/day: 0.50     Years: 10.00     Pack years: 5.00     Types: Cigarettes     Quit date: 1999     Years since quittin.7    Smokeless tobacco: Never   Substance Use Topics    Alcohol use: Yes     Alcohol/week: 4.2 oz     Types: 7 Standard drinks or equivalent per week     Comment: 1 glass wine/day     Family and Occupational History     Socioeconomic History    Marital status:      Spouse name: Not on file    Number of children: Not on file    Years of education: Not on file    Highest education level: Not on file   Occupational History    Not on file       Objective     Observations     Additional Observation Details    Gait: Trunk flexed, guarded and rigid. Antalgic R and lacks R hip extension.     Postural Observations  Seated posture: fair (leaned L)  Standing posture: fair (Stands over L LE, R knee flexed, trunk flexed)      Hip Screen   Hip range of motion within functional limits.    Neurological Testing     Reflexes   Left   Patellar (L4): normal (2+)  Achilles (S1): normal (2+)    Right   Patellar (L4): trace (1+)  Achilles (S1): normal (2+)    Myotome  testing   Lumbar (left)   All left lumbar myotomes within normal limits    Lumbar (right)   All right lumbar myotomes within normal limits    Dermatome testing   Lumbar (left)   All left lumbar dermatomes intact    Lumbar (right)   All right lumbar dermatomes intact    Palpation   Left   Tenderness of the lumbar paraspinals.     Right   Tenderness of the gluteus nain, gluteus medius, lumbar paraspinals and quadratus lumborum.     Tenderness     Right Hip   Tenderness in the iliac crest, iliolumbar ligament and sacroiliac joint.     Active Range of Motion     Lumbar   Flexion: within functional limits (FT to toes. Apprehensive)  Extension: decreased (10 deg)  Left lateral flexion: decreased (FT to mid thigh)  Right lateral flexion: within functional limits (FT to knee)  Left rotation: decreased  Right rotation: within functional limits    Joint Play   Spine     Central PA [place]        L2: WFL       L3: WFL       L4: hypomobile       L5: hypomobile    Unilateral PA [place] (right)        L4: hypomobile and painful       L5: hypomobile and painful      Strength:      Abdominals   Lower abdominals: Able to maintain neutral statically    Lower extremities   Normal left lower extremity strength    Right Hip   Planes of Motion   Flexion: 4  Extension: 4  Abduction: 4-  Adduction: 4+    Right Knee   Flexion: 4+  Extension: 4    Tests     Right Pelvic Girdle/Sacrum   Negative: sacral thrust.     Left Hip   SLR: Negative.     Right Hip   Negative SI compression and SI distraction.   SLR: Negative.     Additional Tests Details  + R prone knee bend for neural tension.       Therapeutic Treatments and Modalities:     1. Manual Therapy (CPT 89952), Prone with pillow under hips: DTW to R lumbar paraspinals, QL, glutes. Piriformis pump. Lumbar PAs GIII, sacral float.    2. E Stim Unattended (CPT 45803), Lumbar IFC and MH x 15 min in prone with pillow under hips.    Therapeutic Treatment and Modalities Summary:   Access Code:  [email or web address]  URL: [email or web address]  Date: 09/15/2022  Prepared by: Ivet Caro    Exercises  Supine Lower Trunk Rotation - 2 x daily - 2 sets - 10 reps  Hooklying Single Knee to Chest Stretch - 2 x daily - 2 sets - 30 sec hold  Prone Press Up On Elbows - 2 x daily - 3 sets - 10 reps    Time-based treatments/modalities:    Physical Therapy Timed Treatment Charges  Manual therapy minutes (CPT 64001): 10 minutes      Assessment, Response and Plan:   Impairments: abnormal gait, abnormal or restricted ROM, activity intolerance, difficulty performing job, impaired functional mobility, impaired balance, impaired physical strength, lacks appropriate home exercise program and limited ADL's    Assessment details:    Ms Torres is an 82 y.o female who presents to PT with complaint of LBP and R LE pain that began [place].  She's undergone a R L4/5 transforaminal epidural on 9/1; unfortunately without relief. PT evaluation reveals sx most consistent with lumbar derangement and radiculopathy. She has limited lumbar extension, R side bend and rotation. Positive neural tension with R prone knee bend. Resultant inability to stand erect and antalgic gait pattern. Skilled PT services are indicated to address the mentioned functional limitations and enhance QOL.     Barriers to therapy:  None  Prognosis: good    Goals:   Short Term Goals:     - Improve LTR to full and painless  - Improve standing trunk extension to at least 20 deg  - Able to ambulate with upright trunk and normal gait pattern clinic/household distances.  -Resolve neural tension  Short term goal time span:  1-2 weeks      Long Term Goals:      - Improve RMQ to less than 7/24  - 30\" STS without hands = 8 reps  - Pt able to ambulate 1 mile without AD on level ground without LBP.   - Indep with HEP  Long term goal time span:  6-8 weeks    Plan:   Therapy options:  Physical therapy treatment to continue  Planned " therapy interventions:  E Stim Unattended (CPT 19041), Therapeutic Activities (CPT 41235), Therapeutic Exercise (CPT 22209), Hot or Cold Pack Therapy (CPT 33188), Manual Therapy (CPT 68782), Neuromuscular Re-education (CPT 65070) and Mechanical Traction (CPT 05438)  Frequency:  2x week  Duration in weeks:  6  Discussed with:  Patient    Functional Assessment Used    RMQ= 23/24    Referring provider co-signature:  I have reviewed this plan of care and my co-signature certifies the need for services.    Certification Period: 09/15/2022 to  11/10/22    Physician Signature: ________________________________ Date: ______________

## 2022-09-15 ENCOUNTER — PHYSICAL THERAPY (OUTPATIENT)
Dept: PHYSICAL THERAPY | Facility: MEDICAL CENTER | Age: 82
End: 2022-09-15
Attending: PHYSICAL MEDICINE & REHABILITATION
Payer: MEDICARE

## 2022-09-15 DIAGNOSIS — M54.16 RIGHT LUMBAR RADICULITIS: ICD-10-CM

## 2022-09-15 DIAGNOSIS — M54.50 LUMBOSACRAL PAIN: ICD-10-CM

## 2022-09-15 DIAGNOSIS — M46.1 SACROILIITIS (HCC): ICD-10-CM

## 2022-09-15 DIAGNOSIS — M48.061 NEURAL FORAMINAL STENOSIS OF LUMBAR SPINE: ICD-10-CM

## 2022-09-15 DIAGNOSIS — M48.062 SPINAL STENOSIS OF LUMBAR REGION WITH NEUROGENIC CLAUDICATION: ICD-10-CM

## 2022-09-15 PROCEDURE — 97161 PT EVAL LOW COMPLEX 20 MIN: CPT

## 2022-09-15 PROCEDURE — 97014 ELECTRIC STIMULATION THERAPY: CPT

## 2022-09-15 PROCEDURE — 97140 MANUAL THERAPY 1/> REGIONS: CPT

## 2022-09-15 ASSESSMENT — ACTIVITIES OF DAILY LIVING (ADL): POOR_BALANCE: 1

## 2022-09-15 ASSESSMENT — ENCOUNTER SYMPTOMS
PAIN SCALE AT LOWEST: 6
PAIN SCALE AT HIGHEST: 9
PAIN SCALE: 8

## 2022-09-16 ENCOUNTER — OFFICE VISIT (OUTPATIENT)
Dept: PHYSICAL MEDICINE AND REHAB | Facility: MEDICAL CENTER | Age: 82
End: 2022-09-16
Payer: MEDICARE

## 2022-09-16 VITALS
OXYGEN SATURATION: 95 % | BODY MASS INDEX: 23.28 KG/M2 | WEIGHT: 131.4 LBS | TEMPERATURE: 97.2 F | HEART RATE: 69 BPM | DIASTOLIC BLOOD PRESSURE: 60 MMHG | HEIGHT: 63 IN | SYSTOLIC BLOOD PRESSURE: 124 MMHG

## 2022-09-16 DIAGNOSIS — M54.50 LUMBOSACRAL PAIN: ICD-10-CM

## 2022-09-16 DIAGNOSIS — M16.0 PRIMARY OSTEOARTHRITIS OF BOTH HIPS: ICD-10-CM

## 2022-09-16 DIAGNOSIS — M48.061 NEURAL FORAMINAL STENOSIS OF LUMBAR SPINE: ICD-10-CM

## 2022-09-16 DIAGNOSIS — M47.818 ARTHRITIS OF SACROILIAC JOINT OF BOTH SIDES: ICD-10-CM

## 2022-09-16 DIAGNOSIS — M99.04 SOMATIC DYSFUNCTION OF SACRAL REGION: ICD-10-CM

## 2022-09-16 DIAGNOSIS — M48.061 SPINAL STENOSIS OF LUMBAR REGION, UNSPECIFIED WHETHER NEUROGENIC CLAUDICATION PRESENT: ICD-10-CM

## 2022-09-16 PROCEDURE — 99214 OFFICE O/P EST MOD 30 MIN: CPT | Performed by: PHYSICAL MEDICINE & REHABILITATION

## 2022-09-16 ASSESSMENT — FIBROSIS 4 INDEX: FIB4 SCORE: 1.726918793895665255

## 2022-09-16 ASSESSMENT — PAIN SCALES - GENERAL: PAINLEVEL: 6=MODERATE PAIN

## 2022-09-16 NOTE — Clinical Note
Ivet, Radicular symptoms seem resolved, she continues to have sacral dysfunction.  She is very hopeful after meeting with you and feels that she improved after her first visit with you.    Thank you,  Roni Choi MD

## 2022-09-20 ENCOUNTER — PHYSICAL THERAPY (OUTPATIENT)
Dept: PHYSICAL THERAPY | Facility: MEDICAL CENTER | Age: 82
End: 2022-09-20
Attending: PHYSICAL MEDICINE & REHABILITATION
Payer: MEDICARE

## 2022-09-20 DIAGNOSIS — M46.1 SACROILIITIS (HCC): ICD-10-CM

## 2022-09-20 DIAGNOSIS — M54.16 RIGHT LUMBAR RADICULITIS: ICD-10-CM

## 2022-09-20 DIAGNOSIS — M48.062 SPINAL STENOSIS OF LUMBAR REGION WITH NEUROGENIC CLAUDICATION: ICD-10-CM

## 2022-09-20 DIAGNOSIS — M54.50 LUMBOSACRAL PAIN: ICD-10-CM

## 2022-09-20 DIAGNOSIS — M48.061 NEURAL FORAMINAL STENOSIS OF LUMBAR SPINE: ICD-10-CM

## 2022-09-20 PROCEDURE — 97110 THERAPEUTIC EXERCISES: CPT

## 2022-09-20 PROCEDURE — 97014 ELECTRIC STIMULATION THERAPY: CPT

## 2022-09-20 PROCEDURE — 97140 MANUAL THERAPY 1/> REGIONS: CPT

## 2022-09-20 NOTE — OP THERAPY DAILY TREATMENT
"  Outpatient Physical Therapy  DAILY TREATMENT     [facility] Outpatient Physical Therapy  [address]  [place] [address]  Phone:  [phone]  Fax:  [phone]    Date: 09/20/2022    Patient: Katerina Torres  YOB: 1940  MRN: [id number]     Time Calculation    Start time: 1335  Stop time: 1440 Time Calculation (min): 65 minutes         Chief Complaint: Back Problem    Visit #: 2    SUBJECTIVE:  Pt is very happy to report that she is much improved after the evaluation. Her leg pain is almost completely resolved. The HEP is helping a lot. Does report some new pain today in her L lower t/s. Isn't sure what caused it.     OBJECTIVE:      Therapeutic Exercises (CPT 58992):     1. NuStep, L4 x 5 min    2. prone alt TKE, x 10 ea    3. prone alt LE ext, x 10 ea, small range to isolate glute    4. prone alt knee flexion, x 10 ea    5. ball LTR, x 10 ea    6. ball bridge, 5\" x 20    7. STS, x 15    Therapeutic Treatments and Modalities:     1. Manual Therapy (CPT 23151), Prone with pillow under hips: DTW to R lumbar paraspinals, QL, glutes. Piriformis pump. Lumbar PAs GIII, sacral float.    2. E Stim Unattended (CPT 02374), IFC and MH to L/S in prone x 15 min.  Time-based treatments/modalities:    Physical Therapy Timed Treatment Charges  Manual therapy minutes (CPT 34499): 10 minutes  Therapeutic exercise minutes (CPT 45770): 30 minutes      ASSESSMENT:   Response to treatment: Substantial improvement compared to previous visit. Little to no neural tension. Improved tolerance to lumbar extension. Gait pattern tall and WFL. Able to tolerate deep pressure through the L/S. Still with a TP in the L t/s paraspinals and R piriformis. Able to advance into lumbopelvic strengthening and control.     PLAN/RECOMMENDATIONS:   Plan for treatment: therapy treatment to continue next visit.  Planned interventions for next visit: continue with current treatment.         "

## 2022-09-22 ENCOUNTER — PHYSICAL THERAPY (OUTPATIENT)
Dept: PHYSICAL THERAPY | Facility: MEDICAL CENTER | Age: 82
End: 2022-09-22
Attending: PHYSICAL MEDICINE & REHABILITATION
Payer: MEDICARE

## 2022-09-22 DIAGNOSIS — M48.062 SPINAL STENOSIS OF LUMBAR REGION WITH NEUROGENIC CLAUDICATION: ICD-10-CM

## 2022-09-22 DIAGNOSIS — M54.16 RIGHT LUMBAR RADICULITIS: ICD-10-CM

## 2022-09-22 DIAGNOSIS — M54.50 LUMBOSACRAL PAIN: ICD-10-CM

## 2022-09-22 DIAGNOSIS — M46.1 SACROILIITIS (HCC): ICD-10-CM

## 2022-09-22 DIAGNOSIS — M48.061 NEURAL FORAMINAL STENOSIS OF LUMBAR SPINE: ICD-10-CM

## 2022-09-22 PROCEDURE — 97110 THERAPEUTIC EXERCISES: CPT

## 2022-09-22 PROCEDURE — 97140 MANUAL THERAPY 1/> REGIONS: CPT

## 2022-09-22 PROCEDURE — 97014 ELECTRIC STIMULATION THERAPY: CPT

## 2022-09-22 NOTE — OP THERAPY DAILY TREATMENT
"  Outpatient Physical Therapy  DAILY TREATMENT     [facility] Outpatient Physical Therapy  [address]  [place] [address]  Phone:  [phone]  Fax:  [phone]    Date: 09/22/2022    Patient: Katerina Torres  YOB: 1940  MRN: [id number]     Time Calculation    Start time: 1330  Stop time: 1435 Time Calculation (min): 65 minutes         Chief Complaint: Back Problem    Visit #: 3    SUBJECTIVE:  My spine pain has resolved. I am having a little pain in the R groin, but not sure why.     OBJECTIVE:      Therapeutic Exercises (CPT 51427):     1. NuStep, L4 x 5 min    2. Fig 4/KTOS, x 1 min ea, light on the R to avoid R hip impingement    3. LTR, x 10 ea    4. Bridge, x 10 ea    5. 3D pelvic tilts, x 1 min ea, having to shorten the range with posterior PT due to pinching in the R hip    6. DKTC, x 1 min    7. supine clam, pink x 15    8. STS, x 10    9. step up, 4\" x 10ea    Therapeutic Treatments and Modalities:     1. Manual Therapy (CPT 25760), Supine: hooklying R hip scoop mobs. Psoas release. SL lumbar rotation GIII.    2. E Stim Unattended (CPT 69831), IFC and MH to L/S in prone x 15 min.    Time-based treatments/modalities:    Physical Therapy Timed Treatment Charges  Manual therapy minutes (CPT 36126): 10 minutes  Therapeutic exercise minutes (CPT 15717): 30 minutes    ASSESSMENT:   Response to treatment: Continues to respond well to manual, lumbopelvic strengthening and IFC for pain control. Presented with R groin pain consistent with hip impingement, likely related to SI dysfunction. Pain resolved after the session. Ok to continue current HEP.     PLAN/RECOMMENDATIONS:   Plan for treatment: therapy treatment to continue next visit.  Planned interventions for next visit: continue with current treatment.         "

## 2022-09-26 ENCOUNTER — OFFICE VISIT (OUTPATIENT)
Dept: MEDICAL GROUP | Facility: MEDICAL CENTER | Age: 82
End: 2022-09-26
Payer: MEDICARE

## 2022-09-26 VITALS
DIASTOLIC BLOOD PRESSURE: 60 MMHG | OXYGEN SATURATION: 98 % | SYSTOLIC BLOOD PRESSURE: 134 MMHG | WEIGHT: 140 LBS | HEART RATE: 76 BPM | TEMPERATURE: 98.1 F | HEIGHT: 62 IN | BODY MASS INDEX: 25.76 KG/M2

## 2022-09-26 DIAGNOSIS — I35.1 NONRHEUMATIC AORTIC VALVE INSUFFICIENCY: ICD-10-CM

## 2022-09-26 DIAGNOSIS — E55.9 VITAMIN D DEFICIENCY: ICD-10-CM

## 2022-09-26 DIAGNOSIS — Z12.31 ENCOUNTER FOR SCREENING MAMMOGRAM FOR BREAST CANCER: ICD-10-CM

## 2022-09-26 DIAGNOSIS — E78.5 DYSLIPIDEMIA: ICD-10-CM

## 2022-09-26 DIAGNOSIS — Z00.00 PREVENTATIVE HEALTH CARE: Chronic | ICD-10-CM

## 2022-09-26 DIAGNOSIS — Z23 NEED FOR PNEUMOCOCCAL VACCINE: ICD-10-CM

## 2022-09-26 DIAGNOSIS — Z23 NEEDS FLU SHOT: ICD-10-CM

## 2022-09-26 DIAGNOSIS — R94.4 DECREASED GFR: ICD-10-CM

## 2022-09-26 DIAGNOSIS — R92.30 DENSE BREAST TISSUE ON MAMMOGRAM: ICD-10-CM

## 2022-09-26 DIAGNOSIS — I35.0 AORTIC VALVE STENOSIS, ETIOLOGY OF CARDIAC VALVE DISEASE UNSPECIFIED: Chronic | ICD-10-CM

## 2022-09-26 PROCEDURE — G0008 ADMIN INFLUENZA VIRUS VAC: HCPCS | Performed by: INTERNAL MEDICINE

## 2022-09-26 PROCEDURE — G0009 ADMIN PNEUMOCOCCAL VACCINE: HCPCS | Performed by: INTERNAL MEDICINE

## 2022-09-26 PROCEDURE — 90662 IIV NO PRSV INCREASED AG IM: CPT | Performed by: INTERNAL MEDICINE

## 2022-09-26 PROCEDURE — 90677 PCV20 VACCINE IM: CPT | Performed by: INTERNAL MEDICINE

## 2022-09-26 PROCEDURE — 99214 OFFICE O/P EST MOD 30 MIN: CPT | Mod: 25 | Performed by: INTERNAL MEDICINE

## 2022-09-26 ASSESSMENT — FIBROSIS 4 INDEX: FIB4 SCORE: 1.726918793895665255

## 2022-09-26 NOTE — PROGRESS NOTES
Subjective     Katerina Torres is a 82 y.o. female who presents follow up aortic stenosis        HPI      Patient went to [place] in August and slept on a hard sofa during the trip, low back pain radiating down the right leg sharp pain made it difficult to move around, when she returned to town saw  physiatry provided epidural L4 to S1 on 9/1/22 and saw PT at [facility] with improvement in symptoms. Her back has subsequently improved. No cane or walker for ambulation.   No chest pain, no sob, no palpitations, patient with severe aortic stenosis followed by cardiology.  Does not limit her activity, still keeps active doing housework, yard work.  Most recent echo 5/11/22 echo EF 55%,severe aortic stenosis peak gradient 70.  Patient cares for her  who has moderate severe dementia.  That does cause her some anxiety but she has wonderful neighbors and friends will help her.  She tries to keep active now that her back is better, do stretching exercises daily.  No falls.  No change in bowel or bladder.  No pain medications.      Medications, allergies, medical history, surgical history, social history, family history  reviewed and updated  Current Outpatient Medications   Medication Sig Dispense Refill    diclofenac DR (VOLTAREN) 75 MG Tablet Delayed Response       timolol (TIMOPTIC) 0.5 % Solution Administer 1 Drop into the left eye every day.      acetaminophen (TYLENOL) 500 MG Tab Take 1,000 mg by mouth every 6 hours as needed.      nitroglycerin (NITROSTAT) 0.4 MG SL Tab Place 0.4 mg under tongue every 5 minutes as needed for Chest Pain.      bimatoprost (LUMIGAN) 0.01 % Solution Place 1 Drop in both eyes every bedtime. Indications: Wide-Angle Glaucoma      vitamin D (CHOLECALCIFEROL) 1000 UNIT Tab Take 1,000 Units by mouth.      Magnesium 500 MG Tab Take 1 Capsule by mouth every day.       No current facility-administered medications for this visit.                  s/p knee replacement  3/3/17  MRI right knee abnormal right lateral meniscus with peripheral extrusion, maceration, and complex tear involving primarily the posterior horn and body but also the anterior horn, abnormal medial meniscus with vertical tear of the peripheral zone of body and posterior horn, probable meniscal root tear, and degenerative fraying of the free edge, severe lateral femorotibial compartment osteoarthritis with significant cartilage loss and moderate to severe subchondral edema within the lateral femoral condyle and lateral tibial plateau, mild medial femorotibial and the patellofemoral compartment osteoarthritis, moderate sized joint effusion with associated popliteal cyst  6/20/17 sees  orthopedics  1/18/18  orthopedic note right knee end-stage arthritis, x-rays bone-on-bone right knee arthritis lateral compartment, right knee steroid injection performed, planned for total right knee arthroplasty after winter  5/2/18  orthopedic note, right knee osteoathritis, failed conservative measures, scheduled for right knee surgery  5/8/18  operative note right knee arthroplasty   5/23/18  orthopedic note 2 weeks s/p right total knee  8/6/18  orthopedic note 3 months status post right knee replacement doing well, x-ray shows stable right total knee  5/22/19  orthopedic note x-ray stable total knee replacement, follow-up 1 year     Preventative health  1/10/13 pneumovax  7/29/14 zoster vaccine  8/6/15 prevnar at [facility]  9/1/16 sonocine negative  3/7/17 colon per [facility] 2 polyps, grade 2 internal hemorrhoids, angioectasias ascending colon,pathology one hyperplastic polyp and one sessile serrated polyp, repeat colonoscopy 5 years   4/5/18 declines sonocine  9/19/20 shingrix second  8/23/21 dexa LS-0.9,hip-1.4  8/24/21 mammogram heterogeneously dense breast tissue recommend screening breast ultrasound, patient declines  10/25/12 moderna booster  1/14/22 vit d 68  4/28/22 tdap       Osteopenia  9/06 dexa LS -1.2,hip -1.1  6/09 dexa LS -1.4,hip -1.0  7/11 vit d 43  7/11 dexa LS -1.1,hip -0.9  712 vit d 30 start 1000 units  7/24/13 vit d 72 on 1000 units  8/6/13 dexa LS -1.1,hip -1.1  8/5/14 vit d 67  8/19/15 dexa LS -1.4,hip -1.3;FRAX 40% major,27% hip only on prednisone one month, does not need bisphosphonate therapy  8/19/15 vit d 43  6/16/16 off prednisone x 3 weeks  6/21/16 vit d 50  6/27/17 vit d 48  4/27/18 vit d 53   5/14/19 vit d 46  5/28/19 dexa LS-1.0,hip-1.4  6/12/20 vit d 88 on 1000 units decrease to qod   8/23/21 dexa LS-0.9,hip-1.4  1/14/22 vit d 68     low back pain  11/30/20 right-sided buttock pain with radiation, referral to physiatry, x-ray, MRI lumbar spine, trial of naproxen short-term plus acetaminophen follow-up 3 weeks  11/30/20 x-ray lumbar spine moderate spondylosis, L2-L3 asymmetric disc height loss on the left resulting in slight right curvature and degenerative retrolisthesis  12/2/20 MRI lumbar spine mild superior endplate compression fracture at L3, no marrow edema consistent with chronic process, L2-L3 moderate central narrowing, moderate right and severe left neuroforaminal narrowing, L3-4 moderate central narrowing, moderate bilateral neuroforaminal narrowing, L4-5 moderate to severe central canal narrowing with severe right and moderate left neuroforaminal narrowing, L5-S1 moderate bilateral neuroforaminal narrowing  1/6/21  physiatry procedure note right lumbar epidural steroid injection L4-S1 under fluoroscopy  1/26/21  physiatry note good response to right lumbar L4-L5 epidural steroid injections, she has returned to activities such as skiing, consider physical therapy and home program for residual pain, consider right SI joint versus repeat lumbar steroid injection if necessary, follow-up 2 months  2/8/21 [facility] note   2/17/21 [facility] note   8/31/22  physiatry note plan right L4-L5 epidural steroid  injection under fluoroscopy and physical therapy as tolerated  9/1/22 dr.storm physiatry procedure note epidural L4-L5 L5-S1 steroid injection  9/6/22 x-ray sacrum degenerative changes SI joint unchanged from prior  9/6/22 x-ray right hip stable mild bilateral osteoarthritis  9/6/22 dr.storm physiatry note SI joint dysfunction, obtain x-ray lumbar spine, sacrum and coccyx, right hip, trial of tramadol every 6 hours, follow-up physical therapy  9/16/22 dr.storm amaro note low back pain, making improvements with physical therapy, continues to have sacral dysfunction with radicular symptoms, discontinue tramadol, trial of acetaminophen 1300 mg tid ,avoid nsaids     knee pain left  8/1/13 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, lateral meniscus mildly extruded, ruptured hopper's cyst  8/12/13  ortho note pain improved on followup, consider injection if pain recurs     History polymyalgia  4/20/15 physical therapy, trial celebrex and zanaflex  5/7/15 physical therapy evaluation today recommended right hip x-ray and pelvic x-ray, ordered in computer  5/8/15 xray hip negative  6/29/15 seen by bridgette diagnosed with polymyalgia rheumatica  6/29/15 CRP 9.4,ESR 32 started on prednisone 20 mg    7/28/15 on prednisone 10 mg will continue 10 mg x 2 weeks, then decrease to 5 mg daily  8/19/15 hgb 14,hct 43, CRP 0.3, ESR 14, tapered off prednisone but developed mouth irritation, has resumed prednisone 5 mg daily, will continue x2 weeks then taper  11/17/15 refill prednisone 5 mg #30 tabs x one  6/21/16 off prednisone; CRP 0.1,ESR 17  6/27/17 CRP 0.09,ESR 12,cpk 66     history neutropenia  8/06 wbc 3.4  3/08 wbc 3.9  6/09 wbc 3.9  7/10 wbc 4.4  7/11 wbc 4.5  7/12 wbc 3.8 (55%N,35%L)  7/24/13 wbc 4.3  3/20/14 wbc 3.1 (52%N,36%L)  3/31/14 wbc 3.7  8/19/15 wbc 5.3  6/21/16 wbc 4.4 (50%N,40%L)  6/27/17 wbc 4.2   4/27/18 wbc 4.4  5/14/19 wbc 4.5  6/12/20 wbc 4.5  6/30/21 wbc 4.3  1/14/22 wbc 5.3    histroy  hypertension  3/31/14 cardiac catheterization; left main mild plaquing, LAD patent, circumflex free of disease, RCA free of disease, normal LV function, mild aortic stenosis  7/9/17 echo normal LV size and function, EF 60%, grade 1 diastolic dysfunction, moderate aortic stenosis peak gradient 50, mean 29, valve area 0.8-1.0 and aortic regurgitation  7/9/17 persantine thallium small fixed perfusion abnormality distal anterior and anteroapical segments, no ischemia is noted to represent mild prior infarct or variant normal apical thinning  7/18/17 start diltiazem 120 mg daily (also has esophageal dysmotility by barium swallow)  4/26/18 off diltiazem  4/27/18 echo normal LV size and function, EF 65%, moderate aortic stenosis peak gradient 46, valve area 0.9,, moderate aortic insufficiency, no change compared to previous  5/27/19 echo normal LV size and function, EF 65%, normal diastolic function, calcified aortic valve moderate aortic stenosis peak gradient 53, moderate aortic insufficiency  10/1/21 cardiology note will likely need valve replacement next 1 to 2 years, follow-up 6 months  2/14/22  cardiology note moderate to severe aortic stenosis, able to walk 4 miles with no limitations, will order repeat echo follow-up 2 months     History of breast cancer  1980s left sided s/p lumpectomy and radiation therapy, no old records  7/11 mammogram  8/12 mammogram  8/13 mammogram  8/18/14 mammogram  9/1/16 mammogram heterogeneously dense breast tissue recommend screening breast ultrasound  9/1/16 sonocine negative     History of basal cell skin cancer     Glaucoma  4/29/21  eye exam   1/13/22  eye exam primary open-angle glaucoma  9/16/22  eye exam      Esophageal dysmotility  7/9/17 barium swallow moderate esophageal dysmotility, small volume silent aspiration  7/18/17 start diltiazem 120 mg daily  7/18/17 referral [facility], speech pathology for esophageal dysmotility, small amount of aspiration  on barium swallow, try low-dose diltiazem 120 mg for esophageal dysmotility and elevated blood pressure  7/26/17 [facility] evaluation dyspepsia, obtain cardiology clearance and then proceed EGD, initiate pantoprazole 20 mg, trial of miralax and colace     Dyspnea  7/24/13 normal LV function, EF 60%, mild LVH, mild aortic stenosis, peak gradient 25  3/20/14 cxr negative  3/31/14 cardiac catheterization; left main mild lacking, LAD patent, circumflex free of disease, RCA free of disease, normal LV function, mild aortic stenosis  8/1/14 PFT FEV 2.8 L (144% predicted), FVC 3.6 (138% predicted), FEV/FVC 78, no bronchodilator response, DLCO 119% predicted     Dyslipidemia  3/08 chol 175,trig 73,hdl 45,ldl 115  6/09 chol 174,trig 89,hdl 47,ldl 109  7/10 chol 182,trig 61,hdl 55,ldl 114  7/11 chol 175,trig 69,hdl 45,ldl 116  7/12 chol 164,trig 64,hdl 43,ldl 108  7/24/13 chol 186,trig 66,hdl 54,ldl 119 no meds  8/5/14 chol 165,trig 93,hdl 48,ldl 98  8/19/15 chol 192,trig 109,hdl 58,ldl 112; 10 year risk 20% declines statin therapy  6/21/16 chol 137,trig 75,hdl 47,ldl 75   6/27/17 hcol 153,trig 94,hdl 54,ldl 80  5/14/19 chol 164,trig 82,hdl 48,ldl 100   6/12/20 chol 176,trig 80,hdl 57,ldl 103   6/30/21 chol 170,trig 100,hdl 43,ldl 107     Decrease GFR  7/7/11 bun 16,creat 1.0,GFR 50  7/24/13 bun 14,creat 1.1,GFR 45  3/20/14 bun 15,creat 0.9,GFR 59  8/5/14 bun 14,creat 1.1,GFR 46  2/19/15 bun 13,creat 0.8,GFR >60  8/19/15 bun 10,creat 1.1,GFR 48  6/21/16 bun 19,creat 1.0,GFR 50  6/27/17 bun 14,creat 0.5,GFR 51  4/28/18 bun 15,creat 0.9,GFR 60  5/14/19 bun 18,creat 1.13,GFR 46  6/12/20 bun 15,creat 1.07,GFR 49  6/30/21 bun 14,creat 1.07,GFR 49,PTH 36,urine mac<1.2,SPEP negative  1/14/22 bun 19,creat 1.18,GFR 44,PTH 25,calcium 9.6,phos 3.7     colon polyp  3/7/17 colon per [facility] 2 polyps, grade 2 internal hemorrhoids, angioectasias ascending colon,pathology one hyperplastic polyp and onesessile serrated polyp, repeat colonoscopy 5 years       Aortic stenosis  10/06 echo mild mr, normal LV  6/09 stress echo negative  8/12 echo normal LV function, EF 65%, mild aortic stenosis, peak gradient 24  7/24/13 normal LV function, EF 60%, mild LVH, mild aortic stenosis, peak gradient 25  3/31/14 cardiac catheterization; left main mild lacking, LAD patent, circumflex free of disease, RCA free of disease, normal LV function, mild aortic stenosis  7/9/17 echo normal LV size and function, EF 60%, grade 1 diastolic dysfunction, moderate aortic stenosis peak gradient 50, mean 29, valve area 0.8-1.0 and aortic regurgitation  7/9/17 persantine thallium small fixed perfusion abnormality distal anterior and anteroapical segments, no ischemia is noted to represent mild prior infarct or variant normal apical thinning  4/27/18 echo normal LV size and function, EF 65%, moderate aortic stenosis peak gradient 46, valve area 0.9,, moderate aortic insufficiency, no change compared to previous  5/27/19 echo normal LV size and function, EF 65%, normal diastolic function, calcified aortic valve moderate aortic stenosis peak gradient 53, moderate aortic insufficiency  9/24/21 echo EF 65%, indeterminate diastolic function, moderate aortic stenosis, peak gradient 56, moderate aortic insufficiency  10/1/21 cardiology note will likely need valve replacement next 1 to 2 years, follow-up 6 months  2/14/22  cardiology note moderate to severe aortic stenosis, able to walk 4 miles with no limitations, will order repeat echo follow-up 2 months  5/11/22 echo EF 55%,severe aortic stenosis peak gradient 70  5/13/22 cardiology note asymptomatic severe aortic stenosis, continue surveillance follow-up 6 months       Patient Active Problem List   Diagnosis    History of breast cancer    Osteopenia    Dyslipidemia    History of neutropenia    Preventative health care    Aortic stenosis    Glaucoma    History of basal cell carcinoma of skin    Knee pain, left    History of polymyalgia  rheumatica    Decreased GFR    Colon polyp    S/P knee replacement    Esophageal dysmotility    History of hypertension    Aortic regurgitation    History of palpitations    Low back pain                Patient Care Team:  Master Suarez M.D. as PCP - General  Master Suarez M.D. as PCP - [facility] Paneled  Chris Bernstein M.D. as Consulting Physician (Ophthalmology)  Amanda Rodriguez M.D. as Consulting Physician (Ophthalmology)  Manoj Tidwell M.D. (Inactive) as Consulting Physician (Orthopedic Surgery)  Gina Caro, PT, DPT as Physical Therapist (Physical Therapy)  Ivet Caro, PT, DPT as Physical Therapist (Physical Therapist)    ROS           Objective          Physical Exam  Vitals and nursing note reviewed.   Constitutional:       Appearance: Normal appearance.   HENT:      Head: Normocephalic and atraumatic.      Left Ear: External ear normal.   Eyes:      Conjunctiva/sclera: Conjunctivae normal.   Cardiovascular:      Rate and Rhythm: Normal rate and regular rhythm.   Pulmonary:      Effort: Pulmonary effort is normal.      Breath sounds: Normal breath sounds.   Abdominal:      General: There is no distension.   Skin:     Findings: No bruising.   Neurological:      General: No focal deficit present.      Mental Status: She is alert.   Psychiatric:         Mood and Affect: Mood normal.   No lower extremity edema            Assessment & Plan        Assessment  #1 aortic stenosis severe nature, no chest pain, shortness of breath, palpitations, lightheadedness followed by cardiology 5/13/22 cardiology note asymptomatic severe aortic stenosis, continue surveillance follow-up 6 months most recent echo 5/11/22 echo EF 55%,severe aortic stenosis peak gradient 70    #2 CKD 3b 1/14/22 bun 19,creat 1.18,GFR 44,PTH 25,calcium 9.6,phos 3.7, no regular NSAIDs    #3 dyslipidemia diet controlled    #4 low back pain improved after epidural injection this month, going to physical therapy, no cane or walker for  ambulation's, no pain medications     #5 history of breast cancer 1980s left-sided lumpectomy, typically has dense breast tissue on mammogram    Plan  #! Flu vaccine high-dose today    #2 prevnar 20     #3 screening mammogram and sonocine dense breast tissue    #4 continue regular activity exercise, no heavy lifting, monitor for recurrent back pain, continue physical therapy, continue stretching    #5 continue good nutrition program, low-sodium diet    #6 continue no NSAIDs    #7 COVID updated vaccine at the pharmacy    #8 follow-up cardiology    #9 follow-up with myself 4 months

## 2022-09-27 ENCOUNTER — PHYSICAL THERAPY (OUTPATIENT)
Dept: PHYSICAL THERAPY | Facility: MEDICAL CENTER | Age: 82
End: 2022-09-27
Attending: PHYSICAL MEDICINE & REHABILITATION
Payer: MEDICARE

## 2022-09-27 DIAGNOSIS — M48.062 SPINAL STENOSIS OF LUMBAR REGION WITH NEUROGENIC CLAUDICATION: ICD-10-CM

## 2022-09-27 DIAGNOSIS — M54.16 RIGHT LUMBAR RADICULITIS: ICD-10-CM

## 2022-09-27 DIAGNOSIS — M48.061 NEURAL FORAMINAL STENOSIS OF LUMBAR SPINE: ICD-10-CM

## 2022-09-27 DIAGNOSIS — M46.1 SACROILIITIS (HCC): ICD-10-CM

## 2022-09-27 PROCEDURE — 97110 THERAPEUTIC EXERCISES: CPT

## 2022-09-27 PROCEDURE — 97014 ELECTRIC STIMULATION THERAPY: CPT

## 2022-09-27 PROCEDURE — 97140 MANUAL THERAPY 1/> REGIONS: CPT

## 2022-09-27 NOTE — OP THERAPY DAILY TREATMENT
"  Outpatient Physical Therapy  DAILY TREATMENT     [facility]  [address]  [place] [address]  Phone:  [phone]  Fax:  [phone]    Date: 09/27/2022    Patient: Katerina Torres  YOB: 1940  MRN: [id number]     Time Calculation    Start time: 1329  Stop time: 1433 Time Calculation (min): 64 minutes         Chief Complaint: Back Problem    Visit #: 3    SUBJECTIVE:  \"I can't believe it. I'm feeling great.\"     OBJECTIVE:    Presents with tall trunk, gait WNL. Antalgic quality resolved.     Therapeutic Exercises (CPT 49908):     1. NuStep, L4 x 5 min    2. Shuttle, Bilateral 4C x 20, SL 3C x 20 ea    3. rows, 12# x 20, crossover straps    4. paloff press, 12# x 20 ea    5. Ball LTR, x 15 ea    6. KTOS/Fig 4, 2x30\" ea    7. Ball bridge, 5\" x 5, terminated due to sharp R hip/glute pain. Manual performed. 5\"x5 repeated and reported to be pain free.    8. seated trunk flexion stretch, x 1 min    Therapeutic Treatments and Modalities:     1. Manual Therapy (CPT 32990), L SL: R glute/piriformis DTW. Sacral txn. AA pelvic tilts ant/post. GIII manual rotation.    2. E Stim Unattended (CPT 71347), IFC and MH to L/S in supine x 15 min.    Time-based treatments/modalities:    Physical Therapy Timed Treatment Charges  Manual therapy minutes (CPT 37172): 10 minutes  Therapeutic exercise minutes (CPT 60133): 30 minutes    ASSESSMENT:   Response to treatment: Overall improved tolerance to strength training, though still reported N/T in the R LE during her standing therex and experienced R hip/glute pain during bridges.  Improves with manual therapy. Will need further strength challenges to maximize functional control.     PLAN/RECOMMENDATIONS:   Plan for treatment: therapy treatment to continue next visit.  Planned interventions for next visit: continue with current treatment.         "

## 2022-09-30 ENCOUNTER — PHYSICAL THERAPY (OUTPATIENT)
Dept: PHYSICAL THERAPY | Facility: MEDICAL CENTER | Age: 82
End: 2022-09-30
Attending: PHYSICAL MEDICINE & REHABILITATION
Payer: MEDICARE

## 2022-09-30 DIAGNOSIS — M54.50 LUMBOSACRAL PAIN: ICD-10-CM

## 2022-09-30 PROCEDURE — 97110 THERAPEUTIC EXERCISES: CPT

## 2022-09-30 PROCEDURE — 97140 MANUAL THERAPY 1/> REGIONS: CPT

## 2022-09-30 NOTE — OP THERAPY DAILY TREATMENT
"  Outpatient Physical Therapy  DAILY TREATMENT     [facility]  [address]  [place] [address]  Phone:  [phone]  Fax:  [phone]    Date: 09/30/2022    Patient: Katerina Torres  YOB: 1940  MRN: [id number]     Time Calculation    Start time: 0130  Stop time: 0225 Time Calculation (min): 55 minutes         Chief Complaint: back and hip pain  Visit #: 4    SUBJECTIVE:  Pt states she's feeling better overall and was able to do some house work.     OBJECTIVE:      Therapeutic Exercises (CPT 25066):     1. NuStep, L4 x 5 min    2. Shuttle, Bilateral 4C x 20, SL 3C x 20 ea    3. rows, 12# x 20, crossover straps    4. paloff press, 12# x 20 ea    5. Ball LTR, x 15 ea    6. KTOS/Fig 4, 2x30\" ea    7. Ball bridge, 2 x 10    8. seated trunk flexion stretch, x 1 min    9. ball curls, 2 x 10    10. hip flexor stretch in true stretch, 2 x 30 seconds    Therapeutic Treatments and Modalities:     1. Manual Therapy (CPT 62386), L SL: R glute/piriformis DTW. Sacral txn. AA pelvic tilts ant/post. GIII manual rotation.    2. E Stim Unattended (CPT 91215), IFC and MH to L/S in supine x 15 min.  Time-based treatments/modalities:    Physical Therapy Timed Treatment Charges  Manual therapy minutes (CPT 51573): 14 minutes  Therapeutic exercise minutes (CPT 93901): 23 minutes    ASSESSMENT:   Response to treatment: pt continues to have some catching in her right hip and intermittent low back pain.however, she is very pleased with her progress thus far.     PLAN/RECOMMENDATIONS:   Plan for treatment: pt would like to do her HEP and will call to reschedule as needed in a few weeks.       "

## 2022-10-20 ENCOUNTER — TELEPHONE (OUTPATIENT)
Dept: MEDICAL GROUP | Facility: MEDICAL CENTER | Age: 82
End: 2022-10-20
Payer: MEDICARE

## 2022-10-20 ENCOUNTER — HOSPITAL ENCOUNTER (OUTPATIENT)
Dept: LAB | Facility: MEDICAL CENTER | Age: 82
End: 2022-10-20
Attending: INTERNAL MEDICINE
Payer: MEDICARE

## 2022-10-20 DIAGNOSIS — R94.4 DECREASED GFR: ICD-10-CM

## 2022-10-20 DIAGNOSIS — E55.9 VITAMIN D DEFICIENCY: ICD-10-CM

## 2022-10-20 DIAGNOSIS — E78.5 DYSLIPIDEMIA: ICD-10-CM

## 2022-10-20 LAB
25(OH)D3 SERPL-MCNC: 83 NG/ML (ref 30–100)
ALBUMIN SERPL BCP-MCNC: 4.3 G/DL (ref 3.2–4.9)
ALBUMIN/GLOB SERPL: 1.5 G/DL
ALP SERPL-CCNC: 53 U/L (ref 30–99)
ALT SERPL-CCNC: 16 U/L (ref 2–50)
ANION GAP SERPL CALC-SCNC: 11 MMOL/L (ref 7–16)
AST SERPL-CCNC: 24 U/L (ref 12–45)
BASOPHILS # BLD AUTO: 0.9 % (ref 0–1.8)
BASOPHILS # BLD: 0.04 K/UL (ref 0–0.12)
BILIRUB SERPL-MCNC: 0.8 MG/DL (ref 0.1–1.5)
BUN SERPL-MCNC: 20 MG/DL (ref 8–22)
CALCIUM SERPL-MCNC: 9.3 MG/DL (ref 8.4–10.2)
CHLORIDE SERPL-SCNC: 107 MMOL/L (ref 96–112)
CHOLEST SERPL-MCNC: 175 MG/DL (ref 100–199)
CO2 SERPL-SCNC: 25 MMOL/L (ref 20–33)
CREAT SERPL-MCNC: 1.09 MG/DL (ref 0.5–1.4)
EOSINOPHIL # BLD AUTO: 0.08 K/UL (ref 0–0.51)
EOSINOPHIL NFR BLD: 1.7 % (ref 0–6.9)
ERYTHROCYTE [DISTWIDTH] IN BLOOD BY AUTOMATED COUNT: 48.6 FL (ref 35.9–50)
FASTING STATUS PATIENT QL REPORTED: NORMAL
GFR SERPLBLD CREATININE-BSD FMLA CKD-EPI: 50 ML/MIN/1.73 M 2
GLOBULIN SER CALC-MCNC: 2.8 G/DL (ref 1.9–3.5)
GLUCOSE SERPL-MCNC: 95 MG/DL (ref 65–99)
HCT VFR BLD AUTO: 43.1 % (ref 37–47)
HDLC SERPL-MCNC: 61 MG/DL
HGB BLD-MCNC: 14.6 G/DL (ref 12–16)
IMM GRANULOCYTES # BLD AUTO: 0.02 K/UL (ref 0–0.11)
IMM GRANULOCYTES NFR BLD AUTO: 0.4 % (ref 0–0.9)
LDLC SERPL CALC-MCNC: 98 MG/DL
LYMPHOCYTES # BLD AUTO: 1.43 K/UL (ref 1–4.8)
LYMPHOCYTES NFR BLD: 31 % (ref 22–41)
MCH RBC QN AUTO: 32.7 PG (ref 27–33)
MCHC RBC AUTO-ENTMCNC: 33.9 G/DL (ref 33.6–35)
MCV RBC AUTO: 96.6 FL (ref 81.4–97.8)
MONOCYTES # BLD AUTO: 0.31 K/UL (ref 0–0.85)
MONOCYTES NFR BLD AUTO: 6.7 % (ref 0–13.4)
NEUTROPHILS # BLD AUTO: 2.73 K/UL (ref 2–7.15)
NEUTROPHILS NFR BLD: 59.3 % (ref 44–72)
NRBC # BLD AUTO: 0 K/UL
NRBC BLD-RTO: 0 /100 WBC
PLATELET # BLD AUTO: 192 K/UL (ref 164–446)
PMV BLD AUTO: 10.8 FL (ref 9–12.9)
POTASSIUM SERPL-SCNC: 4.5 MMOL/L (ref 3.6–5.5)
PROT SERPL-MCNC: 7.1 G/DL (ref 6–8.2)
PTH-INTACT SERPL-MCNC: 31.1 PG/ML (ref 14–72)
RBC # BLD AUTO: 4.46 M/UL (ref 4.2–5.4)
SODIUM SERPL-SCNC: 143 MMOL/L (ref 135–145)
TRIGL SERPL-MCNC: 81 MG/DL (ref 0–149)
TSH SERPL DL<=0.005 MIU/L-ACNC: 0.91 UIU/ML (ref 0.38–5.33)
WBC # BLD AUTO: 4.6 K/UL (ref 4.8–10.8)

## 2022-10-20 PROCEDURE — 80061 LIPID PANEL: CPT

## 2022-10-20 PROCEDURE — 83970 ASSAY OF PARATHORMONE: CPT

## 2022-10-20 PROCEDURE — 82306 VITAMIN D 25 HYDROXY: CPT

## 2022-10-20 PROCEDURE — 80053 COMPREHEN METABOLIC PANEL: CPT

## 2022-10-20 PROCEDURE — 85025 COMPLETE CBC W/AUTO DIFF WBC: CPT

## 2022-10-20 PROCEDURE — 36415 COLL VENOUS BLD VENIPUNCTURE: CPT

## 2022-10-20 PROCEDURE — 84443 ASSAY THYROID STIM HORMONE: CPT

## 2022-11-07 ENCOUNTER — OFFICE VISIT (OUTPATIENT)
Dept: CARDIOLOGY | Facility: MEDICAL CENTER | Age: 82
End: 2022-11-07
Payer: MEDICARE

## 2022-11-07 VITALS
RESPIRATION RATE: 14 BRPM | BODY MASS INDEX: 26.13 KG/M2 | WEIGHT: 142 LBS | SYSTOLIC BLOOD PRESSURE: 144 MMHG | HEIGHT: 62 IN | DIASTOLIC BLOOD PRESSURE: 52 MMHG | HEART RATE: 82 BPM | OXYGEN SATURATION: 98 %

## 2022-11-07 DIAGNOSIS — Z00.00 PREVENTATIVE HEALTH CARE: Chronic | ICD-10-CM

## 2022-11-07 DIAGNOSIS — R94.4 DECREASED GFR: ICD-10-CM

## 2022-11-07 DIAGNOSIS — I35.0 AORTIC VALVE STENOSIS, ETIOLOGY OF CARDIAC VALVE DISEASE UNSPECIFIED: Chronic | ICD-10-CM

## 2022-11-07 DIAGNOSIS — E78.5 DYSLIPIDEMIA: Chronic | ICD-10-CM

## 2022-11-07 DIAGNOSIS — Z86.79 HISTORY OF HYPERTENSION: ICD-10-CM

## 2022-11-07 PROCEDURE — 99214 OFFICE O/P EST MOD 30 MIN: CPT | Performed by: INTERNAL MEDICINE

## 2022-11-07 ASSESSMENT — ENCOUNTER SYMPTOMS
CARDIOVASCULAR NEGATIVE: 1
CLAUDICATION: 0
LOSS OF CONSCIOUSNESS: 0
PALPITATIONS: 0
SPUTUM PRODUCTION: 0
ORTHOPNEA: 0
HEMOPTYSIS: 0
EYES NEGATIVE: 1
PND: 0
WHEEZING: 0
SORE THROAT: 0
CHILLS: 0
WEAKNESS: 0
BRUISES/BLEEDS EASILY: 0
GASTROINTESTINAL NEGATIVE: 1
CONSTITUTIONAL NEGATIVE: 1
MUSCULOSKELETAL NEGATIVE: 1
SHORTNESS OF BREATH: 0
STRIDOR: 0
NEUROLOGICAL NEGATIVE: 1
RESPIRATORY NEGATIVE: 1
COUGH: 0
DIZZINESS: 0
FEVER: 0

## 2022-11-07 ASSESSMENT — FIBROSIS 4 INDEX: FIB4 SCORE: 2.625

## 2022-11-07 NOTE — PROGRESS NOTES
"Chief Complaint   Patient presents with    Aortic Stenosis    Dyslipidemia       Subjective     Katerina Torres is a 81 y.o. female who presents today as a follow-up for her severe aortic stenosis.  Her repeat echocardiogram showed severe aortic stenosis.  Her EF is essentially unchanged.      Since she was last seen she continues to be active as active as she can be.  Her  is having rapid declining heart failure symptoms and is short of breath after 20 feet.  He is also having worsening dementia.  Her last echocardiogram showed severe AS with a normal EF.    Past Medical History:   Diagnosis Date    Breast cancer (formerly [facility])     Breath shortness     with exertion \"walking up a hill\"    Bruises easily     Cancer (formerly [facility]) 1989    breast left side lumpectomy    Cardiac murmur 6/3/2009    Chronic kidney disease, stage III (moderate) (formerly [facility]) 8/20/2015    Dyslipidemia 6/3/2009    Glaucoma     both eyes    Hepatitis A 1993    Hiatus hernia syndrome     Hx of breast cancer 6/3/2009    Hypertension 7/18/2017    currently not taking medications    MEDICAL HOME     Neutropenia 6/3/2009    Osteopenia 6/3/2009    Preventative health care 6/3/2009    Snoring     Unspecified cataract     bilateral IOLs     Past Surgical History:   Procedure Laterality Date    LUMBAR TRANSFORAMINAL EPIDURAL STEROID INJECTION Right 9/1/2022    Procedure: RIGHT lumbar four and lumbar five transforaminal epidural steroid injection;  Surgeon: Roni Choi M.D.;  [facility];  Service: Pain Management    LUMBAR TRANSFORAMINAL EPIDURAL STEROID INJECTION Right 1/6/2021    Procedure: INJECTION, STEROID, SPINE, LUMBAR, EPIDURAL, TRANSFORAMINAL APPROACH;  Surgeon: Roni Choi M.D.;  Location: [facility];  Service: Pain Management    KNEE ARTHROPLASTY TOTAL Right 5/8/2018    Procedure: KNEE ARTHROPLASTY TOTAL;  Surgeon: Thanh Hall M.D.;  Location: SURGERY [facility];  Service: " Orthopedics    VITRECTOMY POSTERIOR Left 10/25/2016    Procedure: VITRECTOMY POSTERIOR membrane peel cryotherapy infusion of SF6 intraocular gas;  Surgeon: Amanda Rodriguez M.D.;  Location: [facility];  Service:     RECOVERY  3/31/2014    Performed by [facility] at [facility]ERY [facility]    CATARACT PHACO WITH IOL  2009    Performed by GERA BREAUX at SURGERY SAME DAY [facility]    CATARACT PHACO WITH IOL  2009    Performed by GERA BREAUX at SURGERY SAME DAY [facility]    LUMPECTOMY      OTHER      [place] RADIATION THERAPY PLAN SIMPLE       Family History   Problem Relation Age of Onset    Heart Disease Father         CAD MI    Stroke Father     Cancer Sister         breast    Cancer Mother         breast     Social History     Socioeconomic History    Marital status:      Spouse name: Not on file    Number of children: Not on file    Years of education: Not on file    Highest education level: Not on file   Occupational History    Not on file   Tobacco Use    Smoking status: Former     Packs/day: 0.50     Years: 10.00     Pack years: 5.00     Types: Cigarettes     Quit date: 1999     Years since quittin.8    Smokeless tobacco: Never   Vaping Use    Vaping Use: Not on file   Substance and Sexual Activity    Alcohol use: Yes     Alcohol/week: 4.2 oz     Types: 7 Standard drinks or equivalent per week     Comment: 1 glass wine/day    Drug use: No    Sexual activity: Not Currently     Partners: Male   Other Topics Concern     Service No    Blood Transfusions No    Caffeine Concern No    Occupational Exposure No    Hobby Hazards Yes    Sleep Concern No    Stress Concern Yes    Weight Concern No    Special Diet No    Back Care No    Exercise No    Bike Helmet No    Seat Belt Yes    Self-Exams Yes   Social History Narrative    Not on file     Social Determinants of Health     Financial Resource Strain: Not on file   Food Insecurity: Not on file    Transportation Needs: Not on file   Physical Activity: Not on file   Stress: Not on file   Social Connections: Not on file   Intimate Partner Violence: Not on file   Housing Stability: Not on file     Allergies   Allergen Reactions    Atorvastatin Unspecified     Does not qualify for primary prevention    Naproxen      GI upset    Vicodin [Hydrocodone-Acetaminophen] Nausea     Nausea, dizziness     Outpatient Encounter Medications as of 11/7/2022   Medication Sig Dispense Refill    timolol (TIMOPTIC) 0.5 % Solution Administer 1 Drop into the left eye every day.      acetaminophen (TYLENOL) 500 MG Tab Take 2 Tablets by mouth every 6 hours as needed.      nitroglycerin (NITROSTAT) 0.4 MG SL Tab Place 1 Tablet under the tongue every 5 minutes as needed for Chest Pain.      bimatoprost (LUMIGAN) 0.01 % Solution Administer 1 Drop into both eyes at bedtime. Indications: Wide-Angle Glaucoma      vitamin D (CHOLECALCIFEROL) 1000 UNIT Tab Take 1 Tablet by mouth.      [DISCONTINUED] Magnesium 500 MG Tab Take 1 Capsule by mouth every day. (Patient not taking: Reported on 11/7/2022)       No facility-administered encounter medications on file as of 11/7/2022.     Review of Systems   Constitutional: Negative.  Negative for chills, fever and malaise/fatigue.   HENT: Negative.  Negative for sore throat.    Eyes: Negative.    Respiratory: Negative.  Negative for cough, hemoptysis, sputum production, shortness of breath, wheezing and stridor.    Cardiovascular: Negative.  Negative for chest pain, palpitations, orthopnea, claudication, leg swelling and PND.   Gastrointestinal: Negative.    Genitourinary: Negative.    Musculoskeletal: Negative.    Skin: Negative.    Neurological: Negative.  Negative for dizziness, loss of consciousness and weakness.   Endo/Heme/Allergies: Negative.  Does not bruise/bleed easily.   All other systems reviewed and are negative.           Objective     BP (!) 144/52 (BP Location: Left arm, Patient  "Position: Sitting, BP Cuff Size: Adult)   Pulse 82   Resp 14   Ht 1.575 m (5' 2\")   Wt 64.4 kg (142 lb)   SpO2 98%   BMI 25.97 kg/m²     Physical Exam       Echocardiogram: Dated 9/24/2021 personally viewed inter by myself showing normal LV systolic function with moderate to severe arctic stenosis.    Echocardiogram: Dated 5/11/2022 personally viewed inter by myself showing severe aortic stenosis no change in ejection fraction.    Lab Results   Component Value Date/Time    CHOLSTRLTOT 175 10/20/2022 11:23 AM    LDL 98 10/20/2022 11:23 AM    HDL 61 10/20/2022 11:23 AM    TRIGLYCERIDE 81 10/20/2022 11:23 AM       Lab Results   Component Value Date/Time    SODIUM 143 10/20/2022 11:23 AM    POTASSIUM 4.5 10/20/2022 11:23 AM    CHLORIDE 107 10/20/2022 11:23 AM    CO2 25 10/20/2022 11:23 AM    GLUCOSE 95 10/20/2022 11:23 AM    BUN 20 10/20/2022 11:23 AM    CREATININE 1.09 10/20/2022 11:23 AM    CREATININE 1.08 (H) 07/02/2010 08:56 AM    BUNCREATRAT 11 07/02/2010 08:56 AM    GLOMRATE 50 (L) 07/02/2010 08:56 AM     Lab Results   Component Value Date/Time    ALKPHOSPHAT 53 10/20/2022 11:23 AM    ASTSGOT 24 10/20/2022 11:23 AM    ALTSGPT 16 10/20/2022 11:23 AM    TBILIRUBIN 0.8 10/20/2022 11:23 AM          Assessment & Plan     1. Dyslipidemia        2. Preventative health care        3. Aortic valve stenosis, etiology of cardiac valve disease unspecified  EC-ECHOCARDIOGRAM COMPLETE W/O CONT      4. Decreased GFR        5. History of hypertension            Medical Decision Making: Today's Assessment/Status/Plan:        81-year-old female with asymptomatic severe aortic stenosis.  At this point we will continue clinical surveillance.  I offered support for her her and her .  At this point I like to repeat her echocardiogram.  She is asymptomatic from aortic stenosis.  We will see her back in 6 months.              "

## 2022-11-09 ENCOUNTER — HOSPITAL ENCOUNTER (OUTPATIENT)
Dept: CARDIOLOGY | Facility: MEDICAL CENTER | Age: 82
End: 2022-11-09
Attending: INTERNAL MEDICINE
Payer: MEDICARE

## 2022-11-09 DIAGNOSIS — I35.0 AORTIC VALVE STENOSIS, ETIOLOGY OF CARDIAC VALVE DISEASE UNSPECIFIED: Chronic | ICD-10-CM

## 2022-11-09 PROCEDURE — 93306 TTE W/DOPPLER COMPLETE: CPT

## 2022-11-10 LAB — LV EJECT FRACT  99904: 60

## 2022-11-10 PROCEDURE — 93306 TTE W/DOPPLER COMPLETE: CPT | Mod: 26 | Performed by: INTERNAL MEDICINE

## 2022-11-11 ENCOUNTER — TELEPHONE (OUTPATIENT)
Dept: CARDIOLOGY | Facility: MEDICAL CENTER | Age: 82
End: 2022-11-11
Payer: MEDICARE

## 2022-11-11 NOTE — TELEPHONE ENCOUNTER
"Message  Received: Today  Thanh Head M.D.  Trice Wilson R.N.  Please let patient know that her valve is severe and we can continue to follow it so long she has no symptoms.  If she has symptoms to have us call us right away.           EC-ECHOCARDIOGRAM COMPLETE W/O CONT    Called pt and discussed echo results and note above per RO. She confirmed no new or worsening symptoms. She said, \"I'm always cold on my feet. When I went skiing, we went to [place] and it was so cold. From then on my feet was always whitish. I think I had a little bit of frostbite.\" Pt reported she has \"a little bit of palpitations when I get upset. Pt stated this is not a new symptom and it occurs once in a while. Discussed ER precautions should she have new or worsening symptoms including chest pain/pressure or SOB. Advised will notify RO and if any new recommendations in the mean time, we will call her or send a [place] message. She has [facility] in May 2023. She verbalized understanding and was very appreciative.  "

## 2022-11-11 NOTE — RESULT ENCOUNTER NOTE
Please let patient know that her valve is severe and we can continue to follow it so long she has no symptoms.  If she has symptoms to have us call us right away.

## 2022-11-16 ENCOUNTER — APPOINTMENT (OUTPATIENT)
Dept: PHYSICAL MEDICINE AND REHAB | Facility: MEDICAL CENTER | Age: 82
End: 2022-11-16
Payer: MEDICARE

## 2022-11-17 ENCOUNTER — HOSPITAL ENCOUNTER (OUTPATIENT)
Dept: RADIOLOGY | Facility: MEDICAL CENTER | Age: 82
End: 2022-11-17
Attending: INTERNAL MEDICINE
Payer: MEDICARE

## 2022-11-17 DIAGNOSIS — Z12.31 ENCOUNTER FOR SCREENING MAMMOGRAM FOR BREAST CANCER: ICD-10-CM

## 2022-11-17 PROCEDURE — 77063 BREAST TOMOSYNTHESIS BI: CPT

## 2022-11-18 ENCOUNTER — TELEPHONE (OUTPATIENT)
Dept: MEDICAL GROUP | Facility: MEDICAL CENTER | Age: 82
End: 2022-11-18
Payer: MEDICARE

## 2022-11-18 NOTE — TELEPHONE ENCOUNTER
Please notify the patient that:  (1) her mammogram is negative but does show dense breast tissue which may decrease the accuracy of the mammogram  (2) she may obtain a screening breast ultrasound which could provide further detail  (3) [facility] no longer offers the screening breast ultrasound, thus if she is interested in having that done, she can have that at the [facility] if that imaging center is covered under her insurance plan  (3) although her insurance may not cover the screening breast ultrasound, if that is the case, then the out of pocket cost is approximately $125  (4) please let me know if she is interested in obtaining the screening breast ultrasound as we would need to send the order to [facility]

## 2022-11-19 NOTE — TELEPHONE ENCOUNTER
----- Message from Master Suarez M.D. sent at 11/18/2022  1:32 PM PST -----  Please notify the patient that:  (1) her mammogram is negative but does show dense breast tissue which may decrease the accuracy of the mammogram  (2) she may obtain a screening breast ultrasound which could provide further detail  (3) [facility] no longer offers the screening breast ultrasound, thus if she is interested in having that done, she can have that at the [facility] if that imaging center is covered under her insurance plan  (3) although her insurance may not cover the screening breast ultrasound, if that is the case, then the out of pocket cost is approximately $125  (4) please let me know if she is interested in obtaining the screening breast ultrasound as we would need to send the order to [facility]

## 2022-11-30 ENCOUNTER — TELEPHONE (OUTPATIENT)
Dept: PHYSICAL THERAPY | Facility: MEDICAL CENTER | Age: 82
End: 2022-11-30
Payer: MEDICARE

## 2022-11-30 NOTE — OP THERAPY DISCHARGE SUMMARY
Outpatient Physical Therapy  DISCHARGE SUMMARY NOTE      [facility] Outpatient [facility] Therapy  [address]  [place] [address]  Phone:  [phone]  Fax:  [phone]    Date of Visit: 11/30/2022    Patient: Katerina Torres  YOB: 1940  MRN: [id number]     Referring Provider: Roni Choi M.D.  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 Torres was seen for 4 PT sessions treating her LBP with R lumbar radiculopathy. Tx included manual therapy and electrical stimulation for pain control and focused on progressive therex to restore full lumbar AROM, resolve neural tension and normalize midline stability/strength. Pt demonstrated quick improvement with her neural signs nearly abolished after the 1st visit. She continued to make progress over the subsequent sessions, but has not returned since 9/30/22.  This episode of care will be discharged due to lapse. Pt is welcomed to return with a new referral if indicated.      Ivet Caro, PT, DPT    Date: 11/30/2022

## 2023-03-09 ENCOUNTER — TELEPHONE (OUTPATIENT)
Dept: CARDIOLOGY | Facility: MEDICAL CENTER | Age: 83
End: 2023-03-09
Payer: MEDICARE

## 2023-03-10 NOTE — TELEPHONE ENCOUNTER
Called and spoke to patient to discuss follow up appointment and echo. Patient states she is expecting guests and requests RN call back tomorrow. Will call back at that time.

## 2023-03-13 NOTE — TELEPHONE ENCOUNTER
Received call back from patient stating she has made appointment to see Dr. Head at his new office. She will reach out in the future if she chooses to follow with [facility].

## 2023-03-13 NOTE — TELEPHONE ENCOUNTER
Called and spoke to patient. Discussed follow up. Patient states she would like to continue seeing Dr. Head.     Advised to reach out to [facility] to verify that they accept patient's insurance and if not to reach out for follow up with [facility]. [facility] phone number provided. Patient verbalizes understanding and is in agreement with plan

## 2023-05-02 ENCOUNTER — OFFICE VISIT (OUTPATIENT)
Dept: MEDICAL GROUP | Facility: MEDICAL CENTER | Age: 83
End: 2023-05-02
Payer: MEDICARE

## 2023-05-02 VITALS
OXYGEN SATURATION: 96 % | TEMPERATURE: 98.1 F | BODY MASS INDEX: 23.99 KG/M2 | HEIGHT: 63 IN | WEIGHT: 135.4 LBS | SYSTOLIC BLOOD PRESSURE: 140 MMHG | HEART RATE: 74 BPM | DIASTOLIC BLOOD PRESSURE: 60 MMHG

## 2023-05-02 DIAGNOSIS — I35.0 AORTIC VALVE STENOSIS, ETIOLOGY OF CARDIAC VALVE DISEASE UNSPECIFIED: Chronic | ICD-10-CM

## 2023-05-02 DIAGNOSIS — N18.31 CHRONIC KIDNEY DISEASE, STAGE 3A: ICD-10-CM

## 2023-05-02 DIAGNOSIS — E55.9 VITAMIN D DEFICIENCY: ICD-10-CM

## 2023-05-02 DIAGNOSIS — E78.5 DYSLIPIDEMIA: Chronic | ICD-10-CM

## 2023-05-02 DIAGNOSIS — R73.09 IMPAIRED GLUCOSE METABOLISM: ICD-10-CM

## 2023-05-02 PROCEDURE — 99214 OFFICE O/P EST MOD 30 MIN: CPT | Performed by: INTERNAL MEDICINE

## 2023-05-02 ASSESSMENT — PATIENT HEALTH QUESTIONNAIRE - PHQ9: CLINICAL INTERPRETATION OF PHQ2 SCORE: 0

## 2023-05-02 ASSESSMENT — FIBROSIS 4 INDEX: FIB4 SCORE: 2.625

## 2023-05-02 NOTE — PROGRESS NOTES
Subjective     Katerina Torres is a 82 y.o. female who presents with Referral Needed (Cardiology doctor?)            HPI    Patient with severe aortic stenosis, has been followed by  from an cardiology but he is left the practice and is now with [facility]  Patient was going to follow him to [facility] however since she has [employer], she was told that would not be covered and she would need to stay with the [facility] cardiology group.  She is , lives with her  who has moderate severe Alzheimer's and she has been under more stress taking care of him as his memory declines and he is less active.  She cannot leave him alone as he needs supervision.  She still tries to keep active, does all the housework and she enjoys working in the garden.  When she is outside working or even inside now she does not have any shortness of breath with activity, no chest pain, palpitations, lightheadedness, syncope or leg edema.  Tries to follow a low-sodium diet.  No tobacco, no alcohol.  She does notice when walking up a hill she will become more short of breath and fatigue but this does not occur on flat surfaces.  She does all of her own ADLs without assist, drives without difficulty, some anxiety and mood changes because of her 's Alzheimer's dementia.  She can do her bathing, toileting, cooking, dressing herself without difficulty.  She does not have family in town but has an advanced directive and power of  and has good help from her neighbors and community.  Her back pain has been stable, no NSAIDs, no falls.  Back pain does not limit her activity.          Current Outpatient Medications   Medication Sig Dispense Refill    timolol (TIMOPTIC) 0.5 % Solution Administer 1 Drop into the left eye every day.      acetaminophen (TYLENOL) 500 MG Tab Take 2 Tablets by mouth every 6 hours as needed.      nitroglycerin (NITROSTAT) 0.4 MG SL Tab Place 1 Tablet under the tongue every 5  minutes as needed for Chest Pain.      bimatoprost (LUMIGAN) 0.01 % Solution Administer 1 Drop into both eyes at bedtime. Indications: Wide-Angle Glaucoma      vitamin D (CHOLECALCIFEROL) 1000 UNIT Tab Take 1 Tablet by mouth.       No current facility-administered medications for this visit.     s/p knee replacement  3/3/17 MRI right knee abnormal right lateral meniscus with peripheral extrusion, maceration, and complex tear involving primarily the posterior horn and body but also the anterior horn, abnormal medial meniscus with vertical tear of the peripheral zone of body and posterior horn, probable meniscal root tear, and degenerative fraying of the free edge, severe lateral femorotibial compartment osteoarthritis with significant cartilage loss and moderate to severe subchondral edema within the lateral femoral condyle and lateral tibial plateau, mild medial femorotibial and the patellofemoral compartment osteoarthritis, moderate sized joint effusion with associated popliteal cyst  6/20/17 sees  orthopedics  1/18/18  orthopedic note right knee end-stage arthritis, x-rays bone-on-bone right knee arthritis lateral compartment, right knee steroid injection performed, planned for total right knee arthroplasty after winter  5/2/18  orthopedic note, right knee osteoathritis, failed conservative measures, scheduled for right knee surgery  5/8/18  operative note right knee arthroplasty   5/23/18  orthopedic note 2 weeks s/p right total knee  8/6/18  orthopedic note 3 months status post right knee replacement doing well, x-ray shows stable right total knee  5/22/19  orthopedic note x-ray stable total knee replacement, follow-up 1 year     Preventative health  1/10/13 pneumovax  7/29/14 zoster vaccine  8/6/15 prevnar at [facility]  9/1/16 sonocine negative  3/7/17 colon per [facility] 2 polyps, grade 2 internal hemorrhoids, angioectasias ascending colon,pathology one  hyperplastic polyp and one sessile serrated polyp, repeat colonoscopy 5 years   9/19/20 shingrix second  8/23/21 dexa LS-0.9,hip-1.4  4/28/22 tdap   6/21/22 covid 4th  9/26/22 prevnar 20  10/20/22 vit d 83  11/18/22 mammogram heterogeneously dense breast tissue, declines screening breast ultrasound      Osteopenia  9/06 dexa LS -1.2,hip -1.1  6/09 dexa LS -1.4,hip -1.0  7/11 vit d 43  7/11 dexa LS -1.1,hip -0.9  712 vit d 30 start 1000 units  7/24/13 vit d 72 on 1000 units  8/6/13 dexa LS -1.1,hip -1.1  8/5/14 vit d 67  8/19/15 dexa LS -1.4,hip -1.3;FRAX 40% major,27% hip only on prednisone one month, does not need bisphosphonate therapy  8/19/15 vit d 43  6/16/16 off prednisone x 3 weeks  6/21/16 vit d 50  6/27/17 vit d 48  4/27/18 vit d 53   5/14/19 vit d 46  5/28/19 dexa LS-1.0,hip-1.4  6/12/20 vit d 88 on 1000 units decrease to qod   8/23/21 dexa LS-0.9,hip-1.4  1/14/22 vit d 68     low back pain  11/30/20 right-sided buttock pain with radiation, referral to physiatry, x-ray, MRI lumbar spine, trial of naproxen short-term plus acetaminophen follow-up 3 weeks  11/30/20 x-ray lumbar spine moderate spondylosis, L2-L3 asymmetric disc height loss on the left resulting in slight right curvature and degenerative retrolisthesis  12/2/20 MRI lumbar spine mild superior endplate compression fracture at L3, no marrow edema consistent with chronic process, L2-L3 moderate central narrowing, moderate right and severe left neuroforaminal narrowing, L3-4 moderate central narrowing, moderate bilateral neuroforaminal narrowing, L4-5 moderate to severe central canal narrowing with severe right and moderate left neuroforaminal narrowing, L5-S1 moderate bilateral neuroforaminal narrowing  1/6/21  physiatry procedure note right lumbar epidural steroid injection L4-S1 under fluoroscopy  1/26/21  physiatry note good response to right lumbar L4-L5 epidural steroid injections, she has returned to activities such as skiing,  consider physical therapy and home program for residual pain, consider right SI joint versus repeat lumbar steroid injection if necessary, follow-up 2 months  2/8/21 [facility] note   2/17/21 [facility] note   8/31/22 dr.storm physiatry note plan right L4-L5 epidural steroid injection under fluoroscopy and physical therapy as tolerated  9/1/22 dr.storm physiatry procedure note epidural L4-L5 L5-S1 steroid injection  9/6/22 x-ray sacrum degenerative changes SI joint unchanged from prior  9/6/22 x-ray right hip stable mild bilateral osteoarthritis  9/6/22 dr.storm physiatry note SI joint dysfunction, obtain x-ray lumbar spine, sacrum and coccyx, right hip, trial of tramadol every 6 hours, follow-up physical therapy  9/16/22 dr.storm amaro note low back pain, making improvements with physical therapy, continues to have sacral dysfunction with radicular symptoms, discontinue tramadol, trial of acetaminophen 1300 mg tid ,avoid nsaids     knee pain left  8/1/13 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, lateral meniscus mildly extruded, ruptured hopper's cyst  8/12/13  ortho note pain improved on followup, consider injection if pain recurs     History polymyalgia  4/20/15 physical therapy, trial celebrex and zanaflex  5/7/15 physical therapy evaluation today recommended right hip x-ray and pelvic x-ray, ordered in computer  5/8/15 xray hip negative  6/29/15 seen by bridgette diagnosed with polymyalgia rheumatica  6/29/15 CRP 9.4,ESR 32 started on prednisone 20 mg    7/28/15 on prednisone 10 mg will continue 10 mg x 2 weeks, then decrease to 5 mg daily  8/19/15 hgb 14,hct 43, CRP 0.3, ESR 14, tapered off prednisone but developed mouth irritation, has resumed prednisone 5 mg daily, will continue x2 weeks then taper  11/17/15 refill prednisone 5 mg #30 tabs x one  6/21/16 off prednisone; CRP 0.1,ESR 17  6/27/17 CRP 0.09,ESR 12,cpk 66     history neutropenia  8/06 wbc 3.4  3/08 wbc  3.9  6/09 wbc 3.9  7/10 wbc 4.4  7/11 wbc 4.5  7/12 wbc 3.8 (55%N,35%L)  7/24/13 wbc 4.3  3/20/14 wbc 3.1 (52%N,36%L)  3/31/14 wbc 3.7  8/19/15 wbc 5.3  6/21/16 wbc 4.4 (50%N,40%L)  6/27/17 wbc 4.2   4/27/18 wbc 4.4  5/14/19 wbc 4.5  6/12/20 wbc 4.5  6/30/21 wbc 4.3  1/14/22 wbc 5.3  10/20/22 wbc 5.6     histroy hypertension  3/31/14 cardiac catheterization; left main mild plaquing, LAD patent, circumflex free of disease, RCA free of disease, normal LV function, mild aortic stenosis  7/9/17 echo normal LV size and function, EF 60%, grade 1 diastolic dysfunction, moderate aortic stenosis peak gradient 50, mean 29, valve area 0.8-1.0 and aortic regurgitation  7/9/17 persantine thallium small fixed perfusion abnormality distal anterior and anteroapical segments, no ischemia is noted to represent mild prior infarct or variant normal apical thinning  7/18/17 start diltiazem 120 mg daily (also has esophageal dysmotility by barium swallow)  4/26/18 off diltiazem  4/27/18 echo normal LV size and function, EF 65%, moderate aortic stenosis peak gradient 46, valve area 0.9,, moderate aortic insufficiency, no change compared to previous  5/27/19 echo normal LV size and function, EF 65%, normal diastolic function, calcified aortic valve moderate aortic stenosis peak gradient 53, moderate aortic insufficiency  10/1/21 cardiology note will likely need valve replacement next 1 to 2 years, follow-up 6 months  2/14/22  cardiology note moderate to severe aortic stenosis, able to walk 4 miles with no limitations, will order repeat echo follow-up 2 months  5/11/22 echo EF 55%,severe aortic stenosis peak gradient 70  5/13/22 cardiology note asymptomatic severe aortic stenosis, continue surveillance follow-up 6 months  11/7/22 cardiology note asymptomatic severe aortic stenosis, continue surveillance, repeat echo, follow-up 6 months  11/10/22 echo severe aortic stenosis, peak gradient 65, EF 60%, normal diastolic function      History of breast cancer  1980s left sided s/p lumpectomy and radiation therapy, no old records  7/11 mammogram  8/12 mammogram  8/13 mammogram  8/18/14 mammogram  9/1/16 mammogram heterogeneously dense breast tissue recommend screening breast ultrasound  9/1/16 ajay negative     History of basal cell skin cancer     Glaucoma  4/29/21  eye exam   1/13/22  eye exam primary open-angle glaucoma  9/16/22  eye exam      Esophageal dysmotility  7/9/17 barium swallow moderate esophageal dysmotility, small volume silent aspiration  7/18/17 start diltiazem 120 mg daily  7/18/17 referral [facility], speech pathology for esophageal dysmotility, small amount of aspiration on barium swallow, try low-dose diltiazem 120 mg for esophageal dysmotility and elevated blood pressure  7/26/17 [facility] evaluation dyspepsia, obtain cardiology clearance and then proceed EGD, initiate pantoprazole 20 mg, trial of miralax and colace     Dyslipidemia  3/08 chol 175,trig 73,hdl 45,ldl 115  6/09 chol 174,trig 89,hdl 47,ldl 109  7/10 chol 182,trig 61,hdl 55,ldl 114  7/11 chol 175,trig 69,hdl 45,ldl 116  7/12 chol 164,trig 64,hdl 43,ldl 108  7/24/13 chol 186,trig 66,hdl 54,ldl 119 no meds  8/5/14 chol 165,trig 93,hdl 48,ldl 98  8/19/15 chol 192,trig 109,hdl 58,ldl 112; 10 year risk 20% declines statin therapy  6/21/16 chol 137,trig 75,hdl 47,ldl 75   6/27/17 hcol 153,trig 94,hdl 54,ldl 80  5/14/19 chol 164,trig 82,hdl 48,ldl 100   6/12/20 chol 176,trig 80,hdl 57,ldl 103   6/30/21 chol 170,trig 100,hdl 43,ldl 107  10/20/22 chol 175,trig 81,hdl 61,ldl 98     Decrease GFR  7/7/11 bun 16,creat 1.0,GFR 50  7/24/13 bun 14,creat 1.1,GFR 45  3/20/14 bun 15,creat 0.9,GFR 59  8/5/14 bun 14,creat 1.1,GFR 46  2/19/15 bun 13,creat 0.8,GFR >60  8/19/15 bun 10,creat 1.1,GFR 48  6/21/16 bun 19,creat 1.0,GFR 50  6/27/17 bun 14,creat 0.5,GFR 51  4/28/18 bun 15,creat 0.9,GFR 60  5/14/19 bun 18,creat 1.13,GFR 46  6/12/20 bun 15,creat 1.07,GFR  49  6/30/21 bun 14,creat 1.07,GFR 49,PTH 36,urine mac<1.2,SPEP negative  1/14/22 bun 19,creat 1.18,GFR 44,PTH 25,calcium 9.6,phos 3.7  10/20/22 bun 20,creat 1.09,GFR 50,PTH 31    colon polyp  3/7/17 colon per [facility] 2 polyps, grade 2 internal hemorrhoids, angioectasias ascending colon,pathology one hyperplastic polyp and onesessile serrated polyp, repeat colonoscopy 5 years      Aortic stenosis  10/06 echo mild mr, normal LV  6/09 stress echo negative  8/12 echo normal LV function, EF 65%, mild aortic stenosis, peak gradient 24  7/24/13 normal LV function, EF 60%, mild LVH, mild aortic stenosis, peak gradient 25  3/31/14 cardiac catheterization; left main mild lacking, LAD patent, circumflex free of disease, RCA free of disease, normal LV function, mild aortic stenosis  7/9/17 echo normal LV size and function, EF 60%, grade 1 diastolic dysfunction, moderate aortic stenosis peak gradient 50, mean 29, valve area 0.8-1.0 and aortic regurgitation  7/9/17 persantine thallium small fixed perfusion abnormality distal anterior and anteroapical segments, no ischemia is noted to represent mild prior infarct or variant normal apical thinning  4/27/18 echo normal LV size and function, EF 65%, moderate aortic stenosis peak gradient 46, valve area 0.9,, moderate aortic insufficiency, no change compared to previous  5/27/19 echo normal LV size and function, EF 65%, normal diastolic function, calcified aortic valve moderate aortic stenosis peak gradient 53, moderate aortic insufficiency  9/24/21 echo EF 65%, indeterminate diastolic function, moderate aortic stenosis, peak gradient 56, moderate aortic insufficiency  10/1/21 cardiology note will likely need valve replacement next 1 to 2 years, follow-up 6 months  2/14/22  cardiology note moderate to severe aortic stenosis, able to walk 4 miles with no limitations, will order repeat echo follow-up 2 months  5/11/22 echo EF 55%,severe aortic stenosis peak gradient 70  5/13/22  cardiology note asymptomatic severe aortic stenosis, continue surveillance follow-up 6 months  11/7/22 cardiology note asymptomatic severe aortic stenosis, continue surveillance, repeat echo, follow-up 6 months  11/10/22 echo severe aortic stenosis, peak gradient 65, EF 60%, normal diastolic function        Patient Active Problem List   Diagnosis    History of breast cancer    Osteopenia    Dyslipidemia    History of neutropenia    Preventative health care    Aortic stenosis    Glaucoma    History of basal cell carcinoma of skin    Knee pain, left    History of polymyalgia rheumatica    Decreased GFR    Colon polyp    S/P knee replacement    Esophageal dysmotility    History of hypertension    History of palpitations    Low back pain         ROS           Objective          Physical Exam  Vitals and nursing note reviewed.   Constitutional:       Appearance: Normal appearance.   HENT:      Head: Normocephalic and atraumatic.   Eyes:      Conjunctiva/sclera: Conjunctivae normal.   Cardiovascular:      Rate and Rhythm: Normal rate and regular rhythm.      Heart sounds: Murmur heard.   Pulmonary:      Effort: Pulmonary effort is normal.      Breath sounds: Normal breath sounds.   Abdominal:      General: There is no distension.   Musculoskeletal:         General: No swelling.      Cervical back: Neck supple.   Skin:     Coloration: Skin is not jaundiced.      Findings: No bruising.   Neurological:      General: No focal deficit present.      Mental Status: She is alert.   Psychiatric:         Mood and Affect: Mood normal.         Behavior: Behavior normal.     Lower extremities no edema                      Assessment & Plan      Assessment  #1 aortic stenosis severe by last echo followed by cardiology last note 11/7/22 cardiology note asymptomatic severe aortic stenosis, continue surveillance, repeat echo, follow-up 6 months, most recent echo 11/10/22 echo severe aortic stenosis, peak gradient 65, EF 60%, normal  diastolic function, currently asymptomatic with regards to chest pain, shortness of breath with standard activity although she does have some shortness of breath walking up hills     #2 ckd stage 3a 10/20/22 bun 20,creat 1.09,GFR 50,PTH 31, no regular NSAIDs    #3 history of low back pain has seen physiatry previously with previous epidural injections, seen physical therapy, currently stable, no falls     #4 anxiety related to her 's dementia memory loss, she has spoken to the , she does have a power of  advanced directive, they have no family in town but have good support with neighbors.  She denies depression.  No alcohol.    #5 postmenopausal bone loss and vitamin D deficiency    #6 impaired glucose metabolism    #7 dyslipidemia diet controlled      Plan  #! Referral back to [facility] cardiology structural heart program for severe aortic stenosis currently asymptomatic with no chest pain, shortness of breath, palpitations or lightheadedness with standard activity, last saw cardiology with her last echo in November, and she is due for a follow-up however her previous cardiologist has left the practice and she needs to establish with the new cardiologist, that referral has been submitted    #2  Continue low-sodium diet, continue regular activity following precautions    #3 has declined physical therapy as back pain has been stable, continue no NSAIDs    #4 we spent some time discussing her 's disease and the inevitable progression of his disease which will continue to manifest with cognitive decline, change in function or behavior, and ultimately I feel that it would be best suited to look for some type of supervised living arrangement, they could share a residence in a assisted living facility with memory care capability for her .  She states she is not ready for that as she just completed a reverse mortgage, and her  would not be willing to move.  I did discuss with  her long-term this would be the best for her  as far as his care and for her anxiety and the stress that she is under having to provide all care and support for her.  She understands my concerns but indicates that she will continue to live at home with her  and if necessary her a home health aide to come in periodically, as unfortunately it is not covered under the insurance.    #5 Labs ordered    #6 she can get the covid bivalent vaccine at the pharmacy    #7 follow-up as scheduled in July

## 2023-05-10 ENCOUNTER — TELEPHONE (OUTPATIENT)
Dept: MEDICAL GROUP | Facility: MEDICAL CENTER | Age: 83
End: 2023-05-10
Payer: MEDICARE

## 2023-05-10 NOTE — TELEPHONE ENCOUNTER
Called the patient, notified her that the referral to [facility] cardiology has been approved, she will call [phone] to schedule that appointment, I did provide the number to her and she did write that down and advised her to call tomorrow.

## 2023-05-11 ENCOUNTER — TELEPHONE (OUTPATIENT)
Dept: CARDIOLOGY | Facility: MEDICAL CENTER | Age: 83
End: 2023-05-11
Payer: MEDICARE

## 2023-05-11 DIAGNOSIS — I35.0 SEVERE AORTIC STENOSIS: ICD-10-CM

## 2023-05-11 DIAGNOSIS — Z01.810 PRE-PROCEDURAL CARDIOVASCULAR EXAMINATION: ICD-10-CM

## 2023-05-11 NOTE — TELEPHONE ENCOUNTER
Referral from: Dr. Master Suarez for sev AS    Patient called on 5/11/2023. LVM on home and mobile numbers for patient to call back and discuss.     First attempt

## 2023-05-12 NOTE — TELEPHONE ENCOUNTER
Called patient again.     Discussed with patient consultation appointments, testing needed, and plan of care.    Patient given dates and times of testing and consultations. Also sent [facility] message with appt details.     All questions answered.    Phone number given to patient for [facility] for any further questions or concerns.

## 2023-05-15 ENCOUNTER — TELEPHONE (OUTPATIENT)
Dept: CARDIOLOGY | Facility: MEDICAL CENTER | Age: 83
End: 2023-05-15
Payer: MEDICARE

## 2023-05-15 NOTE — PROGRESS NOTES
REFERRING PHYSICIAN: Robert Nunez MD.     CONSULTING PHYSICIAN: Jonny Faustin DO     CHIEF COMPLAINT: Shortness of breath     HISTORY OF PRESENT ILLNESS: The patient is a 82 y.o. female with past medical history of aortic stenosis and chronic kidney disease stage III who presents with shortness of breath. She also complain of decreased exertional capacity, chest pressure, palpitations and anxiety. She states she has been skiing her whole life and stopped last year due to symptoms. She denies PND, orthopnea, dizziness, and syncope.     PAST MEDICAL HISTORY:   Active Ambulatory Problems     Diagnosis Date Noted    History of breast cancer 06/03/2009    Osteopenia 06/03/2009    Dyslipidemia 06/03/2009    History of neutropenia 06/03/2009    Severe aortic stenosis 06/03/2009    Glaucoma 06/29/2010    History of basal cell carcinoma of skin 06/29/2010    Knee pain, left 08/01/2013    History of polymyalgia rheumatica 07/28/2015    Decreased GFR 08/20/2015    Colon polyp 03/13/2017    S/P knee replacement 06/20/2017    Esophageal dysmotility 07/18/2017    History of hypertension 07/18/2017    History of palpitations 06/05/2020    Low back pain 12/02/2020    Chronic kidney disease, stage 3a ([facility]) 05/02/2023     Resolved Ambulatory Problems     Diagnosis Date Noted    Preventative health care 06/03/2009    MEDICAL HOME 08/29/2012    MEDICAL HOME     Abnormal cardiovascular stress test 03/24/2014    Angina, preinfarctional ([facility]) 03/28/2014    Other nonspecific abnormal cardiovascular system function study 03/31/2014    Dyspnea 07/29/2014    Bilateral chronic knee pain 06/29/2015    Chronic pain of both shoulders 06/29/2015    Macular pucker 10/25/2016    Right ear pain 01/30/2017    Acute suppurative otitis media of right ear without spontaneous rupture of tympanic membrane 01/30/2017    Meniscus tear 03/05/2017    Chest pain 07/09/2017    Dysphagia 07/09/2017    Sciatica associated with disorder of lumbar spine  09/27/2017    Aortic regurgitation 04/27/2018    Cataract 09/10/2021     Past Medical History:   Diagnosis Date    Breast cancer (HCC)     Breath shortness     Bruises easily     Cancer (HCC) 1989    Cardiac murmur 6/3/2009    Chronic kidney disease, stage III (moderate) (HCC) 8/20/2015    Hepatitis A 1993    Hiatus hernia syndrome     Hx of breast cancer 6/3/2009    Hypertension 7/18/2017    Neutropenia 6/3/2009    Snoring     Unspecified cataract        PAST SURGICAL HISTORY:   Past Surgical History:   Procedure Laterality Date    LUMBAR TRANSFORAMINAL EPIDURAL STEROID INJECTION Right 9/1/2022    Procedure: RIGHT lumbar four and lumbar five transforaminal epidural steroid injection;  Surgeon: Roni Choi M.D.;  Location[facility];  Service: Pain Management    LUMBAR TRANSFORAMINAL EPIDURAL STEROID INJECTION Right 1/6/2021    Procedure: INJECTION, STEROID, SPINE, LUMBAR, EPIDURAL, TRANSFORAMINAL APPROACH;  Surgeon: Roni Choi M.D.;  Location: [facility];  Service: Pain Management    KNEE ARTHROPLASTY TOTAL Right 5/8/2018    Procedure: KNEE ARTHROPLASTY TOTAL;  Surgeon: Thanh Hall M.D.;  Location: SURGERY [facility];  Service: Orthopedics    VITRECTOMY POSTERIOR Left 10/25/2016    Procedure: VITRECTOMY POSTERIOR membrane peel cryotherapy infusion of SF6 intraocular gas;  Surgeon: Amanda Rodriguez M.D.;  Location: SURGERY SAME DAY [facility];  Service:     RECOVERY  3/31/2014    Performed by [facility]Rec[facility] Surgery at SURGERY SAME DAY [facility]    CATARACT PHACO WITH IOL  5/28/2009    Performed by GERA BREAUX at SURGERY SAME DAY [facility] ORS    CATARACT PHACO WITH IOL  5/14/2009    Performed by GERA BREAUX at SURGERY SAME DAY [facility] ORS    LUMPECTOMY      OTHER      CT RADIATION THERAPY PLAN SIMPLE          ALLERGIES:   Allergies   Allergen Reactions    Atorvastatin Unspecified     Does not qualify for primary prevention    Naproxen       GI upset    Vicodin [Hydrocodone-Acetaminophen] Nausea     Nausea, dizziness        CURRENT MEDICATIONS:   Current Outpatient Medications:     timolol (TIMOPTIC) 0.5 % Solution, Administer 1 Drop into the left eye every day., Disp: , Rfl:     acetaminophen (TYLENOL) 500 MG Tab, Take 2 Tablets by mouth every 6 hours as needed., Disp: , Rfl:     nitroglycerin (NITROSTAT) 0.4 MG SL Tab, Place 1 Tablet under the tongue every 5 minutes as needed for Chest Pain., Disp: , Rfl:     bimatoprost (LUMIGAN) 0.01 % Solution, Administer 1 Drop into both eyes at bedtime. Indications: Wide-Angle Glaucoma, Disp: , Rfl:     vitamin D (CHOLECALCIFEROL) 1000 UNIT Tab, Take 1 Tablet by mouth., Disp: , Rfl:     FAMILY HISTORY:   Family History   Problem Relation Age of Onset    Heart Disease Father         CAD MI    Stroke Father     Cancer Sister         breast    Cancer Mother         breast        SOCIAL HISTORY:   Social History     Socioeconomic History    Marital status:      Spouse name: Not on file    Number of children: Not on file    Years of education: Not on file    Highest education level: Not on file   Occupational History    Not on file   Tobacco Use    Smoking status: Former     Packs/day: 0.50     Years: 10.00     Pack years: 5.00     Types: Cigarettes     Quit date: 1999     Years since quittin.4    Smokeless tobacco: Never   Vaping Use    Vaping Use: Not on file   Substance and Sexual Activity    Alcohol use: Yes     Alcohol/week: 4.2 oz     Types: 7 Standard drinks or equivalent per week     Comment: 1 glass wine/day    Drug use: No    Sexual activity: Not Currently     Partners: Male   Other Topics Concern     Service No    Blood Transfusions No    Caffeine Concern No    Occupational Exposure No    Hobby Hazards Yes    Sleep Concern No    Stress Concern Yes    Weight Concern No    Special Diet No    Back Care No    Exercise No    Bike Helmet No    Seat Belt Yes    Self-Exams Yes   Social  "History Narrative    Not on file     Social Determinants of Health     Financial Resource Strain: Not on file   Food Insecurity: Not on file   Transportation Needs: Not on file   Physical Activity: Not on file   Stress: Not on file   Social Connections: Not on file   Intimate Partner Violence: Not on file   Housing Stability: Not on file       REVIEW OF SYSTEMS:  Review of Systems   Constitutional:  Positive for malaise/fatigue. Negative for chills, diaphoresis, fever and weight loss.   HENT:  Negative for congestion, ear pain, hearing loss, nosebleeds, sore throat and tinnitus.    Eyes:  Negative for blurred vision, double vision, pain and discharge.   Respiratory:  Positive for shortness of breath. Negative for cough, hemoptysis, sputum production, wheezing and stridor.    Cardiovascular:  Positive for chest pain. Negative for palpitations, orthopnea and leg swelling.   Gastrointestinal:  Negative for abdominal pain, constipation, heartburn, melena, nausea and vomiting.   Genitourinary:  Negative for dysuria, flank pain and hematuria.   Musculoskeletal:  Positive for joint pain. Negative for myalgias and neck pain.   Skin:  Negative for itching and rash.   Neurological:  Negative for dizziness, speech change, focal weakness, seizures, weakness and headaches.   Endo/Heme/Allergies:  Negative for environmental allergies and polydipsia. Does not bruise/bleed easily.   Psychiatric/Behavioral:  Negative for depression, hallucinations, substance abuse and suicidal ideas. The patient is nervous/anxious and has insomnia.        PHYSICAL EXAMINATION:    /64 (BP Location: Left arm, Patient Position: Sitting, BP Cuff Size: Adult)   Pulse (!) 59   Temp 36.4 °C (97.5 °F) (Temporal)   Ht 1.575 m (5' 2\")   Wt 64.9 kg (143 lb 1.3 oz)   SpO2 95%   BMI 26.17 kg/m²    Physical Exam  Constitutional:       General: She is not in acute distress.  HENT:      Head: Normocephalic and atraumatic.      Nose: Nose normal.   Eyes: "      Conjunctiva/sclera: Conjunctivae normal.      Pupils: Pupils are equal, round, and reactive to light.   Neck:      Vascular: No JVD.      Trachea: No tracheal deviation.   Cardiovascular:      Rate and Rhythm: Normal rate and regular rhythm.      Heart sounds: Murmur heard.   Pulmonary:      Effort: Pulmonary effort is normal. No respiratory distress.      Breath sounds: Normal breath sounds. No stridor.   Abdominal:      General: Bowel sounds are normal. There is no distension.      Palpations: Abdomen is soft.      Tenderness: There is no abdominal tenderness.   Musculoskeletal:         General: No tenderness. Normal range of motion.      Cervical back: Normal range of motion and neck supple.   Skin:     General: Skin is warm and dry.   Neurological:      Mental Status: She is alert and oriented to person, place, and time.      Coordination: Coordination normal.      Gait: Gait is intact.   Psychiatric:         Mood and Affect: Mood and affect normal.         Cognition and Memory: Memory normal.       LABS REVIEWED:  Lab Results   Component Value Date/Time    SODIUM 139 05/17/2023 03:34 PM    POTASSIUM 4.4 05/17/2023 03:34 PM    CHLORIDE 105 05/17/2023 03:34 PM    CO2 23 05/17/2023 03:34 PM    GLUCOSE 92 05/17/2023 03:34 PM    BUN 18 05/17/2023 03:34 PM    CREATININE 1.05 05/17/2023 03:34 PM    CREATININE 1.08 (H) 07/02/2010 08:56 AM    BUNCREATRAT 11 07/02/2010 08:56 AM    GLOMRATE 50 (L) 07/02/2010 08:56 AM      Lab Results   Component Value Date/Time    PROTHROMBTM 13.1 03/31/2014 08:05 AM    INR 1.00 03/31/2014 08:05 AM      Lab Results   Component Value Date/Time    WBC 4.6 (L) 10/20/2022 11:23 AM    WBC 4.4 07/02/2010 08:56 AM    RBC 4.46 10/20/2022 11:23 AM    RBC 4.45 07/02/2010 08:56 AM    HEMOGLOBIN 14.6 10/20/2022 11:23 AM    HEMATOCRIT 43.1 10/20/2022 11:23 AM    MCV 96.6 10/20/2022 11:23 AM    MCV 95 07/02/2010 08:56 AM    MCH 32.7 10/20/2022 11:23 AM    MCH 31.5 07/02/2010 08:56 AM    MCHC 33.9  10/20/2022 11:23 AM    MPV 10.8 10/20/2022 11:23 AM    NEUTSPOLYS 59.30 10/20/2022 11:23 AM    LYMPHOCYTES 31.00 10/20/2022 11:23 AM    MONOCYTES 6.70 10/20/2022 11:23 AM    EOSINOPHILS 1.70 10/20/2022 11:23 AM    BASOPHILS 0.90 10/20/2022 11:23 AM        IMAGING REVIEWED AND INTERPRETED:    ECHOCARDIOGRAM   11/10/22 [facility]  CONCLUSIONS  Compared to the images of the prior study 05/11/2022, the aortic   stenosis is severe.  Vmax is 4.0 m/s.  Severe aortic valve stenosis. Vmax is 4.0 m/s.  Transvalvular gradients   are - Peak: 65 mmHg, Transvalvular gradients are - Peak: 65 mmHg, Mean:   41 mmHg.  The left ventricular ejection fraction is visually estimated to be 60%.     CARDIAC CATHETERIZATION: pending    CT SCAN CHEST: pending      IMPRESSION:  Severe aortic stenosis      PLAN:  I recommend TAVR.  The procedure, its risks, benefits, potential complications and alternative treatments were discussed with the patient in detail including the risks should she decide not to undergo my recommended treatment. All of her questions were answered to her satisfaction and she is willing to proceed with the operation. The risks include death, stroke, infection: to include a rare bacterial infection related to the use of the heart/lung machine, lan-operative myocardial infarction, dysrhythmias, diaphragmatic paralysis, chest wall paresthesia, tracheostomy, kidney or other organ failure, possible return to the operating room for bleeding, bleeding requiring transfusion with its attendant risks including AIDS or hepatitis, dehiscence of surgical incisions, respiratory complications including the need for prolonged ventilator support, Protamine or other drug reaction, peripheral neuropathy, loss of limb, and miscount of surgical items. The operative mortality risk is approximately 5%. The STS mortality risk score is 3% and the morbidity and mortality risk score is 12.7%. The scores were discussed with patient.     Thank you for this  very challenging consultation and participation in the patient’s care.  I will keep you apprised of all future developments.        Sincerely,     Jonny Faustin, DO

## 2023-05-15 NOTE — TELEPHONE ENCOUNTER
Consult on 5-24-23 at 3:40 with Dr. Nunez. Pre TAVR angio spot held on 5-31-23 at 7:30 with . Paper work put into basket in RN office.

## 2023-05-17 ENCOUNTER — HOSPITAL ENCOUNTER (OUTPATIENT)
Dept: LAB | Facility: MEDICAL CENTER | Age: 83
End: 2023-05-17
Attending: INTERNAL MEDICINE
Payer: MEDICARE

## 2023-05-17 DIAGNOSIS — E78.5 DYSLIPIDEMIA: Chronic | ICD-10-CM

## 2023-05-17 PROCEDURE — 36415 COLL VENOUS BLD VENIPUNCTURE: CPT

## 2023-05-17 PROCEDURE — 80053 COMPREHEN METABOLIC PANEL: CPT

## 2023-05-18 ENCOUNTER — TELEPHONE (OUTPATIENT)
Dept: MEDICAL GROUP | Facility: MEDICAL CENTER | Age: 83
End: 2023-05-18
Payer: MEDICARE

## 2023-05-18 LAB
ALBUMIN SERPL BCP-MCNC: 4.4 G/DL (ref 3.2–4.9)
ALBUMIN/GLOB SERPL: 1.8 G/DL
ALP SERPL-CCNC: 60 U/L (ref 30–99)
ALT SERPL-CCNC: 14 U/L (ref 2–50)
ANION GAP SERPL CALC-SCNC: 11 MMOL/L (ref 7–16)
AST SERPL-CCNC: 20 U/L (ref 12–45)
BILIRUB SERPL-MCNC: 0.4 MG/DL (ref 0.1–1.5)
BUN SERPL-MCNC: 18 MG/DL (ref 8–22)
CALCIUM ALBUM COR SERPL-MCNC: 8.7 MG/DL (ref 8.5–10.5)
CALCIUM SERPL-MCNC: 9 MG/DL (ref 8.5–10.5)
CHLORIDE SERPL-SCNC: 105 MMOL/L (ref 96–112)
CO2 SERPL-SCNC: 23 MMOL/L (ref 20–33)
CREAT SERPL-MCNC: 1.05 MG/DL (ref 0.5–1.4)
GFR SERPLBLD CREATININE-BSD FMLA CKD-EPI: 52 ML/MIN/1.73 M 2
GLOBULIN SER CALC-MCNC: 2.5 G/DL (ref 1.9–3.5)
GLUCOSE SERPL-MCNC: 92 MG/DL (ref 65–99)
POTASSIUM SERPL-SCNC: 4.4 MMOL/L (ref 3.6–5.5)
PROT SERPL-MCNC: 6.9 G/DL (ref 6–8.2)
SODIUM SERPL-SCNC: 139 MMOL/L (ref 135–145)

## 2023-05-24 ENCOUNTER — OFFICE VISIT (OUTPATIENT)
Dept: CARDIOLOGY | Facility: MEDICAL CENTER | Age: 83
End: 2023-05-24
Attending: INTERNAL MEDICINE
Payer: MEDICARE

## 2023-05-24 ENCOUNTER — OFFICE VISIT (OUTPATIENT)
Dept: CARDIOTHORACIC SURGERY | Facility: MEDICAL CENTER | Age: 83
End: 2023-05-24
Payer: MEDICARE

## 2023-05-24 VITALS
BODY MASS INDEX: 25.95 KG/M2 | HEIGHT: 62 IN | SYSTOLIC BLOOD PRESSURE: 130 MMHG | DIASTOLIC BLOOD PRESSURE: 62 MMHG | WEIGHT: 141 LBS | RESPIRATION RATE: 16 BRPM | HEART RATE: 60 BPM | OXYGEN SATURATION: 98 %

## 2023-05-24 VITALS
DIASTOLIC BLOOD PRESSURE: 64 MMHG | BODY MASS INDEX: 26.33 KG/M2 | HEART RATE: 59 BPM | HEIGHT: 62 IN | OXYGEN SATURATION: 95 % | WEIGHT: 143.08 LBS | SYSTOLIC BLOOD PRESSURE: 130 MMHG | TEMPERATURE: 97.5 F

## 2023-05-24 DIAGNOSIS — I35.0 SEVERE AORTIC STENOSIS: ICD-10-CM

## 2023-05-24 DIAGNOSIS — N18.31 CHRONIC KIDNEY DISEASE, STAGE 3A: ICD-10-CM

## 2023-05-24 DIAGNOSIS — I35.0 SEVERE AORTIC STENOSIS: Primary | ICD-10-CM

## 2023-05-24 DIAGNOSIS — I35.1 NONRHEUMATIC AORTIC VALVE INSUFFICIENCY: ICD-10-CM

## 2023-05-24 LAB — EKG IMPRESSION: NORMAL

## 2023-05-24 PROCEDURE — 99213 OFFICE O/P EST LOW 20 MIN: CPT | Performed by: INTERNAL MEDICINE

## 2023-05-24 PROCEDURE — 93005 ELECTROCARDIOGRAM TRACING: CPT | Performed by: INTERNAL MEDICINE

## 2023-05-24 PROCEDURE — 3075F SYST BP GE 130 - 139MM HG: CPT | Performed by: THORACIC SURGERY (CARDIOTHORACIC VASCULAR SURGERY)

## 2023-05-24 PROCEDURE — 99205 OFFICE O/P NEW HI 60 MIN: CPT | Performed by: THORACIC SURGERY (CARDIOTHORACIC VASCULAR SURGERY)

## 2023-05-24 PROCEDURE — 93010 ELECTROCARDIOGRAM REPORT: CPT | Performed by: INTERNAL MEDICINE

## 2023-05-24 PROCEDURE — 3078F DIAST BP <80 MM HG: CPT | Performed by: THORACIC SURGERY (CARDIOTHORACIC VASCULAR SURGERY)

## 2023-05-24 PROCEDURE — 3075F SYST BP GE 130 - 139MM HG: CPT | Performed by: INTERNAL MEDICINE

## 2023-05-24 PROCEDURE — 99215 OFFICE O/P EST HI 40 MIN: CPT | Mod: 25 | Performed by: INTERNAL MEDICINE

## 2023-05-24 PROCEDURE — 3078F DIAST BP <80 MM HG: CPT | Performed by: INTERNAL MEDICINE

## 2023-05-24 ASSESSMENT — ENCOUNTER SYMPTOMS
VOMITING: 0
EYE PAIN: 0
NERVOUS/ANXIOUS: 1
FLANK PAIN: 0
FOCAL WEAKNESS: 0
HALLUCINATIONS: 0
DIZZINESS: 0
HEADACHES: 0
WHEEZING: 0
HEMOPTYSIS: 0
DOUBLE VISION: 0
SORE THROAT: 0
EYE DISCHARGE: 0
NECK PAIN: 0
SHORTNESS OF BREATH: 1
FEVER: 0
CONSTIPATION: 0
WEIGHT LOSS: 0
NAUSEA: 0
SPUTUM PRODUCTION: 0
INSOMNIA: 1
WEAKNESS: 0
MYALGIAS: 0
SEIZURES: 0
BLURRED VISION: 0
COUGH: 0
POLYDIPSIA: 0
DIAPHORESIS: 0
DEPRESSION: 0
HEARTBURN: 0
STRIDOR: 0
ABDOMINAL PAIN: 0
PALPITATIONS: 0
BRUISES/BLEEDS EASILY: 0
SPEECH CHANGE: 0
CHILLS: 0
ORTHOPNEA: 0

## 2023-05-24 ASSESSMENT — FIBROSIS 4 INDEX
FIB4 SCORE: 2.338535866733713366
FIB4 SCORE: 2.338535866733713366

## 2023-05-24 ASSESSMENT — LIFESTYLE VARIABLES: SUBSTANCE_ABUSE: 0

## 2023-05-24 ASSESSMENT — PATIENT HEALTH QUESTIONNAIRE - PHQ9: CLINICAL INTERPRETATION OF PHQ2 SCORE: 0

## 2023-05-24 NOTE — PROGRESS NOTES
"CARDIOLOGY STRUCTURAL HEART CONSULTATION    PCP: Master Suarez M.D.  REFERRING: [place]    1. Severe aortic stenosis    2. Nonrheumatic aortic valve insufficiency    3. Chronic kidney disease, stage 3a (HCC)        Katerina Torres appears to have stage C aortic stenosis with preserved ejection fraction. I advised confirming asymptomatic status and low risk with a stress ECG, BNP and echocardiogram. We discussed symptoms which should prompt reevaluation    Follow up: 3 months    History: Katerina Torres is a 82 y.o. female with history of resolved breast cancer presenting for assessment of severe aortic stenosis.  She has been under observation and V-max greater than 4 m/s for the last year.  She is accompanied by her friend today though is  her  has dementia and she serves as his primary caregiver.    She is very active and experiences no clear cardiovascular symptoms though did notice some shortness of breath when climbing a hill and on one occasion even struggled to keep up with her .  Despite this she is very vigorous and all other pursuits and was regularly skiing until this past year due to being unable to leave her  alone.  She also does have some mild vertigo at times.      ROS:   10 point review systems is otherwise negative except as per the HPI    PE:  /62 (BP Location: Left arm, Patient Position: Sitting, BP Cuff Size: Adult)   Pulse 60   Resp 16   Ht 1.575 m (5' 2\")   Wt 64 kg (141 lb)   SpO2 98%   BMI 25.79 kg/m²   Gen: no acute distress  HEENT: Symmetric face. Anicteric sclerae. Moist mucus membranes  NECK: No JVD. No lymphadenopathy  CARDIAC: Regular, Normal S1, S2, +systolic murmur  VASCULATURE: Diminished and delayed upstroke  RESP: Clear to auscultation bilaterally  ABD: Soft, non-tender, non-distended  EXT: No edema, no clubbing or cyanosis  SKIN: Warm and dry  NEURO: No gross deficits  PSYCH: Appropriate affect, participates in " "conversation    Past Medical History:   Diagnosis Date    Breast cancer (HCC)     Breath shortness     with exertion \"walking up a hill\"    Bruises easily     Cancer (HCC) 1989    breast left side lumpectomy    Cardiac murmur 6/3/2009    Chronic kidney disease, stage III (moderate) (HCC) 8/20/2015    Dyslipidemia 6/3/2009    Glaucoma     both eyes    Hepatitis A 1993    Hiatus hernia syndrome     Hx of breast cancer 6/3/2009    Hypertension 7/18/2017    currently not taking medications    MEDICAL HOME     Neutropenia 6/3/2009    Osteopenia 6/3/2009    Preventative health care 6/3/2009    Snoring     Unspecified cataract     bilateral IOLs     Past Surgical History:   Procedure Laterality Date    LUMBAR TRANSFORAMINAL EPIDURAL STEROID INJECTION Right 9/1/2022    Procedure: RIGHT lumbar four and lumbar five transforaminal epidural steroid injection;  Surgeon: Roni Choi M.D.;  Location: [facility];  Service: Pain Management    LUMBAR TRANSFORAMINAL EPIDURAL STEROID INJECTION Right 1/6/2021    Procedure: INJECTION, STEROID, SPINE, LUMBAR, EPIDURAL, TRANSFORAMINAL APPROACH;  Surgeon: Roni Choi M.D.;  Location: [facility];  Service: Pain Management    KNEE ARTHROPLASTY TOTAL Right 5/8/2018    Procedure: KNEE ARTHROPLASTY TOTAL;  Surgeon: Thanh Hall M.D.;  Location: SURGERY [facility];  Service: Orthopedics    VITRECTOMY POSTERIOR Left 10/25/2016    Procedure: VITRECTOMY POSTERIOR membrane peel cryotherapy infusion of SF6 intraocular gas;  Surgeon: Amanda Rodriguez M.D.;  Location: SURGERY SAME DAY [facility];  Service:     RECOVERY  3/31/2014    Performed by [facility]-Recovery Surgery at SURGERY SAME DAY [facility] ORS    CATARACT PHACO WITH IOL  5/28/2009    Performed by GERA BREAUX at SURGERY SAME DAY [facility] ORS    CATARACT PHACO WITH IOL  5/14/2009    Performed by GERA BREAUX at SURGERY SAME DAY [facility] ORS    LUMPECTOMY      OTHER      NM " RADIATION THERAPY PLAN SIMPLE       Allergies   Allergen Reactions    Atorvastatin Unspecified     Does not qualify for primary prevention    Naproxen      GI upset    Vicodin [Hydrocodone-Acetaminophen] Nausea     Nausea, dizziness     Outpatient Encounter Medications as of 2023   Medication Sig Dispense Refill    timolol (TIMOPTIC) 0.5 % Solution Administer 1 Drop into the left eye every day.      acetaminophen (TYLENOL) 500 MG Tab Take 2 Tablets by mouth every 6 hours as needed.      nitroglycerin (NITROSTAT) 0.4 MG SL Tab Place 1 Tablet under the tongue every 5 minutes as needed for Chest Pain.      bimatoprost (LUMIGAN) 0.01 % Solution Administer 1 Drop into both eyes at bedtime. Indications: Wide-Angle Glaucoma      vitamin D (CHOLECALCIFEROL) 1000 UNIT Tab Take 1 Tablet by mouth.       No facility-administered encounter medications on file as of 2023.     Social History     Socioeconomic History    Marital status:      Spouse name: Not on file    Number of children: Not on file    Years of education: Not on file    Highest education level: Not on file   Occupational History    Not on file   Tobacco Use    Smoking status: Former     Packs/day: 0.50     Years: 10.00     Pack years: 5.00     Types: Cigarettes     Quit date: 1999     Years since quittin.4    Smokeless tobacco: Never   Vaping Use    Vaping Use: Not on file   Substance and Sexual Activity    Alcohol use: Yes     Alcohol/week: 4.2 oz     Types: 7 Standard drinks or equivalent per week     Comment: 1 glass wine/day    Drug use: No    Sexual activity: Not Currently     Partners: Male   Other Topics Concern     Service No    Blood Transfusions No    Caffeine Concern No    Occupational Exposure No    Hobby Hazards Yes    Sleep Concern No    Stress Concern Yes    Weight Concern No    Special Diet No    Back Care No    Exercise No    Bike Helmet No    Seat Belt Yes    Self-Exams Yes   Social History Narrative    Not on  file     Social Determinants of Health     Financial Resource Strain: Not on file   Food Insecurity: Not on file   Transportation Needs: Not on file   Physical Activity: Not on file   Stress: Not on file   Social Connections: Not on file   Intimate Partner Violence: Not on file   Housing Stability: Not on file     Family History   Problem Relation Age of Onset    Heart Disease Father         CAD MI    Stroke Father     Cancer Sister         breast    Cancer Mother         breast         Studies  Lab Results   Component Value Date/Time    CHOLSTRLTOT 175 10/20/2022 11:23 AM    LDL 98 10/20/2022 11:23 AM    HDL 61 10/20/2022 11:23 AM    TRIGLYCERIDE 81 10/20/2022 11:23 AM       Lab Results   Component Value Date/Time    SODIUM 139 05/17/2023 03:34 PM    POTASSIUM 4.4 05/17/2023 03:34 PM    CHLORIDE 105 05/17/2023 03:34 PM    CO2 23 05/17/2023 03:34 PM    GLUCOSE 92 05/17/2023 03:34 PM    BUN 18 05/17/2023 03:34 PM    CREATININE 1.05 05/17/2023 03:34 PM    CREATININE 1.08 (H) 07/02/2010 08:56 AM    BUNCREATRAT 11 07/02/2010 08:56 AM    GLOMRATE 50 (L) 07/02/2010 08:56 AM     Lab Results   Component Value Date/Time    ALKPHOSPHAT 60 05/17/2023 03:34 PM    ASTSGOT 20 05/17/2023 03:34 PM    ALTSGPT 14 05/17/2023 03:34 PM    TBILIRUBIN 0.4 05/17/2023 03:34 PM                   40-54 minutes of physician total time spent on the date of the encounter ([id number])

## 2023-06-04 ENCOUNTER — OFFICE VISIT (OUTPATIENT)
Dept: URGENT CARE | Facility: CLINIC | Age: 83
End: 2023-06-04
Payer: MEDICARE

## 2023-06-04 VITALS
SYSTOLIC BLOOD PRESSURE: 136 MMHG | DIASTOLIC BLOOD PRESSURE: 66 MMHG | BODY MASS INDEX: 25.03 KG/M2 | HEART RATE: 59 BPM | OXYGEN SATURATION: 97 % | HEIGHT: 62 IN | WEIGHT: 136 LBS | RESPIRATION RATE: 16 BRPM | TEMPERATURE: 97.8 F

## 2023-06-04 DIAGNOSIS — S91.011A LACERATION OF RIGHT ANKLE, INITIAL ENCOUNTER: ICD-10-CM

## 2023-06-04 PROCEDURE — 3075F SYST BP GE 130 - 139MM HG: CPT

## 2023-06-04 PROCEDURE — 99213 OFFICE O/P EST LOW 20 MIN: CPT

## 2023-06-04 PROCEDURE — 3078F DIAST BP <80 MM HG: CPT

## 2023-06-04 RX ORDER — AMOXICILLIN 500 MG/1
500 CAPSULE ORAL 3 TIMES DAILY
Qty: 15 CAPSULE | Refills: 0 | Status: SHIPPED | OUTPATIENT
Start: 2023-06-04 | End: 2023-06-09

## 2023-06-04 ASSESSMENT — ENCOUNTER SYMPTOMS
SHORTNESS OF BREATH: 0
FALLS: 0
FEVER: 0

## 2023-06-04 ASSESSMENT — FIBROSIS 4 INDEX: FIB4 SCORE: 2.338535866733713366

## 2023-06-04 NOTE — PROGRESS NOTES
Subjective:     CHIEF COMPLAINT  Chief Complaint   Patient presents with    Laceration     [id number] stake, right ankle       HPI  Katerina Torres is a very pleasant 82 y.o. female who presents with a laceration to the medial surface of her right ankle.  She reports that she cut her ankle on a metal stake yesterday at approximately 5 PM.  She had difficulty stopping the bleeding from her ankle and wrapped it and gauze and an Ace bandage.  She reports that it was throbbing all night, but the bleeding has stopped today.  She initially cleaned it thoroughly with soap and water.  Her last tetanus shot was in April 2022.    REVIEW OF SYSTEMS  Review of Systems   Constitutional:  Negative for fever and malaise/fatigue.   Respiratory:  Negative for shortness of breath.    Cardiovascular:  Negative for chest pain.   Musculoskeletal:  Negative for falls.       PAST MEDICAL HISTORY  Patient Active Problem List    Diagnosis Date Noted    Chronic kidney disease, stage 3a (HCC) 05/02/2023    Low back pain 12/02/2020    History of palpitations 06/05/2020    Esophageal dysmotility 07/18/2017    History of hypertension 07/18/2017    S/P knee replacement 06/20/2017    Colon polyp 03/13/2017    Decreased GFR 08/20/2015    History of polymyalgia rheumatica 07/28/2015    Knee pain, left 08/01/2013    Glaucoma 06/29/2010    History of basal cell carcinoma of skin 06/29/2010    History of breast cancer 06/03/2009    Osteopenia 06/03/2009    Dyslipidemia 06/03/2009    History of neutropenia 06/03/2009    Aortic stenosis 06/03/2009       SURGICAL HISTORY   has a past surgical history that includes other; cataract phaco with iol (5/14/2009); cataract phaco with iol (5/28/2009); lumpectomy; radiation therapy plan simple; recovery (3/31/2014); vitrectomy posterior (Left, 10/25/2016); knee arthroplasty total (Right, 5/8/2018); lumbar transforaminal epidural steroid injection (Right, 1/6/2021); and lumbar transforaminal epidural steroid  "injection (Right, 2022).    ALLERGIES  Allergies   Allergen Reactions    Atorvastatin Unspecified     Does not qualify for primary prevention    Naproxen      GI upset    Vicodin [Hydrocodone-Acetaminophen] Nausea     Nausea, dizziness       CURRENT MEDICATIONS  Home Medications       Reviewed by Gemma Fernandez P.A.-C. (Physician Assistant) on 23 at 1214  Med List Status: <None>     Medication Last Dose Status   acetaminophen (TYLENOL) 500 MG Tab PRN Active   bimatoprost (LUMIGAN) 0.01 % Solution Taking Active   nitroglycerin (NITROSTAT) 0.4 MG SL Tab PRN Active   timolol (TIMOPTIC) 0.5 % Solution Taking Active   vitamin D (CHOLECALCIFEROL) 1000 UNIT Tab Taking Active                    SOCIAL HISTORY  Social History     Tobacco Use    Smoking status: Former     Packs/day: 0.50     Years: 10.00     Pack years: 5.00     Types: Cigarettes     Quit date: 1999     Years since quittin.4    Smokeless tobacco: Never   Vaping Use    Vaping Use: Never used   Substance and Sexual Activity    Alcohol use: Yes     Alcohol/week: 4.2 oz     Types: 7 Standard drinks or equivalent per week     Comment: 1 glass wine/day    Drug use: No    Sexual activity: Not Currently     Partners: Male       FAMILY HISTORY  Family History   Problem Relation Age of Onset    Heart Disease Father         CAD MI    Stroke Father     Cancer Sister         breast    Cancer Mother         breast          Objective:     VITAL SIGNS: /66   Pulse (!) 59   Temp 36.6 °C (97.8 °F) (Temporal)   Resp 16   Ht 1.575 m (5' 2\")   Wt 61.7 kg (136 lb)   SpO2 97%   BMI 24.87 kg/m²     PHYSICAL EXAM  Physical Exam  Vitals reviewed.   Constitutional:       General: She is not in acute distress.     Appearance: Normal appearance. She is not ill-appearing or toxic-appearing.   HENT:      Head: Normocephalic and atraumatic.      Nose: Nose normal.      Mouth/Throat:      Mouth: Mucous membranes are moist.   Eyes:      " Conjunctiva/sclera: Conjunctivae normal.   Pulmonary:      Effort: Pulmonary effort is normal.   Musculoskeletal:        Feet:    Feet:      Comments: Small non-bleeding laceration to R medial ankle above medial malleolus with skin flap present. No surrounding bruising or erythema. No foreign body. Wound was thoroughly cleaned with CHG solution and closed using steri-strips.   Skin:     General: Skin is warm and dry.      Capillary Refill: Capillary refill takes less than 2 seconds.   Neurological:      General: No focal deficit present.      Mental Status: She is alert and oriented to person, place, and time.   Psychiatric:         Mood and Affect: Mood normal.         Assessment/Plan:     1. Laceration of right ankle, initial encounter  - amoxicillin (AMOXIL) 500 MG Cap; Take 1 Capsule by mouth 3 times a day for 5 days.  Dispense: 15 Capsule; Refill: 0  -Tylenol OTC as needed for pain  -Return to the clinic if symptoms worsen or fail to resolve    MDM/Comments:  I have prepared for this visit by personally reviewing the patient's prevous medical records and labs including: CBC, CMP, GFR.  Creatinine clearance appropriate for amoxicillin.  Wound approximated using Steri-Strips.  Instructions provided to patient for signs of infection and when to return to the clinic.  Patient demonstrated understanding of treatment plan at this time and will return to the clinic if symptoms worsen or fail to resolve.  Vital signs reviewed.    Differential diagnosis, natural history, supportive care, and indications for immediate follow-up discussed. All questions answered. Patient agrees with the plan of care.    Follow-up as needed if symptoms worsen or fail to improve to PCP, Urgent care or Emergency Room.    I have personally reviewed prior external notes and test results pertinent to today's visit.  I have independently reviewed and interpreted all diagnostics ordered during this urgent care acute visit.   Discussed management  options (risks,benefits, and alternatives to treatment). Pt expresses understanding and the treatment plan was agreed upon. Questions were encouraged and answered to pt's satisfaction.    Please note that this dictation was created using voice recognition software. I have made a reasonable attempt to correct obvious errors, but I expect that there are errors of grammar and possibly content that I did not discover before finalizing the note.

## 2023-06-05 ENCOUNTER — TELEPHONE (OUTPATIENT)
Dept: HEALTH INFORMATION MANAGEMENT | Facility: OTHER | Age: 83
End: 2023-06-05
Payer: MEDICARE

## 2023-06-22 ENCOUNTER — OFFICE VISIT (OUTPATIENT)
Dept: MEDICAL GROUP | Facility: MEDICAL CENTER | Age: 83
End: 2023-06-22
Payer: MEDICARE

## 2023-06-22 VITALS
BODY MASS INDEX: 25.4 KG/M2 | RESPIRATION RATE: 16 BRPM | WEIGHT: 138 LBS | HEART RATE: 73 BPM | HEIGHT: 62 IN | OXYGEN SATURATION: 98 % | TEMPERATURE: 99.1 F | SYSTOLIC BLOOD PRESSURE: 116 MMHG | DIASTOLIC BLOOD PRESSURE: 78 MMHG

## 2023-06-22 DIAGNOSIS — N18.31 STAGE 3A CHRONIC KIDNEY DISEASE: ICD-10-CM

## 2023-06-22 DIAGNOSIS — M54.9 BACK PAIN, UNSPECIFIED BACK LOCATION, UNSPECIFIED BACK PAIN LATERALITY, UNSPECIFIED CHRONICITY: ICD-10-CM

## 2023-06-22 DIAGNOSIS — I35.0 AORTIC VALVE STENOSIS, ETIOLOGY OF CARDIAC VALVE DISEASE UNSPECIFIED: ICD-10-CM

## 2023-06-22 DIAGNOSIS — R13.10 DYSPHAGIA, UNSPECIFIED TYPE: ICD-10-CM

## 2023-06-22 PROCEDURE — 3078F DIAST BP <80 MM HG: CPT | Performed by: INTERNAL MEDICINE

## 2023-06-22 PROCEDURE — 99214 OFFICE O/P EST MOD 30 MIN: CPT | Performed by: INTERNAL MEDICINE

## 2023-06-22 PROCEDURE — 3074F SYST BP LT 130 MM HG: CPT | Performed by: INTERNAL MEDICINE

## 2023-06-22 ASSESSMENT — FIBROSIS 4 INDEX: FIB4 SCORE: 2.338535866733713366

## 2023-06-22 NOTE — PROGRESS NOTES
Subjective     Katerina Torres is a 82 y.o. female who presents with Follow-Up (Back and rib pain)            HPI    New complaint, patient has been working in her yard all spring, 1 to 2 weeks ago was doing a bit of heavy pushing and pulling in her yard, thought she strained her mid back at the time, no direct falls or trauma to the area.  Since that time patient has had some sharp mid right and left back pain with pushing or pulling, some radiation around her rib cages bilaterally, no shortness of breath, no skin rash or shingles, no cough, no associated numbness or tingling of her hands, no decrease in  strength, no radiation down her legs, no significant low back pain.  Patient has also had more heartburn episodes, on occasion she will have a food sticking sensation if she eats too quickly, this does not happen with liquids, occasional bitter taste in her mouth at times, no nausea, no emesis, no abdominal pain, no melena or hematochezia.  Typically this can happen with lettuce or meat when chewing too fast, does not occur with bread.  Does not occur daily.  No food regurgitation. She has tried Tums which is beneficial, only eats 2 meals per day.  For the back pain she will take Tylenol up to 2 tablets twice daily, no aspirin or NSAIDs.  Has been diagnosed with esophageal dysmotility in the past, in 2017 offered EGD which she did not complete, placed on pantoprazole at that time but currently is not on PPI therapy.  She has had low back pain in the past, has had epidurals L4-S1 on 2 separate occasions in 2021 and 2022 but this back pain is lower and does not radiate down her legs.  Aortic stenosis followed by cardiology, has also seen cardiothoracic surgery, she just saw cardiology last month and since she was asymptomatic, surgery was not recommended.  She has been under stress as well since her  has Alzheimer's and she provides all care and support  Limits caffeine intake, also limit his  "alcohol intake  Medications, allergies, medical history, surgical history, social history, family history  reviewed and updated      Current Outpatient Medications   Medication Sig Dispense Refill    timolol (TIMOPTIC) 0.5 % Solution Administer 1 Drop into the left eye every day.      acetaminophen (TYLENOL) 500 MG Tab Take 2 Tablets by mouth every 6 hours as needed.      nitroglycerin (NITROSTAT) 0.4 MG SL Tab Place 1 Tablet under the tongue every 5 minutes as needed for Chest Pain.      bimatoprost (LUMIGAN) 0.01 % Solution Administer 1 Drop into both eyes at bedtime. Indications: Wide-Angle Glaucoma      vitamin D (CHOLECALCIFEROL) 1000 UNIT Tab Take 1 Tablet by mouth.       No current facility-administered medications for this visit.         Patient Active Problem List   Diagnosis    History of breast cancer    Osteopenia    Dyslipidemia    History of neutropenia    Aortic stenosis    Glaucoma    History of basal cell carcinoma of skin    Knee pain, left    History of polymyalgia rheumatica    Decreased GFR    Colon polyp    S/P knee replacement    Esophageal dysmotility    History of hypertension    History of palpitations    Low back pain    Chronic kidney disease, stage 3a (HCC)                Patient Care Team:  Master Suarez M.D. as PCP - General  Master Suarez M.D. as PCP - [facility]  Chris Bernstein M.D. as Consulting Physician (Ophthalmology)  Amanda Rodriguez M.D. as Consulting Physician (Ophthalmology)  Manoj Tidwell M.D. (Inactive) as Consulting Physician (Orthopedic Surgery)  ROS           Objective     /78 (BP Location: Right arm, Patient Position: Sitting, BP Cuff Size: Adult)   Pulse 73   Temp 37.3 °C (99.1 °F)   Resp 16   Ht 1.575 m (5' 2\")   Wt 62.6 kg (138 lb)   SpO2 98%   BMI 25.24 kg/m²      Physical Exam  Vitals and nursing note reviewed.   Constitutional:       Appearance: Normal appearance.   HENT:      Head: Normocephalic and atraumatic.      Right Ear: External " ear normal.      Left Ear: External ear normal.   Eyes:      Conjunctiva/sclera: Conjunctivae normal.   Cardiovascular:      Rate and Rhythm: Normal rate and regular rhythm.      Heart sounds: Murmur heard.   Pulmonary:      Effort: Pulmonary effort is normal.      Breath sounds: Normal breath sounds.   Abdominal:      General: Abdomen is flat. There is no distension.      Palpations: Abdomen is soft. There is no mass.      Tenderness: There is no abdominal tenderness. There is no guarding.      Hernia: No hernia is present.   Musculoskeletal:         General: No swelling.   Skin:     Coloration: Skin is not jaundiced.      Findings: No bruising.   Neurological:      General: No focal deficit present.      Mental Status: She is alert.   Psychiatric:         Mood and Affect: Mood normal.         Behavior: Behavior normal.     Back no spinal tenderness, no CVA tenderness, no evidence of shingles left or right side, no ecchymosis or bruising.  Mild tenderness to palpation left and right paraspinal muscles L2-L3 area  Anterior rib cage bilateral no tenderness to palpation, no crepitus or rub  No epigastric tenderness, no hepatosplenomegaly, anterior abdomen no bruising or ecchymosis, no evidence of shingles  Normal  strength bilateral  Normal lower extremity dorsiflexion plantarflexion  No lower extremity edema            Assessment & Plan        Assessment  #1 mid back musculoskeletal pain likely related to working in the yard and doing excessive yard work, no evidence of shingles, do not suspect cardiovascular etiology, no evidence of pulm embolism, no evidence of low back sciatica, no evidence of nephrolithiasis    #2 reflux with acid regurgitation, occasional dysphagia with food sticking sensation, particularly happens and noticeable if she chews and swallows too quickly, no nausea or vomiting, no food regurgitation, no NSAIDs, no melena, no abdominal pain on exam    #3 aortic stenosis severe by echo done in  November, normal EF, has seen cardiothoracic surgery and is followed by cardiology most recently seen last month, no current shortness of breath, chest pain, palpitations, lightheadedness    #4 CKD 3A GFR 52 no regular NSAIDs    #5 anxiety caring for her  with Alzheimer's      Plan  #1 trial of Zanaflex 2 mg twice daily as needed for back pain may cause drowsiness, lightheadedness, dizziness    #2 gabapentin 100 mg twice a day as needed for back pain may cause dizziness, lightheadedness, no alcohol with these medications    #3 continue no NSAIDs    #4 can continue Tylenol up to 2 tablets twice daily    #5 Prilosec 20 mg in the morning 30 minutes before meals, medications, supplements with water only    #6 no caffeine, no alcohol, chew food slowly and thoroughly, avoid rushing when eating, aspiration precautions    #7 no heavy lifting    #8 referral back to GI    #9 follow-up cardiology    #9 follow-up as scheduled in July

## 2023-06-23 ENCOUNTER — TELEPHONE (OUTPATIENT)
Dept: MEDICAL GROUP | Facility: MEDICAL CENTER | Age: 83
End: 2023-06-23
Payer: MEDICARE

## 2023-06-23 RX ORDER — TIZANIDINE HYDROCHLORIDE 2 MG/1
2 CAPSULE, GELATIN COATED ORAL 2 TIMES DAILY PRN
Qty: 28 CAPSULE | Refills: 1 | Status: SHIPPED | OUTPATIENT
Start: 2023-06-23 | End: 2023-06-27

## 2023-06-23 RX ORDER — GABAPENTIN 100 MG/1
100 CAPSULE ORAL 2 TIMES DAILY PRN
Qty: 28 CAPSULE | Refills: 1 | Status: SHIPPED | OUTPATIENT
Start: 2023-06-23 | End: 2023-07-06

## 2023-06-23 RX ORDER — OMEPRAZOLE 20 MG/1
20 CAPSULE, DELAYED RELEASE ORAL
Qty: 30 CAPSULE | Refills: 11 | Status: SHIPPED
Start: 2023-06-23 | End: 2023-09-07

## 2023-06-23 NOTE — TELEPHONE ENCOUNTER
Need PAR   (1) zanaflex 2 mg caps one po bid prn muscle pain  (2) #28   (3) diagnosis: back pain  (4) ICD 10: M54.9  (5) unable to take nsaids, already taking tylenol

## 2023-06-27 ENCOUNTER — TELEPHONE (OUTPATIENT)
Dept: MEDICAL GROUP | Facility: MEDICAL CENTER | Age: 83
End: 2023-06-27
Payer: MEDICARE

## 2023-06-27 RX ORDER — TIZANIDINE 2 MG/1
2 TABLET ORAL 2 TIMES DAILY PRN
Qty: 28 TABLET | Refills: 0 | Status: SHIPPED | OUTPATIENT
Start: 2023-06-27 | End: 2023-07-06

## 2023-06-28 ENCOUNTER — TELEPHONE (OUTPATIENT)
Dept: MEDICAL GROUP | Facility: MEDICAL CENTER | Age: 83
End: 2023-06-28
Payer: MEDICARE

## 2023-06-28 NOTE — TELEPHONE ENCOUNTER
Please notify the patient that the tizanidine medication for back pain is not covered by her insurance.  We can try an alternative called baclofen twice a day as needed for back pain.  However if the gabapentin I also prescribed is working we can just stay with that medication.    If she would like to try the different muscle relaxant medication please let me know and I can send that to [facility].

## 2023-06-29 NOTE — TELEPHONE ENCOUNTER
Pt says she wants to stay on just the Gabapentin. She is also going to continue to take the Tylenol. She is also seeing a Chiropractor at [place].

## 2023-07-06 ENCOUNTER — APPOINTMENT (OUTPATIENT)
Dept: RADIOLOGY | Facility: MEDICAL CENTER | Age: 83
End: 2023-07-06
Attending: INTERNAL MEDICINE
Payer: MEDICARE

## 2023-07-06 ENCOUNTER — OFFICE VISIT (OUTPATIENT)
Dept: MEDICAL GROUP | Facility: MEDICAL CENTER | Age: 83
End: 2023-07-06
Payer: MEDICARE

## 2023-07-06 VITALS
BODY MASS INDEX: 25.34 KG/M2 | DIASTOLIC BLOOD PRESSURE: 62 MMHG | HEART RATE: 73 BPM | OXYGEN SATURATION: 93 % | RESPIRATION RATE: 16 BRPM | SYSTOLIC BLOOD PRESSURE: 140 MMHG | TEMPERATURE: 97.8 F | HEIGHT: 63 IN | WEIGHT: 143 LBS

## 2023-07-06 DIAGNOSIS — R07.81 RIB PAIN: ICD-10-CM

## 2023-07-06 DIAGNOSIS — M54.9 BACK PAIN, UNSPECIFIED BACK LOCATION, UNSPECIFIED BACK PAIN LATERALITY, UNSPECIFIED CHRONICITY: ICD-10-CM

## 2023-07-06 DIAGNOSIS — I35.0 AORTIC VALVE STENOSIS, ETIOLOGY OF CARDIAC VALVE DISEASE UNSPECIFIED: ICD-10-CM

## 2023-07-06 PROCEDURE — 71111 X-RAY EXAM RIBS/CHEST4/> VWS: CPT

## 2023-07-06 PROCEDURE — 99214 OFFICE O/P EST MOD 30 MIN: CPT | Performed by: INTERNAL MEDICINE

## 2023-07-06 PROCEDURE — 3077F SYST BP >= 140 MM HG: CPT | Performed by: INTERNAL MEDICINE

## 2023-07-06 PROCEDURE — 3078F DIAST BP <80 MM HG: CPT | Performed by: INTERNAL MEDICINE

## 2023-07-06 PROCEDURE — 72070 X-RAY EXAM THORAC SPINE 2VWS: CPT

## 2023-07-06 RX ORDER — MELOXICAM 7.5 MG/1
7.5 TABLET ORAL
Qty: 15 TABLET | Refills: 0 | Status: ON HOLD
Start: 2023-07-06 | End: 2023-10-09

## 2023-07-06 ASSESSMENT — FIBROSIS 4 INDEX: FIB4 SCORE: 2.37

## 2023-07-06 NOTE — PROGRESS NOTES
Subjective     Katerina Torres is a 83 y.o. female who presents with back pain         HPI    Here for follow-up back pain, seen June 22, she had been doing yard work and a lot of pushing, pulling, including, and when I saw her on the 22nd complaint of back pain, mid back area at that time no radiation, seem to be more musculoskeletal in etiology, recommended trial of Zanaflex and gabapentin however unfortunately the Zanaflex was not covered by her insurance.  NSAIDs not recommended due to CKD  5/17/23 bun 18,creat 1.0,GFR 52  6/22/23 musculoskeletal back pain trial of zanaflex and gabapentin  6/29/23 on neurontin, zanaflex not covered, declines baclofen, going to chiropractor at [place]  Has had 3 treatments at chiropractor with some slight improvement. Still has right mid back pain with some radiation to the front, worse with certain movements or positions like bending or twisting, sometimes can be worse with deep inspiration, no chest pain, no shortness of breath at rest.  No history of pulmonary embolism.  No history of recent upper respiratory tract infection, no trauma, although she had been doing quite a bit of yard work and heavy lifting prior to onset of her symptoms.  She has cut back on her yard work and housecleaning.  No fever, no hemoptysis, no cough.  At night will need to sleep on back as turning in different positions will aggravate the pain.  No rash, no bruising, no history of shingles  Has not been taking anti-inflammatories, tried gabapentin but had some dizziness and discontinued that, Zanaflex was not covered.  She has not been taking Prilosec.  Medications, allergies, medical history, surgical history, social history, family history  reviewed and updated    Current Outpatient Medications   Medication Sig Dispense Refill    tizanidine (ZANAFLEX) 2 MG tablet Take 1 Tablet by mouth 2 times a day as needed (back pain). 28 Tablet 0    gabapentin (NEURONTIN) 100 MG Cap Take 1 Capsule by  mouth 2 times a day as needed (back pain). Indications: for back pain 28 Capsule 1    omeprazole (PRILOSEC) 20 MG delayed-release capsule Take 1 Capsule by mouth every morning before breakfast. Indications: heartburn 30 Capsule 11    timolol (TIMOPTIC) 0.5 % Solution Administer 1 Drop into the left eye every day.      acetaminophen (TYLENOL) 500 MG Tab Take 2 Tablets by mouth every 6 hours as needed.      nitroglycerin (NITROSTAT) 0.4 MG SL Tab Place 1 Tablet under the tongue every 5 minutes as needed for Chest Pain.      bimatoprost (LUMIGAN) 0.01 % Solution Administer 1 Drop into both eyes at bedtime. Indications: Wide-Angle Glaucoma      vitamin D (CHOLECALCIFEROL) 1000 UNIT Tab Take 1 Tablet by mouth.       No current facility-administered medications for this visit.              s/p knee replacement  3/3/17 MRI right knee abnormal right lateral meniscus with peripheral extrusion, maceration, and complex tear involving primarily the posterior horn and body but also the anterior horn, abnormal medial meniscus with vertical tear of the peripheral zone of body and posterior horn, probable meniscal root tear, and degenerative fraying of the free edge, severe lateral femorotibial compartment osteoarthritis with significant cartilage loss and moderate to severe subchondral edema within the lateral femoral condyle and lateral tibial plateau, mild medial femorotibial and the patellofemoral compartment osteoarthritis, moderate sized joint effusion with associated popliteal cyst  6/20/17 sees  orthopedics  1/18/18  orthopedic note right knee end-stage arthritis, x-rays bone-on-bone right knee arthritis lateral compartment, right knee steroid injection performed, planned for total right knee arthroplasty after winter  5/2/18  orthopedic note, right knee osteoathritis, failed conservative measures, scheduled for right knee surgery  5/8/18  operative note right knee arthroplasty   5/23/18   orthopedic note 2 weeks s/p right total knee  8/6/18  orthopedic note 3 months status post right knee replacement doing well, x-ray shows stable right total knee  5/22/19  orthopedic note x-ray stable total knee replacement, follow-up 1 year     Preventative health  1/10/13 pneumovax  7/29/14 zoster vaccine  8/6/15 prevnar at [facility]  9/1/16 sonocine negative  3/7/17 colon per [facility] 2 polyps, grade 2 internal hemorrhoids, angioectasias ascending colon,pathology one hyperplastic polyp and one sessile serrated polyp, repeat colonoscopy 5 years   9/19/20 shingrix second  8/23/21 dexa LS-0.9,hip-1.4  4/28/22 tdap   6/21/22 covid 4th  9/26/22 prevnar 20  10/20/22 vit d 83  11/18/22 mammogram heterogeneously dense breast tissue, declines screening breast ultrasound      Osteopenia  9/06 dexa LS -1.2,hip -1.1  6/09 dexa LS -1.4,hip -1.0  7/11 vit d 43  7/11 dexa LS -1.1,hip -0.9  712 vit d 30 start 1000 units  7/24/13 vit d 72 on 1000 units  8/6/13 dexa LS -1.1,hip -1.1  8/5/14 vit d 67  8/19/15 dexa LS -1.4,hip -1.3;FRAX 40% major,27% hip only on prednisone one month, does not need bisphosphonate therapy  8/19/15 vit d 43  6/16/16 off prednisone x 3 weeks  6/21/16 vit d 50  6/27/17 vit d 48  4/27/18 vit d 53   5/14/19 vit d 46  5/28/19 dexa LS-1.0,hip-1.4  6/12/20 vit d 88 on 1000 units decrease to qod   8/23/21 dexa LS-0.9,hip-1.4  1/14/22 vit d 68     low back pain  11/30/20 right-sided buttock pain with radiation, referral to physiatry, x-ray, MRI lumbar spine, trial of naproxen short-term plus acetaminophen follow-up 3 weeks  11/30/20 x-ray lumbar spine moderate spondylosis, L2-L3 asymmetric disc height loss on the left resulting in slight right curvature and degenerative retrolisthesis  12/2/20 MRI lumbar spine mild superior endplate compression fracture at L3, no marrow edema consistent with chronic process, L2-L3 moderate central narrowing, moderate right and severe left neuroforaminal  narrowing, L3-4 moderate central narrowing, moderate bilateral neuroforaminal narrowing, L4-5 moderate to severe central canal narrowing with severe right and moderate left neuroforaminal narrowing, L5-S1 moderate bilateral neuroforaminal narrowing  1/6/21 dr.storm amaro procedure note right lumbar epidural steroid injection L4-S1 under fluoroscopy  1/26/21 dr.storm amaro note good response to right lumbar L4-L5 epidural steroid injections, she has returned to activities such as skiing, consider physical therapy and home program for residual pain, consider right SI joint versus repeat lumbar steroid injection if necessary, follow-up 2 months  2/8/21 [facility] note   2/17/21 [facility] note   8/31/22 dr.storm physiatry note plan right L4-L5 epidural steroid injection under fluoroscopy and physical therapy as tolerated  9/1/22 dr.storm physiatry procedure note epidural L4-L5 L5-S1 steroid injection  9/6/22 x-ray sacrum degenerative changes SI joint unchanged from prior  9/6/22 x-ray right hip stable mild bilateral osteoarthritis  9/6/22 dr.storm physiatry note SI joint dysfunction, obtain x-ray lumbar spine, sacrum and coccyx, right hip, trial of tramadol every 6 hours, follow-up physical therapy  9/16/22 dr.storm amaro note low back pain, making improvements with physical therapy, continues to have sacral dysfunction with radicular symptoms, discontinue tramadol, trial of acetaminophen 1300 mg tid ,avoid nsaids     knee pain left  8/1/13 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, lateral meniscus mildly extruded, ruptured hopper's cyst  8/12/13  ortho note pain improved on followup, consider injection if pain recurs     History polymyalgia  4/20/15 physical therapy, trial celebrex and zanaflex  5/7/15 physical therapy evaluation today recommended right hip x-ray and pelvic x-ray, ordered in computer  5/8/15 xray hip negative  6/29/15 seen by bridgette sexton  with polymyalgia rheumatica  6/29/15 CRP 9.4,ESR 32 started on prednisone 20 mg    7/28/15 on prednisone 10 mg will continue 10 mg x 2 weeks, then decrease to 5 mg daily  8/19/15 hgb 14,hct 43, CRP 0.3, ESR 14, tapered off prednisone but developed mouth irritation, has resumed prednisone 5 mg daily, will continue x2 weeks then taper  11/17/15 refill prednisone 5 mg #30 tabs x one  6/21/16 off prednisone; CRP 0.1,ESR 17  6/27/17 CRP 0.09,ESR 12,cpk 66     history neutropenia  8/06 wbc 3.4  3/08 wbc 3.9  6/09 wbc 3.9  7/10 wbc 4.4  7/11 wbc 4.5  7/12 wbc 3.8 (55%N,35%L)  7/24/13 wbc 4.3  3/20/14 wbc 3.1 (52%N,36%L)  3/31/14 wbc 3.7  8/19/15 wbc 5.3  6/21/16 wbc 4.4 (50%N,40%L)  6/27/17 wbc 4.2   4/27/18 wbc 4.4  5/14/19 wbc 4.5  6/12/20 wbc 4.5  6/30/21 wbc 4.3  1/14/22 wbc 5.3  10/20/22 wbc 5.6     histroy hypertension  3/31/14 cardiac catheterization; left main mild plaquing, LAD patent, circumflex free of disease, RCA free of disease, normal LV function, mild aortic stenosis  7/9/17 echo normal LV size and function, EF 60%, grade 1 diastolic dysfunction, moderate aortic stenosis peak gradient 50, mean 29, valve area 0.8-1.0 and aortic regurgitation  7/9/17 persantine thallium small fixed perfusion abnormality distal anterior and anteroapical segments, no ischemia is noted to represent mild prior infarct or variant normal apical thinning  7/18/17 start diltiazem 120 mg daily (also has esophageal dysmotility by barium swallow)  4/26/18 off diltiazem  4/27/18 echo normal LV size and function, EF 65%, moderate aortic stenosis peak gradient 46, valve area 0.9,, moderate aortic insufficiency, no change compared to previous  5/27/19 echo normal LV size and function, EF 65%, normal diastolic function, calcified aortic valve moderate aortic stenosis peak gradient 53, moderate aortic insufficiency  10/1/21 cardiology note will likely need valve replacement next 1 to 2 years, follow-up 6 months  2/14/22  cardiology  note moderate to severe aortic stenosis, able to walk 4 miles with no limitations, will order repeat echo follow-up 2 months  5/11/22 echo EF 55%,severe aortic stenosis peak gradient 70  5/13/22 cardiology note asymptomatic severe aortic stenosis, continue surveillance follow-up 6 months  11/7/22 cardiology note asymptomatic severe aortic stenosis, continue surveillance, repeat echo, follow-up 6 months  11/10/22 echo severe aortic stenosis, peak gradient 65, EF 60%, normal diastolic function  5/24/23 cardiology note aortic stenosis with preserved ejection fraction stage C, recommend stress EKG, echocardiogram, BNP, follow-up 3 months     History of breast cancer  1980s left sided s/p lumpectomy and radiation therapy, no old records  7/11 mammogram  8/12 mammogram  8/13 mammogram  8/18/14 mammogram  9/1/16 mammogram heterogeneously dense breast tissue recommend screening breast ultrasound  9/1/16 sonocine negative     History of basal cell skin cancer     Glaucoma  4/29/21  eye exam   1/13/22  eye exam primary open-angle glaucoma  9/16/22  eye exam   1/12/23  eye exam   5/12/23  ophthalmology note primary open angle glaucoma moderate, follow-up 6 months     Esophageal dysmotility  7/9/17 barium swallow moderate esophageal dysmotility, small volume silent aspiration  7/18/17 start diltiazem 120 mg daily  7/18/17 referral [facility], speech pathology for esophageal dysmotility, small amount of aspiration on barium swallow, try low-dose diltiazem 120 mg for esophageal dysmotility and elevated blood pressure  7/26/17 [facility] evaluation dyspepsia, obtain cardiology clearance and then proceed EGD, initiate pantoprazole 20 mg, trial of miralax and colace     Dyslipidemia  3/08 chol 175,trig 73,hdl 45,ldl 115  6/09 chol 174,trig 89,hdl 47,ldl 109  7/10 chol 182,trig 61,hdl 55,ldl 114  7/11 chol 175,trig 69,hdl 45,ldl 116  7/12 chol 164,trig 64,hdl 43,ldl 108  7/24/13 chol 186,trig 66,hdl 54,ldl  119 no meds  8/5/14 chol 165,trig 93,hdl 48,ldl 98  8/19/15 chol 192,trig 109,hdl 58,ldl 112; 10 year risk 20% declines statin therapy  6/21/16 chol 137,trig 75,hdl 47,ldl 75   6/27/17 hcol 153,trig 94,hdl 54,ldl 80  5/14/19 chol 164,trig 82,hdl 48,ldl 100   6/12/20 chol 176,trig 80,hdl 57,ldl 103   6/30/21 chol 170,trig 100,hdl 43,ldl 107  10/20/22 chol 175,trig 81,hdl 61,ldl 98     Decrease GFR  7/7/11 bun 16,creat 1.0,GFR 50  7/24/13 bun 14,creat 1.1,GFR 45  3/20/14 bun 15,creat 0.9,GFR 59  8/5/14 bun 14,creat 1.1,GFR 46  2/19/15 bun 13,creat 0.8,GFR >60  8/19/15 bun 10,creat 1.1,GFR 48  6/21/16 bun 19,creat 1.0,GFR 50  6/27/17 bun 14,creat 0.5,GFR 51  4/28/18 bun 15,creat 0.9,GFR 60  5/14/19 bun 18,creat 1.13,GFR 46  6/12/20 bun 15,creat 1.07,GFR 49  6/30/21 bun 14,creat 1.07,GFR 49,PTH 36,urine mac<1.2,SPEP negative  1/14/22 bun 19,creat 1.18,GFR 44,PTH 25,calcium 9.6,phos 3.7  10/20/22 bun 20,creat 1.09,GFR 50,PTH 31  5/17/23 bun 18,creat 1.0,GFR 52     colon polyp  3/7/17 colon per [facility] 2 polyps, grade 2 internal hemorrhoids, angioectasias ascending colon,pathology one hyperplastic polyp and onesessile serrated polyp, repeat colonoscopy 5 years      Aortic stenosis  10/06 echo mild mr, normal LV  6/09 stress echo negative  8/12 echo normal LV function, EF 65%, mild aortic stenosis, peak gradient 24  7/24/13 normal LV function, EF 60%, mild LVH, mild aortic stenosis, peak gradient 25  3/31/14 cardiac catheterization; left main mild lacking, LAD patent, circumflex free of disease, RCA free of disease, normal LV function, mild aortic stenosis  7/9/17 echo normal LV size and function, EF 60%, grade 1 diastolic dysfunction, moderate aortic stenosis peak gradient 50, mean 29, valve area 0.8-1.0 and aortic regurgitation  7/9/17 persantine thallium small fixed perfusion abnormality distal anterior and anteroapical segments, no ischemia is noted to represent mild prior infarct or variant normal apical thinning  4/27/18  echo normal LV size and function, EF 65%, moderate aortic stenosis peak gradient 46, valve area 0.9,, moderate aortic insufficiency, no change compared to previous  5/27/19 echo normal LV size and function, EF 65%, normal diastolic function, calcified aortic valve moderate aortic stenosis peak gradient 53, moderate aortic insufficiency  9/24/21 echo EF 65%, indeterminate diastolic function, moderate aortic stenosis, peak gradient 56, moderate aortic insufficiency  10/1/21 cardiology note will likely need valve replacement next 1 to 2 years, follow-up 6 months  2/14/22  cardiology note moderate to severe aortic stenosis, able to walk 4 miles with no limitations, will order repeat echo follow-up 2 months  5/11/22 echo EF 55%,severe aortic stenosis peak gradient 70  5/13/22 cardiology note asymptomatic severe aortic stenosis, continue surveillance follow-up 6 months  11/7/22 cardiology note asymptomatic severe aortic stenosis, continue surveillance, repeat echo, follow-up 6 months  11/10/22 echo severe aortic stenosis, peak gradient 65, EF 60%, normal diastolic function  5/24/23  cardiothoracic surgery note recommend TAVR  5/24/23 cardiology note aortic stenosis with preserved ejection fraction stage C, recommend stress EKG, echocardiogram, BNP, follow-up 3 months        Patient Active Problem List   Diagnosis    History of breast cancer    Osteopenia    Dyslipidemia    History of neutropenia    Aortic stenosis    Glaucoma    History of basal cell carcinoma of skin    Knee pain, left    History of polymyalgia rheumatica    Decreased GFR    Colon polyp    S/P knee replacement    Esophageal dysmotility    History of hypertension    History of palpitations    Low back pain    Chronic kidney disease, stage 3a (HCC)                  Patient Care Team:  Master Suarez M.D. as PCP - General  Master Suarez M.D. as PCP - [facility]  Chris Bernstein M.D. as Consulting Physician (Ophthalmology)  Amanda  BYRON Rodriguez M.D. as Consulting Physician (Ophthalmology)  Manoj Tidwell M.D. (Inactive) as Consulting Physician (Orthopedic Surgery)    ROS           Objective          Physical Exam  Vitals and nursing note reviewed.   Constitutional:       General: She is not in acute distress.     Appearance: Normal appearance. She is not ill-appearing.   HENT:      Head: Normocephalic and atraumatic.      Right Ear: External ear normal.      Left Ear: External ear normal.   Eyes:      Conjunctiva/sclera: Conjunctivae normal.   Cardiovascular:      Rate and Rhythm: Normal rate and regular rhythm.      Heart sounds: Murmur heard.   Pulmonary:      Effort: Pulmonary effort is normal. No respiratory distress.      Breath sounds: Normal breath sounds. No wheezing or rales.   Abdominal:      General: There is no distension.      Tenderness: There is no abdominal tenderness.   Musculoskeletal:         General: No swelling.      Cervical back: Neck supple. No rigidity.   Skin:     Coloration: Skin is not jaundiced.      Findings: No bruising.   Neurological:      General: No focal deficit present.      Mental Status: She is alert.   Psychiatric:         Mood and Affect: Mood normal.         Behavior: Behavior normal.     No tenderness to palpation thoracic spine, no shingles posterior aspect or anterior aspect, mild tenderness to palpation right paraspinal muscle T7-T8 region, no rib cage tenderness over that area, no crepitus or rub  Lower extremity no edema          Assessment & Plan        Assessment  #1  Right-sided thoracic and right rib cage pain question costochondritis, no evidence of shingles, no direct trauma to the area although prior to onset of pain she was working quite a bit in her yard and doing lifting, yard work, pushing and pulling and perhaps aggravated her rib cage muscle, no evidence of acute coronary syndrome, do not suspect pulmonary embolism, no recent bronchitis or upper respiratory tract infection, some  improvement with seeing a chiropractor has had 3 treatments, pain is reproducible right paraspinal thoracic region    #2 aortic stenosis severe Severe aortic stenosis followed by cardiology most recent echo in November peak gradient 65, saw cardiology May 24       #3 ckd 3a 5/17/23 bun 18,creat 1.0,GFR 52, no regular NSAIDs    Plan  #1 x-ray thoracic spine    #2 rib series    #3 trial of meloxicam 7.5 mg daily, quantity 15 only    #4 Prilosec prescription previous sent to pharmacy she can take that in the morning 30 minutes before meals, medications, supplements monitor for GI upset with meloxicam, only a short duration so this should not adversely impact her renal or hepatic function significantly, keep well-hydrated    #5 continue avoid heavy lifting    #6 consider physical therapy although she does have a follow-up appoint with a chiropractor next week    #7 she declines muscle relaxant at this time, we will try the meloxicam first monitor for GI upset, she will take this with Prilosec    #8 follow-up cardiology    #9 follow-up 1 month

## 2023-07-07 ENCOUNTER — TELEPHONE (OUTPATIENT)
Dept: MEDICAL GROUP | Facility: MEDICAL CENTER | Age: 83
End: 2023-07-07
Payer: MEDICARE

## 2023-07-07 PROBLEM — M54.6 THORACIC BACK PAIN: Status: ACTIVE | Noted: 2023-07-07

## 2023-07-07 NOTE — TELEPHONE ENCOUNTER
Called the patient and left a message.  Please notify the patient that the x-ray of her ribs shows that she has an old rib fracture on the left side but no acute fracture on the right side.  The pain on the right side possibly is coming from an old compression fracture she has on the back x-ray.  If the back pain persists we may need to get an MRI of the mid back.

## 2023-07-07 NOTE — TELEPHONE ENCOUNTER
----- Message from Master Suarez M.D. sent at 7/7/2023 12:15 AM PDT -----  Called patient left a message, please notify her that the x-ray of her back shows she has an old compression fracture in her mid back which may be contributing to the pain however the fracture does not appear to be acute.  She also has a mild amount of arthritis in her mid back.  If the pain persists we may need to get an MRI of the mid back, have her let us know next week how she is doing with the new medication.

## 2023-07-07 NOTE — TELEPHONE ENCOUNTER
----- Message from Matser Suarez M.D. sent at 7/7/2023 12:15 AM PDT -----  Called patient left a message, please notify her that the x-ray of her back shows she has an old compression fracture in her mid back which may be contributing to the pain however the fracture does not appear to be acute.  She also has a mild amount of arthritis in her mid back.  If the pain persists we may need to get an MRI of the mid back, have her let us know next week how she is doing with the new medication.

## 2023-07-10 ENCOUNTER — HOSPITAL ENCOUNTER (OUTPATIENT)
Dept: LAB | Facility: MEDICAL CENTER | Age: 83
End: 2023-07-10
Payer: MEDICARE

## 2023-07-10 ENCOUNTER — HOSPITAL ENCOUNTER (OUTPATIENT)
Dept: LAB | Facility: MEDICAL CENTER | Age: 83
End: 2023-07-10
Attending: INTERNAL MEDICINE
Payer: MEDICARE

## 2023-07-10 DIAGNOSIS — E55.9 VITAMIN D DEFICIENCY: ICD-10-CM

## 2023-07-10 DIAGNOSIS — I35.0 SEVERE AORTIC STENOSIS: ICD-10-CM

## 2023-07-10 DIAGNOSIS — R73.09 IMPAIRED GLUCOSE METABOLISM: ICD-10-CM

## 2023-07-10 DIAGNOSIS — Z01.810 PRE-PROCEDURAL CARDIOVASCULAR EXAMINATION: ICD-10-CM

## 2023-07-10 DIAGNOSIS — E78.5 DYSLIPIDEMIA: Chronic | ICD-10-CM

## 2023-07-10 DIAGNOSIS — N18.31 CHRONIC KIDNEY DISEASE, STAGE 3A: ICD-10-CM

## 2023-07-10 LAB
25(OH)D3 SERPL-MCNC: 82 NG/ML (ref 30–100)
ALBUMIN SERPL BCP-MCNC: 4.4 G/DL (ref 3.2–4.9)
ALBUMIN SERPL BCP-MCNC: 4.5 G/DL (ref 3.2–4.9)
ALBUMIN/GLOB SERPL: 1.7 G/DL
ALBUMIN/GLOB SERPL: 1.7 G/DL
ALP SERPL-CCNC: 70 U/L (ref 30–99)
ALP SERPL-CCNC: 70 U/L (ref 30–99)
ALT SERPL-CCNC: 12 U/L (ref 2–50)
ALT SERPL-CCNC: 15 U/L (ref 2–50)
ANION GAP SERPL CALC-SCNC: 12 MMOL/L (ref 7–16)
ANION GAP SERPL CALC-SCNC: 12 MMOL/L (ref 7–16)
AST SERPL-CCNC: 22 U/L (ref 12–45)
AST SERPL-CCNC: 22 U/L (ref 12–45)
BASOPHILS # BLD AUTO: 0.7 % (ref 0–1.8)
BASOPHILS # BLD: 0.04 K/UL (ref 0–0.12)
BILIRUB SERPL-MCNC: 0.7 MG/DL (ref 0.1–1.5)
BILIRUB SERPL-MCNC: 0.7 MG/DL (ref 0.1–1.5)
BUN SERPL-MCNC: 15 MG/DL (ref 8–22)
BUN SERPL-MCNC: 16 MG/DL (ref 8–22)
CALCIUM ALBUM COR SERPL-MCNC: 9.3 MG/DL (ref 8.5–10.5)
CALCIUM ALBUM COR SERPL-MCNC: 9.6 MG/DL (ref 8.5–10.5)
CALCIUM SERPL-MCNC: 9.7 MG/DL (ref 8.5–10.5)
CALCIUM SERPL-MCNC: 9.9 MG/DL (ref 8.5–10.5)
CHLORIDE SERPL-SCNC: 102 MMOL/L (ref 96–112)
CHLORIDE SERPL-SCNC: 104 MMOL/L (ref 96–112)
CO2 SERPL-SCNC: 25 MMOL/L (ref 20–33)
CO2 SERPL-SCNC: 26 MMOL/L (ref 20–33)
CREAT SERPL-MCNC: 1.17 MG/DL (ref 0.5–1.4)
CREAT SERPL-MCNC: 1.17 MG/DL (ref 0.5–1.4)
EOSINOPHIL # BLD AUTO: 0.12 K/UL (ref 0–0.51)
EOSINOPHIL NFR BLD: 2.1 % (ref 0–6.9)
ERYTHROCYTE [DISTWIDTH] IN BLOOD BY AUTOMATED COUNT: 48.7 FL (ref 35.9–50)
EST. AVERAGE GLUCOSE BLD GHB EST-MCNC: 120 MG/DL
GFR SERPLBLD CREATININE-BSD FMLA CKD-EPI: 46 ML/MIN/1.73 M 2
GFR SERPLBLD CREATININE-BSD FMLA CKD-EPI: 46 ML/MIN/1.73 M 2
GLOBULIN SER CALC-MCNC: 2.6 G/DL (ref 1.9–3.5)
GLOBULIN SER CALC-MCNC: 2.6 G/DL (ref 1.9–3.5)
GLUCOSE SERPL-MCNC: 104 MG/DL (ref 65–99)
GLUCOSE SERPL-MCNC: 106 MG/DL (ref 65–99)
HBA1C MFR BLD: 5.8 % (ref 4–5.6)
HCT VFR BLD AUTO: 42.7 % (ref 37–47)
HGB BLD-MCNC: 13.9 G/DL (ref 12–16)
IMM GRANULOCYTES # BLD AUTO: 0.01 K/UL (ref 0–0.11)
IMM GRANULOCYTES NFR BLD AUTO: 0.2 % (ref 0–0.9)
LYMPHOCYTES # BLD AUTO: 1.37 K/UL (ref 1–4.8)
LYMPHOCYTES NFR BLD: 24.1 % (ref 22–41)
MCH RBC QN AUTO: 31.4 PG (ref 27–33)
MCHC RBC AUTO-ENTMCNC: 32.6 G/DL (ref 32.2–35.5)
MCV RBC AUTO: 96.4 FL (ref 81.4–97.8)
MONOCYTES # BLD AUTO: 0.37 K/UL (ref 0–0.85)
MONOCYTES NFR BLD AUTO: 6.5 % (ref 0–13.4)
NEUTROPHILS # BLD AUTO: 3.78 K/UL (ref 1.82–7.42)
NEUTROPHILS NFR BLD: 66.4 % (ref 44–72)
NRBC # BLD AUTO: 0 K/UL
NRBC BLD-RTO: 0 /100 WBC (ref 0–0.2)
NT-PROBNP SERPL IA-MCNC: 604 PG/ML (ref 0–125)
PLATELET # BLD AUTO: 224 K/UL (ref 164–446)
PMV BLD AUTO: 10.9 FL (ref 9–12.9)
POTASSIUM SERPL-SCNC: 4.3 MMOL/L (ref 3.6–5.5)
POTASSIUM SERPL-SCNC: 4.7 MMOL/L (ref 3.6–5.5)
PROT SERPL-MCNC: 7 G/DL (ref 6–8.2)
PROT SERPL-MCNC: 7.1 G/DL (ref 6–8.2)
PTH-INTACT SERPL-MCNC: 23.8 PG/ML (ref 14–72)
RBC # BLD AUTO: 4.43 M/UL (ref 4.2–5.4)
SODIUM SERPL-SCNC: 140 MMOL/L (ref 135–145)
SODIUM SERPL-SCNC: 141 MMOL/L (ref 135–145)
TSH SERPL DL<=0.005 MIU/L-ACNC: 1.08 UIU/ML (ref 0.38–5.33)
WBC # BLD AUTO: 5.7 K/UL (ref 4.8–10.8)

## 2023-07-10 PROCEDURE — 85025 COMPLETE CBC W/AUTO DIFF WBC: CPT

## 2023-07-10 PROCEDURE — 83880 ASSAY OF NATRIURETIC PEPTIDE: CPT

## 2023-07-10 PROCEDURE — 83970 ASSAY OF PARATHORMONE: CPT

## 2023-07-10 PROCEDURE — 84443 ASSAY THYROID STIM HORMONE: CPT

## 2023-07-10 PROCEDURE — 36415 COLL VENOUS BLD VENIPUNCTURE: CPT

## 2023-07-10 PROCEDURE — 80053 COMPREHEN METABOLIC PANEL: CPT | Mod: 91

## 2023-07-10 PROCEDURE — 83036 HEMOGLOBIN GLYCOSYLATED A1C: CPT

## 2023-07-10 PROCEDURE — 82306 VITAMIN D 25 HYDROXY: CPT

## 2023-07-10 PROCEDURE — 80053 COMPREHEN METABOLIC PANEL: CPT

## 2023-07-11 ENCOUNTER — TELEPHONE (OUTPATIENT)
Dept: MEDICAL GROUP | Facility: MEDICAL CENTER | Age: 83
End: 2023-07-11
Payer: MEDICARE

## 2023-07-11 PROBLEM — R73.09 IMPAIRED GLUCOSE METABOLISM: Status: ACTIVE | Noted: 2023-07-11

## 2023-07-12 NOTE — TELEPHONE ENCOUNTER
Notified with results, no changes, GFR stable, A1c slightly increased, monitor carbohydrate content and portion sizes.  No changes with her medication.

## 2023-07-26 NOTE — PROGRESS NOTES
"Follow up patient note  Pain Medicine, Interventional spine and sports physiatry, Physical medicine rehabilitation      Chief complaint:   Chief Complaint   Patient presents with    Follow-Up     Low back pain    Low Back Pain     Low back pain              HISTORY    Please see new patient note dated 12/18/2020 by Dr Choi,  for more details.     HPI  Patient identification: Katerina Torres 82 y.o. female  presents for follow-up with complaint of low back pain    Interval history:   Since the last visit, Katerina has started physical therapy, this is going well after one visit.  Pain is continuing to improve.  She feels hopeful with improvement.    Some pain in the low back, no groin symptoms.  No symptoms in the leg now.    Continues to take tylenol.  She has only one tramadol remaining, but this caused constipation and headaches.  She thinks that this might be related.      She has not fallen since the last visit.  Fall within the last two years in the garden, tripped, no other falls.       ROS   GI: stomach burning from NSAIDs which she has not been taking  Red Flags :   Fever, Chills, Sweats: Denies  Involuntary Weight Loss: Denies  Bowel/Bladder Incontinence: Denies  Saddle Anesthesia: Denies    PMHx:   Past Medical History:   Diagnosis Date    Breast cancer (HCC)     Breath shortness     with exertion \"walking up a hill\"    Bruises easily     Cancer (HCC) 1989    breast left side lumpectomy    Cardiac murmur 6/3/2009    Chronic kidney disease, stage III (moderate) (HCC) 8/20/2015    Dyslipidemia 6/3/2009    Glaucoma     both eyes    Hepatitis A 1993    Hiatus hernia syndrome     Hx of breast cancer 6/3/2009    Hypertension 7/18/2017    currently not taking medications    MEDICAL HOME     Neutropenia 6/3/2009    Osteopenia 6/3/2009    Preventative health care 6/3/2009    Snoring     Unspecified cataract     bilateral IOLs       PSHx:   Past Surgical History:   Procedure Laterality Date    LUMBAR " TRANSFORAMINAL EPIDURAL STEROID INJECTION Right 9/1/2022    Procedure: RIGHT lumbar four and lumbar five transforaminal epidural steroid injection;  Surgeon: Roni Choi M.D.;  [facility];  Service: Pain Management    LUMBAR TRANSFORAMINAL EPIDURAL STEROID INJECTION Right 1/6/2021    Procedure: INJECTION, STEROID, SPINE, LUMBAR, EPIDURAL, TRANSFORAMINAL APPROACH;  Surgeon: Roni Choi M.D.;  Location: [facility];  Service: Pain Management    KNEE ARTHROPLASTY TOTAL Right 5/8/2018    Procedure: KNEE ARTHROPLASTY TOTAL;  Surgeon: Thanh Hall M.D.;  Location: SURGERY [facility];  Service: Orthopedics    VITRECTOMY POSTERIOR Left 10/25/2016    Procedure: VITRECTOMY POSTERIOR membrane peel cryotherapy infusion of SF6 intraocular gas;  Surgeon: Amanda Rodriguez M.D.;  Location: SURGERY SAME DAY [facility];  Service:     RECOVERY  3/31/2014    Performed by [facility] at SURGERY SAME DAY [facility] ORS    CATARACT PHACO WITH IOL  5/28/2009    Performed by [facility] at SURGERY SAME DAY [facility] ORS    CATARACT PHACO WITH IOL  5/14/2009    Performed by [facility] at SURGERY SAME DAY [facility] ORS    LUMPECTOMY      OTHER      NC RADIATION THERAPY PLAN SIMPLE         Family history   Family History   Problem Relation Age of Onset    Heart Disease Father         CAD MI    Stroke Father     Cancer Sister         breast    Cancer Mother         breast       Medications:   Current Outpatient Medications   Medication    diclofenac DR (VOLTAREN) 75 MG Tablet Delayed Response    timolol (TIMOPTIC) 0.5 % Solution    acetaminophen (TYLENOL) 500 MG Tab    nitroglycerin (NITROSTAT) 0.4 MG SL Tab    bimatoprost (LUMIGAN) 0.01 % Solution    vitamin D (CHOLECALCIFEROL) 1000 UNIT Tab    Magnesium 500 MG Tab     No current facility-administered medications for this visit.       Allergies:   Allergies   Allergen Reactions    Atorvastatin Unspecified     " Does not qualify for primary prevention    Naproxen      GI upset    Vicodin [Hydrocodone-Acetaminophen] Nausea     Nausea, dizziness       Social Hx:   Social History     Socioeconomic History    Marital status:      Spouse name: Not on file    Number of children: Not on file    Years of education: Not on file    Highest education level: Not on file   Occupational History    Not on file   Tobacco Use    Smoking status: Former     Packs/day: 0.50     Years: 10.00     Pack years: 5.00     Types: Cigarettes     Quit date: 1999     Years since quittin.7    Smokeless tobacco: Never   Vaping Use    Vaping Use: Not on file   Substance and Sexual Activity    Alcohol use: Yes     Alcohol/week: 4.2 oz     Types: 7 Standard drinks or equivalent per week     Comment: 1 glass wine/day    Drug use: No    Sexual activity: Not Currently     Partners: Male   Other Topics Concern     Service No    Blood Transfusions No    Caffeine Concern No    Occupational Exposure No    Hobby Hazards Yes    Sleep Concern No    Stress Concern Yes    Weight Concern No    Special Diet No    Back Care No    Exercise No    Bike Helmet No    Seat Belt Yes    Self-Exams Yes   Social History Narrative    Not on file     Social Determinants of Health     Financial Resource Strain: Not on file   Food Insecurity: Not on file   Transportation Needs: Not on file   Physical Activity: Not on file   Stress: Not on file   Social Connections: Not on file   Intimate Partner Violence: Not on file   Housing Stability: Not on file         EXAMINATION     Physical Exam:   Vitals: /60 (BP Location: Right arm, Patient Position: Sitting, BP Cuff Size: Adult long)   Pulse 69   Temp 36.2 °C (97.2 °F) (Temporal)   Ht 1.6 m (5' 3\")   Wt 59.6 kg (131 lb 6.4 oz)   SpO2 95%     Constitutional:   Body Habitus: Body mass index is 23.28 kg/m².  Cooperation: Fully cooperates with exam  Appearance: Well-groomed no disheveled   Skin -no obvious skin " lesions  Respiratory-  breathing comfortable on room air, no audible wheezing  Cardiovascular- capillary refills less than 2 seconds. No lower extremity edema is noted.   Psychiatric- alert and oriented ×3. Normal affect.   Spine:   Tenderness to palpation over the right lumbar paraspinals and minimal tenderness over the right PSIS  Gait is antalgic without any assist device  Straight leg raise is negative bilaterally  Bishop's is positive for back pain on the right  Functional range of motion in the hips in internal and external rotation bilaterally  No focal motor or sensory deficits in the lower extremities bilaterally  Reflexes are 2+ in the left patella and right patella(despite TKA) and 1+ in the Achilles bilaterally  Tenderness over the pubic symphysis and mild right up slip innominate      MEDICAL DECISION MAKING    DATA    Labs:   Lab Results   Component Value Date/Time    SODIUM 141 01/14/2022 09:04 AM    POTASSIUM 4.9 01/14/2022 09:04 AM    CHLORIDE 107 01/14/2022 09:04 AM    CO2 25 01/14/2022 09:04 AM    GLUCOSE 104 (H) 01/14/2022 09:04 AM    BUN 19 01/14/2022 09:04 AM    CREATININE 1.18 01/14/2022 09:04 AM    CREATININE 1.08 (H) 07/02/2010 08:56 AM    BUNCREATRAT 11 07/02/2010 08:56 AM    GLOMRATE 50 (L) 07/02/2010 08:56 AM        Lab Results   Component Value Date/Time    PROTHROMBTM 13.1 03/31/2014 08:05 AM    INR 1.00 03/31/2014 08:05 AM        Lab Results   Component Value Date/Time    WBC 5.3 01/14/2022 09:04 AM    WBC 4.4 07/02/2010 08:56 AM    RBC 4.44 01/14/2022 09:04 AM    RBC 4.45 07/02/2010 08:56 AM    HEMOGLOBIN 14.4 01/14/2022 09:04 AM    HEMATOCRIT 43.0 01/14/2022 09:04 AM    MCV 96.8 01/14/2022 09:04 AM    MCV 95 07/02/2010 08:56 AM    MCH 32.4 01/14/2022 09:04 AM    MCH 31.5 07/02/2010 08:56 AM    MCHC 33.5 (L) 01/14/2022 09:04 AM    MPV 10.7 01/14/2022 09:04 AM    NEUTSPOLYS 60.20 01/14/2022 09:04 AM    LYMPHOCYTES 30.90 01/14/2022 09:04 AM    MONOCYTES 6.20 01/14/2022 09:04 AM     EOSINOPHILS 1.50 01/14/2022 09:04 AM    BASOPHILS 0.80 01/14/2022 09:04 AM        Lab Results   Component Value Date/Time    HBA1C 5.4 04/27/2018 07:52 AM          Imaging: I personally reviewed following images  Xray hip, right 09/06/2022  Mild hip osteoarthritis      MRI lumbar spine.  12/2/2020  Reviewed findings and reviewed with patient    I reviewed the following radiology reports  Xray sacrum and coccyx 09/06/2022  IMPRESSION:     1.  No displaced fracture of sacrum or coccyx.  2.  Degenerative change of the SI joints again seen, unchanged from prior.    Xray right hip 09/06/2022  IMPRESSION:     Stable mild bilateral hip osteoarthritis    Results for orders placed during the hospital encounter of 12/02/20    MR-LUMBAR SPINE-W/O    Impression  1.  Multilevel degenerative disc disease and facet degeneration. There is moderate central canal narrowing at L2-3 and L3-4 with moderate to severe central canal narrowing at L4-5.    2.  Very degrees of neural foraminal narrowing at levels as specifically described above.    3.  Mild chronic compression fracture at L3.    4.  Scoliosis.                               DIAGNOSIS   Visit Diagnoses     ICD-10-CM   1. Lumbosacral pain  M54.50   2. Somatic dysfunction of sacral region  M99.04   3. Spinal stenosis of lumbar region, unspecified whether neurogenic claudication present  M48.061   4. Neural foraminal stenosis of lumbar spine  M48.061   5. Primary osteoarthritis of both hips  M16.0   6. 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 female seen for above    Katerina was seen today for follow-up and low back pain.    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  Continues to have some sacral dysfunction.    Radicular symptoms are involved.  Discontinue tramadol.  Continue tylenol 1300mg no more than 3/day, usually takes less.  Avoid NSAID  Reviewed films.  No evidence of fracture.  Mild hip OA, likely symptoms are related to lumbar spine and sacroiliac joint arthritis/dysfunction.      Follow up: 2 months, prn    Thank you for allowing me to participate in the care of this patient. If you have any questions please not hesitate to contact me.      Please note that this dictation was created using voice recognition software. I have made every reasonable attempt to correct obvious errors but there may be errors of grammar and content that I may have overlooked prior to finalization of this note.      Roni Choi MD  Interventional Spine and Sports Physiatry  Physical Medicine and Rehabilitation  [facility]           Oral Minoxidil Counseling- I discussed with the patient the risks of oral minoxidil including but not limited to shortness of breath, swelling of the feet or ankles, dizziness, lightheadedness, unwanted hair growth and allergic reaction.  The patient verbalized understanding of the proper use and possible adverse effects of oral minoxidil.  All of the patient's questions and concerns were addressed.

## 2023-07-27 ENCOUNTER — OFFICE VISIT (OUTPATIENT)
Dept: MEDICAL GROUP | Facility: MEDICAL CENTER | Age: 83
End: 2023-07-27
Payer: MEDICARE

## 2023-07-27 VITALS
BODY MASS INDEX: 24.63 KG/M2 | WEIGHT: 139 LBS | DIASTOLIC BLOOD PRESSURE: 60 MMHG | TEMPERATURE: 97.9 F | OXYGEN SATURATION: 97 % | HEART RATE: 56 BPM | RESPIRATION RATE: 16 BRPM | HEIGHT: 63 IN | SYSTOLIC BLOOD PRESSURE: 146 MMHG

## 2023-07-27 DIAGNOSIS — I35.0 AORTIC VALVE STENOSIS, ETIOLOGY OF CARDIAC VALVE DISEASE UNSPECIFIED: ICD-10-CM

## 2023-07-27 DIAGNOSIS — R94.4 DECREASED GFR: ICD-10-CM

## 2023-07-27 DIAGNOSIS — N18.31 CHRONIC KIDNEY DISEASE, STAGE 3A: ICD-10-CM

## 2023-07-27 DIAGNOSIS — G89.29 CHRONIC MIDLINE BACK PAIN, UNSPECIFIED BACK LOCATION: ICD-10-CM

## 2023-07-27 DIAGNOSIS — M54.9 CHRONIC MIDLINE BACK PAIN, UNSPECIFIED BACK LOCATION: ICD-10-CM

## 2023-07-27 DIAGNOSIS — M85.80 OSTEOPENIA, UNSPECIFIED LOCATION: ICD-10-CM

## 2023-07-27 PROCEDURE — 3077F SYST BP >= 140 MM HG: CPT | Performed by: INTERNAL MEDICINE

## 2023-07-27 PROCEDURE — 99214 OFFICE O/P EST MOD 30 MIN: CPT | Performed by: INTERNAL MEDICINE

## 2023-07-27 PROCEDURE — 3078F DIAST BP <80 MM HG: CPT | Performed by: INTERNAL MEDICINE

## 2023-07-27 ASSESSMENT — FIBROSIS 4 INDEX: FIB4 SCORE: 2.16

## 2023-07-27 NOTE — PROGRESS NOTES
Subjective     Katerina Torres is a 83 y.o. female who presents with Follow-Up and Lab Results            HPI      Here for follow up mid back pain, her back pain has improved, we did see her last month complaining of mid back pain, xray did show T5 chronic deformity and rib series negative done 7/6/23.  She had tried gabapentin no benefit, Zanaflex was not covered by insurance so she did not take that medication.  Patient states the pain has improved significantly.  No recent falls or trauma to the area recently. Has been trying to avoid doing heavy lifting and does not mow the yard as her neighbor has been helping her do heavy things and she still does cleaning around the house inside as well. Previous low back pain has had lumbar epidural injections and been to PT. Has more stress taking care of her  who has memory loss and dementia and he has been more forgetful and agitated.  They do not have family in town but they do have very helpful neighbors and her neighbor's  does help with yard work and that has been of a significant benefit for her.   Aortic stenosis followed by cardiology, has seen cardiothoracic surgery as well, patient experiences dyspnea only going up hills or inclines, but does not experience shortness of breath otherwise, no associated chest pain, no lightheadedness, no syncope, no palpitations. No leg swelling.,  Tries to limit sodium in her diet.  No tobacco, no alcohol.  Medications, allergies, medical history, surgical history, social history, family history  reviewed and updated      Current Outpatient Medications   Medication Sig Dispense Refill    meloxicam (MOBIC) 7.5 MG Tab Take 1 Tablet by mouth 1 time a day as needed for Moderate Pain (back pain). 15 Tablet 0    omeprazole (PRILOSEC) 20 MG delayed-release capsule Take 1 Capsule by mouth every morning before breakfast. Indications: heartburn 30 Capsule 11    timolol (TIMOPTIC) 0.5 % Solution Administer 1 Drop into  the left eye every day.      acetaminophen (TYLENOL) 500 MG Tab Take 2 Tablets by mouth every 6 hours as needed.      nitroglycerin (NITROSTAT) 0.4 MG SL Tab Place 1 Tablet under the tongue every 5 minutes as needed for Chest Pain.      bimatoprost (LUMIGAN) 0.01 % Solution Administer 1 Drop into both eyes at bedtime. Indications: Wide-Angle Glaucoma      vitamin D (CHOLECALCIFEROL) 1000 UNIT Tab Take 1 Tablet by mouth.       No current facility-administered medications for this visit.       thoracic back pain  6/22/23 musculoskeletal back pain trial of zanaflex and gabapentin, dysphagia, reflux or prilosec, referral back to GI  6/29/23 on neurontin, zanaflex not covered, declines baclofen, going to chiropractor at [place]  7/7/23 off neurontin, trial of mobic 7.5 mg qday x 15 days  7/6/23 x-ray rib series bilaterally and chest x-ray questionable healing fracture left anterior fourth rib  7/6/23 xray thoracic spine chronic severe compression deformity T5, no definite acute fracture seen, mild arthritis     s/p knee replacement  3/3/17 MRI right knee abnormal right lateral meniscus with peripheral extrusion, maceration, and complex tear involving primarily the posterior horn and body but also the anterior horn, abnormal medial meniscus with vertical tear of the peripheral zone of body and posterior horn, probable meniscal root tear, and degenerative fraying of the free edge, severe lateral femorotibial compartment osteoarthritis with significant cartilage loss and moderate to severe subchondral edema within the lateral femoral condyle and lateral tibial plateau, mild medial femorotibial and the patellofemoral compartment osteoarthritis, moderate sized joint effusion with associated popliteal cyst  6/20/17 sees  orthopedics  1/18/18  orthopedic note right knee end-stage arthritis, x-rays bone-on-bone right knee arthritis lateral compartment, right knee steroid injection performed, planned for total  right knee arthroplasty after winter  5/2/18  orthopedic note, right knee osteoathritis, failed conservative measures, scheduled for right knee surgery  5/8/18  operative note right knee arthroplasty   5/23/18  orthopedic note 2 weeks s/p right total knee  8/6/18  orthopedic note 3 months status post right knee replacement doing well, x-ray shows stable right total knee  5/22/19  orthopedic note x-ray stable total knee replacement, follow-up 1 year     Preventative health  1/10/13 pneumovax  7/29/14 zoster vaccine  8/6/15 prevnar at [facility]  9/1/16 sonocine negative  3/7/17 colon per [facility] 2 polyps, grade 2 internal hemorrhoids, angioectasias ascending colon,pathology one hyperplastic polyp and one sessile serrated polyp, repeat colonoscopy 5 years   9/19/20 shingrix second  8/23/21 dexa LS-0.9,hip-1.4  4/28/22 tdap   6/21/22 covid 4th  9/26/22 prevnar 20  11/18/22 mammogram heterogeneously dense breast tissue, declines screening breast ultrasound   7/11/23 vit d 82     Osteopenia  9/06 dexa LS -1.2,hip -1.1  6/09 dexa LS -1.4,hip -1.0  7/11 vit d 43  7/11 dexa LS -1.1,hip -0.9  712 vit d 30 start 1000 units  7/24/13 vit d 72 on 1000 units  8/6/13 dexa LS -1.1,hip -1.1  8/5/14 vit d 67  8/19/15 dexa LS -1.4,hip -1.3;FRAX 40% major,27% hip only on prednisone one month, does not need bisphosphonate therapy  8/19/15 vit d 43  6/16/16 off prednisone x 3 weeks  6/21/16 vit d 50  6/27/17 vit d 48  4/27/18 vit d 53   5/14/19 vit d 46  5/28/19 dexa LS-1.0,hip-1.4  6/12/20 vit d 88 on 1000 units decrease to qod   8/23/21 dexa LS-0.9,hip-1.4  1/14/22 vit d 68  7/6/23 x-ray rib series bilaterally and chest x-ray questionable healing fracture left anterior fourth rib  7/6/23 xray thoracic spine chronic severe compression deformity T5, no definite acute fracture seen, mild arthritis  7/11/23 vit d 82     low back pain  11/30/20 right-sided buttock pain with radiation, referral to  chiny, x-ray, MRI lumbar spine, trial of naproxen short-term plus acetaminophen follow-up 3 weeks  11/30/20 x-ray lumbar spine moderate spondylosis, L2-L3 asymmetric disc height loss on the left resulting in slight right curvature and degenerative retrolisthesis  12/2/20 MRI lumbar spine mild superior endplate compression fracture at L3, no marrow edema consistent with chronic process, L2-L3 moderate central narrowing, moderate right and severe left neuroforaminal narrowing, L3-4 moderate central narrowing, moderate bilateral neuroforaminal narrowing, L4-5 moderate to severe central canal narrowing with severe right and moderate left neuroforaminal narrowing, L5-S1 moderate bilateral neuroforaminal narrowing  1/6/21 dr.storm amaro procedure note right lumbar epidural steroid injection L4-S1 under fluoroscopy  1/26/21 dr.storm amaro note good response to right lumbar L4-L5 epidural steroid injections, she has returned to activities such as skiing, consider physical therapy and home program for residual pain, consider right SI joint versus repeat lumbar steroid injection if necessary, follow-up 2 months  2/8/21 [facility] note   2/17/21 [facility] note   8/31/22 dr.storm physiatry note plan right L4-L5 epidural steroid injection under fluoroscopy and physical therapy as tolerated  9/1/22 dr.storm physiatry procedure note epidural L4-L5 L5-S1 steroid injection  9/6/22 x-ray sacrum degenerative changes SI joint unchanged from prior  9/6/22 x-ray right hip stable mild bilateral osteoarthritis  9/6/22 dr.storm physiatry note SI joint dysfunction, obtain x-ray lumbar spine, sacrum and coccyx, right hip, trial of tramadol every 6 hours, follow-up physical therapy  9/16/22 dr.storm amaro note low back pain, making improvements with physical therapy, continues to have sacral dysfunction with radicular symptoms, discontinue tramadol, trial of acetaminophen 1300 mg tid ,avoid nsaids      knee pain left  8/1/13 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, lateral meniscus mildly extruded, ruptured hopper's cyst  8/12/13  ortho note pain improved on followup, consider injection if pain recurs    impaired glucose metabolism  7/9/17 A1c 5.4%  4/27/18 A1c 5.4%  7/11/23 A1c 5.8%      History polymyalgia  4/20/15 physical therapy, trial celebrex and zanaflex  5/7/15 physical therapy evaluation today recommended right hip x-ray and pelvic x-ray, ordered in computer  5/8/15 xray hip negative  6/29/15 seen by bridgette diagnosed with polymyalgia rheumatica  6/29/15 CRP 9.4,ESR 32 started on prednisone 20 mg    7/28/15 on prednisone 10 mg will continue 10 mg x 2 weeks, then decrease to 5 mg daily  8/19/15 hgb 14,hct 43, CRP 0.3, ESR 14, tapered off prednisone but developed mouth irritation, has resumed prednisone 5 mg daily, will continue x2 weeks then taper  11/17/15 refill prednisone 5 mg #30 tabs x one  6/21/16 off prednisone; CRP 0.1,ESR 17  6/27/17 CRP 0.09,ESR 12,cpk 66    history palpitations  7/11/19 patient feels more intense heart rate and heartbeat at night, can hear her heartbeat, only occurs at night, question anxiety component trial of buspar, 24-hour holter, cardiology referral  7/18/19 24-hour holter monitor, sinus rhythm asymptomatic average heart rate 65 PAC burden 1.3%, PVC burden 1.8%, 2 atrial runs longest 7 beats maximal heart rate 105, fastest run 4 beats maximum heart rate 129  2/14/22  cardiology note moderate to severe aortic stenosis, able to walk 4 miles with no limitations, will order repeat echo follow-up 2 months  5/11/22 echo EF 55%,severe aortic stenosis peak gradient 70  5/13/22 cardiology note asymptomatic severe aortic stenosis, continue surveillance follow-up 6 months  11/7/22 cardiology note asymptomatic severe aortic stenosis, continue surveillance, repeat echo, follow-up 6 months  11/10/22 echo severe aortic stenosis, peak gradient 65, EF 60%,  normal diastolic function  5/24/23 cardiology note aortic stenosis with preserved ejection fraction stage C, recommend lower stress EKG, echocardiogram, BNP, follow-up 3 months     history neutropenia  8/06 wbc 3.4  3/08 wbc 3.9  6/09 wbc 3.9  7/10 wbc 4.4  7/11 wbc 4.5  7/12 wbc 3.8 (55%N,35%L)  7/24/13 wbc 4.3  3/20/14 wbc 3.1 (52%N,36%L)  3/31/14 wbc 3.7  8/19/15 wbc 5.3  6/21/16 wbc 4.4 (50%N,40%L)  6/27/17 wbc 4.2   4/27/18 wbc 4.4  5/14/19 wbc 4.5  6/12/20 wbc 4.5  6/30/21 wbc 4.3  1/14/22 wbc 5.3  10/20/22 wbc 5.6  7/11/23 wbc 5.7     histroy hypertension  3/31/14 cardiac catheterization; left main mild plaquing, LAD patent, circumflex free of disease, RCA free of disease, normal LV function, mild aortic stenosis  7/9/17 echo normal LV size and function, EF 60%, grade 1 diastolic dysfunction, moderate aortic stenosis peak gradient 50, mean 29, valve area 0.8-1.0 and aortic regurgitation  7/9/17 persantine thallium small fixed perfusion abnormality distal anterior and anteroapical segments, no ischemia is noted to represent mild prior infarct or variant normal apical thinning  7/18/17 start diltiazem 120 mg daily (also has esophageal dysmotility by barium swallow)  4/26/18 off diltiazem  4/27/18 echo normal LV size and function, EF 65%, moderate aortic stenosis peak gradient 46, valve area 0.9,, moderate aortic insufficiency, no change compared to previous  5/27/19 echo normal LV size and function, EF 65%, normal diastolic function, calcified aortic valve moderate aortic stenosis peak gradient 53, moderate aortic insufficiency  10/1/21 cardiology note will likely need valve replacement next 1 to 2 years, follow-up 6 months  2/14/22  cardiology note moderate to severe aortic stenosis, able to walk 4 miles with no limitations, will order repeat echo follow-up 2 months  5/11/22 echo EF 55%,severe aortic stenosis peak gradient 70  5/13/22 cardiology note asymptomatic severe aortic stenosis, continue  surveillance follow-up 6 months  11/7/22 cardiology note asymptomatic severe aortic stenosis, continue surveillance, repeat echo, follow-up 6 months  11/10/22 echo severe aortic stenosis, peak gradient 65, EF 60%, normal diastolic function  5/24/23 cardiology note aortic stenosis with preserved ejection fraction stage C, recommend stress EKG, echocardiogram, BNP, follow-up 3 months     History of breast cancer  1980s left sided s/p lumpectomy and radiation therapy, no old records  7/11 mammogram  8/12 mammogram  8/13 mammogram  8/18/14 mammogram  9/1/16 mammogram heterogeneously dense breast tissue recommend screening breast ultrasound  9/1/16 sonocine negative     History of basal cell skin cancer     Glaucoma  4/29/21  eye exam   1/13/22  eye exam primary open-angle glaucoma  9/16/22  eye exam   1/12/23  eye exam   5/12/23  ophthalmology note primary open angle glaucoma moderate, follow-up 6 months     Esophageal dysmotility  7/9/17 barium swallow moderate esophageal dysmotility, small volume silent aspiration  7/18/17 start diltiazem 120 mg daily  7/18/17 referral [facility], speech pathology for esophageal dysmotility, small amount of aspiration on barium swallow, try low-dose diltiazem 120 mg for esophageal dysmotility and elevated blood pressure  7/26/17 [facility] evaluation dyspepsia, obtain cardiology clearance and then proceed EGD, initiate pantoprazole 20 mg, trial of miralax and colace     Dyslipidemia  3/08 chol 175,trig 73,hdl 45,ldl 115  6/09 chol 174,trig 89,hdl 47,ldl 109  7/10 chol 182,trig 61,hdl 55,ldl 114  7/11 chol 175,trig 69,hdl 45,ldl 116  7/12 chol 164,trig 64,hdl 43,ldl 108  7/24/13 chol 186,trig 66,hdl 54,ldl 119 no meds  8/5/14 chol 165,trig 93,hdl 48,ldl 98  8/19/15 chol 192,trig 109,hdl 58,ldl 112; 10 year risk 20% declines statin therapy  6/21/16 chol 137,trig 75,hdl 47,ldl 75   6/27/17 hcol 153,trig 94,hdl 54,ldl 80  5/14/19 chol 164,trig 82,hdl 48,ldl 100    6/12/20 chol 176,trig 80,hdl 57,ldl 103   6/30/21 chol 170,trig 100,hdl 43,ldl 107  10/20/22 chol 175,trig 81,hdl 61,ldl 98     Decrease GFR  7/7/11 bun 16,creat 1.0,GFR 50  7/24/13 bun 14,creat 1.1,GFR 45  3/20/14 bun 15,creat 0.9,GFR 59  8/5/14 bun 14,creat 1.1,GFR 46  2/19/15 bun 13,creat 0.8,GFR >60  8/19/15 bun 10,creat 1.1,GFR 48  6/21/16 bun 19,creat 1.0,GFR 50  6/27/17 bun 14,creat 0.5,GFR 51  4/28/18 bun 15,creat 0.9,GFR 60  5/14/19 bun 18,creat 1.13,GFR 46  6/12/20 bun 15,creat 1.07,GFR 49  6/30/21 bun 14,creat 1.07,GFR 49,PTH 36,urine mac<1.2,SPEP negative  1/14/22 bun 19,creat 1.18,GFR 44,PTH 25,calcium 9.6,phos 3.7  10/20/22 bun 20,creat 1.09,GFR 50,PTH 31  5/17/23 bun 18,creat 1.0,GFR 52  7/11/23 bun 16,creat 1.17,GFR 46,PTH 24     colon polyp  3/7/17 colon per [facility] 2 polyps, grade 2 internal hemorrhoids, angioectasias ascending colon,pathology one hyperplastic polyp and onesessile serrated polyp, repeat colonoscopy 5 years      Aortic stenosis  10/06 echo mild mr, normal LV  6/09 stress echo negative  8/12 echo normal LV function, EF 65%, mild aortic stenosis, peak gradient 24  7/24/13 normal LV function, EF 60%, mild LVH, mild aortic stenosis, peak gradient 25  3/31/14 cardiac catheterization; left main mild lacking, LAD patent, circumflex free of disease, RCA free of disease, normal LV function, mild aortic stenosis  7/9/17 echo normal LV size and function, EF 60%, grade 1 diastolic dysfunction, moderate aortic stenosis peak gradient 50, mean 29, valve area 0.8-1.0 and aortic regurgitation  7/9/17 persantine thallium small fixed perfusion abnormality distal anterior and anteroapical segments, no ischemia is noted to represent mild prior infarct or variant normal apical thinning  4/27/18 echo normal LV size and function, EF 65%, moderate aortic stenosis peak gradient 46, valve area 0.9,, moderate aortic insufficiency, no change compared to previous  5/27/19 echo normal LV size and function, EF 65%,  normal diastolic function, calcified aortic valve moderate aortic stenosis peak gradient 53, moderate aortic insufficiency  9/24/21 echo EF 65%, indeterminate diastolic function, moderate aortic stenosis, peak gradient 56, moderate aortic insufficiency  10/1/21 cardiology note will likely need valve replacement next 1 to 2 years, follow-up 6 months  2/14/22  cardiology note moderate to severe aortic stenosis, able to walk 4 miles with no limitations, will order repeat echo follow-up 2 months  5/11/22 echo EF 55%,severe aortic stenosis peak gradient 70  5/13/22 cardiology note asymptomatic severe aortic stenosis, continue surveillance follow-up 6 months  11/7/22 cardiology note asymptomatic severe aortic stenosis, continue surveillance, repeat echo, follow-up 6 months  11/10/22 echo severe aortic stenosis, peak gradient 65, EF 60%, normal diastolic function  5/24/23  cardiothoracic surgery note recommend TAVR  5/24/23 cardiology note aortic stenosis with preserved ejection fraction stage C, recommend stress EKG, echocardiogram, BNP, follow-up 3 months           Patient Active Problem List   Diagnosis    History of breast cancer    Osteopenia    Dyslipidemia    History of neutropenia    Preventative health care    Aortic stenosis    Glaucoma    History of basal cell carcinoma of skin    Knee pain, left    History of polymyalgia rheumatica    Decreased GFR    Colon polyp    S/P knee replacement    Esophageal dysmotility    History of hypertension    History of palpitations    Low back pain    Chronic kidney disease, stage 3a (HCC)    Thoracic back pain    Impaired glucose metabolism            Patient Care Team:  Master Suarez M.D. as PCP - General  Master Suarez M.D. as PCP - [facility] Paneled  Chris Bernstein M.D. as Consulting Physician (Ophthalmology)  Amanda Rodriguez M.D. as Consulting Physician (Ophthalmology)  Manoj Tidwell M.D. (Inactive) as Consulting Physician (Orthopedic  "Surgery)    ROS           Objective     BP (!) 146/60 (Patient Position: Sitting)   Pulse (!) 56   Temp 36.6 °C (97.9 °F) (Temporal)   Resp 16   Ht 1.6 m (5' 3\")   Wt 63 kg (139 lb)   SpO2 97%   BMI 24.62 kg/m²      Physical Exam  Vitals and nursing note reviewed.   Constitutional:       Appearance: Normal appearance.   HENT:      Head: Normocephalic and atraumatic.      Right Ear: External ear normal.      Left Ear: External ear normal.   Eyes:      Conjunctiva/sclera: Conjunctivae normal.   Cardiovascular:      Rate and Rhythm: Normal rate and regular rhythm.      Heart sounds: Normal heart sounds.   Pulmonary:      Effort: Pulmonary effort is normal.      Breath sounds: Normal breath sounds.   Abdominal:      General: There is no distension.   Musculoskeletal:         General: No swelling.   Skin:     Coloration: Skin is not jaundiced.      Findings: No bruising.   Neurological:      General: No focal deficit present.      Mental Status: She is alert.   Psychiatric:         Mood and Affect: Mood normal.         Behavior: Behavior normal.   No spinal tenderness to palpation, no paraspinal muscle tenderness to palpation, normal back flexion extension  No lower extremity edema  Normal affect, insight, judgment                        Assessment & Plan        Assessment  #1 thoracic back pain improved, x-ray July 6 showed old T5 compression deformity, her back pain is improving, she is not taking any pain medications, no NSAIDs currently, she tries to avoid heavy lifting    #2 Aortic stenosis followed by cardiology most recent echo 11/10/22 echo severe aortic stenosis, peak gradient 65, EF 60%, normal diastolic function, she does experience some shortness of breath going up inclines but no shortness of breath otherwise, no chest pain, lightheadedness, syncope or palpitations      #3 Ckd stage 3a no regular NSAIDs most recent lab 7/11/23 bun 16,creat 1.17,GFR 46,PTH 24    #4 some anxiety caring for her  " who has moderately advanced dementia, she provides full-time care and supervision for him, and she does get stressed taking care of him since they have no family and she does have power of     #5 postmenopausal bone loss, osteopenia by bone density 8/23/21 dexa LS-0.9,hip-1.4    Plan  #1 for the back pain she declines physical therapy, continue to avoid heavy lifting, minimize use of NSAIDs which she is not using at all now    #2 no need for MRI scan of the mid back given her improvement    #3 will be due for her bone density in 2 months    #4 follow-up cardiology    #5 long-term I do recommend she consider having a home health aide coming once a month to do heavy lifting and cleaning    #6 also recommend a home aide to care for her  once a week to get her  more comfortable and habituated with the idea of having someone coming in once or twice a week for an hour or 2 at a time to provide her some respite and spend time with her  while she can run errands without having to worry about him    #7 explained again to the patient that long-term her 's mental acuity will continue to decline, that will at some point more significantly affect his function and behavior, at some point she would need more help with him at home and long-term I do recommend looking at an alternative living arrangement such as moving to a half-way community, where her  will have supervision and she says that that is not an option now since they just got a reverse mortgage, but I expressed upon her the importance of taking this into consideration and looking for some eventual possible location options as eventually she is going to need help caring for her     #8 at some point if she may require TAVR, to consider that at least having the done before her  experiences further cognitive decline and a decrease in his function and behavior as if she is hospitalized for any procedure or needs help  with recovery from any, someone will need to stay with her  and with her as she recovers since they have no family in town.  I explained to her that over time her 's function, behavior, cognition will continue to decline and he will continue to require more and more assistance, and that will put more demands upon her with her underlying aortic stenosis    #9 follow-up 3 months

## 2023-09-07 ENCOUNTER — TELEPHONE (OUTPATIENT)
Dept: CARDIOLOGY | Facility: MEDICAL CENTER | Age: 83
End: 2023-09-07
Payer: MEDICARE

## 2023-09-07 ENCOUNTER — OFFICE VISIT (OUTPATIENT)
Dept: CARDIOLOGY | Facility: MEDICAL CENTER | Age: 83
End: 2023-09-07
Attending: INTERNAL MEDICINE
Payer: MEDICARE

## 2023-09-07 ENCOUNTER — DOCUMENTATION (OUTPATIENT)
Dept: CARDIOLOGY | Facility: MEDICAL CENTER | Age: 83
End: 2023-09-07

## 2023-09-07 VITALS
HEART RATE: 57 BPM | WEIGHT: 137 LBS | BODY MASS INDEX: 24.27 KG/M2 | RESPIRATION RATE: 18 BRPM | OXYGEN SATURATION: 94 % | HEIGHT: 63 IN | DIASTOLIC BLOOD PRESSURE: 78 MMHG | SYSTOLIC BLOOD PRESSURE: 138 MMHG

## 2023-09-07 DIAGNOSIS — I35.0 SEVERE AORTIC STENOSIS: ICD-10-CM

## 2023-09-07 DIAGNOSIS — Z01.810 PRE-PROCEDURAL CARDIOVASCULAR EXAMINATION: ICD-10-CM

## 2023-09-07 DIAGNOSIS — I35.0 NONRHEUMATIC AORTIC (VALVE) STENOSIS: ICD-10-CM

## 2023-09-07 DIAGNOSIS — I35.0 AORTIC VALVE STENOSIS, ETIOLOGY OF CARDIAC VALVE DISEASE UNSPECIFIED: ICD-10-CM

## 2023-09-07 DIAGNOSIS — N18.31 CHRONIC KIDNEY DISEASE, STAGE 3A: ICD-10-CM

## 2023-09-07 PROCEDURE — 3078F DIAST BP <80 MM HG: CPT | Performed by: INTERNAL MEDICINE

## 2023-09-07 PROCEDURE — 99212 OFFICE O/P EST SF 10 MIN: CPT | Performed by: INTERNAL MEDICINE

## 2023-09-07 PROCEDURE — 99215 OFFICE O/P EST HI 40 MIN: CPT | Performed by: INTERNAL MEDICINE

## 2023-09-07 PROCEDURE — 3075F SYST BP GE 130 - 139MM HG: CPT | Performed by: INTERNAL MEDICINE

## 2023-09-07 ASSESSMENT — FIBROSIS 4 INDEX: FIB4 SCORE: 2.16

## 2023-09-07 ASSESSMENT — PATIENT HEALTH QUESTIONNAIRE - PHQ9
5. POOR APPETITE OR OVEREATING: 1 - SEVERAL DAYS
SUM OF ALL RESPONSES TO PHQ QUESTIONS 1-9: 9
CLINICAL INTERPRETATION OF PHQ2 SCORE: 4

## 2023-09-07 NOTE — PROGRESS NOTES
"CARDIOLOGY STRUCTURAL HEART FOLLOWUP    PCP: Master Suarez M.D.  REFERRING: [place]    1. Aortic valve stenosis, etiology of cardiac valve disease unspecified    2. Chronic kidney disease, stage 3a (HCC)        Katerina Torres is stage D1 aortic stenosis with new class II symptoms.  I advised proceeding with transcatheter aortic valve replacement and will have her complete the requisite preoperative work-up including CT and catheterization.    We discussed the risks, benefits and alternatives to TAVR including but not limited to a 10% risk of pacemaker, 5% risk of bleeding/access site complication, 2% risk of stroke, risk of contrast nephropathy and 2% risk of mortality. The patient is in agreement with proceeding.    Follow up: 6 weeks    History: Katerina Torres is a 83 y.o. female with history of resolved breast cancer presenting for follow-up of severe aortic stenosis.  She has had a severe gradient over the past year but had been asymptomatic until the past few months.  A BNP level was recently measured at 600.  She now has increased breathlessness and fatigue on exertion but continues to maintain an active lifestyle.  She is also struggled with rib pain and has made substantial improvements with chiropractic care.      PE:  /78 (BP Location: Left arm, Patient Position: Sitting, BP Cuff Size: Adult)   Pulse (!) 57   Resp 18   Ht 1.6 m (5' 3\")   Wt 62.1 kg (137 lb)   SpO2 94%   BMI 24.27 kg/m²   GEN: NAD  CARDIAC: +DOROTEO late peaking harsh regular no JVP Normal S1 Preserved S2  VASCULATURE: diminished and delayed carotid pulse  RESP: Clear to auscultation bilaterally  ABD: Soft, non-tender, non-distended  EXT: No edema  NEURO: No focal deficit    Past Medical History:   Diagnosis Date    Breast cancer (HCC)     Breath shortness     with exertion \"walking up a hill\"    Bruises easily     Cancer (HCC) 1989    breast left side lumpectomy    Cardiac murmur 6/3/2009    Chronic kidney " disease, stage III (moderate) (HCC) 8/20/2015    Dyslipidemia 6/3/2009    Glaucoma     both eyes    Hepatitis A 1993    Hiatus hernia syndrome     Hx of breast cancer 6/3/2009    Hypertension 7/18/2017    currently not taking medications    MEDICAL HOME     Neutropenia 6/3/2009    Osteopenia 6/3/2009    Preventative health care 6/3/2009    Snoring     Unspecified cataract     bilateral IOLs     Allergies   Allergen Reactions    Atorvastatin Unspecified     Does not qualify for primary prevention    Hydrocodone-Acetaminophen     Naproxen      GI upset    Neurontin [Gabapentin] Unspecified     dizziness    Vicodin [Hydrocodone-Acetaminophen] Nausea     Nausea, dizziness     Outpatient Encounter Medications as of 9/7/2023   Medication Sig Dispense Refill    meloxicam (MOBIC) 7.5 MG Tab Take 1 Tablet by mouth 1 time a day as needed for Moderate Pain (back pain). 15 Tablet 0    timolol (TIMOPTIC) 0.5 % Solution Administer 1 Drop into the left eye every day.      acetaminophen (TYLENOL) 500 MG Tab Take 2 Tablets by mouth every 6 hours as needed.      nitroglycerin (NITROSTAT) 0.4 MG SL Tab Place 1 Tablet under the tongue every 5 minutes as needed for Chest Pain.      vitamin D (CHOLECALCIFEROL) 1000 UNIT Tab Take 1 Tablet by mouth.      [DISCONTINUED] omeprazole (PRILOSEC) 20 MG delayed-release capsule Take 1 Capsule by mouth every morning before breakfast. Indications: heartburn (Patient not taking: Reported on 9/7/2023) 30 Capsule 11    bimatoprost (LUMIGAN) 0.01 % Solution Administer 1 Drop into both eyes at bedtime. Indications: Wide-Angle Glaucoma       No facility-administered encounter medications on file as of 9/7/2023.     Social History     Socioeconomic History    Marital status:      Spouse name: Not on file    Number of children: Not on file    Years of education: Not on file    Highest education level: Not on file   Occupational History    Not on file   Tobacco Use    Smoking status: Former      Current packs/day: 0.00     Average packs/day: 0.5 packs/day for 10.0 years (5.0 ttl pk-yrs)     Types: Cigarettes     Start date: 1989     Quit date: 1999     Years since quittin.6    Smokeless tobacco: Never   Vaping Use    Vaping Use: Never used   Substance and Sexual Activity    Alcohol use: Yes     Alcohol/week: 4.2 oz     Types: 7 Standard drinks or equivalent per week     Comment: 1 glass wine/day    Drug use: No    Sexual activity: Not Currently     Partners: Male   Other Topics Concern     Service No    Blood Transfusions No    Caffeine Concern No    Occupational Exposure No    Hobby Hazards Yes    Sleep Concern No    Stress Concern Yes    Weight Concern No    Special Diet No    Back Care No    Exercise No    Bike Helmet No    Seat Belt Yes    Self-Exams Yes   Social History Narrative    Not on file     Social Determinants of Health     Financial Resource Strain: Not on file   Food Insecurity: Not on file   Transportation Needs: Not on file   Physical Activity: Not on file   Stress: Not on file   Social Connections: Not on file   Intimate Partner Violence: Not on file   Housing Stability: Not on file       Studies  Lab Results   Component Value Date/Time    CHOLSTRLTOT 175 10/20/2022 11:23 AM    LDL 98 10/20/2022 11:23 AM    HDL 61 10/20/2022 11:23 AM    TRIGLYCERIDE 81 10/20/2022 11:23 AM       Lab Results   Component Value Date/Time    SODIUM 140 07/10/2023 09:35 AM    POTASSIUM 4.3 07/10/2023 09:35 AM    CHLORIDE 102 07/10/2023 09:35 AM    CO2 26 07/10/2023 09:35 AM    GLUCOSE 106 (H) 07/10/2023 09:35 AM    BUN 16 07/10/2023 09:35 AM    CREATININE 1.17 07/10/2023 09:35 AM    CREATININE 1.08 (H) 2010 08:56 AM    BUNCREATRAT 11 2010 08:56 AM    GLOMRATE 50 (L) 2010 08:56 AM     Lab Results   Component Value Date/Time    ALKPHOSPHAT 70 07/10/2023 09:35 AM    ASTSGOT 22 07/10/2023 09:35 AM    ALTSGPT 15 07/10/2023 09:35 AM    TBILIRUBIN 0.7 07/10/2023 09:35 AM         For this encounter I directly reviewed and interpreted ECG tracings.  Sinus rhythm.  No IVCD    Chief Complaint   Patient presents with    Aortic Stenosis     The patient is experiencing symptoms of a life-threatening condition.    ROS:   10 point review systems is otherwise negative except as per the HPI

## 2023-09-07 NOTE — PROGRESS NOTES
"Valve Program Consultation: 09/07/2023 for tentative TAVR    Mental Status Assessment:  Appearance: normal  Behavior: normal  Mood/Affect: normal  Thought process/content: normal  Cognition: normal  Functional ability: normal  Dental Concerns: natural teeth with bridge; patient denies s/s of active oral infection/irritation  Further mental assessment needed: no    Post-op Plan of Care:  Support systems: Manuela (friend, neighbor) present at consult today  Patient understands discharge plan: yes  DME: none  Home health warranted prior to procedure: no  Social concerns for discharge: none  PCP alerted of social concerns: n/a  POLST: none  Advance directive: AD packet provided to patient  Flu/PNA/COVID vaccines completed: UTD  Post-op goal: \"Have a better quality of life and be better able to care for my \"  [place]  Medication instructions: Hold all vitamins/supplements x 7 days prior to procedure. Patient states she is no longer taking Meloxicam.     Concerns prior to procedure: none    Discussed depression screening in detail with patient. She states caring for her  with dementia has been very stressful. Per patient, she denies having feelings of hurting herself. She leans on her friend Manuela for support. Discussed resources for additional support. Patient verbalizes understanding.     Met with patient during New TAVR consult.     All patient's questions and concerns were addressed during this visit. They understood pre-operative and post-operative plan of care.    Reviewed patient TAVR education packet explaining that information is provided regarding preparation for TAVR the night prior, what to expect during the hospital stay, average LOS, and what to look out for post TAVR discharge. Explained that patient will require SBE prophylactic antibiotic prior to any dental treatment post TAVR.     Explained that patient should not eat or drink anything past midnight the day of the " procedure. Encouraged patient to wear something clean and comfortable, easy to get on/off to check in Monday morning, time TBD. Patient may have friends and family in the pre-operational area until patient is taken to the operating room, at which time family and friends will be asked to wait on floor 1 [facility] surgical waiting area. On completion of TAVR, heart team will update family. Once update is given, there is some time before family/friends may visit patient in their assigned room. Length of stay on average is one night, however patient may stay longer depending on specific needs at that time. Patient given printed instructions sheet with all above stated instructions. Patient states understanding of all material and education presented today and has no further questions at this time. Encouraged patient again, to contact me with any questions or concerns during this work-up process. Patient states understanding.

## 2023-09-07 NOTE — PROGRESS NOTES
Valve Program Functional Assessment:     KCCQ12   1a) Showering/bathin  1b) Walking 1 block on ground: 5  1c) Hurrying or joggin  2) Swellin  3) Fatigue: 7  4) Shortness of breath: 7  5) Sleep sitting up: 5  6) Limited enjoyment of life: 5  7) Spend the rest of your life with HF: 4  8a) Hobbies, recreational activities:5  8b) Working or doing household chores:5  8c) Visiting family or friends: 5    5 meter walk test  1) __4.43____ s/5m  2) __4.63____ s/5m  3) __4.78____ s/5m  AVG:_4.612______     Strength   1) _18_____ kg  2) _16_____ kg  3) _16_____ kg  AVG:__16.6____    BRASHER ADLs  Patient independently preforms...   - Bathing: Yes   - Dressing: Yes   - Toileting: Yes   - Transferring: Yes   - Continence: Yes   - Feeding: Yes   Total Score: _6_/6    Living Situation  Patient lives: with spouse    Mobility Aids   Patient uses: none      FRAILTY SCORE: _1_/ 4

## 2023-09-07 NOTE — TELEPHONE ENCOUNTER
Patient is scheduled on 10-2-23 for a Pre TAVR angio with Dr. Hubbard. No meds to stop and patient to check in at 6:30 for an 8:30 procedure. H&P was done on 9-7-23 by . Pre admit to call patient.

## 2023-09-11 ENCOUNTER — APPOINTMENT (OUTPATIENT)
Dept: ADMISSIONS | Facility: MEDICAL CENTER | Age: 83
End: 2023-09-11
Attending: INTERNAL MEDICINE
Payer: MEDICARE

## 2023-09-12 ENCOUNTER — HOSPITAL ENCOUNTER (OUTPATIENT)
Dept: LAB | Facility: MEDICAL CENTER | Age: 83
End: 2023-09-12
Attending: FAMILY MEDICINE
Payer: MEDICARE

## 2023-09-12 LAB
ALBUMIN SERPL BCP-MCNC: 4.2 G/DL (ref 3.2–4.9)
ALBUMIN/GLOB SERPL: 1.6 G/DL
ALP SERPL-CCNC: 55 U/L (ref 30–99)
ALT SERPL-CCNC: 14 U/L (ref 2–50)
ANION GAP SERPL CALC-SCNC: 13 MMOL/L (ref 7–16)
AST SERPL-CCNC: 22 U/L (ref 12–45)
BILIRUB SERPL-MCNC: 0.6 MG/DL (ref 0.1–1.5)
BUN SERPL-MCNC: 21 MG/DL (ref 8–22)
CALCIUM ALBUM COR SERPL-MCNC: 9.5 MG/DL (ref 8.5–10.5)
CALCIUM SERPL-MCNC: 9.7 MG/DL (ref 8.5–10.5)
CHLORIDE SERPL-SCNC: 107 MMOL/L (ref 96–112)
CO2 SERPL-SCNC: 23 MMOL/L (ref 20–33)
CREAT SERPL-MCNC: 1.22 MG/DL (ref 0.5–1.4)
FASTING STATUS PATIENT QL REPORTED: NORMAL
GFR SERPLBLD CREATININE-BSD FMLA CKD-EPI: 43 ML/MIN/1.73 M 2
GLOBULIN SER CALC-MCNC: 2.6 G/DL (ref 1.9–3.5)
GLUCOSE SERPL-MCNC: 106 MG/DL (ref 65–99)
POTASSIUM SERPL-SCNC: 4.3 MMOL/L (ref 3.6–5.5)
PROT SERPL-MCNC: 6.8 G/DL (ref 6–8.2)
SODIUM SERPL-SCNC: 143 MMOL/L (ref 135–145)

## 2023-09-12 PROCEDURE — 36415 COLL VENOUS BLD VENIPUNCTURE: CPT

## 2023-09-12 PROCEDURE — 80053 COMPREHEN METABOLIC PANEL: CPT

## 2023-09-18 ENCOUNTER — TELEPHONE (OUTPATIENT)
Dept: MEDICAL GROUP | Facility: MEDICAL CENTER | Age: 83
End: 2023-09-18
Payer: MEDICARE

## 2023-09-19 ENCOUNTER — PRE-ADMISSION TESTING (OUTPATIENT)
Dept: ADMISSIONS | Facility: MEDICAL CENTER | Age: 83
End: 2023-09-19
Attending: INTERNAL MEDICINE
Payer: MEDICARE

## 2023-09-27 DIAGNOSIS — Z00.6 EXAMINATION OF PARTICIPANT IN CLINICAL TRIAL: ICD-10-CM

## 2023-09-27 DIAGNOSIS — I35.0 NONRHEUMATIC AORTIC (VALVE) STENOSIS: ICD-10-CM

## 2023-09-27 DIAGNOSIS — I35.0 SEVERE AORTIC STENOSIS: ICD-10-CM

## 2023-09-28 ENCOUNTER — HOSPITAL ENCOUNTER (OUTPATIENT)
Dept: RADIOLOGY | Facility: MEDICAL CENTER | Age: 83
End: 2023-09-28
Payer: MEDICARE

## 2023-09-28 ENCOUNTER — HOSPITAL ENCOUNTER (OUTPATIENT)
Facility: MEDICAL CENTER | Age: 83
End: 2023-09-28
Attending: INTERNAL MEDICINE | Admitting: INTERNAL MEDICINE
Payer: MEDICARE

## 2023-09-28 ENCOUNTER — PRE-ADMISSION TESTING (OUTPATIENT)
Dept: ADMISSIONS | Facility: MEDICAL CENTER | Age: 83
End: 2023-09-28
Attending: INTERNAL MEDICINE
Payer: MEDICARE

## 2023-09-28 DIAGNOSIS — Z01.810 PRE-OPERATIVE CARDIOVASCULAR EXAMINATION: ICD-10-CM

## 2023-09-28 DIAGNOSIS — I35.0 SEVERE AORTIC STENOSIS: ICD-10-CM

## 2023-09-28 DIAGNOSIS — I35.0 NONRHEUMATIC AORTIC (VALVE) STENOSIS: ICD-10-CM

## 2023-09-28 DIAGNOSIS — Z01.810 PRE-PROCEDURAL CARDIOVASCULAR EXAMINATION: ICD-10-CM

## 2023-09-28 DIAGNOSIS — Z01.812 PRE-OPERATIVE LABORATORY EXAMINATION: ICD-10-CM

## 2023-09-28 LAB
ALBUMIN SERPL BCP-MCNC: 4.3 G/DL (ref 3.2–4.9)
ALBUMIN/GLOB SERPL: 1.8 G/DL
ALP SERPL-CCNC: 53 U/L (ref 30–99)
ALT SERPL-CCNC: 11 U/L (ref 2–50)
ANION GAP SERPL CALC-SCNC: 13 MMOL/L (ref 7–16)
APTT PPP: 29.7 SEC (ref 24.7–36)
AST SERPL-CCNC: 17 U/L (ref 12–45)
BILIRUB SERPL-MCNC: 0.5 MG/DL (ref 0.1–1.5)
BUN SERPL-MCNC: 21 MG/DL (ref 8–22)
CALCIUM ALBUM COR SERPL-MCNC: 9 MG/DL (ref 8.5–10.5)
CALCIUM SERPL-MCNC: 9.2 MG/DL (ref 8.5–10.5)
CHLORIDE SERPL-SCNC: 107 MMOL/L (ref 96–112)
CO2 SERPL-SCNC: 22 MMOL/L (ref 20–33)
CREAT SERPL-MCNC: 1.24 MG/DL (ref 0.5–1.4)
EKG IMPRESSION: NORMAL
ERYTHROCYTE [DISTWIDTH] IN BLOOD BY AUTOMATED COUNT: 45.4 FL (ref 35.9–50)
GFR SERPLBLD CREATININE-BSD FMLA CKD-EPI: 43 ML/MIN/1.73 M 2
GLOBULIN SER CALC-MCNC: 2.4 G/DL (ref 1.9–3.5)
GLUCOSE SERPL-MCNC: 103 MG/DL (ref 65–99)
HCT VFR BLD AUTO: 41.2 % (ref 37–47)
HGB BLD-MCNC: 13.7 G/DL (ref 12–16)
INR PPP: 1.04 (ref 0.87–1.13)
MCH RBC QN AUTO: 31.6 PG (ref 27–33)
MCHC RBC AUTO-ENTMCNC: 33.3 G/DL (ref 32.2–35.5)
MCV RBC AUTO: 94.9 FL (ref 81.4–97.8)
PLATELET # BLD AUTO: 184 K/UL (ref 164–446)
PMV BLD AUTO: 10.3 FL (ref 9–12.9)
POTASSIUM SERPL-SCNC: 4.6 MMOL/L (ref 3.6–5.5)
PROT SERPL-MCNC: 6.7 G/DL (ref 6–8.2)
PROTHROMBIN TIME: 13.7 SEC (ref 12–14.6)
RBC # BLD AUTO: 4.34 M/UL (ref 4.2–5.4)
SODIUM SERPL-SCNC: 142 MMOL/L (ref 135–145)
WBC # BLD AUTO: 4.3 K/UL (ref 4.8–10.8)

## 2023-09-28 PROCEDURE — 85027 COMPLETE CBC AUTOMATED: CPT

## 2023-09-28 PROCEDURE — 85730 THROMBOPLASTIN TIME PARTIAL: CPT

## 2023-09-28 PROCEDURE — 700117 HCHG RX CONTRAST REV CODE 255

## 2023-09-28 PROCEDURE — 71275 CT ANGIOGRAPHY CHEST: CPT

## 2023-09-28 PROCEDURE — 80053 COMPREHEN METABOLIC PANEL: CPT

## 2023-09-28 PROCEDURE — 93010 ELECTROCARDIOGRAM REPORT: CPT | Performed by: INTERNAL MEDICINE

## 2023-09-28 PROCEDURE — 36415 COLL VENOUS BLD VENIPUNCTURE: CPT

## 2023-09-28 PROCEDURE — 85610 PROTHROMBIN TIME: CPT

## 2023-09-28 PROCEDURE — 93005 ELECTROCARDIOGRAM TRACING: CPT

## 2023-09-28 RX ADMIN — IOHEXOL 100 ML: 350 INJECTION, SOLUTION INTRAVENOUS at 13:33

## 2023-10-02 ENCOUNTER — TELEPHONE (OUTPATIENT)
Dept: CARDIOLOGY | Facility: MEDICAL CENTER | Age: 83
End: 2023-10-02
Payer: MEDICARE

## 2023-10-02 ENCOUNTER — APPOINTMENT (OUTPATIENT)
Dept: CARDIOLOGY | Facility: MEDICAL CENTER | Age: 83
End: 2023-10-02
Attending: INTERNAL MEDICINE
Payer: MEDICARE

## 2023-10-02 PROBLEM — M47.9 DEGENERATIVE ARTHRITIS OF SPINE: Status: ACTIVE | Noted: 2023-10-02

## 2023-10-02 PROBLEM — R91.1 PULMONARY NODULE: Status: ACTIVE | Noted: 2023-10-02

## 2023-10-02 PROBLEM — N94.9 ADNEXAL CYST: Status: ACTIVE | Noted: 2023-10-02

## 2023-10-03 ENCOUNTER — TELEPHONE (OUTPATIENT)
Dept: CARDIOLOGY | Facility: MEDICAL CENTER | Age: 83
End: 2023-10-03
Payer: MEDICARE

## 2023-10-03 ENCOUNTER — DOCUMENTATION (OUTPATIENT)
Dept: CARDIOLOGY | Facility: MEDICAL CENTER | Age: 83
End: 2023-10-03
Payer: MEDICARE

## 2023-10-03 NOTE — TELEPHONE ENCOUNTER
Patient has been rescheduled to 10-9-23 for Pre TAVR angio with . No meds to stop and patient to check in at 6:30 for an 8:30 procedure. H&P was done on 9-7-23 by Dr. Nunez. Pre admit to call patient.

## 2023-10-03 NOTE — PROGRESS NOTES
[facility]enzie TAVR Review:    Consider a 20 S3UR from a right femoral approach.  Suboptimal CT quality with motion artifact  SOV is small for a 23mm valve  L20, Cra16

## 2023-10-03 NOTE — TELEPHONE ENCOUNTER
Received call from patient's friend, Manuela. She states patient's  passed away last night. Patient would like to reschedule angiogram and TAVR as soon as possible.    Angiogram rescheduled for 10/09/2023 with a check in time 0630. Reviewed pre procedure instructions with Manuela who verbalizes understanding.     Advised TAVR would tentatively be rescheduled on 10/23/2023. Manuela is aware RN will reach out 10/18/2023 to confirm.

## 2023-10-05 ENCOUNTER — PRE-ADMISSION TESTING (OUTPATIENT)
Dept: ADMISSIONS | Facility: MEDICAL CENTER | Age: 83
End: 2023-10-05
Attending: INTERNAL MEDICINE
Payer: MEDICARE

## 2023-10-09 ENCOUNTER — TELEPHONE (OUTPATIENT)
Dept: MEDICAL GROUP | Facility: MEDICAL CENTER | Age: 83
End: 2023-10-09

## 2023-10-09 ENCOUNTER — HOSPITAL ENCOUNTER (OUTPATIENT)
Facility: MEDICAL CENTER | Age: 83
End: 2023-10-09
Attending: INTERNAL MEDICINE | Admitting: INTERNAL MEDICINE
Payer: MEDICARE

## 2023-10-09 ENCOUNTER — PATIENT MESSAGE (OUTPATIENT)
Dept: MEDICAL GROUP | Facility: MEDICAL CENTER | Age: 83
End: 2023-10-09

## 2023-10-09 ENCOUNTER — APPOINTMENT (OUTPATIENT)
Dept: CARDIOLOGY | Facility: MEDICAL CENTER | Age: 83
End: 2023-10-09
Attending: INTERNAL MEDICINE
Payer: MEDICARE

## 2023-10-09 VITALS
SYSTOLIC BLOOD PRESSURE: 169 MMHG | TEMPERATURE: 97.4 F | BODY MASS INDEX: 24.26 KG/M2 | HEIGHT: 63 IN | OXYGEN SATURATION: 97 % | RESPIRATION RATE: 20 BRPM | HEART RATE: 59 BPM | WEIGHT: 136.91 LBS | DIASTOLIC BLOOD PRESSURE: 69 MMHG

## 2023-10-09 LAB
ALBUMIN SERPL BCP-MCNC: 4.2 G/DL (ref 3.2–4.9)
ALBUMIN/GLOB SERPL: 1.6 G/DL
ALP SERPL-CCNC: 58 U/L (ref 30–99)
ALT SERPL-CCNC: 15 U/L (ref 2–50)
ANION GAP SERPL CALC-SCNC: 12 MMOL/L (ref 7–16)
APTT PPP: 30.4 SEC (ref 24.7–36)
AST SERPL-CCNC: 19 U/L (ref 12–45)
BILIRUB SERPL-MCNC: 0.7 MG/DL (ref 0.1–1.5)
BUN SERPL-MCNC: 15 MG/DL (ref 8–22)
CALCIUM ALBUM COR SERPL-MCNC: 8.9 MG/DL (ref 8.5–10.5)
CALCIUM SERPL-MCNC: 9.1 MG/DL (ref 8.5–10.5)
CHLORIDE SERPL-SCNC: 105 MMOL/L (ref 96–112)
CO2 SERPL-SCNC: 22 MMOL/L (ref 20–33)
CREAT SERPL-MCNC: 1.09 MG/DL (ref 0.5–1.4)
EKG IMPRESSION: NORMAL
ERYTHROCYTE [DISTWIDTH] IN BLOOD BY AUTOMATED COUNT: 44.2 FL (ref 35.9–50)
GFR SERPLBLD CREATININE-BSD FMLA CKD-EPI: 50 ML/MIN/1.73 M 2
GLOBULIN SER CALC-MCNC: 2.6 G/DL (ref 1.9–3.5)
GLUCOSE SERPL-MCNC: 105 MG/DL (ref 65–99)
HCT VFR BLD AUTO: 43.6 % (ref 37–47)
HGB BLD-MCNC: 14.8 G/DL (ref 12–16)
INR PPP: 1.05 (ref 0.87–1.13)
MCH RBC QN AUTO: 31.4 PG (ref 27–33)
MCHC RBC AUTO-ENTMCNC: 33.9 G/DL (ref 32.2–35.5)
MCV RBC AUTO: 92.6 FL (ref 81.4–97.8)
PLATELET # BLD AUTO: 189 K/UL (ref 164–446)
PMV BLD AUTO: 10.8 FL (ref 9–12.9)
POTASSIUM SERPL-SCNC: 4.4 MMOL/L (ref 3.6–5.5)
PROT SERPL-MCNC: 6.8 G/DL (ref 6–8.2)
PROTHROMBIN TIME: 13.9 SEC (ref 12–14.6)
RBC # BLD AUTO: 4.71 M/UL (ref 4.2–5.4)
SODIUM SERPL-SCNC: 139 MMOL/L (ref 135–145)
WBC # BLD AUTO: 5.5 K/UL (ref 4.8–10.8)

## 2023-10-09 PROCEDURE — 99152 MOD SED SAME PHYS/QHP 5/>YRS: CPT | Performed by: INTERNAL MEDICINE

## 2023-10-09 PROCEDURE — 160002 HCHG RECOVERY MINUTES (STAT)

## 2023-10-09 PROCEDURE — 80053 COMPREHEN METABOLIC PANEL: CPT

## 2023-10-09 PROCEDURE — 700105 HCHG RX REV CODE 258: Performed by: INTERNAL MEDICINE

## 2023-10-09 PROCEDURE — 160046 HCHG PACU - 1ST 60 MINS PHASE II

## 2023-10-09 PROCEDURE — 93567 NJX CAR CTH SPRVLV AORTGRPHY: CPT | Performed by: INTERNAL MEDICINE

## 2023-10-09 PROCEDURE — 85730 THROMBOPLASTIN TIME PARTIAL: CPT

## 2023-10-09 PROCEDURE — A9270 NON-COVERED ITEM OR SERVICE: HCPCS

## 2023-10-09 PROCEDURE — 99153 MOD SED SAME PHYS/QHP EA: CPT

## 2023-10-09 PROCEDURE — 700117 HCHG RX CONTRAST REV CODE 255: Performed by: INTERNAL MEDICINE

## 2023-10-09 PROCEDURE — 700101 HCHG RX REV CODE 250

## 2023-10-09 PROCEDURE — 700102 HCHG RX REV CODE 250 W/ 637 OVERRIDE(OP)

## 2023-10-09 PROCEDURE — 93010 ELECTROCARDIOGRAM REPORT: CPT | Performed by: INTERNAL MEDICINE

## 2023-10-09 PROCEDURE — 85610 PROTHROMBIN TIME: CPT

## 2023-10-09 PROCEDURE — 700111 HCHG RX REV CODE 636 W/ 250 OVERRIDE (IP): Mod: JZ | Performed by: INTERNAL MEDICINE

## 2023-10-09 PROCEDURE — 93458 L HRT ARTERY/VENTRICLE ANGIO: CPT | Mod: 26 | Performed by: INTERNAL MEDICINE

## 2023-10-09 PROCEDURE — 160036 HCHG PACU - EA ADDL 30 MINS PHASE I

## 2023-10-09 PROCEDURE — 160035 HCHG PACU - 1ST 60 MINS PHASE I

## 2023-10-09 PROCEDURE — 93005 ELECTROCARDIOGRAM TRACING: CPT | Performed by: INTERNAL MEDICINE

## 2023-10-09 PROCEDURE — 700111 HCHG RX REV CODE 636 W/ 250 OVERRIDE (IP): Mod: JZ

## 2023-10-09 PROCEDURE — 160047 HCHG PACU  - EA ADDL 30 MINS PHASE II

## 2023-10-09 PROCEDURE — 85027 COMPLETE CBC AUTOMATED: CPT

## 2023-10-09 RX ORDER — HEPARIN SODIUM 200 [USP'U]/100ML
INJECTION, SOLUTION INTRAVENOUS
Status: COMPLETED
Start: 2023-10-09 | End: 2023-10-09

## 2023-10-09 RX ORDER — SODIUM CHLORIDE 9 MG/ML
3 INJECTION, SOLUTION INTRAVENOUS CONTINUOUS
Status: ACTIVE | OUTPATIENT
Start: 2023-10-09 | End: 2023-10-09

## 2023-10-09 RX ORDER — SODIUM CHLORIDE 9 MG/ML
INJECTION, SOLUTION INTRAVENOUS ONCE
Status: COMPLETED | OUTPATIENT
Start: 2023-10-09 | End: 2023-10-09

## 2023-10-09 RX ORDER — LIDOCAINE HYDROCHLORIDE 20 MG/ML
INJECTION, SOLUTION INFILTRATION; PERINEURAL
Status: COMPLETED
Start: 2023-10-09 | End: 2023-10-09

## 2023-10-09 RX ORDER — VERAPAMIL HYDROCHLORIDE 2.5 MG/ML
INJECTION, SOLUTION INTRAVENOUS
Status: COMPLETED
Start: 2023-10-09 | End: 2023-10-09

## 2023-10-09 RX ORDER — HEPARIN SODIUM 1000 [USP'U]/ML
INJECTION, SOLUTION INTRAVENOUS; SUBCUTANEOUS
Status: COMPLETED
Start: 2023-10-09 | End: 2023-10-09

## 2023-10-09 RX ORDER — ASPIRIN 81 MG/1
TABLET, CHEWABLE ORAL
Status: COMPLETED
Start: 2023-10-09 | End: 2023-10-09

## 2023-10-09 RX ORDER — MIDAZOLAM HYDROCHLORIDE 1 MG/ML
INJECTION INTRAMUSCULAR; INTRAVENOUS
Status: COMPLETED
Start: 2023-10-09 | End: 2023-10-09

## 2023-10-09 RX ORDER — LABETALOL HYDROCHLORIDE 5 MG/ML
10 INJECTION, SOLUTION INTRAVENOUS ONCE
Status: COMPLETED | OUTPATIENT
Start: 2023-10-09 | End: 2023-10-09

## 2023-10-09 RX ADMIN — IOHEXOL 25 ML: 350 INJECTION, SOLUTION INTRAVENOUS at 09:28

## 2023-10-09 RX ADMIN — LIDOCAINE HYDROCHLORIDE: 20 INJECTION, SOLUTION INFILTRATION; PERINEURAL at 08:57

## 2023-10-09 RX ADMIN — HEPARIN SODIUM: 1000 INJECTION, SOLUTION INTRAVENOUS; SUBCUTANEOUS at 08:57

## 2023-10-09 RX ADMIN — FENTANYL CITRATE 100 MCG: 50 INJECTION, SOLUTION INTRAMUSCULAR; INTRAVENOUS at 09:24

## 2023-10-09 RX ADMIN — SODIUM CHLORIDE: 9 INJECTION, SOLUTION INTRAVENOUS at 07:35

## 2023-10-09 RX ADMIN — VERAPAMIL HYDROCHLORIDE 5 MG: 2.5 INJECTION INTRAVENOUS at 08:57

## 2023-10-09 RX ADMIN — MIDAZOLAM 2 MG: 1 INJECTION, SOLUTION INTRAMUSCULAR; INTRAVENOUS at 09:24

## 2023-10-09 RX ADMIN — NITROGLYCERIN 10 ML: 20 INJECTION INTRAVENOUS at 08:57

## 2023-10-09 RX ADMIN — LABETALOL HYDROCHLORIDE 10 MG: 5 INJECTION INTRAVENOUS at 13:40

## 2023-10-09 RX ADMIN — HEPARIN SODIUM 2000 UNITS: 200 INJECTION, SOLUTION INTRAVENOUS at 08:30

## 2023-10-09 RX ADMIN — ASPIRIN 324 MG: 81 TABLET, CHEWABLE ORAL at 08:54

## 2023-10-09 RX ADMIN — FENTANYL CITRATE 25 MCG: 50 INJECTION, SOLUTION INTRAMUSCULAR; INTRAVENOUS at 13:58

## 2023-10-09 ASSESSMENT — ENCOUNTER SYMPTOMS
DIZZINESS: 0
ORTHOPNEA: 0
SHORTNESS OF BREATH: 1
COUGH: 0
BACK PAIN: 1
PALPITATIONS: 0
PND: 0
BLOOD IN STOOL: 0
LOSS OF CONSCIOUSNESS: 0

## 2023-10-09 ASSESSMENT — PAIN DESCRIPTION - PAIN TYPE
TYPE: SURGICAL PAIN

## 2023-10-09 ASSESSMENT — FIBROSIS 4 INDEX: FIB4 SCORE: 2.37

## 2023-10-09 NOTE — PROGRESS NOTES
Med Rec complete per PT  Allergies Reviewed    Pt reports NOT taking anticoagulant in the last 14 days  \

## 2023-10-09 NOTE — H&P
"         Cardiology H&P Note    Name:   Katerina Torres   YOB: 1940  Age:   83 y.o.  female   MRN:   [id number]    Outpatient Cardiologist: Dr. Nunez      Chief Complaint: Elective Cardiac Catheterization with Coronary Angiogram     History of Present Illness  82 yo female with severe aortic stenosis, CKD stage 3a presented to the clinic with increased dyspnea with exertion, fatigue. She was referred for elective pre-TAVR evaluation including cardiac catheterization and coronary angiogram.       Review of Systems   Respiratory:  Positive for shortness of breath. Negative for cough.    Cardiovascular:  Negative for chest pain, palpitations, orthopnea, leg swelling and PND.   Gastrointestinal:  Negative for blood in stool and melena.   Musculoskeletal:  Positive for back pain.   Neurological:  Negative for dizziness and loss of consciousness.       Medical History  Past Medical History:   Diagnosis Date    Arrhythmia Year ago    Breast cancer (HCC)     Left Breast    Breath shortness     with exertion \"walking up a hill\"    Bruises easily     Cancer (HCC) 1989    breast left side lumpectomy    Cardiac murmur 06/03/2009    Chronic kidney disease, stage III (moderate) ([facility]) 08/20/2015    Dyslipidemia 06/03/2009    Glaucoma     both eyes    Heart valve disease year ago    Having surgery    Hepatitis A 1993    Hiatus hernia syndrome     Hx of breast cancer 06/03/2009    Hypertension 07/18/2017    currently not taking medications    MEDICAL HOME     Neutropenia 06/03/2009    Osteopenia 06/03/2009    Preventative health care 06/03/2009    Snoring     Unspecified cataract     bilateral IOLs         Family History   Problem Relation Age of Onset    Heart Disease Father         CAD MI    Stroke Father     Cancer Sister         breast    Cancer Mother         breast         Social History     Tobacco Use    Smoking status: Former     Current packs/day: 0.00     Average packs/day: 0.5 packs/day for 10.0 " "years (5.0 ttl pk-yrs)     Types: Cigarettes     Start date: 1989     Quit date: 1999     Years since quittin.7    Smokeless tobacco: Never   Vaping Use    Vaping Use: Never used   Substance Use Topics    Alcohol use: Yes     Alcohol/week: 4.2 oz     Types: 7 Standard drinks or equivalent per week     Comment: 1 glass wine/day    Drug use: No         Allergies   Allergen Reactions    Atorvastatin Unspecified     Does not qualify for primary prevention    Hydrocodone-Acetaminophen     Naproxen      GI upset    Neurontin [Gabapentin] Unspecified     dizziness    Vicodin [Hydrocodone-Acetaminophen] Nausea     Nausea, dizziness         Medications   Scheduled Medications   Medication Dose Frequency    verapamil       heparin       lidocaine       nitroglycerin 0.2 mg/mL              Physical Exam  BP (!) 165/79   Pulse 61   Temp 36.7 °C (98 °F) (Temporal)   Resp 16   Ht 1.588 m (5' 2.5\")   Wt 62.1 kg (136 lb 14.5 oz)   SpO2 97%     Physical Exam  Vitals reviewed.   Constitutional:       Appearance: Normal appearance.   HENT:      Head: Normocephalic and atraumatic.   Cardiovascular:      Rate and Rhythm: Normal rate and regular rhythm.      Pulses: Normal pulses.      Heart sounds: Murmur heard.   Pulmonary:      Effort: Pulmonary effort is normal.      Breath sounds: No rhonchi or rales.   Musculoskeletal:      Right lower leg: No edema.      Left lower leg: No edema.   Skin:     General: Skin is warm and dry.   Neurological:      Mental Status: She is alert.   Psychiatric:         Judgment: Judgment normal.           Labs (personally reviewed):     Lab Results   Component Value Date/Time    SODIUM 142 2023 09:14 AM    POTASSIUM 4.6 2023 09:14 AM    CHLORIDE 107 2023 09:14 AM    CO2 22 2023 09:14 AM    GLUCOSE 103 (H) 2023 09:14 AM    BUN 21 2023 09:14 AM    CREATININE 1.24 2023 09:14 AM    CREATININE 1.08 (H) 2010 08:56 AM    BUNCREATRAT 11 2010 " "08:56 AM    GLOMRATE 50 (L) 07/02/2010 08:56 AM     Lab Results   Component Value Date/Time    CHOLSTRLTOT 175 10/20/2022 11:23 AM    LDL 98 10/20/2022 11:23 AM    HDL 61 10/20/2022 11:23 AM    TRIGLYCERIDE 81 10/20/2022 11:23 AM     No results found for: \"BNPBTYPENAT\"      Assessment and Medical Decision Making:  Severe Aortic stenosis  CKD stage 3a  - proceed with elective cardiac catheterization.     The risks, benefits, and alternatives to coronary angiography with IV sedation were discussed in great detail. Specific risks mentioned include bleeding, infection, kidney damage, allergic reaction, cardiac perforation with possible tamponade requiring lan-cardiocentesis or possible open heart surgery. In addition, we discussed that 10% of patients will experience small to moderate bruising at the side of the arterial puncture. Lastly the risks of heart attack, stroke, and death were discussed; the risks of major complications such as heart attack or stroke caused by the angiogram is less than 1%; the risk of death is approximately 1 in 1000. The patient verbalized understanding of these potential complications and wishes to proceed with this procedure.      Please see Dr. Hubbard's attestation for additions and further recommendations.    Marlen Charles PA-C  [facility]        "

## 2023-10-09 NOTE — DISCHARGE INSTRUCTIONS
HOME CARE INSTRUCTIONS    ACTIVITY: Rest and take it easy for the first 24 hours.  A responsible adult is recommended to remain with you during that time.  It is normal to feel sleepy.  We encourage you to not do anything that requires balance, judgment or coordination.    FOR 24 HOURS DO NOT:  Drive, operate machinery or run household appliances.  Drink beer or alcoholic beverages.  Make important decisions or sign legal documents.    SPECIAL INSTRUCTIONS: Limit wrist movement for 48 hours  POST ANGIOGRAM    General Care Instructions  · Maintain a bandage over the incision site for 24 hours. It's normal to find a small bruise or dime-sized lump at the insertion site. This should disappear within a few weeks.  · Do not apply lotions or powders to the site. Do not immerse the catheter insertion site in water (bathtub/swimming) for five days. It is ok to shower 24 hours after the procedure.  · You may resume your normal diet immediately; on the day of your procedure, drink 6-10 glasses of water to help flush the contrast liquid out of your system.  · If the doctor inserted the catheter in through your groin:  o Walking short distances on a flat surface is OK. Limit going up/down stairs for the first 2 days.  o DO NOT do yard work, drive, squat, lift heavy objects, or play sports for 2 days; or until your health care provider tells you it is OK.  · If the doctor inserted the catheter in your arm:  o For 24 hours, DO NOT lift anything heavier than 10 pounds (approximately a gallon of milk). DO NOT do any heavy pushing, pulling, or twisting.    Medications  · If your current medications need to be changed, you will be provided with an updated list of your medications prior to discharge.  · If you take warfarin (Coumadin), resume taking your usual dose the evening after the procedure.  · DO NOT STOP taking prescribed blood thinning (anti-platelet) medications unless instructed by your cardiologist. These medications  include:  o Aspirin, Clopidogrel (Plavix), Ticagrelor (Brilinta), or Prasugrel (Effient)  · If you take one of the following anticoagulants, RESUME 24 HOURS after your procedure:  o Apixiban (Eliquis), Rivaroxaban (Xarelto), Dabigatran (Pradaxa), Edoxaban (Savaysa)  · If you take metformin (Glucophage), RESUME 48 HOURS after your procedure.    When to call your healthcare provider  Call your cardiologist right away at [phone] if you have any of the following:  - Problems/Concerns taking any of your prescribed heart medicines.  - The insertion site has increasing pain, swelling, redness, bleeding, or drainage.  - Your arm or leg below where the insertion site changes color, is cool, or is numb.  - You have chest pain or shortness of breath that does not go away with rest.  - Your pulse feels irregular -- very slow (less than 60 beats/minute) or very fast (over 100 beats/minute).  - You have dizziness, fainting, or you are very tired.  - You are coughing up blood or yellow or green mucus.  - You have chills or a fever over 101°F (38.3°C).  - If there is bleeding at the catheter insertion site, apply pressure for 10 minutes. If bleeding persists, call 911, and continue to hold pressure until advanced medical support arrives.      DIET: To avoid nausea, slowly advance diet as tolerated, avoiding spicy or greasy foods for the first day.  Add more substantial food to your diet according to your physician's instructions. INCREASE FLUIDS AND FIBER TO AVOID CONSTIPATION.    MEDICATIONS: Resume taking daily medication.  Take prescribed pain medication with food.  If no medication is prescribed, you may take non-aspirin pain medication if needed.  PAIN MEDICATION CAN BE VERY CONSTIPATING.  Take a stool softener or laxative such as senokot, pericolace, or milk of magnesia if needed.    A follow-up appointment should be arranged with your doctor; call to schedule.    You should CALL YOUR PHYSICIAN if you develop:  Fever  greater than 101 degrees F.  Pain not relieved by medication, or persistent nausea or vomiting.  Excessive bleeding (blood soaking through dressing) or unexpected drainage from the wound.  Extreme redness or swelling around the incision site, drainage of pus or foul smelling drainage.  Inability to urinate or empty your bladder within 8 hours.  Problems with breathing or chest pain.    You should call 911 if you develop problems with breathing or chest pain.  If you are unable to contact your doctor or surgical center, you should go to the nearest emergency room or urgent care center.  Physician's telephone #: [phone]    MILD FLU-LIKE SYMPTOMS ARE NORMAL.  YOU MAY EXPERIENCE GENERALIZED MUSCLE ACHES, THROAT IRRITATION, HEADACHE AND/OR SOME NAUSEA.    If any questions arise, call your doctor.  If your doctor is not available, please feel free to call the Surgical Center at [phone].  The Center is open Monday through Friday from 7AM to 7PM.      A registered nurse may call you a few days after your surgery to see how you are doing after your procedure.    You may also receive a survey in the mail within the next two weeks and we ask that you take a few moments to complete the survey and return it to us.  Our goal is to provide you with very good care and we value your comments.     Depression / Suicide Risk    As you are discharged from this [facility], it is important to learn how to keep safe from harming yourself.    Recognize the warning signs:  Abrupt changes in personality, positive or negative- including increase in energy   Giving away possessions  Change in eating patterns- significant weight changes-  positive or negative  Change in sleeping patterns- unable to sleep or sleeping all the time   Unwillingness or inability to communicate  Depression  Unusual sadness, discouragement and loneliness  Talk of wanting to die  Neglect of personal appearance   Rebelliousness- reckless  behavior  Withdrawal from people/activities they love  Confusion- inability to concentrate     If you or a loved one observes any of these behaviors or has concerns about self-harm, here's what you can do:  Talk about it- your feelings and reasons for harming yourself  Remove any means that you might use to hurt yourself (examples: pills, rope, extension cords, firearm)  Get professional help from the community (Mental Health, Substance Abuse, psychological counseling)  Do not be alone:Call your Safe Contact- someone whom you trust who will be there for you.  Call your local CRISIS HOTLINE 765-9961 or 410-449-4306  Call your local Children's Mobile Crisis Response Team [place] (731) 456-3013 or www.[employer]iV[employer]  Call the toll free National Suicide Prevention Hotlines   National Suicide Prevention Lifeline 106-252-RADG (6066)  National Hope Line Network 800-SUICIDE (965-0570)    I acknowledge receipt and understanding of these Home Care instructions.

## 2023-10-09 NOTE — PROCEDURES
Cardiac Catheterization Laboratory Procedure Note    DATE: 10/9/2023    : Zacarias Hubbard MD    PROCEDURES PERFORMED:  Left heart catheterization  Coronary angiography  Ascending aortography  Moderate conscious sedation    INDICATIONS:  The patient is an 83-year-old woman referred for cardiac catheterization prior to transcatheter aortic valve replacement.    CONSENT:  The complete alternatives, risks, and benefits of the procedure were explained to the patient.  Signed informed consent was obtained and placed in the chart prior to the procedure.  A timeout was performed prior to beginning the procedure.    MEDICATIONS:  Lidocaine  Fentanyl  Midazolam  Nitroglycerin  Verapamil  Heparin    MODERATE CONSCIOUS SEDATION:  I personally supervised the administration of moderate conscious sedation by the nursing staff for 19 minutes.  Sedation start time: 9:10 AM  Sedation end time: 9:29 AM    CONTRAST: Omnipaque 25 cc    ACCESS: 6-Eritrean Glidesheath in the right radial artery.    ESTIMATED BLOOD LOSS: 10 cc    COMPLICATIONS: None    DESCRIPTION OF PROCEDURE:  The patient was brought to the cardiac catheterization laboratory in the fasting state.  The skin over the right wrist was prepped and draped in the usual sterile fashion.  Lidocaine infiltration was used to anesthetize the tissue over the right radial artery.  Using the micropuncture technique, a 6-Eritrean Glidesheath was inserted in the right radial artery.  A 5-Eritrean Eagle catheter was then advanced over a standard J-wire into the left ventricular cavity where it was gently aspirated, flushed, and then withdrawn across the aortic valve with sequential pressures measured.  This catheter was then used to engage the ostium of the left main coronary artery and cineangiograms were obtained in multiple projections for complete evaluation of the left coronary system.  This catheter was then used to engage the ostium of the right coronary artery and cineangiograms  were obtained in multiple projections for complete evaluation of the right coronary system.  Following completion of coronary angiography, the Eagle catheter was exchanged for a 6-Sinhala pigtail catheter, which was used to perform an ascending aortogram for procedural planning purposes.  A distal aortogram with iliofemoral run-off was not performed due to the patient's underlying kidney disease.  At the completion of the case all wires, catheters, and sheaths were removed.  A TR band was placed using the patent hemostasis technique.    HEMODYNAMICS:   Aortic pressure: 123/60 mmHg  Left ventricular pressure: 223/14 mmHg    CORONARY ANGIOGRAPHY:  The left main coronary artery has distal 20% disease and bifurcates into the left anterior descending and left circumflex coronary arteries.  The left anterior descending coronary artery is a moderate vessel that tapers near the apex and has proximal 40% disease, mid 30% disease, and distal luminal irregularities.  It supplies two small diagonal branches.  The left circumflex coronary artery is a large, nondominant vessel that supplies one significant obtuse marginal branch and has luminal irregularities.  The right coronary artery is a large, dominant vessel with mid 30% disease and otherwise luminal irregularities.  Distally, it bifurcates into a large posterior descending coronary and a large posterolateral branch with luminal irregularities.    ASCENDING AORTOGRAPHY:  Ascending aorta diameter at 2.9 cm.  Mild to moderate (2+) aortic regurgitation.    IMPRESSION:  Mild nonobstructive coronary artery disease.  Severe transaortic pressure gradient.  Borderline elevated left heart filling pressures.  Mild to moderate aortic regurgitation.    RECOMMENDATIONS:  Recover in post procedure area.  TR band release per protocol.  Post procedure fluids.  Continue evaluation for transcatheter aortic valve replacement.    NOTIFICATION:  The patient's family was notified of the results  of her cardiac catheterization.

## 2023-10-09 NOTE — OR NURSING
1245 - Pt's vital signs are stable, yes hypertension present, pt is alert and pain in the wrist is 0/10, but back pain is 10/10. Dressing is clean, dry and intact - right wrist, guaze and tegaderm. Pt has no complaints of nausea or vomiting.    1309- Dr. Hubbard and Marlen Charles contacted about pts bp of 200/84 manually. Pt is having back pain due to gardening, and tree trimming, 10/10, and with no history of hypertension prior, believes its the pain that is the cause. Dr. Hubbard would like 10 mg of Labetalol and 25 mcg of Fentanyl given for pain, if needed. Systolic bp must be <180 for DC.    1340- Marlen came to see pt at bedside and took bp in left arm was 180/68 at 1335, and right arm 175/77 at 1340, Marlen okay with Dr. Hubbard's ordered medications being given.     1350- BP down to 159/67 at this time, retaking every 10-15 mins, see epic. Pt will be discharged if BP is within guidelines listed above and pt is stable.     1434 - Most recent /69, pts pain is down to a 7/10. Discharge instructions and follow up appointments were discussed with pt and fátima Roy. Pt's IV was discontinued and pt was given all belongings. Pt had no other questions. Pt pushed out via wheelchair.

## 2023-10-09 NOTE — OR NURSING
Patient recovered well post op. A&Ox4. VSS, room air. Surgical sites CDI. Surgical pain managed. TR band removed as ordered and charted. Patient able to drink fluids without Nausea and vomiting. PT belongings in phase two. Spouse updated and discussed plan of care. Report called to Donald BARR.

## 2023-10-10 ENCOUNTER — OFFICE VISIT (OUTPATIENT)
Dept: MEDICAL GROUP | Facility: MEDICAL CENTER | Age: 83
End: 2023-10-10
Payer: MEDICARE

## 2023-10-10 VITALS
BODY MASS INDEX: 25.03 KG/M2 | OXYGEN SATURATION: 98 % | HEIGHT: 62 IN | SYSTOLIC BLOOD PRESSURE: 162 MMHG | RESPIRATION RATE: 16 BRPM | DIASTOLIC BLOOD PRESSURE: 82 MMHG | WEIGHT: 136 LBS | HEART RATE: 71 BPM | TEMPERATURE: 98.8 F

## 2023-10-10 DIAGNOSIS — N18.31 CHRONIC KIDNEY DISEASE, STAGE 3A: ICD-10-CM

## 2023-10-10 DIAGNOSIS — Z86.79 HISTORY OF HYPERTENSION: ICD-10-CM

## 2023-10-10 DIAGNOSIS — I35.0 AORTIC VALVE STENOSIS, ETIOLOGY OF CARDIAC VALVE DISEASE UNSPECIFIED: ICD-10-CM

## 2023-10-10 DIAGNOSIS — R94.4 DECREASED GFR: ICD-10-CM

## 2023-10-10 DIAGNOSIS — M54.9 BACK PAIN, UNSPECIFIED BACK LOCATION, UNSPECIFIED BACK PAIN LATERALITY, UNSPECIFIED CHRONICITY: ICD-10-CM

## 2023-10-10 PROCEDURE — 3079F DIAST BP 80-89 MM HG: CPT | Performed by: INTERNAL MEDICINE

## 2023-10-10 PROCEDURE — 1170F FXNL STATUS ASSESSED: CPT | Performed by: INTERNAL MEDICINE

## 2023-10-10 PROCEDURE — 99214 OFFICE O/P EST MOD 30 MIN: CPT | Performed by: INTERNAL MEDICINE

## 2023-10-10 PROCEDURE — 3077F SYST BP >= 140 MM HG: CPT | Performed by: INTERNAL MEDICINE

## 2023-10-10 RX ORDER — TRAMADOL HYDROCHLORIDE 50 MG/1
50 TABLET ORAL EVERY 6 HOURS PRN
Qty: 60 TABLET | Refills: 0 | Status: SHIPPED | OUTPATIENT
Start: 2023-10-10 | End: 2023-10-25

## 2023-10-10 ASSESSMENT — FIBROSIS 4 INDEX: FIB4 SCORE: 2.21

## 2023-10-10 NOTE — PROGRESS NOTES
Subjective     Katerina Torres is a 83 y.o. 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  She states that the day after trimming the trees for a few hours she started to have mid to low back pain, no radiation down her legs, no sensory changes in her feet, no radiation down her arms, occasional tingling of her hands but no decrease in  strength.  No falls, no trauma when she was trimming the trees.  Her neighbor was helping her and advised her to not do the tree trimming but she insisted as her back was feeling good.  Since then she has tried cold application, heat application has been more beneficial compared to cold application, has been taking Tylenol over-the-counter.  Previously she has tried hydrocodone for pain but had side effects of nausea.  Tramadol has been effective without side effects in the past for back pain.  Her  recently passed away, she is still grieving over that but thankfully she has good support of her neighbors.  She had a cardiac catheterization yesterday in preparation for TAVR.  Back pain is worse with movement, she can lean forward which is easier on her back, she sleeps typically on her side.  No issues with ADLs, bathing, toileting.  Somewhat decreased appetite.  10/9/23 cardiac catheterization prior to TAVR procedure note, left main 20% disease, LAD proximal 40% and mid 30% disease, RCA mid 30% disease, mild to moderate aortic regurgitation, ascending aorta 2.9 cm, severe transaortic pressure gradient, borderline elevated left heart filling pressures  No alcohol for 3  "days  No NSAIDs        Current Outpatient Medications   Medication Sig Dispense Refill    traMADol (ULTRAM) 50 MG Tab Take 1 Tablet by mouth every 6 hours as needed for Moderate Pain (back pain) for up to 15 days. 60 Tablet 0    timolol (TIMOPTIC) 0.5 % Solution Administer 1 Drop into the left eye every day.      acetaminophen (TYLENOL) 500 MG Tab Take 1,000 mg by mouth every 6 hours as needed for Mild Pain.      bimatoprost (LUMIGAN) 0.01 % Solution Administer 1 Drop into both eyes at bedtime. Indications: Wide-Angle Glaucoma      vitamin D (CHOLECALCIFEROL) 1000 UNIT Tab Take 1,000 Units by mouth every day.       No current facility-administered medications for this visit.       Patient Active Problem List   Diagnosis    History of breast cancer    Osteopenia    Dyslipidemia    History of neutropenia    Preventative health care    Aortic stenosis    Glaucoma    History of basal cell carcinoma of skin    Knee pain, left    History of polymyalgia rheumatica    Decreased GFR    Colon polyp    S/P knee replacement    Esophageal dysmotility    History of hypertension    History of palpitations    Low back pain    Chronic kidney disease, stage 3a (HCC)    Thoracic back pain    Impaired glucose metabolism    Pulmonary nodule    Degenerative arthritis of spine    Adnexal cyst         ROS           Objective     BP (!) 162/82 (BP Location: Right arm, Patient Position: Sitting, BP Cuff Size: Adult)   Pulse 71   Temp 37.1 °C (98.8 °F)   Resp 16   Ht 1.575 m (5' 2\")   Wt 61.7 kg (136 lb)   SpO2 98%   BMI 24.87 kg/m²      Physical Exam  Vitals and nursing note reviewed.   Constitutional:       Appearance: Normal appearance.   HENT:      Head: Normocephalic and atraumatic.      Right Ear: External ear normal.      Left Ear: External ear normal.   Cardiovascular:      Rate and Rhythm: Normal rate and regular rhythm.      Heart sounds: Murmur heard.   Pulmonary:      Effort: Pulmonary effort is normal.      Breath sounds: " Normal breath sounds.   Abdominal:      General: There is no distension.   Musculoskeletal:         General: No swelling.   Skin:     Coloration: Skin is not jaundiced.   Neurological:      General: No focal deficit present.      Mental Status: She is alert.   Psychiatric:         Mood and Affect: Mood normal.         Behavior: Behavior normal.     Tender to palpation paraspinal lumbar muscles right and left L2-L5 region, no ecchymosis, no bruising no shingles rash, normal breath sounds bilateral, no upper back, trapezius, shoulder pain, normal dorsiflexion, plantarflexion, knee flexion extension, no lower extremity edema.  No bruising or ecchymosis low back                      Assessment & Plan        Assessment  #1 musculoskeletal low back pain related to excessive tree trimming on Friday, no trauma, no falls, no direct injury, previous history of low back pain with prior T5 compression fracture, previous MRI lumbar spine did show chronic arthritis, foraminal stenosis, has had epidural injections 2 in the past 3 years by physiatry, no radicular symptoms, no lower extremity paresthesias    #2 elevated blood pressure today likely related to pain, she has had high blood pressure in the past and been on medications previously but has been off medication for blood pressure due to lower blood pressures, I believe the pain component is contributing to blood pressure elevation today    #3 aortic stenosis severe in nature, being evaluated for TAVR, cardiac catheterization done yesterday showed mild coronary disease with severe transaortic pressure gradient      #4 CKD 3a 10/9/23 bun 15,creat 1.09,GFR 50 stable no NSAIDs    #5 grieving as her  passed away recently thankfully she has good social support with her neighbors and she has not had any alcohol the past 3 days      Plan  #1 no imaging necessary given lack of trauma and clear relationship to onset of low back muscular type pain on exam with excessive tree  trimming    #2 unable to use NSAIDs with CKD and recent cardiac catheterization, she has been using over-the-counter Tylenol with no benefit, she could not tolerate hydrocodone in the past, she has had tramadol in the past with benefit, we will write another prescription for tramadol short-term, 50 mg 1 p.o. every 6 hours as needed for pain, as she has had this before we anticipate no side effects however the medication may cause drowsiness, sedation, memory loss, increased risk for falls, no driving, no alcohol on the medication, she can use Tylenol in conjunction with this only up to 1000 mg 3 times daily over-the-counter, continue no NSAIDs    #3 prescription sent to pharmacy, medication is a controlled substance,  reviewed    #4 no housecleaning duties or physical activity other than ADLs, no lifting, pushing, pulling, she can try heat over the area with a heating pad, ensure adequate water intake    #5 send me an update later this week or next week on her symptoms    #6 continue no alcohol    #7 consider physical therapy in the future    #8 follow-up cardiology

## 2023-10-10 NOTE — TELEPHONE ENCOUNTER
Please call the patient, she sent me a MyChart message that she hurt her back, she has been having a more difficult time since her  passed away a little over a week ago.    Please call her Tuesday morning to see if she can come in at noon Tuesday, October 10, as an add-on appointment for me.

## 2023-10-11 DIAGNOSIS — Z00.6 EXAMINATION OF PARTICIPANT IN CLINICAL TRIAL: ICD-10-CM

## 2023-10-11 DIAGNOSIS — I35.0 SEVERE AORTIC STENOSIS: ICD-10-CM

## 2023-10-13 ENCOUNTER — APPOINTMENT (OUTPATIENT)
Dept: ADMISSIONS | Facility: MEDICAL CENTER | Age: 83
DRG: 266 | End: 2023-10-13
Attending: INTERNAL MEDICINE
Payer: MEDICARE

## 2023-10-17 ENCOUNTER — TELEPHONE (OUTPATIENT)
Dept: MEDICAL GROUP | Facility: MEDICAL CENTER | Age: 83
End: 2023-10-17
Payer: MEDICARE

## 2023-10-17 DIAGNOSIS — M54.50 ACUTE BILATERAL LOW BACK PAIN, UNSPECIFIED WHETHER SCIATICA PRESENT: ICD-10-CM

## 2023-10-17 DIAGNOSIS — M54.9 BACK PAIN, UNSPECIFIED BACK LOCATION, UNSPECIFIED BACK PAIN LATERALITY, UNSPECIFIED CHRONICITY: ICD-10-CM

## 2023-10-17 NOTE — TELEPHONE ENCOUNTER
Katerina Bethany Torres  [phone] (home)     Katerina called to say that she is in pain again and would like you to order PT for her. States her leg is making her miserable.

## 2023-10-18 ENCOUNTER — PRE-ADMISSION TESTING (OUTPATIENT)
Dept: ADMISSIONS | Facility: MEDICAL CENTER | Age: 83
DRG: 266 | End: 2023-10-18
Attending: INTERNAL MEDICINE
Payer: MEDICARE

## 2023-10-18 ENCOUNTER — TELEPHONE (OUTPATIENT)
Dept: CARDIOLOGY | Facility: MEDICAL CENTER | Age: 83
End: 2023-10-18
Payer: MEDICARE

## 2023-10-18 NOTE — TELEPHONE ENCOUNTER
Referral to [facility] has been placed, she has seen them before, she can call [phone] to schedule

## 2023-10-18 NOTE — TELEPHONE ENCOUNTER
Valve Conference Plan of Care: 10/18/2023 for TAVR 10/23/2023           TAVR Candidate: Yes  Access: right femoral   Valve Size: 20 mm vs 23 mm Resilia  General Anesthesia or MAC: GA with SHARIFA  Unit: Telemetry  Incidental findings to be discussed with PCP: 7 mm subpleural nodule in left lung (? Malignancy with hx), bilateral opacities in lungs, degenerative spine, old trauma in L inferior obturator ring, and simple cyst in left adnexa.      Clearance needed prior to procedure: No    Check in time of 0700 10/23/2023.     Pre-op appointment completed: No, scheduled for later today 10/18/2023    NPO after midnight. Hold all vitamins and supplements 7 days prior.     Patient notified of procedure date/time and check in process.     All questions were answered. Patient has direct extension for any further questions/concerns prior to the procedure.

## 2023-10-18 NOTE — TELEPHONE ENCOUNTER
Thank you for the opportunity to participate in your patient's care.     Your patient is scheduled for transcatheter aortic valve replacement (TAVR) on Monday, October 23, 2023.    Sincerely,    [facility]    LOUISE Schrader, RN, Structural Heart Program Nurse Coordinator ([phone])  LOUISE Walter, RN, Structural Heart Program Nurse Coordinator ([phone])

## 2023-10-19 NOTE — TELEPHONE ENCOUNTER
Pt states she went to [facility]. Please send REF  to Leslie Cohen [phone].

## 2023-10-20 DIAGNOSIS — I35.0 SEVERE AORTIC STENOSIS: ICD-10-CM

## 2023-10-23 ENCOUNTER — APPOINTMENT (OUTPATIENT)
Dept: CARDIOLOGY | Facility: MEDICAL CENTER | Age: 83
DRG: 266 | End: 2023-10-23
Attending: ANESTHESIOLOGY
Payer: MEDICARE

## 2023-10-23 ENCOUNTER — ANESTHESIA EVENT (OUTPATIENT)
Dept: SURGERY | Facility: MEDICAL CENTER | Age: 83
DRG: 266 | End: 2023-10-23
Payer: MEDICARE

## 2023-10-23 ENCOUNTER — HOSPITAL ENCOUNTER (INPATIENT)
Facility: MEDICAL CENTER | Age: 83
LOS: 1 days | DRG: 266 | End: 2023-10-24
Attending: INTERNAL MEDICINE | Admitting: INTERNAL MEDICINE
Payer: MEDICARE

## 2023-10-23 ENCOUNTER — ANESTHESIA (OUTPATIENT)
Dept: SURGERY | Facility: MEDICAL CENTER | Age: 83
DRG: 266 | End: 2023-10-23
Payer: MEDICARE

## 2023-10-23 ENCOUNTER — APPOINTMENT (OUTPATIENT)
Dept: RADIOLOGY | Facility: MEDICAL CENTER | Age: 83
DRG: 266 | End: 2023-10-23
Payer: MEDICARE

## 2023-10-23 DIAGNOSIS — Z95.5 S/P DRUG ELUTING CORONARY STENT PLACEMENT: ICD-10-CM

## 2023-10-23 DIAGNOSIS — Z95.2 S/P TAVR (TRANSCATHETER AORTIC VALVE REPLACEMENT): ICD-10-CM

## 2023-10-23 PROBLEM — I50.31 ACUTE DIASTOLIC HEART FAILURE (HCC): Status: ACTIVE | Noted: 2023-10-23

## 2023-10-23 LAB
ABO + RH BLD: NORMAL
ABO GROUP BLD: NORMAL
ACT BLD: 257 SEC (ref 74–137)
ACT BLD: 275 SEC (ref 74–137)
ALBUMIN SERPL BCP-MCNC: 3.9 G/DL (ref 3.2–4.9)
ALBUMIN SERPL BCP-MCNC: 4.2 G/DL (ref 3.2–4.9)
ALBUMIN/GLOB SERPL: 1.6 G/DL
ALBUMIN/GLOB SERPL: 1.6 G/DL
ALP SERPL-CCNC: 67 U/L (ref 30–99)
ALP SERPL-CCNC: 69 U/L (ref 30–99)
ALT SERPL-CCNC: 13 U/L (ref 2–50)
ALT SERPL-CCNC: 17 U/L (ref 2–50)
ANION GAP SERPL CALC-SCNC: 11 MMOL/L (ref 7–16)
ANION GAP SERPL CALC-SCNC: 12 MMOL/L (ref 7–16)
APTT PPP: 31.2 SEC (ref 24.7–36)
AST SERPL-CCNC: 20 U/L (ref 12–45)
AST SERPL-CCNC: 26 U/L (ref 12–45)
BILIRUB SERPL-MCNC: 0.4 MG/DL (ref 0.1–1.5)
BILIRUB SERPL-MCNC: 0.5 MG/DL (ref 0.1–1.5)
BLD GP AB SCN SERPL QL: NORMAL
BUN SERPL-MCNC: 12 MG/DL (ref 8–22)
BUN SERPL-MCNC: 14 MG/DL (ref 8–22)
CALCIUM ALBUM COR SERPL-MCNC: 9 MG/DL (ref 8.5–10.5)
CALCIUM ALBUM COR SERPL-MCNC: 9.1 MG/DL (ref 8.5–10.5)
CALCIUM SERPL-MCNC: 8.9 MG/DL (ref 8.5–10.5)
CALCIUM SERPL-MCNC: 9.3 MG/DL (ref 8.5–10.5)
CHLORIDE SERPL-SCNC: 105 MMOL/L (ref 96–112)
CHLORIDE SERPL-SCNC: 106 MMOL/L (ref 96–112)
CO2 SERPL-SCNC: 21 MMOL/L (ref 20–33)
CO2 SERPL-SCNC: 22 MMOL/L (ref 20–33)
CREAT SERPL-MCNC: 1 MG/DL (ref 0.5–1.4)
CREAT SERPL-MCNC: 1.02 MG/DL (ref 0.5–1.4)
EKG IMPRESSION: NORMAL
EKG IMPRESSION: NORMAL
ERYTHROCYTE [DISTWIDTH] IN BLOOD BY AUTOMATED COUNT: 44.5 FL (ref 35.9–50)
ERYTHROCYTE [DISTWIDTH] IN BLOOD BY AUTOMATED COUNT: 45.1 FL (ref 35.9–50)
GFR SERPLBLD CREATININE-BSD FMLA CKD-EPI: 54 ML/MIN/1.73 M 2
GFR SERPLBLD CREATININE-BSD FMLA CKD-EPI: 55 ML/MIN/1.73 M 2
GLOBULIN SER CALC-MCNC: 2.4 G/DL (ref 1.9–3.5)
GLOBULIN SER CALC-MCNC: 2.6 G/DL (ref 1.9–3.5)
GLUCOSE SERPL-MCNC: 108 MG/DL (ref 65–99)
GLUCOSE SERPL-MCNC: 142 MG/DL (ref 65–99)
HCT VFR BLD AUTO: 36.8 % (ref 37–47)
HCT VFR BLD AUTO: 40.7 % (ref 37–47)
HGB BLD-MCNC: 12.7 G/DL (ref 12–16)
HGB BLD-MCNC: 13.9 G/DL (ref 12–16)
INR PPP: 1.02 (ref 0.87–1.13)
LV EJECT FRACT  99904: 55
MCH RBC QN AUTO: 31.7 PG (ref 27–33)
MCH RBC QN AUTO: 32.3 PG (ref 27–33)
MCHC RBC AUTO-ENTMCNC: 34.2 G/DL (ref 32.2–35.5)
MCHC RBC AUTO-ENTMCNC: 34.5 G/DL (ref 32.2–35.5)
MCV RBC AUTO: 92.7 FL (ref 81.4–97.8)
MCV RBC AUTO: 93.6 FL (ref 81.4–97.8)
NT-PROBNP SERPL IA-MCNC: 1029 PG/ML (ref 0–125)
NT-PROBNP SERPL IA-MCNC: 1193 PG/ML (ref 0–125)
PLATELET # BLD AUTO: 203 K/UL (ref 164–446)
PLATELET # BLD AUTO: 247 K/UL (ref 164–446)
PMV BLD AUTO: 10 FL (ref 9–12.9)
PMV BLD AUTO: 10.4 FL (ref 9–12.9)
POTASSIUM SERPL-SCNC: 3.7 MMOL/L (ref 3.6–5.5)
POTASSIUM SERPL-SCNC: 3.8 MMOL/L (ref 3.6–5.5)
PROT SERPL-MCNC: 6.3 G/DL (ref 6–8.2)
PROT SERPL-MCNC: 6.8 G/DL (ref 6–8.2)
PROTHROMBIN TIME: 13.6 SEC (ref 12–14.6)
RBC # BLD AUTO: 3.93 M/UL (ref 4.2–5.4)
RBC # BLD AUTO: 4.39 M/UL (ref 4.2–5.4)
RH BLD: NORMAL
SODIUM SERPL-SCNC: 138 MMOL/L (ref 135–145)
SODIUM SERPL-SCNC: 139 MMOL/L (ref 135–145)
WBC # BLD AUTO: 5.2 K/UL (ref 4.8–10.8)
WBC # BLD AUTO: 8.1 K/UL (ref 4.8–10.8)

## 2023-10-23 PROCEDURE — C1769 GUIDE WIRE: HCPCS | Performed by: INTERNAL MEDICINE

## 2023-10-23 PROCEDURE — 160002 HCHG RECOVERY MINUTES (STAT): Performed by: INTERNAL MEDICINE

## 2023-10-23 PROCEDURE — 700101 HCHG RX REV CODE 250: Performed by: ANESTHESIOLOGY

## 2023-10-23 PROCEDURE — C1760 CLOSURE DEV, VASC: HCPCS | Performed by: INTERNAL MEDICINE

## 2023-10-23 PROCEDURE — 80053 COMPREHEN METABOLIC PANEL: CPT

## 2023-10-23 PROCEDURE — 503001 HCHG PERFUSION: Performed by: INTERNAL MEDICINE

## 2023-10-23 PROCEDURE — C1894 INTRO/SHEATH, NON-LASER: HCPCS | Performed by: INTERNAL MEDICINE

## 2023-10-23 PROCEDURE — 85347 COAGULATION TIME ACTIVATED: CPT

## 2023-10-23 PROCEDURE — C1751 CATH, INF, PER/CENT/MIDLINE: HCPCS | Performed by: INTERNAL MEDICINE

## 2023-10-23 PROCEDURE — C1887 CATHETER, GUIDING: HCPCS | Performed by: INTERNAL MEDICINE

## 2023-10-23 PROCEDURE — 700111 HCHG RX REV CODE 636 W/ 250 OVERRIDE (IP): Performed by: INTERNAL MEDICINE

## 2023-10-23 PROCEDURE — 71045 X-RAY EXAM CHEST 1 VIEW: CPT

## 2023-10-23 PROCEDURE — 700102 HCHG RX REV CODE 250 W/ 637 OVERRIDE(OP): Mod: JZ

## 2023-10-23 PROCEDURE — 3E02340 INTRODUCTION OF INFLUENZA VACCINE INTO MUSCLE, PERCUTANEOUS APPROACH: ICD-10-PCS | Performed by: THORACIC SURGERY (CARDIOTHORACIC VASCULAR SURGERY)

## 2023-10-23 PROCEDURE — 700111 HCHG RX REV CODE 636 W/ 250 OVERRIDE (IP): Performed by: ANESTHESIOLOGY

## 2023-10-23 PROCEDURE — 160048 HCHG OR STATISTICAL LEVEL 1-5: Performed by: INTERNAL MEDICINE

## 2023-10-23 PROCEDURE — 160042 HCHG SURGERY MINUTES - EA ADDL 1 MIN LEVEL 5: Performed by: INTERNAL MEDICINE

## 2023-10-23 PROCEDURE — 93005 ELECTROCARDIOGRAM TRACING: CPT | Performed by: INTERNAL MEDICINE

## 2023-10-23 PROCEDURE — 160009 HCHG ANES TIME/MIN: Performed by: INTERNAL MEDICINE

## 2023-10-23 PROCEDURE — C1874 STENT, COATED/COV W/DEL SYS: HCPCS | Performed by: INTERNAL MEDICINE

## 2023-10-23 PROCEDURE — 700105 HCHG RX REV CODE 258

## 2023-10-23 PROCEDURE — 700105 HCHG RX REV CODE 258: Performed by: INTERNAL MEDICINE

## 2023-10-23 PROCEDURE — 93355 ECHO TRANSESOPHAGEAL (TEE): CPT

## 2023-10-23 PROCEDURE — 33361 REPLACE AORTIC VALVE PERQ: CPT | Mod: 62,Q0 | Performed by: INTERNAL MEDICINE

## 2023-10-23 PROCEDURE — 700102 HCHG RX REV CODE 250 W/ 637 OVERRIDE(OP)

## 2023-10-23 PROCEDURE — C9600 PERC DRUG-EL COR STENT SING: HCPCS | Mod: RC

## 2023-10-23 PROCEDURE — 110372 HCHG SHELL REV 278: Performed by: INTERNAL MEDICINE

## 2023-10-23 PROCEDURE — 160035 HCHG PACU - 1ST 60 MINS PHASE I: Performed by: INTERNAL MEDICINE

## 2023-10-23 PROCEDURE — 33361 REPLACE AORTIC VALVE PERQ: CPT | Mod: 62,Q0 | Performed by: THORACIC SURGERY (CARDIOTHORACIC VASCULAR SURGERY)

## 2023-10-23 PROCEDURE — 93005 ELECTROCARDIOGRAM TRACING: CPT

## 2023-10-23 PROCEDURE — 502240 HCHG MISC OR SUPPLY RC 0272: Performed by: INTERNAL MEDICINE

## 2023-10-23 PROCEDURE — 02RF38N REPLACEMENT OF AORTIC VALVE WITH ZOOPLASTIC TISSUE, USING RAPID DEPLOYMENT TECHNIQUE, PERCUTANEOUS APPROACH: ICD-10-PCS | Performed by: INTERNAL MEDICINE

## 2023-10-23 PROCEDURE — A9270 NON-COVERED ITEM OR SERVICE: HCPCS | Mod: JZ

## 2023-10-23 PROCEDURE — 85027 COMPLETE CBC AUTOMATED: CPT

## 2023-10-23 PROCEDURE — 770020 HCHG ROOM/CARE - TELE (206)

## 2023-10-23 PROCEDURE — 86901 BLOOD TYPING SEROLOGIC RH(D): CPT

## 2023-10-23 PROCEDURE — 93010 ELECTROCARDIOGRAM REPORT: CPT | Mod: 76,59 | Performed by: INTERNAL MEDICINE

## 2023-10-23 PROCEDURE — 160031 HCHG SURGERY MINUTES - 1ST 30 MINS LEVEL 5: Performed by: INTERNAL MEDICINE

## 2023-10-23 PROCEDURE — C1883 ADAPT/EXT, PACING/NEURO LEAD: HCPCS | Performed by: INTERNAL MEDICINE

## 2023-10-23 PROCEDURE — 700111 HCHG RX REV CODE 636 W/ 250 OVERRIDE (IP): Mod: JZ

## 2023-10-23 PROCEDURE — 93010 ELECTROCARDIOGRAM REPORT: CPT | Mod: 59 | Performed by: INTERNAL MEDICINE

## 2023-10-23 PROCEDURE — 83880 ASSAY OF NATRIURETIC PEPTIDE: CPT

## 2023-10-23 PROCEDURE — 85610 PROTHROMBIN TIME: CPT

## 2023-10-23 PROCEDURE — A9270 NON-COVERED ITEM OR SERVICE: HCPCS

## 2023-10-23 PROCEDURE — 86900 BLOOD TYPING SEROLOGIC ABO: CPT

## 2023-10-23 PROCEDURE — 700101 HCHG RX REV CODE 250: Performed by: INTERNAL MEDICINE

## 2023-10-23 PROCEDURE — 86850 RBC ANTIBODY SCREEN: CPT

## 2023-10-23 PROCEDURE — 160036 HCHG PACU - EA ADDL 30 MINS PHASE I: Performed by: INTERNAL MEDICINE

## 2023-10-23 PROCEDURE — 502000 HCHG MISC OR IMPLANTS RC 0278: Performed by: INTERNAL MEDICINE

## 2023-10-23 PROCEDURE — B24BZZ4 ULTRASONOGRAPHY OF HEART WITH AORTA, TRANSESOPHAGEAL: ICD-10-PCS | Performed by: INTERNAL MEDICINE

## 2023-10-23 PROCEDURE — 85730 THROMBOPLASTIN TIME PARTIAL: CPT

## 2023-10-23 DEVICE — IMPLANTABLE DEVICE: Type: IMPLANTABLE DEVICE | Site: HEART | Status: FUNCTIONAL

## 2023-10-23 RX ORDER — CLOPIDOGREL BISULFATE 75 MG/1
75 TABLET ORAL DAILY
Status: DISCONTINUED | OUTPATIENT
Start: 2023-10-25 | End: 2023-10-24 | Stop reason: HOSPADM

## 2023-10-23 RX ORDER — PRASUGREL 10 MG/1
TABLET, FILM COATED ORAL
Status: COMPLETED
Start: 2023-10-23 | End: 2023-10-23

## 2023-10-23 RX ORDER — PRASUGREL 10 MG/1
60 TABLET, FILM COATED ORAL ONCE
Status: DISCONTINUED | OUTPATIENT
Start: 2023-10-23 | End: 2023-10-23

## 2023-10-23 RX ORDER — DIPHENHYDRAMINE HYDROCHLORIDE 50 MG/ML
12.5 INJECTION INTRAMUSCULAR; INTRAVENOUS
Status: DISCONTINUED | OUTPATIENT
Start: 2023-10-23 | End: 2023-10-23 | Stop reason: HOSPADM

## 2023-10-23 RX ORDER — ACETAMINOPHEN 325 MG/1
650 TABLET ORAL EVERY 6 HOURS PRN
Status: DISCONTINUED | OUTPATIENT
Start: 2023-10-23 | End: 2023-10-24 | Stop reason: HOSPADM

## 2023-10-23 RX ORDER — SODIUM CHLORIDE, SODIUM LACTATE, POTASSIUM CHLORIDE, CALCIUM CHLORIDE 600; 310; 30; 20 MG/100ML; MG/100ML; MG/100ML; MG/100ML
INJECTION, SOLUTION INTRAVENOUS CONTINUOUS
Status: DISCONTINUED | OUTPATIENT
Start: 2023-10-23 | End: 2023-10-23

## 2023-10-23 RX ORDER — LIDOCAINE HYDROCHLORIDE 20 MG/ML
INJECTION, SOLUTION INFILTRATION; PERINEURAL
Status: DISCONTINUED | OUTPATIENT
Start: 2023-10-23 | End: 2023-10-23 | Stop reason: HOSPADM

## 2023-10-23 RX ORDER — CEFAZOLIN SODIUM 1 G/3ML
INJECTION, POWDER, FOR SOLUTION INTRAMUSCULAR; INTRAVENOUS PRN
Status: DISCONTINUED | OUTPATIENT
Start: 2023-10-23 | End: 2023-10-23 | Stop reason: SURG

## 2023-10-23 RX ORDER — BISACODYL 10 MG
10 SUPPOSITORY, RECTAL RECTAL
Status: DISCONTINUED | OUTPATIENT
Start: 2023-10-23 | End: 2023-10-24 | Stop reason: HOSPADM

## 2023-10-23 RX ORDER — FUROSEMIDE 10 MG/ML
20 INJECTION INTRAMUSCULAR; INTRAVENOUS
Status: DISCONTINUED | OUTPATIENT
Start: 2023-10-23 | End: 2023-10-24 | Stop reason: HOSPADM

## 2023-10-23 RX ORDER — TRAMADOL HYDROCHLORIDE 50 MG/1
50 TABLET ORAL EVERY 6 HOURS PRN
Status: DISCONTINUED | OUTPATIENT
Start: 2023-10-23 | End: 2023-10-24 | Stop reason: HOSPADM

## 2023-10-23 RX ORDER — CLOPIDOGREL BISULFATE 75 MG/1
75 TABLET ORAL DAILY
Status: DISCONTINUED | OUTPATIENT
Start: 2023-10-24 | End: 2023-10-23

## 2023-10-23 RX ORDER — LABETALOL HYDROCHLORIDE 5 MG/ML
INJECTION, SOLUTION INTRAVENOUS PRN
Status: DISCONTINUED | OUTPATIENT
Start: 2023-10-23 | End: 2023-10-23 | Stop reason: SURG

## 2023-10-23 RX ORDER — HYDROMORPHONE HYDROCHLORIDE 1 MG/ML
0.2 INJECTION, SOLUTION INTRAMUSCULAR; INTRAVENOUS; SUBCUTANEOUS
Status: DISCONTINUED | OUTPATIENT
Start: 2023-10-23 | End: 2023-10-23 | Stop reason: HOSPADM

## 2023-10-23 RX ORDER — SODIUM CHLORIDE 9 MG/ML
INJECTION, SOLUTION INTRAVENOUS CONTINUOUS
Status: ACTIVE | OUTPATIENT
Start: 2023-10-23 | End: 2023-10-23

## 2023-10-23 RX ORDER — ONDANSETRON 2 MG/ML
4 INJECTION INTRAMUSCULAR; INTRAVENOUS
Status: DISCONTINUED | OUTPATIENT
Start: 2023-10-23 | End: 2023-10-23 | Stop reason: HOSPADM

## 2023-10-23 RX ORDER — OXYCODONE HCL 5 MG/5 ML
5 SOLUTION, ORAL ORAL
Status: DISCONTINUED | OUTPATIENT
Start: 2023-10-23 | End: 2023-10-23 | Stop reason: HOSPADM

## 2023-10-23 RX ORDER — HYDROMORPHONE HYDROCHLORIDE 1 MG/ML
0.1 INJECTION, SOLUTION INTRAMUSCULAR; INTRAVENOUS; SUBCUTANEOUS
Status: DISCONTINUED | OUTPATIENT
Start: 2023-10-23 | End: 2023-10-23 | Stop reason: HOSPADM

## 2023-10-23 RX ORDER — OXYCODONE HCL 5 MG/5 ML
10 SOLUTION, ORAL ORAL
Status: DISCONTINUED | OUTPATIENT
Start: 2023-10-23 | End: 2023-10-23 | Stop reason: HOSPADM

## 2023-10-23 RX ORDER — HYDROMORPHONE HYDROCHLORIDE 1 MG/ML
0.4 INJECTION, SOLUTION INTRAMUSCULAR; INTRAVENOUS; SUBCUTANEOUS
Status: DISCONTINUED | OUTPATIENT
Start: 2023-10-23 | End: 2023-10-23 | Stop reason: HOSPADM

## 2023-10-23 RX ORDER — ASPIRIN 81 MG/1
81 TABLET ORAL DAILY
Status: DISCONTINUED | OUTPATIENT
Start: 2023-10-24 | End: 2023-10-24 | Stop reason: HOSPADM

## 2023-10-23 RX ORDER — HEPARIN SODIUM,PORCINE 1000/ML
VIAL (ML) INJECTION PRN
Status: DISCONTINUED | OUTPATIENT
Start: 2023-10-23 | End: 2023-10-23 | Stop reason: SURG

## 2023-10-23 RX ORDER — EPHEDRINE SULFATE 50 MG/ML
INJECTION, SOLUTION INTRAVENOUS PRN
Status: DISCONTINUED | OUTPATIENT
Start: 2023-10-23 | End: 2023-10-23 | Stop reason: SURG

## 2023-10-23 RX ORDER — ONDANSETRON 2 MG/ML
INJECTION INTRAMUSCULAR; INTRAVENOUS PRN
Status: DISCONTINUED | OUTPATIENT
Start: 2023-10-23 | End: 2023-10-23 | Stop reason: HOSPADM

## 2023-10-23 RX ORDER — ASPIRIN 81 MG/1
TABLET, CHEWABLE ORAL
Status: COMPLETED
Start: 2023-10-23 | End: 2023-10-23

## 2023-10-23 RX ORDER — DIPHENHYDRAMINE HYDROCHLORIDE 50 MG/ML
25 INJECTION INTRAMUSCULAR; INTRAVENOUS EVERY 6 HOURS PRN
Status: DISCONTINUED | OUTPATIENT
Start: 2023-10-23 | End: 2023-10-24 | Stop reason: HOSPADM

## 2023-10-23 RX ORDER — HYDRALAZINE HYDROCHLORIDE 20 MG/ML
10 INJECTION INTRAMUSCULAR; INTRAVENOUS
Status: DISCONTINUED | OUTPATIENT
Start: 2023-10-23 | End: 2023-10-24 | Stop reason: HOSPADM

## 2023-10-23 RX ORDER — AMOXICILLIN 250 MG
2 CAPSULE ORAL 2 TIMES DAILY
Status: DISCONTINUED | OUTPATIENT
Start: 2023-10-23 | End: 2023-10-24 | Stop reason: HOSPADM

## 2023-10-23 RX ORDER — POTASSIUM CHLORIDE 20 MEQ/1
20 TABLET, EXTENDED RELEASE ORAL DAILY
Status: DISCONTINUED | OUTPATIENT
Start: 2023-10-23 | End: 2023-10-24 | Stop reason: HOSPADM

## 2023-10-23 RX ORDER — CLOPIDOGREL 300 MG/1
600 TABLET, FILM COATED ORAL ONCE
Status: COMPLETED | OUTPATIENT
Start: 2023-10-24 | End: 2023-10-24

## 2023-10-23 RX ORDER — ONDANSETRON 2 MG/ML
4 INJECTION INTRAMUSCULAR; INTRAVENOUS EVERY 4 HOURS PRN
Status: DISCONTINUED | OUTPATIENT
Start: 2023-10-23 | End: 2023-10-24 | Stop reason: HOSPADM

## 2023-10-23 RX ORDER — TIMOLOL MALEATE 5 MG/ML
1 SOLUTION/ DROPS OPHTHALMIC DAILY
Status: DISCONTINUED | OUTPATIENT
Start: 2023-10-23 | End: 2023-10-23

## 2023-10-23 RX ORDER — SODIUM CHLORIDE 9 MG/ML
INJECTION, SOLUTION INTRAVENOUS CONTINUOUS
Status: DISCONTINUED | OUTPATIENT
Start: 2023-10-23 | End: 2023-10-23 | Stop reason: HOSPADM

## 2023-10-23 RX ORDER — LATANOPROST 50 UG/ML
1 SOLUTION/ DROPS OPHTHALMIC EVERY EVENING
Status: DISCONTINUED | OUTPATIENT
Start: 2023-10-23 | End: 2023-10-24 | Stop reason: HOSPADM

## 2023-10-23 RX ORDER — PROTAMINE SULFATE 10 MG/ML
INJECTION, SOLUTION INTRAVENOUS PRN
Status: DISCONTINUED | OUTPATIENT
Start: 2023-10-23 | End: 2023-10-23 | Stop reason: SURG

## 2023-10-23 RX ORDER — LIDOCAINE HYDROCHLORIDE 40 MG/ML
SOLUTION TOPICAL PRN
Status: DISCONTINUED | OUTPATIENT
Start: 2023-10-23 | End: 2023-10-23 | Stop reason: SURG

## 2023-10-23 RX ORDER — DIPHENHYDRAMINE HCL 25 MG
25 TABLET ORAL NIGHTLY PRN
Status: DISCONTINUED | OUTPATIENT
Start: 2023-10-23 | End: 2023-10-24 | Stop reason: HOSPADM

## 2023-10-23 RX ORDER — DEXAMETHASONE SODIUM PHOSPHATE 4 MG/ML
INJECTION, SOLUTION INTRA-ARTICULAR; INTRALESIONAL; INTRAMUSCULAR; INTRAVENOUS; SOFT TISSUE PRN
Status: DISCONTINUED | OUTPATIENT
Start: 2023-10-23 | End: 2023-10-23 | Stop reason: SURG

## 2023-10-23 RX ORDER — POLYETHYLENE GLYCOL 3350 17 G/17G
1 POWDER, FOR SOLUTION ORAL
Status: DISCONTINUED | OUTPATIENT
Start: 2023-10-23 | End: 2023-10-24 | Stop reason: HOSPADM

## 2023-10-23 RX ORDER — TIMOLOL MALEATE 5 MG/ML
1 SOLUTION/ DROPS OPHTHALMIC EVERY EVENING
Status: DISCONTINUED | OUTPATIENT
Start: 2023-10-23 | End: 2023-10-24 | Stop reason: HOSPADM

## 2023-10-23 RX ORDER — BUPIVACAINE HYDROCHLORIDE 2.5 MG/ML
INJECTION, SOLUTION EPIDURAL; INFILTRATION; INTRACAUDAL
Status: DISCONTINUED | OUTPATIENT
Start: 2023-10-23 | End: 2023-10-23 | Stop reason: HOSPADM

## 2023-10-23 RX ORDER — HALOPERIDOL 5 MG/ML
1 INJECTION INTRAMUSCULAR
Status: DISCONTINUED | OUTPATIENT
Start: 2023-10-23 | End: 2023-10-23 | Stop reason: HOSPADM

## 2023-10-23 RX ADMIN — LIDOCAINE HYDROCHLORIDE 4 ML: 40 SOLUTION TOPICAL at 09:05

## 2023-10-23 RX ADMIN — ROCURONIUM BROMIDE 50 MG: 10 INJECTION, SOLUTION INTRAVENOUS at 09:05

## 2023-10-23 RX ADMIN — SODIUM CHLORIDE: 9 INJECTION, SOLUTION INTRAVENOUS at 11:45

## 2023-10-23 RX ADMIN — HEPARIN SODIUM 8000 UNITS: 1000 INJECTION, SOLUTION INTRAVENOUS; SUBCUTANEOUS at 09:31

## 2023-10-23 RX ADMIN — PRASUGREL 60 MG: 10 TABLET, FILM COATED ORAL at 10:45

## 2023-10-23 RX ADMIN — PROTAMINE SULFATE 40 MG: 10 INJECTION, SOLUTION INTRAVENOUS at 10:24

## 2023-10-23 RX ADMIN — EPHEDRINE SULFATE 15 MG: 50 INJECTION INTRAVENOUS at 10:04

## 2023-10-23 RX ADMIN — EPHEDRINE SULFATE 20 MG: 50 INJECTION INTRAVENOUS at 10:19

## 2023-10-23 RX ADMIN — ONDANSETRON 4 MG: 2 INJECTION INTRAMUSCULAR; INTRAVENOUS at 09:51

## 2023-10-23 RX ADMIN — FUROSEMIDE 20 MG: 10 INJECTION, SOLUTION INTRAVENOUS at 21:33

## 2023-10-23 RX ADMIN — SUGAMMADEX 200 MG: 100 INJECTION, SOLUTION INTRAVENOUS at 10:40

## 2023-10-23 RX ADMIN — SODIUM CHLORIDE, POTASSIUM CHLORIDE, SODIUM LACTATE AND CALCIUM CHLORIDE: 600; 310; 30; 20 INJECTION, SOLUTION INTRAVENOUS at 08:00

## 2023-10-23 RX ADMIN — SODIUM CHLORIDE: 9 INJECTION, SOLUTION INTRAVENOUS at 13:30

## 2023-10-23 RX ADMIN — CEFAZOLIN 2 G: 1 INJECTION, POWDER, FOR SOLUTION INTRAMUSCULAR; INTRAVENOUS at 09:15

## 2023-10-23 RX ADMIN — ROCURONIUM BROMIDE 10 MG: 10 INJECTION, SOLUTION INTRAVENOUS at 10:12

## 2023-10-23 RX ADMIN — EPHEDRINE SULFATE 15 MG: 50 INJECTION INTRAVENOUS at 10:00

## 2023-10-23 RX ADMIN — LABETALOL HYDROCHLORIDE 10 MG: 5 INJECTION, SOLUTION INTRAVENOUS at 09:16

## 2023-10-23 RX ADMIN — HEPARIN SODIUM 2000 UNITS: 1000 INJECTION, SOLUTION INTRAVENOUS; SUBCUTANEOUS at 10:10

## 2023-10-23 RX ADMIN — PROPOFOL 200 MG: 10 INJECTION, EMULSION INTRAVENOUS at 09:05

## 2023-10-23 RX ADMIN — POTASSIUM CHLORIDE 20 MEQ: 1500 TABLET, EXTENDED RELEASE ORAL at 21:32

## 2023-10-23 RX ADMIN — DEXAMETHASONE SODIUM PHOSPHATE 4 MG: 4 INJECTION INTRA-ARTICULAR; INTRALESIONAL; INTRAMUSCULAR; INTRAVENOUS; SOFT TISSUE at 09:51

## 2023-10-23 RX ADMIN — ASPIRIN 81 MG 324 MG: 81 TABLET ORAL at 10:45

## 2023-10-23 RX ADMIN — EPHEDRINE SULFATE 15 MG: 50 INJECTION INTRAVENOUS at 10:15

## 2023-10-23 ASSESSMENT — PAIN DESCRIPTION - PAIN TYPE
TYPE: ACUTE PAIN

## 2023-10-23 ASSESSMENT — PATIENT HEALTH QUESTIONNAIRE - PHQ9
2. FEELING DOWN, DEPRESSED, IRRITABLE, OR HOPELESS: NEARLY EVERY DAY
1. LITTLE INTEREST OR PLEASURE IN DOING THINGS: MORE THAN HALF THE DAYS
8. MOVING OR SPEAKING SO SLOWLY THAT OTHER PEOPLE COULD HAVE NOTICED. OR THE OPPOSITE, BEING SO FIGETY OR RESTLESS THAT YOU HAVE BEEN MOVING AROUND A LOT MORE THAN USUAL: SEVERAL DAYS
2. FEELING DOWN, DEPRESSED, IRRITABLE, OR HOPELESS: NEARLY EVERY DAY
5. POOR APPETITE OR OVEREATING: NEARLY EVERY DAY
SUM OF ALL RESPONSES TO PHQ9 QUESTIONS 1 AND 2: 5
6. FEELING BAD ABOUT YOURSELF - OR THAT YOU ARE A FAILURE OR HAVE LET YOURSELF OR YOUR FAMILY DOWN: NOT AL ALL
3. TROUBLE FALLING OR STAYING ASLEEP OR SLEEPING TOO MUCH: NEARLY EVERY DAY
9. THOUGHTS THAT YOU WOULD BE BETTER OFF DEAD, OR OF HURTING YOURSELF: NOT AT ALL
SUM OF ALL RESPONSES TO PHQ QUESTIONS 1-9: 11
7. TROUBLE CONCENTRATING ON THINGS, SUCH AS READING THE NEWSPAPER OR WATCHING TELEVISION: NOT AT ALL
1. LITTLE INTEREST OR PLEASURE IN DOING THINGS: SEVERAL DAYS
4. FEELING TIRED OR HAVING LITTLE ENERGY: NOT AT ALL
SUM OF ALL RESPONSES TO PHQ9 QUESTIONS 1 AND 2: 4

## 2023-10-23 ASSESSMENT — COGNITIVE AND FUNCTIONAL STATUS - GENERAL
MOBILITY SCORE: 23
SUGGESTED CMS G CODE MODIFIER MOBILITY: CI
SUGGESTED CMS G CODE MODIFIER DAILY ACTIVITY: CH
DAILY ACTIVITIY SCORE: 24
CLIMB 3 TO 5 STEPS WITH RAILING: A LITTLE

## 2023-10-23 ASSESSMENT — LIFESTYLE VARIABLES
TOTAL SCORE: 0
HAVE PEOPLE ANNOYED YOU BY CRITICIZING YOUR DRINKING: NO
HOW MANY TIMES IN THE PAST YEAR HAVE YOU HAD 5 OR MORE DRINKS IN A DAY: 0
CONSUMPTION TOTAL: NEGATIVE
EVER HAD A DRINK FIRST THING IN THE MORNING TO STEADY YOUR NERVES TO GET RID OF A HANGOVER: NO
EVER FELT BAD OR GUILTY ABOUT YOUR DRINKING: NO
HAVE YOU EVER FELT YOU SHOULD CUT DOWN ON YOUR DRINKING: NO
AVERAGE NUMBER OF DAYS PER WEEK YOU HAVE A DRINK CONTAINING ALCOHOL: 3
TOTAL SCORE: 0
DOES PATIENT WANT TO STOP DRINKING: NO
ALCOHOL_USE: YES
TOTAL SCORE: 0
ON A TYPICAL DAY WHEN YOU DRINK ALCOHOL HOW MANY DRINKS DO YOU HAVE: 2

## 2023-10-23 ASSESSMENT — FIBROSIS 4 INDEX: FIB4 SCORE: 2.21

## 2023-10-23 NOTE — OR NURSING
Called and gave report to Beto in Tele. TR band at 9cc. No concerns for hematoma. Femoral sites CDI. Vascular and neuro checks wnl. IV fluids infusing. Preparing pt to go up to Tele

## 2023-10-23 NOTE — OP REPORT
"TRANSCATHETER AORTIC VALVE REPLACEMENT REPORT    Referring Provider:  Dr. Head    INTERVENTIONAL CARDIOLOGIST: Robert Nunez MD  CARDIAC SURGEON: Jonny Faustin DO  ANESTHESIOLOGIST: Yair Sesay MD    ASSISTANT: None    IMPRESSIONS:  1. Successful implantation of a 23 Mckenzie Chris S3 Ultra Resilia deployed at nominal volume via the transfemoral approach  2. Acute decompensated diastolic heart failure with LVEDP of 28 mmHg  3. Successful protection of the RCA with snorkel stent (3.5x24 Synergy XD MARCELLUS)    Recommendations:  Aspirin and Plavix for 12 months    Pre-procedure diagnosis: TAVR recommended by heart valve team. Stage D1 (symptomatic severe AS)  Post-procedure diagnosis: Same    Procedure performed  Pigtail placement/ascending aortography  Transvenous pacemaker  23 Mckenzie S3 Ultra Resilia  Perclose closure  PCI of the RCA origin using snorkel technique/MARCELLUS    Procedure Description  1. Access:   A) Valve Sheath: Right femoral, 14F Mckenzie eSheath. Fluoroscopic guidance was utilized for femoral access Dynamic ultrasound was utilized to gain access.  B) Temporary pacemaker: 6 Arabic Left femoral vein. Fluoroscopic guidance was utilized for femoral access Dynamic ultrasound was utilized to gain access.  C) Pigtail catheter: 5/6 Arabic right radial artery Micropuncture technique was utilized following local anesthesia with lidocaine.  Fluoroscopic guidance was utilized for femoral access  D) Other: 6 Hebrew left femoral artery    2. Procedure Description:      A TVP and pigtail catheter were placed and appropriate pacer function and implantation angle confirmed. The preclose was deployed followed by dilation of the tract with an 8F sheath.     A standard 0.035\" J wire was used to deliver an catheter to the ascending aorta and then exchange for a 0.035\" Lunderquist wire. Over this wire the Mckenzie E sheath advanced into the descending aorta.     SHARIFA images recorded a annulus size of 345 mm² and a 9 mm " "right coronary artery height.  There was concern that the right coronary artery would become occluded during the procedure and after conferring with the various members of the treatment team we elected to proceed with protection of the right coronary artery and anticipated the need for a snorkel stent.  Other methods of treatment were considered and the proposed treatment plan was felt to be the best option.  Additional access was obtained in the left femoral artery.  I passed a 3.5 x 24 Synergy XD MARCELLUS into the right coronary artery over a  run-through wire and placed a guide extension catheter over this.  The patient developed inferior ST segment elevation and significant bradycardia necessitating transient removal of the guide extension catheter from the right coronary artery, pacing and vasopressor agent administration.    AAn AL1 was placed in the ascending aorta and the valve crossed with a 0.035\" strait wire. The catheter was advanced into the LV apex and wire exchanged for a 0.035\" Amplatz Super Stiff wire.   The valve was then advanced through the sheath and built in the descending aorta.  I then passed the guide extension catheter back into the right coronary artery.  Next a 23 mm Mckenzie Chris S3 Ultra Resilia was deployed under rapid pacing with nominal volume  and 6 bowen.  Root angiography demonstrated occlusion of the right coronary artery.  I then deployed a 3.5 x 24 Synergy XD MARCELLUS at nominal inflation pressure snorkeling the stent above the valve frame and extending into the proximal right coronary artery.  The stent balloon was then withdrawn partially and the proximal edge of the stent flared with the stent balloon at 16 bowen.  . Successful valve implantation confirmed by. A pigtail catheter was used to perform left heart catheterization and LVEDP measured at 28 mmHg.  Protamine was administered and the Mckenzie sheath was removed from the body after reinsertion of the dilator. The perclose sutures " were tightened hemostasis was achieved. The pigtail catheter was removed. The TVP was removed.    3. Hemostasis:   A) TAVR Sheath: Perclose by Preclose technique  B) TVP: Manual  C) Pigtail access: TR band  D) left femoral artery: Manual pressure    Technical Factors  1. Complications: None  2. Estimated Blood Loss: 75 cc  3. Specimens: None  4. Contrast Volume: 160 ml  5. Sedation: General Anesthesia  6. Echo: SHARIFA

## 2023-10-23 NOTE — ANESTHESIA PREPROCEDURE EVALUATION
" Case: [id number] Date/Time: 10/23/23 0915    Procedures:       TRANSCATHETER AORTIC VALVE REPLACEMENT (Bilateral: Groin)      ECHOCARDIOGRAM, TRANSESOPHAGEAL, INTRAOPERATIVE (Mouth)    Anesthesia type: General    Pre-op diagnosis: SEVERE AORTIC STENOSIS    Location: [facility]    Surgeons: Robert Nunez M.D.          Relevant Problems   CARDIAC   (positive) Aortic stenosis         (positive) Chronic kidney disease, stage 3a (HCC)     BP (!) 158/70   Pulse 86   Temp 36.3 °C (97.3 °F) (Temporal)   Resp 20   Ht 1.588 m (5' 2.5\")   Wt 61.1 kg (134 lb 11.2 oz)   SpO2 97%   BMI 24.24 kg/m²     Physical Exam    Airway   Mallampati: II  TM distance: >3 FB  Neck ROM: full       Cardiovascular - normal exam  Rhythm: regular  Rate: normal  (-) murmur     Dental - normal exam           Pulmonary - normal exam  Breath sounds clear to auscultation     Abdominal    Neurological - normal exam                 Anesthesia Plan    ASA 4       Plan - general       SHARIFA Planned        Induction: intravenous    Postoperative Plan: Postoperative administration of opioids is intended.    Pertinent diagnostic labs and testing reviewed    Informed Consent:    Anesthetic plan and risks discussed with patient.    Use of blood products discussed with: patient whom consented to blood products.         "

## 2023-10-23 NOTE — ANESTHESIA TIME REPORT
Anesthesia Start and Stop Event Times     Date Time Event    10/23/2023 0900 Anesthesia Start     0914 Ready for Procedure     1058 Anesthesia Stop        Responsible Staff  10/23/23    Name Role Begin End    Yair Sesay M.D. Anesth 0900 1058        Overtime Reason:  no overtime (within assigned shift)    Comments:

## 2023-10-23 NOTE — OP REPORT
Operative report    PreOp Diagnosis: Severe symptomatic aortic stenosis, chronic kidney disease stage IIIa, hypertension, dyslipidemia, history of palpitations      PostOp Diagnosis: Same as above      Procedure(s):  TRANSCATHETER AORTIC VALVE REPLACEMENT with a 23 mm Mckenzie S3- Wound Class: Clean  ECHOCARDIOGRAM, TRANSESOPHAGEAL, INTRAOPERATIVE - Wound Class: None  STENT INSERTION RIGHT CORONARY ARTERY - Wound Class: Clean    Surgeon(s):  ESTRELLA Wasserman D.O.    Anesthesiologist/Type of Anesthesia:  Anesthesiologist: Yair Sesay M.D./General    Surgical Staff:  Circulator: Mikhail Ghotra R.N.; Marina Ley R.N.  Perfusionist: Sarah Box Circulator: Shawn Paredes R.N.  Scrub Person: Maribell Hill  Radiology Technologist: Delores Corbin; Mo Rosado    Specimens removed if any:  * No specimens in log *    Estimated Blood Loss: Minimal    Findings: Post valve deployment there was no paravalvular leak with acceptable transvalvular gradient.    Upon establishing our coplanar view, we noted a very low right coronary artery.  At that point we also reviewed the imaging for the patient, and found that there was some discrepancy between the preoperative measurements and what we are finding in vivo.  Transesophageal echo also correlated with a low right coronary artery within the face sinus.  Ultimately, we elected to protect it, and then after deployment found that stenting was required in order to continue flow down the right coronary artery    Complications: None immediate    Disposition: Stable to PACU      Procedure:    After informed consent was obtained, the patient was brought to the operating room and placed in the supine position on the operating table afterwards, general endotracheal anesthesia was induced by the anesthesia team.  Lines, catheters etc. were placed by the nursing and anesthesia team in the usual fashion.  Bony prominences were padded,  joints placed in neutral position, and the patient was prepped and draped in the usual sterile fashion.  A timeout was performed.    We began the procedure by obtaining primary and secondary arterial access.  Primary arterial access was in the right common femoral artery, and secondary arterial access was in the right radial artery.  Access for temporary venous pacing wire was placed via the left common femoral vein.  The femoral vessels were accessed using a  Seldinger's technique with ultrasound guidance.  After obtaining access, 6 Cambodian sheaths were placed here.  The right radial artery was accessed by my cardiology colleague in the usual fashion.  Right femoral angiogram was performed to confirm good positioning of our sheath.  Following that, the temporary pacing lead was floated from the left common femoral vein to the right ventricular apex.  Testing confirmed excellent capture.  Following that, a J-wire was advanced via the right radial catheter with an overriding pigtail catheter into the aortic root.  Angiography was performed there and are coplanar view was determined.  Heparinization was performed    Via the right femoral artery, access obtained with a J-wire.  The 6 Cambodian sheath was removed and 2 Perclose sutures were placed in the usual fashion.  Following those, an 8 Cambodian sheath was placed.  We again obtained access with a J-wire and pigtail catheter.  The J-wire was exchanged for a Lunderquist wire.  This allowed us to upsize our primary arterial access site to a 14 Cambodian sheath.  Once the sheath was in place, it was secured with an Ethibond stitch.  A J-wire with an overriding AL-1 catheter was advanced to the aortic root.  The J-wire was exchanged for a 0.035 straight wire which was used to cross the aortic valve.  The AL-1 catheter was then advanced into the left ventricle.  The straight wire was then exchanged for extra-stiff Amplatz deployment wire.  The AL-1 catheter was removed.      As  noted above, we did note that the right coronary artery came off very low.  In order to protect the right coronary artery, at that point my cardiology colleague proceeded with gaining access in the left femoral artery and preemptively placing a stent.  For further details of the access, placement, and deployment of coronary stent please see his dictated note.  Beyond this, the procedure proceeded in the usual fashion.    The 23 mm valve was prepped on the back table in the usual fashion and brought up for advancement into the aorta.  Prior to placement, the configuration of the valve was confirmed to be correct.  It was advanced into the abdominal aorta under fluoroscopic guidance.  The valve and balloon were docked in the usual fashion in the abdominal aorta.  It was then advanced across the aortic arch and down into the aortic root and across the aortic valve.  The valve was then prepped for deployment.  Following that, respirations were held, rapid ventricular pacing performed, and root angiogram shot.  The valve was then deployed us at a pressure of 6 bowen, 17 cc of contrast.  The balloon was deflated at that point in the delivery system removed.  Post deployment echo revealed trivial to no perivalvular leak and acceptable gradient.    We proceeded with removal of all her wires, catheters etc.  The right femoral artery was secured using the 2 previously placed Perclose sutures.    Protamine was administered to reverse heparinization.  The right radial artery was treated with a TR band and pressure was held over the left common femoral venous site.  The patient tolerated procedure well, was taken to the CCU in stable condition.          10/23/2023 10:47 AM Jonny Faustin D.O.

## 2023-10-23 NOTE — ANESTHESIA PROCEDURE NOTES
Arterial Line    Performed by: Yair Sesay M.D.  Authorized by: Yair Sesay M.D.    Start Time:  10/23/2023 9:10 AM  End Time:  10/23/2023 9:12 AM  Localization: surface landmarks    Patient Location:  OR  Indication: continuous blood pressure monitoring        Catheter Size:  20 G  Seldinger Technique?: Yes    Laterality:  Left  Site:  Radial artery  Line Secured:  Antimicrobial disc, tape and transparent dressing  Events: patient tolerated procedure well with no complications

## 2023-10-23 NOTE — ANESTHESIA POSTPROCEDURE EVALUATION
Patient: Katerina Torers    Procedure Summary     Date: 10/23/23 Room / Location: [place] [id number] / SURGERY [facility]    Anesthesia Start: 0900 Anesthesia Stop: 1058    Procedures:       TRANSCATHETER AORTIC VALVE REPLACEMENT (Bilateral: Groin)      ECHOCARDIOGRAM, TRANSESOPHAGEAL, INTRAOPERATIVE (Mouth)      STENT INSERTION RIGHT CORONARY ARTERY (Bilateral: Groin) Diagnosis: (SEVERE AORTIC STENOSIS)    Surgeons: Robert Nunez M.D. Responsible Provider: Yair Sesay M.D.    Anesthesia Type: general ASA Status: 4          Final Anesthesia Type: general  Last vitals  BP   Blood Pressure : (!) 157/68, Arterial BP: 153/45    Temp   37.2 °C (99 °F)    Pulse   77   Resp   20    SpO2   95 %      Anesthesia Post Evaluation    Patient location during evaluation: PACU  Patient participation: complete - patient participated  Level of consciousness: awake and alert    Airway patency: patent  Anesthetic complications: no  Cardiovascular status: hemodynamically stable  Respiratory status: acceptable  Hydration status: euvolemic    PONV: none          No notable events documented.     Nurse Pain Score: 0 (NPRS)

## 2023-10-23 NOTE — ANESTHESIA PROCEDURE NOTES
Peripheral IV    Date/Time: 10/23/2023 9:22 AM    Performed by: Yair Sesay M.D.  Authorized by: Yair Sesay M.D.    Size:  18 G  Laterality:  Left  Peripheral IV Location:  Hand  Local Anesthetic:  None  Site Prep:  Alcohol  Technique:  Direct puncture  Attempts:  1

## 2023-10-23 NOTE — ANESTHESIA PROCEDURE NOTES
SHARIFA    Date/Time: 10/23/2023 9:17 AM    Performed by: Yair Sesay M.D.  Authorized by: Yair Sesay M.D.    Start Time:10/23/2023 9:17 AM  Preanesthetic Checklist: patient identified, IV checked, site marked, risks and benefits discussed, surgical consent, monitors and equipment checked, pre-op evaluation and timeout performed    Indication for SHARIFA: diagnostic   Patient Location: OR  Intubated: Yes  Bite Block: Yes  Heart Visualized: Yes  Insertion: atraumatic    **See FULL SHARIFA report in patient's chart via [facility]**

## 2023-10-23 NOTE — OR NURSING
Pt arrived with OPA in place and on 6 L face mask. TR band at 12 cc on the right radial site, CDI. Arterial line on the left and being setup to monitors. IV line saline locked and patent. Groin sites CDI, right perclose, dermabond and manual pressure held in OR per report. Pedal pulses and left radial pulse palpable and wnl. Pupils PERRL. Vitals wnl.

## 2023-10-23 NOTE — ANESTHESIA PROCEDURE NOTES
Airway    Date/Time: 10/23/2023 9:06 AM    Performed by: Yair Sesay M.D.  Authorized by: Yair Sesay M.D.    Location:  OR  Urgency:  Elective  Difficult Airway: No    Indications for Airway Management:  Anesthesia      Spontaneous Ventilation: absent    Sedation Level:  Deep  Preoxygenated: Yes    Patient Position:  Sniffing  Mask Difficulty Assessment:  0 - not attempted  Final Airway Type:  Endotracheal airway  Final Endotracheal Airway:  ETT  Cuffed: Yes    Technique Used for Successful ETT Placement:  Direct laryngoscopy    Insertion Site:  Oral  Blade Type:  Candie  Laryngoscope Blade/Videolaryngoscope Blade Size:  3  ETT Size (mm):  6.5  Measured from:  Teeth  ETT to Teeth (cm):  21  Placement Verified by: auscultation and capnometry    Cormack-Lehane Classification:  Grade I - full view of glottis  Number of Attempts at Approach:  1

## 2023-10-24 ENCOUNTER — PHARMACY VISIT (OUTPATIENT)
Dept: PHARMACY | Facility: MEDICAL CENTER | Age: 83
End: 2023-10-24
Payer: COMMERCIAL

## 2023-10-24 ENCOUNTER — APPOINTMENT (OUTPATIENT)
Dept: RADIOLOGY | Facility: MEDICAL CENTER | Age: 83
DRG: 266 | End: 2023-10-24
Payer: MEDICARE

## 2023-10-24 VITALS
RESPIRATION RATE: 16 BRPM | DIASTOLIC BLOOD PRESSURE: 79 MMHG | OXYGEN SATURATION: 96 % | TEMPERATURE: 97.7 F | BODY MASS INDEX: 24.57 KG/M2 | SYSTOLIC BLOOD PRESSURE: 128 MMHG | HEART RATE: 75 BPM | WEIGHT: 138.67 LBS | HEIGHT: 63 IN

## 2023-10-24 LAB
ALBUMIN SERPL BCP-MCNC: 3.7 G/DL (ref 3.2–4.9)
ALBUMIN/GLOB SERPL: 1.5 G/DL
ALP SERPL-CCNC: 63 U/L (ref 30–99)
ALT SERPL-CCNC: 14 U/L (ref 2–50)
ANION GAP SERPL CALC-SCNC: 13 MMOL/L (ref 7–16)
AST SERPL-CCNC: 25 U/L (ref 12–45)
BILIRUB SERPL-MCNC: 0.3 MG/DL (ref 0.1–1.5)
BUN SERPL-MCNC: 17 MG/DL (ref 8–22)
CALCIUM ALBUM COR SERPL-MCNC: 9.1 MG/DL (ref 8.5–10.5)
CALCIUM SERPL-MCNC: 8.9 MG/DL (ref 8.5–10.5)
CHLORIDE SERPL-SCNC: 106 MMOL/L (ref 96–112)
CO2 SERPL-SCNC: 22 MMOL/L (ref 20–33)
CREAT SERPL-MCNC: 1.2 MG/DL (ref 0.5–1.4)
EKG IMPRESSION: NORMAL
ERYTHROCYTE [DISTWIDTH] IN BLOOD BY AUTOMATED COUNT: 46 FL (ref 35.9–50)
GFR SERPLBLD CREATININE-BSD FMLA CKD-EPI: 44 ML/MIN/1.73 M 2
GLOBULIN SER CALC-MCNC: 2.4 G/DL (ref 1.9–3.5)
GLUCOSE SERPL-MCNC: 125 MG/DL (ref 65–99)
HCT VFR BLD AUTO: 38.1 % (ref 37–47)
HGB BLD-MCNC: 12.5 G/DL (ref 12–16)
MCH RBC QN AUTO: 30.6 PG (ref 27–33)
MCHC RBC AUTO-ENTMCNC: 32.8 G/DL (ref 32.2–35.5)
MCV RBC AUTO: 93.2 FL (ref 81.4–97.8)
NT-PROBNP SERPL IA-MCNC: 1850 PG/ML (ref 0–125)
PLATELET # BLD AUTO: 219 K/UL (ref 164–446)
PMV BLD AUTO: 10.3 FL (ref 9–12.9)
POTASSIUM SERPL-SCNC: 4.1 MMOL/L (ref 3.6–5.5)
PROT SERPL-MCNC: 6.1 G/DL (ref 6–8.2)
RBC # BLD AUTO: 4.09 M/UL (ref 4.2–5.4)
SODIUM SERPL-SCNC: 141 MMOL/L (ref 135–145)
WBC # BLD AUTO: 10.2 K/UL (ref 4.8–10.8)

## 2023-10-24 PROCEDURE — 85027 COMPLETE CBC AUTOMATED: CPT

## 2023-10-24 PROCEDURE — 97535 SELF CARE MNGMENT TRAINING: CPT

## 2023-10-24 PROCEDURE — 90471 IMMUNIZATION ADMIN: CPT

## 2023-10-24 PROCEDURE — 97161 PT EVAL LOW COMPLEX 20 MIN: CPT

## 2023-10-24 PROCEDURE — 99238 HOSP IP/OBS DSCHRG MGMT 30/<: CPT

## 2023-10-24 PROCEDURE — 700102 HCHG RX REV CODE 250 W/ 637 OVERRIDE(OP)

## 2023-10-24 PROCEDURE — 71045 X-RAY EXAM CHEST 1 VIEW: CPT

## 2023-10-24 PROCEDURE — RXMED WILLOW AMBULATORY MEDICATION CHARGE

## 2023-10-24 PROCEDURE — 93010 ELECTROCARDIOGRAM REPORT: CPT | Performed by: INTERNAL MEDICINE

## 2023-10-24 PROCEDURE — 97165 OT EVAL LOW COMPLEX 30 MIN: CPT

## 2023-10-24 PROCEDURE — 90662 IIV NO PRSV INCREASED AG IM: CPT | Performed by: INTERNAL MEDICINE

## 2023-10-24 PROCEDURE — A9270 NON-COVERED ITEM OR SERVICE: HCPCS

## 2023-10-24 PROCEDURE — 93005 ELECTROCARDIOGRAM TRACING: CPT

## 2023-10-24 PROCEDURE — 700111 HCHG RX REV CODE 636 W/ 250 OVERRIDE (IP): Performed by: INTERNAL MEDICINE

## 2023-10-24 PROCEDURE — 80053 COMPREHEN METABOLIC PANEL: CPT

## 2023-10-24 PROCEDURE — 83880 ASSAY OF NATRIURETIC PEPTIDE: CPT

## 2023-10-24 RX ORDER — SODIUM CHLORIDE 9 MG/ML
INJECTION, SOLUTION INTRAVENOUS CONTINUOUS
Status: DISCONTINUED | OUTPATIENT
Start: 2023-10-24 | End: 2023-10-24 | Stop reason: ALTCHOICE

## 2023-10-24 RX ORDER — CLOPIDOGREL BISULFATE 75 MG/1
75 TABLET ORAL DAILY
Qty: 90 TABLET | Refills: 1 | Status: SHIPPED | OUTPATIENT
Start: 2023-10-25 | End: 2023-10-31

## 2023-10-24 RX ORDER — ASPIRIN 81 MG/1
81 TABLET ORAL DAILY
Qty: 100 TABLET | Refills: 3 | Status: SHIPPED | OUTPATIENT
Start: 2023-10-25 | End: 2024-02-15

## 2023-10-24 RX ADMIN — INFLUENZA A VIRUS A/VICTORIA/4897/2022 IVR-238 (H1N1) ANTIGEN (FORMALDEHYDE INACTIVATED), INFLUENZA A VIRUS A/DARWIN/9/2021 SAN-010 (H3N2) ANTIGEN (FORMALDEHYDE INACTIVATED), INFLUENZA B VIRUS B/PHUKET/3073/2013 ANTIGEN (FORMALDEHYDE INACTIVATED), AND INFLUENZA B VIRUS B/MICHIGAN/01/2021 ANTIGEN (FORMALDEHYDE INACTIVATED) 0.7 ML: 60; 60; 60; 60 INJECTION, SUSPENSION INTRAMUSCULAR at 10:44

## 2023-10-24 RX ADMIN — CLOPIDOGREL BISULFATE 600 MG: 300 TABLET, FILM COATED ORAL at 10:47

## 2023-10-24 RX ADMIN — ACETAMINOPHEN 650 MG: 325 TABLET, FILM COATED ORAL at 04:05

## 2023-10-24 RX ADMIN — ASPIRIN 81 MG: 81 TABLET, COATED ORAL at 05:05

## 2023-10-24 ASSESSMENT — COGNITIVE AND FUNCTIONAL STATUS - GENERAL
MOBILITY SCORE: 24
HELP NEEDED FOR BATHING: A LITTLE
TOILETING: A LITTLE
SUGGESTED CMS G CODE MODIFIER MOBILITY: CH
DAILY ACTIVITIY SCORE: 20
PERSONAL GROOMING: A LITTLE
DRESSING REGULAR LOWER BODY CLOTHING: A LITTLE
SUGGESTED CMS G CODE MODIFIER DAILY ACTIVITY: CJ

## 2023-10-24 ASSESSMENT — ACTIVITIES OF DAILY LIVING (ADL): TOILETING: INDEPENDENT

## 2023-10-24 ASSESSMENT — GAIT ASSESSMENTS
DISTANCE (FEET): 400
GAIT LEVEL OF ASSIST: SUPERVISED

## 2023-10-24 ASSESSMENT — PAIN DESCRIPTION - PAIN TYPE: TYPE: ACUTE PAIN

## 2023-10-24 ASSESSMENT — FIBROSIS 4 INDEX: FIB4 SCORE: 2.59

## 2023-10-24 NOTE — DISCHARGE INSTRUCTIONS
Transcatheter Aortic Valve Replacement (TAVR)    General Instructions:  Take Medication as directed.  You will likely need to take aspirin and another blood thinner (antiplatelet medication) for a period.  You'll need to take the aspirin for the rest of your life.  Be sure your doctor knows about all other medicines you take, including over-the-counter medicines and dietary supplements.    Incision Care Instructions:  Look and feel the site(s) of your TAVR TODAY so you can recognize changes that should be called to your doctor (see below).  It's normal for your incision to be bruised, itchy, or sore while it's healing.  Your incision may take a week or more to heal.  Bruising may occur.  Some of the discoloration may travel down the leg.  As it resolves, the color should change from blue to green to yellow-brown.  A small, round lump under the TAVR site may remain for up to 6 weeks.  Wash your incision site every day with warm water and soap.  Gently pat it dry.  Don't put powder, lotion, or ointment on the incision until it's healed.    Activity:  No driving for one week  If you must take a long car ride (not as a ), stop every hour and walk around the car.  No lifting or pulling objects over 5 pounds for 4-5 days.  Warm showers or baths are permitted after the bandage is removed.  Walk regularly.  One of the best ways to get stronger is to walk.  Walk a little more each day.  Take someone with you until you feel OK to walk alone.    When to call your health care provider:  You develop a fever.  Redness, swelling, bleeding, warmth, or fluid draining at the incision site.  Bruising appears to be new or does not look like it is getting better.  The small, round lump in the groin in the area of the TAVR site increases in size.    Seek immediate medical help if you have any of the following symptoms:  You experience any leg numbness, aching, or discomfort  Chest pain or trouble breathing (call 911)  Sudden  numbness or weakness in your face, arms, or legs (call 911)  Bowel movement that is bright red  Dizziness or fainting  Weight gain of more than 2 pounds in 24 hours or more than 5 pounds in 1 week  Swelling in your hands, feet, or ankles  Shortness of breath that doesn't get better when you rest  Pain that gets worse or doesn't go away  Fast or irregular pulse

## 2023-10-24 NOTE — THERAPY
Occupational Therapy   Initial Evaluation     Patient Name: Katerina Torres  Age:  83 y.o., Sex:  female  Medical Record #: [id number]  Today's Date: 10/24/2023     Precautions  Precautions: Fall Risk, Cardiac Precautions (See Comments)  Comments: EF 55%    Assessment  Patient is 83 y.o. female s/p TAVR. Completed ADLs/txfs with CGA-SPV and functional ambulation w/o AD and CGA w/x2 LOBs req min A to correct, may benefit from use of AD, but defer to PT. Req v/cs for safety throughout. Pt's friend present and supportive. Reports she is available to assist PRN. Patient will not be actively followed for occupational therapy services at this time, however may be seen if requested by physician for 1 more visit within 30 days to address any discharge or equipment needs.     Plan    Occupational Therapy Initial Treatment Plan   Duration: Discharge Needs Only    DC Equipment Recommendations: Front-Wheel Walker, Grab Bar(s) by Toilet  Discharge Recommendations: Recommend home health for continued occupational therapy services     Objective     10/24/23 1002   Prior Living Situation   Prior Services Home-Independent   Housing / Facility 1 Story House   Steps Into Home 1   Bathroom Set up Walk In Shower;Shower Chair   Equipment Owned Tub / Shower Seat;Hand Held Shower   Lives with - Patient's Self Care Capacity Alone and Able to Care For Self   Comments Pt's friend present and supportive. Reports she is available to assist PRN.   Prior Level of ADL Function   Self Feeding Independent   Grooming / Hygiene Independent   Bathing Independent   Dressing Independent   Toileting Independent   Prior Level of IADL Function   Medication Management Independent   Laundry Independent   Kitchen Mobility Independent   Finances Independent   Home Management Requires Assist   Shopping Requires Assist   Prior Level Of Mobility Independent Without Device in Home   Driving / Transportation Relatives / Others Provide Transportation    Precautions   Precautions Fall Risk;Cardiac Precautions (See Comments)   Comments EF 55%   Vitals   O2 Delivery Device None - Room Air   Pain 0 - 10 Group   Therapist Pain Assessment Post Activity Pain Same as Prior to Activity;During Activity;Nurse Notified;0   Cognition    Cognition / Consciousness X   Level of Consciousness Alert   Comments very pleasant and cooperative; pt appears to have some short term memory deficits.   Passive ROM Upper Body   Passive ROM Upper Body WDL   Active ROM Upper Body   Active ROM Upper Body  WDL   Strength Upper Body   Upper Body Strength  WDL   Coordination Upper Body   Coordination WDL   Balance Assessment   Sitting Balance (Static) Good   Sitting Balance (Dynamic) Good   Standing Balance (Static) Fair +   Standing Balance (Dynamic) Fair   Weight Shift Sitting Good   Weight Shift Standing Good   Comments w/o AD; x2 LOBs req min A to correct, some unsteadiness throughout   Bed Mobility    Supine to Sit Supervised   Sit to Supine Supervised   Scooting Supervised   Comments HOB elevated, use of rail   ADL Assessment   Eating Supervision   Grooming   (declined need)   Lower Body Dressing Contact Guard Assist  (standing to thread legs for underwear req v/cs to sit to complete; slight LOBs in standing)   Toileting Supervision   Comments Educated on energy conservation techniques, talk test, safety/adaptive techniques for ADL/txfs, home safety, pacing/gradual return to activity, balance deficits and uneven surfaces, and importance of continued OOB activity.   Functional Mobility   Sit to Stand Supervised   Bed, Chair, Wheelchair Transfer Supervised   Toilet Transfers Contact Guard Assist   Transfer Method Stand Step   Mobility w/o AD; in room and hallway req v/cs for pacing and safety   Edema / Skin Assessment   Edema / Skin  Not Assessed   Activity Tolerance   Comments limited by fatigue   Education Group   Education Provided Role of Occupational Therapist;Pathology of bedrest;Energy  Conservation   Role of Occupational Therapist Patient Response Patient;Acceptance;Explanation;Verbal Demonstration;Reinforcement Needed   Energy Conservation Patient Response Patient;Acceptance;Explanation;Verbal Demonstration;Reinforcement Needed   Pathology of Bedrest Patient Response Patient;Acceptance;Explanation;Verbal Demonstration;Reinforcement Needed

## 2023-10-24 NOTE — THERAPY
Physical Therapy   Initial Evaluation     Patient Name: Katerina Torres  Age:  83 y.o., Sex:  female  Medical Record #: [id number]  Today's Date: 10/24/2023     Precautions  Precautions: Fall Risk;Cardiac Precautions (See Comments)  Comments: EF=55%    Assessment  Patient is 83 y.o. female s/p TAVR and stent.  Additional factors influencing patient status / progress : pt is seen for phase I cardiac rehab education. Pt tolerated ambulation around unit with no SOB, supervised for all activity including stairs, HR at rest 75, HR with ambulation 95 with quick return to resting HR once back in bed. Pt has a friend who will do the driving for first week.  See details below.     Plan         DC Equipment Recommendations: None  Discharge Recommendations: Other - (phase II CR)    Objective       10/24/23 0858   Charge Group   PT Evaluation PT Evaluation Low   PT Self Care / Home Evaluation (Units) 1   Total Time Spent   PT Total Time Yes   PT Evaluation Time Spent (Mins) 15   PT Self Care/Home Evaluation Time Spent (Mins) 15   PT Total Time Spent (Calculated) 30   Initial Contact Note    Initial Contact Note Order Received and Verified, Evaluation Only - Patient Does Not Require Further Acute Physical Therapy at this Time.  However, May Benefit from Post Acute Therapy for Higher Level Functional Deficits.   Precautions   Precautions Fall Risk;Cardiac Precautions (See Comments)   Comments EF=55%   Vitals   Pulse 75  (95 with ambulation)   Patient BP Position Supine   O2 Delivery Device None - Room Air   Pain 0 - 10 Group   Therapist Pain Assessment During Activity;Nurse Notified;0   Prior Living Situation   Prior Services None   Housing / Facility 1 Story House   Steps Into Home 1   Steps In Home 0   Equipment Owned None   Lives with - Patient's Self Care Capacity Alone and Able to Care For Self  (neighbor will assist with driving, very helpful)   Prior Level of Functional Mobility   Bed Mobility Independent   Transfer  Status Independent   Ambulation Independent   Ambulation Distance [employer], lots gardening   Assistive Devices Used None   Stairs Independent   Cognition    Cognition / Consciousness WDL   Level of Consciousness Alert   Active ROM Lower Body    Active ROM Lower Body  WDL   Strength Lower Body   Lower Body Strength  WDL   Balance Assessment   Sitting Balance (Static) Good   Sitting Balance (Dynamic) Good   Standing Balance (Static) Good   Standing Balance (Dynamic) Good   Weight Shift Sitting Good   Weight Shift Standing Good   Comments with no AD   Bed Mobility    Supine to Sit Supervised   Sit to Supine Supervised   Scooting Supervised   Rolling Supervised   Gait Analysis   Gait Level Of Assist Supervised   Assistive Device None  (FWW for first 50 feet, then did not need for last 350 feet.)   Distance (Feet) 400   # of Times Distance was Traveled 1   # of Stairs Climbed 3   Level of Assist with Stairs Supervised   Weight Bearing Status no restrictions   Functional Mobility   Sit to Stand Supervised   Bed, Chair, Wheelchair Transfer Supervised   Transfer Method Stand Pivot   How much difficulty does the patient currently have...   Turning over in bed (including adjusting bedclothes, sheets and blankets)? 4   Sitting down on and standing up from a chair with arms (e.g., wheelchair, bedside commode, etc.) 4   Moving from lying on back to sitting on the side of the bed? 4   How much help from another person does the patient currently need...   Moving to and from a bed to a chair (including a wheelchair)? 4   Need to walk in a hospital room? 4   Climbing 3-5 steps with a railing? 4   6 clicks Mobility Score 24   Activity Tolerance   Standing 10+   Education Group   Education Provided Cardiac Precautions   Cardiac Precautions Patient Response Patient;Acceptance;Explanation;Handout;Verbal Demonstration   Additional Comments TAVR handout given and reviewed, questions answered. phase I topics covered, phase II introduced.    Anticipated Discharge Equipment and Recommendations   DC Equipment Recommendations None   Discharge Recommendations Other -  (phase II CR)   Interdisciplinary Plan of Care Collaboration   IDT Collaboration with  Nursing   Patient Position at End of Therapy In Bed;Call Light within Reach;Tray Table within Reach;Phone within Reach;Family / Friend in Room   Collaboration Comments nsg updated   Session Information   Date / Session Number  10/24-1x only

## 2023-10-24 NOTE — DISCHARGE SUMMARY
PRIMARY DISCHARGE DIAGNOSIS: Status post transcatheter aortic valve replacement.    DISCHARGE DIAGNOSIS:  S/P TAVR    PROCEDURES/TESTIN. Successful transcatheter aortic valve replacement (TAVR) with # 23 Mckenzie Chris S3 Ultra Resilia deployed at nominal volume via the transfemoral approach on 10/23/2023 under general anesthesia.   2.  Successful placement of drug-eluting stent intraoperatively to RCA on 10/23/2023  3. Intraoperative transesophageal echocardiogram showing   Intraoperative SHARIFA during TAVR.  Baseline images show normal   biventricular systolic function with EF = 55%.  Calcified aortic valve   leaflets. Tricuspid aortic valve. Severe aortic valve stenosis. Mild   aortic insufficiency.  Post deployment images show preserved function.    A 23 mm Mckenzie TAVR valve is seen in the aortic position with normal   leaflet motion, no paravalvular leak, mean gradient of 6 mm Hg, and   calculated effective orifice area of 2.2 cm2.  Findings communicated at   the time of exam.   4. CXR on 10/24/2023 showing   1.  Left lung base atelectasis or early infiltrate  2.  Trace left pleural effusion  3.  Atherosclerosis  5. EKG on 10/24/2023    Sinus rhythm   Consider left ventricular hypertrophy   Inferior infarct, old   Rate 84, 0.176/0.083/0.464    Labs   Lab Results   Component Value Date/Time    SODIUM 141 10/24/2023 03:17 AM    POTASSIUM 4.1 10/24/2023 03:17 AM    CHLORIDE 106 10/24/2023 03:17 AM    CO2 22 10/24/2023 03:17 AM    GLUCOSE 125 (H) 10/24/2023 03:17 AM    BUN 17 10/24/2023 03:17 AM    CREATININE 1.20 10/24/2023 03:17 AM    CREATININE 1.08 (H) 2010 08:56 AM    BUNCREATRAT 11 2010 08:56 AM    GLOMRATE 50 (L) 2010 08:56 AM       Lab Results   Component Value Date/Time    PROTHROMBTM 13.6 10/23/2023 07:45 AM    INR 1.02 10/23/2023 07:45 AM       Lab Results   Component Value Date/Time    WBC 10.2 10/24/2023 03:17 AM    WBC 4.4 2010 08:56 AM    RBC 4.09 (L) 10/24/2023 03:17 AM     RBC 4.45 07/02/2010 08:56 AM    HEMOGLOBIN 12.5 10/24/2023 03:17 AM    HEMATOCRIT 38.1 10/24/2023 03:17 AM    MCV 93.2 10/24/2023 03:17 AM    MCV 95 07/02/2010 08:56 AM    MCH 30.6 10/24/2023 03:17 AM    MCH 31.5 07/02/2010 08:56 AM    MCHC 32.8 10/24/2023 03:17 AM    MPV 10.3 10/24/2023 03:17 AM    NEUTSPOLYS 66.40 07/10/2023 09:35 AM    LYMPHOCYTES 24.10 07/10/2023 09:35 AM    MONOCYTES 6.50 07/10/2023 09:35 AM    EOSINOPHILS 2.10 07/10/2023 09:35 AM    BASOPHILS 0.70 07/10/2023 09:35 AM         HOSPITAL COURSE: The patient is a pleasant 83 year old female with severe symptomatic aortic stenosis, hypertension, hyperlipidemia, CKD stage III, pulmonary nodule, history of basal cell carcinoma skin, and history of breast cancer. Due to the patient's symptoms, the patient underwent successful TAVR described as above.  Intraprocedure the right coronary artery was found to be lower than previously believed on imaging.  Due to this a stent was placed in the RCA to protect it during TAVR valve inflation, please see operative note for details.  Post-procedure, the patient did well. They did require IV diuresis during their stay but will not continue on oral diuretics post-operatively due to euvolemia on subsequent exam. Acute diastolic heart failure exacerbation as evidenced by: signs and symptoms weakness/fatigue, bilateral lower extremity edema; Echocardiogram showing severe aortic stenosis; corroborated by elevated BNP (1850), left lung base atelectasis or early infiltrate, and trace left pleural effusion on chest x-ray, and elevated LVEDP of 28. They were able to ambulate without difficulty. No further events were noted during their stay. They are now off oxygen and are to be discharged to home.    DISCHARGE MEDICATIONS:      Medication List        START taking these medications        Instructions   aspirin 81 MG EC tablet  Start taking on: October 25, 2023   Take 1 Tablet by mouth every day.  Dose: 81 mg      clopidogrel 75 MG Tabs  Start taking on: October 25, 2023  Commonly known as: Plavix   Take 1 Tablet by mouth every day.  Dose: 75 mg            CONTINUE taking these medications        Instructions   acetaminophen 500 MG Tabs  Commonly known as: Tylenol   Take 1,000 mg by mouth every 6 hours as needed for Mild Pain.  Dose: 1,000 mg     bimatoprost 0.01 % Soln  Commonly known as: Lumigan   Administer 1 Drop into both eyes at bedtime. Indications: Wide-Angle Glaucoma  Dose: 1 Drop     timolol 0.5 % Soln  Commonly known as: Timoptic   Administer 1 Drop into the left eye every day.  Dose: 1 Drop     traMADol 50 MG Tabs  Commonly known as: Ultram   Doctor's comments: M54.50, #60 for 15 days start date 10/10/23  Take 1 Tablet by mouth every 6 hours as needed for Moderate Pain (back pain) for up to 15 days.  Dose: 50 mg     vitamin D 1000 Unit (25 mcg) Tabs  Commonly known as: cholecalciferol   Take 1,000 Units by mouth every day.  Dose: 1,000 Units              DISCHARGE INSTRUCTIONS: They are given discharge instructions on potential post-operative complications and symptoms to watch out for. Their groin sites were checked and were clean, dry, and intact. Patient or family to notify us for any complications noted on the discharge instructions.  We discussed at length the need for DAPT for at least the next 6 months.  Patient was notified that she would receive a higher dose of Plavix today (600 mg) but that her home dose would be 75 mg daily with aspirin 81 mg daily which she would begin tomorrow.  They will follow up with myself, ALEX Chappell, on Tuesday in our cardiology office. They will not need labs before their follow up appointment. They will then follow up with Dr. Nunez with a repeat echocardiogram in one month for post TAVR assessment.      Future Appointments   Date Time Provider Department Center   10/31/2023  1:15 PM ELVIS Salinas [facility] None   11/20/2023  2:15 PM [facility] EXAM 9 ECHO  [facility]   11/29/2023  8:40 AM Robert Nunez M.D. [facility] None   12/21/2023  1:20 PM Master BONIFACIO Suarez M.D. [facility] HARMAN [facility]   10/23/2024  9:15 AM [facility] EXAM 10 ECHO [facility]       Discharge time spent with patient was 28 minutes.

## 2023-10-24 NOTE — PROGRESS NOTES
4 Eyes Skin Assessment Completed by MOMO palomares and MOMO Becerril.    Head WDL  Ears WDL  Nose WDL  Mouth WDL  Neck WDL  Breast/Chest WDL  Shoulder Blades WDL  Spine WDL  (R) Arm/Elbow/Hand radial site  (L) Arm/Elbow/Hand arterial site  Abdomen WDL  Groin Left and Right groin site   Scrotum/Coccyx/Buttocks WDL  (R) Leg WDL  (L) Leg Scar  (R) Heel/Foot/Toe WDL  (L) Heel/Foot/Toe WDL          Devices In Places Tele Box, Blood Pressure Cuff, and Pulse Ox      Interventions In Place Pillows    Possible Skin Injury No    Pictures Uploaded Into Epic N/A  Wound Consult Placed N/A  RN Wound Prevention Protocol Ordered No    
Bedside report received and patient care assumed. Pt is resting in bed, A&O4, with no complaints of pain, and is on RA. Tele box on. All fall precautions are in place, belongings at bedside table.  Pt was updated on POC, no questions or concerns. Pt educated on use of call light for assistance.      
Discharge instructions reviewed and signed, all questions answered, no PIV, Meds to Beds delivered.  
MS:    SR (72 - 90) with rare PVCs  0.18 / 0.08 / 0.35            
Med Rec complete per Pt  Allergies Reviewed    Pt reports not taking anticoagulant in the last 14 days    
Received Pt. Tele box on. Post op vitals started. All sites checked. Pt is flat until 1654.  
Received bedside report from MOMO Pérez, pt care assumed. VS WDL, pt assessment complete. Pt A&Ox4, no c/o pain at this time. POC discussed with pt and verbalizes no questions. Pt denies any additional needs at this time. Bed locked and in lowest position, bed alarm off as client is low fall risk and calls appropriately. Pt educated on fall risk and verbalized understanding, call light within reach, hourly rounding initiated.    
[id number]  R pac R pvc  .20/.07/.42    
yes

## 2023-10-24 NOTE — CARE PLAN
"The patient is Stable - Low risk of patient condition declining or worsening    Shift Goals  Clinical Goals: Patient will verbalize understanding of POC for TAVR POD#0 and #1 by end of shift. Patient will receive TAVR after care instructions education with handout before end of shift. Patient will ambulate before bed.  Patient Goals: \"Get to go home so I can be with my cat.\"  Family Goals: JOSIE    Progress made toward(s) clinical / shift goals:  Provided patient with a verbal discussion related to POC TAVR POD#1. Discussed plan for patient to ambulate x4, up to chair for all meals, and plans for EKG, chest X-ray, and Echo cardiogram. Patient verbalized understanding and had no questions or concerns. Provided patient with a verbal discussion and printed handout related to after care instructions for TAVR. Patient educated to use call light for assistance. Fall precautions in place. Staff will assist with mobilization. Hourly rounding in place. Proper hand hygiene before and after patient care to ensure patient will remain free from infection.      Patient is not progressing towards the following goals: N/A       "

## 2023-10-30 ENCOUNTER — OFFICE VISIT (OUTPATIENT)
Dept: MEDICAL GROUP | Facility: MEDICAL CENTER | Age: 83
End: 2023-10-30
Payer: MEDICARE

## 2023-10-30 VITALS
TEMPERATURE: 97.4 F | HEIGHT: 62 IN | OXYGEN SATURATION: 98 % | RESPIRATION RATE: 20 BRPM | BODY MASS INDEX: 24.59 KG/M2 | WEIGHT: 133.6 LBS | SYSTOLIC BLOOD PRESSURE: 118 MMHG | DIASTOLIC BLOOD PRESSURE: 76 MMHG | HEART RATE: 78 BPM

## 2023-10-30 DIAGNOSIS — M54.6 CHRONIC BILATERAL THORACIC BACK PAIN: ICD-10-CM

## 2023-10-30 DIAGNOSIS — G89.29 CHRONIC BILATERAL THORACIC BACK PAIN: ICD-10-CM

## 2023-10-30 DIAGNOSIS — Z95.2 S/P TAVR (TRANSCATHETER AORTIC VALVE REPLACEMENT): ICD-10-CM

## 2023-10-30 PROCEDURE — 1170F FXNL STATUS ASSESSED: CPT | Performed by: PHYSICIAN ASSISTANT

## 2023-10-30 PROCEDURE — 3078F DIAST BP <80 MM HG: CPT | Performed by: PHYSICIAN ASSISTANT

## 2023-10-30 PROCEDURE — 3074F SYST BP LT 130 MM HG: CPT | Performed by: PHYSICIAN ASSISTANT

## 2023-10-30 PROCEDURE — 99214 OFFICE O/P EST MOD 30 MIN: CPT | Performed by: PHYSICIAN ASSISTANT

## 2023-10-30 RX ORDER — METHYLPREDNISOLONE 4 MG/1
TABLET ORAL
Qty: 21 TABLET | Refills: 0 | Status: ON HOLD | OUTPATIENT
Start: 2023-10-30 | End: 2023-11-03

## 2023-10-30 ASSESSMENT — FIBROSIS 4 INDEX: FIB4 SCORE: 2.59

## 2023-10-30 NOTE — ASSESSMENT & PLAN NOTE
Chronic and unstable  Goes to [facility] chiropactor. This helps.  Patient has tried tramadol for pain relief but does not help much.  Likes to avoid narcotics therefore hydrocodone is not a good choice.  Cannot tolerate NSAIDs secondary to chronic kidney disease.  Use Tylenol and this helps.  Also over-the-counter lidocaine patch 4% has been very helpful.

## 2023-10-30 NOTE — ASSESSMENT & PLAN NOTE
10/30/23: Will see Dr. Nunez's nurse practitioner tomorrow status post heart valve replacement.  No shortness of breath, chest pain or chest tightness

## 2023-10-30 NOTE — PROGRESS NOTES
Subjective:   Katerina Torres is a 83 y.o. female here today for     HPI: Patient is here to discuss:  Problem   S/P Tavr (Transcatheter Aortic Valve Replacement)    Successful implantation of a 23 Mckenzie Chris S3 Ultra Resilia deployed at nominal volume via the transfemoral approach on 10/23/2023 under general anesthesia.     10/30/23: Will see Dr. Nunez's nurse tomorrow status post heart valve replacement.  No shortness of breath, chest pain or chest tightness       Thoracic Back Pain    6/22/23 musculoskeletal back pain trial of zanaflex and gabapentin  6/29/23 on neurontin, zanaflex not covered, declines baclofen, going to chiropractor at [facility]  7/7/23 off neurontin, trial of mobic 7.5 mg qday x 15 days  7/6/23 x-ray rib series bilaterally and chest x-ray questionable healing fracture left anterior fourth rib  7/6/23 xray thoracic spine chronic severe compression deformity T5, no definite acute fracture seen, mild arthritis            Current medicines (including changes today)  Current Outpatient Medications   Medication Sig Dispense Refill    methylPREDNISolone (MEDROL DOSEPAK) 4 MG Tablet Therapy Pack As directed on the packaging label. 21 Tablet 0    aspirin 81 MG EC tablet Take 1 Tablet by mouth every day. 100 Tablet 3    clopidogrel (PLAVIX) 75 MG Tab Take 1 Tablet by mouth every day. 90 Tablet 1    timolol (TIMOPTIC) 0.5 % Solution Administer 1 Drop into the left eye every day.      acetaminophen (TYLENOL) 500 MG Tab Take 1,000 mg by mouth every 6 hours as needed for Mild Pain.      bimatoprost (LUMIGAN) 0.01 % Solution Administer 1 Drop into both eyes at bedtime. Indications: Wide-Angle Glaucoma      vitamin D (CHOLECALCIFEROL) 1000 UNIT Tab Take 1,000 Units by mouth every day.       No current facility-administered medications for this visit.     She  has a past medical history of Arrhythmia (Year ago), Breast cancer (HCC), Breath shortness, Bruises easily, Cancer (HCC) (1989), Cardiac murmur  "(06/03/2009), Chronic kidney disease, stage III (moderate) (HCC) (08/20/2015), Dyslipidemia (06/03/2009), Glaucoma, Heart valve disease (year ago), Hepatitis A (1993), Hiatus hernia syndrome, breast cancer (06/03/2009), Hypertension (07/18/2017), MEDICAL HOME, Neutropenia (06/03/2009), Osteopenia (06/03/2009), PONV (postoperative nausea and vomiting), Preventative health care (06/03/2009), Snoring, and Unspecified cataract.    She has no past medical history of Acute nasopharyngitis, Anesthesia, Anginal syndrome (HCC), Arthritis, Asthma, Blood clotting disorder (HCC), Bowel habit changes, Bronchitis, CAD (coronary artery disease), Carcinoma in situ of respiratory system, Congestive heart failure (HCC), Continuous ambulatory peritoneal dialysis status (HCC), COPD, Coughing blood, Dental disorder, Diabetes (HCC), Dialysis patient (HCC), Disorder of thyroid, Emphysema of lung (HCC), Gynecological disorder, Heart burn, Hemorrhagic disorder (HCC), Hepatitis B, Hepatitis C, High cholesterol, Indigestion, Jaundice, Liver disease, Myocardial infarct (HCC), Pacemaker, Pain, Pneumonia, Pregnant, Psychiatric problem, Rheumatic fever, Seizure (HCC), Seizure disorder (HCC), Sleep apnea, Stroke (HCC), Tuberculosis, Urinary bladder disorder, or Urinary incontinence.  Atorvastatin, Hydrocodone-acetaminophen, Naproxen, Neurontin [gabapentin], and Vicodin [hydrocodone-acetaminophen]     Social History and Family History were reviewed and updated.    ROS   No headaches, chest pain, no shortness of breath, abdominal pain, nausea, or vomiting.  All other systems were reviewed and are negative or noted as positive in the HPI.       Objective:     /76   Pulse 78   Temp 36.3 °C (97.4 °F) (Temporal)   Resp 20   Ht 1.575 m (5' 2\")   Wt 60.6 kg (133 lb 9.6 oz)   SpO2 98%  Body mass index is 24.44 kg/m².     Physical Exam:  General: Patient appears well-nourished, well-hydrated, nontoxic  HEENT, normocephalic atraumatic, PERRLA, " extraocular movements intact, nares are patent and clear  Neck: No visible masses, thyromegaly or abnormalities noted  Cardiovascular.  Sitting comfortably without visible signs of edema  Lungs: No cyanosis noted, nondyspneic  Skin: Well perfused without evidence of rash or lesions  Neurological: Cranial nerves II through XII intact, normal gait  Musculoskeletal: Normal range of motion, normal strength and no deficit noted     Clinical Course/Lab Analysis:        Assessment and Plan:   The following treatment plan was discussed.  Signs and symptoms for which to return were discussed with patient at length.  Patient verbalized understanding.    Problem List Items Addressed This Visit       Thoracic back pain     Chronic and unstable  Goes to [facility] chiropactor. This helps.  Patient has tried tramadol for pain relief but does not help much.  Likes to avoid narcotics therefore hydrocodone is not a good choice.  Cannot tolerate NSAIDs secondary to chronic kidney disease.  Use Tylenol and this helps.  Also over-the-counter lidocaine patch 4% has been very helpful.         Relevant Medications    methylPREDNISolone (MEDROL DOSEPAK) 4 MG Tablet Therapy Pack    S/P TAVR (transcatheter aortic valve replacement)       10/30/23: Will see Dr. Nunez's nurse practitioner tomorrow status post heart valve replacement.  No shortness of breath, chest pain or chest tightness           Continue asa and plavix.    Followup: with pcp as directed or needed    Please note that this dictation was created using voice recognition software. I have made every reasonable attempt to correct obvious errors, but I expect that there are errors of grammar and possibly content that I did not discover before finalizing the note.

## 2023-10-31 ENCOUNTER — TELEPHONE (OUTPATIENT)
Dept: CARDIOLOGY | Facility: MEDICAL CENTER | Age: 83
End: 2023-10-31
Payer: MEDICARE

## 2023-10-31 ENCOUNTER — OFFICE VISIT (OUTPATIENT)
Dept: CARDIOLOGY | Facility: MEDICAL CENTER | Age: 83
End: 2023-10-31
Attending: NURSE PRACTITIONER
Payer: MEDICARE

## 2023-10-31 VITALS
RESPIRATION RATE: 16 BRPM | WEIGHT: 135 LBS | HEART RATE: 71 BPM | BODY MASS INDEX: 24.84 KG/M2 | HEIGHT: 62 IN | OXYGEN SATURATION: 98 % | SYSTOLIC BLOOD PRESSURE: 124 MMHG | DIASTOLIC BLOOD PRESSURE: 72 MMHG

## 2023-10-31 DIAGNOSIS — I50.31 ACUTE DIASTOLIC HEART FAILURE (HCC): ICD-10-CM

## 2023-10-31 DIAGNOSIS — I25.10 CORONARY ARTERY DISEASE INVOLVING NATIVE CORONARY ARTERY OF NATIVE HEART WITHOUT ANGINA PECTORIS: ICD-10-CM

## 2023-10-31 DIAGNOSIS — N18.31 CHRONIC KIDNEY DISEASE, STAGE 3A: ICD-10-CM

## 2023-10-31 DIAGNOSIS — M54.6 CHRONIC BILATERAL THORACIC BACK PAIN: ICD-10-CM

## 2023-10-31 DIAGNOSIS — I35.0 NONRHEUMATIC AORTIC (VALVE) STENOSIS: ICD-10-CM

## 2023-10-31 DIAGNOSIS — Z95.5 S/P DRUG ELUTING CORONARY STENT PLACEMENT: ICD-10-CM

## 2023-10-31 DIAGNOSIS — E78.5 DYSLIPIDEMIA: Chronic | ICD-10-CM

## 2023-10-31 DIAGNOSIS — Z87.898 HISTORY OF PALPITATIONS: ICD-10-CM

## 2023-10-31 DIAGNOSIS — Z86.79 HISTORY OF HYPERTENSION: ICD-10-CM

## 2023-10-31 DIAGNOSIS — G89.29 CHRONIC BILATERAL THORACIC BACK PAIN: ICD-10-CM

## 2023-10-31 DIAGNOSIS — Z95.2 S/P TAVR (TRANSCATHETER AORTIC VALVE REPLACEMENT): ICD-10-CM

## 2023-10-31 LAB — EKG IMPRESSION: NORMAL

## 2023-10-31 PROCEDURE — 3074F SYST BP LT 130 MM HG: CPT | Performed by: NURSE PRACTITIONER

## 2023-10-31 PROCEDURE — 93010 ELECTROCARDIOGRAM REPORT: CPT | Performed by: INTERNAL MEDICINE

## 2023-10-31 PROCEDURE — 99214 OFFICE O/P EST MOD 30 MIN: CPT | Performed by: NURSE PRACTITIONER

## 2023-10-31 PROCEDURE — 93005 ELECTROCARDIOGRAM TRACING: CPT | Performed by: NURSE PRACTITIONER

## 2023-10-31 PROCEDURE — 1170F FXNL STATUS ASSESSED: CPT | Performed by: NURSE PRACTITIONER

## 2023-10-31 PROCEDURE — 3078F DIAST BP <80 MM HG: CPT | Performed by: NURSE PRACTITIONER

## 2023-10-31 RX ORDER — ROSUVASTATIN CALCIUM 5 MG/1
5 TABLET, COATED ORAL EVERY EVENING
Qty: 90 TABLET | Refills: 3 | Status: SHIPPED | OUTPATIENT
Start: 2023-10-31 | End: 2023-11-29 | Stop reason: SDUPTHER

## 2023-10-31 RX ORDER — CLOPIDOGREL BISULFATE 75 MG/1
75 TABLET ORAL DAILY
Qty: 90 TABLET | Refills: 3 | Status: SHIPPED | OUTPATIENT
Start: 2023-10-31 | End: 2023-11-29 | Stop reason: SDUPTHER

## 2023-10-31 ASSESSMENT — FIBROSIS 4 INDEX: FIB4 SCORE: 2.59

## 2023-10-31 ASSESSMENT — ENCOUNTER SYMPTOMS
PALPITATIONS: 0
COUGH: 0
BACK PAIN: 1
SHORTNESS OF BREATH: 0
FEVER: 0
DIZZINESS: 0
MYALGIAS: 0
ORTHOPNEA: 0
CLAUDICATION: 0
ABDOMINAL PAIN: 0
PND: 0

## 2023-10-31 NOTE — TELEPHONE ENCOUNTER
LVM for patient on home number regarding rescheduling 1 week post op appt today d/t Trice JOHNSON out sick. Unable to LVM on mobile number d/t mailbox not set up.     Called and spoke with patient's friend Manuela. Rescheduled appt with Radha JOHNSON today at 1145.

## 2023-10-31 NOTE — PATIENT INSTRUCTIONS
Labs in 3-6 months.    Echo in 1 month with follow up with Dr. Nunez.    No baths until incisions completely healed.    You can go back to normal activity.    I have referred you to pain management.    Continue aspirin and statin lifelong.    Continue plavix for one year post stent placement.

## 2023-10-31 NOTE — PROGRESS NOTES
"Chief Complaint   Patient presents with    Other     1 week Post TAVR     Subjective     Katerina Torres is a 83 y.o. female who presents today for 1 week post TAVR with unexpected complication of acute coronary occlusion intra-operatively during TAVR.    She is a patient of Dr. Nunez in our office. Hx of severe symptomatic aortic stenosis now S/P TAVR, hypertension, hyperlipidemia, CKD stage III, pulmonary nodule, history of basal cell carcinoma skin, and history of breast cancer.    She presents today with her friend. Her biggest complaint is her chronic back pain. She has a known chronic compression fracture in her thoracic back.    She was considered to be in heart failure during admission but presents with no HF symptoms today and is currently on no HF medications.    She has no chest pain, shortness of breath, edema, dizziness/lightheadedness, or palpitations.    Past Medical History:   Diagnosis Date    Arrhythmia Year ago    Breast cancer ([facility])     Left Breast    Breath shortness     with exertion \"walking up a hill\"    Bruises easily     Cancer ([facility]) 1989    breast left side lumpectomy    Cardiac murmur 06/03/2009    Chronic kidney disease, stage III (moderate) (HCC) 08/20/2015    Coronary artery disease involving native coronary artery of native heart without angina pectoris 10/31/2023    Dyslipidemia 06/03/2009    Glaucoma     both eyes    Heart valve disease year ago    Having surgery    Hepatitis A 1993    Hiatus hernia syndrome     Hx of breast cancer 06/03/2009    Hypertension 07/18/2017    currently not taking medications    MEDICAL HOME     Neutropenia 06/03/2009    Osteopenia 06/03/2009    PONV (postoperative nausea and vomiting)     Preventative health care 06/03/2009    Snoring     Unspecified cataract     bilateral IOLs     Past Surgical History:   Procedure Laterality Date    TRANSCATHETER AORTIC VALVE REPLACEMENT Bilateral 10/23/2023    Procedure: TRANSCATHETER AORTIC VALVE " Left message for patient to call back .    REPLACEMENT;  Surgeon: Robert Nunez M.D.;  Location: SURGERY [facility];  Service: Cardiac    ECHOCARDIOGRAM, TRANSESOPHAGEAL, INTRAOPERATIVE N/A 10/23/2023    Procedure: ECHOCARDIOGRAM, TRANSESOPHAGEAL, INTRAOPERATIVE;  Surgeon: Robert Nunez M.D.;  Location: SURGERY [facility];  Service: Cardiac    ANGIOGRAM, WITH ANGIOPLASTY, AND STENT INSERTION IF INDICATED Bilateral 10/23/2023    Procedure: STENT INSERTION RIGHT CORONARY ARTERY;  Surgeon: Robert Nunez M.D.;  Location: SURGERY [facility];  Service: Cardiac    LUMBAR TRANSFORAMINAL EPIDURAL STEROID INJECTION Right 09/01/2022    Procedure: RIGHT lumbar four and lumbar five transforaminal epidural steroid injection;  Surgeon: Roni Choi M.D.;  Location: SURGERY [facility];  Service: Pain Management    LUMBAR TRANSFORAMINAL EPIDURAL STEROID INJECTION Right 01/06/2021    Procedure: INJECTION, STEROID, SPINE, LUMBAR, EPIDURAL, TRANSFORAMINAL APPROACH;  Surgeon: Roni Choi M.D.;  Location: SURGERY REHAB PAIN MANAGEMENT;  Service: Pain Management    KNEE ARTHROPLASTY TOTAL Right 05/08/2018    Procedure: KNEE ARTHROPLASTY TOTAL;  Surgeon: Thanh Hall M.D.;  Location: SURGERY [facility];  Service: Orthopedics    VITRECTOMY POSTERIOR Left 10/25/2016    Procedure: VITRECTOMY POSTERIOR membrane peel cryotherapy infusion of SF6 intraocular gas;  Surgeon: Amanda Rodriguez M.D.;  Location: SURGERY SAME DAY [facility];  Service:     RECOVERY  03/31/2014    Performed by [facility] at SURGERY SAME DAY [facility]    CATARACT PHACO WITH IOL  05/28/2009    Performed by GERA BREAUX at SURGERY SAME DAY [facility] ORS    CATARACT PHACO WITH IOL  05/14/2009    Performed by GERA BREAUX at SURGERY SAME DAY [facility] ORS    LUMPECTOMY      LYMPH NODE EXCISION Left     H/O Breast CA    NO PERTINENT PAST SURGICAL HISTORY  Breast Cancer    OTHER      OTHER ORTHOPEDIC SURGERY  2017    Knee    MI RADIATION THERAPY PLAN SIMPLE        Family History   Problem Relation Age of Onset    Heart Disease Father         CAD MI    Stroke Father     Cancer Sister         breast    Cancer Mother         breast     Social History     Socioeconomic History    Marital status:      Spouse name: Not on file    Number of children: Not on file    Years of education: Not on file    Highest education level: 12th grade   Occupational History    Not on file   Tobacco Use    Smoking status: Former     Current packs/day: 0.00     Average packs/day: 0.5 packs/day for 10.0 years (5.0 ttl pk-yrs)     Types: Cigarettes     Start date: 1989     Quit date: 1999     Years since quittin.8    Smokeless tobacco: Never   Vaping Use    Vaping Use: Never used   Substance and Sexual Activity    Alcohol use: Yes     Alcohol/week: 4.2 oz     Types: 7 Standard drinks or equivalent per week     Comment: 1 glass wine/day    Drug use: No    Sexual activity: Not Currently     Partners: Male   Other Topics Concern     Service No    Blood Transfusions No    Caffeine Concern No    Occupational Exposure No    Hobby Hazards Yes    Sleep Concern No    Stress Concern Yes    Weight Concern No    Special Diet No    Back Care No    Exercise No    Bike Helmet No    Seat Belt Yes    Self-Exams Yes   Social History Narrative    Not on file     Social Determinants of Health     Financial Resource Strain: Low Risk  (10/23/2023)    Overall Financial Resource Strain (CARDIA)     Difficulty of Paying Living Expenses: Not hard at all   Food Insecurity: No Food Insecurity (10/23/2023)    Hunger Vital Sign     Worried About Running Out of Food in the Last Year: Never true     Ran Out of Food in the Last Year: Never true   Transportation Needs: No Transportation Needs (10/23/2023)    PRAPARE - Transportation     Lack of Transportation (Medical): No     Lack of Transportation (Non-Medical): No   Physical Activity: Insufficiently Active (10/23/2023)    Exercise Vital Sign     Days  of Exercise per Week: 2 days     Minutes of Exercise per Session: 30 min   Stress: Stress Concern Present (10/23/2023)    [employer] - Occupational Stress Questionnaire     Feeling of Stress : To some extent   Social Connections: Socially Isolated (10/23/2023)    Social Connection and Isolation Panel [NHANES]     Frequency of Communication with Friends and Family: More than three times a week     Frequency of Social Gatherings with Friends and Family: More than three times a week     Attends [employer] Services: Never     Active Member of Clubs or Organizations: No     Attends Club or Organization Meetings: Never     Marital Status:    Intimate Partner Violence: Not on file   Housing Stability: Low Risk  (10/23/2023)    Housing Stability Vital Sign     Unable to Pay for Housing in the Last Year: No     Number of Places Lived in the Last Year: 1     Unstable Housing in the Last Year: No     Allergies   Allergen Reactions    Atorvastatin Unspecified     Does not qualify for primary prevention    Hydrocodone-Acetaminophen     Naproxen      GI upset    Neurontin [Gabapentin] Unspecified     dizziness    Vicodin [Hydrocodone-Acetaminophen] Nausea     Nausea, dizziness     Outpatient Encounter Medications as of 10/31/2023   Medication Sig Dispense Refill    clopidogrel (PLAVIX) 75 MG Tab Take 1 Tablet by mouth every day. 90 Tablet 3    rosuvastatin (CRESTOR) 5 MG Tab Take 1 Tablet by mouth every evening. 90 Tablet 3    methylPREDNISolone (MEDROL DOSEPAK) 4 MG Tablet Therapy Pack As directed on the packaging label. 21 Tablet 0    aspirin 81 MG EC tablet Take 1 Tablet by mouth every day. 100 Tablet 3    timolol (TIMOPTIC) 0.5 % Solution Administer 1 Drop into the left eye every day.      acetaminophen (TYLENOL) 500 MG Tab Take 1,000 mg by mouth every 6 hours as needed for Mild Pain.      bimatoprost (LUMIGAN) 0.01 % Solution Administer 1 Drop into both eyes at bedtime. Indications:  "Wide-Angle Glaucoma      vitamin D (CHOLECALCIFEROL) 1000 UNIT Tab Take 1,000 Units by mouth every day.      [DISCONTINUED] clopidogrel (PLAVIX) 75 MG Tab Take 1 Tablet by mouth every day. 90 Tablet 1     No facility-administered encounter medications on file as of 10/31/2023.     Review of Systems   Constitutional:  Negative for fever and malaise/fatigue.   Respiratory:  Negative for cough and shortness of breath.    Cardiovascular:  Negative for chest pain, palpitations, orthopnea, claudication, leg swelling and PND.   Gastrointestinal:  Negative for abdominal pain.   Musculoskeletal:  Positive for back pain. Negative for myalgias.   Neurological:  Negative for dizziness.              Objective     /72 (BP Location: Left arm, Patient Position: Sitting, BP Cuff Size: Adult)   Pulse 71   Resp 16   Ht 1.575 m (5' 2\")   Wt 61.2 kg (135 lb)   SpO2 98%   BMI 24.69 kg/m²     Physical Exam  Vitals and nursing note reviewed.   Constitutional:       Appearance: Normal appearance. She is well-developed and normal weight.   HENT:      Head: Normocephalic and atraumatic.   Neck:      Vascular: No JVD.   Cardiovascular:      Rate and Rhythm: Normal rate and regular rhythm.      Pulses: Normal pulses.      Heart sounds: Normal heart sounds.   Pulmonary:      Effort: Pulmonary effort is normal.      Breath sounds: Normal breath sounds.   Musculoskeletal:         General: Normal range of motion.   Skin:     General: Skin is warm and dry.      Capillary Refill: Capillary refill takes less than 2 seconds.   Neurological:      General: No focal deficit present.      Mental Status: She is alert and oriented to person, place, and time. Mental status is at baseline.   Psychiatric:         Mood and Affect: Mood normal.         Behavior: Behavior normal.         Thought Content: Thought content normal.         Judgment: Judgment normal.                Assessment & Plan     1. S/P TAVR (transcatheter aortic valve replacement)  " EKG      2. History of hypertension        3. Dyslipidemia  rosuvastatin (CRESTOR) 5 MG Tab    Lipid Profile      4. Chronic kidney disease, stage 3a (HCC)        5. History of palpitations        6. S/P drug eluting coronary stent placement  clopidogrel (PLAVIX) 75 MG Tab      7. Acute diastolic heart failure (HCC)        8. Chronic bilateral thoracic back pain  Referral to Pain Management      9. Coronary artery disease involving native coronary artery of native heart without angina pectoris          Medical Decision Making: Today's Assessment/Status/Plan:      1. S/P TAVR with acute coronary occlusion  -doing well post-op  -cont asa lifelong, plavix 1 year post coronary stent placement  -groins CDI with mild bruising and small nodule   -SBE prophylaxis understands  -echo 1 month with structural heart follow up  -reviewed hospital imaging, labs, and EKG  -cardiac rehab referral placed  -EKG shows SR    2. HTN with CKD  -normotensive on no medications  -follow labs with PCP    3. HLD with CAD  -non-obstructive disease on angiogram  -order rosuvastatin 5 mg QPM  -repeat lipid/cmp in 3-6 months  -LDL goal <70 with CAD    4. Chronic back pain  -refer to pain management  -follow    Patient is to follow up with Dr. Nunez in 1 month with echo and labs in 3-6 months.

## 2023-11-01 ENCOUNTER — TELEPHONE (OUTPATIENT)
Dept: CARDIOLOGY | Facility: MEDICAL CENTER | Age: 83
End: 2023-11-01
Payer: MEDICARE

## 2023-11-01 NOTE — TELEPHONE ENCOUNTER
On behalf of [facility]'s Structural Heart Program, we would like to thank you for allowing us to participate in the care of your patient, Ms. Torres.     She underwent a successful transcatheter aortic valve replacement (TAVR) on Monday, October 23, 2023.     Your patient is scheduled to follow up with our Structural Heart Program at one month and one year post procedure.     Again, thank you for allowing us to participate in the care of your patient. If you have any questions, please do not hesitate to contact our Structural Heart team.     Sincerely,    [facility] Structural Heart Team    LOUISE Salcido, RN, Structural Heart Program Nurse Coordinator ([phone])  LOUISE Walter, RN, Structural Heart Program Nurse Coordinator ([phone])

## 2023-11-02 ENCOUNTER — HOSPITAL ENCOUNTER (OUTPATIENT)
Facility: MEDICAL CENTER | Age: 83
End: 2023-11-03
Attending: EMERGENCY MEDICINE | Admitting: HOSPITALIST
Payer: MEDICARE

## 2023-11-02 ENCOUNTER — APPOINTMENT (OUTPATIENT)
Dept: RADIOLOGY | Facility: MEDICAL CENTER | Age: 83
End: 2023-11-02
Attending: EMERGENCY MEDICINE
Payer: MEDICARE

## 2023-11-02 ENCOUNTER — APPOINTMENT (OUTPATIENT)
Dept: CARDIOLOGY | Facility: MEDICAL CENTER | Age: 83
End: 2023-11-02
Attending: INTERNAL MEDICINE
Payer: MEDICARE

## 2023-11-02 DIAGNOSIS — R00.2 PALPITATIONS: ICD-10-CM

## 2023-11-02 DIAGNOSIS — R10.13 DYSPEPSIA: ICD-10-CM

## 2023-11-02 DIAGNOSIS — R07.9 ACUTE CHEST PAIN: ICD-10-CM

## 2023-11-02 DIAGNOSIS — I50.30 DIASTOLIC HEART FAILURE, UNSPECIFIED HF CHRONICITY (HCC): ICD-10-CM

## 2023-11-02 PROBLEM — E78.2 MIXED HYPERLIPIDEMIA: Status: ACTIVE | Noted: 2023-11-02

## 2023-11-02 LAB
ALBUMIN SERPL BCP-MCNC: 4 G/DL (ref 3.2–4.9)
ALBUMIN/GLOB SERPL: 1.5 G/DL
ALP SERPL-CCNC: 71 U/L (ref 30–99)
ALT SERPL-CCNC: 13 U/L (ref 2–50)
ANION GAP SERPL CALC-SCNC: 11 MMOL/L (ref 7–16)
APAP SERPL-MCNC: <5 UG/ML (ref 10–30)
AST SERPL-CCNC: 15 U/L (ref 12–45)
BASOPHILS # BLD AUTO: 0.5 % (ref 0–1.8)
BASOPHILS # BLD: 0.04 K/UL (ref 0–0.12)
BILIRUB SERPL-MCNC: 0.3 MG/DL (ref 0.1–1.5)
BUN SERPL-MCNC: 18 MG/DL (ref 8–22)
CALCIUM ALBUM COR SERPL-MCNC: 9.2 MG/DL (ref 8.5–10.5)
CALCIUM SERPL-MCNC: 9.2 MG/DL (ref 8.5–10.5)
CHLORIDE SERPL-SCNC: 106 MMOL/L (ref 96–112)
CO2 SERPL-SCNC: 24 MMOL/L (ref 20–33)
CREAT SERPL-MCNC: 1.1 MG/DL (ref 0.5–1.4)
EKG IMPRESSION: NORMAL
EKG IMPRESSION: NORMAL
EOSINOPHIL # BLD AUTO: 0.06 K/UL (ref 0–0.51)
EOSINOPHIL NFR BLD: 0.8 % (ref 0–6.9)
ERYTHROCYTE [DISTWIDTH] IN BLOOD BY AUTOMATED COUNT: 48.9 FL (ref 35.9–50)
GFR SERPLBLD CREATININE-BSD FMLA CKD-EPI: 49 ML/MIN/1.73 M 2
GLOBULIN SER CALC-MCNC: 2.7 G/DL (ref 1.9–3.5)
GLUCOSE SERPL-MCNC: 146 MG/DL (ref 65–99)
HCT VFR BLD AUTO: 37.8 % (ref 37–47)
HGB BLD-MCNC: 12.4 G/DL (ref 12–16)
IMM GRANULOCYTES # BLD AUTO: 0.03 K/UL (ref 0–0.11)
IMM GRANULOCYTES NFR BLD AUTO: 0.4 % (ref 0–0.9)
LIPASE SERPL-CCNC: 35 U/L (ref 11–82)
LV EJECT FRACT  99904: 70
LV EJECT FRACT MOD 2C 99903: 73.71
LV EJECT FRACT MOD 4C 99902: 81.75
LV EJECT FRACT MOD BP 99901: 82.97
LYMPHOCYTES # BLD AUTO: 1.11 K/UL (ref 1–4.8)
LYMPHOCYTES NFR BLD: 15.1 % (ref 22–41)
MCH RBC QN AUTO: 31.4 PG (ref 27–33)
MCHC RBC AUTO-ENTMCNC: 32.8 G/DL (ref 32.2–35.5)
MCV RBC AUTO: 95.7 FL (ref 81.4–97.8)
MONOCYTES # BLD AUTO: 0.37 K/UL (ref 0–0.85)
MONOCYTES NFR BLD AUTO: 5 % (ref 0–13.4)
NEUTROPHILS # BLD AUTO: 5.74 K/UL (ref 1.82–7.42)
NEUTROPHILS NFR BLD: 78.2 % (ref 44–72)
NRBC # BLD AUTO: 0 K/UL
NRBC BLD-RTO: 0 /100 WBC (ref 0–0.2)
NT-PROBNP SERPL IA-MCNC: 578 PG/ML (ref 0–125)
PLATELET # BLD AUTO: 232 K/UL (ref 164–446)
PMV BLD AUTO: 9.9 FL (ref 9–12.9)
POTASSIUM SERPL-SCNC: 3.9 MMOL/L (ref 3.6–5.5)
PROT SERPL-MCNC: 6.7 G/DL (ref 6–8.2)
RBC # BLD AUTO: 3.95 M/UL (ref 4.2–5.4)
SODIUM SERPL-SCNC: 141 MMOL/L (ref 135–145)
TROPONIN T SERPL-MCNC: 23 NG/L (ref 6–19)
TROPONIN T SERPL-MCNC: 23 NG/L (ref 6–19)
TROPONIN T SERPL-MCNC: 28 NG/L (ref 6–19)
TSH SERPL DL<=0.005 MIU/L-ACNC: 0.19 UIU/ML (ref 0.38–5.33)
WBC # BLD AUTO: 7.4 K/UL (ref 4.8–10.8)

## 2023-11-02 PROCEDURE — 76705 ECHO EXAM OF ABDOMEN: CPT

## 2023-11-02 PROCEDURE — 83690 ASSAY OF LIPASE: CPT

## 2023-11-02 PROCEDURE — 84484 ASSAY OF TROPONIN QUANT: CPT | Mod: 91

## 2023-11-02 PROCEDURE — G0378 HOSPITAL OBSERVATION PER HR: HCPCS

## 2023-11-02 PROCEDURE — A9270 NON-COVERED ITEM OR SERVICE: HCPCS | Performed by: HOSPITALIST

## 2023-11-02 PROCEDURE — 700102 HCHG RX REV CODE 250 W/ 637 OVERRIDE(OP): Performed by: EMERGENCY MEDICINE

## 2023-11-02 PROCEDURE — 99285 EMERGENCY DEPT VISIT HI MDM: CPT

## 2023-11-02 PROCEDURE — 93306 TTE W/DOPPLER COMPLETE: CPT | Mod: 26 | Performed by: INTERNAL MEDICINE

## 2023-11-02 PROCEDURE — 71045 X-RAY EXAM CHEST 1 VIEW: CPT

## 2023-11-02 PROCEDURE — 36415 COLL VENOUS BLD VENIPUNCTURE: CPT

## 2023-11-02 PROCEDURE — 80143 DRUG ASSAY ACETAMINOPHEN: CPT

## 2023-11-02 PROCEDURE — 93005 ELECTROCARDIOGRAM TRACING: CPT | Performed by: EMERGENCY MEDICINE

## 2023-11-02 PROCEDURE — 84443 ASSAY THYROID STIM HORMONE: CPT

## 2023-11-02 PROCEDURE — 99222 1ST HOSP IP/OBS MODERATE 55: CPT | Performed by: INTERNAL MEDICINE

## 2023-11-02 PROCEDURE — 93306 TTE W/DOPPLER COMPLETE: CPT

## 2023-11-02 PROCEDURE — 83880 ASSAY OF NATRIURETIC PEPTIDE: CPT

## 2023-11-02 PROCEDURE — 85025 COMPLETE CBC W/AUTO DIFF WBC: CPT

## 2023-11-02 PROCEDURE — A9270 NON-COVERED ITEM OR SERVICE: HCPCS | Performed by: EMERGENCY MEDICINE

## 2023-11-02 PROCEDURE — 700102 HCHG RX REV CODE 250 W/ 637 OVERRIDE(OP): Performed by: HOSPITALIST

## 2023-11-02 PROCEDURE — 99223 1ST HOSP IP/OBS HIGH 75: CPT | Performed by: HOSPITALIST

## 2023-11-02 PROCEDURE — 80053 COMPREHEN METABOLIC PANEL: CPT

## 2023-11-02 PROCEDURE — 93005 ELECTROCARDIOGRAM TRACING: CPT

## 2023-11-02 RX ORDER — AMOXICILLIN 250 MG
2 CAPSULE ORAL 2 TIMES DAILY
Status: DISCONTINUED | OUTPATIENT
Start: 2023-11-02 | End: 2023-11-03 | Stop reason: HOSPADM

## 2023-11-02 RX ORDER — ONDANSETRON 2 MG/ML
4 INJECTION INTRAMUSCULAR; INTRAVENOUS ONCE
Status: DISCONTINUED | OUTPATIENT
Start: 2023-11-02 | End: 2023-11-02

## 2023-11-02 RX ORDER — LATANOPROST 50 UG/ML
1 SOLUTION/ DROPS OPHTHALMIC
Status: DISCONTINUED | OUTPATIENT
Start: 2023-11-02 | End: 2023-11-03 | Stop reason: HOSPADM

## 2023-11-02 RX ORDER — ONDANSETRON 2 MG/ML
4 INJECTION INTRAMUSCULAR; INTRAVENOUS EVERY 4 HOURS PRN
Status: DISCONTINUED | OUTPATIENT
Start: 2023-11-02 | End: 2023-11-03 | Stop reason: HOSPADM

## 2023-11-02 RX ORDER — ROSUVASTATIN CALCIUM 10 MG/1
5 TABLET, COATED ORAL EVERY EVENING
Status: DISCONTINUED | OUTPATIENT
Start: 2023-11-02 | End: 2023-11-03 | Stop reason: HOSPADM

## 2023-11-02 RX ORDER — FAMOTIDINE 20 MG/1
20 TABLET, FILM COATED ORAL ONCE
Status: DISCONTINUED | OUTPATIENT
Start: 2023-11-02 | End: 2023-11-02

## 2023-11-02 RX ORDER — OMEPRAZOLE 20 MG/1
20 CAPSULE, DELAYED RELEASE ORAL 2 TIMES DAILY
Status: DISCONTINUED | OUTPATIENT
Start: 2023-11-02 | End: 2023-11-03 | Stop reason: HOSPADM

## 2023-11-02 RX ORDER — POLYETHYLENE GLYCOL 3350 17 G/17G
1 POWDER, FOR SOLUTION ORAL
Status: DISCONTINUED | OUTPATIENT
Start: 2023-11-02 | End: 2023-11-03 | Stop reason: HOSPADM

## 2023-11-02 RX ORDER — ACETAMINOPHEN 500 MG
1000 TABLET ORAL EVERY 6 HOURS PRN
Status: DISCONTINUED | OUTPATIENT
Start: 2023-11-02 | End: 2023-11-03 | Stop reason: HOSPADM

## 2023-11-02 RX ORDER — BISACODYL 10 MG
10 SUPPOSITORY, RECTAL RECTAL
Status: DISCONTINUED | OUTPATIENT
Start: 2023-11-02 | End: 2023-11-03 | Stop reason: HOSPADM

## 2023-11-02 RX ORDER — ASPIRIN 81 MG/1
81 TABLET ORAL DAILY
Status: DISCONTINUED | OUTPATIENT
Start: 2023-11-03 | End: 2023-11-03 | Stop reason: HOSPADM

## 2023-11-02 RX ORDER — HYDRALAZINE HYDROCHLORIDE 20 MG/ML
10 INJECTION INTRAMUSCULAR; INTRAVENOUS EVERY 4 HOURS PRN
Status: DISCONTINUED | OUTPATIENT
Start: 2023-11-02 | End: 2023-11-03 | Stop reason: HOSPADM

## 2023-11-02 RX ORDER — ONDANSETRON 4 MG/1
4 TABLET, ORALLY DISINTEGRATING ORAL EVERY 4 HOURS PRN
Status: DISCONTINUED | OUTPATIENT
Start: 2023-11-02 | End: 2023-11-03 | Stop reason: HOSPADM

## 2023-11-02 RX ORDER — CLOPIDOGREL BISULFATE 75 MG/1
75 TABLET ORAL DAILY
Status: DISCONTINUED | OUTPATIENT
Start: 2023-11-03 | End: 2023-11-03 | Stop reason: HOSPADM

## 2023-11-02 RX ORDER — TRAMADOL HYDROCHLORIDE 50 MG/1
50 TABLET ORAL EVERY 6 HOURS PRN
COMMUNITY
End: 2023-11-10

## 2023-11-02 RX ADMIN — LIDOCAINE HYDROCHLORIDE 30 ML: 20 SOLUTION OROPHARYNGEAL at 09:30

## 2023-11-02 RX ADMIN — OMEPRAZOLE 20 MG: 20 CAPSULE, DELAYED RELEASE ORAL at 18:35

## 2023-11-02 RX ADMIN — ROSUVASTATIN 5 MG: 10 TABLET, FILM COATED ORAL at 18:35

## 2023-11-02 RX ADMIN — LATANOPROST 1 DROP: 50 SOLUTION OPHTHALMIC at 20:05

## 2023-11-02 ASSESSMENT — FIBROSIS 4 INDEX
FIB4 SCORE: 1.52
FIB4 SCORE: 2.59

## 2023-11-02 ASSESSMENT — LIFESTYLE VARIABLES
CONSUMPTION TOTAL: NEGATIVE
ON A TYPICAL DAY WHEN YOU DRINK ALCOHOL HOW MANY DRINKS DO YOU HAVE: 1
DOES PATIENT WANT TO STOP DRINKING: NO
HAVE YOU EVER FELT YOU SHOULD CUT DOWN ON YOUR DRINKING: NO
ALCOHOL_USE: YES
AVERAGE NUMBER OF DAYS PER WEEK YOU HAVE A DRINK CONTAINING ALCOHOL: 5
HOW MANY TIMES IN THE PAST YEAR HAVE YOU HAD 5 OR MORE DRINKS IN A DAY: 0
EVER HAD A DRINK FIRST THING IN THE MORNING TO STEADY YOUR NERVES TO GET RID OF A HANGOVER: NO
TOTAL SCORE: 0
EVER FELT BAD OR GUILTY ABOUT YOUR DRINKING: NO
HAVE PEOPLE ANNOYED YOU BY CRITICIZING YOUR DRINKING: NO
TOTAL SCORE: 0
TOTAL SCORE: 0
SUBSTANCE_ABUSE: 0

## 2023-11-02 ASSESSMENT — ENCOUNTER SYMPTOMS
DIZZINESS: 1
PHOTOPHOBIA: 0
TREMORS: 0
SPUTUM PRODUCTION: 0
PALPITATIONS: 1
RESPIRATORY NEGATIVE: 1
BLURRED VISION: 0
VOMITING: 0
ORTHOPNEA: 0
ABDOMINAL PAIN: 1
WEIGHT LOSS: 0
EYE PAIN: 0
CHILLS: 0
FEVER: 0
DIZZINESS: 0
HEADACHES: 0
NECK PAIN: 0
NAUSEA: 0
WEAKNESS: 0
BACK PAIN: 1
CONSTIPATION: 0
ABDOMINAL PAIN: 0
DEPRESSION: 0
MYALGIAS: 0
PALPITATIONS: 0
HEADACHES: 1
TINGLING: 0
BRUISES/BLEEDS EASILY: 0
SHORTNESS OF BREATH: 0
CARDIOVASCULAR NEGATIVE: 1
COUGH: 0
DIARRHEA: 0
EYES NEGATIVE: 1
PSYCHIATRIC NEGATIVE: 1
CONSTITUTIONAL NEGATIVE: 1
HEARTBURN: 0
NERVOUS/ANXIOUS: 0
NAUSEA: 1

## 2023-11-02 ASSESSMENT — HEART SCORE
HEART SCORE: 5
TROPONIN: 1-3 TIMES NORMAL LIMIT
AGE: 65+
HISTORY: SLIGHTLY SUSPICIOUS
RISK FACTORS: >2 RISK FACTORS OR HX OF ATHEROSCLEROTIC DISEASE
ECG: NORMAL

## 2023-11-02 ASSESSMENT — PATIENT HEALTH QUESTIONNAIRE - PHQ9
2. FEELING DOWN, DEPRESSED, IRRITABLE, OR HOPELESS: NOT AT ALL
SUM OF ALL RESPONSES TO PHQ QUESTIONS 1-9: 4
3. TROUBLE FALLING OR STAYING ASLEEP OR SLEEPING TOO MUCH: NOT AT ALL
9. THOUGHTS THAT YOU WOULD BE BETTER OFF DEAD, OR OF HURTING YOURSELF: NOT AT ALL
5. POOR APPETITE OR OVEREATING: SEVERAL DAYS
8. MOVING OR SPEAKING SO SLOWLY THAT OTHER PEOPLE COULD HAVE NOTICED. OR THE OPPOSITE, BEING SO FIGETY OR RESTLESS THAT YOU HAVE BEEN MOVING AROUND A LOT MORE THAN USUAL: NOT AT ALL
SUM OF ALL RESPONSES TO PHQ9 QUESTIONS 1 AND 2: 0
4. FEELING TIRED OR HAVING LITTLE ENERGY: NOT AT ALL
SUM OF ALL RESPONSES TO PHQ9 QUESTIONS 1 AND 2: 2
1. LITTLE INTEREST OR PLEASURE IN DOING THINGS: NOT AT ALL
6. FEELING BAD ABOUT YOURSELF - OR THAT YOU ARE A FAILURE OR HAVE LET YOURSELF OR YOUR FAMILY DOWN: NOT AL ALL
1. LITTLE INTEREST OR PLEASURE IN DOING THINGS: SEVERAL DAYS
2. FEELING DOWN, DEPRESSED, IRRITABLE, OR HOPELESS: SEVERAL DAYS
7. TROUBLE CONCENTRATING ON THINGS, SUCH AS READING THE NEWSPAPER OR WATCHING TELEVISION: SEVERAL DAYS

## 2023-11-02 ASSESSMENT — PAIN DESCRIPTION - PAIN TYPE: TYPE: ACUTE PAIN

## 2023-11-02 NOTE — CARE PLAN
The patient is Stable - Low risk of patient condition declining or worsening    Shift Goals  Clinical Goals: Tele monitoring; Trops monitoring  Patient Goals: To rest and DC  Family Goals: Friend wants her to rest    Progress made toward(s) clinical / shift goals:    Problem: Knowledge Deficit - Standard  Goal: Patient and family/care givers will demonstrate understanding of plan of care, disease process/condition, diagnostic tests and medications  Description: Target End Date:  1-3 days or as soon as patient condition allows    Document in Patient Education    1.  Patient and family/caregiver oriented to unit, equipment, visitation policy and means for communicating concern  2.  Complete/review Learning Assessment  3.  Assess knowledge level of disease process/condition, treatment plan, diagnostic tests and medications  4.  Explain disease process/condition, treatment plan, diagnostic tests and medications  Outcome: Progressing       Patient is not progressing towards the following goals:

## 2023-11-02 NOTE — ASSESSMENT & PLAN NOTE
Pt returned call.    The ASCVD Risk score (Candice LEUNG, et al., 2019) failed to calculate for the following reasons:    The 2019 ASCVD risk score is only valid for ages 40 to 79      With her recent TAVR patient will be monitored on telemetry     get an echocardiogram and get serial troponins and monitor for myocardial damage.    Dr. Flnaagan of cardiology will be consulting.

## 2023-11-02 NOTE — ED TRIAGE NOTES
Chief Complaint   Patient presents with    Chest Pain     Patient complains of 10/10 chest pain with nausea. Unable to describe. No SOB.  Patient had TAVR procedure 1 week ago. Patient also mentions she was recently prescribed steroids for chronic back pain.         Patient educated on triage process. Informed to notified ED staff of change in symptoms.     Vitals:    11/02/23 0701   BP: (!) 169/87   Pulse: 86   Resp: 20   Temp: 36.3 °C (97.4 °F)   SpO2: 97%

## 2023-11-02 NOTE — CONSULTS
Cardiology Initial Consultation    Date of Service  11/2/2023    Referring Physician  Wu Higuera M.D.    Reason for Consultation  Abdominal pain status post TAVR.    History of Presenting Illness  Katerina Torres is a 83 y.o. female with a past medical history status post TAVR with stent placement who presented 11/2/2023 with abdominal pain, nausea with elevated blood pressure. She denies chest pain, shortness of breath. She is accompanied by her neighbor friend.     Review of Systems  Review of Systems   Constitutional: Negative.  Negative for chills and fever.   HENT: Negative.  Negative for hearing loss.    Eyes: Negative.    Respiratory: Negative.  Negative for cough and shortness of breath.    Cardiovascular: Negative.  Negative for chest pain, palpitations and leg swelling.   Gastrointestinal:  Positive for abdominal pain and nausea. Negative for vomiting.   Genitourinary: Negative.  Negative for dysuria and urgency.   Musculoskeletal:  Positive for back pain. Negative for myalgias.   Skin: Negative.  Negative for rash.   Neurological:  Positive for headaches. Negative for dizziness and weakness.   Hematological:  Does not bruise/bleed easily.   Psychiatric/Behavioral: Negative.  The patient is not nervous/anxious.        Past Medical History   has a past medical history of Arrhythmia (Year ago), Breast cancer (HCC), Breath shortness, Bruises easily, Cancer (HCC) (1989), Cardiac murmur (06/03/2009), Chest pain (11/2/2023), Chronic kidney disease, stage III (moderate) ([facility]) (08/20/2015), Coronary artery disease involving native coronary artery of native heart without angina pectoris (10/31/2023), Dyslipidemia (06/03/2009), Glaucoma, Heart valve disease (year ago), Hepatitis A (1993), Hiatus hernia syndrome, breast cancer (06/03/2009), Hypertension (07/18/2017), MEDICAL HOME, Neutropenia (06/03/2009), Osteopenia (06/03/2009), PONV (postoperative nausea and vomiting), Preventative health care  (06/03/2009), Snoring, and Unspecified cataract.    She has no past medical history of Acute nasopharyngitis, Anesthesia, Anginal syndrome (HCC), Arthritis, Asthma, Blood clotting disorder (HCC), Bowel habit changes, Bronchitis, Carcinoma in situ of respiratory system, Congestive heart failure (HCC), Continuous ambulatory peritoneal dialysis status (HCC), COPD, Coughing blood, Dental disorder, Diabetes (HCC), Dialysis patient (HCC), Disorder of thyroid, Emphysema of lung (HCC), Gynecological disorder, Heart burn, Hemorrhagic disorder (HCC), Hepatitis B, Hepatitis C, High cholesterol, Indigestion, Jaundice, Liver disease, Myocardial infarct (HCC), Pacemaker, Pneumonia, Pregnant, Psychiatric problem, Rheumatic fever, Seizure (HCC), Seizure disorder (HCC), Sleep apnea, Stroke (HCC), Tuberculosis, Urinary bladder disorder, or Urinary incontinence.    Surgical History   has a past surgical history that includes other; cataract phaco with iol (05/14/2009); cataract phaco with iol (05/28/2009); lumpectomy; pr radiation therapy plan simple; recovery (03/31/2014); vitrectomy posterior (Left, 10/25/2016); knee arthroplasty total (Right, 05/08/2018); lumbar transforaminal epidural steroid injection (Right, 01/06/2021); lumbar transforaminal epidural steroid injection (Right, 09/01/2022); other orthopedic surgery (2017); no pertinent past surgical history (Breast Cancer); lymph node excision (Left); transcatheter aortic valve replacement (Bilateral, 10/23/2023); echocardiogram, transesophageal, intraoperative (N/A, 10/23/2023); and angiogram, with angioplasty, and stent insertion if indicated (Bilateral, 10/23/2023).    Family History  family history includes Cancer in her mother and sister; Heart Disease in her father; Stroke in her father.    Social History   reports that she quit smoking about 24 years ago. Her smoking use included cigarettes. She started smoking about 34 years ago. She has a 5.0 pack-year smoking history.  She has never used smokeless tobacco. She reports current alcohol use of about 4.2 oz of alcohol per week. She reports that she does not use drugs.    Medications  Prior to Admission Medications   Prescriptions Last Dose Informant Patient Reported? Taking?   acetaminophen (TYLENOL) 500 MG Tab   Yes No   Sig: Take 1,000 mg by mouth every 6 hours as needed for Mild Pain.   aspirin 81 MG EC tablet   No No   Sig: Take 1 Tablet by mouth every day.   bimatoprost (LUMIGAN) 0.01 % Solution   Yes No   Sig: Administer 1 Drop into both eyes at bedtime. Indications: Wide-Angle Glaucoma   clopidogrel (PLAVIX) 75 MG Tab   No No   Sig: Take 1 Tablet by mouth every day.   methylPREDNISolone (MEDROL DOSEPAK) 4 MG Tablet Therapy Pack   No No   Sig: As directed on the packaging label.   rosuvastatin (CRESTOR) 5 MG Tab   No No   Sig: Take 1 Tablet by mouth every evening.   timolol (TIMOPTIC) 0.5 % Solution   Yes No   Sig: Administer 1 Drop into the left eye every day.   vitamin D (CHOLECALCIFEROL) 1000 UNIT Tab   Yes No   Sig: Take 1,000 Units by mouth every day.      Facility-Administered Medications: None       Allergies  Allergies   Allergen Reactions    Atorvastatin Unspecified     Does not qualify for primary prevention    Hydrocodone-Acetaminophen     Naproxen      GI upset    Neurontin [Gabapentin] Unspecified     dizziness    Vicodin [Hydrocodone-Acetaminophen] Nausea     Nausea, dizziness       Vital signs in last 24 hours  Temp:  [36.3 °C (97.4 °F)] 36.3 °C (97.4 °F)  Pulse:  [67-86] 67  Resp:  [18-20] 18  BP: (169-187)/(77-87) 170/77  SpO2:  [95 %-97 %] 95 %    Physical Exam  Physical Exam  Constitutional:       Appearance: Normal appearance. She is well-developed and normal weight.   HENT:      Head: Normocephalic and atraumatic.      Mouth/Throat:      Mouth: Mucous membranes are moist.   Eyes:      Extraocular Movements: Extraocular movements intact.      Conjunctiva/sclera: Conjunctivae normal.   Cardiovascular:       Rate and Rhythm: Normal rate and regular rhythm.      Pulses: Normal pulses.      Heart sounds: Normal heart sounds.   Pulmonary:      Effort: Pulmonary effort is normal.      Breath sounds: Normal breath sounds.   Abdominal:      General: Bowel sounds are normal.      Palpations: Abdomen is soft.   Musculoskeletal:         General: Normal range of motion.      Cervical back: Normal range of motion and neck supple.   Skin:     General: Skin is warm and dry.   Neurological:      General: No focal deficit present.      Mental Status: She is alert and oriented to person, place, and time. Mental status is at baseline.   Psychiatric:         Mood and Affect: Mood normal.         Behavior: Behavior normal.         Thought Content: Thought content normal.         Judgment: Judgment normal.         Lab Review  Lab Results   Component Value Date/Time    WBC 7.4 11/02/2023 07:35 AM    WBC 4.4 07/02/2010 08:56 AM    RBC 3.95 (L) 11/02/2023 07:35 AM    RBC 4.45 07/02/2010 08:56 AM    HEMOGLOBIN 12.4 11/02/2023 07:35 AM    HEMATOCRIT 37.8 11/02/2023 07:35 AM    MCV 95.7 11/02/2023 07:35 AM    MCV 95 07/02/2010 08:56 AM    MCH 31.4 11/02/2023 07:35 AM    MCH 31.5 07/02/2010 08:56 AM    MCHC 32.8 11/02/2023 07:35 AM    MPV 9.9 11/02/2023 07:35 AM      Lab Results   Component Value Date/Time    SODIUM 141 11/02/2023 07:35 AM    POTASSIUM 3.9 11/02/2023 07:35 AM    CHLORIDE 106 11/02/2023 07:35 AM    CO2 24 11/02/2023 07:35 AM    GLUCOSE 146 (H) 11/02/2023 07:35 AM    BUN 18 11/02/2023 07:35 AM    CREATININE 1.10 11/02/2023 07:35 AM    CREATININE 1.08 (H) 07/02/2010 08:56 AM    BUNCREATRAT 11 07/02/2010 08:56 AM    GLOMRATE 50 (L) 07/02/2010 08:56 AM      Lab Results   Component Value Date/Time    ASTSGOT 15 11/02/2023 07:35 AM    ALTSGPT 13 11/02/2023 07:35 AM     Lab Results   Component Value Date/Time    CHOLSTRLTOT 175 10/20/2022 11:23 AM    LDL 98 10/20/2022 11:23 AM    HDL 61 10/20/2022 11:23 AM    TRIGLYCERIDE 81 10/20/2022  "11:23 AM    TROPONINT 28 (H) 11/02/2023 07:35 AM       No results for input(s): \"NTPROBNP\" in the last 72 hours.    Cardiac Imaging and Procedures Review  CARDIAC STUDIES/PROCEDURES:    CARDIAC CATHETERIZATION CONCLUSIONS by Zacarias Walden (10/09/23)  Mild nonobstructive coronary artery disease.  Severe transaortic pressure gradient.  Borderline elevated left heart filling pressures.  Mild to moderate aortic regurgitation.  (study result reviewed)     EKG performed on (11/02/23) was reviewed: EKG personally interpreted shows sinus rhythm.     TRANSESOPHAGEAL ECHOCARDIOGRAM CONCLUSIONS by Dr. Sesay (10/23/23)  Intraoperative SHARIFA during TAVR.  Baseline images show normal   biventricular systolic function with EF = 55%.  Calcified aortic valve   leaflets. Tricuspid aortic valve. Severe aortic valve stenosis. Mild   aortic insufficiency.  Post deployment images show preserved function.    A 23 mm Mckenzie TAVR valve is seen in the aortic position with normal   leaflet motion, no paravalvular leak, mean gradient of 6 mm Hg, and   calculated effective orifice area of 2.2 cm2.  Findings communicated at   the time of exam.   (study result reviewed)   Assessment/Plan  Abdominal pain status post TAVR: She is a 83 y.o. female with a past medical history status post TAVR (23 Mckenzie Chris S3 Ultra Resilia deployed at nominal volume via the transfemoral approach with protection of the RCA with snorkel stent with 3.5x24 Synergy XD MARCELLUS by Robert Corbett and Jonny Dillon 10/23/23) who presented 11/2/2023 with abdominal pain, nausea with elevated blood pressure. She is currently pain free. We will perform an echocardiogram, follow her troponin levels and her clinically.    Thank you for allowing me to participate in the care of this patient.    I will continue to follow this patient    Please contact me with any questions.    Thai Lin M.D.   Cardiologist, [facility]  [phone] - [phone]

## 2023-11-02 NOTE — ED NOTES
Med rec is complete per Patient at bedside.  Visitor was present at time of interview with permission from Patient.    Allergies reviewed.    Has patient had any outside antibiotics in the last 30 days? N    Any Anticoagulants (rivaroxaban, apixaban, edoxaban, dabigatran, warfarin, enoxaparin) taken in the last 14 days? N          Preferred pharmacy for this visit : [facility]

## 2023-11-02 NOTE — H&P
Hospital Medicine History & Physical Note    Date of Service  11/2/2023    Primary Care Physician  Master Suarez M.D.    Consultants  cardiology    Specialist Names: [email or web address]    Code Status  Prior    Chief Complaint  Chief Complaint   Patient presents with    Chest Pain     Patient complains of 10/10 chest pain with nausea. Unable to describe. No SOB.  Patient had TAVR procedure 1 week ago. Patient also mentions she was recently prescribed steroids for chronic back pain.        History of Presenting Illness  Katerina Torres is a 83 y.o. female who presented 11/2/2023 with past medical history of chronic back pain, aortic stenosis status post TAVR 1-1/2 weeks ago presents to the emergency department with complaints of palpitations.  She states that she felt like she was having palpitations on and off since last night so severe that she felt like she was going to pass out.  She got concerned and presented to [place] emergency department.  The emergency part of her routine labs show slight elevation of troponin of 28.  Due to her recent cardiac procedure cardiology was consulted.  Patient referred to me for the care and management    No fever no chills    No nausea or vomiting    I discussed the plan of care with patient.    Review of Systems  Review of Systems   Constitutional:  Negative for chills, fever and weight loss.   HENT:  Negative for congestion, ear pain, hearing loss and tinnitus.    Eyes:  Negative for blurred vision, photophobia and pain.   Respiratory:  Negative for cough and sputum production.    Cardiovascular:  Positive for palpitations. Negative for chest pain and orthopnea.   Gastrointestinal:  Negative for abdominal pain, constipation, diarrhea, heartburn, nausea and vomiting.   Genitourinary:  Negative for dysuria and urgency.   Musculoskeletal:  Positive for back pain. Negative for myalgias and neck pain.   Neurological:  Positive for dizziness. Negative for tingling, tremors and  headaches.   Psychiatric/Behavioral:  Negative for depression, substance abuse and suicidal ideas.    All other systems reviewed and are negative.      Past Medical History   has a past medical history of Arrhythmia (Year ago), Breast cancer (HCC), Breath shortness, Bruises easily, Cancer (HCC) (1989), Cardiac murmur (06/03/2009), Chest pain (11/2/2023), Chronic kidney disease, stage III (moderate) ([facility]) (08/20/2015), Coronary artery disease involving native coronary artery of native heart without angina pectoris (10/31/2023), Dyslipidemia (06/03/2009), Glaucoma, Heart valve disease (year ago), Hepatitis A (1993), Hiatus hernia syndrome, breast cancer (06/03/2009), Hypertension (07/18/2017), MEDICAL HOME, Neutropenia (06/03/2009), Osteopenia (06/03/2009), PONV (postoperative nausea and vomiting), Preventative health care (06/03/2009), Snoring, and Unspecified cataract.    Surgical History   has a past surgical history that includes other; cataract phaco with iol (05/14/2009); cataract phaco with iol (05/28/2009); lumpectomy; pr radiation therapy plan simple; recovery (03/31/2014); vitrectomy posterior (Left, 10/25/2016); knee arthroplasty total (Right, 05/08/2018); lumbar transforaminal epidural steroid injection (Right, 01/06/2021); lumbar transforaminal epidural steroid injection (Right, 09/01/2022); other orthopedic surgery (2017); no pertinent past surgical history (Breast Cancer); lymph node excision (Left); transcatheter aortic valve replacement (Bilateral, 10/23/2023); echocardiogram, transesophageal, intraoperative (N/A, 10/23/2023); and angiogram, with angioplasty, and stent insertion if indicated (Bilateral, 10/23/2023).     Family History  family history includes Cancer in her mother and sister; Heart Disease in her father; Stroke in her father.   Family history reviewed with patient. There is family history that is pertinent to the chief complaint.     Social History   reports that she quit smoking about  24 years ago. Her smoking use included cigarettes. She started smoking about 34 years ago. She has a 5.0 pack-year smoking history. She has never used smokeless tobacco. She reports current alcohol use of about 4.2 oz of alcohol per week. She reports that she does not use drugs.    Allergies  Allergies   Allergen Reactions    Hydrocodone Nausea    Atorvastatin Unspecified     Does not qualify for primary prevention    Naproxen      GI upset    Neurontin [Gabapentin] Unspecified     dizziness       Medications  Prior to Admission Medications   Prescriptions Last Dose Informant Patient Reported? Taking?   acetaminophen (TYLENOL) 500 MG Tab 11/1/2023 at pm Patient, Friend Yes No   Sig: Take 1,000 mg by mouth every 6 hours as needed for Mild Pain.   aspirin 81 MG EC tablet 11/2/2023 at 0613 Patient, Friend No No   Sig: Take 1 Tablet by mouth every day.   bimatoprost (LUMIGAN) 0.01 % Solution 11/1/2023 at 2300 Patient, Friend Yes No   Sig: Administer 1 Drop into both eyes at bedtime. Indications: Wide-Angle Glaucoma   clopidogrel (PLAVIX) 75 MG Tab 11/2/2023 at 0613 Patient, Friend No No   Sig: Take 1 Tablet by mouth every day.   methylPREDNISolone (MEDROL DOSEPAK) 4 MG Tablet Therapy Pack 11/2/2023 at 0613 Patient, Friend No No   Sig: As directed on the packaging label.   rosuvastatin (CRESTOR) 5 MG Tab new Patient, Friend No No   Sig: Take 1 Tablet by mouth every evening.   traMADol (ULTRAM) 50 MG Tab 10/27/2023 at stopped Patient, Friend Yes Yes   Sig: Take 50 mg by mouth every 6 hours as needed for Mild Pain (pt does not like).   vitamin D (CHOLECALCIFEROL) 1000 UNIT Tab 11/2/2023 at 0613 Patient, Friend Yes No   Sig: Take 1,000 Units by mouth every day.      Facility-Administered Medications: None       Physical Exam  Temp:  [36.3 °C (97.4 °F)-36.6 °C (97.8 °F)] 36.6 °C (97.8 °F)  Pulse:  [64-86] 64  Resp:  [18-20] 18  BP: (165-187)/(75-87) 165/75  SpO2:  [95 %-98 %] 98 %  Blood Pressure : (!) 165/75   Temperature:  36.6 °C (97.8 °F)   Pulse: 64   Respiration: 18   Pulse Oximetry: 98 %       Physical Exam  Constitutional:       General: She is not in acute distress.     Appearance: Normal appearance. She is not ill-appearing, toxic-appearing or diaphoretic.   HENT:      Head: Normocephalic.      Mouth/Throat:      Mouth: Mucous membranes are moist.   Eyes:      General: No scleral icterus.        Left eye: No discharge.      Extraocular Movements: Extraocular movements intact.      Pupils: Pupils are equal, round, and reactive to light.   Cardiovascular:      Rate and Rhythm: Normal rate and regular rhythm.      Heart sounds: Murmur heard.      No friction rub. No gallop.   Pulmonary:      Effort: Pulmonary effort is normal. No respiratory distress.      Breath sounds: No stridor. No wheezing or rales.   Chest:      Chest wall: No tenderness.   Abdominal:      General: Abdomen is flat. There is no distension.      Palpations: There is no mass.      Tenderness: There is no abdominal tenderness. There is no guarding.      Hernia: No hernia is present.   Musculoskeletal:         General: No swelling, tenderness or deformity. Normal range of motion.      Cervical back: Normal range of motion.      Right lower leg: No edema.   Skin:     General: Skin is warm.      Capillary Refill: Capillary refill takes 2 to 3 seconds.      Coloration: Skin is not jaundiced or pale.      Findings: No bruising or erythema.   Neurological:      General: No focal deficit present.      Mental Status: She is alert.      Cranial Nerves: No cranial nerve deficit.      Motor: No weakness.   Psychiatric:         Mood and Affect: Mood normal.         Behavior: Behavior normal.         Laboratory:  Recent Labs     11/02/23  0735   WBC 7.4   RBC 3.95*   HEMOGLOBIN 12.4   HEMATOCRIT 37.8   MCV 95.7   MCH 31.4   MCHC 32.8   RDW 48.9   PLATELETCT 232   MPV 9.9     Recent Labs     11/02/23  0735   SODIUM 141   POTASSIUM 3.9   CHLORIDE 106   CO2 24   GLUCOSE 146*  "  BUN 18   CREATININE 1.10   CALCIUM 9.2     Recent Labs     11/02/23  0735   ALTSGPT 13   ASTSGOT 15   ALKPHOSPHAT 71   TBILIRUBIN 0.3   LIPASE 35   GLUCOSE 146*         No results for input(s): \"NTPROBNP\" in the last 72 hours.      Recent Labs     11/02/23  0735   TROPONINT 28*       Imaging:  EC-ECHOCARDIOGRAM COMPLETE W/O CONT   Final Result      US-RUQ   Final Result      1.  No sonographic evidence of acute cholecystitis.   2.  Atherosclerotic disease.      DX-CHEST-PORTABLE (1 VIEW)   Final Result      1.  Mild interstitial edema.   2.  Mild enlargement of the cardiomediastinal silhouette.          EKG:  I have personally reviewed the images and compared with prior images.    EKG read by me shows no STEMI rate of 68 with no ST or T wave changes consistent ischemia.      Assessment/Plan:  Justification for Admission Status  I anticipate this patient is appropriate for observation status at this time because I suspect patient require less than 48 hours for further evaluation and treatment of her palpitations      * Palpitations- (present on admission)  Assessment & Plan  The ASCVD Risk score (Candice DK, et al., 2019) failed to calculate for the following reasons:    The 2019 ASCVD risk score is only valid for ages 40 to 79      With her recent TAVR patient will be monitored on telemetry     get an echocardiogram and get serial troponins and monitor for myocardial damage.    Dr. Flanagan of cardiology will be consulting.    Dyspepsia- (present on admission)  Assessment & Plan  Patient is started on 20 mg p.o. twice daily= symptoms may be related to GERD    Mixed hyperlipidemia  Assessment & Plan  Continue Crestor    Diastolic heart failure (HCC)- (present on admission)  Assessment & Plan  Check echocardiogram    Check BNP        VTE prophylaxis: SCDs/TEDs  "

## 2023-11-02 NOTE — ED PROVIDER NOTES
"ED Provider Note    CHIEF COMPLAINT  Chief Complaint   Patient presents with    Chest Pain     Patient complains of 10/10 chest pain with nausea. Unable to describe. No SOB.  Patient had TAVR procedure 1 week ago. Patient also mentions she was recently prescribed steroids for chronic back pain.        EXTERNAL RECORDS REVIEWED  Inpatient Notes TAVR by Dr. Nunez.  Discharge summary from 10/24/2023 reviewed.  Patient had a TAVR placed with drug-eluting stent to the RCA and underwent an intraoperative transesophageal echocardiogram noted to have a calcified aortic valve, tricuspid aortic valve, severe aortic valve stenosis and mild aortic insufficiency.  Post deployment of TAVR valve showed preserved EF.    HPI/ROS  LIMITATION TO HISTORY   Select: : None    OUTSIDE HISTORIAN(S):  Friend patient's neighbor is here at the bedside.  She states the pain seemed \"like acid reflux.\"    Katerina Torres is a 83 y.o. female with history of dyslipidemia, hypertension, coronary artery disease, breast cancer, and valvular heart disease status post TAVR 1 week ago who presents for central chest pain, burning in nature.  This pain began in her back last evening, has been constant, and now radiates to the front.  No exacerbating or alleviating factors.  She also reports associated nausea and palpitations beginning at 9 PM.  Patient reports upper back pain that has been ongoing since July secondary to a T5 compression fracture.  She started a Medrol Dosepak on Wednesday.  Patient has been taking Tylenol 1 g more frequently than every 6 hours for her back pain without relief and was told this may be too much so she reduced the dosing in the last couple of days.  She has also been applying Lidoderm patches.    She denies vomiting, shortness of breath, diaphoresis, fever, chills, cough, hemoptysis, melena, hematochezia.    PAST MEDICAL HISTORY   has a past medical history of Arrhythmia (Year ago), Breast cancer (HCC), Breath " shortness, Bruises easily, Cancer ([facility]) (), Cardiac murmur (2009), Chest pain (2023), Chronic kidney disease, stage III (moderate) ([facility]) (2015), Coronary artery disease involving native coronary artery of native heart without angina pectoris (10/31/2023), Dyslipidemia (2009), Glaucoma, Heart valve disease (year ago), Hepatitis A (), Hiatus hernia syndrome, breast cancer (2009), Hypertension (2017), MEDICAL HOME, Neutropenia (2009), Osteopenia (2009), PONV (postoperative nausea and vomiting), Preventative health care (2009), Snoring, and Unspecified cataract.    SURGICAL HISTORY   has a past surgical history that includes other; cataract phaco with iol (2009); cataract phaco with iol (2009); lumpectomy; radiation therapy plan simple; recovery (2014); vitrectomy posterior (Left, 10/25/2016); knee arthroplasty total (Right, 2018); lumbar transforaminal epidural steroid injection (Right, 2021); lumbar transforaminal epidural steroid injection (Right, 2022); other orthopedic surgery (); no pertinent past surgical history (Breast Cancer); lymph node excision (Left); transcatheter aortic valve replacement (Bilateral, 10/23/2023); echocardiogram, transesophageal, intraoperative (N/A, 10/23/2023); and angiogram, with angioplasty, and stent insertion if indicated (Bilateral, 10/23/2023).    FAMILY HISTORY  Family History   Problem Relation Age of Onset    Heart Disease Father         CAD MI    Stroke Father     Cancer Sister         breast    Cancer Mother         breast       SOCIAL HISTORY  Social History     Tobacco Use    Smoking status: Former     Current packs/day: 0.00     Average packs/day: 0.5 packs/day for 10.0 years (5.0 ttl pk-yrs)     Types: Cigarettes     Start date: 1989     Quit date: 1999     Years since quittin.8    Smokeless tobacco: Never   Vaping Use    Vaping Use: Never used   Substance and  Sexual Activity    Alcohol use: Yes     Alcohol/week: 4.2 oz     Types: 7 Standard drinks or equivalent per week     Comment: 1 glass wine/day    Drug use: No    Sexual activity: Not Currently     Partners: Male     Pt's   as of 3 weeks ago  Lives alone   Here with her neighbor    CURRENT MEDICATIONS  Home Medications       Reviewed by Rasheed Jimenez (Pharmacy Tech) on 23 at 1018  Med List Status: Unable to Obtain     Medication Last Dose Status   acetaminophen (TYLENOL) 500 MG Tab  Active   aspirin 81 MG EC tablet  Active   bimatoprost (LUMIGAN) 0.01 % Solution  Active   clopidogrel (PLAVIX) 75 MG Tab  Active   methylPREDNISolone (MEDROL DOSEPAK) 4 MG Tablet Therapy Pack  Active   rosuvastatin (CRESTOR) 5 MG Tab  Active   timolol (TIMOPTIC) 0.5 % Solution  Active   vitamin D (CHOLECALCIFEROL) 1000 UNIT Tab  Active                    ALLERGIES  Allergies   Allergen Reactions    Atorvastatin Unspecified     Does not qualify for primary prevention    Hydrocodone-Acetaminophen     Naproxen      GI upset    Neurontin [Gabapentin] Unspecified     dizziness    Vicodin [Hydrocodone-Acetaminophen] Nausea     Nausea, dizziness       PHYSICAL EXAM  VITAL SIGNS: BP (!) 170/77   Pulse 67   Temp 36.3 °C (97.4 °F) (Temporal)   Resp 18   Wt 61.2 kg (134 lb 14.7 oz)   SpO2 95%   BMI 24.68 kg/m²    General:  WDWN female, nontoxic, slightly anxious and uncomfortable appearing; A+Ox3; V/S as above; elevated BP; afebrile  Skin: warm and dry; good color; no rash  HEENT: NCAT; EOMs intact; PERRL; no scleral icterus   Neck: FROM; no JVD  Cardiovascular: Regular heart rate and rhythm.  No murmurs, rubs, or gallops; pulses 2+ bilaterally radially and DP areas  Chest wall:  no crepitus or tenderness  Lungs: No respiratory distress or tachypnea; Clear to auscultation with good air movement bilaterally.  No wheezes, rhonchi, or rales.   Abdomen: BS present; soft; NTND; no rebound, guarding, or rigidity.  No  organomegaly or pulsatile mass  Extremities: MUJICA x 4; no e/o trauma; no pedal edema; neg Devin's  Neurologic: CNs III-XII grossly intact; speech clear; distal sensation intact; strength 5/5 UE/LEs  Psychiatric: Appropriate affect, normal mood      DIAGNOSTIC STUDIES / PROCEDURES  EKG  I have independently interpreted this EKG which shows no acute ST changes.    LABS  Results for orders placed or performed during the hospital encounter of 11/02/23   CBC with Differential   Result Value Ref Range    WBC 7.4 4.8 - 10.8 K/uL    RBC 3.95 (L) 4.20 - 5.40 M/uL    Hemoglobin 12.4 12.0 - 16.0 g/dL    Hematocrit 37.8 37.0 - 47.0 %    MCV 95.7 81.4 - 97.8 fL    MCH 31.4 27.0 - 33.0 pg    MCHC 32.8 32.2 - 35.5 g/dL    RDW 48.9 35.9 - 50.0 fL    Platelet Count 232 164 - 446 K/uL    MPV 9.9 9.0 - 12.9 fL    Neutrophils-Polys 78.20 (H) 44.00 - 72.00 %    Lymphocytes 15.10 (L) 22.00 - 41.00 %    Monocytes 5.00 0.00 - 13.40 %    Eosinophils 0.80 0.00 - 6.90 %    Basophils 0.50 0.00 - 1.80 %    Immature Granulocytes 0.40 0.00 - 0.90 %    Nucleated RBC 0.00 0.00 - 0.20 /100 WBC    Neutrophils (Absolute) 5.74 1.82 - 7.42 K/uL    Lymphs (Absolute) 1.11 1.00 - 4.80 K/uL    Monos (Absolute) 0.37 0.00 - 0.85 K/uL    Eos (Absolute) 0.06 0.00 - 0.51 K/uL    Baso (Absolute) 0.04 0.00 - 0.12 K/uL    Immature Granulocytes (abs) 0.03 0.00 - 0.11 K/uL    NRBC (Absolute) 0.00 K/uL   Complete Metabolic Panel (CMP)   Result Value Ref Range    Sodium 141 135 - 145 mmol/L    Potassium 3.9 3.6 - 5.5 mmol/L    Chloride 106 96 - 112 mmol/L    Co2 24 20 - 33 mmol/L    Anion Gap 11.0 7.0 - 16.0    Glucose 146 (H) 65 - 99 mg/dL    Bun 18 8 - 22 mg/dL    Creatinine 1.10 0.50 - 1.40 mg/dL    Calcium 9.2 8.5 - 10.5 mg/dL    Correct Calcium 9.2 8.5 - 10.5 mg/dL    AST(SGOT) 15 12 - 45 U/L    ALT(SGPT) 13 2 - 50 U/L    Alkaline Phosphatase 71 30 - 99 U/L    Total Bilirubin 0.3 0.1 - 1.5 mg/dL    Albumin 4.0 3.2 - 4.9 g/dL    Total Protein 6.7 6.0 - 8.2 g/dL     Globulin 2.7 1.9 - 3.5 g/dL    A-G Ratio 1.5 g/dL   Troponins NOW   Result Value Ref Range    Troponin T 28 (H) 6 - 19 ng/L   ESTIMATED GFR   Result Value Ref Range    GFR (CKD-EPI) 49 (A) >60 mL/min/1.73 m 2   LIPASE   Result Value Ref Range    Lipase 35 11 - 82 U/L   EKG (NOW)   Result Value Ref Range    Report       [facility]    Test Date:  2023  Pt Name:    [facility]           Department: ER  MRN:        [id number]                      Room:  Gender:     Female                       Technician: [id number]  :        [id number]                   Requested By:ER TRIAGE PROTOCOL  Order #:    [id number]                    Reading MD: SLIME CHAN MD    Measurements  Intervals                                Axis  Rate:       73                           P:          -30  VT:         179                          QRS:        -1  QRSD:       86                           T:          18  QT:         404  QTc:        446    Interpretive Statements  Sinus rhythm  Compared to ECG 10/31/2023 11:47:45  No significant changes  Electronically Signed On 2023 08:14:07 PDT by SLIME CHAN MD     EKG   Result Value Ref Range    Report       [facility]    Test Date:  2023  Pt Name:    [facility]           Department: ER  MRN:        [id number]                      Room:       RD 10  Gender:     Female                       Technician: [id number]  :        [id number]                   Requested By:SLIME CHAN  Order #:    [id number]                    Reading MD:    Measurements  Intervals                                Axis  Rate:       68                           P:          53  VT:         186                          QRS:        0  QRSD:       80                           T:          35  QT:         398  QTc:        424    Interpretive Statements  Sinus rhythm  Atrial premature complex  Compared to ECG 2023  07:07:58  Atrial premature complex(es) now present           RADIOLOGY  I have independently interpreted the diagnostic imaging associated with this visit and am waiting the final reading from the radiologist.   My preliminary interpretation is as follows:   Chest x-ray demonstrates no pleural effusions    Radiologist interpretation:   US-RUQ   Final Result      1.  No sonographic evidence of acute cholecystitis.   2.  Atherosclerotic disease.      DX-CHEST-PORTABLE (1 VIEW)   Final Result      1.  Mild interstitial edema.   2.  Mild enlargement of the cardiomediastinal silhouette.      EC-ECHOCARDIOGRAM COMPLETE W/O CONT    (Results Pending)         COURSE & MEDICAL DECISION MAKING    ED Observation Status? Yes; I am placing the patient in to an observation status due to a diagnostic uncertainty as well as therapeutic intensity. Patient placed in observation status at 8:14 AM, 11/2/2023.     Observation plan is as follows: Labs, imaging, possible echo, nausea and pain control, cardiology consultation    Upon Reevaluation, the patient's condition has improved.  Pt will be hospitalized to the CDU for continued monitoring, serial troponin, and treatment of symptoms.    Patient discharged from ED Observation status at 10:26 AM   (Time) 11/2/23 (Date).     INITIAL ASSESSMENT, COURSE AND PLAN  Care Narrative: This is an 83-year-old female who underwent a TAVR with placement of a MARCELLUS right coronary artery stent on 10/24.  Patient describes burning in the central chest area with nausea that has been constant since last evening.  She has been taking steroids for the last 2 days for T5 compression fracture pain.  I do not suspect TAD as this back pain is her baseline pain and worsening gradually since July.  I do not suspect PE given the character of the pain.  I considered ACS, postoperative complications, Takotsubo, and esophagitis given the burning nature of the pain and current steroid therapy.  I do not suspect  mediastinitis, Boerhaave's.    EKG demonstrates no acute ST changes.    Chest x-ray reviewed at the bedside and reveals no large pleural effusions.    GI cocktail ordered.    We discussed obtaining Ponaris for her nosebleeds as well as a humidifier given that the season is changing, the heat is on in the house, and her nasal passages are dry and she is now on a blood thinner.    We also discussed looking into kyphoplasty for the T5 compression fracture.  This might reduce her need to take oral medications to control her pain.    Labs show a normal white blood cell count, normal hemoglobin, glucose 146 which may be due to the steroid use, troponin 28 and normal lipase.  Transaminases are normal.    I reevaluated the patient.  She is no longer having pain but states that resolved prior to her taking the GI cocktail.  Patient blood pressure is now 170 systolic.    Repeat EKG ordered.    I discussed the case with Dr. Lin from cardiology who agrees with the plan to admit to CDU for serial troponin and monitoring of symptoms.    10:09 AM  Paging CDU hospitalist    10:25 AM  I discussed the case with Dr. Higuera from the hospitalist service who agrees to hospitalize the patient in the CDU.  He is aware that cardiology has been consulted and agrees with the plan.    Repeat EKG demonstrates no acute ST changes.      HTN/IDDM FOLLOW UP:  The patient has known hypertension and is being followed by their primary care doctor      ADDITIONAL PROBLEM LIST  Memory issues    DISPOSITION AND DISCUSSIONS  I have discussed management of the patient with the following physicians and LULU's:    Delia    Decision tools and prescription drugs considered including, but not limited to: HEART Score 5 .    FINAL DIAGNOSIS  1. Acute chest pain           Electronically signed by: Roberta Green M.D., 11/2/2023 8:13 AM

## 2023-11-03 ENCOUNTER — PHARMACY VISIT (OUTPATIENT)
Dept: PHARMACY | Facility: MEDICAL CENTER | Age: 83
End: 2023-11-03
Payer: COMMERCIAL

## 2023-11-03 ENCOUNTER — PATIENT MESSAGE (OUTPATIENT)
Dept: CARDIOLOGY | Facility: MEDICAL CENTER | Age: 83
End: 2023-11-03
Payer: MEDICARE

## 2023-11-03 VITALS
DIASTOLIC BLOOD PRESSURE: 65 MMHG | BODY MASS INDEX: 23.4 KG/M2 | WEIGHT: 132.06 LBS | RESPIRATION RATE: 16 BRPM | SYSTOLIC BLOOD PRESSURE: 145 MMHG | OXYGEN SATURATION: 97 % | HEART RATE: 64 BPM | TEMPERATURE: 99 F | HEIGHT: 63 IN

## 2023-11-03 PROBLEM — R00.2 PALPITATIONS: Status: RESOLVED | Noted: 2023-11-02 | Resolved: 2023-11-03

## 2023-11-03 PROBLEM — R10.13 DYSPEPSIA: Status: RESOLVED | Noted: 2023-11-02 | Resolved: 2023-11-03

## 2023-11-03 LAB
NT-PROBNP SERPL IA-MCNC: 661 PG/ML (ref 0–125)
TROPONIN T SERPL-MCNC: 27 NG/L (ref 6–19)

## 2023-11-03 PROCEDURE — G0378 HOSPITAL OBSERVATION PER HR: HCPCS

## 2023-11-03 PROCEDURE — 84484 ASSAY OF TROPONIN QUANT: CPT

## 2023-11-03 PROCEDURE — 700102 HCHG RX REV CODE 250 W/ 637 OVERRIDE(OP): Performed by: HOSPITALIST

## 2023-11-03 PROCEDURE — 99231 SBSQ HOSP IP/OBS SF/LOW 25: CPT | Performed by: INTERNAL MEDICINE

## 2023-11-03 PROCEDURE — A9270 NON-COVERED ITEM OR SERVICE: HCPCS | Performed by: HOSPITALIST

## 2023-11-03 PROCEDURE — RXMED WILLOW AMBULATORY MEDICATION CHARGE: Performed by: HOSPITALIST

## 2023-11-03 PROCEDURE — 99239 HOSP IP/OBS DSCHRG MGMT >30: CPT | Performed by: HOSPITALIST

## 2023-11-03 PROCEDURE — 83880 ASSAY OF NATRIURETIC PEPTIDE: CPT

## 2023-11-03 RX ORDER — LISINOPRIL 10 MG/1
10 TABLET ORAL
Status: DISCONTINUED | OUTPATIENT
Start: 2023-11-03 | End: 2023-11-03 | Stop reason: HOSPADM

## 2023-11-03 RX ORDER — METOPROLOL SUCCINATE 25 MG/1
25 TABLET, EXTENDED RELEASE ORAL
Status: DISCONTINUED | OUTPATIENT
Start: 2023-11-03 | End: 2023-11-03 | Stop reason: HOSPADM

## 2023-11-03 RX ORDER — LISINOPRIL 10 MG/1
10 TABLET ORAL DAILY
Qty: 30 TABLET | Refills: 3 | Status: SHIPPED | OUTPATIENT
Start: 2023-11-04 | End: 2023-11-29 | Stop reason: SDUPTHER

## 2023-11-03 RX ORDER — METOPROLOL SUCCINATE 25 MG/1
25 TABLET, EXTENDED RELEASE ORAL DAILY
Qty: 30 TABLET | Refills: 3 | Status: SHIPPED | OUTPATIENT
Start: 2023-11-03 | End: 2023-11-29

## 2023-11-03 RX ORDER — ECHINACEA PURPUREA EXTRACT 125 MG
2 TABLET ORAL
Status: DISCONTINUED | OUTPATIENT
Start: 2023-11-03 | End: 2023-11-03 | Stop reason: HOSPADM

## 2023-11-03 RX ORDER — OMEPRAZOLE 20 MG/1
20 CAPSULE, DELAYED RELEASE ORAL 2 TIMES DAILY
Qty: 60 CAPSULE | Refills: 0 | Status: SHIPPED | OUTPATIENT
Start: 2023-11-03 | End: 2023-11-29

## 2023-11-03 RX ADMIN — METOPROLOL SUCCINATE 25 MG: 25 TABLET, EXTENDED RELEASE ORAL at 09:01

## 2023-11-03 RX ADMIN — OMEPRAZOLE 20 MG: 20 CAPSULE, DELAYED RELEASE ORAL at 05:03

## 2023-11-03 RX ADMIN — LISINOPRIL 10 MG: 10 TABLET ORAL at 09:01

## 2023-11-03 RX ADMIN — CLOPIDOGREL BISULFATE 75 MG: 75 TABLET ORAL at 05:03

## 2023-11-03 RX ADMIN — ASPIRIN 81 MG: 81 TABLET, COATED ORAL at 05:03

## 2023-11-03 ASSESSMENT — ENCOUNTER SYMPTOMS
SHORTNESS OF BREATH: 0
CONSTITUTIONAL NEGATIVE: 1
MYALGIAS: 0
EYES NEGATIVE: 1
PSYCHIATRIC NEGATIVE: 1
MUSCULOSKELETAL NEGATIVE: 1
VOMITING: 0
NERVOUS/ANXIOUS: 0
HEADACHES: 0
CARDIOVASCULAR NEGATIVE: 1
COUGH: 0
RESPIRATORY NEGATIVE: 1
PALPITATIONS: 0
WEAKNESS: 0
NAUSEA: 0
FEVER: 0
DIZZINESS: 0
ABDOMINAL PAIN: 0
BRUISES/BLEEDS EASILY: 0
NEUROLOGICAL NEGATIVE: 1
CHILLS: 0
GASTROINTESTINAL NEGATIVE: 1

## 2023-11-03 NOTE — PATIENT COMMUNICATION
To SC: does she still need to have echo done on 11/20 for TAVR follow up or is the one done inpatient on 11/2 okay?

## 2023-11-03 NOTE — DISCHARGE SUMMARY
Discharge Summary    CHIEF COMPLAINT ON ADMISSION  Chief Complaint   Patient presents with    Chest Pain     Patient complains of 10/10 chest pain with nausea. Unable to describe. No SOB.  Patient had TAVR procedure 1 week ago. Patient also mentions she was recently prescribed steroids for chronic back pain.        Reason for Admission  Palpitations     Admission Date  11/2/2023    CODE STATUS  Full Code    HPI & HOSPITAL COURSE  Katerina Torres is a 83 y.o. female who presented 11/2/2023 with past medical history of chronic back pain, aortic stenosis status post TAVR 1-1/2 weeks ago presents to the emergency department with complaints of palpitations.  She states that she felt like she was having palpitations on and off since last night so severe that she felt like she was going to pass out.  She got concerned and presented to [place] emergency department.  The emergency part of her routine labs show slight elevation of troponin of 28.  Due to her recent cardiac procedure cardiology was consulted.  Patient referred to me for the care and management     No fever no chills     No nausea or vomiting    Patient is monitored on telemetry no evidence of arrhythmia.  Patient blood pressure was elevated and patient initiated on lisinopril and low-dose metoprolol immediate observe better blood pressure control.  Patient was symptom-free on day discharge of 11/3/2023.  Patient was seen by cardiology and cleared for discharge home to follow-up cardiology outpatient setting    Patient instructed to follow with PCP within the week    Therefore, she is discharged in good and stable condition to home with close outpatient follow-up.    The patient recovered much more quickly than anticipated on admission.    Discharge Date  11/3/2023    FOLLOW UP ITEMS POST DISCHARGE      DISCHARGE DIAGNOSES  Principal Problem (Resolved):    Palpitations (POA: Yes)  Active Problems:    Diastolic heart failure (HCC) (POA: Yes)      Overview:   LVEDP of 28 mmHg    Mixed hyperlipidemia (POA: Yes)  Resolved Problems:    Dyspepsia (POA: Yes)      FOLLOW UP  Future Appointments   Date Time Provider Department Center   11/9/2023  4:00 PM ESTRELLA Khan   11/20/2023  2:15 PM [facility] 9 [facility]   11/29/2023  8:40 AM Robert Nunez M.D. [facility] None   12/21/2023  1:20 PM ESTRELLA Khan   10/23/2024  9:15 AM [facility] EXAM 10 [facility]     Master Suarez M.D.  [address]  [address]  [place] [address]  [phone]    Schedule an appointment as soon as possible for a visit in 1 week(s)      Master Suarez M.D.  [address]  [address]  [place] [address]  [phone]            MEDICATIONS ON DISCHARGE     Medication List        START taking these medications        Instructions   lisinopril 10 MG Tabs  Start taking on: November 4, 2023  Commonly known as: Prinivil   Take 1 Tablet by mouth every day.  Dose: 10 mg     metoprolol SR 25 MG Tb24  Commonly known as: Toprol XL   Take 1 Tablet by mouth every day.  Dose: 25 mg     omeprazole 20 MG delayed-release capsule  Commonly known as: PriLOSEC   Take 1 Capsule by mouth 2 times a day.  Dose: 20 mg            CONTINUE taking these medications        Instructions   acetaminophen 500 MG Tabs  Commonly known as: Tylenol   Take 1,000 mg by mouth every 6 hours as needed for Mild Pain.  Dose: 1,000 mg     Aspirin Low Dose 81 MG EC tablet  Generic drug: aspirin   Take 1 Tablet by mouth every day.  Dose: 81 mg     bimatoprost 0.01 % Soln  Commonly known as: Lumigan   Administer 1 Drop into both eyes at bedtime. Indications: Wide-Angle Glaucoma  Dose: 1 Drop     clopidogrel 75 MG Tabs  Commonly known as: Plavix   Take 1 Tablet by mouth every day.  Dose: 75 mg     rosuvastatin 5 MG Tabs  Commonly known as: Crestor   Take 1 Tablet by mouth every evening.  Dose: 5 mg     traMADol 50 MG Tabs  Commonly known as: Ultram   Take 50 mg by mouth  every 6 hours as needed for Mild Pain (pt does not like).  Dose: 50 mg     vitamin D 1000 Unit (25 mcg) Tabs  Commonly known as: cholecalciferol   Take 1,000 Units by mouth every day.  Dose: 1,000 Units            STOP taking these medications      methylPREDNISolone 4 MG Tbpk  Commonly known as: Medrol Dosepak              Allergies  Allergies   Allergen Reactions    Hydrocodone Nausea    Atorvastatin Unspecified     Does not qualify for primary prevention    Naproxen      GI upset    Neurontin [Gabapentin] Unspecified     dizziness       DIET  Orders Placed This Encounter   Procedures    Diet Order Diet: Cardiac; Miscellaneous modifications: (optional): No Decaf, No Caffeine(for test)     Standing Status:   Standing     Number of Occurrences:   1     Order Specific Question:   Diet:     Answer:   Cardiac [6]     Order Specific Question:   Miscellaneous modifications: (optional)     Answer:   No Decaf, No Caffeine(for test) [11]       ACTIVITY  As tolerated.  Weight bearing as tolerated    CONSULTATIONS      PROCEDURES  Physician Reading Date Result Priority   No Reading Provider Prelim 11/2/2023    Yosef Velarde M.D.  [phone] 11/2/2023      Narrative & Impression  Transthoracic  Echo Report        Echocardiography Laboratory     CONCLUSIONS  Compared to the prior study on  11/09/22, TAVR valve now present  Normal LV size, thickness and systolic function submitted LVEF 70%  Indeterminate LV diastolic function  Normal RV size and systolic function  Known TAVR aortic valve that is functioning normally with normal   transvalvular gradients.  No aortic insufficiency. No evidence of   paravalvular leak. Vmax is 2.0 m/s.   Calcification of mitral valve leaflets without stenosis  No pericardial fusion  Normal IVC size  Unable to estimate RVSP     GAEL DIALLO  Exam Date:         11/02/2023                      10:47  Exam Location:     Inpatient  Priority:          Routine     Ordering Physician:         NANY ESCAMILLA  Referring Physician:       [id number]  Sonographer:               Rona Stack [facility]     Age:    83     Gender:    F  MRN:    [id number]  :    1940  BSA:    1.61   Ht (in):    62     Wt (lb):    134  Exam Type:     Complete     Indications:     Chest Pain  ICD Codes:       786.5     CPT Codes:       14612     BP:   186    /   76     HR:   63  Technical Quality:       Fair     MEASUREMENTS  (Male / Female) Normal Values  2D ECHO  LV Diastolic Diameter PLAX        4.5 cm                4.2 - 5.9 / 3.9 - 5.3   cm  LV Systolic Diameter PLAX         3.2 cm                2.1 - 4.0 cm  IVS Diastolic Thickness           0.85 cm                 LVPW Diastolic Thickness          0.88 cm                 LVOT Diameter                     1.6 cm                  Estimated LV Ejection Fraction    70 %                    LV Ejection Fraction MOD BP       83 %                  >= 55  %  LV Ejection Fraction MOD 4C       81.8 %                  LV Ejection Fraction MOD 2C       73.7 %                  IVC Diameter                      0.9 cm                     DOPPLER  AV Peak Velocity                  1.8 m/s                 AV Peak Gradient                  12.5 mmHg               AV Mean Gradient                  6.6 mmHg                LVOT Peak Velocity                1.1 m/s                 AV Area Cont Eq vti               1.5 cm2                 MV Velocity Time Integral         38.3 cm                 Mitral E Point Velocity           1 m/s                   Mitral E to A Ratio               0.95                    MV Pressure Half Time             82.1 ms                 MV Area PHT                       2.7 cm2                 MV Deceleration Time              247 ms                  TR Peak Velocity                  206 cm/s                PV Peak Velocity                  1.1 m/s                 PV Peak Gradient                  4.5 mmHg                RVOT Peak Velocity                0.82  m/s                   * Indicates values subject to auto-interpretation  LV EF:  70    %     FINDINGS  Left Ventricle  Normal left ventricular size and systolic function. Normal left   ventricular wall thickness. Indeterminate diastolic function. The left   ventricular ejection fraction is visually estimated to be 70%.     Right Ventricle  Normal right ventricular size and systolic function.     Right Atrium  Normal right atrial size. Normal inferior vena cava size and   inspiratory collapse.     Left Atrium  Normal left atrial size. Left atrial volume index is 20 mL/sq m.     Mitral Valve  Calcification of the mitral valve leaflets. No mitral stenosis. Trace   mitral regurgitation.     Aortic Valve  Known TAVR aortic valve that is functioning normally with normal   transvalvular gradients.  No aortic insufficiency. No evidence of   paravalvular leak. Vmax is 2.0 m/s. Transvalvular gradients are - Peak:   16 mmHg,  Mean: 7 mmHg. Acceleration time is 45 ms.     Tricuspid Valve  Structurally normal tricuspid valve. Trace tricuspid regurgitation. No   tricuspid stenosis. Right atrial pressure is estimated to be 3 mmHg.   Unable to estimate pulmonary artery pressure due to an inadequate   tricuspid regurgitant jet.     Pulmonic Valve  Structurally normal pulmonic valve without significant stenosis or   regurgitation.     Pericardium  No pericardial effusion.     Aorta  Normal aortic root for body surface area. The ascending aorta diameter   is 2.8 cm.                                Yosef Velarde MD  (Electronically Signed)  Final Date:     02 November 2023                   12:08    LABORATORY  Lab Results   Component Value Date    SODIUM 141 11/02/2023    POTASSIUM 3.9 11/02/2023    CHLORIDE 106 11/02/2023    CO2 24 11/02/2023    GLUCOSE 146 (H) 11/02/2023    BUN 18 11/02/2023    CREATININE 1.10 11/02/2023    CREATININE 1.08 (H) 07/02/2010    GLOMRATE 50 (L) 07/02/2010        Lab Results   Component Value Date    WBC  7.4 11/02/2023    WBC 4.4 07/02/2010    HEMOGLOBIN 12.4 11/02/2023    HEMATOCRIT 37.8 11/02/2023    PLATELETCT 232 11/02/2023        Total time of the discharge process exceeds 40 minutes.

## 2023-11-03 NOTE — PROGRESS NOTES
Received report from day shift RN. Assumed patient care. Patient resting in bed, NAD, A&O x4. Denies chest pain, tele monitor in place. POC discussed with patient, all questions addressed. Call light within reach.

## 2023-11-03 NOTE — PROGRESS NOTES
Cardiology Follow Up Progress Note    Date of Service  11/3/2023    Attending Physician  No att. providers found    Chief Complaint   Abdominal pain status post TAVR.    HPI  Katerina Torres is a 83 y.o. female admitted 11/2/2023 with above.    Interim Events  No significant changes noted from cardiac standpoint within the past 24 hours.    Review of Systems  Review of Systems   Constitutional: Negative.  Negative for chills and fever.   HENT: Negative.  Negative for hearing loss.    Eyes: Negative.    Respiratory: Negative.  Negative for cough and shortness of breath.    Cardiovascular: Negative.  Negative for chest pain, palpitations and leg swelling.   Gastrointestinal: Negative.  Negative for abdominal pain, nausea and vomiting.   Genitourinary: Negative.  Negative for dysuria and urgency.   Musculoskeletal: Negative.  Negative for myalgias.   Skin: Negative.  Negative for rash.   Neurological: Negative.  Negative for dizziness, weakness and headaches.   Hematological:  Does not bruise/bleed easily.   Psychiatric/Behavioral: Negative.  The patient is not nervous/anxious.        Vital signs in last 24 hours  Temp:  [36.6 °C (97.8 °F)-37.2 °C (99 °F)] 37.2 °C (99 °F)  Pulse:  [56-80] 64  Resp:  [16-20] 16  BP: (137-175)/(65-82) 145/65  SpO2:  [94 %-97 %] 97 %    Physical Exam  Physical Exam  Constitutional:       Appearance: Normal appearance. She is well-developed and normal weight.   HENT:      Head: Normocephalic and atraumatic.      Mouth/Throat:      Mouth: Mucous membranes are moist.   Eyes:      Extraocular Movements: Extraocular movements intact.      Conjunctiva/sclera: Conjunctivae normal.   Cardiovascular:      Rate and Rhythm: Normal rate and regular rhythm.      Pulses: Normal pulses.      Heart sounds: Normal heart sounds.   Pulmonary:      Effort: Pulmonary effort is normal.      Breath sounds: Normal breath sounds.   Abdominal:      General: Bowel sounds are normal.      Palpations: Abdomen  is soft.   Musculoskeletal:         General: Normal range of motion.      Cervical back: Normal range of motion and neck supple.   Skin:     General: Skin is warm and dry.   Neurological:      General: No focal deficit present.      Mental Status: She is alert and oriented to person, place, and time. Mental status is at baseline.   Psychiatric:         Mood and Affect: Mood normal.         Behavior: Behavior normal.         Thought Content: Thought content normal.         Judgment: Judgment normal.         Lab Review  Lab Results   Component Value Date/Time    WBC 7.4 11/02/2023 07:35 AM    WBC 4.4 07/02/2010 08:56 AM    RBC 3.95 (L) 11/02/2023 07:35 AM    RBC 4.45 07/02/2010 08:56 AM    HEMOGLOBIN 12.4 11/02/2023 07:35 AM    HEMATOCRIT 37.8 11/02/2023 07:35 AM    MCV 95.7 11/02/2023 07:35 AM    MCV 95 07/02/2010 08:56 AM    MCH 31.4 11/02/2023 07:35 AM    MCH 31.5 07/02/2010 08:56 AM    MCHC 32.8 11/02/2023 07:35 AM    MPV 9.9 11/02/2023 07:35 AM      Lab Results   Component Value Date/Time    SODIUM 141 11/02/2023 07:35 AM    POTASSIUM 3.9 11/02/2023 07:35 AM    CHLORIDE 106 11/02/2023 07:35 AM    CO2 24 11/02/2023 07:35 AM    GLUCOSE 146 (H) 11/02/2023 07:35 AM    BUN 18 11/02/2023 07:35 AM    CREATININE 1.10 11/02/2023 07:35 AM    CREATININE 1.08 (H) 07/02/2010 08:56 AM    BUNCREATRAT 11 07/02/2010 08:56 AM    GLOMRATE 50 (L) 07/02/2010 08:56 AM      Lab Results   Component Value Date/Time    ASTSGOT 15 11/02/2023 07:35 AM    ALTSGPT 13 11/02/2023 07:35 AM     Lab Results   Component Value Date/Time    CHOLSTRLTOT 175 10/20/2022 11:23 AM    LDL 98 10/20/2022 11:23 AM    HDL 61 10/20/2022 11:23 AM    TRIGLYCERIDE 81 10/20/2022 11:23 AM    TROPONINT 27 (H) 11/03/2023 01:30 AM       Recent Labs     11/02/23  1205 11/03/23  0130   NTPROBNP 578* 661*   (Above labs reviewed.)       Current Facility-Administered Medications:     sodium chloride (Ocean) 0.65 % nasal spray 2 Spray, 2 Spray, Nasal, Q2HRS PRN, Wu LUX  ESTRELLA Higuera    lisinopril (Prinivil) tablet 10 mg, 10 mg, Oral, Q DAY, Wu Higuera M.D., 10 mg at 11/03/23 0901    metoprolol SR (Toprol XL) tablet 25 mg, 25 mg, Oral, Q DAY, Wu Higuera M.D., 25 mg at 11/03/23 0901    omeprazole (PriLOSEC) capsule 20 mg, 20 mg, Oral, BID, Wu Higuera M.D., 20 mg at 11/03/23 0503    acetaminophen (Tylenol) tablet 1,000 mg, 1,000 mg, Oral, Q6HRS PRN, Wu Higuera M.D.    aspirin EC tablet 81 mg, 81 mg, Oral, DAILY, Wu Higuera M.D., 81 mg at 11/03/23 0503    clopidogrel (Plavix) tablet 75 mg, 75 mg, Oral, DAILY, Wu Higuera M.D., 75 mg at 11/03/23 0503    rosuvastatin (Crestor) tablet 5 mg, 5 mg, Oral, Q EVENING, Wu Higuera M.D., 5 mg at 11/02/23 1835    senna-docusate (Pericolace Or Senokot S) 8.6-50 MG per tablet 2 Tablet, 2 Tablet, Oral, BID **AND** polyethylene glycol/lytes (Miralax) PACKET 1 Packet, 1 Packet, Oral, QDAY PRN **AND** magnesium hydroxide (Milk Of Magnesia) suspension 30 mL, 30 mL, Oral, QDAY PRN **AND** bisacodyl (Dulcolax) suppository 10 mg, 10 mg, Rectal, QDAY PRN, Wu Higuera M.D.    ondansetron (Zofran) syringe/vial injection 4 mg, 4 mg, Intravenous, Q4HRS PRN, Wu Higuera M.D.    ondansetron (Zofran ODT) dispertab 4 mg, 4 mg, Oral, Q4HRS PRN, Wu Higuera M.D.    hydrALAZINE (Apresoline) injection 10 mg, 10 mg, Intravenous, Q4HRS PRN, Wu Higuera M.D.    latanoprost (Xalatan) 0.005 % ophthalmic solution 1 Drop, 1 Drop, Both Eyes, QHS, Wu Higuera M.D., 1 Drop at 11/02/23 2005    Current Outpatient Medications:     metoprolol SR (TOPROL XL) 25 MG TABLET SR 24 HR, Take 1 Tablet by mouth every day., Disp: 30 Tablet, Rfl: 3    omeprazole (PRILOSEC) 20 MG delayed-release capsule, Take 1 Capsule by mouth 2 times a day., Disp: 60 Capsule, Rfl: 0    [START ON 11/4/2023] lisinopril (PRINIVIL) 10 MG Tab, Take 1 Tablet by mouth every day., Disp: 30 Tablet, Rfl: 3    traMADol (ULTRAM) 50 MG Tab, Take 50 mg by  mouth every 6 hours as needed for Mild Pain (pt does not like)., Disp: , Rfl:     clopidogrel (PLAVIX) 75 MG Tab, Take 1 Tablet by mouth every day., Disp: 90 Tablet, Rfl: 3    rosuvastatin (CRESTOR) 5 MG Tab, Take 1 Tablet by mouth every evening., Disp: 90 Tablet, Rfl: 3    aspirin 81 MG EC tablet, Take 1 Tablet by mouth every day., Disp: 100 Tablet, Rfl: 3    acetaminophen (TYLENOL) 500 MG Tab, Take 1,000 mg by mouth every 6 hours as needed for Mild Pain., Disp: , Rfl:     bimatoprost (LUMIGAN) 0.01 % Solution, Administer 1 Drop into both eyes at bedtime. Indications: Wide-Angle Glaucoma, Disp: , Rfl:     vitamin D (CHOLECALCIFEROL) 1000 UNIT Tab, Take 1,000 Units by mouth every day., Disp: , Rfl:   (Medications reviewed.)      Cardiac Imaging and Procedures Review  CARDIAC STUDIES/PROCEDURES:     CARDIAC CATHETERIZATION CONCLUSIONS by Zacarias Walden (10/09/23)  Mild nonobstructive coronary artery disease.  Severe transaortic pressure gradient.  Borderline elevated left heart filling pressures.  Mild to moderate aortic regurgitation.      ECHOCARDIOGRAM CONCLUSIONS (11/02/23)  Compared to the prior study on  11/09/22, TAVR valve now present  Normal LV size, thickness and systolic function submitted LVEF 70%  Indeterminate LV diastolic function  Normal RV size and systolic function  Known TAVR aortic valve that is functioning normally with normal   transvalvular gradients.  No aortic insufficiency. No evidence of   paravalvular leak. Vmax is 2.0 m/s.   Calcification of mitral valve leaflets without stenosis  No pericardial fusion  Normal IVC size  Unable to estimate RVSP  (study result reviewed)     EKG performed on (11/02/23) EKG shows sinus rhythm.      TRANSESOPHAGEAL ECHOCARDIOGRAM CONCLUSIONS by Dr. Sesay (10/23/23)  Intraoperative SHARIFA during TAVR.  Baseline images show normal   biventricular systolic function with EF = 55%.  Calcified aortic valve   leaflets. Tricuspid aortic valve. Severe aortic valve  stenosis. Mild   aortic insufficiency.  Post deployment images show preserved function.    A 23 mm Mckenzie TAVR valve is seen in the aortic position with normal   leaflet motion, no paravalvular leak, mean gradient of 6 mm Hg, and   calculated effective orifice area of 2.2 cm2.  Findings communicated at   the time of exam.     Assessment/Plan  Abdominal pain status post TAVR: She is a 83 y.o. female with a past medical history status post TAVR (23 Mckenzie Chris S3 Ultra Resilia deployed at nominal volume via the transfemoral approach with protection of the RCA with snorkel stent with 3.5x24 Synergy XD MARCELLUS by Robert Corbett and Jonny Dillon 10/23/23): She has clinically resolved and underwent unremarkable echocardiogram. Yousuf may be discharged to home today     Thank you for allowing me to participate in the care of this patient.  We will sign off.    Please contact me with any questions.    Thai Lin M.D.   Cardiologist, [facility]  [phone]

## 2023-11-03 NOTE — PROGRESS NOTES
Pt in DCL. IV dcd. Went over discharge paperwork with patient and signed. Going home with sister.

## 2023-11-03 NOTE — PROGRESS NOTES
4 Eyes Skin Assessment Completed by MOMO Gurrola and MOMO Bautista.    Head WDL  Ears WDL  Nose WDL  Mouth WDL  Neck WDL  Breast/Chest WDL  Shoulder Blades WDL  Spine WDL  (R) Arm/Elbow/Hand Bruising  (L) Arm/Elbow/Hand Bruising  Abdomen WDL  Groin Bruising  Scrotum/Coccyx/Buttocks WDL  (R) Leg WDL  (L) Leg Bruising  (R) Heel/Foot/Toe WDL  (L) Heel/Foot/Toe WDL          Devices In Places Blood Pressure Cuff and Pulse Ox      Interventions In Place Pressure Redistribution Mattress    Possible Skin Injury No    Pictures Uploaded Into Epic N/A  Wound Consult Placed N/A  RN Wound Prevention Protocol Ordered No

## 2023-11-03 NOTE — PROGRESS NOTES
Monitor summary: SB/SR 58-68, PA 0.20, QRS 0.06, QT 0.37, with rare PVCs, rare PACs and rare couplet per strip from monitor room.

## 2023-11-03 NOTE — CARE PLAN
The patient is Stable - Low risk of patient condition declining or worsening    Shift Goals  Clinical Goals: Trend troponins, tele monitoring, pain management  Patient Goals: Rest  Family Goals: Not at bedside    POC discussed with patient to continue with tele monitoring, pain management, and trending troponin levels. Patient verbalized understanding of treatment plan and education.     Problem: Knowledge Deficit - Standard  Goal: Patient and family/care givers will demonstrate understanding of plan of care, disease process/condition, diagnostic tests and medications  Outcome: Progressing     Problem: Pain - Standard  Goal: Alleviation of pain or a reduction in pain to the patient’s comfort goal  Outcome: Progressing

## 2023-11-04 PROBLEM — N94.9 ADNEXAL CYST: Status: RESOLVED | Noted: 2023-10-02 | Resolved: 2023-11-04

## 2023-11-04 PROBLEM — Z95.2 S/P TAVR (TRANSCATHETER AORTIC VALVE REPLACEMENT): Status: RESOLVED | Noted: 2023-10-23 | Resolved: 2023-11-04

## 2023-11-04 PROBLEM — I25.10 CORONARY ARTERY DISEASE INVOLVING NATIVE CORONARY ARTERY OF NATIVE HEART WITHOUT ANGINA PECTORIS: Status: RESOLVED | Noted: 2023-10-31 | Resolved: 2023-11-04

## 2023-11-04 PROBLEM — N18.31 CHRONIC KIDNEY DISEASE, STAGE 3A (HCC): Status: RESOLVED | Noted: 2023-05-02 | Resolved: 2023-11-04

## 2023-11-04 PROBLEM — I50.30 DIASTOLIC HEART FAILURE (HCC): Status: RESOLVED | Noted: 2023-10-23 | Resolved: 2023-11-04

## 2023-11-04 PROBLEM — Z86.79 HISTORY OF HYPERTENSION: Status: RESOLVED | Noted: 2017-07-18 | Resolved: 2023-11-04

## 2023-11-04 PROBLEM — M47.9 DEGENERATIVE ARTHRITIS OF SPINE: Status: RESOLVED | Noted: 2023-10-02 | Resolved: 2023-11-04

## 2023-11-04 PROBLEM — E78.2 MIXED HYPERLIPIDEMIA: Status: RESOLVED | Noted: 2023-11-02 | Resolved: 2023-11-04

## 2023-11-06 ENCOUNTER — PATIENT OUTREACH (OUTPATIENT)
Dept: MEDICAL GROUP | Facility: MEDICAL CENTER | Age: 83
End: 2023-11-06
Payer: MEDICARE

## 2023-11-06 NOTE — PATIENT COMMUNICATION
CONSUELO Ferrera.  You 1 hour ago (10:47 AM)     She will still need repeat echo. DOUGLAS Gofft sent to patient at this time.

## 2023-11-06 NOTE — PROGRESS NOTES
11/6: 1st Patient outreach for TCM.  LV with contact information.  11/7: Transitional Care Management  TCM Outreach Date and Time: Filed (11/6/2023  8:59 AM)    Discharge Questions  Actual Discharge Date: 11/03/23  Now that you are home, how are you feeling?: Fair (back pain, recent loss of  x3 weeks ago)  Did you receive any new prescriptions?: Yes  Were you able to get them filled?: Yes  Meds to Bed or Pharmacy filled?: Pharmacy  Do you have any questions about your current medications or new medications (Review Med Rec)?: No  Do you have a follow up appointment scheduled with your PCP?: Yes  Appointment Date: 11/09/23  Appointment Time: 1600  Any issues or paperwork you wish to discuss with your PCP?: Yes (Please specify) (back pain options, states she is following with physiatry, but would like to discuss potential surgical options with pcp. Pt also suffered recent loss of  x3 weeks ago, may benefit from grief counseling or therapy services, denies SI/HI.)  Does this patient qualify for the CCM program?: Yes    Transitional Care  Number of attempts made to contact patient: 2  Current or previous attempts competed within two business days of discharge? : Yes  Provided education regarding treatment plan, medications, self-management, ADLs?: Yes  Has patient completed an Advanced Directive?: Yes  Is the patient's advanced directive on file?: Yes  Has the Care Manager's phone number provided?: Yes  Is there anything else I can help you with?: No    Discharge Summary  Chief Complaint: Chest Pain - Patient complains of 10/10 chest pain with nausea. Unable to describe. No SOB.  Patient had TAVR procedure 1 week ago. Patient also mentions she was recently prescribed steroids for chronic back pain.  Admitting Diagnosis: Palpitations  Discharge Diagnosis: palpitations

## 2023-11-09 ENCOUNTER — OFFICE VISIT (OUTPATIENT)
Dept: MEDICAL GROUP | Facility: MEDICAL CENTER | Age: 83
End: 2023-11-09
Payer: MEDICARE

## 2023-11-09 VITALS
WEIGHT: 134 LBS | DIASTOLIC BLOOD PRESSURE: 58 MMHG | HEART RATE: 68 BPM | HEIGHT: 61 IN | SYSTOLIC BLOOD PRESSURE: 112 MMHG | BODY MASS INDEX: 25.3 KG/M2 | RESPIRATION RATE: 16 BRPM | TEMPERATURE: 97.4 F | OXYGEN SATURATION: 98 %

## 2023-11-09 DIAGNOSIS — S22.050D COMPRESSION FRACTURE OF T5 VERTEBRA WITH ROUTINE HEALING, SUBSEQUENT ENCOUNTER: ICD-10-CM

## 2023-11-09 DIAGNOSIS — G89.29 CHRONIC BILATERAL THORACIC BACK PAIN: ICD-10-CM

## 2023-11-09 DIAGNOSIS — I35.0 AORTIC VALVE STENOSIS, ETIOLOGY OF CARDIAC VALVE DISEASE UNSPECIFIED: ICD-10-CM

## 2023-11-09 DIAGNOSIS — I25.10 CORONARY ARTERY DISEASE INVOLVING NATIVE CORONARY ARTERY OF NATIVE HEART WITHOUT ANGINA PECTORIS: ICD-10-CM

## 2023-11-09 DIAGNOSIS — M85.80 OSTEOPENIA, UNSPECIFIED LOCATION: Chronic | ICD-10-CM

## 2023-11-09 DIAGNOSIS — R91.1 PULMONARY NODULE: ICD-10-CM

## 2023-11-09 DIAGNOSIS — M54.6 CHRONIC BILATERAL THORACIC BACK PAIN: ICD-10-CM

## 2023-11-09 PROCEDURE — 99214 OFFICE O/P EST MOD 30 MIN: CPT | Performed by: INTERNAL MEDICINE

## 2023-11-09 PROCEDURE — 3078F DIAST BP <80 MM HG: CPT | Performed by: INTERNAL MEDICINE

## 2023-11-09 PROCEDURE — 3074F SYST BP LT 130 MM HG: CPT | Performed by: INTERNAL MEDICINE

## 2023-11-09 PROCEDURE — 1170F FXNL STATUS ASSESSED: CPT | Performed by: INTERNAL MEDICINE

## 2023-11-09 RX ORDER — DULOXETIN HYDROCHLORIDE 30 MG/1
30 CAPSULE, DELAYED RELEASE ORAL DAILY
Qty: 30 CAPSULE | Refills: 3 | Status: SHIPPED | OUTPATIENT
Start: 2023-11-09 | End: 2023-11-28

## 2023-11-09 ASSESSMENT — FIBROSIS 4 INDEX: FIB4 SCORE: 1.52

## 2023-11-09 NOTE — PROGRESS NOTES
Subjective     Katerina Torres is a 83 y.o. female who presents with follow up back pain          HPI    Patient is here with her neighbor, follow-up back pain, she is status post TAVR October 23 seen in the emergency room 11/2/2023 admission to the hospital, discharged 11/3/2023 for back pain, echocardiogram done, ultrasound right upper quadrant negative.  Patient discharged home, normal CBC, CMP showing normal renal function, normal hepatic function, discharged home. 11/2/23 echo TAVR present, normal LV size and function, EF 70%, indeterminate diastolic function, no TAVR aortic valve functioning normally with normal transvalvular gradient, no evidence of paravalvular leak.  Patient has been having right mid back pain starting earlier this summer in June, x-ray rib series and thoracic spine in July showed questionable old healing left rib fracture and T5 chronic compression fracture.  Has seen [facility] with no improvement in symptoms.  Right mid back pain worse with position change, not worse with deep breath or inspiration, does not bother her if she is sitting still and not moving.  No trauma to the area.  Has tried topical lidocaine, topical Voltaren, tramadol, gabapentin, tizanidine,, unable to use oral NSAIDs, is on Plavix and aspirin.  Hypertension, on lisinopril and metoprolol not checking blood pressure regularly.  Started on rosuvastatin in the hospital, no worsening back pain or muscle aches on statin therapy.  Has had some occasional constipation, trying to eat more fiber in diet, occasional nosebleeds on Plavix and aspirin.  Does not like to use nasal moisturizing sprays.  Has been under more stress since her  passed away recently right before her TAVR surgery and also her cat has been diagnosed with cancer.  She has no family in town and depends on her neighbors for assistance, her neighbor is here today with her and her neighbor and neighbor's  have  been an invaluable help for her, helping her with yard work, shopping, finances, computer work.  She is finally starting to drive again.  She will have occasional episodes where she will wake up in the middle of the night anxious, she has limited caffeine and alcohol.  Medications, allergies, medical history, surgical history, social history, family history  reviewed and updated        Current Outpatient Medications   Medication Sig Dispense Refill    metoprolol SR (TOPROL XL) 25 MG TABLET SR 24 HR Take 1 Tablet by mouth every day. 30 Tablet 3    omeprazole (PRILOSEC) 20 MG delayed-release capsule Take 1 Capsule by mouth 2 times a day. 60 Capsule 0    lisinopril (PRINIVIL) 10 MG Tab Take 1 Tablet by mouth every day. 30 Tablet 3    traMADol (ULTRAM) 50 MG Tab Take 50 mg by mouth every 6 hours as needed for Mild Pain (pt does not like).      clopidogrel (PLAVIX) 75 MG Tab Take 1 Tablet by mouth every day. 90 Tablet 3    rosuvastatin (CRESTOR) 5 MG Tab Take 1 Tablet by mouth every evening. 90 Tablet 3    aspirin 81 MG EC tablet Take 1 Tablet by mouth every day. 100 Tablet 3    acetaminophen (TYLENOL) 500 MG Tab Take 1,000 mg by mouth every 6 hours as needed for Mild Pain.      bimatoprost (LUMIGAN) 0.01 % Solution Administer 1 Drop into both eyes at bedtime. Indications: Wide-Angle Glaucoma      vitamin D (CHOLECALCIFEROL) 1000 UNIT Tab Take 1,000 Units by mouth every day.       No current facility-administered medications for this visit.           thoracic back pain  6/22/23 musculoskeletal back pain trial of zanaflex and gabapentin, dysphagia, reflux or prilosec, referral back to GI  6/29/23 on neurontin, zanaflex not covered, declines baclofen, going to chiropractor at [place]  7/7/23 off neurontin, trial of mobic 7.5 mg qday x 15 days  7/6/23 x-ray rib series bilaterally and chest x-ray questionable healing fracture left anterior fourth rib  7/6/23 xray thoracic spine chronic severe compression deformity T5, no  definite acute fracture seen, mild arthritis     s/p tavr aortic stenosis  10/06 echo mild mitral regurgitation, normal LV  6/09 stress echo negative  8/12 echo normal LV function, EF 65%, mild aortic stenosis, peak gradient 24  7/24/13 normal LV function, EF 60%, mild LVH, mild aortic stenosis, peak gradient 25  3/28/14 cardiology note, abnormal EKG, recommend coronary angiogram continue metoprolol, aspirin  3/31/14 cardiac catheterization; left main and LAD patent, circumflex free of disease, RCA free of disease, normal LV function, mild aortic stenosis  7/9/17 echo normal LV size and function, EF 60%, grade 1 diastolic dysfunction, moderate aortic stenosis peak gradient 50, mean 29, valve area 0.8-1.0 and aortic regurgitation  7/9/17 persantine thallium small fixed perfusion abnormality distal anterior and anteroapical segments, no ischemia is noted to represent mild prior infarct or variant normal apical thinning  4/27/18 echo normal LV size and function, EF 65%, moderate aortic stenosis peak gradient 46, valve area 0.9, moderate aortic insufficiency, no change compared to previous  5/27/19 echo normal LV size and function, EF 65%, normal diastolic function, calcified aortic valve moderate aortic stenosis peak gradient 53, moderate aortic insufficiency  7/11/19 patient feels more intense heart rate and heartbeat at night, can hear her heartbeat, only occurs at night, question anxiety component trial of buspar, 24-hour holter, cardiology referral  7/18/19 24-hour holter monitor, sinus rhythm asymptomatic average heart rate 65 PAC burden 1.3%, PVC burden 1.8%, 2 atrial runs longest 7 beats maximal heart rate 105, fastest run 4 beats maximum heart rate 129  9/24/21 echo EF 65%, indeterminate diastolic function, moderate aortic stenosis, peak gradient 56, moderate aortic insufficiency  10/1/21 cardiology note will likely need valve replacement next 1 to 2 years, follow-up 6 months  2/14/22  cardiology note  moderate to severe aortic stenosis, able to walk 4 miles with no limitations, will order repeat echo follow-up 2 months  5/11/22 echo EF 55%,severe aortic stenosis peak gradient 70  5/13/22 cardiology note asymptomatic severe aortic stenosis, continue surveillance follow-up 6 months  11/7/22 cardiology note asymptomatic severe aortic stenosis, continue surveillance, repeat echo, follow-up 6 months  11/10/22 echo severe aortic stenosis, peak gradient 65, EF 60%, normal diastolic function  5/24/23  cardiothoracic surgery note recommend TAVR  5/24/23 cardiology note aortic stenosis with preserved ejection fraction stage C, recommend stress EKG, echocardiogram, BNP, follow-up 3 months  9/7/23  cardiology structural heart follow up discussed risks and benefits and alternatives to TAVR, will require preoperative work-up including CT and cardiac catheterization, patient agrees, follow-up 6 weeks  9/20/23 CT-TAVR tricuspid aortic root with moderate aortic calcification, nonspecific 7 mm left lower lobe lung nodule with pleural tach suggesting postinflammatory process, no evidence of adenopathy chest, abdomen, pelvis, simple 3.2 cm cyst left adnexa  10/9/23 cardiac catheterization prior to TAVR procedure note, left main 20% disease, LAD proximal 40% and mid 30% disease, RCA mid 30% disease, mild to moderate aortic regurgitation, ascending aorta 2.9 cm, severe transaortic pressure gradient, borderline elevated left heart filling pressures  10/31/23 cardiology note status post TAVR doing well, continue aspirin indefinitely, plavix 1 year post stent placement, echo 1 month, SBE prophylaxis,crestor 5 mg qpm, cardiac rehab referral  10/23/23  interventional cardiology operative note TAVR 23 shearer nik, RCA MARCELLUS stent, left ventricular end-diastolic pressure 28  10/23/23 SHARIFA during TAVR, baseline images systolic function EF 55%, calcified aortic valve, severe aortic stenosis, mild aortic  insufficiency, post deployment images showed preserved function, 23 mm shearer TAVR valve seen in the aortic position with normal leaflet motion, no paravalvular leak, mean gradient of 6, calculated effective orifice area 2.2 cm    s/p knee replacement  3/3/17 MRI right knee abnormal right lateral meniscus with peripheral extrusion, maceration, and complex tear involving primarily the posterior horn and body but also the anterior horn, abnormal medial meniscus with vertical tear of the peripheral zone of body and posterior horn, probable meniscal root tear, and degenerative fraying of the free edge, severe lateral femorotibial compartment osteoarthritis with significant cartilage loss and moderate to severe subchondral edema within the lateral femoral condyle and lateral tibial plateau, mild medial femorotibial and the patellofemoral compartment osteoarthritis, moderate sized joint effusion with associated popliteal cyst  6/20/17 sees  orthopedics  1/18/18  orthopedic note right knee end-stage arthritis, x-rays bone-on-bone right knee arthritis lateral compartment, right knee steroid injection performed, planned for total right knee arthroplasty after winter  5/2/18  orthopedic note, right knee osteoathritis, failed conservative measures, scheduled for right knee surgery  5/8/18  operative note right knee arthroplasty   5/23/18  orthopedic note 2 weeks s/p right total knee  8/6/18  orthopedic note 3 months status post right knee replacement doing well, x-ray shows stable right total knee  5/22/19  orthopedic note x-ray stable total knee replacement, follow-up 1 year    pulmonary nodule  9/20/23 CT-TAVR tricuspid aortic root with moderate aortic calcification, nonspecific 7 mm left lower lobe lung nodule with pleural tag suggesting postinflammatory process, no evidence of adenopathy chest, abdomen, pelvis      Preventative health  3/7/17 colon per [facility] 2 polyps,  grade 2 internal hemorrhoids, angioectasias ascending colon,pathology one hyperplastic polyp and one sessile serrated polyp, repeat colonoscopy 5 years   9/19/20 shingrix second  8/23/21 dexa LS-0.9,hip-1.4  4/28/22 tdap   9/26/22 prevnar 20  11/18/22 mammogram heterogeneously dense breast tissue, declines screening breast ultrasound   7/11/23 vit d 82  10/24/23 flu     Osteopenia  9/06 dexa LS -1.2,hip -1.1  6/09 dexa LS -1.4,hip -1.0  7/11 vit d 43  7/11 dexa LS -1.1,hip -0.9  712 vit d 30 start 1000 units  7/24/13 vit d 72 on 1000 units  8/6/13 dexa LS -1.1,hip -1.1  8/5/14 vit d 67  8/19/15 dexa LS -1.4,hip -1.3;FRAX 40% major,27% hip only on prednisone one month, does not need bisphosphonate therapy  8/19/15 vit d 43  6/16/16 off prednisone x 3 weeks  6/21/16 vit d 50  6/27/17 vit d 48  4/27/18 vit d 53   5/14/19 vit d 46  5/28/19 dexa LS-1.0,hip-1.4  6/12/20 vit d 88 on 1000 units decrease to qod   8/23/21 dexa LS-0.9,hip-1.4  1/14/22 vit d 68  7/6/23 x-ray rib series bilaterally and chest x-ray questionable healing fracture left anterior fourth rib  7/6/23 xray thoracic spine chronic severe compression deformity T5, no definite acute fracture seen, mild arthritis  7/11/23 vit d 82     low back pain  11/30/20 right-sided buttock pain with radiation, referral to physiatry, x-ray, MRI lumbar spine, trial of naproxen short-term plus acetaminophen follow-up 3 weeks  11/30/20 x-ray lumbar spine moderate spondylosis, L2-L3 asymmetric disc height loss on the left resulting in slight right curvature and degenerative retrolisthesis  12/2/20 MRI lumbar spine mild superior endplate compression fracture at L3, no marrow edema consistent with chronic process, L2-L3 moderate central narrowing, moderate right and severe left neuroforaminal narrowing, L3-4 moderate central narrowing, moderate bilateral neuroforaminal narrowing, L4-5 moderate to severe central canal narrowing with severe right and moderate left neuroforaminal  narrowing, L5-S1 moderate bilateral neuroforaminal narrowing  1/6/21 dr.storm amaro procedure note right lumbar epidural steroid injection L4-S1 under fluoroscopy  1/26/21 dr.storm amaro note good response to right lumbar L4-L5 epidural steroid injections, she has returned to activities such as skiing, consider physical therapy and home program for residual pain, consider right SI joint versus repeat lumbar steroid injection if necessary, follow-up 2 months  2/8/21 [facility] note   2/17/21 [facility] note   8/31/22 dr.storm physiatry note plan right L4-L5 epidural steroid injection under fluoroscopy and physical therapy as tolerated  9/1/22 dr.storm physiatry procedure note epidural L4-L5 L5-S1 steroid injection  9/6/22 x-ray sacrum degenerative changes SI joint unchanged from prior  9/6/22 x-ray right hip stable mild bilateral osteoarthritis  9/6/22 dr.storm physiatry note SI joint dysfunction, obtain x-ray lumbar spine, sacrum and coccyx, right hip, trial of tramadol every 6 hours, follow-up physical therapy  9/16/22 dr.storm amaro note low back pain, making improvements with physical therapy, continues to have sacral dysfunction with radicular symptoms, discontinue tramadol, trial of acetaminophen 1300 mg tid ,avoid nsaids  10/10/23 low back pain after trimming branches, no trauma, no imaging necessary, trial of tramadol 50 mg qid     knee pain left  8/1/13 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, lateral meniscus mildly extruded, ruptured hopper's cyst  8/12/13  ortho note pain improved on followup, consider injection if pain recurs     impaired glucose metabolism  7/9/17 A1c 5.4%  4/27/18 A1c 5.4%  7/11/23 A1c 5.8%      History polymyalgia  4/20/15 physical therapy, trial celebrex and zanaflex  5/7/15 physical therapy evaluation today recommended right hip x-ray and pelvic x-ray, ordered in computer  5/8/15 xray hip negative  6/29/15 seen by bridgette  diagnosed with polymyalgia rheumatica  6/29/15 CRP 9.4,ESR 32 started on prednisone 20 mg    7/28/15 on prednisone 10 mg will continue 10 mg x 2 weeks, then decrease to 5 mg daily  8/19/15 hgb 14,hct 43, CRP 0.3, ESR 14, tapered off prednisone but developed mouth irritation, has resumed prednisone 5 mg daily, will continue x2 weeks then taper  11/17/15 refill prednisone 5 mg #30 tabs x one  6/21/16 off prednisone; CRP 0.1,ESR 17  6/27/17 CRP 0.09,ESR 12,cpk 66     history palpitations  7/11/19 patient feels more intense heart rate and heartbeat at night, can hear her heartbeat, only occurs at night, question anxiety component trial of buspar, 24-hour holter, cardiology referral  7/18/19 24-hour holter monitor, sinus rhythm asymptomatic average heart rate 65 PAC burden 1.3%, PVC burden 1.8%, 2 atrial runs longest 7 beats maximal heart rate 105, fastest run 4 beats maximum heart rate 129  2/14/22  cardiology note moderate to severe aortic stenosis, able to walk 4 miles with no limitations, will order repeat echo follow-up 2 months  5/11/22 echo EF 55%,severe aortic stenosis peak gradient 70  5/13/22 cardiology note asymptomatic severe aortic stenosis, continue surveillance follow-up 6 months  11/7/22 cardiology note asymptomatic severe aortic stenosis, continue surveillance, repeat echo, follow-up 6 months  11/10/22 echo severe aortic stenosis, peak gradient 65, EF 60%, normal diastolic function  5/24/23 cardiology note aortic stenosis with preserved ejection fraction stage C, recommend lower stress EKG, echocardiogram, BNP, follow-up 3 months  5/24/23 cardiology note aortic stenosis with preserved ejection fraction stage C, recommend lower stress EKG, echocardiogram, BNP, follow-up 3 months  9/7/23  cardiology structural heart follow up discussed risks and benefits and alternatives to TAVR, will require preoperative work-up including CT and cardiac catheterization, patient agrees, follow-up 6 weeks      history neutropenia  8/06 wbc 3.4  3/08 wbc 3.9  6/09 wbc 3.9  7/10 wbc 4.4  7/11 wbc 4.5  7/12 wbc 3.8 (55%N,35%L)  7/24/13 wbc 4.3  3/20/14 wbc 3.1 (52%N,36%L)  3/31/14 wbc 3.7  8/19/15 wbc 5.3  6/21/16 wbc 4.4 (50%N,40%L)  6/27/17 wbc 4.2   4/27/18 wbc 4.4  5/14/19 wbc 4.5  6/12/20 wbc 4.5  6/30/21 wbc 4.3  1/14/22 wbc 5.3  10/20/22 wbc 5.6  7/11/23 wbc 5.7     histroy hypertension  3/31/14 cardiac catheterization; left main mild plaquing, LAD patent, circumflex free of disease, RCA free of disease, normal LV function, mild aortic stenosis  7/9/17 echo normal LV size and function, EF 60%, grade 1 diastolic dysfunction, moderate aortic stenosis peak gradient 50, mean 29, valve area 0.8-1.0 and aortic regurgitation  7/9/17 persantine thallium small fixed perfusion abnormality distal anterior and anteroapical segments, no ischemia is noted to represent mild prior infarct or variant normal apical thinning  7/18/17 start diltiazem 120 mg daily (also has esophageal dysmotility by barium swallow)  4/26/18 off diltiazem  4/27/18 echo normal LV size and function, EF 65%, moderate aortic stenosis peak gradient 46, valve area 0.9,, moderate aortic insufficiency, no change compared to previous  5/27/19 echo normal LV size and function, EF 65%, normal diastolic function, calcified aortic valve moderate aortic stenosis peak gradient 53, moderate aortic insufficiency  10/1/21 cardiology note will likely need valve replacement next 1 to 2 years, follow-up 6 months  2/14/22  cardiology note moderate to severe aortic stenosis, able to walk 4 miles with no limitations, will order repeat echo follow-up 2 months  5/11/22 echo EF 55%,severe aortic stenosis peak gradient 70  5/13/22 cardiology note asymptomatic severe aortic stenosis, continue surveillance follow-up 6 months  11/7/22 cardiology note asymptomatic severe aortic stenosis, continue surveillance, repeat echo, follow-up 6 months  11/10/22 echo severe aortic  stenosis, peak gradient 65, EF 60%, normal diastolic function  5/24/23 cardiology note aortic stenosis with preserved ejection fraction stage C, recommend stress EKG, echocardiogram, BNP, follow-up 3 months     History of breast cancer  1980s left sided s/p lumpectomy and radiation therapy, no old records  7/11 mammogram  8/12 mammogram  8/13 mammogram  8/18/14 mammogram  9/1/16 mammogram heterogeneously dense breast tissue recommend screening breast ultrasound  9/1/16 sonocine negative  11/18/22 mammogram heterogeneously dense breast tissue, declines screening breast ultrasound      History of basal cell skin cancer     Glaucoma  4/29/21  eye exam   1/13/22  eye exam primary open-angle glaucoma  9/16/22  eye exam   1/12/23  eye exam   5/12/23  ophthalmology note primary open angle glaucoma moderate, follow-up 6 months     Esophageal dysmotility  7/9/17 barium swallow moderate esophageal dysmotility, small volume silent aspiration  7/18/17 start diltiazem 120 mg daily  7/18/17 referral [facility], speech pathology for esophageal dysmotility, small amount of aspiration on barium swallow, try low-dose diltiazem 120 mg for esophageal dysmotility and elevated blood pressure  7/26/17 [facility] evaluation dyspepsia, obtain cardiology clearance and then proceed EGD, initiate pantoprazole 20 mg, trial of miralax and colace     Dyslipidemia  3/08 chol 175,trig 73,hdl 45,ldl 115  6/09 chol 174,trig 89,hdl 47,ldl 109  7/10 chol 182,trig 61,hdl 55,ldl 114  7/11 chol 175,trig 69,hdl 45,ldl 116  7/12 chol 164,trig 64,hdl 43,ldl 108  7/24/13 chol 186,trig 66,hdl 54,ldl 119 no meds  8/5/14 chol 165,trig 93,hdl 48,ldl 98  8/19/15 chol 192,trig 109,hdl 58,ldl 112; 10 year risk 20% declines statin therapy  6/21/16 chol 137,trig 75,hdl 47,ldl 75   6/27/17 hcol 153,trig 94,hdl 54,ldl 80  5/14/19 chol 164,trig 82,hdl 48,ldl 100   6/12/20 chol 176,trig 80,hdl 57,ldl 103   6/30/21 chol 170,trig 100,hdl 43,ldl  107  10/20/22 chol 175,trig 81,hdl 61,ldl 98     Decrease GFR  7/7/11 bun 16,creat 1.0,GFR 50  7/24/13 bun 14,creat 1.1,GFR 45  3/20/14 bun 15,creat 0.9,GFR 59  8/5/14 bun 14,creat 1.1,GFR 46  2/19/15 bun 13,creat 0.8,GFR >60  8/19/15 bun 10,creat 1.1,GFR 48  6/21/16 bun 19,creat 1.0,GFR 50  6/27/17 bun 14,creat 0.5,GFR 51  4/28/18 bun 15,creat 0.9,GFR 60  5/14/19 bun 18,creat 1.13,GFR 46  6/12/20 bun 15,creat 1.07,GFR 49  6/30/21 bun 14,creat 1.07,GFR 49,PTH 36,urine mac<1.2,SPEP negative  1/14/22 bun 19,creat 1.18,GFR 44,PTH 25,calcium 9.6,phos 3.7  10/20/22 bun 20,creat 1.09,GFR 50,PTH 31  5/17/23 bun 18,creat 1.0,GFR 52  7/11/23 bun 16,creat 1.17,GFR 46,PTH 24  10/9/23 bun 15,creat 1.09,GFR 50  11/2/23 bun 18,creat 1.1,GFR,GFR 49    colon polyp  3/7/17 colon per [facility] 2 polyps, grade 2 internal hemorrhoids, angioectasias ascending colon,pathology one hyperplastic polyp and onesessile serrated polyp, repeat colonoscopy 5 years     chf   5/11/22 echo EF 55%,severe aortic stenosis peak gradient 70  11/10/22 echo severe aortic stenosis, peak gradient 65, EF 60%, normal diastolic function  7/10/23 NT-proBNP 578  10/24/23 NT-proBNP 578  11/2/23 NT-proBNP 578  11/10/22 echo severe aortic stenosis, peak gradient 65, EF 60%, normal diastolic function  10/9/23 cardiac catheterization prior to TAVR procedure note, left main 20% disease, LAD proximal 40% and mid 30% disease, RCA mid 30% disease, mild to moderate aortic regurgitation, ascending aorta 2.9 cm, severe transaortic pressure gradient, borderline elevated left heart filling pressures, successful implantation of a 23 shearer nik S3 ultra resilia, acute decompensated diastolic heart failure with LVEDP of 28 mmHg, successful protection of the RCA with snorkel stent (3.5x24 Synergy XD MARCELLUS)      cad   10/06 echo mild mitral regurgitation, normal LV  6/09 stress echo negative  8/12 echo normal LV function, EF 65%, mild aortic stenosis, peak gradient 24  7/24/13 normal  LV function, EF 60%, mild LVH, mild aortic stenosis, peak gradient 25  3/24/14 exercise treadmill carla protocol 5 minutes 5 seconds, 1 mm mild upsloping ST segment depression in V6, flat ST segment depression V4 and V5 compatible with ischemia, no arrhythmia noted  3/28/14 cardiology note, scheduled for cardiac catheterization, cont asa, metoprolol 25 mg bid, crestor 5 mg samples  3/31/14 cardiac catheterization; left main mild plaquing, LAD patent, circumflex free of disease, RCA free of disease, normal LV function, mild aortic stenosis  7/9/17 echo normal LV size and function, EF 60%, grade 1 diastolic dysfunction, moderate aortic stenosis peak gradient 50, mean 29, valve area 0.8-1.0 and aortic regurgitation  7/9/17 persantine thallium small fixed perfusion abnormality distal anterior and anteroapical segments, no ischemia is noted to represent mild prior infarct or variant normal apical thinning  4/27/18 echo normal LV size and function, EF 65%, moderate aortic stenosis peak gradient 46, valve area 0.9, moderate aortic insufficiency, no change compared to previous  7/18/19 24-hour holter monitor, sinus rhythm asymptomatic average heart rate 65 PAC burden 1.3%, PVC burden 1.8%, 2 atrial runs longest 7 beats maximal heart rate 105, fastest run 4 beats maximum heart rate 129  9/24/21 echo EF 65%, indeterminate diastolic function, moderate aortic stenosis, peak gradient 56, moderate aortic insufficiency  10/1/21 cardiology note will likely need valve replacement next 1 to 2 years, follow-up 6 months  2/14/22  cardiology note moderate to severe aortic stenosis, able to walk 4 miles with no limitations, will order repeat echo follow-up 2 months  5/11/22 echo EF 55%,severe aortic stenosis peak gradient 70  11/7/22 cardiology note asymptomatic severe aortic stenosis, continue surveillance, repeat echo, follow-up 6 months  11/10/22 echo severe aortic stenosis, peak gradient 65, EF 60%, normal diastolic  function  5/24/23 cardiology note aortic stenosis with preserved ejection fraction stage C, recommend stress EKG, echocardiogram, BNP, follow-up 3 months  9/7/23  cardiology structural heart follow up discussed risks and benefits and alternatives to TAVR, will require preoperative work-up including CT and cardiac catheterization, patient agrees, follow-up 6 weeks  10/9/23 cardiac catheterization prior to TAVR procedure note, left main 20% disease, LAD proximal 40% and mid 30% disease, RCA mid 30% disease, mild to moderate aortic regurgitation, ascending aorta 2.9 cm, severe transaortic pressure gradient, borderline elevated left heart filling pressures, successful implantation of a 23 shearer nik S3 ultra resilia, acute decompensated diastolic heart failure with LVEDP of 28 mmHg, successful protection of the RCA with snorkel stent (3.5x24 Synergy XD MARCELLUS)         Patient Active Problem List   Diagnosis    History of breast cancer s/p lumpectomy left 1980s    Osteopenia    Dyslipidemia    History of neutropenia    Preventative health care    S/p TAVR for aortic stenosis    Glaucoma    History of basal cell carcinoma of skin    Knee pain, left    History of polymyalgia rheumatica    Decreased GFR    Colon polyp    S/P knee replacement    Esophageal dysmotility    History of palpitations    Low back pain    Thoracic back pain    Impaired glucose metabolism    Pulmonary nodule    S/P drug eluting coronary stent placement    Diastolic congestive heart failure (HCC)    CAD (coronary artery disease)              Patient Care Team:  Master Suarez M.D. as PCP - General  Master Suarez M.D. as PCP - [facility] Paneled  Chris Bernstein M.D. as Consulting Physician (Ophthalmology)  Amanda Rodriguez M.D. as Consulting Physician (Ophthalmology)  Manoj Tidwell M.D. (Inactive) as Consulting Physician (Orthopedic Surgery)    ROS           Objective          Physical Exam  Vitals and nursing note reviewed.    Constitutional:       General: She is not in acute distress.     Appearance: Normal appearance. She is not ill-appearing.   HENT:      Head: Normocephalic and atraumatic.      Right Ear: External ear normal.      Left Ear: External ear normal.   Eyes:      Conjunctiva/sclera: Conjunctivae normal.   Cardiovascular:      Rate and Rhythm: Normal rate and regular rhythm.      Heart sounds: Normal heart sounds.   Pulmonary:      Effort: Pulmonary effort is normal.      Breath sounds: Normal breath sounds.   Abdominal:      General: There is no distension.   Musculoskeletal:         General: No swelling or deformity.   Skin:     Coloration: Skin is not jaundiced.      Findings: Bruising present.      Comments: Some mild ecchymosis forearms   Neurological:      General: No focal deficit present.      Mental Status: She is alert.   Psychiatric:         Mood and Affect: Mood normal.         Behavior: Behavior normal.       No spinal tenderness over thoracic region, some right paraspinal muscle tenderness and posterior costochondral tenderness to palpation T5-T7 region, no evidence of shingles, no crepitus or rub, normal breath sounds to auscultation bilateral, no rales                      Assessment & Plan     Assessment  #1 chronic thoracic back pain, worse with activity and movement, lungs are clear, x-ray thoracic spine in July showed severe compression deformity T5 region chronic, has tried gabapentin, tramadol, Zanaflex, unable to use oral NSAIDs, has tried topical lidocaine and Voltaren gel, try chiropractor therapy    #2 status post TAVR stable    #3 hypertension on lisinopril and Toprol per cardiology    #4 CAD status post cardiac catheterization, protection of RCA with snorkel stent October night, started on Crestor, on Plavix, aspirin    #5 occasional constipation    #6 occasional epistaxis, does not like to use nasal moisturizers    #7 osteopenia 8/23/21 dexa LS-0.9,hip-1.4    #8 incidental pulmonary nodule no  tobacco 9/20/23 CT-TAVR tricuspid aortic root with moderate aortic calcification, nonspecific 7 mm left lower lobe lung nodule with pleural tag     #9 anxiety and occasional panic attacks at night, she has done a good job stopping caffeine and alcohol      Plan  #1 reviewed hospital records    #2 MRI thoracic spine    #3 continue Tylenol 1000 mg 3 times daily, no NSAIDs, trial of Cymbalta 30 mg daily for pain, has already tried tramadol, topical therapy, Tylenol, Zanaflex, gabapentin, would like to avoid opiate therapy, Cymbalta can cause nausea, lightheadedness, dizziness, insomnia and may take 2 to 4 weeks for benefit, send me a MyChart update 1 to 2 weeks, Cymbalta may help for anxiety component as well    #4 follow-up cardiology, check blood pressure daily and record, continue limiting caffeine and alcohol, continue no oral NSAIDs    #5 use Vaseline for nasal passageways twice a day    #6 MiraLAX every other day    #7 old records from [place] chiropractor    #8 repeat CT lung 6 months follow-up nodule incidental finding    #9 based upon MRI results consider referral to pain management    #10 follow-up as scheduled next month    #11 recommend she get the COVID-vaccine, RSV vaccine at the pharmacy    #12 we will need follow-up DEXA but can wait for now

## 2023-11-10 ENCOUNTER — TELEPHONE (OUTPATIENT)
Dept: MEDICAL GROUP | Facility: MEDICAL CENTER | Age: 83
End: 2023-11-10
Payer: MEDICARE

## 2023-11-10 ENCOUNTER — HOSPITAL ENCOUNTER (OUTPATIENT)
Dept: LAB | Facility: MEDICAL CENTER | Age: 83
End: 2023-11-10
Attending: NURSE PRACTITIONER
Payer: MEDICARE

## 2023-11-10 DIAGNOSIS — E78.5 DYSLIPIDEMIA: Chronic | ICD-10-CM

## 2023-11-10 LAB
CHOLEST SERPL-MCNC: 136 MG/DL (ref 100–199)
FASTING STATUS PATIENT QL REPORTED: NORMAL
HDLC SERPL-MCNC: 45 MG/DL
LDLC SERPL CALC-MCNC: 71 MG/DL
TRIGL SERPL-MCNC: 102 MG/DL (ref 0–149)

## 2023-11-10 PROCEDURE — 36415 COLL VENOUS BLD VENIPUNCTURE: CPT

## 2023-11-10 PROCEDURE — 80061 LIPID PANEL: CPT

## 2023-11-10 NOTE — LETTER
[email or web address]  Master Suarez M.D.  [address]  [place] [address]  Fax: [phone]   Authorization for Release/Disclosure of   Protected Health Information   Name: GAEL DIALLO : 1940 SSN: [id number]   Address: [address] [place] [address]  [place] [address] Phone:    [phone] (home)    I authorize the entity listed below to release/disclose the PHI below to:   [email or web address]/Master Suarez M.D. and Master Suarez M.D.   Provider or Entity Name:                                             [facility]   Address   City, [facility], [facility]   Phone:      Fax:              [phone]   Reason for request: continuity of care   Information to be released: OLD RECORDS   [  ] LAST COLONOSCOPY,  including any PATH REPORT and follow-up  [  ] LAST FIT/COLOGUARD RESULT [  ] LAST DEXA  [  ] LAST MAMMOGRAM  [  ] LAST PAP  [  ] LAST LABS [  ] RETINA EXAM REPORT  [  ] IMMUNIZATION RECORDS  [ x ] Release all info      [  ] Check here and initial the line next to each item to release ALL health information INCLUDING  _____ Care and treatment for drug and / or alcohol abuse  _____ HIV testing, infection status, or AIDS  _____ Genetic Testing    DATES OF SERVICE OR TIME PERIOD TO BE DISCLOSED: _____________  I understand and acknowledge that:  * This Authorization may be revoked at any time by you in writing, except if your health information has already been used or disclosed.  * Your health information that will be used or disclosed as a result of you signing this authorization could be re-disclosed by the recipient. If this occurs, your re-disclosed health information may no longer be protected by State or Federal laws.  * You may refuse to sign this Authorization. Your refusal will not affect your ability to obtain treatment.  * This Authorization becomes effective upon signing and will  on (date) __________.      If no date is indicated, this Authorization will  one (1) year from the  signature date.    Name: Katerina Torres  Signature:                  Continuity of Care   Date:   11/13/2023     PLEASE FAX REQUESTED RECORDS BACK TO: [phone]

## 2023-11-20 ENCOUNTER — HOSPITAL ENCOUNTER (OUTPATIENT)
Dept: CARDIOLOGY | Facility: MEDICAL CENTER | Age: 83
End: 2023-11-20
Attending: INTERNAL MEDICINE
Payer: MEDICARE

## 2023-11-20 DIAGNOSIS — I35.0 SEVERE AORTIC STENOSIS: ICD-10-CM

## 2023-11-20 LAB
LV EJECT FRACT  99904: 64
LV EJECT FRACT MOD 2C 99903: 70.02
LV EJECT FRACT MOD 4C 99902: 56.49
LV EJECT FRACT MOD BP 99901: 63.52

## 2023-11-20 PROCEDURE — 93306 TTE W/DOPPLER COMPLETE: CPT | Mod: 26 | Performed by: INTERNAL MEDICINE

## 2023-11-20 PROCEDURE — 93306 TTE W/DOPPLER COMPLETE: CPT

## 2023-11-28 DIAGNOSIS — M54.6 CHRONIC BILATERAL THORACIC BACK PAIN: ICD-10-CM

## 2023-11-28 DIAGNOSIS — G89.29 CHRONIC BILATERAL THORACIC BACK PAIN: ICD-10-CM

## 2023-11-29 ENCOUNTER — DOCUMENTATION (OUTPATIENT)
Dept: CARDIOLOGY | Facility: MEDICAL CENTER | Age: 83
End: 2023-11-29

## 2023-11-29 ENCOUNTER — OFFICE VISIT (OUTPATIENT)
Dept: CARDIOLOGY | Facility: MEDICAL CENTER | Age: 83
End: 2023-11-29
Attending: INTERNAL MEDICINE
Payer: MEDICARE

## 2023-11-29 VITALS
SYSTOLIC BLOOD PRESSURE: 136 MMHG | WEIGHT: 132 LBS | HEIGHT: 62 IN | BODY MASS INDEX: 24.29 KG/M2 | RESPIRATION RATE: 16 BRPM | HEART RATE: 74 BPM | OXYGEN SATURATION: 98 % | DIASTOLIC BLOOD PRESSURE: 80 MMHG

## 2023-11-29 DIAGNOSIS — R00.2 PALPITATIONS: ICD-10-CM

## 2023-11-29 DIAGNOSIS — Z95.2 S/P TAVR (TRANSCATHETER AORTIC VALVE REPLACEMENT): ICD-10-CM

## 2023-11-29 DIAGNOSIS — I25.10 CORONARY ARTERIOSCLEROSIS: ICD-10-CM

## 2023-11-29 DIAGNOSIS — I35.0 NONRHEUMATIC AORTIC VALVE STENOSIS: ICD-10-CM

## 2023-11-29 DIAGNOSIS — I50.32 CHRONIC DIASTOLIC CONGESTIVE HEART FAILURE (HCC): ICD-10-CM

## 2023-11-29 DIAGNOSIS — Z95.5 S/P DRUG ELUTING CORONARY STENT PLACEMENT: ICD-10-CM

## 2023-11-29 DIAGNOSIS — I50.30 DIASTOLIC HEART FAILURE, UNSPECIFIED HF CHRONICITY (HCC): ICD-10-CM

## 2023-11-29 DIAGNOSIS — E78.5 DYSLIPIDEMIA: Chronic | ICD-10-CM

## 2023-11-29 PROCEDURE — 3075F SYST BP GE 130 - 139MM HG: CPT | Performed by: INTERNAL MEDICINE

## 2023-11-29 PROCEDURE — 99214 OFFICE O/P EST MOD 30 MIN: CPT | Performed by: INTERNAL MEDICINE

## 2023-11-29 PROCEDURE — 3079F DIAST BP 80-89 MM HG: CPT | Performed by: INTERNAL MEDICINE

## 2023-11-29 PROCEDURE — 99212 OFFICE O/P EST SF 10 MIN: CPT | Performed by: INTERNAL MEDICINE

## 2023-11-29 PROCEDURE — 1170F FXNL STATUS ASSESSED: CPT | Performed by: INTERNAL MEDICINE

## 2023-11-29 RX ORDER — ROSUVASTATIN CALCIUM 10 MG/1
10 TABLET, COATED ORAL EVERY EVENING
Qty: 100 TABLET | Refills: 3 | Status: SHIPPED | OUTPATIENT
Start: 2023-11-29 | End: 2024-01-22 | Stop reason: SDUPTHER

## 2023-11-29 RX ORDER — LISINOPRIL 10 MG/1
10 TABLET ORAL DAILY
Qty: 100 TABLET | Refills: 3 | Status: SHIPPED | OUTPATIENT
Start: 2023-11-29 | End: 2023-12-14 | Stop reason: SDUPTHER

## 2023-11-29 RX ORDER — CLOPIDOGREL BISULFATE 75 MG/1
75 TABLET ORAL DAILY
Qty: 100 TABLET | Refills: 3 | Status: SHIPPED | OUTPATIENT
Start: 2023-11-29 | End: 2024-01-22 | Stop reason: SDUPTHER

## 2023-11-29 ASSESSMENT — FIBROSIS 4 INDEX: FIB4 SCORE: 1.52

## 2023-11-29 NOTE — PROGRESS NOTES
"CARDIOLOGY OUTPATIENT FOLLOWUP    PCP: Master Suarez M.D.    1. S/P TAVR (transcatheter aortic valve replacement)    2. Nonrheumatic aortic valve stenosis    3. Dyslipidemia    4. S/P drug eluting coronary stent placement    5. Chronic diastolic congestive heart failure (HCC)    6. Coronary arteriosclerosis    7. Diastolic heart failure, unspecified HF chronicity (HCC)    8. Palpitations        Katerina Torres has had a successful TAVR procedure.  Now that she has been more mobile and medical situation improved she is now finding more back pain as well as grief from the loss of her .  She is pending evaluation for the low back and hopefully this will bring relief.  I did recommend discontinuing metoprolol as well as omeprazole as these may not be useful.  She will increase rosuvastatin to 10 mg and continue dual antiplatelet therapy until her next visit-at which time if she is stable she may move to Plavix monotherapy    Follow up: 3 months    History: Katerina Torres is a 83 y.o. female with history of resolved breast cancer presenting for follow-up of TAVR performed 10/23/2023 with 23 S3 ultra Resilia valve deployed at nominal volume.  During the procedure it was identified that she was in danger of acute right coronary artery occlusion and ultimately required deployment of a snorkel stent which successfully preserve flow into the right coronary artery.    She was admitted for abdominal pain postoperatively and cardiac evaluation was reassuring.  She has been bothered by severe back pain predominantly lower.  She is also experiencing a lot of the grief after the passing of her .  She has been getting good blood pressure readings.  A lumbar MRI is planned next week.      Physical Exam:  /80 (BP Location: Left arm, Patient Position: Sitting, BP Cuff Size: Adult)   Pulse 74   Resp 16   Ht 1.575 m (5' 2\")   Wt 59.9 kg (132 lb)   SpO2 98%   BMI 24.14 kg/m²   GEN: " "NAD  CARDIAC: Regular. Normal S1, S2, Systolic murmur.   VASCULATURE: Normal carotid upstroke  RESP: Clear to auscultation bilaterally  ABD: Soft, non-tender, non-distended  EXT: No edema  NEURO: No focal deficit       The ASCVD Risk score (Candice DK, et al., 2019) failed to calculate.         Studies  Lab Results   Component Value Date/Time    CHOLSTRLTOT 136 11/10/2023 07:13 AM    LDL 71 11/10/2023 07:13 AM    HDL 45 11/10/2023 07:13 AM    TRIGLYCERIDE 102 11/10/2023 07:13 AM       Lab Results   Component Value Date/Time    SODIUM 141 11/02/2023 07:35 AM    POTASSIUM 3.9 11/02/2023 07:35 AM    CHLORIDE 106 11/02/2023 07:35 AM    CO2 24 11/02/2023 07:35 AM    GLUCOSE 146 (H) 11/02/2023 07:35 AM    BUN 18 11/02/2023 07:35 AM    CREATININE 1.10 11/02/2023 07:35 AM    CREATININE 1.08 (H) 07/02/2010 08:56 AM    BUNCREATRAT 11 07/02/2010 08:56 AM    GLOMRATE 50 (L) 07/02/2010 08:56 AM     Lab Results   Component Value Date/Time    ALKPHOSPHAT 71 11/02/2023 07:35 AM    ASTSGOT 15 11/02/2023 07:35 AM    ALTSGPT 13 11/02/2023 07:35 AM    TBILIRUBIN 0.3 11/02/2023 07:35 AM        Past Medical History:   Diagnosis Date    Arrhythmia Year ago    Breast cancer (HCC)     Left Breast    Breath shortness     with exertion \"walking up a hill\"    Bruises easily     Cancer (HCC) 1989    breast left side lumpectomy    Cardiac murmur 06/03/2009    Chest pain 11/2/2023    Chronic kidney disease, stage III (moderate) (HCC) 08/20/2015    Coronary artery disease involving native coronary artery of native heart without angina pectoris 10/31/2023    Dyslipidemia 06/03/2009    Dyspepsia 11/2/2023    Glaucoma     both eyes    Heart valve disease year ago    Having surgery    Hepatitis A 1993    Hiatus hernia syndrome     Hx of breast cancer 06/03/2009    Hypertension 07/18/2017    currently not taking medications    MEDICAL HOME     Neutropenia 06/03/2009    Osteopenia 06/03/2009    PONV (postoperative nausea and vomiting)     Preventative " health care 2009    Snoring     Unspecified cataract     bilateral IOLs     Allergies   Allergen Reactions    Hydrocodone Nausea    Atorvastatin Unspecified     Does not qualify for primary prevention    Cymbalta [Duloxetine Hcl]      dizziness    Naproxen      GI upset    Neurontin [Gabapentin] Unspecified     dizziness     Outpatient Encounter Medications as of 2023   Medication Sig Dispense Refill    metoprolol SR (TOPROL XL) 25 MG TABLET SR 24 HR Take 1 Tablet by mouth every day. 30 Tablet 3    omeprazole (PRILOSEC) 20 MG delayed-release capsule Take 1 Capsule by mouth 2 times a day. 60 Capsule 0    lisinopril (PRINIVIL) 10 MG Tab Take 1 Tablet by mouth every day. 30 Tablet 3    clopidogrel (PLAVIX) 75 MG Tab Take 1 Tablet by mouth every day. 90 Tablet 3    rosuvastatin (CRESTOR) 5 MG Tab Take 1 Tablet by mouth every evening. 90 Tablet 3    aspirin 81 MG EC tablet Take 1 Tablet by mouth every day. 100 Tablet 3    acetaminophen (TYLENOL) 500 MG Tab Take 1,000 mg by mouth every 6 hours as needed for Mild Pain.      bimatoprost (LUMIGAN) 0.01 % Solution Administer 1 Drop into both eyes at bedtime. Indications: Wide-Angle Glaucoma      vitamin D (CHOLECALCIFEROL) 1000 UNIT Tab Take 1,000 Units by mouth every day.       No facility-administered encounter medications on file as of 2023.     Social History     Socioeconomic History    Marital status:      Spouse name: Not on file    Number of children: Not on file    Years of education: Not on file    Highest education level: 12th grade   Occupational History    Not on file   Tobacco Use    Smoking status: Former     Current packs/day: 0.00     Average packs/day: 0.5 packs/day for 10.0 years (5.0 ttl pk-yrs)     Types: Cigarettes     Start date: 1989     Quit date: 1999     Years since quittin.9    Smokeless tobacco: Never   Vaping Use    Vaping Use: Never used   Substance and Sexual Activity    Alcohol use: Yes     Alcohol/week:  4.2 oz     Types: 7 Standard drinks or equivalent per week     Comment: 1 glass wine/day    Drug use: No    Sexual activity: Not Currently     Partners: Male   Other Topics Concern     Service No    Blood Transfusions No    Caffeine Concern No    Occupational Exposure No    Hobby Hazards Yes    Sleep Concern No    Stress Concern Yes    Weight Concern No    Special Diet No    Back Care No    Exercise No    Bike Helmet No    Seat Belt Yes    Self-Exams Yes   Social History Narrative    Not on file     Social Determinants of Health     Financial Resource Strain: Low Risk  (10/23/2023)    Overall Financial Resource Strain (CARDIA)     Difficulty of Paying Living Expenses: Not hard at all   Food Insecurity: No Food Insecurity (10/23/2023)    Hunger Vital Sign     Worried About Running Out of Food in the Last Year: Never true     Ran Out of Food in the Last Year: Never true   Transportation Needs: No Transportation Needs (10/23/2023)    PRAPARE - Transportation     Lack of Transportation (Medical): No     Lack of Transportation (Non-Medical): No   Physical Activity: Insufficiently Active (10/23/2023)    Exercise Vital Sign     Days of Exercise per Week: 2 days     Minutes of Exercise per Session: 30 min   Stress: Stress Concern Present (10/23/2023)    [employer] - Occupational Stress Questionnaire     Feeling of Stress : To some extent   Social Connections: Socially Isolated (10/23/2023)    Social Connection and Isolation Panel [NHANES]     Frequency of Communication with Friends and Family: More than three times a week     Frequency of Social Gatherings with Friends and Family: More than three times a week     Attends Confucianist Services: Never     Active Member of Clubs or Organizations: No     Attends Club or Organization Meetings: Never     Marital Status:    Intimate Partner Violence: Not on file   Housing Stability: Low Risk  (10/23/2023)    Housing Stability Vital Sign      Unable to Pay for Housing in the Last Year: No     Number of Places Lived in the Last Year: 1     Unstable Housing in the Last Year: No         ROS:   10 point review systems is otherwise negative except as per the HPI    No chief complaint on file.

## 2023-11-29 NOTE — PROGRESS NOTES
Valve Program Functional Assessment:     KCCQ12   1a) Showering/bathin  1b) Walking 1 block on ground: 4  1c) Hurrying or jogging:3  2) Swellin  3) Fatigue: 3  4) Shortness of breath: 6  5) Sleep sitting up: 5  6) Limited enjoyment of life: 4  7) Spend the rest of your life with HF: 4  8a) Hobbies, recreational activities:4  8b) Working or doing household chores:4  8c) Visiting family or friends: 4

## 2023-12-04 ENCOUNTER — HOSPITAL ENCOUNTER (OUTPATIENT)
Dept: RADIOLOGY | Facility: MEDICAL CENTER | Age: 83
End: 2023-12-04
Attending: INTERNAL MEDICINE
Payer: MEDICARE

## 2023-12-04 DIAGNOSIS — S22.050D COMPRESSION FRACTURE OF T5 VERTEBRA WITH ROUTINE HEALING, SUBSEQUENT ENCOUNTER: ICD-10-CM

## 2023-12-04 PROCEDURE — 72146 MRI CHEST SPINE W/O DYE: CPT

## 2023-12-05 ENCOUNTER — TELEPHONE (OUTPATIENT)
Dept: MEDICAL GROUP | Facility: MEDICAL CENTER | Age: 83
End: 2023-12-05
Payer: MEDICARE

## 2023-12-05 ENCOUNTER — PATIENT MESSAGE (OUTPATIENT)
Dept: MEDICAL GROUP | Facility: MEDICAL CENTER | Age: 83
End: 2023-12-05
Payer: MEDICARE

## 2023-12-05 DIAGNOSIS — S22.000D COMPRESSION FRACTURE OF THORACIC VERTEBRA WITH ROUTINE HEALING, UNSPECIFIED THORACIC VERTEBRAL LEVEL, SUBSEQUENT ENCOUNTER: ICD-10-CM

## 2023-12-05 PROBLEM — S22.000A THORACIC COMPRESSION FRACTURE (HCC): Status: ACTIVE | Noted: 2023-12-05

## 2023-12-05 PROBLEM — S22.000A THORACIC COMPRESSION FRACTURE (HCC): Status: RESOLVED | Noted: 2023-12-05 | Resolved: 2023-12-05

## 2023-12-05 PROBLEM — S22.000A COMPRESSION FRACTURE OF THORACIC VERTEBRA (HCC): Status: ACTIVE | Noted: 2023-07-07

## 2023-12-05 NOTE — TELEPHONE ENCOUNTER
Katerina Torres  the patient leaves a voice mail the MRI results are in epic and she has  Severe back pain that does not improve she wants to have a conversation with hr provider Master Suarez M.D.  PCP - General

## 2023-12-05 NOTE — PATIENT COMMUNICATION
Patient Katerina Torres leave a voice mail today 12/05/23 the MRI is results are in epic the patient is complaining severe back pain that does not improve and needs the to have a conversation with her provider Master Suarez M.D. phone number is [phone]  PCP - General  Please and thank you

## 2023-12-06 ENCOUNTER — TELEPHONE (OUTPATIENT)
Dept: MEDICAL GROUP | Facility: MEDICAL CENTER | Age: 83
End: 2023-12-06
Payer: MEDICARE

## 2023-12-06 DIAGNOSIS — M54.6 CHRONIC THORACIC BACK PAIN, UNSPECIFIED BACK PAIN LATERALITY: ICD-10-CM

## 2023-12-06 DIAGNOSIS — G89.29 CHRONIC THORACIC BACK PAIN, UNSPECIFIED BACK PAIN LATERALITY: ICD-10-CM

## 2023-12-06 DIAGNOSIS — S22.000S COMPRESSION FRACTURE OF THORACIC VERTEBRA, UNSPECIFIED THORACIC VERTEBRAL LEVEL, SEQUELA: ICD-10-CM

## 2023-12-06 NOTE — TELEPHONE ENCOUNTER
Notified patient with results, thoracic compression fractures, does meet criteria for kyphoplasty having failed conservative therapy with chronic pain unresponsive to standard treatment options, recommend patient that she consider kyphoplasty done per interventional radiology, however she states that she would like to see a pain management specialist,  at [facility], advised the patient that he does not perform kyphoplasties but he can provide an opinion as to whether or not that might be beneficial.  She understands my recommendation and I will submit the referral to  for his opinion regarding her chronic back pain related to thoracic compression fractures.

## 2023-12-08 ENCOUNTER — TELEPHONE (OUTPATIENT)
Dept: CARDIOLOGY | Facility: MEDICAL CENTER | Age: 83
End: 2023-12-08
Payer: MEDICARE

## 2023-12-08 NOTE — TELEPHONE ENCOUNTER
BE      Caller: Dr. Nabeel Tidwell with [facility]    Topic/issue: Their office was calling because of the patient's plavix medication and their office found some compression fractures and they wanted to confirm that the patient would be on this Plavix medication indefinitely and he was asking for a call back    Callback Number: see below      Thank you    -Pavel SEYMOUR

## 2023-12-11 NOTE — TELEPHONE ENCOUNTER
Robert Nunez M.D.  You1 minute ago (12:47 PM)     She will take 6 months of aspirin and plavix, then switch to plavix monotherapy     Phone Number Called: [phone]    Call outcome:  [facility]    Message: Provided update about patients Plavix at this time. Advised to call back with any questions.

## 2023-12-13 ENCOUNTER — PATIENT MESSAGE (OUTPATIENT)
Dept: CARDIOLOGY | Facility: MEDICAL CENTER | Age: 83
End: 2023-12-13
Payer: MEDICARE

## 2023-12-13 DIAGNOSIS — I50.30 DIASTOLIC HEART FAILURE, UNSPECIFIED HF CHRONICITY (HCC): ICD-10-CM

## 2023-12-14 RX ORDER — LISINOPRIL 20 MG/1
20 TABLET ORAL DAILY
Qty: 90 TABLET | Refills: 3 | Status: SHIPPED | OUTPATIENT
Start: 2023-12-14 | End: 2023-12-14

## 2023-12-14 RX ORDER — LISINOPRIL 20 MG/1
20 TABLET ORAL DAILY
Qty: 90 TABLET | Refills: 3 | Status: SHIPPED | OUTPATIENT
Start: 2023-12-14 | End: 2023-12-22 | Stop reason: SDUPTHER

## 2023-12-14 NOTE — PATIENT COMMUNICATION
To BE: Patient sent response to your BP MyChart. Please review and advise if any recommendations, thank you!

## 2023-12-15 NOTE — PATIENT COMMUNICATION
Robert Nunez M.D.  You1 minute ago (5:05 PM)     BP still a bit high. I suggest doubling lisinopril to 20 mg daily. BMP in two weeks. Keep tracking BP    Thx  BE

## 2023-12-21 ENCOUNTER — PATIENT MESSAGE (OUTPATIENT)
Dept: CARDIOLOGY | Facility: MEDICAL CENTER | Age: 83
End: 2023-12-21

## 2023-12-21 ENCOUNTER — OFFICE VISIT (OUTPATIENT)
Dept: MEDICAL GROUP | Facility: MEDICAL CENTER | Age: 83
End: 2023-12-21
Payer: MEDICARE

## 2023-12-21 ENCOUNTER — TELEPHONE (OUTPATIENT)
Dept: MEDICAL GROUP | Facility: MEDICAL CENTER | Age: 83
End: 2023-12-21

## 2023-12-21 VITALS
DIASTOLIC BLOOD PRESSURE: 62 MMHG | BODY MASS INDEX: 24.11 KG/M2 | TEMPERATURE: 97 F | HEIGHT: 62 IN | SYSTOLIC BLOOD PRESSURE: 110 MMHG | HEART RATE: 68 BPM | WEIGHT: 131 LBS | OXYGEN SATURATION: 98 %

## 2023-12-21 DIAGNOSIS — I35.0 NONRHEUMATIC AORTIC VALVE STENOSIS: ICD-10-CM

## 2023-12-21 DIAGNOSIS — Z95.2 S/P TAVR (TRANSCATHETER AORTIC VALVE REPLACEMENT): ICD-10-CM

## 2023-12-21 DIAGNOSIS — I50.32 CHRONIC DIASTOLIC CONGESTIVE HEART FAILURE (HCC): ICD-10-CM

## 2023-12-21 DIAGNOSIS — M81.0 POSTMENOPAUSAL BONE LOSS: ICD-10-CM

## 2023-12-21 DIAGNOSIS — S22.000S COMPRESSION FRACTURE OF THORACIC VERTEBRA, UNSPECIFIED THORACIC VERTEBRAL LEVEL, SEQUELA: ICD-10-CM

## 2023-12-21 DIAGNOSIS — I25.10 CORONARY ARTERY DISEASE INVOLVING NATIVE CORONARY ARTERY OF NATIVE HEART WITHOUT ANGINA PECTORIS: ICD-10-CM

## 2023-12-21 DIAGNOSIS — Z12.31 ENCOUNTER FOR SCREENING MAMMOGRAM FOR BREAST CANCER: ICD-10-CM

## 2023-12-21 DIAGNOSIS — Z87.898 HISTORY OF PALPITATIONS: ICD-10-CM

## 2023-12-21 PROBLEM — S22.000A COMPRESSION FRACTURE OF THORACIC VERTEBRA (HCC): Status: RESOLVED | Noted: 2023-07-07 | Resolved: 2023-12-21

## 2023-12-21 PROCEDURE — 1170F FXNL STATUS ASSESSED: CPT | Performed by: INTERNAL MEDICINE

## 2023-12-21 PROCEDURE — 3074F SYST BP LT 130 MM HG: CPT | Performed by: INTERNAL MEDICINE

## 2023-12-21 PROCEDURE — 3078F DIAST BP <80 MM HG: CPT | Performed by: INTERNAL MEDICINE

## 2023-12-21 PROCEDURE — 99214 OFFICE O/P EST MOD 30 MIN: CPT | Performed by: INTERNAL MEDICINE

## 2023-12-21 ASSESSMENT — FIBROSIS 4 INDEX: FIB4 SCORE: 1.52

## 2023-12-21 NOTE — PROGRESS NOTES
Subjective     Katerina Torres is a 83 y.o. female who presents with back pain  Follow-Up            HPI    Here for mid back pain, currently left greater than right, initially her right side has been bothering her more, previous x-ray thoracic spine in July showed old T5 compression deformity no acute fracture, subsequent MRI thoracic spine in December 12/4/23 MRI thoracic spine without, T5 50% to 60% loss of height with bone marrow edema, T6 superior endplate contour deformity with remote compression fracture approximately 20% loss of height, T7 bone marrow edema with 30% loss of height, T8 50% loss of height with bone marrow edema, T7-T8 right paracentral disc protrusion with bilateral mild facet degeneration, compared to previous radiographs 7/6/23 T5 lesion may have worsened while the T7 and T8 lesions are new, she had recently seen pain management at [facility], had steroid injections by Dr. Pompa I do not have those records yet.  Patient states the right-sided back pain is improved but she now has more left-sided back pain.  Back pain does not radiate down her leg, more localized, worse with particular movements or laying on her back.  Has tried heat and cold application no benefit.  Pain management has provided Tylenol 3 which she states does not help significantly, but she will also take 1-2 Tylenol as needed, she has tried tramadol and Cymbalta in the past, no current NSAIDs as she is on aspirin and Plavix status post TAVR.  She does notice more bruising of her arms on the aspirin Plavix combination, no falls.  Patient is avoiding ladders, tries not to be as active because of her back and avoids heavy lifting.  Patient is , her  passed away earlier this year, she is grieving, and she does miss her , but she has wonderful support from neighbors.  She tried Cymbalta for the pain and mood but she could not tolerate that medication because of dizziness.  Also does  notice occasional nosebleeds on the aspirin Plavix combination.  She is using Vaseline to help moisturize her nasal passageways.  No hemoptysis.  Since the TAVR her shortness of breath with activity has improved, no chest pain, no palpitations, blood pressures at home running systolic 130s as she now has a new blood pressure arm cuff.  Medications, allergies, medical history, surgical history, social history, family history  reviewed and updated        Current Outpatient Medications   Medication Sig Dispense Refill    lisinopril (PRINIVIL) 20 MG Tab Take 1 Tablet by mouth every day. 90 Tablet 3    rosuvastatin (CRESTOR) 10 MG Tab Take 1 Tablet by mouth every evening. 100 Tablet 3    clopidogrel (PLAVIX) 75 MG Tab Take 1 Tablet by mouth every day. 100 Tablet 3    aspirin 81 MG EC tablet Take 1 Tablet by mouth every day. 100 Tablet 3    acetaminophen (TYLENOL) 500 MG Tab Take 1,000 mg by mouth every 6 hours as needed for Mild Pain.      bimatoprost (LUMIGAN) 0.01 % Solution Administer 1 Drop into both eyes at bedtime. Indications: Wide-Angle Glaucoma      vitamin D (CHOLECALCIFEROL) 1000 UNIT Tab Take 1,000 Units by mouth every day.       No current facility-administered medications for this visit.         Thoracic spine compression fracture  6/22/23 musculoskeletal back pain trial of zanaflex and gabapentin  6/29/23 on neurontin, zanaflex not covered, declines baclofen, going to chiropractor at [facility]  7/7/23 off neurontin, trial of mobic 7.5 mg qday x 15 days  7/6/23 x-ray rib series bilaterally and chest x-ray questionable healing fracture left anterior fourth rib  7/6/23 xray thoracic spine chronic severe compression deformity T5, no definite acute fracture seen, mild arthritis  11/9/23 trial of cymbalta 30 mg has tried lidocaine, voltaren topical, ultram, gabapentin, tizanidine, unable to use NSAIDs oral  11/28/23 stopped cymbalta due to dizziness  12/4/23 MRI thoracic spine without, T5 50% to 60% loss of height  with bone marrow edema, T6 superior endplate contour deformity with remote compression fracture approximately 20% loss of height, T7 bone marrow edema with 30% loss of height, T8 57% loss of height with bone marrow edema, T7-T8 right paracentral disc protrusion with bilateral mild facet degeneration, compared to previous radiographs 7/6/23 T5 lesion may have worsened while the T7 and T8 lesions are new    s/p tavr aortic stenosis  10/31/23 cardiology note status post TAVR doing well, continue aspirin indefinitely, plavix 1 10/06 echo mild mitral regurgitation, normal LV  6/09 stress echo negative  8/12 echo normal LV function, EF 65%, mild aortic stenosis, peak gradient 24  7/24/13 normal LV function, EF 60%, mild LVH, mild aortic stenosis, peak gradient 25  3/28/14 cardiology note, abnormal EKG, recommend coronary angiogram continue metoprolol, aspirin  3/31/14 cardiac catheterization; left main and LAD patent, circumflex free of disease, RCA free of disease, normal LV function, mild aortic stenosis  7/9/17 echo normal LV size and function, EF 60%, grade 1 diastolic dysfunction, moderate aortic stenosis peak gradient 50, mean 29, valve area 0.8-1.0 and aortic regurgitation  7/9/17 persantine thallium small fixed perfusion abnormality distal anterior and anteroapical segments, no ischemia is noted to represent mild prior infarct or variant normal apical thinning  4/27/18 echo normal LV size and function, EF 65%, moderate aortic stenosis peak gradient 46, valve area 0.9, moderate aortic insufficiency, no change compared to previous  5/27/19 echo normal LV size and function, EF 65%, normal diastolic function, calcified aortic valve moderate aortic stenosis peak gradient 53, moderate aortic insufficiency  7/11/19 patient feels more intense heart rate and heartbeat at night, can hear her heartbeat, only occurs at night, question anxiety component trial of buspar, 24-hour holter, cardiology referral  7/18/19 24-hour  holter monitor, sinus rhythm asymptomatic average heart rate 65 PAC burden 1.3%, PVC burden 1.8%, 2 atrial runs longest 7 beats maximal heart rate 105, fastest run 4 beats maximum heart rate 129  9/24/21 echo EF 65%, indeterminate diastolic function, moderate aortic stenosis, peak gradient 56, moderate aortic insufficiency  10/1/21 cardiology note will likely need valve replacement next 1 to 2 years, follow-up 6 months  2/14/22  cardiology note moderate to severe aortic stenosis, able to walk 4 miles with no limitations, will order repeat echo follow-up 2 months  5/11/22 echo EF 55%,severe aortic stenosis peak gradient 70  5/13/22 cardiology note asymptomatic severe aortic stenosis, continue surveillance follow-up 6 months  11/7/22 cardiology note asymptomatic severe aortic stenosis, continue surveillance, repeat echo, follow-up 6 months  11/10/22 echo severe aortic stenosis, peak gradient 65, EF 60%, normal diastolic function  5/24/23  cardiothoracic surgery note recommend TAVR  5/24/23 cardiology note aortic stenosis with preserved ejection fraction stage C, recommend stress EKG, echocardiogram, BNP, follow-up 3 months  9/7/23  cardiology structural heart follow up discussed risks and benefits and alternatives to TAVR, will require preoperative work-up including CT and cardiac catheterization, patient agrees, follow-up 6 weeks  9/20/23 CT-TAVR tricuspid aortic root with moderate aortic calcification, nonspecific 7 mm left lower lobe lung nodule with pleural tach suggesting postinflammatory process, no evidence of adenopathy chest, abdomen, pelvis, simple 3.2 cm cyst left adnexa  10/9/23 cardiac catheterization prior to TAVR procedure note, left main 20% disease, LAD proximal 40% and mid 30% disease, RCA mid 30% disease, mild to moderate aortic regurgitation, ascending aorta 2.9 cm, severe transaortic pressure gradient, borderline elevated left heart filling pressures  10/31/23  cardiology note status post TAVR doing well, continue aspirin indefinitely, plavix 1 year post stent placement, echo 1 month, SBE prophylaxis,crestor 5 mg qpm, cardiac rehab referral  10/23/23  interventional cardiology operative note TAVR 23 shearer nik, RCA MARCELLUS stent, left ventricular end-diastolic pressure 28  10/23/23 SHARIFA during TAVR, baseline images systolic function EF 55%, calcified aortic valve, severe aortic stenosis, mild aortic insufficiency, post deployment images showed preserved function, 23 mm shearer TAVR valve seen in the aortic position with normal leaflet motion, no paravalvular leak, mean gradient of 6, calculated effective orifice area 2.2 cm  11/2/23 echo TAVR present, normal LV size and function, EF 70%, indeterminate diastolic function, no TAVR aortic valve functioning normally with normal    11/20/23 echo normal LV size, thickness, function, EF 64%, indeterminate diastolic function, trace MR, known TAVR aortic valve functioning normally, peak gradient 15  11/29/23 cardiology note can stop metoprolol, continue asa and plavix, next visit consider change to plavix monotherapy    s/p knee replacement  3/3/17 MRI right knee abnormal right lateral meniscus with peripheral extrusion, maceration, and complex tear involving primarily the posterior horn and body but also the anterior horn, abnormal medial meniscus with vertical tear of the peripheral zone of body and posterior horn, probable meniscal root tear, and degenerative fraying of the free edge, severe lateral femorotibial compartment osteoarthritis with significant cartilage loss and moderate to severe subchondral edema within the lateral femoral condyle and lateral tibial plateau, mild medial femorotibial and the patellofemoral compartment osteoarthritis, moderate sized joint effusion with associated popliteal cyst  6/20/17 sees  orthopedics  1/18/18  orthopedic note right knee end-stage arthritis, x-rays  bone-on-bone right knee arthritis lateral compartment, right knee steroid injection performed, planned for total right knee arthroplasty after winter  5/2/18  orthopedic note, right knee osteoathritis, failed conservative measures, scheduled for right knee surgery  5/8/18  operative note right knee arthroplasty   5/23/18  orthopedic note 2 weeks s/p right total knee  8/6/18  orthopedic note 3 months status post right knee replacement doing well, x-ray shows stable right total knee  5/22/19  orthopedic note x-ray stable total knee replacement, follow-up 1 year     pulmonary nodule  9/20/23 CT-TAVR tricuspid aortic root with moderate aortic calcification, nonspecific 7 mm left lower lobe lung nodule with pleural tag suggesting postinflammatory process, no evidence of adenopathy chest, abdomen, pelvis      Preventative health  3/7/17 colon per [facility] 2 polyps, grade 2 internal hemorrhoids, angioectasias ascending colon,pathology one hyperplastic polyp and one sessile serrated polyp, repeat colonoscopy 5 years   9/19/20 shingrix second  8/23/21 dexa LS-0.9,hip-1.4  4/28/22 tdap   9/26/22 prevnar 20  11/18/22 mammogram heterogeneously dense breast tissue, declines screening breast ultrasound   7/11/23 vit d 82  10/24/23 flu     Osteopenia  9/06 dexa LS -1.2,hip -1.1  6/09 dexa LS -1.4,hip -1.0  7/11 vit d 43  7/11 dexa LS -1.1,hip -0.9  712 vit d 30 start 1000 units  7/24/13 vit d 72 on 1000 units  8/6/13 dexa LS -1.1,hip -1.1  8/5/14 vit d 67  8/19/15 dexa LS -1.4,hip -1.3;FRAX 40% major,27% hip only on prednisone one month, does not need bisphosphonate therapy  8/19/15 vit d 43  6/16/16 off prednisone x 3 weeks  6/21/16 vit d 50  6/27/17 vit d 48  4/27/18 vit d 53   5/14/19 vit d 46  5/28/19 dexa LS-1.0,hip-1.4  6/12/20 vit d 88 on 1000 units decrease to qod   8/23/21 dexa LS-0.9,hip-1.4  1/14/22 vit d 68  7/6/23 x-ray rib series bilaterally and chest x-ray questionable healing  fracture left anterior fourth rib  7/6/23 xray thoracic spine chronic severe compression deformity T5, no definite acute fracture seen, mild arthritis  7/11/23 vit d 82  12/4/23 MRI thoracic spine, acute subacute compression fractures T5 with 50 to 60% loss of height, T7 with 30% loss of height, T8 with 50 to 70% loss of height, no significant retropulsion     low back pain  11/30/20 right-sided buttock pain with radiation, referral to physiatry, x-ray, MRI lumbar spine, trial of naproxen short-term plus acetaminophen follow-up 3 weeks  11/30/20 x-ray lumbar spine moderate spondylosis, L2-L3 asymmetric disc height loss on the left resulting in slight right curvature and degenerative retrolisthesis  12/2/20 MRI lumbar spine mild superior endplate compression fracture at L3, no marrow edema consistent with chronic process, L2-L3 moderate central narrowing, moderate right and severe left neuroforaminal narrowing, L3-4 moderate central narrowing, moderate bilateral neuroforaminal narrowing, L4-5 moderate to severe central canal narrowing with severe right and moderate left neuroforaminal narrowing, L5-S1 moderate bilateral neuroforaminal narrowing  1/6/21  physiatry procedure note right lumbar epidural steroid injection L4-S1 under fluoroscopy  1/26/21  physiatry note good response to right lumbar L4-L5 epidural steroid injections, she has returned to activities such as skiing, consider physical therapy and home program for residual pain, consider right SI joint versus repeat lumbar steroid injection if necessary, follow-up 2 months  2/8/21 [facility] note   2/17/21 [facility] note   8/31/22  physiatry note plan right L4-L5 epidural steroid injection under fluoroscopy and physical therapy as tolerated  9/1/22 dr.storm physiatry procedure note epidural L4-L5 L5-S1 steroid injection  9/6/22 x-ray sacrum degenerative changes SI joint unchanged from prior  9/6/22 x-ray right  hip stable mild bilateral osteoarthritis  9/6/22 dr.storm physiatry note SI joint dysfunction, obtain x-ray lumbar spine, sacrum and coccyx, right hip, trial of tramadol every 6 hours, follow-up physical therapy  9/16/22  physiatry note low back pain, making improvements with physical therapy, continues to have sacral dysfunction with radicular symptoms, discontinue tramadol, trial of acetaminophen 1300 mg tid ,avoid nsaids  10/10/23 low back pain after trimming branches, no trauma, no imaging necessary, trial of tramadol 50 mg qid  11/28/23 stopped cymbalta due to dizziness     knee pain left  8/1/13 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, lateral meniscus mildly extruded, ruptured hopper's cyst  8/12/13  ortho note pain improved on followup, consider injection if pain recurs     impaired glucose metabolism  7/9/17 A1c 5.4%  4/27/18 A1c 5.4%  7/11/23 A1c 5.8%      History polymyalgia  4/20/15 physical therapy, trial celebrex and zanaflex  5/7/15 physical therapy evaluation today recommended right hip x-ray and pelvic x-ray, ordered in computer  5/8/15 xray hip negative  6/29/15 seen by bridgette diagnosed with polymyalgia rheumatica  6/29/15 CRP 9.4,ESR 32 started on prednisone 20 mg    7/28/15 on prednisone 10 mg will continue 10 mg x 2 weeks, then decrease to 5 mg daily  8/19/15 hgb 14,hct 43, CRP 0.3, ESR 14, tapered off prednisone but developed mouth irritation, has resumed prednisone 5 mg daily, will continue x2 weeks then taper  11/17/15 refill prednisone 5 mg #30 tabs x one  6/21/16 off prednisone; CRP 0.1,ESR 17  6/27/17 CRP 0.09,ESR 12,cpk 66     history palpitations  7/11/19 patient feels more intense heart rate and heartbeat at night, can hear her heartbeat, only occurs at night, question anxiety component trial of buspar, 24-hour holter, cardiology referral  7/18/19 24-hour holter monitor, sinus rhythm asymptomatic average heart rate 65 PAC burden 1.3%, PVC burden 1.8%, 2  atrial runs longest 7 beats maximal heart rate 105, fastest run 4 beats maximum heart rate 129  2/14/22  cardiology note moderate to severe aortic stenosis, able to walk 4 miles with no limitations, will order repeat echo follow-up 2 months  5/11/22 echo EF 55%,severe aortic stenosis peak gradient 70  5/13/22 cardiology note asymptomatic severe aortic stenosis, continue surveillance follow-up 6 months  11/7/22 cardiology note asymptomatic severe aortic stenosis, continue surveillance, repeat echo, follow-up 6 months  11/10/22 echo severe aortic stenosis, peak gradient 65, EF 60%, normal diastolic function  5/24/23 cardiology note aortic stenosis with preserved ejection fraction stage C, recommend lower stress EKG, echocardiogram, BNP, follow-up 3 months  9/7/23  cardiology structural heart follow up discussed risks and benefits and alternatives to TAVR, will require preoperative work-up including CT and cardiac catheterization, patient agrees, follow-up 6 weeks  10/23/23  interventional cardiology operative note TAVR 23 shearer nik, RCA MARCELLUS stent, left ventricular end-diastolic pressure 28  11/20/23 echo normal LV size, thickness, function, EF 64%, indeterminate diastolic function, trace MR, known TAVR aortic valve functioning normally, peak gradient 15  11/29/23 cardiology note can stop metoprolol     history neutropenia  8/06 wbc 3.4  3/08 wbc 3.9  6/09 wbc 3.9  7/10 wbc 4.4  7/11 wbc 4.5  7/12 wbc 3.8 (55%N,35%L)  7/24/13 wbc 4.3  3/20/14 wbc 3.1 (52%N,36%L)  3/31/14 wbc 3.7  8/19/15 wbc 5.3  6/21/16 wbc 4.4 (50%N,40%L)  6/27/17 wbc 4.2   4/27/18 wbc 4.4  5/14/19 wbc 4.5  6/12/20 wbc 4.5  6/30/21 wbc 4.3  1/14/22 wbc 5.3  10/20/22 wbc 5.6  7/11/23 wbc 5.7      History of breast cancer  1980s left sided s/p lumpectomy and radiation therapy, no old records  7/11 mammogram  8/12 mammogram  8/13 mammogram  8/18/14 mammogram  9/1/16 mammogram heterogeneously dense breast tissue recommend  screening breast ultrasound  9/1/16 sonocine negative  11/18/22 mammogram heterogeneously dense breast tissue, declines screening breast ultrasound      History of basal cell skin cancer  10[facility]22[facility]     Glaucoma  4/29/21  eye exam   1/13/22  eye exam primary open-angle glaucoma  9/16/22  eye exam    1/12/23  eye exam   5/12/23  ophthalmology note primary open angle glaucoma moderate, follow-up 6 months  11/16/23  ophthalmology note continues on lumigan, timolol     Esophageal dysmotility  7/9/17 barium swallow moderate esophageal dysmotility, small volume silent aspiration  7/18/17 start diltiazem 120 mg daily  7/18/17 referral [facility], speech pathology for esophageal dysmotility, small amount of aspiration on barium swallow, try low-dose diltiazem 120 mg for esophageal dysmotility and elevated blood pressure  7/26/17 [facility] evaluation dyspepsia, obtain cardiology clearance and then proceed EGD, initiate pantoprazole 20 mg, trial of miralax and colace     Dyslipidemia  3/08 chol 175,trig 73,hdl 45,ldl 115  6/09 chol 174,trig 89,hdl 47,ldl 109  7/10 chol 182,trig 61,hdl 55,ldl 114  7/11 chol 175,trig 69,hdl 45,ldl 116  7/12 chol 164,trig 64,hdl 43,ldl 108  7/24/13 chol 186,trig 66,hdl 54,ldl 119 no meds  8/5/14 chol 165,trig 93,hdl 48,ldl 98  8/19/15 chol 192,trig 109,hdl 58,ldl 112; 10 year risk 20% declines statin therapy  6/21/16 chol 137,trig 75,hdl 47,ldl 75   6/27/17 hcol 153,trig 94,hdl 54,ldl 80  5/14/19 chol 164,trig 82,hdl 48,ldl 100   6/12/20 chol 176,trig 80,hdl 57,ldl 103   6/30/21 chol 170,trig 100,hdl 43,ldl 107  10/20/22 chol 175,trig 81,hdl 61,ldl 98  10/31/23 start crestor 5 mg  11/29/23 cardiology note increase crestor to 10 mg  11/29/23 cardiology note increase crestor to 10 mg    diastolic chf  5/11/22 echo EF 55%,severe aortic stenosis peak gradient 70  11/10/22 echo severe aortic stenosis, peak gradient 65, EF 60%, normal  diastolic function  7/10/23 NT-proBNP 578  10/24/23 NT-proBNP 578  11/2/23 NT-proBNP 578  11/10/22 echo severe aortic stenosis, peak gradient 65, EF 60%, normal diastolic function  10/9/23 cardiac catheterization prior to TAVR procedure note, left main 20% disease, LAD proximal 40% and mid 30% disease, RCA mid 30% disease, mild to moderate aortic regurgitation, ascending aorta 2.9 cm, severe transaortic pressure gradient, borderline elevated left heart filling pressures, successful implantation of a 23 shearer nik S3 ultra resilia, acute decompensated diastolic heart failure with LVEDP of 28 mmHg, successful protection of the RCA with snorkel stent (3.5x24 Synergy XD MARCELLUS)  10/23/23  interventional cardiology operative note TAVR 23 shearer nik, RCA MARCELLUS stent, left ventricular end-diastolic pressure 28  11/20/23 echo normal LV size, thickness, function, EF 64%, indeterminate diastolic function, trace MR, known TAVR aortic valve functioning normally, peak gradient 15  11/29/23 cardiology note can stop metoprolol, continue asa and plavix, next visit consider change to plavix monotherapy     Decrease GFR  7/7/11 bun 16,creat 1.0,GFR 50  7/24/13 bun 14,creat 1.1,GFR 45  3/20/14 bun 15,creat 0.9,GFR 59  8/5/14 bun 14,creat 1.1,GFR 46  2/19/15 bun 13,creat 0.8,GFR >60  8/19/15 bun 10,creat 1.1,GFR 48  6/21/16 bun 19,creat 1.0,GFR 50  6/27/17 bun 14,creat 0.5,GFR 51  4/28/18 bun 15,creat 0.9,GFR 60  5/14/19 bun 18,creat 1.13,GFR 46  6/12/20 bun 15,creat 1.07,GFR 49  6/30/21 bun 14,creat 1.07,GFR 49,PTH 36,urine mac<1.2,SPEP negative  1/14/22 bun 19,creat 1.18,GFR 44,PTH 25,calcium 9.6,phos 3.7  10/20/22 bun 20,creat 1.09,GFR 50,PTH 31  5/17/23 bun 18,creat 1.0,GFR 52  7/11/23 bun 16,creat 1.17,GFR 46,PTH 24  10/9/23 bun 15,creat 1.09,GFR 50  11/2/23 bun 18,creat 1.1,GFR,GFR 49    colon polyp  3/7/17 colon per [facility] 2 polyps, grade 2 internal hemorrhoids, angioectasias ascending colon,pathology one hyperplastic  polyp and onesessile serrated polyp, repeat colonoscopy 5 years       cad   10/06 echo mild mitral regurgitation, normal LV  6/09 stress echo negative  8/12 echo normal LV function, EF 65%, mild aortic stenosis, peak gradient 24  7/24/13 normal LV function, EF 60%, mild LVH, mild aortic stenosis, peak gradient 25  3/24/14 exercise treadmill carla protocol 5 minutes 5 seconds, 1 mm mild upsloping ST segment depression in V6, flat ST segment depression V4 and V5 compatible with ischemia, no arrhythmia noted  3/28/14 cardiology note, scheduled for cardiac catheterization, cont asa, metoprolol 25 mg bid, crestor 5 mg samples  3/31/14 cardiac catheterization; left main mild plaquing, LAD patent, circumflex free of disease, RCA free of disease, normal LV function, mild aortic stenosis  7/9/17 echo normal LV size and function, EF 60%, grade 1 diastolic dysfunction, moderate aortic stenosis peak gradient 50, mean 29, valve area 0.8-1.0 and aortic regurgitation  7/9/17 persantine thallium small fixed perfusion abnormality distal anterior and anteroapical segments, no ischemia is noted to represent mild prior infarct or variant normal apical thinning  4/27/18 echo normal LV size and function, EF 65%, moderate aortic stenosis peak gradient 46, valve area 0.9, moderate aortic insufficiency, no change compared to previous  7/18/19 24-hour holter monitor, sinus rhythm asymptomatic average heart rate 65 PAC burden 1.3%, PVC burden 1.8%, 2 atrial runs longest 7 beats maximal heart rate 105, fastest run 4 beats maximum heart rate 129  9/24/21 echo EF 65%, indeterminate diastolic function, moderate aortic stenosis, peak gradient 56, moderate aortic insufficiency  10/1/21 cardiology note will likely need valve replacement next 1 to 2 years, follow-up 6 months  2/14/22  cardiology note moderate to severe aortic stenosis, able to walk 4 miles with no limitations, will order repeat echo follow-up 2 months  5/11/22 echo EF  55%,severe aortic stenosis peak gradient 70  11/7/22 cardiology note asymptomatic severe aortic stenosis, continue surveillance, repeat echo, follow-up 6 months  11/10/22 echo severe aortic stenosis, peak gradient 65, EF 60%, normal diastolic function  5/24/23 cardiology note aortic stenosis with preserved ejection fraction stage C, recommend stress EKG, echocardiogram, BNP, follow-up 3 months  9/7/23  cardiology structural heart follow up discussed risks and benefits and alternatives to TAVR, will require preoperative work-up including CT and cardiac catheterization, patient agrees, follow-up 6 weeks  10/9/23 cardiac catheterization prior to TAVR procedure note, left main 20% disease, LAD proximal 40% and mid 30% disease, RCA mid 30% disease, mild to moderate aortic regurgitation, ascending aorta 2.9 cm, severe transaortic pressure gradient, borderline elevated left heart filling pressures, successful implantation of a 23 shearer nik S3 ultra resilia, acute decompensated diastolic heart failure with LVEDP of 28 mmHg, successful protection of the RCA with snorkel stent (3.5x24 Synergy XD MARCELLUS)  10/23/23  interventional cardiology operative note TAVR 23 shearer nik, RCA MARCELLUS stent, left ventricular end-diastolic pressure 28  11/20/23 echo normal LV size, thickness, function, EF 64%, indeterminate diastolic function, trace MR, known TAVR aortic valve functioning normally, peak gradient 15  11/29/23 cardiology note can stop metoprolol, continue asa and plavix, next visit consider change to plavix monotherapy        Patient Active Problem List   Diagnosis    History of breast cancer s/p lumpectomy left 1980s    Osteopenia    Dyslipidemia    History of neutropenia    Preventative health care    S/p TAVR for aortic stenosis    Glaucoma    History of basal cell carcinoma of skin    Knee pain, left    History of polymyalgia rheumatica    Decreased GFR    Colon polyp    S/P knee replacement    Esophageal  "dysmotility    History of palpitations    Low back pain    Impaired glucose metabolism    Pulmonary nodule    S/P TAVR (transcatheter aortic valve replacement)    S/P drug eluting coronary stent placement    Diastolic congestive heart failure (HCC)    CAD (coronary artery disease)    Thoracic compression fracture (HCC)         ROS           Objective     /62   Pulse 68   Temp 36.1 °C (97 °F) (Temporal)   Ht 1.575 m (5' 2\")   Wt 59.4 kg (131 lb)   SpO2 98%   BMI 23.96 kg/m²      Physical Exam  Vitals and nursing note reviewed.   Constitutional:       Appearance: Normal appearance.   HENT:      Head: Normocephalic and atraumatic.      Right Ear: External ear normal.      Left Ear: External ear normal.   Eyes:      Conjunctiva/sclera: Conjunctivae normal.   Cardiovascular:      Rate and Rhythm: Normal rate and regular rhythm.      Heart sounds: Normal heart sounds.   Pulmonary:      Effort: Pulmonary effort is normal.      Breath sounds: Normal breath sounds.   Abdominal:      General: There is no distension.   Musculoskeletal:         General: No swelling.   Skin:     Coloration: Skin is not jaundiced.      Findings: Bruising present.   Neurological:      General: No focal deficit present.      Mental Status: She is alert.   Psychiatric:         Mood and Affect: Mood normal.       Mild ecchymosis of her forearms bilateral     No spinal tenderness to palpation lumbar and thoracic region, no paraspinal muscle tenderness to palpation  No lower extremity edema                Assessment & Plan           Assessment   #1 thoracic back pain left greater than right status post recent steroid injection by [facility] I do not have those old records, right-sided back pain is improved but she now has more left-sided back pain, MRI did show compression fractures T5, T6, T7, T8, currently on Tylenol 3 per pain management, no NSAIDs since she is on aspirin and Plavix, tried tramadol, Cymbalta but had side " effects    #2 status post TAVR in October followed by cardiology shortness of breath has improved most recent echo in November showed valve functioning normally    #3 postmenopausal bone loss bone density in August 2021 lumbar spine -0.9, hip -1.4 she is due for follow-up bone density testing    #4 dyslipidemia Crestor was increased from 5 mg to 10 mg by cardiology    #5 CKD 3a 11/2/23 bun 18,creat 1.1,GFR,GFR 49 stable, no fluid overload, no regular NSAIDs    #6 colon polyp adenoma 2017 due for follow-up with GI consultants    #7 hypertension, stable on lisinopril    #8 pulmonary nodule incidental finding on CT TAVR workup in September, centimeter left lower lobe nodule likely postinflammatory    #9 easy bruising on aspirin and Plavix post TAVR    #10 CAD, cardiac catheterization October snorkel stent protection of RCA with Synergy drug-eluting stent    #11 diastolic CHF stable fluid status    Plan  #1 old records from [facility]    #2 continue Tylenol 3 from [facility], continue no NSAIDs, could not tolerate tramadol and Cymbalta    #3 continue lisinopril, check blood pressure periodically and record    #4 bone density mammogram ordered    #5 she will be due for CT thorax in March or April    #6 follow-up with myself in March    #7 recommend she get the COVID and RSV vaccine at the pharmacy    #8 she is due for follow-up colonoscopy however with her recent TAVR, stent placement, will wait at least 6 months before considering referral for GI evaluation, and perhaps given age and cardiac comorbidities, it may be reasonable to defer further screening, perhaps we could consider Cologuard    #9 no heavy lifting, no step ladders or stepstools    #10 continue work on a good nutrition program

## 2023-12-22 ENCOUNTER — PATIENT MESSAGE (OUTPATIENT)
Dept: CARDIOLOGY | Facility: MEDICAL CENTER | Age: 83
End: 2023-12-22
Payer: MEDICARE

## 2023-12-22 DIAGNOSIS — I50.30 DIASTOLIC HEART FAILURE, UNSPECIFIED HF CHRONICITY (HCC): ICD-10-CM

## 2023-12-22 RX ORDER — LISINOPRIL 40 MG/1
40 TABLET ORAL DAILY
Qty: 90 TABLET | Refills: 3 | Status: SHIPPED | OUTPATIENT
Start: 2023-12-22 | End: 2024-01-22 | Stop reason: SDUPTHER

## 2023-12-22 NOTE — PATIENT COMMUNICATION
Robert Nunez M.D.  You13 hours ago (5:39 PM)     BP close, but still above target. I suggest increasing to 40 mg lisinopril daily. If not at <130/80 then can add amlodipine 2.5 mg daily    Thx  BE     Recommendations sent via [employer] [employer].

## 2023-12-22 NOTE — PATIENT COMMUNICATION
To BE: Patient sent in recent blood pressures after increasing lisinopril. Please review and advise if any recommendations. Thank you!

## 2023-12-23 ENCOUNTER — HOSPITAL ENCOUNTER (OUTPATIENT)
Dept: LAB | Facility: MEDICAL CENTER | Age: 83
End: 2023-12-23
Attending: INTERNAL MEDICINE
Payer: MEDICARE

## 2023-12-23 DIAGNOSIS — Z95.5 S/P DRUG ELUTING CORONARY STENT PLACEMENT: ICD-10-CM

## 2023-12-23 LAB
ALBUMIN SERPL BCP-MCNC: 4.3 G/DL (ref 3.2–4.9)
ALBUMIN/GLOB SERPL: 1.7 G/DL
ALP SERPL-CCNC: 69 U/L (ref 30–99)
ALT SERPL-CCNC: 13 U/L (ref 2–50)
ANION GAP SERPL CALC-SCNC: 9 MMOL/L (ref 7–16)
AST SERPL-CCNC: 19 U/L (ref 12–45)
BILIRUB SERPL-MCNC: 0.6 MG/DL (ref 0.1–1.5)
BUN SERPL-MCNC: 28 MG/DL (ref 8–22)
CALCIUM ALBUM COR SERPL-MCNC: 9.3 MG/DL (ref 8.5–10.5)
CALCIUM SERPL-MCNC: 9.5 MG/DL (ref 8.5–10.5)
CHLORIDE SERPL-SCNC: 105 MMOL/L (ref 96–112)
CHOLEST SERPL-MCNC: 134 MG/DL (ref 100–199)
CO2 SERPL-SCNC: 24 MMOL/L (ref 20–33)
CREAT SERPL-MCNC: 1.04 MG/DL (ref 0.5–1.4)
FASTING STATUS PATIENT QL REPORTED: NORMAL
GFR SERPLBLD CREATININE-BSD FMLA CKD-EPI: 53 ML/MIN/1.73 M 2
GLOBULIN SER CALC-MCNC: 2.6 G/DL (ref 1.9–3.5)
GLUCOSE SERPL-MCNC: 103 MG/DL (ref 65–99)
HDLC SERPL-MCNC: 65 MG/DL
LDLC SERPL CALC-MCNC: 50 MG/DL
POTASSIUM SERPL-SCNC: 5.1 MMOL/L (ref 3.6–5.5)
PROT SERPL-MCNC: 6.9 G/DL (ref 6–8.2)
SODIUM SERPL-SCNC: 138 MMOL/L (ref 135–145)
TRIGL SERPL-MCNC: 96 MG/DL (ref 0–149)

## 2023-12-23 PROCEDURE — 36415 COLL VENOUS BLD VENIPUNCTURE: CPT

## 2023-12-23 PROCEDURE — 80061 LIPID PANEL: CPT

## 2023-12-23 PROCEDURE — 80053 COMPREHEN METABOLIC PANEL: CPT

## 2023-12-26 ENCOUNTER — TELEPHONE (OUTPATIENT)
Dept: RADIOLOGY | Facility: MEDICAL CENTER | Age: 83
End: 2023-12-26
Payer: MEDICARE

## 2024-01-08 ENCOUNTER — PATIENT MESSAGE (OUTPATIENT)
Dept: CARDIOLOGY | Facility: MEDICAL CENTER | Age: 84
End: 2024-01-08
Payer: MEDICARE

## 2024-01-08 NOTE — PATIENT COMMUNICATION
Robert Nunez M.D.  You5 minutes ago (11:35 AM)     Cont meds as prescribed. Lets set another office visit. Continue BP monitoring once daily.  Thx  BE     Recommendations sent to patient via [employer].

## 2024-01-11 ENCOUNTER — HOSPITAL ENCOUNTER (OUTPATIENT)
Dept: RADIOLOGY | Facility: MEDICAL CENTER | Age: 84
End: 2024-01-11
Attending: INTERNAL MEDICINE
Payer: MEDICARE

## 2024-01-11 DIAGNOSIS — M81.0 POSTMENOPAUSAL BONE LOSS: ICD-10-CM

## 2024-01-11 DIAGNOSIS — Z12.31 ENCOUNTER FOR SCREENING MAMMOGRAM FOR BREAST CANCER: ICD-10-CM

## 2024-01-11 PROCEDURE — 77067 SCR MAMMO BI INCL CAD: CPT

## 2024-01-11 PROCEDURE — 77080 DXA BONE DENSITY AXIAL: CPT

## 2024-01-12 ENCOUNTER — TELEPHONE (OUTPATIENT)
Dept: MEDICAL GROUP | Facility: MEDICAL CENTER | Age: 84
End: 2024-01-12
Payer: MEDICARE

## 2024-01-13 NOTE — TELEPHONE ENCOUNTER
Please notify the patient that:  (1) her mammogram is negative but does show dense breast tissue which may decrease the accuracy of the mammogram  (2) she may obtain a screening breast ultrasound which could provide further detail  (3) [facility] offers the screening breast ultrasound, however some insurance plans may not cover the screening breast ultrasound  (4) if the screening breast ultrasound is not covered, typically the out-of-pocket expense is approximately $125  (5) if she would like me to order the screening breast ultrasound let me know, and I can submit that order to [facility], otherwise we can repeat the screening mammogram in 1 year

## 2024-01-13 NOTE — TELEPHONE ENCOUNTER
Called the patient left a message, please notify her that her bone density test shows:  (1) she has normal bone strength of her back  (2) she has decreased bone strength of her hip, since she has had a fracture in the past I believe she would benefit from a medication to strengthen her bones and decrease her risk of future fractures  (3) I would recommend she start a once weekly medication called Fosamax, the main side effects are stomach irritation, heartburn, rarely it can affect her jaw bones so she should make sure she follows up with her dentist, if she would like to start the medication please let me know and I can send a prescription to her pharmacy

## 2024-01-15 ENCOUNTER — TELEPHONE (OUTPATIENT)
Dept: MEDICAL GROUP | Facility: MEDICAL CENTER | Age: 84
End: 2024-01-15
Payer: MEDICARE

## 2024-01-15 ENCOUNTER — TELEPHONE (OUTPATIENT)
Dept: CARDIOLOGY | Facility: MEDICAL CENTER | Age: 84
End: 2024-01-15
Payer: MEDICARE

## 2024-01-15 DIAGNOSIS — M85.80 OSTEOPENIA, UNSPECIFIED LOCATION: Chronic | ICD-10-CM

## 2024-01-15 DIAGNOSIS — Z95.2 S/P TAVR (TRANSCATHETER AORTIC VALVE REPLACEMENT): ICD-10-CM

## 2024-01-15 DIAGNOSIS — I50.30 DIASTOLIC HEART FAILURE, UNSPECIFIED HF CHRONICITY (HCC): ICD-10-CM

## 2024-01-15 RX ORDER — AMOXICILLIN 500 MG/1
2000 CAPSULE ORAL ONCE
Qty: 4 CAPSULE | Refills: 0 | Status: SHIPPED | OUTPATIENT
Start: 2024-01-15 | End: 2024-01-15

## 2024-01-15 NOTE — TELEPHONE ENCOUNTER
Robert Nunez M.D.  You1 minute ago (2:23 PM)     Yes.  Tx  BE     Order for 2 g amoxicillin placed at this time.

## 2024-01-15 NOTE — TELEPHONE ENCOUNTER
BE      Caller: Katerina Torres     Topic/issue: Patient is having her teeth cleaned tomorrow and she is unsure if she is allowed to have this procedure done due to the bleeding, please advise because her appt is tomorrow and she will need to cancel if need be     Callback Number: [phone]    Thank You   Ivet Driscoll

## 2024-01-15 NOTE — TELEPHONE ENCOUNTER
Phone Number Called: [phone]    Call outcome: Spoke to patient regarding message below.    Message: Patient reports that she had TAVR 10/23/23. Patient advised that she would need to reschedule as she needs to be at least 3 months out prior to cleaning appointment. Patient also advised that she would need pre-antibiotics prior to cleaning as well. Patient verbalizes understanding at this time. Patient requesting BE order abx. All questions answered at this time. Advised to call back with any further questions or concerns.     To BE: okay to order 2 g amoxicillin for dental cleaning? Thank you!

## 2024-01-15 NOTE — TELEPHONE ENCOUNTER
----- Message from Master Suarez M.D. sent at 1/12/2024  5:59 PM PST -----  Called the patient left a message, please notify her that her bone density test shows:  (1) she has normal bone strength of her back  (2) she has decreased bone strength of her hip, since she has had a fracture in the past I believe she would benefit from a medication to strengthen her bones and decrease her risk of future fractures  (3) I would recommend she start a once weekly medication called Fosamax, the main side effects are stomach irritation, heartburn, rarely it can affect her jaw bones so she should make sure she follows up with her dentist, if she would like to start the medication please let me know and I can send a prescription to her pharmacy

## 2024-01-15 NOTE — TELEPHONE ENCOUNTER
----- Message from Master Suaerz M.D. sent at 1/12/2024  5:27 PM PST -----  Please notify the patient that:  (1) her mammogram is negative but does show dense breast tissue which may decrease the accuracy of the mammogram  (2) she may obtain a screening breast ultrasound which could provide further detail  (3) [facility] offers the screening breast ultrasound, however some insurance plans may not cover the screening breast ultrasound  (4) if the screening breast ultrasound is not covered, typically the out-of-pocket expense is approximately $125  (5) if she would like me to order the screening breast ultrasound let me know, and I can submit that order to [facility], otherwise we can repeat the screening mammogram in 1 year

## 2024-01-16 NOTE — TELEPHONE ENCOUNTER
Has appt with Dr Nunez (dentist) next month. She will discuss this with him and let us know.     Pt declines ultrasound.

## 2024-01-16 NOTE — TELEPHONE ENCOUNTER
Noted.  She will discuss bisphosphonate therapy with her dentist first as she declines treatment for now.

## 2024-01-18 NOTE — TELEPHONE ENCOUNTER
BE    Caller: Katerina Torres    Topic/issue: MEDICAL ADVICE    Leelee would like a call back to discuss her medications. She states that has been having bloody noses since her procedure. Please advise.    Thank you,  Jorge HAMMER    Callback Number: [phone] (home)

## 2024-01-19 NOTE — TELEPHONE ENCOUNTER
Phone number called: [phone]    Call outcome: Voicemail reached. Message left with number provided to return call.

## 2024-01-19 NOTE — TELEPHONE ENCOUNTER
Phone number called: [phone]     Call outcome: Voicemail reached. Message left with number provided to return call.

## 2024-01-22 ENCOUNTER — TELEPHONE (OUTPATIENT)
Dept: CARDIOLOGY | Facility: MEDICAL CENTER | Age: 84
End: 2024-01-22
Payer: MEDICARE

## 2024-01-22 DIAGNOSIS — I50.30 DIASTOLIC HEART FAILURE, UNSPECIFIED HF CHRONICITY (HCC): ICD-10-CM

## 2024-01-22 DIAGNOSIS — E78.5 DYSLIPIDEMIA: Chronic | ICD-10-CM

## 2024-01-22 DIAGNOSIS — Z95.5 S/P DRUG ELUTING CORONARY STENT PLACEMENT: ICD-10-CM

## 2024-01-22 RX ORDER — LISINOPRIL 40 MG/1
40 TABLET ORAL DAILY
Qty: 90 TABLET | Refills: 3 | Status: SHIPPED | OUTPATIENT
Start: 2024-01-22

## 2024-01-22 RX ORDER — ROSUVASTATIN CALCIUM 10 MG/1
10 TABLET, COATED ORAL EVERY EVENING
Qty: 100 TABLET | Refills: 3 | Status: SHIPPED | OUTPATIENT
Start: 2024-01-22

## 2024-01-22 RX ORDER — CLOPIDOGREL BISULFATE 75 MG/1
75 TABLET ORAL DAILY
Qty: 100 TABLET | Refills: 3 | Status: SHIPPED | OUTPATIENT
Start: 2024-01-22

## 2024-01-22 NOTE — TELEPHONE ENCOUNTER
Phone Number Called: [phone]    Call outcome: Spoke to patient regarding message below.    Message: Spoke to patient in depth about  medications at this time. Reviewed dosages and how often to take. Patient requesting refill for medications. Refills provided at this time. All questions answered at this time. Advised to call back with any further questions or concerns.

## 2024-01-22 NOTE — TELEPHONE ENCOUNTER
BE  Caller: Katerina Torres     Topic/issue: Patient is confused about her medication. Patient would like a call back to discuss what is what.    Callback Number: [phone]      Thank you,  Elena GALVAN

## 2024-02-08 ENCOUNTER — PHARMACY VISIT (OUTPATIENT)
Dept: PHARMACY | Facility: MEDICAL CENTER | Age: 84
End: 2024-02-08
Payer: COMMERCIAL

## 2024-02-08 PROCEDURE — RXMED WILLOW AMBULATORY MEDICATION CHARGE: Performed by: OPHTHALMOLOGY

## 2024-02-12 ENCOUNTER — HOSPITAL ENCOUNTER (OUTPATIENT)
Dept: LAB | Facility: MEDICAL CENTER | Age: 84
End: 2024-02-12
Attending: INTERNAL MEDICINE
Payer: MEDICARE

## 2024-02-12 DIAGNOSIS — I50.30 DIASTOLIC HEART FAILURE, UNSPECIFIED HF CHRONICITY (HCC): ICD-10-CM

## 2024-02-12 LAB
ANION GAP SERPL CALC-SCNC: 14 MMOL/L (ref 7–16)
BUN SERPL-MCNC: 20 MG/DL (ref 8–22)
CALCIUM SERPL-MCNC: 9.3 MG/DL (ref 8.5–10.5)
CHLORIDE SERPL-SCNC: 107 MMOL/L (ref 96–112)
CO2 SERPL-SCNC: 24 MMOL/L (ref 20–33)
CREAT SERPL-MCNC: 1.05 MG/DL (ref 0.5–1.4)
FASTING STATUS PATIENT QL REPORTED: NORMAL
GFR SERPLBLD CREATININE-BSD FMLA CKD-EPI: 52 ML/MIN/1.73 M 2
GLUCOSE SERPL-MCNC: 111 MG/DL (ref 65–99)
POTASSIUM SERPL-SCNC: 3.9 MMOL/L (ref 3.6–5.5)
SODIUM SERPL-SCNC: 145 MMOL/L (ref 135–145)

## 2024-02-12 PROCEDURE — 80048 BASIC METABOLIC PNL TOTAL CA: CPT

## 2024-02-12 PROCEDURE — 36415 COLL VENOUS BLD VENIPUNCTURE: CPT

## 2024-02-15 ENCOUNTER — OFFICE VISIT (OUTPATIENT)
Dept: CARDIOLOGY | Facility: MEDICAL CENTER | Age: 84
End: 2024-02-15
Attending: INTERNAL MEDICINE
Payer: MEDICARE

## 2024-02-15 VITALS
BODY MASS INDEX: 24.48 KG/M2 | WEIGHT: 133 LBS | DIASTOLIC BLOOD PRESSURE: 74 MMHG | SYSTOLIC BLOOD PRESSURE: 130 MMHG | OXYGEN SATURATION: 97 % | HEART RATE: 81 BPM | RESPIRATION RATE: 16 BRPM | HEIGHT: 62 IN

## 2024-02-15 DIAGNOSIS — Z95.5 S/P DRUG ELUTING CORONARY STENT PLACEMENT: ICD-10-CM

## 2024-02-15 DIAGNOSIS — Z95.2 S/P TAVR (TRANSCATHETER AORTIC VALVE REPLACEMENT): ICD-10-CM

## 2024-02-15 DIAGNOSIS — I50.32 CHRONIC DIASTOLIC CONGESTIVE HEART FAILURE (HCC): ICD-10-CM

## 2024-02-15 PROCEDURE — 3075F SYST BP GE 130 - 139MM HG: CPT | Performed by: INTERNAL MEDICINE

## 2024-02-15 PROCEDURE — 1170F FXNL STATUS ASSESSED: CPT | Performed by: INTERNAL MEDICINE

## 2024-02-15 PROCEDURE — 99214 OFFICE O/P EST MOD 30 MIN: CPT | Performed by: INTERNAL MEDICINE

## 2024-02-15 PROCEDURE — 99212 OFFICE O/P EST SF 10 MIN: CPT | Performed by: INTERNAL MEDICINE

## 2024-02-15 PROCEDURE — 3078F DIAST BP <80 MM HG: CPT | Performed by: INTERNAL MEDICINE

## 2024-02-15 ASSESSMENT — FIBROSIS 4 INDEX: FIB4 SCORE: 1.93

## 2024-02-15 NOTE — PROGRESS NOTES
"CARDIOLOGY STRUCTURAL HEART FOLLOWUP    PCP: Master Suarez M.D.      1. S/P TAVR (transcatheter aortic valve replacement)    2. S/P drug eluting coronary stent placement    3. Chronic diastolic congestive heart failure (HCC)        Katerina Torres is doing well following transcatheter aortic valve replacement with protection of the right coronary by snorkel stenting.  She no longer requires 2 antiplatelet therapies and aspirin was discontinued in favor of clopidogrel.  If this is a problem for treatment of spine diseases then we could alternatively use aspirin in place of clopidogrel.  She will take antibiotics before going to the dentist.  I recommend continue with rosuvastatin and lisinopril.    Follow up: in 9 months    History: Katerina Torres is a 83 y.o. female with history of resolved breast cancer presenting for follow-up of TAVR performed October 2023 with 23 S3 ultra Resilia valve requiring right coronary artery protection with snorkel stenting.  She has done well from a cardiovascular standpoint but has had persistent problems with back discomfort and is under the care of the spine team.  She has had to curtail activities such as gardening and skiing.    PE:  /74 (BP Location: Left arm, Patient Position: Sitting, BP Cuff Size: Adult)   Pulse 81   Resp 16   Ht 1.575 m (5' 2\")   Wt 60.3 kg (133 lb)   SpO2 97%   BMI 24.33 kg/m²   GEN: NAD  CARDIAC: Regular. Normal S1, S2, Systolic murmur.   VASCULATURE: Normal carotid upstroke  RESP: Clear to auscultation bilaterally  ABD: Soft, non-tender, non-distended  EXT: No edema  NEURO: No focal deficit       () Today's E/M visit is associated with medical care services that serve as the continuing focal point for all needed health care services and/or with medical care services that  are part of ongoing care related to a patient's single, serious condition, or a complex condition: This includes  furnishing services to patients on " "an ongoing basis that result in care that is personalized  to the patient. The services result in a comprehensive, longitudinal, and continuous  relationship with the patient and involve delivery of team-based care that is accessible, coordinated with other practitioners and providers, and integrated with the broader health  care landscape.      Past Medical History:   Diagnosis Date    Arrhythmia Year ago    Breast cancer ([facility])     Left Breast    Breath shortness     with exertion \"walking up a hill\"    Bruises easily     Cancer ([facility]) 1989    breast left side lumpectomy    Cardiac murmur 06/03/2009    Chest pain 11/2/2023    Chronic kidney disease, stage III (moderate) ([facility]) 08/20/2015    Coronary artery disease involving native coronary artery of native heart without angina pectoris 10/31/2023    Dyslipidemia 06/03/2009    Dyspepsia 11/2/2023    Glaucoma     both eyes    Heart valve disease year ago    Having surgery    Hepatitis A 1993    Hiatus hernia syndrome     Hx of breast cancer 06/03/2009    Hypertension 07/18/2017    currently not taking medications    MEDICAL HOME     Neutropenia 06/03/2009    Osteopenia 06/03/2009    PONV (postoperative nausea and vomiting)     Preventative health care 06/03/2009    Snoring     Unspecified cataract     bilateral IOLs     Allergies   Allergen Reactions    Hydrocodone Nausea    Atorvastatin Unspecified     Does not qualify for primary prevention    Cymbalta [Duloxetine Hcl]      dizziness    Naproxen      GI upset    Neurontin [Gabapentin] Unspecified     dizziness     Outpatient Encounter Medications as of 2/15/2024   Medication Sig Dispense Refill    bimatoprost (LUMIGAN) 0.01 % Solution Instill 1 drop into both eyes every night at bedtime 7.5 mL 3    lisinopril (PRINIVIL) 40 MG tablet Take 1 Tablet by mouth every day. 90 Tablet 3    rosuvastatin (CRESTOR) 10 MG Tab Take 1 Tablet by mouth every evening. 100 Tablet 3    clopidogrel (PLAVIX) 75 MG Tab Take 1 Tablet by " mouth every day. 100 Tablet 3    acetaminophen (TYLENOL) 500 MG Tab Take 1,000 mg by mouth every 6 hours as needed for Mild Pain.      bimatoprost (LUMIGAN) 0.01 % Solution Administer 1 Drop into both eyes at bedtime. Indications: Wide-Angle Glaucoma      vitamin D (CHOLECALCIFEROL) 1000 UNIT Tab Take 1,000 Units by mouth every day.      [DISCONTINUED] aspirin 81 MG EC tablet Take 1 Tablet by mouth every day. 100 Tablet 3     No facility-administered encounter medications on file as of 2/15/2024.     Social History     Socioeconomic History    Marital status:      Spouse name: Not on file    Number of children: Not on file    Years of education: Not on file    Highest education level: 12th grade   Occupational History    Not on file   Tobacco Use    Smoking status: Former     Current packs/day: 0.00     Average packs/day: 0.5 packs/day for 10.0 years (5.0 ttl pk-yrs)     Types: Cigarettes     Start date: 1989     Quit date: 1999     Years since quittin.1    Smokeless tobacco: Never   Vaping Use    Vaping Use: Never used   Substance and Sexual Activity    Alcohol use: Yes     Alcohol/week: 4.2 oz     Types: 7 Standard drinks or equivalent per week     Comment: 1 glass wine/day    Drug use: No    Sexual activity: Not Currently     Partners: Male   Other Topics Concern     Service No    Blood Transfusions No    Caffeine Concern No    Occupational Exposure No    Hobby Hazards Yes    Sleep Concern No    Stress Concern Yes    Weight Concern No    Special Diet No    Back Care No    Exercise No    Bike Helmet No    Seat Belt Yes    Self-Exams Yes   Social History Narrative    Not on file     Social Determinants of Health     Financial Resource Strain: Low Risk  (10/23/2023)    Overall Financial Resource Strain (CARDIA)     Difficulty of Paying Living Expenses: Not hard at all   Food Insecurity: No Food Insecurity (10/23/2023)    Hunger Vital Sign     Worried About Running Out of Food in the Last  Year: Never true     Ran Out of Food in the Last Year: Never true   Transportation Needs: No Transportation Needs (10/23/2023)    PRAPARE - Transportation     Lack of Transportation (Medical): No     Lack of Transportation (Non-Medical): No   Physical Activity: Insufficiently Active (10/23/2023)    Exercise Vital Sign     Days of Exercise per Week: 2 days     Minutes of Exercise per Session: 30 min   Stress: Stress Concern Present (10/23/2023)    [employer] [employer] - Occupational Stress Questionnaire     Feeling of Stress : To some extent   Social Connections: Socially Isolated (10/23/2023)    Social Connection and Isolation Panel [NHANES]     Frequency of Communication with Friends and Family: More than three times a week     Frequency of Social Gatherings with Friends and Family: More than three times a week     Attends Scientologist Services: Never     Active Member of Clubs or Organizations: No     Attends Club or Organization Meetings: Never     Marital Status:    Intimate Partner Violence: Not on file   Housing Stability: Low Risk  (10/23/2023)    Housing Stability Vital Sign     Unable to Pay for Housing in the Last Year: No     Number of Places Lived in the Last Year: 1     Unstable Housing in the Last Year: No       Studies  Lab Results   Component Value Date/Time    CHOLSTRLTOT 134 12/23/2023 08:42 AM    LDL 50 12/23/2023 08:42 AM    HDL 65 12/23/2023 08:42 AM    TRIGLYCERIDE 96 12/23/2023 08:42 AM       Lab Results   Component Value Date/Time    SODIUM 145 02/12/2024 08:45 AM    POTASSIUM 3.9 02/12/2024 08:45 AM    CHLORIDE 107 02/12/2024 08:45 AM    CO2 24 02/12/2024 08:45 AM    GLUCOSE 111 (H) 02/12/2024 08:45 AM    BUN 20 02/12/2024 08:45 AM    CREATININE 1.05 02/12/2024 08:45 AM    CREATININE 1.08 (H) 07/02/2010 08:56 AM    BUNCREATRAT 11 07/02/2010 08:56 AM    GLOMRATE 50 (L) 07/02/2010 08:56 AM     Lab Results   Component Value Date/Time    ALKPHOSPHAT 69 12/23/2023 08:42 AM     ASTSGOT 19 12/23/2023 08:42 AM    ALTSGPT 13 12/23/2023 08:42 AM    TBILIRUBIN 0.6 12/23/2023 08:42 AM      @CBC@    Chief Complaint   Patient presents with    Aortic Stenosis    Dyslipidemia       ROS:   10 point review systems is otherwise negative except as per the HPI

## 2024-02-29 ENCOUNTER — APPOINTMENT (OUTPATIENT)
Dept: CARDIOLOGY | Facility: MEDICAL CENTER | Age: 84
End: 2024-02-29
Payer: MEDICARE

## 2024-03-20 ENCOUNTER — PATIENT MESSAGE (OUTPATIENT)
Dept: HEALTH INFORMATION MANAGEMENT | Facility: OTHER | Age: 84
End: 2024-03-20

## 2024-03-20 ENCOUNTER — PATIENT OUTREACH (OUTPATIENT)
Dept: HEALTH INFORMATION MANAGEMENT | Facility: OTHER | Age: 84
End: 2024-03-20
Payer: MEDICARE

## 2024-03-20 DIAGNOSIS — I25.10 CORONARY ARTERY DISEASE INVOLVING NATIVE CORONARY ARTERY OF NATIVE HEART WITHOUT ANGINA PECTORIS: ICD-10-CM

## 2024-03-20 DIAGNOSIS — I50.32 CHRONIC DIASTOLIC CONGESTIVE HEART FAILURE (HCC): ICD-10-CM

## 2024-03-21 ENCOUNTER — TELEPHONE (OUTPATIENT)
Dept: MEDICAL GROUP | Facility: MEDICAL CENTER | Age: 84
End: 2024-03-21

## 2024-03-21 ENCOUNTER — OFFICE VISIT (OUTPATIENT)
Dept: MEDICAL GROUP | Facility: MEDICAL CENTER | Age: 84
End: 2024-03-21
Payer: MEDICARE

## 2024-03-21 VITALS
DIASTOLIC BLOOD PRESSURE: 72 MMHG | SYSTOLIC BLOOD PRESSURE: 122 MMHG | TEMPERATURE: 96.9 F | BODY MASS INDEX: 25.01 KG/M2 | HEART RATE: 67 BPM | HEIGHT: 62 IN | WEIGHT: 135.9 LBS | OXYGEN SATURATION: 97 %

## 2024-03-21 DIAGNOSIS — E78.5 DYSLIPIDEMIA: Chronic | ICD-10-CM

## 2024-03-21 DIAGNOSIS — R94.4 DECREASED GFR: ICD-10-CM

## 2024-03-21 DIAGNOSIS — M54.50 LOW BACK PAIN, UNSPECIFIED BACK PAIN LATERALITY, UNSPECIFIED CHRONICITY, UNSPECIFIED WHETHER SCIATICA PRESENT: ICD-10-CM

## 2024-03-21 DIAGNOSIS — M85.80 OSTEOPENIA, UNSPECIFIED LOCATION: Chronic | ICD-10-CM

## 2024-03-21 DIAGNOSIS — Z86.2 HISTORY OF NEUTROPENIA: ICD-10-CM

## 2024-03-21 DIAGNOSIS — R91.1 PULMONARY NODULE: ICD-10-CM

## 2024-03-21 DIAGNOSIS — Z85.3 HISTORY OF BREAST CANCER: ICD-10-CM

## 2024-03-21 PROCEDURE — 1170F FXNL STATUS ASSESSED: CPT | Performed by: INTERNAL MEDICINE

## 2024-03-21 PROCEDURE — 1158F ADVNC CARE PLAN TLK DOCD: CPT | Performed by: INTERNAL MEDICINE

## 2024-03-21 PROCEDURE — 3078F DIAST BP <80 MM HG: CPT | Performed by: INTERNAL MEDICINE

## 2024-03-21 PROCEDURE — 99214 OFFICE O/P EST MOD 30 MIN: CPT | Performed by: INTERNAL MEDICINE

## 2024-03-21 PROCEDURE — 3074F SYST BP LT 130 MM HG: CPT | Performed by: INTERNAL MEDICINE

## 2024-03-21 RX ORDER — ALENDRONATE SODIUM 70 MG/1
70 TABLET ORAL
Qty: 12 TABLET | Refills: 3 | Status: SHIPPED | OUTPATIENT
Start: 2024-03-21

## 2024-03-21 RX ORDER — AMOXICILLIN 500 MG/1
CAPSULE ORAL
COMMUNITY
Start: 2024-01-15

## 2024-03-21 ASSESSMENT — FIBROSIS 4 INDEX: FIB4 SCORE: 1.93

## 2024-03-21 NOTE — PROGRESS NOTES
Subjective     Katerina Torres is a 83 y.o. female who presents with back pain Follow-Up            HPI      Has a new cat as she had to put the other cat down. Good neighbors who helps with yard work, patient still keeps active and back pain improved after recent injections by [facility] and her back pain is improved especially after most recent injection a few weeks ago, we do not have those records.  Keeps active at home, no falls.  Goes walking for exercise and likes to work in her garden during the summer.  No climbing up steps or ladders.  Her neighbor is quite helpful helping her do things around the house but to those things that require lifting.  Back pain improved, no radiation down her legs.  No sensory changes.  Mood is improved since getting a new cat for company. Checks blood pressure at home running 125 sbp and 70s dbp at home on lisinopril 40 mg daily, eating well  grzegorz and has air fryer, rare sweets and cookies, eats veggies   On Plavix, no NSAIDs, no melena, hematochezia, hematuria  Followed by cardiology, CAD status post RCA last year, TAVR in October of last year, no chest pain, shortness of breath, no palpitations, lightheadedness.  Tries to incorporate water in diet.  Tries to minimize sodium intake in her diet as well.  No tobacco, no alcohol.  Medications, allergies, medical history, surgical history, social history, family history  reviewed and updated         Current Outpatient Medications   Medication Sig Dispense Refill    amoxicillin (AMOXIL) 500 MG Cap       bimatoprost (LUMIGAN) 0.01 % Solution Instill 1 drop into both eyes every night at bedtime 7.5 mL 3    lisinopril (PRINIVIL) 40 MG tablet Take 1 Tablet by mouth every day. 90 Tablet 3    rosuvastatin (CRESTOR) 10 MG Tab Take 1 Tablet by mouth every evening. 100 Tablet 3    clopidogrel (PLAVIX) 75 MG Tab Take 1 Tablet by mouth every day. 100 Tablet 3    acetaminophen (TYLENOL) 500 MG Tab Take 1,000 mg by  mouth every 6 hours as needed for Mild Pain.      bimatoprost (LUMIGAN) 0.01 % Solution Administer 1 Drop into both eyes at bedtime. Indications: Wide-Angle Glaucoma      vitamin D (CHOLECALCIFEROL) 1000 UNIT Tab Take 1,000 Units by mouth every day.       No current facility-administered medications for this visit.         Thoracic spine compression fracture  6/22/23 musculoskeletal back pain trial of zanaflex and gabapentin  6/29/23 on neurontin, zanaflex not covered, declines baclofen, going to chiropractor at [place]  7/7/23 off neurontin, trial of mobic 7.5 mg qday x 15 days  7/6/23 x-ray rib series bilaterally and chest x-ray questionable healing fracture left anterior fourth rib  7/6/23 xray thoracic spine chronic severe compression deformity T5, no definite acute fracture seen, mild arthritis  11/9/23 trial of cymbalta 30 mg has tried lidocaine, voltaren topical, ultram, gabapentin, tizanidine, unable to use NSAIDs oral  11/28/23 stopped cymbalta due to dizziness  12/4/23 MRI thoracic spine without, T5 50% to 60% loss of height with bone marrow edema, T6 superior endplate contour deformity with remote compression fracture approximately 20% loss of height, T7 bone marrow edema with 30% loss of height, T8 57% loss of height with bone marrow edema, T7-T8 right paracentral disc protrusion with bilateral mild facet degeneration, compared to previous radiographs 7/6/23 T5 lesion may have worsened while the T7 and T8 lesions are new  12/13/23 dr.ydra navarro pain procedure note ultrasound-guided trigger point injections T8-T9 level  1/11/24 dexa LS-0.5,hip-1.6,frax 20.4% major,5.5% hip recommend fosamax     s/p tavr aortic stenosis  10/06 echo mild mitral regurgitation, normal LV  6/09 stress echo negative  8/12 echo normal LV function, EF 65%, mild aortic stenosis, peak gradient 24  7/24/13 normal LV function, EF 60%, mild LVH, mild aortic stenosis, peak gradient 25  3/28/14 cardiology note, abnormal EKG,  recommend coronary angiogram continue metoprolol, aspirin  3/31/14 cardiac catheterization; left main and LAD patent, circumflex free of disease, RCA free of disease, normal LV function, mild aortic stenosis  7/9/17 echo normal LV size and function, EF 60%, grade 1 diastolic dysfunction, moderate aortic stenosis peak gradient 50, mean 29, valve area 0.8-1.0 and aortic regurgitation  7/9/17 persantine thallium small fixed perfusion abnormality distal anterior and anteroapical segments, no ischemia is noted to represent mild prior infarct or variant normal apical thinning  4/27/18 echo normal LV size and function, EF 65%, moderate aortic stenosis peak gradient 46, valve area 0.9, moderate aortic insufficiency, no change compared to previous  5/27/19 echo normal LV size and function, EF 65%, normal diastolic function, calcified aortic valve moderate aortic stenosis peak gradient 53, moderate aortic insufficiency  7/11/19 patient feels more intense heart rate and heartbeat at night, can hear her heartbeat, only occurs at night, question anxiety component trial of buspar, 24-hour holter, cardiology referral  7/18/19 24-hour holter monitor, sinus rhythm asymptomatic average heart rate 65 PAC burden 1.3%, PVC burden 1.8%, 2 atrial runs longest 7 beats maximal heart rate 105, fastest run 4 beats maximum heart rate 129  9/24/21 echo EF 65%, indeterminate diastolic function, moderate aortic stenosis, peak gradient 56, moderate aortic insufficiency  10/1/21 cardiology note will likely need valve replacement next 1 to 2 years, follow-up 6 months  2/14/22  cardiology note moderate to severe aortic stenosis, able to walk 4 miles with no limitations, will order repeat echo follow-up 2 months  5/11/22 echo EF 55%,severe aortic stenosis peak gradient 70  5/13/22 cardiology note asymptomatic severe aortic stenosis, continue surveillance follow-up 6 months  11/7/22 cardiology note asymptomatic severe aortic stenosis, continue  surveillance, repeat echo, follow-up 6 months  11/10/22 echo severe aortic stenosis, peak gradient 65, EF 60%, normal diastolic function  5/24/23  cardiothoracic surgery note recommend TAVR  5/24/23 cardiology note aortic stenosis with preserved ejection fraction stage C, recommend stress EKG, echocardiogram, BNP, follow-up 3 months  9/7/23  cardiology structural heart follow up discussed risks and benefits and alternatives to TAVR, will require preoperative work-up including CT and cardiac catheterization, patient agrees, follow-up 6 weeks  9/20/23 CT-TAVR tricuspid aortic root with moderate aortic calcification, nonspecific 7 mm left lower lobe lung nodule with pleural tach suggesting postinflammatory process, no evidence of adenopathy chest, abdomen, pelvis, simple 3.2 cm cyst left adnexa  10/9/23 cardiac catheterization prior to TAVR procedure note, left main 20% disease, LAD proximal 40% and mid 30% disease, RCA mid 30% disease, mild to moderate aortic regurgitation, ascending aorta 2.9 cm, severe transaortic pressure gradient, borderline elevated left heart filling pressures  10/31/23 cardiology note status post TAVR doing well, continue aspirin indefinitely, plavix 1 year post stent placement, echo 1 month, SBE prophylaxis,crestor 5 mg qpm, cardiac rehab referral  10/23/23  interventional cardiology operative note TAVR 23 shearer nik, RCA MARCELLUS stent, left ventricular end-diastolic pressure 28  10/23/23 SHARIFA during TAVR, baseline images systolic function EF 55%, calcified aortic valve, severe aortic stenosis, mild aortic insufficiency, post deployment images showed preserved function, 23 mm shearer TAVR valve seen in the aortic position with normal leaflet motion, no paravalvular leak, mean gradient of 6, calculated effective orifice area 2.2 cm  11/2/23 echo TAVR present, normal LV size and function, EF 70%, indeterminate diastolic function, no TAVR aortic valve functioning  normally with normal    11/20/23 echo normal LV size, thickness, function, EF 64%, indeterminate diastolic function, trace MR, known TAVR aortic valve functioning normally, peak gradient 15  11/29/23 cardiology note can stop metoprolol, continue asa and plavix, next visit consider change to plavix monotherapy  2/15/24  cardiology note doing well status post transcatheter aortic valve replacement with RCA protection by snorkel stenting, discontinue aspirin, continue plavix continue dental prophylaxis with antibiotics, continue rosuvastatin, lisinopril, follow-up 9 months     s/p knee replacement  3/3/17 MRI right knee abnormal right lateral meniscus with peripheral extrusion, maceration, and complex tear involving primarily the posterior horn and body but also the anterior horn, abnormal medial meniscus with vertical tear of the peripheral zone of body and posterior horn, probable meniscal root tear, and degenerative fraying of the free edge, severe lateral femorotibial compartment osteoarthritis with significant cartilage loss and moderate to severe subchondral edema within the lateral femoral condyle and lateral tibial plateau, mild medial femorotibial and the patellofemoral compartment osteoarthritis, moderate sized joint effusion with associated popliteal cyst  6/20/17 sees  orthopedics  1/18/18  orthopedic note right knee end-stage arthritis, x-rays bone-on-bone right knee arthritis lateral compartment, right knee steroid injection performed, planned for total right knee arthroplasty after winter  5/2/18  orthopedic note, right knee osteoathritis, failed conservative measures, scheduled for right knee surgery  5/8/18  operative note right knee arthroplasty   5/23/18  orthopedic note 2 weeks s/p right total knee  8/6/18  orthopedic note 3 months status post right knee replacement doing well, x-ray shows stable right total knee  5/22/19  orthopedic  note x-ray stable total knee replacement, follow-up 1 year     pulmonary nodule  9/20/23 CT-TAVR tricuspid aortic root with moderate aortic calcification, nonspecific 7 mm left lower lobe lung nodule with pleural tag suggesting postinflammatory process, no evidence of adenopathy chest, abdomen, pelvis      Preventative health  3/7/17 colon per [facility] 2 polyps, grade 2 internal hemorrhoids, angioectasias ascending colon,pathology one hyperplastic polyp and one sessile serrated polyp, repeat colonoscopy 5 years   9/19/20 shingrix second  4/28/22 tdap   9/26/22 prevnar 20  7/11/23 vit d 82  10/24/23 flu  1/11/24 dexa LS-0.5,hip-1.6,frax 20.4% major,5.5% hip  1/11/24 mammogram heterogeneously dense breast tissue offered screening breast ultrasound     Osteopenia  9/06 dexa LS -1.2,hip -1.1  6/09 dexa LS -1.4,hip -1.0  7/11 vit d 43  7/11 dexa LS -1.1,hip -0.9  712 vit d 30 start 1000 units  7/24/13 vit d 72 on 1000 units  8/6/13 dexa LS -1.1,hip -1.1  8/5/14 vit d 67  8/19/15 dexa LS -1.4,hip -1.3;FRAX 40% major,27% hip only on prednisone one month, does not need bisphosphonate therapy  8/19/15 vit d 43  6/16/16 off prednisone x 3 weeks  6/21/16 vit d 50  6/27/17 vit d 48  4/27/18 vit d 53   5/14/19 vit d 46  5/28/19 dexa LS-1.0,hip-1.4  6/12/20 vit d 88 on 1000 units decrease to qod   8/23/21 dexa LS-0.9,hip-1.4  1/14/22 vit d 68  7/6/23 x-ray rib series bilaterally and chest x-ray questionable healing fracture left anterior fourth rib  7/6/23 xray thoracic spine chronic severe compression deformity T5, no definite acute fracture seen, mild arthritis  7/11/23 vit d 82  12/4/23 MRI thoracic spine, acute subacute compression fractures T5 with 50 to 60% loss of height, T7 with 30% loss of height, T8 with 50 to 70% loss of height, no significant retropulsion  1/11/24 dexa LS-0.5,hip-1.6,frax 20.4% major,5.5% hip recommend fosamax  1/15/24 declines fosamax, she would like to discuss with her dentist first     low back  pain  11/30/20 right-sided buttock pain with radiation, referral to physiatry, x-ray, MRI lumbar spine, trial of naproxen short-term plus acetaminophen follow-up 3 weeks  11/30/20 x-ray lumbar spine moderate spondylosis, L2-L3 asymmetric disc height loss on the left resulting in slight right curvature and degenerative retrolisthesis  12/2/20 MRI lumbar spine mild superior endplate compression fracture at L3, no marrow edema consistent with chronic process, L2-L3 moderate central narrowing, moderate right and severe left neuroforaminal narrowing, L3-4 moderate central narrowing, moderate bilateral neuroforaminal narrowing, L4-5 moderate to severe central canal narrowing with severe right and moderate left neuroforaminal narrowing, L5-S1 moderate bilateral neuroforaminal narrowing  1/6/21  physibrocky procedure note right lumbar epidural steroid injection L4-S1 under fluoroscopy  1/26/21  physibrocky note good response to right lumbar L4-L5 epidural steroid injections, she has returned to activities such as skiing, consider physical therapy and home program for residual pain, consider right SI joint versus repeat lumbar steroid injection if necessary, follow-up 2 months  2/8/21 [facility] note   2/17/21 [facility] note   8/31/22  physieliot note plan right L4-L5 epidural steroid injection under fluoroscopy and physical therapy as tolerated  9/1/22 dr.storm physiatry procedure note epidural L4-L5 L5-S1 steroid injection  9/6/22 x-ray sacrum degenerative changes SI joint unchanged from prior  9/6/22 x-ray right hip stable mild bilateral osteoarthritis  9/6/22 dr.storm physiatry note SI joint dysfunction, obtain x-ray lumbar spine, sacrum and coccyx, right hip, trial of tramadol every 6 hours, follow-up physical therapy  9/16/22  physibrocky note low back pain, making improvements with physical therapy, continues to have sacral dysfunction with radicular symptoms,  discontinue tramadol, trial of acetaminophen 1300 mg tid ,avoid nsaids  10/10/23 low back pain after trimming branches, no trauma, no imaging necessary, trial of tramadol 50 mg qid  11/28/23 stopped cymbalta due to dizziness  12/4/23 MRI thoracic spine, acute subacute compression fractures T5 with 50 to 60% loss of height, T7 with 30% loss of height, T8 with 50 to 70% loss of height, no significant retropulsion     knee pain left  8/1/13 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, lateral meniscus mildly extruded, ruptured hopper's cyst  8/12/13  ortho note pain improved on followup, consider injection if pain recurs     impaired glucose metabolism  7/9/17 A1c 5.4%  4/27/18 A1c 5.4%  7/11/23 A1c 5.8%      History polymyalgia  4/20/15 physical therapy, trial celebrex and zanaflex  5/7/15 physical therapy evaluation today recommended right hip x-ray and pelvic x-ray, ordered in computer  5/8/15 xray hip negative  6/29/15 seen by bridgette diagnosed with polymyalgia rheumatica  6/29/15 CRP 9.4,ESR 32 started on prednisone 20 mg    7/28/15 on prednisone 10 mg will continue 10 mg x 2 weeks, then decrease to 5 mg daily  8/19/15 hgb 14,hct 43, CRP 0.3, ESR 14, tapered off prednisone but developed mouth irritation, has resumed prednisone 5 mg daily, will continue x2 weeks then taper  11/17/15 refill prednisone 5 mg #30 tabs x one  6/21/16 off prednisone; CRP 0.1,ESR 17  6/27/17 CRP 0.09,ESR 12,cpk 66     history palpitations  7/11/19 patient feels more intense heart rate and heartbeat at night, can hear her heartbeat, only occurs at night, question anxiety component trial of buspar, 24-hour holter, cardiology referral  7/18/19 24-hour holter monitor, sinus rhythm asymptomatic average heart rate 65 PAC burden 1.3%, PVC burden 1.8%, 2 atrial runs longest 7 beats maximal heart rate 105, fastest run 4 beats maximum heart rate 129  2/14/22  cardiology note moderate to severe aortic stenosis, able to walk 4  miles with no limitations, will order repeat echo follow-up 2 months  5/11/22 echo EF 55%,severe aortic stenosis peak gradient 70  5/13/22 cardiology note asymptomatic severe aortic stenosis, continue surveillance follow-up 6 months  11/7/22 cardiology note asymptomatic severe aortic stenosis, continue surveillance, repeat echo, follow-up 6 months  11/10/22 echo severe aortic stenosis, peak gradient 65, EF 60%, normal diastolic function  5/24/23 cardiology note aortic stenosis with preserved ejection fraction stage C, recommend lower stress EKG, echocardiogram, BNP, follow-up 3 months  9/7/23  cardiology structural heart follow up discussed risks and benefits and alternatives to TAVR, will require preoperative work-up including CT and cardiac catheterization, patient agrees, follow-up 6 weeks  10/23/23  interventional cardiology operative note TAVR 23 shearer nik, RCA MARCELLUS stent, left ventricular end-diastolic pressure 28  11/20/23 echo normal LV size, thickness, function, EF 64%, indeterminate diastolic function, trace MR, known TAVR aortic valve functioning normally, peak gradient 15  11/29/23 cardiology note can stop metoprolol     history neutropenia  8/06 wbc 3.4  3/08 wbc 3.9  6/09 wbc 3.9  7/10 wbc 4.4  7/11 wbc 4.5  7/12 wbc 3.8 (55%N,35%L)  7/24/13 wbc 4.3  3/20/14 wbc 3.1 (52%N,36%L)  3/31/14 wbc 3.7  8/19/15 wbc 5.3  6/21/16 wbc 4.4 (50%N,40%L)  6/27/17 wbc 4.2   4/27/18 wbc 4.4  5/14/19 wbc 4.5  6/12/20 wbc 4.5  6/30/21 wbc 4.3  1/14/22 wbc 5.3  10/20/22 wbc 5.6  7/11/23 wbc 5.7  11/2/23 wbc 7.4      History of breast cancer  1980s left sided s/p lumpectomy and radiation therapy, no old records  7/11 mammogram  8/12 mammogram  8/13 mammogram  8/18/14 mammogram  9/1/16 mammogram heterogeneously dense breast tissue recommend screening breast ultrasound  9/1/16 sonocine negative  11/18/22 mammogram heterogeneously dense breast tissue, declines screening breast ultrasound   1/11/24  mammogram heterogeneously dense breast tissue offered screening breast ultrasound     History of basal cell skin cancer  [facility]22[facility]     Glaucoma  4/29/21  eye exam   1/13/22  eye exam primary open-angle glaucoma  9/16/22  eye exam    1/12/23  eye exam   5/12/23  ophthalmology note primary open angle glaucoma moderate, follow-up 6 months  11/16/23  ophthalmology note continues on lumigan, timolol     Esophageal dysmotility  7/9/17 barium swallow moderate esophageal dysmotility, small volume silent aspiration  7/18/17 start diltiazem 120 mg daily  7/18/17 referral [facility], speech pathology for esophageal dysmotility, small amount of aspiration on barium swallow, try low-dose diltiazem 120 mg for esophageal dysmotility and elevated blood pressure  7/26/17 [facility] evaluation dyspepsia, obtain cardiology clearance and then proceed EGD, initiate pantoprazole 20 mg, trial of miralax and colace     Dyslipidemia  3/08 chol 175,trig 73,hdl 45,ldl 115  6/09 chol 174,trig 89,hdl 47,ldl 109  7/10 chol 182,trig 61,hdl 55,ldl 114  7/11 chol 175,trig 69,hdl 45,ldl 116  7/12 chol 164,trig 64,hdl 43,ldl 108  7/24/13 chol 186,trig 66,hdl 54,ldl 119 no meds  8/5/14 chol 165,trig 93,hdl 48,ldl 98  8/19/15 chol 192,trig 109,hdl 58,ldl 112; 10 year risk 20% declines statin therapy  6/21/16 chol 137,trig 75,hdl 47,ldl 75   6/27/17 hcol 153,trig 94,hdl 54,ldl 80  5/14/19 chol 164,trig 82,hdl 48,ldl 100   6/12/20 chol 176,trig 80,hdl 57,ldl 103   6/30/21 chol 170,trig 100,hdl 43,ldl 107  10/20/22 chol 175,trig 81,hdl 61,ldl 98  10/31/23 start crestor 5 mg  11/29/23 cardiology note increase crestor to 10 mg  12/23/23 chol 134,trig 96,hdl 65,ldl 50 on crestor 10 mg     diastolic chf  5/11/22 echo EF 55%,severe aortic stenosis peak gradient 70  11/10/22 echo severe aortic stenosis, peak gradient 65, EF 60%, normal diastolic function  7/10/23 NT-proBNP 578  10/24/23 NT-proBNP  578  11/2/23 NT-proBNP 578  11/10/22 echo severe aortic stenosis, peak gradient 65, EF 60%, normal diastolic function  10/9/23 cardiac catheterization prior to TAVR procedure note, left main 20% disease, LAD proximal 40% and mid 30% disease, RCA mid 30% disease, mild to moderate aortic regurgitation, ascending aorta 2.9 cm, severe transaortic pressure gradient, borderline elevated left heart filling pressures, successful implantation of a 23 shearer nik S3 ultra resilia, acute decompensated diastolic heart failure with LVEDP of 28 mmHg, successful protection of the RCA with snorkel stent (3.5x24 Synergy XD MARCELLUS)  10/23/23  interventional cardiology operative note TAVR 23 shearer nik, RCA MARCELLUS stent, left ventricular end-diastolic pressure 28  11/20/23 echo normal LV size, thickness, function, EF 64%, indeterminate diastolic function, trace MR, known TAVR aortic valve functioning normally, peak gradient 15  11/29/23 cardiology note can stop metoprolol, continue asa and plavix, next visit consider change to plavix monotherapy  2/15/24  cardiology note doing well status post transcatheter aortic valve replacement with RCA protection by snorkel stenting, discontinue aspirin, continue plavix continue dental prophylaxis with antibiotics, continue rosuvastatin, lisinopril, follow-up 9 months     Decrease GFR  7/7/11 bun 16,creat 1.0,GFR 50  7/24/13 bun 14,creat 1.1,GFR 45  3/20/14 bun 15,creat 0.9,GFR 59  8/5/14 bun 14,creat 1.1,GFR 46  2/19/15 bun 13,creat 0.8,GFR >60  8/19/15 bun 10,creat 1.1,GFR 48  6/21/16 bun 19,creat 1.0,GFR 50  6/27/17 bun 14,creat 0.5,GFR 51  4/28/18 bun 15,creat 0.9,GFR 60  5/14/19 bun 18,creat 1.13,GFR 46  6/12/20 bun 15,creat 1.07,GFR 49  6/30/21 bun 14,creat 1.07,GFR 49,PTH 36,urine mac<1.2,SPEP negative  1/14/22 bun 19,creat 1.18,GFR 44,PTH 25,calcium 9.6,phos 3.7  10/20/22 bun 20,creat 1.09,GFR 50,PTH 31  5/17/23 bun 18,creat 1.0,GFR 52  7/11/23 bun 16,creat 1.17,GFR  46,PTH 24  10/9/23 bun 15,creat 1.09,GFR 50  11/2/23 bun 18,creat 1.1,GFR,GFR 49  2/12/24 bun 20,creat 1.05,GFR 52     colon polyp  3/7/17 colon per [facility] 2 polyps, grade 2 internal hemorrhoids, angioectasias ascending colon,pathology one hyperplastic polyp and onesessile serrated polyp, repeat colonoscopy 5 years       cad   10/06 echo mild mitral regurgitation, normal LV  6/09 stress echo negative  8/12 echo normal LV function, EF 65%, mild aortic stenosis, peak gradient 24  7/24/13 normal LV function, EF 60%, mild LVH, mild aortic stenosis, peak gradient 25  3/24/14 exercise treadmill carla protocol 5 minutes 5 seconds, 1 mm mild upsloping ST segment depression in V6, flat ST segment depression V4 and V5 compatible with ischemia, no arrhythmia noted  3/28/14 cardiology note, scheduled for cardiac catheterization, cont asa, metoprolol 25 mg bid, crestor 5 mg samples  3/31/14 cardiac catheterization; left main mild plaquing, LAD patent, circumflex free of disease, RCA free of disease, normal LV function, mild aortic stenosis  7/9/17 echo normal LV size and function, EF 60%, grade 1 diastolic dysfunction, moderate aortic stenosis peak gradient 50, mean 29, valve area 0.8-1.0 and aortic regurgitation  7/9/17 persantine thallium small fixed perfusion abnormality distal anterior and anteroapical segments, no ischemia is noted to represent mild prior infarct or variant normal apical thinning  4/27/18 echo normal LV size and function, EF 65%, moderate aortic stenosis peak gradient 46, valve area 0.9, moderate aortic insufficiency, no change compared to previous  7/18/19 24-hour holter monitor, sinus rhythm asymptomatic average heart rate 65 PAC burden 1.3%, PVC burden 1.8%, 2 atrial runs longest 7 beats maximal heart rate 105, fastest run 4 beats maximum heart rate 129  9/24/21 echo EF 65%, indeterminate diastolic function, moderate aortic stenosis, peak gradient 56, moderate aortic insufficiency  10/1/21 cardiology note  will likely need valve replacement next 1 to 2 years, follow-up 6 months  2/14/22  cardiology note moderate to severe aortic stenosis, able to walk 4 miles with no limitations, will order repeat echo follow-up 2 months  5/11/22 echo EF 55%,severe aortic stenosis peak gradient 70  11/7/22 cardiology note asymptomatic severe aortic stenosis, continue surveillance, repeat echo, follow-up 6 months  11/10/22 echo severe aortic stenosis, peak gradient 65, EF 60%, normal diastolic function  5/24/23 cardiology note aortic stenosis with preserved ejection fraction stage C, recommend stress EKG, echocardiogram, BNP, follow-up 3 months  9/7/23  cardiology structural heart follow up discussed risks and benefits and alternatives to TAVR, will require preoperative work-up including CT and cardiac catheterization, patient agrees, follow-up 6 weeks  10/9/23 cardiac catheterization prior to TAVR procedure note, left main 20% disease, LAD proximal 40% and mid 30% disease, RCA mid 30% disease, mild to moderate aortic regurgitation, ascending aorta 2.9 cm, severe transaortic pressure gradient, borderline elevated left heart filling pressures, successful implantation of a 23 shearer nik S3 ultra resilia, acute decompensated diastolic heart failure with LVEDP of 28 mmHg, successful protection of the RCA with snorkel stent (3.5x24 Synergy XD MARCELLUS)  10/23/23  interventional cardiology operative note TAVR 23 shearer nik, RCA MARCELLUS stent, left ventricular end-diastolic pressure 28  11/20/23 echo normal LV size, thickness, function, EF 64%, indeterminate diastolic function, trace MR, known TAVR aortic valve functioning normally, peak gradient 15  11/29/23 cardiology note can stop metoprolol, continue asa and plavix, next visit consider change to plavix monotherapy  2/15/24  cardiology note doing well status post transcatheter aortic valve replacement with RCA protection by snorkel stenting, discontinue  "aspirin, continue plavix continue dental prophylaxis with antibiotics, continue rosuvastatin, lisinopril, follow-up 9 months       Patient Active Problem List   Diagnosis    History of breast cancer s/p lumpectomy left 1980s    Osteopenia    Dyslipidemia    History of neutropenia    Preventative health care    S/p TAVR for aortic stenosis    Glaucoma    History of basal cell carcinoma of skin    Knee pain, left    History of polymyalgia rheumatica    Decreased GFR    Colon polyp    S/P knee replacement    Esophageal dysmotility    History of palpitations    Low back pain    Impaired glucose metabolism    Pulmonary nodule    S/P TAVR (transcatheter aortic valve replacement)    S/P drug eluting coronary stent placement    Diastolic congestive heart failure (HCC)    CAD (coronary artery disease)    Thoracic compression fracture (HCC)                  Patient Care Team:  Master Suarez M.D. as PCP - General  Master Suarez M.D. as PCP - [facility] Paneled  Chris Bernstein M.D. as Consulting Physician (Ophthalmology)  Amanda Rodriguez M.D. as Consulting Physician (Ophthalmology)  Manoj Tidwell M.D. (Inactive) as Consulting Physician (Orthopedic Surgery)  Maite Vieira R.N. as RN Care Coordinator                      ROS           Objective     /72 (BP Location: Left arm, Patient Position: Sitting, BP Cuff Size: Adult)   Pulse 67   Temp 36.1 °C (96.9 °F) (Temporal)   Ht 1.575 m (5' 2\")   Wt 61.6 kg (135 lb 14.4 oz)   SpO2 97%   BMI 24.86 kg/m²      Physical Exam  Vitals and nursing note reviewed.   Constitutional:       Appearance: Normal appearance.   HENT:      Head: Normocephalic and atraumatic.      Right Ear: External ear normal.      Left Ear: External ear normal.   Eyes:      Conjunctiva/sclera: Conjunctivae normal.   Cardiovascular:      Rate and Rhythm: Normal rate and regular rhythm.      Heart sounds: Normal heart sounds.   Pulmonary:      Effort: Pulmonary effort is normal.      Breath " sounds: Normal breath sounds.   Abdominal:      General: There is no distension.   Musculoskeletal:         General: No swelling.      Cervical back: Neck supple.   Skin:     Coloration: Skin is not jaundiced.      Findings: No bruising.   Neurological:      General: No focal deficit present.      Mental Status: She is alert.   Psychiatric:         Mood and Affect: Mood normal.         Behavior: Behavior normal.                  Assessment & Plan        Assessment   #1 low back pain with history of vertebral compression fracture T5, T7, T8, followed by [facility] initial trigger point injections in December, had subsequent injections recently but I do not have those records, patient states that her back pain is actually improved significantly after the most recent course of injections, to the point where she is more active at home, around the house, although she is trying to avoid significantly heavy lifting    #2 osteopenia and postmenopausal bone loss 1/11/24 dexa LS-0.5,hip-1.6,frax 20.4% major,5.5% hip recommend fosamax which she declined as she wanted to discuss with her dentist first    #3 status post TAVR in October followed by cardiology, no chest pain, or shortness of breath, no palpitations, no lightheadedness    #4 CAD status post RCA stent in October    #5 ckd 3a 2/12/24 bun 20,creat 1.05,GFR 52 no NSAIDs    #6 dyslipidemia 12/23/23 chol 134,trig 96,hdl 65,ldl 50 on Crestor per cardiology    #7 pulmonary nodule left lower lobe 7 mm on CT scan incidental finding on CT-TAVR in September, no tobacco    Plan  #1 old records from [facility] regarding most recent injection    #2 recommend start Fosamax again, this time she agrees understand increased risk of dyspepsia, heartburn, stomach upset, renal function stable to support use of bisphosphonate, take once a week with water only 30 minutes before meals, medications, supplements, do not lie down after taking medication, prescription to  pharmacy, rare risk of jaw osteonecrosis or atypical femur fracture, she states she has seen her dentist already, repeat bone density 2 years    #3 continue calcium, vitamin D, regular weightbearing exercise    #4 continue NSAIDs    #5 repeat CT thorax follow-up nodule    #6 she would like to try physical therapy, referral to [facility] [place]    #7 follow-up 4 months

## 2024-03-21 NOTE — LETTER
[facility]  Master Suarez M.D.  [address]  [place] [address]  Fax: [phone]   Authorization for Release/Disclosure of   Protected Health Information   Name: GAEL DIALLO : 1940 SSN: [id number]   Address: [address] [place] [address]  [place] [address] Phone:    [phone] (home) [phone] (work)   I authorize the entity listed below to release/disclose the PHI below to:   [facility]/Master Suarez M.D. and Master Suarez M.D.   Provider or Entity Name:                                                         [facility]   Address   [facility], [facility], [facility]   Phone:      Fax:         [phone]         Reason for request: continuity of care   Information to be released:   Most recent records   [  ] LAST COLONOSCOPY,  including any PATH REPORT and follow-up  [  ] LAST FIT/COLOGUARD RESULT [  ] LAST DEXA  [  ] LAST MAMMOGRAM  [  ] LAST PAP  [  ] LAST LABS [  ] RETINA EXAM REPORT  [  ] IMMUNIZATION RECORDS  [ x ] Release all info      [  ] Check here and initial the line next to each item to release ALL health information INCLUDING  _____ Care and treatment for drug and / or alcohol abuse  _____ HIV testing, infection status, or AIDS  _____ Genetic Testing    DATES OF SERVICE OR TIME PERIOD TO BE DISCLOSED: _____________  I understand and acknowledge that:  * This Authorization may be revoked at any time by you in writing, except if your health information has already been used or disclosed.  * Your health information that will be used or disclosed as a result of you signing this authorization could be re-disclosed by the recipient. If this occurs, your re-disclosed health information may no longer be protected by State or Federal laws.  * You may refuse to sign this Authorization. Your refusal will not affect your ability to obtain treatment.  * This Authorization becomes effective upon signing and will  on (date) __________.      If no date is indicated, this  Authorization will  one (1) year from the signature date.    Name: Katerina Bethany Torres  Signature:                      Continuity of Care Date:   3/21/2024   PLEASE FAX REQUESTED RECORDS BACK TO: [phone]

## 2024-03-25 ENCOUNTER — PATIENT OUTREACH (OUTPATIENT)
Dept: HEALTH INFORMATION MANAGEMENT | Facility: OTHER | Age: 84
End: 2024-03-25
Payer: MEDICARE

## 2024-03-25 DIAGNOSIS — I25.10 CORONARY ARTERY DISEASE INVOLVING NATIVE CORONARY ARTERY OF NATIVE HEART WITHOUT ANGINA PECTORIS: ICD-10-CM

## 2024-03-25 DIAGNOSIS — E78.5 DYSLIPIDEMIA: Chronic | ICD-10-CM

## 2024-03-25 DIAGNOSIS — I50.32 CHRONIC DIASTOLIC CONGESTIVE HEART FAILURE (HCC): ICD-10-CM

## 2024-03-25 NOTE — PROGRESS NOTES
This RN spoke with patient on the phone to introduce Chronic/Personal Care Management Program. Patient did not express interest in the program and would not like  to enroll into the program.

## 2024-03-28 ENCOUNTER — HOSPITAL ENCOUNTER (OUTPATIENT)
Dept: RADIOLOGY | Facility: MEDICAL CENTER | Age: 84
End: 2024-03-28
Attending: INTERNAL MEDICINE
Payer: MEDICARE

## 2024-03-28 DIAGNOSIS — R91.1 PULMONARY NODULE: ICD-10-CM

## 2024-03-28 PROCEDURE — 71250 CT THORAX DX C-: CPT

## 2024-03-29 ENCOUNTER — TELEPHONE (OUTPATIENT)
Dept: MEDICAL GROUP | Facility: MEDICAL CENTER | Age: 84
End: 2024-03-29
Payer: MEDICARE

## 2024-03-29 NOTE — TELEPHONE ENCOUNTER
Notified with CT results, old compression fractures, stable left subpleural nodule, 8 mm right nodule, no tobacco, repeat 12 months

## 2024-04-16 PROCEDURE — RXMED WILLOW AMBULATORY MEDICATION CHARGE: Performed by: OPHTHALMOLOGY

## 2024-04-17 ENCOUNTER — PHARMACY VISIT (OUTPATIENT)
Dept: PHARMACY | Facility: MEDICAL CENTER | Age: 84
End: 2024-04-17
Payer: COMMERCIAL

## 2024-05-06 ENCOUNTER — TELEPHONE (OUTPATIENT)
Dept: HEALTH INFORMATION MANAGEMENT | Facility: OTHER | Age: 84
End: 2024-05-06
Payer: MEDICARE

## 2024-05-16 ENCOUNTER — PHYSICAL THERAPY (OUTPATIENT)
Dept: PHYSICAL THERAPY | Facility: MEDICAL CENTER | Age: 84
End: 2024-05-16
Attending: INTERNAL MEDICINE
Payer: MEDICARE

## 2024-05-16 DIAGNOSIS — G89.29 CHRONIC BILATERAL THORACIC BACK PAIN: ICD-10-CM

## 2024-05-16 DIAGNOSIS — M54.6 CHRONIC BILATERAL THORACIC BACK PAIN: ICD-10-CM

## 2024-05-16 DIAGNOSIS — M54.50 LUMBOSACRAL PAIN: ICD-10-CM

## 2024-05-16 ASSESSMENT — ENCOUNTER SYMPTOMS
PAIN SCALE AT HIGHEST: 10
QUALITY: ACHING
PAIN SCALE AT LOWEST: 0
PAIN SCALE: 5

## 2024-05-16 NOTE — OP THERAPY EVALUATION
"   Outpatient Physical Therapy  INITIAL EVALUATION    [facility]  [address]  [place] [address]  Phone:  [phone]  Fax:  [phone]    Date of Evaluation: 05/16/2024    Patient: Katerina Torres  YOB: 1940  MRN: [id number]     Referring Provider: Master Suarez M.D.  [address]  [place] [address]   Referring Diagnosis Low back pain, unspecified [M54.50]     Time Calculation  Start time: 0817  Stop time: 0916 Time Calculation (min): 59 minutes         Chief Complaint: Back Problem    Visit Diagnoses     ICD-10-CM   1. Lumbosacral pain  M54.50   2. Chronic bilateral thoracic back pain  M54.6    G89.29       Date of onset of impairment: No data found    Subjective:   History of Present Illness:     Mechanism of injury:  Patient is a 83 year old female with a PMH including: Breast cancer 1980's, s/p TAVR, heart failure, t/s compression fracture, osteopenia, TKA (2018), lumpectomy (80's), aortic valve replacement (10/23).    Per MD note on 3/21/24 by Dr. Suarez, pt has c/o \"low back pain with history of vertebral compression fracture T5, T7, T8, followed by [facility] initial trigger point injections in December, had subsequent injections recently but I do not have those records, patient states that her back pain is actually improved significantly after the most recent course of injections, to the point where she is more active at home, around the house, although she is trying to avoid significantly heavy lifting.\" Pt becoming tearful during session and reporting spouse passed away in October.    Pt presents to therapy with complaints of back pain returning after assisting her spouse with lifting legs to transfer into bed. Had a lot of low back pain which is now resolved; pain is mostly on the R in t/s. Pt reporting s/s are okay in the morning for the first hour or so and then starts to have " "pain in mid back R>L. Had injections to the t/s; this improved pain. No s/s in BUE's. Denies n/t. Pt endorses falls in the past.       Currently denies changes in bowel and bladder, saddle anesthesia, significant weight changes, chills/night sweats, nausea and vomiting, and unexplained fatigue. Endorses history of cancer. Pt consents to evaluation and treatment today.           Pain:     Current pain ratin    At best pain ratin    At worst pain rating:  10    Location:  Mid back R>L; QL region    Quality:  Aching    Progression:  Improving    Pain Comments::  Aggravating: as the day goes on, prolonged walking    Relieving: tylenol, lying down on back, sleeping on side and flexing knees, holding the back, walking  Diagnostic Tests:     Diagnostic Tests Comments:  CT t/s 3/28/24:  IMPRESSION:     1.  Multilevel variable chronic-appearing thoracic compression fractures.  2.  TAVR  3.  Coronary calcification.  4.  Mild apical pleural-parenchymal scarring consistent with old granulomatous disease.  5.  8 mm subpleural boxlike nodule anterior right middle lobe  6.  7 mm ovoid subpleural nodule posterior left lung base which was the subject of follow-up for this exam. No change from prior exam.       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 [place] (24 years in [place])  Hobbies: gardening   Exercise: Still completing some old PT exercises from PT addressing low back (BKFO, piriformis stretch), walking daily          Past Medical History:   Diagnosis Date    Arrhythmia Year ago    Breast cancer (HCC)     Left Breast    Breath shortness     with exertion \"walking up a hill\"    Bruises easily     Cancer (HCC)     breast left side lumpectomy    Cardiac murmur 2009    Chest pain 2023    Chronic " kidney disease, stage III (moderate) (HCC) 08/20/2015    Coronary artery disease involving native coronary artery of native heart without angina pectoris 10/31/2023    Dyslipidemia 06/03/2009    Dyspepsia 11/2/2023    Glaucoma     both eyes    Heart valve disease year ago    Having surgery    Hepatitis A 1993    Hiatus hernia syndrome     Hx of breast cancer 06/03/2009    Hypertension 07/18/2017    currently not taking medications    MEDICAL HOME     Neutropenia 06/03/2009    Osteopenia 06/03/2009    PONV (postoperative nausea and vomiting)     Preventative health care 06/03/2009    Snoring     Unspecified cataract     bilateral IOLs     Past Surgical History:   Procedure Laterality Date    TRANSCATHETER AORTIC VALVE REPLACEMENT Bilateral 10/23/2023    Procedure: TRANSCATHETER AORTIC VALVE REPLACEMENT;  Surgeon: Robert Nunez M.D.;  Location: SURGERY [facility];  Service: Cardiac    ECHOCARDIOGRAM, TRANSESOPHAGEAL, INTRAOPERATIVE N/A 10/23/2023    Procedure: ECHOCARDIOGRAM, TRANSESOPHAGEAL, INTRAOPERATIVE;  Surgeon: Robert Nunez M.D.;  Location: SURGERY [facility];  Service: Cardiac    ANGIOGRAM, WITH ANGIOPLASTY, AND STENT INSERTION IF INDICATED Bilateral 10/23/2023    Procedure: STENT INSERTION RIGHT CORONARY ARTERY;  Surgeon: Robert Nunez M.D.;  Location: SURGERY [facility];  Service: Cardiac    LUMBAR TRANSFORAMINAL EPIDURAL STEROID INJECTION Right 09/01/2022    Procedure: RIGHT lumbar four and lumbar five transforaminal epidural steroid injection;  Surgeon: Roni Choi M.D.;  [facility];  Service: Pain Management    LUMBAR TRANSFORAMINAL EPIDURAL STEROID INJECTION Right 01/06/2021    Procedure: INJECTION, STEROID, SPINE, LUMBAR, EPIDURAL, TRANSFORAMINAL APPROACH;  Surgeon: Roni Choi M.D.;  [facility];  Service: Pain Management    KNEE ARTHROPLASTY TOTAL Right 05/08/2018    Procedure: KNEE ARTHROPLASTY TOTAL;  Surgeon: Thanh GARCIA  ESTRELLA Hall;  Location: SURGERY [facility];  Service: Orthopedics    VITRECTOMY POSTERIOR Left 10/25/2016    Procedure: VITRECTOMY POSTERIOR membrane peel cryotherapy infusion of SF6 intraocular gas;  Surgeon: Amanda Rodriguez M.D.;  Location: SURGERY [facility];  Service:     RECOVERY  2014    Performed by [facility] at SURGERY SAME DAY [facility]    CATARACT PHACO WITH IOL  2009    Performed by [facility] at SURGERY SAME DAY [facility]    CATARACT PHACO WITH IOL  2009    Performed by [facility] at SURGERY SAME DAY [facility]    LUMPECTOMY      LYMPH NODE EXCISION Left     H/O Breast CA    NO PERTINENT PAST SURGICAL HISTORY  Breast Cancer    OTHER      OTHER ORTHOPEDIC SURGERY  2017    Knee    WI RADIATION THERAPY PLAN SIMPLE       Social History     Tobacco Use    Smoking status: Former     Current packs/day: 0.00     Average packs/day: 0.5 packs/day for 10.0 years (5.0 ttl pk-yrs)     Types: Cigarettes     Start date: 1989     Quit date: 1999     Years since quittin.3    Smokeless tobacco: Never   Substance Use Topics    Alcohol use: Yes     Alcohol/week: 4.2 oz     Types: 7 Standard drinks or equivalent per week     Comment: 1 glass wine/day     Family and Occupational History     Socioeconomic History    Marital status:      Spouse name: Not on file    Number of children: Not on file    Years of education: Not on file    Highest education level: 12th grade   Occupational History    Not on file       Objective     Postural Observations  Seated posture: poor  Correction of posture: makes symptoms better    Additional Postural Observation Details  Forward head and rounded shoulders    Hip Screen   Hip range of motion within functional limits with the following exceptions: Hip IR/ER WFL -assessed in 90/90    Limited thoracic mobility     Palpation     Additional Palpation Details  TTP t/s paraspinals B  TTP R l/s paraspinals    Active  "Range of Motion     Lumbar   Flexion: within functional limits (FT to toes)  Extension: decreased (mod decreased; pain abolished)  Left lateral flexion: within functional limits  Right lateral flexion: within functional limits  Left rotation: within functional limits  Right rotation: decreased        Therapeutic Exercises (CPT 27186):     1. pt education, re: PT exam findings and upcoming POC    2. new hep as below    3. pt education, re: discussion re: therapy/counselilng and  clubs if interested    4. therapuetic listening, pt discussing spouse and his passing due to \"alzheimers and heart\"      Therapeutic Exercise Summary: Access Code: [email or web address]  URL: [email or web address]  Date: 05/16/2024  Prepared by: Clemente Torres    Exercises  - Correct Seated Posture  - 7 x weekly  - Standing Shoulder Extension with Dowel  - 1-2 x daily - 7 x weekly - 1 sets - 10-15 reps - 1 sec hold  - Shoulder External Rotation and Scapular Retraction with Resistance  - 1-2 x daily - 7 x weekly - 2-3 sets - 20-30 sec hold  - Open Book Chest Stretch on Towel Roll  - 1-2 x daily - 7 x weekly - 1-2 sets - 10-15 reps    Pt performed these exercises with instruction and SPV.  Provided handout with these exercises for daily HEP.        Therapeutic Treatments and Modalities:     1. E Stim Unattended (CPT 07377), IFC and mhp to t/s x 15 min, pain management    Time-based treatments/modalities:    Physical Therapy Timed Treatment Charges  Therapeutic exercise minutes (CPT 49122): 44 minutes      Assessment, Response and Plan:   Impairments: abnormal gait, abnormal or restricted ROM, lacks appropriate home exercise program, limited ADL's, limited mobility, pain with function and safety issue    Assessment details:  Patient is a pleasant and cooperative 83 year old female who presents to therapy with t/s back pain. She does have a positive hx of chronic vertebral compression fractures, also seen on imaging. She has received " injections from [facility] with success. No red flags.   Exam findings suggestive of poor postural awareness, limited t/s mobility, and periscapular weakness. Pt may benefit from skilled physical therapy in order to address above impairments in order to improve QOL and return to reported ADL's.     Barriers to therapy:  Comorbidities  Prognosis: fair    Goals:   Short Term Goals:   1. Pt will be independent with written HEP.      Short term goal time span:  2-4 weeks      Long Term Goals:    1. Pt will be independent with written HEP.  2. Pt will have a sig improvement in RMQ score (eval: 62.5)  3. Pt will be able to garden for 1-2 hour with min modifications and no increase in s/s in order to return to recreational activities.  4. Pt will be able to walk 1 mile on even terrain without AD without incr in s/s at least 75% of the time to improve health and improve QOL.      Long term goal time span:  6-8 weeks    Plan:   Therapy options:  Physical therapy treatment to continue  Planned therapy interventions:  Neuromuscular Re-education (CPT 89250), E Stim Unattended (CPT 18698), Manual Therapy (CPT 51561), Therapeutic Exercise (CPT 21484), Therapeutic Activities (CPT 34721) and Gait Training (CPT 73059)  Frequency: 1-2x/wk.  Duration in weeks:  8  Discussed with:  Patient  Plan details:  UPOC: 7/12/24          Functional Assessment Used  Benjamin Mat Low Back Pain and Disability Score: 62.5     Referring provider co-signature:  I have reviewed this plan of care and my co-signature certifies the need for services.    Certification Period: 05/16/2024 to  7/12/24    Physician Signature: ________________________________ Date: ______________

## 2024-05-18 ENCOUNTER — PATIENT MESSAGE (OUTPATIENT)
Dept: CARDIOLOGY | Facility: MEDICAL CENTER | Age: 84
End: 2024-05-18
Payer: MEDICARE

## 2024-05-20 ENCOUNTER — PATIENT MESSAGE (OUTPATIENT)
Dept: CARDIOLOGY | Facility: MEDICAL CENTER | Age: 84
End: 2024-05-20
Payer: MEDICARE

## 2024-05-20 RX ORDER — AMOXICILLIN 500 MG/1
CAPSULE ORAL
Qty: 4 CAPSULE | OUTPATIENT
Start: 2024-05-20

## 2024-05-21 ENCOUNTER — PHYSICAL THERAPY (OUTPATIENT)
Dept: PHYSICAL THERAPY | Facility: MEDICAL CENTER | Age: 84
End: 2024-05-21
Attending: INTERNAL MEDICINE
Payer: MEDICARE

## 2024-05-21 DIAGNOSIS — M54.50 LUMBOSACRAL PAIN: ICD-10-CM

## 2024-05-21 NOTE — OP THERAPY DAILY TREATMENT
Outpatient Physical Therapy  DAILY TREATMENT     [facility]  [address]  [place] [address]  Phone:  [phone]  Fax:  [phone]    Date: 05/21/2024    Patient: Katerina Torres  YOB: 1940  MRN: [id number]     Time Calculation    Start time: 0808  Stop time: 0901 Time Calculation (min): 53 minutes         Chief Complaint: Back Problem    Visit #: 2    SUBJECTIVE:  Pt reporting she may have an infection on her water line; isn't sure what happened but she was outside gardening. Stating that her back pain feels it has improved with the exercises and doesn't have as much pain.      OBJECTIVE:  Current objective measures:       Additional Postural Observation Details  Forward head and rounded shoulders       Therapeutic Exercises (CPT 36665):     2. new hep as below    4. open book pec stretch, x15, reviewed    5. shoulder extension with dowel, x15    6. flasher, orange TB; 2x30 sec, VC's for set up with cuing to adjust hand position to supination; hep    7. rows, 2x10 (3 sec holds); orange TB, VC's for set up; hep      Therapeutic Exercise Summary: Access Code: [email or web address]  URL: [email or web address]  Date: 05/16/2024  Prepared by: Clemente Torres    Exercises  - Correct Seated Posture  - 7 x weekly  - Standing Shoulder Extension with Dowel  - 1-2 x daily - 7 x weekly - 1 sets - 10-15 reps - 1 sec hold  - Shoulder External Rotation and Scapular Retraction with Resistance  - 1-2 x daily - 7 x weekly - 2-3 sets - 20-30 sec hold  - Open Book Chest Stretch on Towel Roll  - 1-2 x daily - 7 x weekly - 1-2 sets - 10-15 reps    Pt performed these exercises with instruction and SPV.  Provided handout with these exercises for daily HEP.        Therapeutic Treatments and Modalities:     1. E Stim Unattended (CPT 04041), IFC and mhp to t/s x 15 min, pain management    Time-based treatments/modalities:    Physical Therapy Timed  Treatment Charges  Therapeutic exercise minutes (CPT 02264): 38 minutes      Pain rating (1-10) before treatment:  5/10 R sided t/s  Pain rating (1-10) after treatment:      Pain Comments::  Aggravating: as the day goes on, prolonged walking     ASSESSMENT:   Response to treatment:   Mod carryover of hep with cuing to correct for set up. Pt reporting pos impact of hep on ADL's and already noticing decreased pain while she was gardening and throughout the day. Several cues for set up provided for hep with pt indep by end of session. Pt may continue to benefit from periscapular strengthening due to pos impact on her s/s and postural correction.    PLAN/RECOMMENDATIONS:   Plan for treatment: therapy treatment to continue next visit.  Planned interventions for next visit: continue with current treatment.

## 2024-05-29 ENCOUNTER — PHYSICAL THERAPY (OUTPATIENT)
Dept: PHYSICAL THERAPY | Facility: MEDICAL CENTER | Age: 84
End: 2024-05-29
Attending: INTERNAL MEDICINE
Payer: MEDICARE

## 2024-05-29 DIAGNOSIS — M54.50 LUMBOSACRAL PAIN: ICD-10-CM

## 2024-05-29 NOTE — OP THERAPY DAILY TREATMENT
Outpatient Physical Therapy  DAILY TREATMENT     [facility] Outpatient Physical Therapy  [address]  [place] [address]  Phone:  [phone]  Fax:  [phone]    Date: 05/29/2024    Patient: Katerina Torres  YOB: 1940  MRN: [id number]     Time Calculation                   Chief Complaint: No chief complaint on file.    Visit #: 3    SUBJECTIVE:  Pt reporting she may not be improving. Noticing pain and cracking; sometimes improved after she cracks her back. Notices some improvements with rotations while supine lying; pain with return to standing.     OBJECTIVE:  Current objective measures:       Additional Postural Observation Details  Forward head and rounded shoulders     Bridge: full ROM; visible LE sway B     Therapeutic Exercises (CPT 29257):     1. UBE, x5 min, cardiovascular warm up    2. new hep as below    4. open book pec stretch, x15, reviewed    5. shoulder extension with dowel, x15    6. flasher, orange TB; 5-10 sec sitting->supine, VC's for supine lying for hep    7. rows, 2x10 (3 sec holds); orange TB, VC's for set up; hep    8. SA overhead with pillowcase, x5, DC-ed due to incr pain at R shoulder    9. resisted bridges, green TB, 2x10 with 2 sec hold      Therapeutic Exercise Summary: Access Code: [email or web address]  URL: [email or web address]  Date: 05/16/2024  Prepared by: Clemente Torres    Exercises  - Correct Seated Posture  - 7 x weekly  - Standing Shoulder Extension with Dowel  - 1-2 x daily - 7 x weekly - 1 sets - 10-15 reps - 1 sec hold  - Shoulder External Rotation and Scapular Retraction with Resistance  - 1-2 x daily - 7 x weekly - 2-3 sets - 20-30 sec hold  - Open Book Chest Stretch on Towel Roll  - 1-2 x daily - 7 x weekly - 1-2 sets - 10-15 reps    Pt performed these exercises with instruction and SPV.  Provided handout with these exercises for daily HEP.        Therapeutic Treatments and Modalities:     1. E Stim  Unattended (CPT 76985), IFC and mhp to t/s x 15 min, pain management    Time-based treatments/modalities:           Pain rating (1-10) before treatment:  5/10 central back pain   Pain rating (1-10) after treatment:      Pain Comments::  Aggravating: as the day goes on, prolonged walking     ASSESSMENT:   Response to treatment:   Modified hep due to pt reports of discomfort; additional advice to take rest days b/w exercises and complete 3x/wk only and walking on off days. Emphasis on addressing core and posterior chain gently without inclusion of BUE's due to increase in s/s; will assess response to modified hep and progress as tolerated.        PLAN/RECOMMENDATIONS:   Plan for treatment: therapy treatment to continue next visit.  Planned interventions for next visit: continue with current treatment.

## 2024-06-06 ENCOUNTER — PHYSICAL THERAPY (OUTPATIENT)
Dept: PHYSICAL THERAPY | Facility: MEDICAL CENTER | Age: 84
End: 2024-06-06
Attending: INTERNAL MEDICINE
Payer: MEDICARE

## 2024-06-06 DIAGNOSIS — M54.50 LUMBOSACRAL PAIN: ICD-10-CM

## 2024-06-06 PROCEDURE — 999999 HB NO CHARGE

## 2024-06-06 NOTE — OP THERAPY DAILY TREATMENT
Outpatient Physical Therapy  DAILY TREATMENT     [facility] Outpatient Physical Therapy  [address]  [place] [address]  Phone:  [phone]  Fax:  [phone]    Date: 06/06/2024    Patient: Katerina Torres  YOB: 1940  MRN: [id number]     Time Calculation    Start time: 1036  Stop time: 1113 Time Calculation (min): 37 minutes         Chief Complaint: Back Problem    Visit #: 4    SUBJECTIVE:  Pt reporting she hasn't noticed any improvements. Stating she doesn't feel the PT is working. Feels like someone is stabbing her in the back.     OBJECTIVE:  Current objective measures:         Therapeutic Exercises (CPT 92507):     1. UBE, x5 min, cardiovascular warm up    2. review of hep    3. pt care coordination    6. flasher    20. PN due 6/16    Therapeutic Treatments and Modalities:     1. E Stim Unattended (CPT 91036), IFC and mhp to t/s x 15 min, pain management    Time-based treatments/modalities:       Pain rating (1-10) before treatment: 7/10    Pain rating (1-10) after treatment:      Pain Comments::  Aggravating: as the day goes on, prolonged walking     ASSESSMENT:   Response to treatment:   See DC note      PLAN/RECOMMENDATIONS:   Plan for treatment: Refer back to PCP

## 2024-06-06 NOTE — OP THERAPY DISCHARGE SUMMARY
Outpatient Physical Therapy  DISCHARGE SUMMARY NOTE      [facility] Outpatient Physical Therapy  [address]  [place] [address]  Phone:  [phone]  Fax:  [phone]    Date of Visit: 06/06/2024    Patient: Katerina Torres  YOB: 1940  MRN: [id number]     Referring Provider: Master Suarez M.D.  [address]  [address]  [place] [address]   Referring Diagnosis Low back pain, unspecified back pain laterality, unspecified chronicity, unspecified whether sciatica present [M54.50]         Functional Assessment Used        Your patient is being discharged from Physical Therapy with the following comments:   Progress plateau  Pt reporting therapy unhelpful     Comments:  Pt has been seen for 4 visits and reports no improvement in s/s despite strengthening and mobility program. Stating she needs to find something else and will follow up with her PCP. She has had imaging 6 months ago, demo's old compression fractures but no other findings. Pt encouraged to follow up with PCP and return to PT in the future once pain is better managed if she would like.     Limitations Remaining:  Cont'd poor postural awareness including forward head and rounded shoulders, periscapular weakness, impaired t/s mobility, increased pain complaints.    Recommendations:  Pt has not satisfied above goals and reports no improvement in s/s following trial of skilled physical therapy. Will DC pt and advised her to follow up with PCP re: alternative options.      Clemente Torres, PT    Date: 6/6/2024

## 2024-06-10 ENCOUNTER — APPOINTMENT (OUTPATIENT)
Dept: PHYSICAL THERAPY | Facility: MEDICAL CENTER | Age: 84
End: 2024-06-10
Attending: INTERNAL MEDICINE
Payer: MEDICARE

## 2024-06-13 ENCOUNTER — APPOINTMENT (OUTPATIENT)
Dept: PHYSICAL THERAPY | Facility: MEDICAL CENTER | Age: 84
End: 2024-06-13
Attending: INTERNAL MEDICINE
Payer: MEDICARE

## 2024-06-17 ENCOUNTER — APPOINTMENT (OUTPATIENT)
Dept: PHYSICAL THERAPY | Facility: MEDICAL CENTER | Age: 84
End: 2024-06-17
Attending: INTERNAL MEDICINE
Payer: MEDICARE

## 2024-06-20 ENCOUNTER — APPOINTMENT (OUTPATIENT)
Dept: PHYSICAL THERAPY | Facility: MEDICAL CENTER | Age: 84
End: 2024-06-20
Attending: INTERNAL MEDICINE
Payer: MEDICARE

## 2024-06-22 PROCEDURE — RXMED WILLOW AMBULATORY MEDICATION CHARGE: Performed by: OPHTHALMOLOGY

## 2024-06-24 ENCOUNTER — APPOINTMENT (OUTPATIENT)
Dept: PHYSICAL THERAPY | Facility: MEDICAL CENTER | Age: 84
End: 2024-06-24
Attending: INTERNAL MEDICINE
Payer: MEDICARE

## 2024-06-24 ENCOUNTER — PHARMACY VISIT (OUTPATIENT)
Dept: PHARMACY | Facility: MEDICAL CENTER | Age: 84
End: 2024-06-24
Payer: COMMERCIAL

## 2024-06-27 ENCOUNTER — APPOINTMENT (OUTPATIENT)
Dept: PHYSICAL THERAPY | Facility: MEDICAL CENTER | Age: 84
End: 2024-06-27
Attending: INTERNAL MEDICINE
Payer: MEDICARE

## 2024-07-01 ENCOUNTER — APPOINTMENT (OUTPATIENT)
Dept: PHYSICAL THERAPY | Facility: MEDICAL CENTER | Age: 84
End: 2024-07-01
Attending: INTERNAL MEDICINE
Payer: MEDICARE

## 2024-07-18 ENCOUNTER — APPOINTMENT (OUTPATIENT)
Dept: MEDICAL GROUP | Facility: MEDICAL CENTER | Age: 84
End: 2024-07-18
Payer: MEDICARE

## 2024-07-18 VITALS
RESPIRATION RATE: 16 BRPM | DIASTOLIC BLOOD PRESSURE: 56 MMHG | BODY MASS INDEX: 25.68 KG/M2 | HEART RATE: 72 BPM | HEIGHT: 61 IN | SYSTOLIC BLOOD PRESSURE: 104 MMHG | OXYGEN SATURATION: 96 % | TEMPERATURE: 98.2 F | WEIGHT: 136 LBS

## 2024-07-18 DIAGNOSIS — E55.9 VITAMIN D DEFICIENCY: ICD-10-CM

## 2024-07-18 DIAGNOSIS — E78.5 DYSLIPIDEMIA: Chronic | ICD-10-CM

## 2024-07-18 DIAGNOSIS — I77.819 AORTIC ECTASIA, UNSPECIFIED SITE (HCC): ICD-10-CM

## 2024-07-18 DIAGNOSIS — N18.31 CHRONIC KIDNEY DISEASE, STAGE 3A: ICD-10-CM

## 2024-07-18 DIAGNOSIS — R94.4 DECREASED GFR: ICD-10-CM

## 2024-07-18 PROBLEM — I10 HYPERTENSION: Status: ACTIVE | Noted: 2024-07-18

## 2024-07-18 PROCEDURE — 3074F SYST BP LT 130 MM HG: CPT | Performed by: INTERNAL MEDICINE

## 2024-07-18 PROCEDURE — 1158F ADVNC CARE PLAN TLK DOCD: CPT | Performed by: INTERNAL MEDICINE

## 2024-07-18 PROCEDURE — 1170F FXNL STATUS ASSESSED: CPT | Performed by: INTERNAL MEDICINE

## 2024-07-18 PROCEDURE — 99214 OFFICE O/P EST MOD 30 MIN: CPT | Performed by: INTERNAL MEDICINE

## 2024-07-18 PROCEDURE — 3078F DIAST BP <80 MM HG: CPT | Performed by: INTERNAL MEDICINE

## 2024-07-18 ASSESSMENT — FIBROSIS 4 INDEX: FIB4 SCORE: 1.95

## 2024-08-23 ENCOUNTER — PHARMACY VISIT (OUTPATIENT)
Dept: PHARMACY | Facility: MEDICAL CENTER | Age: 84
End: 2024-08-23
Payer: COMMERCIAL

## 2024-08-23 PROCEDURE — RXMED WILLOW AMBULATORY MEDICATION CHARGE: Performed by: OPHTHALMOLOGY

## 2024-08-24 ENCOUNTER — HOSPITAL ENCOUNTER (OUTPATIENT)
Dept: LAB | Facility: MEDICAL CENTER | Age: 84
End: 2024-08-24
Attending: INTERNAL MEDICINE
Payer: MEDICARE

## 2024-08-24 DIAGNOSIS — E78.5 DYSLIPIDEMIA: Chronic | ICD-10-CM

## 2024-08-24 DIAGNOSIS — E55.9 VITAMIN D DEFICIENCY: ICD-10-CM

## 2024-08-24 LAB
25(OH)D3 SERPL-MCNC: 109 NG/ML (ref 30–100)
ALBUMIN SERPL BCP-MCNC: 4.4 G/DL (ref 3.2–4.9)
ALBUMIN/GLOB SERPL: 1.8 G/DL
ALP SERPL-CCNC: 49 U/L (ref 30–99)
ALT SERPL-CCNC: 16 U/L (ref 2–50)
ANION GAP SERPL CALC-SCNC: 13 MMOL/L (ref 7–16)
AST SERPL-CCNC: 29 U/L (ref 12–45)
BASOPHILS # BLD AUTO: 0.5 % (ref 0–1.8)
BASOPHILS # BLD: 0.02 K/UL (ref 0–0.12)
BILIRUB SERPL-MCNC: 0.6 MG/DL (ref 0.1–1.5)
BUN SERPL-MCNC: 23 MG/DL (ref 8–22)
CALCIUM ALBUM COR SERPL-MCNC: 9.6 MG/DL (ref 8.5–10.5)
CALCIUM SERPL-MCNC: 9.9 MG/DL (ref 8.5–10.5)
CHLORIDE SERPL-SCNC: 106 MMOL/L (ref 96–112)
CHOLEST SERPL-MCNC: 116 MG/DL (ref 100–199)
CO2 SERPL-SCNC: 22 MMOL/L (ref 20–33)
CREAT SERPL-MCNC: 1.12 MG/DL (ref 0.5–1.4)
EOSINOPHIL # BLD AUTO: 0.13 K/UL (ref 0–0.51)
EOSINOPHIL NFR BLD: 3.2 % (ref 0–6.9)
ERYTHROCYTE [DISTWIDTH] IN BLOOD BY AUTOMATED COUNT: 46.9 FL (ref 35.9–50)
GFR SERPLBLD CREATININE-BSD FMLA CKD-EPI: 48 ML/MIN/1.73 M 2
GLOBULIN SER CALC-MCNC: 2.4 G/DL (ref 1.9–3.5)
GLUCOSE SERPL-MCNC: 91 MG/DL (ref 65–99)
HCT VFR BLD AUTO: 42.2 % (ref 37–47)
HDLC SERPL-MCNC: 52 MG/DL
HGB BLD-MCNC: 13.8 G/DL (ref 12–16)
IMM GRANULOCYTES # BLD AUTO: 0.01 K/UL (ref 0–0.11)
IMM GRANULOCYTES NFR BLD AUTO: 0.2 % (ref 0–0.9)
LDLC SERPL CALC-MCNC: 49 MG/DL
LYMPHOCYTES # BLD AUTO: 1.13 K/UL (ref 1–4.8)
LYMPHOCYTES NFR BLD: 27.9 % (ref 22–41)
MCH RBC QN AUTO: 32.7 PG (ref 27–33)
MCHC RBC AUTO-ENTMCNC: 32.7 G/DL (ref 32.2–35.5)
MCV RBC AUTO: 100 FL (ref 81.4–97.8)
MONOCYTES # BLD AUTO: 0.33 K/UL (ref 0–0.85)
MONOCYTES NFR BLD AUTO: 8.1 % (ref 0–13.4)
NEUTROPHILS # BLD AUTO: 2.43 K/UL (ref 1.82–7.42)
NEUTROPHILS NFR BLD: 60.1 % (ref 44–72)
NRBC # BLD AUTO: 0 K/UL
NRBC BLD-RTO: 0 /100 WBC (ref 0–0.2)
PLATELET # BLD AUTO: 188 K/UL (ref 164–446)
PMV BLD AUTO: 10.4 FL (ref 9–12.9)
POTASSIUM SERPL-SCNC: 4.9 MMOL/L (ref 3.6–5.5)
PROT SERPL-MCNC: 6.8 G/DL (ref 6–8.2)
RBC # BLD AUTO: 4.22 M/UL (ref 4.2–5.4)
SODIUM SERPL-SCNC: 141 MMOL/L (ref 135–145)
TRIGL SERPL-MCNC: 75 MG/DL (ref 0–149)
TSH SERPL-ACNC: 1.48 UIU/ML (ref 0.35–5.5)
WBC # BLD AUTO: 4.1 K/UL (ref 4.8–10.8)

## 2024-08-24 PROCEDURE — 84443 ASSAY THYROID STIM HORMONE: CPT

## 2024-08-24 PROCEDURE — 80053 COMPREHEN METABOLIC PANEL: CPT

## 2024-08-24 PROCEDURE — 82306 VITAMIN D 25 HYDROXY: CPT

## 2024-08-24 PROCEDURE — 80061 LIPID PANEL: CPT

## 2024-08-24 PROCEDURE — 36415 COLL VENOUS BLD VENIPUNCTURE: CPT

## 2024-08-24 PROCEDURE — 85025 COMPLETE CBC W/AUTO DIFF WBC: CPT

## 2024-08-25 ENCOUNTER — TELEPHONE (OUTPATIENT)
Dept: MEDICAL GROUP | Facility: MEDICAL CENTER | Age: 84
End: 2024-08-25
Payer: MEDICARE

## 2024-08-25 NOTE — TELEPHONE ENCOUNTER
Called patient and left a message, please notify her that her test shows:  (1) her cholesterol is still excellent, have her continue on the rosuvastatin cholesterol pill  (2) her kidney function is stable, liver function is normal, thyroid function is normal  (3) her vitamin D level is high, how much vitamin D she is taking a da?, please have her cut the vitamin D dose in half or go to every other day dosing

## 2024-08-26 ENCOUNTER — TELEPHONE (OUTPATIENT)
Dept: MEDICAL GROUP | Facility: MEDICAL CENTER | Age: 84
End: 2024-08-26
Payer: MEDICARE

## 2024-08-26 NOTE — TELEPHONE ENCOUNTER
----- Message from Physician Master Suarez M.D. sent at 8/25/2024  1:49 PM PDT -----  Called patient and left a message, please notify her that her test shows:  (1) her cholesterol is still excellent, have her continue on the rosuvastatin cholesterol pill  (2) her kidney function is stable, liver function is normal, thyroid function is normal  (3) her vitamin D level is high, how much vitamin D she is taking a da?, please have her cut the vitamin D dose in half or go to every other day dosing

## 2024-08-26 NOTE — TELEPHONE ENCOUNTER
Pt notified, she believes that her Vitamin D is 400 iu and is taking 2 or 3 a day. I have instructed her to take only one a day.

## 2024-08-28 ENCOUNTER — OFFICE VISIT (OUTPATIENT)
Dept: CARDIOLOGY | Facility: MEDICAL CENTER | Age: 84
End: 2024-08-28
Attending: INTERNAL MEDICINE
Payer: MEDICARE

## 2024-08-28 VITALS
HEIGHT: 61 IN | BODY MASS INDEX: 25.3 KG/M2 | RESPIRATION RATE: 16 BRPM | SYSTOLIC BLOOD PRESSURE: 102 MMHG | DIASTOLIC BLOOD PRESSURE: 44 MMHG | WEIGHT: 134 LBS | HEART RATE: 82 BPM | OXYGEN SATURATION: 94 %

## 2024-08-28 DIAGNOSIS — S22.000S COMPRESSION FRACTURE OF THORACIC VERTEBRA, UNSPECIFIED THORACIC VERTEBRAL LEVEL, SEQUELA: ICD-10-CM

## 2024-08-28 DIAGNOSIS — I35.0 NONRHEUMATIC AORTIC VALVE STENOSIS: ICD-10-CM

## 2024-08-28 DIAGNOSIS — I50.32 CHRONIC DIASTOLIC CONGESTIVE HEART FAILURE (HCC): ICD-10-CM

## 2024-08-28 DIAGNOSIS — N18.31 STAGE 3A CHRONIC KIDNEY DISEASE: ICD-10-CM

## 2024-08-28 DIAGNOSIS — Z95.5 S/P DRUG ELUTING CORONARY STENT PLACEMENT: ICD-10-CM

## 2024-08-28 PROCEDURE — 99212 OFFICE O/P EST SF 10 MIN: CPT | Performed by: INTERNAL MEDICINE

## 2024-08-28 ASSESSMENT — FIBROSIS 4 INDEX: FIB4 SCORE: 3.32

## 2024-08-29 ENCOUNTER — DOCUMENTATION (OUTPATIENT)
Dept: CARDIOLOGY | Facility: MEDICAL CENTER | Age: 84
End: 2024-08-29
Payer: MEDICARE

## 2024-08-29 NOTE — PROGRESS NOTES
"CARDIOLOGY OUTPATIENT FOLLOWUP    PCP: Master Suarez M.D.    1. Nonrheumatic aortic valve stenosis    2. S/P drug eluting coronary stent placement    3. Compression fracture of thoracic vertebra, unspecified thoracic vertebral level, sequela    4. Chronic diastolic congestive heart failure (HCC)    5. Stage 3a chronic kidney disease        Katerina Torres is doing well from a cardiovascular standpoint but with disabling back discomfort.  She should continue on Plavix and utilize predental antibiotic prophylaxis in addition to continuing rosuvastatin and lisinopril which are nicely regulating cardiac risk factors.  She may interrupt antiplatelets as needed for spine treatments.  I also discussed the reasonably safe addition of occasional NSAID use.    Follow up: 6 months    History: Katerina Torres is a 84 y.o. female with resolved breast cancer presenting for follow-up of TAVR performed October 2023 with 23 S3 ultra Resilia valve and protection of the RCA with snorkel stenting presenting for follow-up    She has back pain.  Not getting any relief despite treatments with spine clinic.  Is considering kyphoplasty.      Physical Exam:  /44 (BP Location: Left arm, Patient Position: Sitting, BP Cuff Size: Adult)   Pulse 82   Resp 16   Ht 1.549 m (5' 1\")   Wt 60.8 kg (134 lb)   SpO2 94%   BMI 25.32 kg/m²   GEN: NAD  CARDIAC: Regular. Normal S1, S2, Systolic murmur.   VASCULATURE: Normal carotid upstroke  RESP: Clear to auscultation bilaterally  ABD: Soft, non-tender, non-distended  EXT: No edema  NEURO: No focal deficit       () Today's E/M visit is associated with medical care services that serve as the continuing focal point for all needed health care services and/or with medical care services that  are part of ongoing care related to a patient's single, serious condition, or a complex condition: This includes  furnishing services to patients on an ongoing basis that result in care " "that is personalized  to the patient. The services result in a comprehensive, longitudinal, and continuous  relationship with the patient and involve delivery of team-based care that is accessible, coordinated with other practitioners and providers, and integrated with the broader health  care landscape.     The ASCVD Risk score (Candice LEUNG, et al., 2019) failed to calculate.    Studies  Lab Results   Component Value Date/Time    CHOLSTRLTOT 116 08/24/2024 08:29 AM    LDL 49 08/24/2024 08:29 AM    HDL 52 08/24/2024 08:29 AM    TRIGLYCERIDE 75 08/24/2024 08:29 AM       Lab Results   Component Value Date/Time    SODIUM 141 08/24/2024 08:29 AM    POTASSIUM 4.9 08/24/2024 08:29 AM    CHLORIDE 106 08/24/2024 08:29 AM    CO2 22 08/24/2024 08:29 AM    GLUCOSE 91 08/24/2024 08:29 AM    BUN 23 (H) 08/24/2024 08:29 AM    CREATININE 1.12 08/24/2024 08:29 AM    CREATININE 1.08 (H) 07/02/2010 08:56 AM    BUNCREATRAT 11 07/02/2010 08:56 AM    GLOMRATE 50 (L) 07/02/2010 08:56 AM      Lab Results   Component Value Date/Time    PROTHROMBTM 13.6 10/23/2023 07:45 AM    INR 1.02 10/23/2023 07:45 AM      Lab Results   Component Value Date/Time    WBC 4.1 (L) 08/24/2024 08:29 AM    WBC 4.4 07/02/2010 08:56 AM    RBC 4.22 08/24/2024 08:29 AM    RBC 4.45 07/02/2010 08:56 AM    HEMOGLOBIN 13.8 08/24/2024 08:29 AM    HEMATOCRIT 42.2 08/24/2024 08:29 AM    .0 (H) 08/24/2024 08:29 AM    MCV 95 07/02/2010 08:56 AM    MCH 32.7 08/24/2024 08:29 AM    MCH 31.5 07/02/2010 08:56 AM    MCHC 32.7 08/24/2024 08:29 AM    MPV 10.4 08/24/2024 08:29 AM    NEUTSPOLYS 60.10 08/24/2024 08:29 AM    LYMPHOCYTES 27.90 08/24/2024 08:29 AM    MONOCYTES 8.10 08/24/2024 08:29 AM    EOSINOPHILS 3.20 08/24/2024 08:29 AM    BASOPHILS 0.50 08/24/2024 08:29 AM        Past Medical History:   Diagnosis Date    Arrhythmia Year ago    Breast cancer (HCC)     Left Breast    Breath shortness     with exertion \"walking up a hill\"    Bruises easily     Cancer (HCC) 1989    " breast left side lumpectomy    Cardiac murmur 06/03/2009    Chest pain 11/2/2023    Chronic kidney disease, stage III (moderate) 08/20/2015    Coronary artery disease involving native coronary artery of native heart without angina pectoris 10/31/2023    Dyslipidemia 06/03/2009    Dyspepsia 11/2/2023    Glaucoma     both eyes    Heart valve disease year ago    Having surgery    Hepatitis A 1993    Hiatus hernia syndrome     Hx of breast cancer 06/03/2009    Hypertension 07/18/2017    currently not taking medications    MEDICAL HOME     Neutropenia 06/03/2009    Osteopenia 06/03/2009    PONV (postoperative nausea and vomiting)     Preventative health care 06/03/2009    Snoring     Unspecified cataract     bilateral IOLs     Allergies   Allergen Reactions    Hydrocodone Nausea    Atorvastatin Unspecified     Does not qualify for primary prevention    Cymbalta [Duloxetine Hcl]      dizziness    Naproxen      GI upset    Neurontin [Gabapentin] Unspecified     dizziness     Outpatient Encounter Medications as of 8/28/2024   Medication Sig Dispense Refill    bimatoprost (LUMIGAN) 0.01 % Solution Instill 1 drop into both eyes every night at bedtime. please fill large bottle (7.5ml) [place] [id number] 7.5 mL 3    lisinopril (PRINIVIL) 40 MG tablet Take 1 Tablet by mouth every day. 90 Tablet 3    rosuvastatin (CRESTOR) 10 MG Tab Take 1 Tablet by mouth every evening. 100 Tablet 3    clopidogrel (PLAVIX) 75 MG Tab Take 1 Tablet by mouth every day. 100 Tablet 3    acetaminophen (TYLENOL) 500 MG Tab Take 1,000 mg by mouth every 6 hours as needed for Mild Pain.      vitamin D (CHOLECALCIFEROL) 1000 UNIT Tab Take 1,000 Units by mouth every day.       No facility-administered encounter medications on file as of 8/28/2024.     Social History     Socioeconomic History    Marital status:      Spouse name: Not on file    Number of children: Not on file    Years of education: Not on file    Highest education level: 12th grade    Occupational History    Not on file   Tobacco Use    Smoking status: Former     Current packs/day: 0.00     Average packs/day: 0.5 packs/day for 10.0 years (5.0 ttl pk-yrs)     Types: Cigarettes     Start date: 1989     Quit date: 1999     Years since quittin.6    Smokeless tobacco: Never   Vaping Use    Vaping status: Never Used   Substance and Sexual Activity    Alcohol use: Yes     Alcohol/week: 4.2 oz     Types: 7 Standard drinks or equivalent per week     Comment: 1 glass wine/day    Drug use: No    Sexual activity: Not Currently     Partners: Male   Other Topics Concern     Service No    Blood Transfusions No    Caffeine Concern No    Occupational Exposure No    Hobby Hazards Yes    Sleep Concern No    Stress Concern Yes    Weight Concern No    Special Diet No    Back Care No    Exercise No    Bike Helmet No    Seat Belt Yes    Self-Exams Yes   Social History Narrative    Not on file     Social Determinants of Health     Financial Resource Strain: Low Risk  (10/23/2023)    Overall Financial Resource Strain (CARDIA)     Difficulty of Paying Living Expenses: Not hard at all   Food Insecurity: No Food Insecurity (10/23/2023)    Hunger Vital Sign     Worried About Running Out of Food in the Last Year: Never true     Ran Out of Food in the Last Year: Never true   Transportation Needs: No Transportation Needs (10/23/2023)    PRAPARE - Transportation     Lack of Transportation (Medical): No     Lack of Transportation (Non-Medical): No   Physical Activity: Insufficiently Active (10/23/2023)    Exercise Vital Sign     Days of Exercise per Week: 2 days     Minutes of Exercise per Session: 30 min   Stress: Stress Concern Present (10/23/2023)    [employer] - Occupational Stress Questionnaire     Feeling of Stress : To some extent   Social Connections: Socially Isolated (10/23/2023)    Social Connection and Isolation Panel [NHANES]     Frequency of Communication with  Friends and Family: More than three times a week     Frequency of Social Gatherings with Friends and Family: More than three times a week     Attends Taoism Services: Never     Active Member of Clubs or Organizations: No     Attends Club or Organization Meetings: Never     Marital Status:    Intimate Partner Violence: Not on file   Housing Stability: Low Risk  (10/23/2023)    Housing Stability Vital Sign     Unable to Pay for Housing in the Last Year: No     Number of Places Lived in the Last Year: 1     Unstable Housing in the Last Year: No         ROS:   10 point review systems is otherwise negative except as per the HPI    Chief Complaint   Patient presents with    Follow-Up     F/v Dx: S/P TAVR (transcatheter aortic valve replacement)    Dyslipidemia    Hypertension

## 2024-08-29 NOTE — PROGRESS NOTES
Valve Program Functional Assessment:     KCCQ12   1a) Showering/bathin  1b) Walking 1 block on ground: 5  1c) Hurrying or jogging:3  2) Swellin  3) Fatigue: 7  4) Shortness of breath: 7  5) Sleep sitting up: 5  6) Limited enjoyment of life: 5  7) Spend the rest of your life with HF: 4  8a) Hobbies, recreational activities:5  8b) Working or doing household chores:6  8c) Visiting family or friends: 5

## 2024-10-01 ENCOUNTER — APPOINTMENT (RX ONLY)
Dept: URBAN - METROPOLITAN AREA CLINIC 15 | Facility: CLINIC | Age: 84
Setting detail: DERMATOLOGY
End: 2024-10-01

## 2024-10-01 DIAGNOSIS — L82.1 OTHER SEBORRHEIC KERATOSIS: ICD-10-CM

## 2024-10-01 DIAGNOSIS — Z71.89 OTHER SPECIFIED COUNSELING: ICD-10-CM

## 2024-10-01 DIAGNOSIS — L82.0 INFLAMED SEBORRHEIC KERATOSIS: ICD-10-CM

## 2024-10-01 DIAGNOSIS — L81.4 OTHER MELANIN HYPERPIGMENTATION: ICD-10-CM

## 2024-10-01 DIAGNOSIS — Z85.828 PERSONAL HISTORY OF OTHER MALIGNANT NEOPLASM OF SKIN: ICD-10-CM

## 2024-10-01 DIAGNOSIS — L57.8 OTHER SKIN CHANGES DUE TO CHRONIC EXPOSURE TO NONIONIZING RADIATION: ICD-10-CM

## 2024-10-01 DIAGNOSIS — L57.0 ACTINIC KERATOSIS: ICD-10-CM

## 2024-10-01 DIAGNOSIS — D69.2 OTHER NONTHROMBOCYTOPENIC PURPURA: ICD-10-CM

## 2024-10-01 PROCEDURE — 17003 DESTRUCT PREMALG LES 2-14: CPT | Mod: 59

## 2024-10-01 PROCEDURE — ? LIQUID NITROGEN

## 2024-10-01 PROCEDURE — 17000 DESTRUCT PREMALG LESION: CPT | Mod: 59

## 2024-10-01 PROCEDURE — ? COUNSELING

## 2024-10-01 PROCEDURE — ? ORDER FOR PHOTODYNAMIC THERAPY

## 2024-10-01 PROCEDURE — 99203 OFFICE O/P NEW LOW 30 MIN: CPT | Mod: 25

## 2024-10-01 PROCEDURE — 17110 DESTRUCTION B9 LES UP TO 14: CPT

## 2024-10-01 ASSESSMENT — LOCATION SIMPLE DESCRIPTION DERM
LOCATION SIMPLE: LEFT LIP
LOCATION SIMPLE: NOSE
LOCATION SIMPLE: INFERIOR FOREHEAD
LOCATION SIMPLE: LEFT ZYGOMA
LOCATION SIMPLE: LEFT FOREHEAD
LOCATION SIMPLE: RIGHT FOREARM
LOCATION SIMPLE: LEFT FOREARM
LOCATION SIMPLE: RIGHT CHEEK
LOCATION SIMPLE: RIGHT FOREHEAD
LOCATION SIMPLE: LEFT CHEEK

## 2024-10-01 ASSESSMENT — LOCATION DETAILED DESCRIPTION DERM
LOCATION DETAILED: RIGHT LATERAL BUCCAL CHEEK
LOCATION DETAILED: LEFT INFERIOR CENTRAL MALAR CHEEK
LOCATION DETAILED: RIGHT INFERIOR FOREHEAD
LOCATION DETAILED: LEFT UPPER CUTANEOUS LIP
LOCATION DETAILED: LEFT SUPERIOR NASAL CHEEK
LOCATION DETAILED: RIGHT INFERIOR LATERAL MALAR CHEEK
LOCATION DETAILED: RIGHT FOREHEAD
LOCATION DETAILED: RIGHT LATERAL MALAR CHEEK
LOCATION DETAILED: LEFT INFERIOR CENTRAL BUCCAL CHEEK
LOCATION DETAILED: RIGHT CENTRAL BUCCAL CHEEK
LOCATION DETAILED: RIGHT PROXIMAL RADIAL DORSAL FOREARM
LOCATION DETAILED: LEFT PROXIMAL DORSAL FOREARM
LOCATION DETAILED: NASAL ROOT
LOCATION DETAILED: INFERIOR MID FOREHEAD
LOCATION DETAILED: RIGHT INFERIOR MEDIAL FOREHEAD
LOCATION DETAILED: LEFT FOREHEAD
LOCATION DETAILED: LEFT MEDIAL ZYGOMA

## 2024-10-01 ASSESSMENT — LOCATION ZONE DERM
LOCATION ZONE: ARM
LOCATION ZONE: NOSE
LOCATION ZONE: FACE
LOCATION ZONE: LIP

## 2024-10-07 DIAGNOSIS — Z95.2 S/P TAVR (TRANSCATHETER AORTIC VALVE REPLACEMENT): ICD-10-CM

## 2024-10-08 RX ORDER — AMOXICILLIN 500 MG/1
CAPSULE ORAL
Qty: 4 CAPSULE | Refills: 0 | Status: SHIPPED | OUTPATIENT
Start: 2024-10-08

## 2024-10-15 ENCOUNTER — APPOINTMENT (RX ONLY)
Dept: URBAN - METROPOLITAN AREA CLINIC 15 | Facility: CLINIC | Age: 84
Setting detail: DERMATOLOGY
End: 2024-10-15

## 2024-10-15 DIAGNOSIS — L57.0 ACTINIC KERATOSIS: ICD-10-CM

## 2024-10-15 PROCEDURE — 96567 PDT DSTR PRMLG LES SKN: CPT

## 2024-10-15 PROCEDURE — ? PHOTODYNAMIC THERAPY COUNSELING

## 2024-10-15 PROCEDURE — ? PRESCRIPTION

## 2024-10-15 PROCEDURE — ? ADDITIONAL NOTES

## 2024-10-15 PROCEDURE — ? PHOTO-DOCUMENTATION

## 2024-10-15 PROCEDURE — ? COUNSELING

## 2024-10-15 PROCEDURE — ? PDT: BLUE

## 2024-10-15 RX ORDER — VALACYCLOVIR HYDROCHLORIDE 500 MG/1
1 TABLET, FILM COATED ORAL BID
Qty: 9 | Refills: 0 | Status: ERX | COMMUNITY
Start: 2024-10-15

## 2024-10-15 RX ADMIN — VALACYCLOVIR HYDROCHLORIDE 1: 500 TABLET, FILM COATED ORAL at 00:00

## 2024-10-15 ASSESSMENT — LOCATION DETAILED DESCRIPTION DERM: LOCATION DETAILED: INFERIOR MID FOREHEAD

## 2024-10-15 ASSESSMENT — LOCATION SIMPLE DESCRIPTION DERM: LOCATION SIMPLE: INFERIOR FOREHEAD

## 2024-10-15 ASSESSMENT — LOCATION ZONE DERM: LOCATION ZONE: FACE

## 2024-10-15 NOTE — PROCEDURE: PDT: BLUE
Detail Level: Zone
Who Performed The Pdt?: Performed by Nurse, MA or  with Pre-Procedure Debridement of Hyperkeratotic Lesions (43459)
Post-Care Instructions: I reviewed with the patient in detail post-care instructions. Patient is to avoid sunlight for the next 2 days, and wear sun protection. Patients may expect sunburn like redness, discomfort and scabbing.
Incubation Time: 01:30:00
Show Anesthesia In Plan?: Yes
Consent: Written consent obtained.  The risks were reviewed with the patient including but not limited to: pigmentary changes, pain, blistering, scabbing, redness, and the remote possibility of scarring.
Lot # (Optional): [id number]
Total Number Of Aks Treated (Optional To Report): 0
Treatment Number: 1
Light Source: [place]-U
Anesthesia Type: 1% lidocaine with epinephrine
Pre-Procedure Text: The treatment areas were cleaned and prepped in the usual fashion.
Who Performed The Pdt (Staff): Ramo WEAVER MA
Which Photosensitizer Was Used: Levulan
Expiration Date (Optional): 2/2027
Was Levulan/Ameluz Applied On A Previous Day?: No
Medical Necessity: Precancerous Lesions
Illumination Time: 00:16:40

## 2024-10-15 NOTE — PROCEDURE: ADDITIONAL NOTES
Render Risk Assessment In Note?: no
Additional Notes: First dose given prior to tx today. Rx sent in to pref phx.
Detail Level: Simple

## 2024-10-15 NOTE — PROCEDURE: MIPS QUALITY
Quality 130: Documentation Of Current Medications In The Medical Record: Current Medications Documented
Name And Contact Information For Health Care Proxy: Teetee Marcy [phone]
Quality 226: Preventive Care And Screening: Tobacco Use: Screening And Cessation Intervention: Patient screened for tobacco use and is an ex/non-smoker
Quality 47: Advance Care Plan: Advance Care Planning discussed and documented; advance care plan or surrogate decision maker documented in the medical record.
Detail Level: Detailed

## 2024-10-15 NOTE — HPI: PHOTODYNAMIC THERAPY (PDT)
Have You Had Previous Treatments With Pdt Before?: has not had previous treatments
When Outside In The Sun, Do You...: mostly burns, rarely tans
Carpal tunnel syndrome of right wrist    DM (diabetes mellitus)    Essential hypertension    Essential hypertension    HTN (hypertension)    Hypothyroid    Lymphedema    Obesity    Obesity (BMI 30-39.9)    Type 2 diabetes mellitus without complication

## 2024-10-23 ENCOUNTER — HOSPITAL ENCOUNTER (OUTPATIENT)
Dept: CARDIOLOGY | Facility: MEDICAL CENTER | Age: 84
End: 2024-10-23
Attending: INTERNAL MEDICINE
Payer: MEDICARE

## 2024-10-23 DIAGNOSIS — I35.0 NONRHEUMATIC AORTIC (VALVE) STENOSIS: ICD-10-CM

## 2024-10-23 DIAGNOSIS — Z95.2 S/P TAVR (TRANSCATHETER AORTIC VALVE REPLACEMENT): ICD-10-CM

## 2024-10-23 LAB
LV EJECT FRACT  99904: 75
LV EJECT FRACT MOD 2C 99903: 82.69
LV EJECT FRACT MOD 4C 99902: 74.17
LV EJECT FRACT MOD BP 99901: 78.85

## 2024-10-23 PROCEDURE — 93306 TTE W/DOPPLER COMPLETE: CPT | Mod: 26 | Performed by: INTERNAL MEDICINE

## 2024-10-23 PROCEDURE — 93306 TTE W/DOPPLER COMPLETE: CPT

## 2024-11-04 ENCOUNTER — APPOINTMENT (RX ONLY)
Dept: URBAN - METROPOLITAN AREA CLINIC 15 | Facility: CLINIC | Age: 84
Setting detail: DERMATOLOGY
End: 2024-11-04

## 2024-11-04 DIAGNOSIS — D485 NEOPLASM OF UNCERTAIN BEHAVIOR OF SKIN: ICD-10-CM

## 2024-11-04 DIAGNOSIS — D69.2 OTHER NONTHROMBOCYTOPENIC PURPURA: ICD-10-CM

## 2024-11-04 DIAGNOSIS — L82.1 OTHER SEBORRHEIC KERATOSIS: ICD-10-CM

## 2024-11-04 DIAGNOSIS — L57.0 ACTINIC KERATOSIS: ICD-10-CM

## 2024-11-04 PROBLEM — D48.5 NEOPLASM OF UNCERTAIN BEHAVIOR OF SKIN: Status: ACTIVE | Noted: 2024-11-04

## 2024-11-04 PROCEDURE — ? BIOPSY BY SHAVE METHOD

## 2024-11-04 PROCEDURE — 11102 TANGNTL BX SKIN SINGLE LES: CPT

## 2024-11-04 PROCEDURE — ? LIQUID NITROGEN (COSMETIC)

## 2024-11-04 PROCEDURE — 17000 DESTRUCT PREMALG LESION: CPT | Mod: 59

## 2024-11-04 PROCEDURE — ? COUNSELING

## 2024-11-04 PROCEDURE — 99212 OFFICE O/P EST SF 10 MIN: CPT | Mod: 25

## 2024-11-04 PROCEDURE — ? LIQUID NITROGEN

## 2024-11-04 PROCEDURE — 17003 DESTRUCT PREMALG LES 2-14: CPT

## 2024-11-04 ASSESSMENT — LOCATION DETAILED DESCRIPTION DERM
LOCATION DETAILED: RIGHT LATERAL FOREHEAD
LOCATION DETAILED: LEFT INFERIOR LATERAL NECK
LOCATION DETAILED: RIGHT CENTRAL MANDIBULAR CHEEK
LOCATION DETAILED: LEFT SUPERIOR LATERAL NECK
LOCATION DETAILED: RIGHT ULNAR DORSAL HAND
LOCATION DETAILED: LEFT LATERAL FOREHEAD

## 2024-11-04 ASSESSMENT — LOCATION ZONE DERM
LOCATION ZONE: FACE
LOCATION ZONE: NECK
LOCATION ZONE: HAND

## 2024-11-04 ASSESSMENT — LOCATION SIMPLE DESCRIPTION DERM
LOCATION SIMPLE: RIGHT HAND
LOCATION SIMPLE: LEFT ANTERIOR NECK
LOCATION SIMPLE: RIGHT CHEEK
LOCATION SIMPLE: RIGHT FOREHEAD
LOCATION SIMPLE: LEFT FOREHEAD

## 2024-11-04 NOTE — PROCEDURE: LIQUID NITROGEN (COSMETIC)
Show Spray Paint Technique Variable?: Yes
Post-Care Instructions: I reviewed with the patient in detail post-care instructions. Patient is to wear sunprotection, and avoid picking at any of the treated lesions. Pt may apply Vaseline to crusted or scabbing areas.
Spray Paint Technique: No
Spray Paint Text: The liquid nitrogen was applied to the skin utilizing a spray paint frosting technique.
Billing Information: Bill by Static Price
Consent: The patient's consent was obtained including but not limited to risks of crusting, scabbing, blistering, scarring, darker or lighter pigmentary change, recurrence, incomplete removal and infection. The patient understands that the procedure is cosmetic in nature and is not covered by insurance.
Price (Use Numbers Only, No Special Characters Or $): 0
Detail Level: Detailed

## 2024-11-13 ENCOUNTER — PHARMACY VISIT (OUTPATIENT)
Dept: PHARMACY | Facility: MEDICAL CENTER | Age: 84
End: 2024-11-13
Payer: COMMERCIAL

## 2024-11-13 PROCEDURE — RXMED WILLOW AMBULATORY MEDICATION CHARGE: Performed by: OPHTHALMOLOGY

## 2024-11-15 DIAGNOSIS — I50.30 DIASTOLIC HEART FAILURE, UNSPECIFIED HF CHRONICITY (HCC): ICD-10-CM

## 2024-11-15 RX ORDER — LISINOPRIL 40 MG/1
40 TABLET ORAL DAILY
Qty: 100 TABLET | OUTPATIENT
Start: 2024-11-15

## 2024-11-21 ENCOUNTER — APPOINTMENT (RX ONLY)
Dept: URBAN - METROPOLITAN AREA CLINIC 36 | Facility: CLINIC | Age: 84
Setting detail: DERMATOLOGY
End: 2024-11-21

## 2024-11-21 DIAGNOSIS — L57.0 ACTINIC KERATOSIS: ICD-10-CM

## 2024-11-21 PROBLEM — C44.329 SQUAMOUS CELL CARCINOMA OF SKIN OF OTHER PARTS OF FACE: Status: ACTIVE | Noted: 2024-11-21

## 2024-11-21 PROCEDURE — ? COUNSELING

## 2024-11-21 PROCEDURE — ? MOHS SURGERY

## 2024-11-21 PROCEDURE — 17311 MOHS 1 STAGE H/N/HF/G: CPT

## 2024-11-21 PROCEDURE — ? PRESCRIPTION

## 2024-11-21 PROCEDURE — 99213 OFFICE O/P EST LOW 20 MIN: CPT | Mod: 25

## 2024-11-21 PROCEDURE — ? DIAGNOSIS COMMENT

## 2024-11-21 PROCEDURE — 13132 CMPLX RPR F/C/C/M/N/AX/G/H/F: CPT

## 2024-11-21 RX ORDER — FLUOROURACIL 5 MG/G
CREAM TOPICAL
Qty: 40 | Refills: 0 | Status: ERX | COMMUNITY
Start: 2024-11-21

## 2024-11-21 RX ADMIN — FLUOROURACIL: 5 CREAM TOPICAL at 00:00

## 2024-11-21 NOTE — PROCEDURE: MOHS SURGERY
Otolaryngologist Procedure Text (E): After obtaining clear surgical margins the patient was sent to otolaryngology for surgical repair.  The patient understands they will receive post-surgical care and follow-up from the referring physician's office.
Asc Procedure Text (B): After obtaining clear surgical margins the patient was sent to an ASC for surgical repair.  The patient understands they will receive post-surgical care and follow-up from the ASC physician.
Graft Donor Site Will Heal By Secondary Intention: No
Mid-Level Procedure Text (D): After obtaining clear surgical margins the patient was sent to a mid-level provider for surgical repair.  The patient understands they will receive post-surgical care and follow-up from the mid-level provider.
Consent (Spinal Accessory)/Introductory Paragraph: The rationale for Mohs was explained to the patient and consent was obtained. The risks, benefits and alternatives to therapy were discussed in detail. Specifically, the risks of damage to the spinal accessory nerve, infection, scarring, bleeding, prolonged wound healing, incomplete removal, allergy to anesthesia, and recurrence were addressed. Prior to the procedure, the treatment site was clearly identified and confirmed by the patient. All components of Universal Protocol/PAUSE Rule completed.
Consent (Temporal Branch)/Introductory Paragraph: The rationale for Mohs was explained to the patient and consent was obtained. The risks, benefits and alternatives to therapy were discussed in detail. Specifically, the risks of damage to the temporal branch of the facial nerve, infection, scarring, bleeding, prolonged wound healing, incomplete removal, allergy to anesthesia, and recurrence were addressed. Prior to the procedure, the treatment site was clearly identified and confirmed by the patient. All components of Universal Protocol/PAUSE Rule completed.
Referred To Plastics For Closure Text (Leave Blank If You Do Not Want): After obtaining clear surgical margins the patient was sent to plastics for surgical repair.  The patient understands they will receive post-surgical care and follow-up from the referring physician's office.
Deep Sutures: 5-0 Monocryl
Quadrants Reporting?: 0
Display The Individual Mohs Indications As Separate Paragraphs: Yes
Flip-Flop Flap Text: The defect edges were debeveled with a #15 blade scalpel.  Given the location of the defect and the proximity to free margins a flip-flop flap was deemed most appropriate. Using a sterile surgical marker, the appropriate flap was drawn incorporating the defect and placing the expected incisions between the helical rim and antihelix where possible.  The area thus outlined was incised through and through with a #15 scalpel blade. Following this, the designed flap was carried over into the primary defect and sutured into place.
Bill 59 Modifier?: No - Continue to Bill 79 Modifier
Graft Donor Site Epidermal Sutures (Optional): Dermabond
Detail Level: Detailed
Bilobed Transposition Flap Text: The defect edges were debeveled with a #15 scalpel blade.  Given the location of the defect and the proximity to free margins a bilobed transposition flap was deemed most appropriate.  Using a sterile surgical marker, an appropriate bilobe flap drawn around the defect.    The area thus outlined was incised deep to adipose tissue with a #15 scalpel blade.  The skin margins were undermined to an appropriate distance in all directions utilizing iris scissors.
Anesthesia Type: 1% lidocaine with epinephrine and a 1:10 solution of 8.4% sodium bicarbonate
Estlander Flap (Lower To Upper Lip) Text: The defect of the lower lip was assessed and measured.  Given the location and size of the defect, an Estlander flap was deemed most appropriate.  Using a sterile surgical marker, an appropriate Estlander flap was measured and drawn on the upper lip. Local anesthesia was then infiltrated. A scalpel was then used to incise the lateral aspect of the flap, through skin, muscle and mucosa, leaving the flap pedicled medially.  The flap was then rotated and positioned to fill the lower lip defect.  The flap was then sutured into place with a three layer technique, closing the orbicularis oris muscle layer with subcutaneous buried sutures, followed by a mucosal layer and an epidermal layer.
Rhombic Flap Text: The defect edges were debeveled with a #15 scalpel blade.  Given the location of the defect and the proximity to free margins a rhombic flap was deemed most appropriate.  Using a sterile surgical marker, an appropriate rhombic flap was drawn incorporating the defect.    The area thus outlined was incised deep to adipose tissue with a #15 scalpel blade.  The skin margins were undermined to an appropriate distance in all directions utilizing iris scissors.
Rotation Flap Text: The defect edges were debeveled with a #15 scalpel blade.  Given the location of the defect, shape of the defect and the proximity to free margins a rotation flap was deemed most appropriate.  Using a sterile surgical marker, an appropriate rotation flap was drawn incorporating the defect and placing the expected incisions within the relaxed skin tension lines where possible.    The area thus outlined was incised deep to adipose tissue with a #15 scalpel blade.  The skin margins were undermined to an appropriate distance in all directions utilizing iris scissors.
Suturegard Retention Suture: 0-0 Nylon
Bilateral Helical Rim Advancement Flap Text: The defect edges were debeveled with a #15 blade scalpel.  Given the location of the defect and the proximity to free margins (helical rim) a bilateral helical rim advancement flap was deemed most appropriate.  Using a sterile surgical marker, the appropriate advancement flaps were drawn incorporating the defect and placing the expected incisions between the helical rim and antihelix where possible.  The area thus outlined was incised through and through with a #15 scalpel blade.  With a skin hook and iris scissors, the flaps were gently and sharply undermined and freed up.
Z Plasty Text: The lesion was extirpated to the level of the fat with a #15 scalpel blade.  Given the location of the defect, shape of the defect and the proximity to free margins a Z-plasty was deemed most appropriate for repair.  Using a sterile surgical marker, the appropriate transposition arms of the Z-plasty were drawn incorporating the defect and placing the expected incisions within the relaxed skin tension lines where possible.    The area thus outlined was incised deep to adipose tissue with a #15 scalpel blade.  The skin margins were undermined to an appropriate distance in all directions utilizing iris scissors.  The opposing transposition arms were then transposed into place in opposite direction and anchored with interrupted buried subcutaneous sutures.
Localized Dermabrasion With 15 Blade Text: The patient was draped in routine manner.  Localized dermabrasion using a 15 blade was performed in routine manner to papillary dermis. This spot dermabrasion is being performed to complete skin cancer reconstruction. It also will eliminate the other sun damaged precancerous cells that are known to be part of the regional effect of a lifetime's worth of sun exposure. This localized dermabrasion is therapeutic and should not be considered cosmetic in any regard.
Consent 1/Introductory Paragraph: The rationale for Mohs was explained to the patient and consent was obtained. The risks, benefits and alternatives to therapy were discussed in detail. Specifically, the risks of infection, scarring, bleeding, prolonged wound healing, incomplete removal, allergy to anesthesia, nerve injury and recurrence were addressed. Prior to the procedure, the treatment site was clearly identified and confirmed by the patient. All components of Universal Protocol/PAUSE Rule completed.
Number Of Stages: 1
Staging Info: By selecting yes to the question above you will include information on AJCC 8 tumor staging in your Mohs note. Information on tumor staging will be automatically added for SCCs on the head and neck. AJCC 8 includes tumor size, tumor depth, perineural involvement and bone invasion.
No Repair - Repaired With Adjacent Surgical Defect Text (Leave Blank If You Do Not Want): After obtaining clear surgical margins the defect was repaired concurrently with another surgical defect which was in close approximation.
Subsequent Stages Histo Method Verbiage: Using a similar technique to that described above, a thin layer of tissue was removed from all areas where tumor was visible on the previous stage.  The tissue was again oriented, mapped, dyed, and processed as above.
Suturegard Body: The suture ends were repeatedly re-tightened and re-clamped to achieve the desired tissue expansion.
Wound Care: Vaseline
Stage 2: Additional Anesthesia Type: 1% lidocaine with epinephrine
Referring Physician (Optional): Brittanie NOLASCO
Pinch Graft Text: The defect edges were debeveled with a #15 scalpel blade. Given the location of the defect, shape of the defect and the proximity to free margins a pinch graft was deemed most appropriate. Using a sterile surgical marker, the primary defect shape was transferred to the donor site. The area thus outlined was incised deep to adipose tissue with a #15 scalpel blade.  The harvested graft was then trimmed of adipose tissue until only dermis and epidermis was left. The skin margins of the secondary defect were undermined to an appropriate distance in all directions utilizing iris scissors.  The secondary defect was closed with interrupted buried subcutaneous sutures.  The skin edges were then re-apposed with running  sutures.  The skin graft was then placed in the primary defect and oriented appropriately.
Orbicularis Oris Muscle Flap Text: The defect edges were debeveled with a #15 scalpel blade.  Given that the defect affected the competency of the oral sphincter an orbicularis oris muscle flap was deemed most appropriate to restore this competency and normal muscle function.  Using a sterile surgical marker, an appropriate flap was drawn incorporating the defect. The area thus outlined was incised with a #15 scalpel blade.
Mart-1 - Positive Histology Text: MART-1 staining demonstrates areas of higher density and clustering of melanocytes with Pagetoid spread upwards within the epidermis. The surgical margins are positive for tumor cells.
Dressing (No Sutures): pressure dressing with telfa
Double M-Plasty Intermediate Repair Preamble Text (Leave Blank If You Do Not Want): Undermining was performed with blunt dissection.
Retention Suture Text: Retention sutures were placed to support the closure and prevent dehiscence.
Adjacent Tissue Transfer Text: The defect edges were debeveled with a #15 scalpel blade.  Given the location of the defect and the proximity to free margins an adjacent tissue transfer was deemed most appropriate.  Using a sterile surgical marker, an appropriate flap was drawn incorporating the defect and placing the expected incisions within the relaxed skin tension lines where possible.    The area thus outlined was incised deep to adipose tissue with a #15 scalpel blade.  The skin margins were undermined to an appropriate distance in all directions utilizing iris scissors.
Complex Repair And Flap Additional Text (Will Appearing After The Standard Complex Repair Text): The complex repair was not sufficient to completely close the primary defect. The remaining additional defect was repaired with the flap mentioned below.
Closure 4 Information: This tab is for additional flaps and grafts above and beyond our usual structured repairs.  Please note if you enter information here it will not currently bill and you will need to add the billing information manually.
Cheek-To-Nose Interpolation Flap Text: A decision was made to reconstruct the defect utilizing an interpolation axial flap and a staged reconstruction.  A telfa template was made of the defect.  This telfa template was then used to outline the Cheek-To-Nose Interpolation flap.  The donor area for the pedicle flap was then injected with anesthesia.  The flap was excised through the skin and subcutaneous tissue down to the layer of the underlying musculature.  The interpolation flap was carefully excised within this deep plane to maintain its blood supply.  The edges of the donor site were undermined.   The donor site was closed in a primary fashion.  The pedicle was then rotated into position and sutured.  Once the tube was sutured into place, adequate blood supply was confirmed with blanching and refill.  The pedicle was then wrapped with xeroform gauze and dressed appropriately with a telfa and gauze bandage to ensure continued blood supply and protect the attached pedicle.
Special Stains Stage 12 - Results: Base On Clearance Noted Above
Localized Dermabrasion With Sand Papertext: The patient was draped in routine manner.  Localized dermabrasion using sterile sand paper was performed in routine manner to papillary dermis. This spot dermabrasion is being performed to complete skin cancer reconstruction. It also will eliminate the other sun damaged precancerous cells that are known to be part of the regional effect of a lifetime's worth of sun exposure. This localized dermabrasion is therapeutic and should not be considered cosmetic in any regard.
Mucosal Advancement Flap Text: Given the location of the defect, shape of the defect and the proximity to free margins a mucosal advancement flap was deemed most appropriate. Incisions were made with a 15 blade scalpel in the appropriate fashion along the cutaneous vermilion border and the mucosal lip. The remaining actinically damaged mucosal tissue was excised.  The mucosal advancement flap was then elevated to the gingival sulcus with care taken to preserve the neurovascular structures and advanced into the primary defect. Care was taken to ensure that precise realignment of the vermilion border was achieved.
Repair Anesthesia Method: local infiltration
Medical Necessity Statement: Based on my medical judgement, Mohs surgery is the most appropriate treatment for this cancer compared to other treatments.
Melolabial Transposition Flap Text: The defect edges were debeveled with a #15 scalpel blade.  Given the location of the defect and the proximity to free margins a melolabial flap was deemed most appropriate.  Using a sterile surgical marker, an appropriate melolabial transposition flap was drawn incorporating the defect.    The area thus outlined was incised deep to adipose tissue with a #15 scalpel blade.  The skin margins were undermined to an appropriate distance in all directions utilizing iris scissors.
Repair Type: Complex Repair
Hemostasis: Electrocautery
X Size Of Lesion In Cm (Optional): 0.7
Oculoplastic Surgeon Procedure Text (E): After obtaining clear surgical margins the patient was sent to oculoplastics for surgical repair.  The patient understands they will receive post-surgical care and follow-up from the referring physician's office.
M-Plasty Complex Repair Preamble Text (Leave Blank If You Do Not Want): Extensive wide undermining was performed.
Repair Performed By Another Provider Text (Leave Blank If You Do Not Want): After obtaining clear surgical margins the defect was repaired by another provider.
Vermilion Border Text: The closure involved the vermilion border.
Double O-Z Flap Text: The defect edges were debeveled with a #15 scalpel blade.  Given the location of the defect, shape of the defect and the proximity to free margins a Double O-Z flap was deemed most appropriate.  Using a sterile surgical marker, an appropriate transposition flap was drawn incorporating the defect and placing the expected incisions within the relaxed skin tension lines where possible. The area thus outlined was incised deep to adipose tissue with a #15 scalpel blade.  The skin margins were undermined to an appropriate distance in all directions utilizing iris scissors.
Cheek Interpolation Flap Text: A decision was made to reconstruct the defect utilizing an interpolation axial flap and a staged reconstruction.  A telfa template was made of the defect.  This telfa template was then used to outline the Cheek Interpolation flap.  The donor area for the pedicle flap was then injected with anesthesia.  The flap was excised through the skin and subcutaneous tissue down to the layer of the underlying musculature.  The interpolation flap was carefully excised within this deep plane to maintain its blood supply.  The edges of the donor site were undermined.   The donor site was closed in a primary fashion.  The pedicle was then rotated into position and sutured.  Once the tube was sutured into place, adequate blood supply was confirmed with blanching and refill.  The pedicle was then wrapped with xeroform gauze and dressed appropriately with a telfa and gauze bandage to ensure continued blood supply and protect the attached pedicle.
Which Eyelid Repair Cpt Are You Using?: 40820
Non-Graft Cartilage Fenestration Text: The cartilage was fenestrated with a 2mm punch biopsy to help facilitate healing.
Primary Defect Width In Cm (Final Defect Size - Required For Flaps/Grafts): 0.9
Staged Advancement Flap Text: The defect edges were debeveled with a #15 scalpel blade.  Given the location of the defect, shape of the defect and the proximity to free margins a staged advancement flap was deemed most appropriate.  Using a sterile surgical marker, an appropriate advancement flap was drawn incorporating the defect and placing the expected incisions within the relaxed skin tension lines where possible. The area thus outlined was incised deep to adipose tissue with a #15 scalpel blade.  The skin margins were undermined to an appropriate distance in all directions utilizing iris scissors.
Mustarde Flap Text: The defect edges were debeveled with a #15 scalpel blade.  Given the size, depth and location of the defect and the proximity to free margins a Mustarde flap was deemed most appropriate.  Using a sterile surgical marker, an appropriate flap was drawn incorporating the defect. The area thus outlined was incised with a #15 scalpel blade.  The skin margins were undermined to an appropriate distance in all directions utilizing iris scissors.
Star Wedge Flap Text: The defect edges were debeveled with a #15 scalpel blade.  Given the location of the defect, shape of the defect and the proximity to free margins a star wedge flap was deemed most appropriate.  Using a sterile surgical marker, an appropriate rotation flap was drawn incorporating the defect and placing the expected incisions within the relaxed skin tension lines where possible. The area thus outlined was incised deep to adipose tissue with a #15 scalpel blade.  The skin margins were undermined to an appropriate distance in all directions utilizing iris scissors.
Trilobed Flap Text: The defect edges were debeveled with a #15 scalpel blade.  Given the location of the defect and the proximity to free margins a trilobed flap was deemed most appropriate.  Using a sterile surgical marker, an appropriate trilobed flap drawn around the defect.    The area thus outlined was incised deep to adipose tissue with a #15 scalpel blade.  The skin margins were undermined to an appropriate distance in all directions utilizing iris scissors.
Simple / Intermediate / Complex Repair - Final Wound Length In Cm: 2.8
Spiral Flap Text: The defect edges were debeveled with a #15 scalpel blade.  Given the location of the defect, shape of the defect and the proximity to free margins a spiral flap was deemed most appropriate.  Using a sterile surgical marker, an appropriate rotation flap was drawn incorporating the defect and placing the expected incisions within the relaxed skin tension lines where possible. The area thus outlined was incised deep to adipose tissue with a #15 scalpel blade.  The skin margins were undermined to an appropriate distance in all directions utilizing iris scissors.
Tumor Debulked?: curette
Consent (Near Eyelid Margin)/Introductory Paragraph: The rationale for Mohs was explained to the patient and consent was obtained. The risks, benefits and alternatives to therapy were discussed in detail. Specifically, the risks of ectropion or eyelid deformity, infection, scarring, bleeding, prolonged wound healing, incomplete removal, allergy to anesthesia, nerve injury and recurrence were addressed. Prior to the procedure, the treatment site was clearly identified and confirmed by the patient. All components of Universal Protocol/PAUSE Rule completed.
Advancement Flap (Double) Text: The defect edges were debeveled with a #15 scalpel blade.  Given the location of the defect and the proximity to free margins a double advancement flap was deemed most appropriate.  Using a sterile surgical marker, the appropriate advancement flaps were drawn incorporating the defect and placing the expected incisions within the relaxed skin tension lines where possible.    The area thus outlined was incised deep to adipose tissue with a #15 scalpel blade.  The skin margins were undermined to an appropriate distance in all directions utilizing iris scissors.
Consent (Nose)/Introductory Paragraph: The rationale for Mohs was explained to the patient and consent was obtained. The risks, benefits and alternatives to therapy were discussed in detail. Specifically, the risks of nasal deformity, changes in the flow of air through the nose, infection, scarring, bleeding, prolonged wound healing, incomplete removal, allergy to anesthesia, nerve injury and recurrence were addressed. Prior to the procedure, the treatment site was clearly identified and confirmed by the patient. All components of Universal Protocol/PAUSE Rule completed.
Undermining Type: Entire Wound
Island Pedicle Flap With Canthal Suspension Text: The defect edges were debeveled with a #15 scalpel blade.  Given the location of the defect, shape of the defect and the proximity to free margins an island pedicle advancement flap was deemed most appropriate.  Using a sterile surgical marker, an appropriate advancement flap was drawn incorporating the defect, outlining the appropriate donor tissue and placing the expected incisions within the relaxed skin tension lines where possible. The area thus outlined was incised deep to adipose tissue with a #15 scalpel blade.  The skin margins were undermined to an appropriate distance in all directions around the primary defect and laterally outward around the island pedicle utilizing iris scissors.  There was minimal undermining beneath the pedicle flap. A suspension suture was placed in the canthal tendon to prevent tension and prevent ectropion.
Dermal Autograft Text: The defect edges were debeveled with a #15 scalpel blade.  Given the location of the defect, shape of the defect and the proximity to free margins a dermal autograft was deemed most appropriate.  Using a sterile surgical marker, the primary defect shape was transferred to the donor site. The area thus outlined was incised deep to adipose tissue with a #15 scalpel blade.  The harvested graft was then trimmed of adipose and epidermal tissue until only dermis was left.  The skin graft was then placed in the primary defect and oriented appropriately.
Tarsorrhaphy Text: A tarsorrhaphy was performed using Frost sutures.
Hemigard Postcare Instructions: The HEMIGARD strips are to remain completely dry for at least 5-7 days.
Distance Of Undermining In Cm (Required): 1.2
Ftsg Text: The defect edges were debeveled with a #15 scalpel blade.  Given the location of the defect, shape of the defect and the proximity to free margins a full thickness skin graft was deemed most appropriate.  Using a sterile surgical marker, the primary defect shape was transferred to the donor site. The area thus outlined was incised deep to adipose tissue with a #15 scalpel blade.  The harvested graft was then trimmed of adipose tissue until only dermis and epidermis was left.  The skin margins of the secondary defect were undermined to an appropriate distance in all directions utilizing iris scissors.  The secondary defect was closed with interrupted buried subcutaneous sutures.  The skin edges were then re-apposed with running  sutures.  The skin graft was then placed in the primary defect and oriented appropriately.
O-Z Flap Text: The defect edges were debeveled with a #15 scalpel blade.  Given the location of the defect, shape of the defect and the proximity to free margins an O-Z flap was deemed most appropriate.  Using a sterile surgical marker, an appropriate transposition flap was drawn incorporating the defect and placing the expected incisions within the relaxed skin tension lines where possible. The area thus outlined was incised deep to adipose tissue with a #15 scalpel blade.  The skin margins were undermined to an appropriate distance in all directions utilizing iris scissors.
Consent Type: Consent 1 (Standard)
Island Pedicle Flap Text: The defect edges were debeveled with a #15 scalpel blade.  Given the location of the defect, shape of the defect and the proximity to free margins an island pedicle advancement flap was deemed most appropriate.  Using a sterile surgical marker, an appropriate advancement flap was drawn incorporating the defect, outlining the appropriate donor tissue and placing the expected incisions within the relaxed skin tension lines where possible.    The area thus outlined was incised deep to adipose tissue with a #15 scalpel blade.  The skin margins were undermined to an appropriate distance in all directions around the primary defect and laterally outward around the island pedicle utilizing iris scissors.  There was minimal undermining beneath the pedicle flap.
Eyelid Full Thickness Repair - 58970: The eyelid defect was full thickness which required a wedge repair of the eyelid. Special care was taken to ensure that the eyelid margin was realligned when placing sutures.
Consent (Lip)/Introductory Paragraph: The rationale for Mohs was explained to the patient and consent was obtained. The risks, benefits and alternatives to therapy were discussed in detail. Specifically, the risks of lip deformity, changes in the oral aperture, infection, scarring, bleeding, prolonged wound healing, incomplete removal, allergy to anesthesia, nerve injury and recurrence were addressed. Prior to the procedure, the treatment site was clearly identified and confirmed by the patient. All components of Universal Protocol/PAUSE Rule completed.
Xenograft Text: The defect edges were debeveled with a #15 scalpel blade.  Given the location of the defect, shape of the defect and the proximity to free margins a xenograft was deemed most appropriate.  The graft was then trimmed to fit the size of the defect.  The graft was then placed in the primary defect and oriented appropriately.
Zygomaticofacial Flap Text: Given the location of the defect, shape of the defect and the proximity to free margins a zygomaticofacial flap was deemed most appropriate for repair.  Using a sterile surgical marker, the appropriate flap was drawn incorporating the defect and placing the expected incisions within the relaxed skin tension lines where possible. The area thus outlined was incised deep to adipose tissue with a #15 scalpel blade with preservation of a vascular pedicle.  The skin margins were undermined to an appropriate distance in all directions utilizing iris scissors.  The flap was then placed into the defect and anchored with interrupted buried subcutaneous sutures.
Hemigard Intro: Due to skin fragility and wound tension, it was decided to use HEMIGARD adhesive retention suture devices to permit a linear closure. The skin was cleaned and dried for a 6cm distance away from the wound. Excessive hair, if present, was removed to allow for adhesion.
Skin Substitute Text: Given the location of the defect, shape/depth of the defect and the underlying tissue, a skin substitute graft was deemed most appropriate.  The graft was placed in the primary defect and oriented appropriately.
Partial Purse String (Intermediate) Text: Given the location of the defect and the characteristics of the surrounding skin an intermediate purse string closure was deemed most appropriate.  Undermining was performed circumfirentially around the surgical defect.  A purse string suture was then placed and tightened. Wound tension only allowed a partial closure of the circular defect.
Consent 3/Introductory Paragraph: I gave the patient a chance to ask questions they had about the procedure.  Following this I explained the Mohs procedure and consent was obtained. The risks, benefits and alternatives to therapy were discussed in detail. Specifically, the risks of infection, scarring, bleeding, prolonged wound healing, incomplete removal, allergy to anesthesia, nerve injury and recurrence were addressed. Prior to the procedure, the treatment site was clearly identified and confirmed by the patient. All components of Universal Protocol/PAUSE Rule completed.
Inflammation Suggestive Of Cancer Camouflage Histology Text: There was a dense lymphocytic infiltrate which prevented adequate histologic evaluation of adjacent structures.
Localized Dermabrasion With Wire Brush Text: The patient was draped in routine manner.  Localized dermabrasion using 3 x 17 mm wire brush was performed in routine manner to papillary dermis. This spot dermabrasion is being performed to complete skin cancer reconstruction. It also will eliminate the other sun damaged precancerous cells that are known to be part of the regional effect of a lifetime's worth of sun exposure. This localized dermabrasion is therapeutic and should not be considered cosmetic in any regard.
Manual Repair Warning Statement: We plan on removing the manually selected variable below in favor of our much easier automatic structured text blocks found in the previous tab. We decided to do this to help make the flow better and give you the full power of structured data. Manual selection is never going to be ideal in our platform and I would encourage you to avoid using manual selection from this point on, especially since I will be sunsetting this feature. It is important that you do one of two things with the customized text below. First, you can save all of the text in a word file so you can have it for future reference. Second, transfer the text to the appropriate area in the Library tab. Lastly, if there is a flap or graft type which we do not have you need to let us know right away so I can add it in before the variable is hidden. No need to panic, we plan to give you roughly 6 months to make the change.
Surgeon/Pathologist Verbiage (Will Incorporate Name Of Surgeon From Intro If Not Blank): operated in two distinct and integrated capacities as the surgeon and pathologist.
Posterior Auricular Interpolation Flap Text: A decision was made to reconstruct the defect utilizing an interpolation axial flap and a staged reconstruction.  A telfa template was made of the defect.  This telfa template was then used to outline the posterior auricular interpolation flap.  The donor area for the pedicle flap was then injected with anesthesia.  The flap was excised through the skin and subcutaneous tissue down to the layer of the underlying musculature.  The pedicle flap was carefully excised within this deep plane to maintain its blood supply.  The edges of the donor site were undermined.   The donor site was closed in a primary fashion.  The pedicle was then rotated into position and sutured.  Once the tube was sutured into place, adequate blood supply was confirmed with blanching and refill.  The pedicle was then wrapped with xeroform gauze and dressed appropriately with a telfa and gauze bandage to ensure continued blood supply and protect the attached pedicle.
Keystone Flap Text: The defect edges were debeveled with a #15 scalpel blade.  Given the location of the defect, shape of the defect a keystone flap was deemed most appropriate.  Using a sterile surgical marker, an appropriate keystone flap was drawn incorporating the defect, outlining the appropriate donor tissue and placing the expected incisions within the relaxed skin tension lines where possible. The area thus outlined was incised deep to adipose tissue with a #15 scalpel blade.  The skin margins were undermined to an appropriate distance in all directions around the primary defect and laterally outward around the flap utilizing iris scissors.
Undermining Location (Optional): in the superficial subcutaneous fat
Ear Wedge Repair Text: A wedge excision was completed by carrying down an excision through the full thickness of the ear and cartilage with an inward facing Burow's triangle. The wound was then closed in a layered fashion.
Alternatives Discussed Intro (Do Not Add Period): I discussed alternative treatments to Mohs surgery and specifically discussed the risks and benefits of
H Plasty Text: Given the location of the defect, shape of the defect and the proximity to free margins a H-plasty was deemed most appropriate for repair.  Using a sterile surgical marker, the appropriate advancement arms of the H-plasty were drawn incorporating the defect and placing the expected incisions within the relaxed skin tension lines where possible. The area thus outlined was incised deep to adipose tissue with a #15 scalpel blade. The skin margins were undermined to an appropriate distance in all directions utilizing iris scissors.  The opposing advancement arms were then advanced into place in opposite direction and anchored with interrupted buried subcutaneous sutures.
Cartilage Graft Text: The defect edges were debeveled with a #15 scalpel blade.  Given the location of the defect, shape of the defect, the fact the defect involved a full thickness cartilage defect a cartilage graft was deemed most appropriate.  An appropriate donor site was identified, cleansed, and anesthetized. The cartilage graft was then harvested and transferred to the recipient site, oriented appropriately and then sutured into place.  The secondary defect was then repaired using a primary closure.
Additional Postop Diagnosis: Actinic Keratosis
Cheiloplasty (Less Than 50%) Text: A decision was made to reconstruct the defect with a  cheiloplasty.  The defect was undermined extensively.  Additional obicularis oris muscle was excised with a 15 blade scalpel.  The defect was converted into a full thickness wedge, of less than 50% of the vertical height of the lip, to facilite a better cosmetic result.  Small vessels were then tied off with 5-0 monocyrl. The obicularis oris, superficial fascia, adipose and dermis were then reapproximated.  After the deeper layers were approximated the epidermis was reapproximated with particular care given to realign the vermilion border.
Tumor Depth: Less than 6mm from granular layer and no invasion beyond the subcutaneous fat
Eyelid Partial Thickness Repair - 37308: The eyelid defect was partial thickness which required a wedge repair of the eyelid. Special care was taken to ensure that the eyelid margin was realligned when placing sutures.
Alar Island Pedicle Flap Text: The defect edges were debeveled with a #15 scalpel blade.  Given the location of the defect, shape of the defect and the proximity to the alar rim an island pedicle advancement flap was deemed most appropriate.  Using a sterile surgical marker, an appropriate advancement flap was drawn incorporating the defect, outlining the appropriate donor tissue and placing the expected incisions within the nasal ala running parallel to the alar rim. The area thus outlined was incised with a #15 scalpel blade.  The skin margins were undermined minimally to an appropriate distance in all directions around the primary defect and laterally outward around the island pedicle utilizing iris scissors.  There was minimal undermining beneath the pedicle flap.
Purse String (Simple) Text: Given the location of the defect and the characteristics of the surrounding skin a purse string closure was deemed most appropriate.  Undermining was performed circumfirentially around the surgical defect.  A purse string suture was then placed and tightened.
Epidermal Closure: running
Graft Donor Site Bandage (Optional-Leave Blank If You Don't Want In Note): A xeroform bolster was fitted to the graft site and sewn into place. A pressure bandage were applied to the donor site and over the bolster.
Previous Accession (Optional): [id number]
Anesthesia Volume In Cc: 3
Mauc Instructions: By selecting yes to the question below the MAUC number will be added into the note.  This will be calculated automatically based on the diagnosis chosen, the size entered, the body zone selected (H,M,L) and the specific indications you chose. You will also have the option to override the Mohs AUC if you disagree with the automatically calculated number and this option is found in the Case Summary tab.
Postop Diagnosis: same
No Residual Tumor Seen Histology Text: There were no malignant cells seen in the sections examined.
Complex Repair And Graft Additional Text (Will Appearing After The Standard Complex Repair Text): The complex repair was not sufficient to completely close the primary defect. The remaining additional defect was repaired with the graft mentioned below.
Nasal Turnover Hinge Flap Text: The defect edges were debeveled with a #15 scalpel blade.  Given the size, depth, location of the defect and the defect being full thickness a nasal turnover hinge flap was deemed most appropriate.  Using a sterile surgical marker, an appropriate hinge flap was drawn incorporating the defect. The area thus outlined was incised with a #15 scalpel blade. The flap was designed to recreate the nasal mucosal lining and the alar rim. The skin margins were undermined to an appropriate distance in all directions utilizing iris scissors.
Burow's Advancement Flap Text: The defect edges were debeveled with a #15 scalpel blade.  Given the location of the defect and the proximity to free margins a Burow's advancement flap was deemed most appropriate.  Using a sterile surgical marker, the appropriate advancement flap was drawn incorporating the defect and placing the expected incisions within the relaxed skin tension lines where possible.    The area thus outlined was incised deep to adipose tissue with a #15 scalpel blade.  The skin margins were undermined to an appropriate distance in all directions utilizing iris scissors.
Suturegard Intro: Intraoperative tissue expansion was performed, utilizing the SUTUREGARD device, in order to reduce wound tension.
V-Y Plasty Text: The defect edges were debeveled with a #15 scalpel blade.  Given the location of the defect, shape of the defect and the proximity to free margins an V-Y advancement flap was deemed most appropriate.  Using a sterile surgical marker, an appropriate advancement flap was drawn incorporating the defect and placing the expected incisions within the relaxed skin tension lines where possible.    The area thus outlined was incised deep to adipose tissue with a #15 scalpel blade.  The skin margins were undermined to an appropriate distance in all directions utilizing iris scissors.
Consent (Marginal Mandibular)/Introductory Paragraph: The rationale for Mohs was explained to the patient and consent was obtained. The risks, benefits and alternatives to therapy were discussed in detail. Specifically, the risks of damage to the marginal mandibular branch of the facial nerve, infection, scarring, bleeding, prolonged wound healing, incomplete removal, allergy to anesthesia, and recurrence were addressed. Prior to the procedure, the treatment site was clearly identified and confirmed by the patient. All components of Universal Protocol/PAUSE Rule completed.
Burow's Graft Text: The defect edges were debeveled with a #15 scalpel blade.  Given the location of the defect, shape of the defect, the proximity to free margins and the presence of a standing cone deformity a Burow's skin graft was deemed most appropriate. The standing cone was removed and this tissue was then trimmed to the shape of the primary defect. The adipose tissue was also removed until only dermis and epidermis were left.  The skin margins of the secondary defect were undermined to an appropriate distance in all directions utilizing iris scissors.  The secondary defect was closed with interrupted buried subcutaneous sutures.  The skin edges were then re-apposed with running  sutures.  The skin graft was then placed in the primary defect and oriented appropriately.
Epidermal Autograft Text: The defect edges were debeveled with a #15 scalpel blade.  Given the location of the defect, shape of the defect and the proximity to free margins an epidermal autograft was deemed most appropriate.  Using a sterile surgical marker, the primary defect shape was transferred to the donor site. The epidermal graft was then harvested.  The skin graft was then placed in the primary defect and oriented appropriately.
Brow Lift Text: A midfrontal incision was made medially to the defect to allow access to the tissues just superior to the left eyebrow. Following careful dissection inferiorly in a supraperiosteal plane to the level of the left eyebrow, several 3-0 monocryl sutures were used to resuspend the eyebrow orbicularis oculi muscular unit to the superior frontal bone periosteum. This resulted in an appropriate reapproximation of static eyebrow symmetry and correction of the left brow ptosis.
Retention Suture Bite Size: 1 mm
Was The Patient On Physician Recommended Anticoagulation Therapy?: Please Select the Appropriate Response
Nasalis-Muscle-Based Myocutaneous Island Pedicle Flap Text: Using a #15 blade, an incision was made around the donor flap to the level of the nasalis muscle. Wide lateral undermining was then performed in both the subcutaneous plane above the nasalis muscle, and in a submuscular plane just above periosteum. This allowed the formation of a free nasalis muscle axial pedicle (based on the angular artery) which was still attached to the actual cutaneous flap, increasing its mobility and vascular viability. Hemostasis was obtained with pinpoint electrocoagulation. The flap was mobilized into position and the pivotal anchor points positioned and stabilized with buried interrupted sutures. Subcutaneous and dermal tissues were closed in a multilayered fashion with sutures. Tissue redundancies were excised, and the epidermal edges were apposed without significant tension and sutured with sutures.
Perineural Invasion (For Histology - Be Specific If Possible): absent
Nasolabial Transposition Flap Text: The defect edges were debeveled with a #15 scalpel blade.  Given the size, depth and location of the defect and the proximity to free margins a nasolabial transposition flap was deemed most appropriate. Using a sterile surgical marker, an appropriate flap was drawn incorporating the defect. The area thus outlined was incised with a #15 scalpel blade. The skin margins were undermined to an appropriate distance in all directions utilizing iris scissors. Following this, the designed flap was carried into the primary defect and sutured into place.
Partial Purse String (Simple) Text: Given the location of the defect and the characteristics of the surrounding skin a simple purse string closure was deemed most appropriate.  Undermining was performed circumfirentially around the surgical defect.  A purse string suture was then placed and tightened. Wound tension only allowed a partial closure of the circular defect.
Modified Advancement Flap Text: The defect edges were debeveled with a #15 scalpel blade.  Given the location of the defect, shape of the defect and the proximity to free margins a modified advancement flap was deemed most appropriate.  Using a sterile surgical marker, an appropriate advancement flap was drawn incorporating the defect and placing the expected incisions within the relaxed skin tension lines where possible.    The area thus outlined was incised deep to adipose tissue with a #15 scalpel blade.  The skin margins were undermined to an appropriate distance in all directions utilizing iris scissors.
Abbe Flap (Upper To Lower Lip) Text: The defect of the lower lip was assessed and measured.  Given the location and size of the defect, an Abbe flap was deemed most appropriate.  Using a sterile surgical marker, an appropriate Abbe flap was measured and drawn on the upper lip. Local anesthesia was then infiltrated.  A scalpel was then used to incise the upper lip through and through the skin, vermilion, muscle and mucosa, leaving the flap pedicled on the opposite side.  The flap was then rotated and transferred to the lower lip defect.  The flap was then sutured into place with a three layer technique, closing the orbicularis oris muscle layer with subcutaneous buried sutures, followed by a mucosal layer and an epidermal layer.
Helical Rim Text: The closure involved the helical rim.
Abbe Flap (Lower To Upper Lip) Text: The defect of the upper lip was assessed and measured.  Given the location and size of the defect, an Abbe flap was deemed most appropriate.  Using a sterile surgical marker, an appropriate Abbe flap was measured and drawn on the lower lip. Local anesthesia was then infiltrated. A scalpel was then used to incise the upper lip through and through the skin, vermilion, muscle and mucosa, leaving the flap pedicled on the opposite side.  The flap was then rotated and transferred to the lower lip defect.  The flap was then sutured into place with a three layer technique, closing the orbicularis oris muscle layer with subcutaneous buried sutures, followed by a mucosal layer and an epidermal layer.
Double Z Plasty Text: The lesion was extirpated to the level of the fat with a #15 scalpel blade. Given the location of the defect, shape of the defect and the proximity to free margins a double Z-plasty was deemed most appropriate for repair. Using a sterile surgical marker, the appropriate transposition arms of the double Z-plasty were drawn incorporating the defect and placing the expected incisions within the relaxed skin tension lines where possible. The area thus outlined was incised deep to adipose tissue with a #15 scalpel blade. The skin margins were undermined to an appropriate distance in all directions utilizing iris scissors. The opposing transposition arms were then transposed and carried over into place in opposite direction and anchored with interrupted buried subcutaneous sutures.
Hatchet Flap Text: The defect edges were debeveled with a #15 scalpel blade.  Given the location of the defect, shape of the defect and the proximity to free margins a hatchet flap was deemed most appropriate.  Using a sterile surgical marker, an appropriate hatchet flap was drawn incorporating the defect and placing the expected incisions within the relaxed skin tension lines where possible.    The area thus outlined was incised deep to adipose tissue with a #15 scalpel blade.  The skin margins were undermined to an appropriate distance in all directions utilizing iris scissors.
Home Suture Removal Text: Patient was provided instructions on removing sutures and will remove their sutures at home.  If they have any questions or difficulties they will call the office.
Which Instrument Did You Use For Dermabrasion?: Wire Brush
Information: Selecting Yes will display possible errors in your note based on the variables you have selected. This validation is only offered as a suggestion for you. PLEASE NOTE THAT THE VALIDATION TEXT WILL BE REMOVED WHEN YOU FINALIZE YOUR NOTE. IF YOU WANT TO FAX A PRELIMINARY NOTE YOU WILL NEED TO TOGGLE THIS TO 'NO' IF YOU DO NOT WANT IT IN YOUR FAXED NOTE.
Consent (Ear)/Introductory Paragraph: The rationale for Mohs was explained to the patient and consent was obtained. The risks, benefits and alternatives to therapy were discussed in detail. Specifically, the risks of ear deformity, infection, scarring, bleeding, prolonged wound healing, incomplete removal, allergy to anesthesia, nerve injury and recurrence were addressed. Prior to the procedure, the treatment site was clearly identified and confirmed by the patient. All components of Universal Protocol/PAUSE Rule completed.
Tissue Cultured Epidermal Autograft Text: The defect edges were debeveled with a #15 scalpel blade.  Given the location of the defect, shape of the defect and the proximity to free margins a tissue cultured epidermal autograft was deemed most appropriate.  The graft was then trimmed to fit the size of the defect.  The graft was then placed in the primary defect and oriented appropriately.
Crescentic Advancement Flap Text: The defect edges were debeveled with a #15 scalpel blade.  Given the location of the defect and the proximity to free margins a crescentic advancement flap was deemed most appropriate.  Using a sterile surgical marker, the appropriate advancement flap was drawn incorporating the defect and placing the expected incisions within the relaxed skin tension lines where possible.    The area thus outlined was incised deep to adipose tissue with a #15 scalpel blade.  The skin margins were undermined to an appropriate distance in all directions utilizing iris scissors.
Area H Indication Text: Tumors in this location are included in Area H (eyelids, eyebrows, nose, lips, chin, ear, pre-auricular, post-auricular, temple, genitalia, hands, feet, ankles and areola).  Tissue conservation is critical in these anatomic locations.
Where Do You Want The Question To Include Opioid Counseling Located?: Case Summary Tab
Rhomboid Transposition Flap Text: The defect edges were debeveled with a #15 scalpel blade.  Given the location of the defect and the proximity to free margins a rhomboid transposition flap was deemed most appropriate.  Using a sterile surgical marker, an appropriate rhomboid flap was drawn incorporating the defect.    The area thus outlined was incised deep to adipose tissue with a #15 scalpel blade.  The skin margins were undermined to an appropriate distance in all directions utilizing iris scissors.
Double Island Pedicle Flap Text: The defect edges were debeveled with a #15 scalpel blade.  Given the location of the defect, shape of the defect and the proximity to free margins a double island pedicle advancement flap was deemed most appropriate.  Using a sterile surgical marker, an appropriate advancement flap was drawn incorporating the defect, outlining the appropriate donor tissue and placing the expected incisions within the relaxed skin tension lines where possible.    The area thus outlined was incised deep to adipose tissue with a #15 scalpel blade.  The skin margins were undermined to an appropriate distance in all directions around the primary defect and laterally outward around the island pedicle utilizing iris scissors.  There was minimal undermining beneath the pedicle flap.
Mastoid Interpolation Flap Text: A decision was made to reconstruct the defect utilizing an interpolation axial flap and a staged reconstruction.  A telfa template was made of the defect.  This telfa template was then used to outline the mastoid interpolation flap.  The donor area for the pedicle flap was then injected with anesthesia.  The flap was excised through the skin and subcutaneous tissue down to the layer of the underlying musculature.  The pedicle flap was carefully excised within this deep plane to maintain its blood supply.  The edges of the donor site were undermined.   The donor site was closed in a primary fashion.  The pedicle was then rotated into position and sutured.  Once the tube was sutured into place, adequate blood supply was confirmed with blanching and refill.  The pedicle was then wrapped with xeroform gauze and dressed appropriately with a telfa and gauze bandage to ensure continued blood supply and protect the attached pedicle.
Secondary Intention Text (Leave Blank If You Do Not Want): The defect will heal with secondary intention.
Banner Transposition Flap Text: The defect edges were debeveled with a #15 scalpel blade.  Given the location of the defect and the proximity to free margins a Banner transposition flap was deemed most appropriate.  Using a sterile surgical marker, an appropriate flap drawn around the defect. The area thus outlined was incised deep to adipose tissue with a #15 scalpel blade.  The skin margins were undermined to an appropriate distance in all directions utilizing iris scissors.
Ear Star Wedge Flap Text: The defect edges were debeveled with a #15 blade scalpel.  Given the location of the defect and the proximity to free margins (helical rim) an ear star wedge flap was deemed most appropriate.  Using a sterile surgical marker, the appropriate flap was drawn incorporating the defect and placing the expected incisions between the helical rim and antihelix where possible.  The area thus outlined was incised through and through with a #15 scalpel blade.
Area L Indication Text: Tumors in this location are included in Area L (trunk and extremities).  Mohs surgery is indicated for larger tumors, or tumors with aggressive histologic features, in these anatomic locations.
Cheiloplasty (Complex) Text: A decision was made to reconstruct the defect with a  cheiloplasty.  The defect was undermined extensively.  Additional obicularis oris muscle was excised with a 15 blade scalpel.  The defect was converted into a full thickness wedge to facilite a better cosmetic result.  Small vessels were then tied off with 5-0 monocyrl. The obicularis oris, superficial fascia, adipose and dermis were then reapproximated.  After the deeper layers were approximated the epidermis was reapproximated with particular care given to realign the vermilion border.
Nasalis Myocutaneous Flap Text: Using a #15 blade, an incision was made around the donor flap to the level of the nasalis muscle. Wide lateral undermining was then performed in both the subcutaneous plane above the nasalis muscle, and in a submuscular plane just above periosteum. This allowed the formation of a free nasalis muscle axial pedicle which was still attached to the actual cutaneous flap, increasing its mobility and vascular viability. Hemostasis was obtained with pinpoint electrocoagulation. The flap was mobilized into position and the pivotal anchor points positioned and stabilized with buried interrupted sutures. Subcutaneous and dermal tissues were closed in a multilayered fashion with sutures. Tissue redundancies were excised, and the epidermal edges were apposed without significant tension and sutured with sutures.
Same Histology In Subsequent Stages Text: The pattern and morphology of the tumor is as described in the first stage.
Eye Protection Verbiage: Before proceeding with the stage, a plastic scleral shield was inserted. The globe was anesthetized with a few drops of 1% lidocaine with 1:100,000 epinephrine. Then, an appropriate sized scleral shield was chosen and coated with lacrilube ointment. The shield was gently inserted and left in place for the duration of each stage. After the stage was completed, the shield was gently removed.
Intermediate Repair And Graft Additional Text (Will Appearing After The Standard Complex Repair Text): The intermediate repair was not sufficient to completely close the primary defect. The remaining additional defect was repaired with the graft mentioned below.
Epidermal Sutures: 5-0 Fast Absorbing Gut
Anesthesia Volume In Cc: 2.4
Mart-1 - Negative Histology Text: MART-1 staining demonstrates a normal density and pattern of melanocytes along the dermal-epidermal junction. The surgical margins are negative for tumor cells.
Double O-Z Plasty Text: The defect edges were debeveled with a #15 scalpel blade.  Given the location of the defect, shape of the defect and the proximity to free margins a Double O-Z plasty (double transposition flap) was deemed most appropriate.  Using a sterile surgical marker, the appropriate transposition flaps were drawn incorporating the defect and placing the expected incisions within the relaxed skin tension lines where possible. The area thus outlined was incised deep to adipose tissue with a #15 scalpel blade.  The skin margins were undermined to an appropriate distance in all directions utilizing iris scissors.  Hemostasis was achieved with electrocautery.  The flaps were then transposed into place, one clockwise and the other counterclockwise, and anchored with interrupted buried subcutaneous sutures.
Donor Site Anesthesia Type: same as repair anesthesia
O-L Flap Text: The defect edges were debeveled with a #15 scalpel blade.  Given the location of the defect, shape of the defect and the proximity to free margins an O-L flap was deemed most appropriate.  Using a sterile surgical marker, an appropriate advancement flap was drawn incorporating the defect and placing the expected incisions within the relaxed skin tension lines where possible.    The area thus outlined was incised deep to adipose tissue with a #15 scalpel blade.  The skin margins were undermined to an appropriate distance in all directions utilizing iris scissors.
Melolabial Interpolation Flap Text: A decision was made to reconstruct the defect utilizing an interpolation axial flap and a staged reconstruction.  A telfa template was made of the defect.  This telfa template was then used to outline the melolabial interpolation flap.  The donor area for the pedicle flap was then injected with anesthesia.  The flap was excised through the skin and subcutaneous tissue down to the layer of the underlying musculature.  The pedicle flap was carefully excised within this deep plane to maintain its blood supply.  The edges of the donor site were undermined.   The donor site was closed in a primary fashion.  The pedicle was then rotated into position and sutured.  Once the tube was sutured into place, adequate blood supply was confirmed with blanching and refill.  The pedicle was then wrapped with xeroform gauze and dressed appropriately with a telfa and gauze bandage to ensure continued blood supply and protect the attached pedicle.
Surgeon Performing Repair (Optional): Ronda Mcmullen MD
Debridement Text: The wound edges were debrided prior to proceeding with the closure to facilitate wound healing.
O-Z Plasty Text: The defect edges were debeveled with a #15 scalpel blade.  Given the location of the defect, shape of the defect and the proximity to free margins an O-Z plasty (double transposition flap) was deemed most appropriate.  Using a sterile surgical marker, the appropriate transposition flaps were drawn incorporating the defect and placing the expected incisions within the relaxed skin tension lines where possible.    The area thus outlined was incised deep to adipose tissue with a #15 scalpel blade.  The skin margins were undermined to an appropriate distance in all directions utilizing iris scissors.  Hemostasis was achieved with electrocautery.  The flaps were then transposed into place, one clockwise and the other counterclockwise, and anchored with interrupted buried subcutaneous sutures.
Repair Hemostasis (Optional): Pinpoint electrocautery
Helical Rim Advancement Flap Text: The defect edges were debeveled with a #15 blade scalpel.  Given the location of the defect and the proximity to free margins (helical rim) a double helical rim advancement flap was deemed most appropriate.  Using a sterile surgical marker, the appropriate advancement flaps were drawn incorporating the defect and placing the expected incisions between the helical rim and antihelix where possible.  The area thus outlined was incised through and through with a #15 scalpel blade.  With a skin hook and iris scissors, the flaps were gently and sharply undermined and freed up.
Island Pedicle Flap-Requiring Vessel Identification Text: The defect edges were debeveled with a #15 scalpel blade.  Given the location of the defect, shape of the defect and the proximity to free margins an island pedicle advancement flap was deemed most appropriate.  Using a sterile surgical marker, an appropriate advancement flap was drawn, based on the axial vessel mentioned above, incorporating the defect, outlining the appropriate donor tissue and placing the expected incisions within the relaxed skin tension lines where possible.    The area thus outlined was incised deep to adipose tissue with a #15 scalpel blade.  The skin margins were undermined to an appropriate distance in all directions around the primary defect and laterally outward around the island pedicle utilizing iris scissors.  There was minimal undermining beneath the pedicle flap.
Interpolation Flap Text: A decision was made to reconstruct the defect utilizing an interpolation axial flap and a staged reconstruction.  A telfa template was made of the defect.  This telfa template was then used to outline the interpolation flap.  The donor area for the pedicle flap was then injected with anesthesia.  The flap was excised through the skin and subcutaneous tissue down to the layer of the underlying musculature.  The interpolation flap was carefully excised within this deep plane to maintain its blood supply.  The edges of the donor site were undermined.   The donor site was closed in a primary fashion.  The pedicle was then rotated into position and sutured.  Once the tube was sutured into place, adequate blood supply was confirmed with blanching and refill.  The pedicle was then wrapped with xeroform gauze and dressed appropriately with a telfa and gauze bandage to ensure continued blood supply and protect the attached pedicle.
Purse String (Intermediate) Text: Given the location of the defect and the characteristics of the surrounding skin a purse string intermediate closure was deemed most appropriate.  Undermining was performed circumfirentially around the surgical defect.  A purse string suture was then placed and tightened.
Advancement Flap (Single) Text: The defect edges were debeveled with a #15 scalpel blade.  Given the location of the defect and the proximity to free margins a single advancement flap was deemed most appropriate.  Using a sterile surgical marker, an appropriate advancement flap was drawn incorporating the defect and placing the expected incisions within the relaxed skin tension lines where possible.    The area thus outlined was incised deep to adipose tissue with a #15 scalpel blade.  The skin margins were undermined to an appropriate distance in all directions utilizing iris scissors.
Advancement-Rotation Flap Text: The defect edges were debeveled with a #15 scalpel blade.  Given the location of the defect, shape of the defect and the proximity to free margins an advancement-rotation flap was deemed most appropriate.  Using a sterile surgical marker, an appropriate flap was drawn incorporating the defect and placing the expected incisions within the relaxed skin tension lines where possible. The area thus outlined was incised deep to adipose tissue with a #15 scalpel blade.  The skin margins were undermined to an appropriate distance in all directions utilizing iris scissors.
A-T Advancement Flap Text: The defect edges were debeveled with a #15 scalpel blade.  Given the location of the defect, shape of the defect and the proximity to free margins an A-T advancement flap was deemed most appropriate.  Using a sterile surgical marker, an appropriate advancement flap was drawn incorporating the defect and placing the expected incisions within the relaxed skin tension lines where possible.    The area thus outlined was incised deep to adipose tissue with a #15 scalpel blade.  The skin margins were undermined to an appropriate distance in all directions utilizing iris scissors.
Mohs Histo Method Verbiage: Each section was then chromacoded and processed in the Mohs lab using the Mohs protocol and submitted for frozen section.
Estimated Blood Loss (Cc): minimal
Mohs Case Number: [id number]
Bcc Infiltrative Histology Text: There were numerous aggregates of basaloid cells demonstrating an infiltrative pattern.
Wound Care (No Sutures): Petrolatum
Mercedes Flap Text: The defect edges were debeveled with a #15 scalpel blade.  Given the location of the defect, shape of the defect and the proximity to free margins a Mercedes flap was deemed most appropriate.  Using a sterile surgical marker, an appropriate advancement flap was drawn incorporating the defect and placing the expected incisions within the relaxed skin tension lines where possible. The area thus outlined was incised deep to adipose tissue with a #15 scalpel blade.  The skin margins were undermined to an appropriate distance in all directions utilizing iris scissors.
Area M Indication Text: Tumors in this location are included in Area M (cheek, forehead, scalp, neck, jawline and pretibial skin).  Mohs surgery is indicated for tumors in these anatomic locations.
Intermediate Repair And Flap Additional Text (Will Appearing After The Standard Complex Repair Text): The intermediate repair was not sufficient to completely close the primary defect. The remaining additional defect was repaired with the flap mentioned below.
Post-Care Instructions: I reviewed with the patient in detail post-care instructions. Patient is not to engage in any heavy lifting, exercise, or swimming for the next 14 days. Should the patient develop any fevers, chills, bleeding, severe pain patient will contact the office immediately.
Consent 2/Introductory Paragraph: Mohs surgery was explained to the patient and consent was obtained. The risks, benefits and alternatives to therapy were discussed in detail. Specifically, the risks of infection, scarring, bleeding, prolonged wound healing, incomplete removal, allergy to anesthesia, nerve injury and recurrence were addressed. Prior to the procedure, the treatment site was clearly identified and confirmed by the patient. All components of Universal Protocol/PAUSE Rule completed.
Peng Advancement Flap Text: The defect edges were debeveled with a #15 scalpel blade.  Given the location of the defect, shape of the defect and the proximity to free margins a Peng advancement flap was deemed most appropriate.  Using a sterile surgical marker, an appropriate advancement flap was drawn incorporating the defect and placing the expected incisions within the relaxed skin tension lines where possible. The area thus outlined was incised deep to adipose tissue with a #15 scalpel blade.  The skin margins were undermined to an appropriate distance in all directions utilizing iris scissors.
Transposition Flap Text: The defect edges were debeveled with a #15 scalpel blade.  Given the location of the defect and the proximity to free margins a transposition flap was deemed most appropriate.  Using a sterile surgical marker, an appropriate transposition flap was drawn incorporating the defect.    The area thus outlined was incised deep to adipose tissue with a #15 scalpel blade.  The skin margins were undermined to an appropriate distance in all directions utilizing iris scissors.
Mohs Method Verbiage: An incision at a 45 degree angle following the standard Mohs approach was done and the specimen was harvested as a microscopic controlled layer.
Rectangular Flap Text: The defect edges were debeveled with a #15 scalpel blade. Given the location of the defect and the proximity to free margins a rectangular flap was deemed most appropriate. Using a sterile surgical marker, an appropriate rectangular flap was drawn incorporating the defect. The area thus outlined was incised deep to adipose tissue with a #15 scalpel blade. The skin margins were undermined to an appropriate distance in all directions utilizing iris scissors. Following this, the designed flap was carried over into the primary defect and sutured into place.
Chonodrocutaneous Helical Advancement Flap Text: The defect edges were debeveled with a #15 scalpel blade.  Given the location of the defect and the proximity to free margins a chondrocutaneous helical advancement flap was deemed most appropriate.  Using a sterile surgical marker, the appropriate advancement flap was drawn incorporating the defect and placing the expected incisions within the relaxed skin tension lines where possible.    The area thus outlined was incised deep to adipose tissue with a #15 scalpel blade.  The skin margins were undermined to an appropriate distance in all directions utilizing iris scissors.
Presence Of Scar Tissue (For Histology): present
Unna Boot Text: An Unna boot was placed to help immobilize the limb and facilitate more rapid healing.
Closure 2 Information: This tab is for additional flaps and grafts, including complex repair and grafts and complex repair and flaps. You can also specify a different location for the additional defect, if the location is the same you do not need to select a new one. We will insert the automated text for the repair you select below just as we do for solitary flaps and grafts. Please note that at this time if you select a location with a different insurance zone you will need to override the ICD10 and CPT if appropriate.
Consent (Scalp)/Introductory Paragraph: The rationale for Mohs was explained to the patient and consent was obtained. The risks, benefits and alternatives to therapy were discussed in detail. Specifically, the risks of changes in hair growth pattern secondary to repair, infection, scarring, bleeding, prolonged wound healing, incomplete removal, allergy to anesthesia, nerve injury and recurrence were addressed. Prior to the procedure, the treatment site was clearly identified and confirmed by the patient. All components of Universal Protocol/PAUSE Rule completed.
O-T Advancement Flap Text: The defect edges were debeveled with a #15 scalpel blade.  Given the location of the defect, shape of the defect and the proximity to free margins an O-T advancement flap was deemed most appropriate.  Using a sterile surgical marker, an appropriate advancement flap was drawn incorporating the defect and placing the expected incisions within the relaxed skin tension lines where possible.    The area thus outlined was incised deep to adipose tissue with a #15 scalpel blade.  The skin margins were undermined to an appropriate distance in all directions utilizing iris scissors.
Bilobed Flap Text: The defect edges were debeveled with a #15 scalpel blade.  Given the location of the defect and the proximity to free margins a bilobe flap was deemed most appropriate.  Using a sterile surgical marker, an appropriate bilobe flap drawn around the defect.    The area thus outlined was incised deep to adipose tissue with a #15 scalpel blade.  The skin margins were undermined to an appropriate distance in all directions utilizing iris scissors.
Composite Graft Text: The defect edges were debeveled with a #15 scalpel blade.  Given the location of the defect, shape of the defect, the proximity to free margins and the fact the defect was full thickness a composite graft was deemed most appropriate.  The defect was outline and then transferred to the donor site.  A full thickness graft was then excised from the donor site. The graft was then placed in the primary defect, oriented appropriately and then sutured into place.  The secondary defect was then repaired using a primary closure.
Muscle Hinge Flap Text: The defect edges were debeveled with a #15 scalpel blade.  Given the size, depth and location of the defect and the proximity to free margins a muscle hinge flap was deemed most appropriate.  Using a sterile surgical marker, an appropriate hinge flap was drawn incorporating the defect. The area thus outlined was incised with a #15 scalpel blade.  The skin margins were undermined to an appropriate distance in all directions utilizing iris scissors.
V-Y Flap Text: The defect edges were debeveled with a #15 scalpel blade.  Given the location of the defect, shape of the defect and the proximity to free margins a V-Y flap was deemed most appropriate.  Using a sterile surgical marker, an appropriate advancement flap was drawn incorporating the defect and placing the expected incisions within the relaxed skin tension lines where possible.    The area thus outlined was incised deep to adipose tissue with a #15 scalpel blade.  The skin margins were undermined to an appropriate distance in all directions utilizing iris scissors.
Paramedian Forehead Flap Text: A decision was made to reconstruct the defect utilizing an interpolation axial flap and a staged reconstruction.  A telfa template was made of the defect.  This telfa template was then used to outline the paramedian forehead pedicle flap.  The donor area for the pedicle flap was then injected with anesthesia.  The flap was excised through the skin and subcutaneous tissue down to the layer of the underlying musculature.  The pedicle flap was carefully excised within this deep plane to maintain its blood supply.  The edges of the donor site were undermined.   The donor site was closed in a primary fashion.  The pedicle was then rotated into position and sutured.  Once the tube was sutured into place, adequate blood supply was confirmed with blanching and refill.  The pedicle was then wrapped with xeroform gauze and dressed appropriately with a telfa and gauze bandage to ensure continued blood supply and protect the attached pedicle.
Mohs Rapid Report Verbiage: The area of clinically evident tumor was marked with skin marking ink and appropriately hatched.  The initial incision was made following the Mohs approach through the skin.  The specimen was taken to the lab, divided into the necessary number of pieces, chromacoded and processed according to the Mohs protocol.  This was repeated in successive stages until a tumor free defect was achieved.
Dorsal Nasal Flap Text: The defect edges were debeveled with a #15 scalpel blade.  Given the location of the defect and the proximity to free margins a dorsal nasal flap was deemed most appropriate.  Using a sterile surgical marker, an appropriate dorsal nasal flap was drawn around the defect.    The area thus outlined was incised deep to adipose tissue with a #15 scalpel blade.  The skin margins were undermined to an appropriate distance in all directions utilizing iris scissors.
Bi-Rhombic Flap Text: The defect edges were debeveled with a #15 scalpel blade.  Given the location of the defect and the proximity to free margins a bi-rhombic flap was deemed most appropriate.  Using a sterile surgical marker, an appropriate rhombic flap was drawn incorporating the defect. The area thus outlined was incised deep to adipose tissue with a #15 scalpel blade.  The skin margins were undermined to an appropriate distance in all directions utilizing iris scissors.
O-T Plasty Text: The defect edges were debeveled with a #15 scalpel blade.  Given the location of the defect, shape of the defect and the proximity to free margins an O-T plasty was deemed most appropriate.  Using a sterile surgical marker, an appropriate O-T plasty was drawn incorporating the defect and placing the expected incisions within the relaxed skin tension lines where possible.    The area thus outlined was incised deep to adipose tissue with a #15 scalpel blade.  The skin margins were undermined to an appropriate distance in all directions utilizing iris scissors.
Graft Cartilage Fenestration Text: The cartilage was fenestrated with a 2mm punch biopsy to help facilitate graft survival and healing.
Nostril Rim Text: The closure involved the nostril rim.
Pain Refusal Text: I offered to prescribe pain medication but the patient refused to take this medication.
Full Thickness Lip Wedge Repair (Flap) Text: Given the location of the defect and the proximity to free margins a full thickness wedge repair was deemed most appropriate.  Using a sterile surgical marker, the appropriate repair was drawn incorporating the defect and placing the expected incisions perpendicular to the vermilion border.  The vermilion border was also meticulously outlined to ensure appropriate reapproximation during the repair.  The area thus outlined was incised through and through with a #15 scalpel blade.  The muscularis and dermis were reaproximated with deep sutures following hemostasis. Care was taken to realign the vermilion border before proceeding with the superficial closure.  Once the vermilion was realigned the superfical and mucosal closure was finished.
Split-Thickness Skin Graft Text: The defect edges were debeveled with a #15 scalpel blade.  Given the location of the defect, shape of the defect and the proximity to free margins a split thickness skin graft was deemed most appropriate.  Using a sterile surgical marker, the primary defect shape was transferred to the donor site. The split thickness graft was then harvested.  The skin graft was then placed in the primary defect and oriented appropriately.
Bilateral Rotation Flap Text: The defect edges were debeveled with a #15 scalpel blade. Given the location of the defect, shape of the defect and the proximity to free margins a bilateral rotation flap was deemed most appropriate. Using a sterile surgical marker, an appropriate rotation flap was drawn incorporating the defect and placing the expected incisions within the relaxed skin tension lines where possible. The area thus outlined was incised deep to adipose tissue with a #15 scalpel blade. The skin margins were undermined to an appropriate distance in all directions utilizing iris scissors. Following this, the designed flap was carried over into the primary defect and sutured into place.
Depth Of Tumor Invasion (For Histology): epidermis
Bcc Histology Text: There were numerous aggregates of basaloid cells.
W Plasty Text: The lesion was extirpated to the level of the fat with a #15 scalpel blade.  Given the location of the defect, shape of the defect and the proximity to free margins a W-plasty was deemed most appropriate for repair.  Using a sterile surgical marker, the appropriate transposition arms of the W-plasty were drawn incorporating the defect and placing the expected incisions within the relaxed skin tension lines where possible.    The area thus outlined was incised deep to adipose tissue with a #15 scalpel blade.  The skin margins were undermined to an appropriate distance in all directions utilizing iris scissors.  The opposing transposition arms were then transposed into place in opposite direction and anchored with interrupted buried subcutaneous sutures.

## 2025-01-21 PROCEDURE — RXMED WILLOW AMBULATORY MEDICATION CHARGE: Performed by: OPHTHALMOLOGY

## 2025-01-23 ENCOUNTER — PHARMACY VISIT (OUTPATIENT)
Dept: PHARMACY | Facility: MEDICAL CENTER | Age: 85
End: 2025-01-23
Payer: COMMERCIAL

## 2025-02-01 DIAGNOSIS — E78.5 DYSLIPIDEMIA: Chronic | ICD-10-CM

## 2025-02-01 DIAGNOSIS — I50.30 DIASTOLIC HEART FAILURE, UNSPECIFIED HF CHRONICITY (HCC): ICD-10-CM

## 2025-02-03 RX ORDER — LISINOPRIL 40 MG/1
40 TABLET ORAL DAILY
Qty: 100 TABLET | Refills: 2 | Status: SHIPPED | OUTPATIENT
Start: 2025-02-03

## 2025-02-03 RX ORDER — ROSUVASTATIN CALCIUM 10 MG/1
10 TABLET, COATED ORAL EVERY EVENING
Qty: 100 TABLET | Refills: 2 | Status: SHIPPED | OUTPATIENT
Start: 2025-02-03

## 2025-02-03 NOTE — TELEPHONE ENCOUNTER
"Per BE's last OV note from 8/28/24, \"She should continue on Plavix and utilize predental antibiotic prophylaxis in addition to continuing rosuvastatin and lisinopril which are nicely regulating cardiac risk factors.\"    Labs reviewed     "

## 2025-02-04 NOTE — TELEPHONE ENCOUNTER
Received request via: Pharmacy    Was the patient seen in the last year in this department? Yes    Does the patient have an active prescription (recently filled or refills available) for medication(s) requested? No    Pharmacy Name:  [facility]     Does the patient have [facility] Plus and need 100-day supply? (This applies to ALL medications) Yes, quantity updated to 100 days

## 2025-02-05 DIAGNOSIS — Z95.2 S/P TAVR (TRANSCATHETER AORTIC VALVE REPLACEMENT): ICD-10-CM

## 2025-02-05 RX ORDER — AMOXICILLIN 500 MG/1
CAPSULE ORAL
Qty: 4 CAPSULE | Refills: 0 | Status: SHIPPED | OUTPATIENT
Start: 2025-02-05

## 2025-02-05 RX ORDER — BIMATOPROST 0.1 MG/ML
SOLUTION/ DROPS OPHTHALMIC
Qty: 7.5 ML | Refills: 3 | OUTPATIENT
Start: 2025-02-05

## 2025-02-06 ENCOUNTER — OFFICE VISIT (OUTPATIENT)
Dept: CARDIOLOGY | Facility: MEDICAL CENTER | Age: 85
End: 2025-02-06
Attending: INTERNAL MEDICINE
Payer: MEDICARE

## 2025-02-06 VITALS
OXYGEN SATURATION: 96 % | BODY MASS INDEX: 26.06 KG/M2 | DIASTOLIC BLOOD PRESSURE: 60 MMHG | HEIGHT: 61 IN | HEART RATE: 79 BPM | WEIGHT: 138 LBS | RESPIRATION RATE: 16 BRPM | SYSTOLIC BLOOD PRESSURE: 122 MMHG

## 2025-02-06 DIAGNOSIS — I77.819 AORTIC ECTASIA, UNSPECIFIED SITE (HCC): ICD-10-CM

## 2025-02-06 DIAGNOSIS — Z95.5 S/P DRUG ELUTING CORONARY STENT PLACEMENT: ICD-10-CM

## 2025-02-06 DIAGNOSIS — N18.31 CKD STAGE 3A, GFR 45-59 ML/MIN: ICD-10-CM

## 2025-02-06 DIAGNOSIS — I35.0 NONRHEUMATIC AORTIC VALVE STENOSIS: ICD-10-CM

## 2025-02-06 PROBLEM — I50.30 DIASTOLIC CONGESTIVE HEART FAILURE (HCC): Status: RESOLVED | Noted: 2023-10-23 | Resolved: 2025-02-06

## 2025-02-06 PROCEDURE — 3078F DIAST BP <80 MM HG: CPT | Performed by: INTERNAL MEDICINE

## 2025-02-06 PROCEDURE — 1158F ADVNC CARE PLAN TLK DOCD: CPT | Performed by: INTERNAL MEDICINE

## 2025-02-06 PROCEDURE — 3074F SYST BP LT 130 MM HG: CPT | Performed by: INTERNAL MEDICINE

## 2025-02-06 PROCEDURE — 1170F FXNL STATUS ASSESSED: CPT | Performed by: INTERNAL MEDICINE

## 2025-02-06 PROCEDURE — 99214 OFFICE O/P EST MOD 30 MIN: CPT | Performed by: INTERNAL MEDICINE

## 2025-02-06 PROCEDURE — 99212 OFFICE O/P EST SF 10 MIN: CPT | Performed by: INTERNAL MEDICINE

## 2025-02-06 PROCEDURE — G2211 COMPLEX E/M VISIT ADD ON: HCPCS | Performed by: INTERNAL MEDICINE

## 2025-02-06 ASSESSMENT — FIBROSIS 4 INDEX: FIB4 SCORE: 3.32

## 2025-02-06 NOTE — PROGRESS NOTES
"CARDIOLOGY OUTPATIENT FOLLOWUP    PCP: Master Suarez M.D.    1. Nonrheumatic aortic valve stenosis    2. Aortic ectasia, unspecified site (HCC)    3. S/P drug eluting coronary stent placement    4. CKD stage 3a, GFR 45-59 ml/min        Katerina Torres is experiencing easy bruising.  I therefore advised moving clopidogrel to every other day.  Will continue the other medications.  She will update labs this summer    Follow up: 6 months    History: Katerina Torres is a 84 y.o. female with history of resolved breast cancer presenting for follow-up of TAVR performed October 2023 with 23 S3 ultra Resilia valve and protection of the RCA using snorkel stenting.  LVEF is normal.    Echocardiogram from the fall showed stable valve function.  She has no cardiovascular complaints.    She continues to struggle with back pain.  Has been receiving ablation therapy    Is anticipating a visit from [facility] this coming summer and plans to tour around [place], the [place] and [facility].    Physical Exam:  /60 (BP Location: Left arm, Patient Position: Sitting, BP Cuff Size: Adult)   Pulse 79   Resp 16   Ht 1.549 m (5' 1\")   Wt 62.6 kg (138 lb)   SpO2 96%   BMI 26.07 kg/m²   GEN: NAD  CARDIAC: Regular. Normal S1, S2, Systolic murmur.   VASCULATURE: Normal carotid upstroke  RESP: Clear to auscultation bilaterally  ABD: Soft, non-tender, non-distended  EXT: No edema  NEURO: No focal deficit       () Today's E/M visit is associated with medical care services that serve as the continuing focal point for all needed health care services and/or with medical care services that  are part of ongoing care related to a patient's single, serious condition, or a complex condition: This includes  furnishing services to patients on an ongoing basis that result in care that is personalized  to the patient. The services result in a comprehensive, longitudinal, and continuous  relationship with the patient " "and involve delivery of team-based care that is accessible, coordinated with other practitioners and providers, and integrated with the broader health  care landscape.     The ASCVD Risk score (Candice LEUNG, et al., 2019) failed to calculate.    Studies  Lab Results   Component Value Date/Time    CHOLSTRLTOT 116 08/24/2024 08:29 AM    LDL 49 08/24/2024 08:29 AM    HDL 52 08/24/2024 08:29 AM    TRIGLYCERIDE 75 08/24/2024 08:29 AM       Lab Results   Component Value Date/Time    SODIUM 141 08/24/2024 08:29 AM    POTASSIUM 4.9 08/24/2024 08:29 AM    CHLORIDE 106 08/24/2024 08:29 AM    CO2 22 08/24/2024 08:29 AM    GLUCOSE 91 08/24/2024 08:29 AM    BUN 23 (H) 08/24/2024 08:29 AM    CREATININE 1.12 08/24/2024 08:29 AM    CREATININE 1.08 (H) 07/02/2010 08:56 AM    BUNCREATRAT 11 07/02/2010 08:56 AM    GLOMRATE 50 (L) 07/02/2010 08:56 AM      Lab Results   Component Value Date/Time    PROTHROMBTM 13.6 10/23/2023 07:45 AM    INR 1.02 10/23/2023 07:45 AM      Lab Results   Component Value Date/Time    WBC 4.1 (L) 08/24/2024 08:29 AM    WBC 4.4 07/02/2010 08:56 AM    RBC 4.22 08/24/2024 08:29 AM    RBC 4.45 07/02/2010 08:56 AM    HEMOGLOBIN 13.8 08/24/2024 08:29 AM    HEMATOCRIT 42.2 08/24/2024 08:29 AM    .0 (H) 08/24/2024 08:29 AM    MCV 95 07/02/2010 08:56 AM    MCH 32.7 08/24/2024 08:29 AM    MCH 31.5 07/02/2010 08:56 AM    MCHC 32.7 08/24/2024 08:29 AM    MPV 10.4 08/24/2024 08:29 AM    NEUTSPOLYS 60.10 08/24/2024 08:29 AM    LYMPHOCYTES 27.90 08/24/2024 08:29 AM    MONOCYTES 8.10 08/24/2024 08:29 AM    EOSINOPHILS 3.20 08/24/2024 08:29 AM    BASOPHILS 0.50 08/24/2024 08:29 AM        Past Medical History:   Diagnosis Date    Arrhythmia Year ago    Breast cancer (HCC)     Left Breast    Breath shortness     with exertion \"walking up a hill\"    Bruises easily     Cancer (HCC) 1989    breast left side lumpectomy    Cardiac murmur 06/03/2009    Chest pain 11/2/2023    Chronic kidney disease, stage III (moderate) " 08/20/2015    Coronary artery disease involving native coronary artery of native heart without angina pectoris 10/31/2023    Dyslipidemia 06/03/2009    Dyspepsia 11/2/2023    Glaucoma     both eyes    Heart valve disease year ago    Having surgery    Hepatitis A 1993    Hiatus hernia syndrome     Hx of breast cancer 06/03/2009    Hypertension 07/18/2017    currently not taking medications    MEDICAL HOME     Neutropenia 06/03/2009    Osteopenia 06/03/2009    PONV (postoperative nausea and vomiting)     Preventative health care 06/03/2009    Snoring     Unspecified cataract     bilateral IOLs     Allergies   Allergen Reactions    Hydrocodone Nausea    Atorvastatin Unspecified     Does not qualify for primary prevention    Cymbalta [Duloxetine Hcl]      dizziness    Naproxen      GI upset    Neurontin [Gabapentin] Unspecified     dizziness     Outpatient Encounter Medications as of 2/6/2025   Medication Sig Dispense Refill    Cholecalciferol (VITAMIN D3 PO) Take  by mouth every day.      amoxicillin (AMOXIL) 500 MG Cap TAKE FOUR CAPSULES BY MOUTH ONE TIME FOR ONE DOSE. TAKE 30 TO 60 MINUTES BEFORE DENTAL CLEANING. 4 Capsule 0    rosuvastatin (CRESTOR) 10 MG Tab TAKE ONE TABLET BY MOUTH EVERY EVENING 100 Tablet 2    lisinopril (PRINIVIL) 40 MG tablet TAKE 1 TABLET BY MOUTH DAILY 100 Tablet 2    bimatoprost (LUMIGAN) 0.01 % Solution Administer 1 Drop into both eyes at bedtime. 7.5 mL 3    bimatoprost (LUMIGAN) 0.01 % Solution Administer 1 drop into both eyes at bedtime. 7.5 mL 3    clopidogrel (PLAVIX) 75 MG Tab Take 1 Tablet by mouth every day. 100 Tablet 3    acetaminophen (TYLENOL) 500 MG Tab Take 1,000 mg by mouth every 6 hours as needed for Mild Pain.      vitamin D (CHOLECALCIFEROL) 1000 UNIT Tab Take 1,000 Units by mouth every day.      bimatoprost (LUMIGAN) 0.01 % Solution Instill 1 drop into both eyes every night at bedtime. please fill large bottle (7.5ml) per pt [address]-65 7.5 mL 3     No  facility-administered encounter medications on file as of 2025.     Social History     Socioeconomic History    Marital status:      Spouse name: Not on file    Number of children: Not on file    Years of education: Not on file    Highest education level: 12th grade   Occupational History    Not on file   Tobacco Use    Smoking status: Former     Current packs/day: 0.00     Average packs/day: 0.5 packs/day for 10.0 years (5.0 ttl pk-yrs)     Types: Cigarettes     Start date: 1989     Quit date: 1999     Years since quittin.1    Smokeless tobacco: Never   Vaping Use    Vaping status: Never Used   Substance and Sexual Activity    Alcohol use: Yes     Alcohol/week: 4.2 oz     Types: 7 Standard drinks or equivalent per week     Comment: 1 glass wine/day    Drug use: No    Sexual activity: Not Currently     Partners: Male   Other Topics Concern     Service No    Blood Transfusions No    Caffeine Concern No    Occupational Exposure No    Hobby Hazards Yes    Sleep Concern No    Stress Concern Yes    Weight Concern No    Special Diet No    Back Care No    Exercise No    Bike Helmet No    Seat Belt Yes    Self-Exams Yes   Social History Narrative    Not on file     Social Drivers of Health     Financial Resource Strain: Low Risk  (10/23/2023)    Overall Financial Resource Strain (CARDIA)     Difficulty of Paying Living Expenses: Not hard at all   Food Insecurity: No Food Insecurity (10/23/2023)    Hunger Vital Sign     Worried About Running Out of Food in the Last Year: Never true     Ran Out of Food in the Last Year: Never true   Transportation Needs: No Transportation Needs (10/23/2023)    PRAPARE - Transportation     Lack of Transportation (Medical): No     Lack of Transportation (Non-Medical): No   Physical Activity: Insufficiently Active (10/23/2023)    Exercise Vital Sign     Days of Exercise per Week: 2 days     Minutes of Exercise per Session: 30 min   Stress: Stress Concern Present  (10/23/2023)    [place] [employer] - Occupational Stress Questionnaire     Feeling of Stress : To some extent   Social Connections: Socially Isolated (10/23/2023)    Social Connection and Isolation Panel [NHANES]     Frequency of Communication with Friends and Family: More than three times a week     Frequency of Social Gatherings with Friends and Family: More than three times a week     Attends Bahai Services: Never     Active Member of Clubs or Organizations: No     Attends Club or Organization Meetings: Never     Marital Status:    Intimate Partner Violence: Not on file   Housing Stability: Low Risk  (10/23/2023)    Housing Stability Vital Sign     Unable to Pay for Housing in the Last Year: No     Number of Places Lived in the Last Year: 1     Unstable Housing in the Last Year: No         ROS:   10 point review systems is otherwise negative except as per the HPI    Chief Complaint   Patient presents with    Follow-Up     Nonrheumatic aortic valve stenosis    Dyslipidemia    Hypertension

## 2025-02-23 DIAGNOSIS — Z95.5 S/P DRUG ELUTING CORONARY STENT PLACEMENT: ICD-10-CM

## 2025-02-24 RX ORDER — CLOPIDOGREL BISULFATE 75 MG/1
75 TABLET ORAL
Qty: 50 TABLET | Refills: 3 | Status: SHIPPED | OUTPATIENT
Start: 2025-02-24

## 2025-02-24 NOTE — TELEPHONE ENCOUNTER
Is the patient due for a refill? Yes    Was the patient seen the last 15 months? Yes    Date of last office visit: 2/6/2025    Does the patient have an upcoming appointment?  Yes   If yes, When? 9/10/2025    Provider to refill: BE     Does the patient have [facility] Plus and need 100-day supply? (This applies to ALL medications) Yes, quantity updated to 100 days

## 2025-02-24 NOTE — TELEPHONE ENCOUNTER
"Per last OV note with BE 2/6/25 \"Katerina Torres is experiencing easy bruising.  I therefore advised moving clopidogrel to every other day.\"    Rx updated to reflect Q48 hrs.  "

## 2025-03-08 SDOH — ECONOMIC STABILITY: FOOD INSECURITY: WITHIN THE PAST 12 MONTHS, YOU WORRIED THAT YOUR FOOD WOULD RUN OUT BEFORE YOU GOT MONEY TO BUY MORE.: NEVER TRUE

## 2025-03-08 SDOH — HEALTH STABILITY: PHYSICAL HEALTH: ON AVERAGE, HOW MANY DAYS PER WEEK DO YOU ENGAGE IN MODERATE TO STRENUOUS EXERCISE (LIKE A BRISK WALK)?: 7 DAYS

## 2025-03-08 SDOH — ECONOMIC STABILITY: TRANSPORTATION INSECURITY
IN THE PAST 12 MONTHS, HAS LACK OF TRANSPORTATION KEPT YOU FROM MEETINGS, WORK, OR FROM GETTING THINGS NEEDED FOR DAILY LIVING?: NO

## 2025-03-08 SDOH — ECONOMIC STABILITY: FOOD INSECURITY: WITHIN THE PAST 12 MONTHS, THE FOOD YOU BOUGHT JUST DIDN'T LAST AND YOU DIDN'T HAVE MONEY TO GET MORE.: NEVER TRUE

## 2025-03-08 SDOH — ECONOMIC STABILITY: TRANSPORTATION INSECURITY
IN THE PAST 12 MONTHS, HAS LACK OF RELIABLE TRANSPORTATION KEPT YOU FROM MEDICAL APPOINTMENTS, MEETINGS, WORK OR FROM GETTING THINGS NEEDED FOR DAILY LIVING?: NO

## 2025-03-08 SDOH — ECONOMIC STABILITY: INCOME INSECURITY: IN THE LAST 12 MONTHS, WAS THERE A TIME WHEN YOU WERE NOT ABLE TO PAY THE MORTGAGE OR RENT ON TIME?: NO

## 2025-03-08 SDOH — ECONOMIC STABILITY: TRANSPORTATION INSECURITY
IN THE PAST 12 MONTHS, HAS THE LACK OF TRANSPORTATION KEPT YOU FROM MEDICAL APPOINTMENTS OR FROM GETTING MEDICATIONS?: NO

## 2025-03-08 SDOH — ECONOMIC STABILITY: INCOME INSECURITY: HOW HARD IS IT FOR YOU TO PAY FOR THE VERY BASICS LIKE FOOD, HOUSING, MEDICAL CARE, AND HEATING?: NOT VERY HARD

## 2025-03-08 SDOH — HEALTH STABILITY: PHYSICAL HEALTH: ON AVERAGE, HOW MANY MINUTES DO YOU ENGAGE IN EXERCISE AT THIS LEVEL?: 60 MIN

## 2025-03-08 SDOH — ECONOMIC STABILITY: HOUSING INSECURITY
IN THE LAST 12 MONTHS, WAS THERE A TIME WHEN YOU DID NOT HAVE A STEADY PLACE TO SLEEP OR SLEPT IN A SHELTER (INCLUDING NOW)?: NO

## 2025-03-08 SDOH — HEALTH STABILITY: MENTAL HEALTH
STRESS IS WHEN SOMEONE FEELS TENSE, NERVOUS, ANXIOUS, OR CAN'T SLEEP AT NIGHT BECAUSE THEIR MIND IS TROUBLED. HOW STRESSED ARE YOU?: VERY MUCH

## 2025-03-08 ASSESSMENT — SOCIAL DETERMINANTS OF HEALTH (SDOH)
DO YOU BELONG TO ANY CLUBS OR ORGANIZATIONS SUCH AS CHURCH GROUPS UNIONS, FRATERNAL OR ATHLETIC GROUPS, OR SCHOOL GROUPS?: NO
HOW OFTEN DO YOU ATTENT MEETINGS OF THE CLUB OR ORGANIZATION YOU BELONG TO?: NEVER
HOW OFTEN DO YOU HAVE SIX OR MORE DRINKS ON ONE OCCASION: NEVER
HOW OFTEN DO YOU ATTEND CHURCH OR RELIGIOUS SERVICES?: PATIENT DECLINED
HOW OFTEN DO YOU ATTENT MEETINGS OF THE CLUB OR ORGANIZATION YOU BELONG TO?: NEVER
HOW OFTEN DO YOU GET TOGETHER WITH FRIENDS OR RELATIVES?: MORE THAN THREE TIMES A WEEK
HOW OFTEN DO YOU HAVE A DRINK CONTAINING ALCOHOL: 4 OR MORE TIMES A WEEK
IN A TYPICAL WEEK, HOW MANY TIMES DO YOU TALK ON THE PHONE WITH FAMILY, FRIENDS, OR NEIGHBORS?: MORE THAN THREE TIMES A WEEK
HOW HARD IS IT FOR YOU TO PAY FOR THE VERY BASICS LIKE FOOD, HOUSING, MEDICAL CARE, AND HEATING?: NOT VERY HARD
DO YOU BELONG TO ANY CLUBS OR ORGANIZATIONS SUCH AS CHURCH GROUPS UNIONS, FRATERNAL OR ATHLETIC GROUPS, OR SCHOOL GROUPS?: NO
HOW OFTEN DO YOU ATTEND CHURCH OR RELIGIOUS SERVICES?: PATIENT DECLINED
IN A TYPICAL WEEK, HOW MANY TIMES DO YOU TALK ON THE PHONE WITH FAMILY, FRIENDS, OR NEIGHBORS?: MORE THAN THREE TIMES A WEEK
HOW MANY DRINKS CONTAINING ALCOHOL DO YOU HAVE ON A TYPICAL DAY WHEN YOU ARE DRINKING: 1 OR 2
WITHIN THE PAST 12 MONTHS, YOU WORRIED THAT YOUR FOOD WOULD RUN OUT BEFORE YOU GOT THE MONEY TO BUY MORE: NEVER TRUE
IN THE PAST 12 MONTHS, HAS THE ELECTRIC, GAS, OIL, OR WATER COMPANY THREATENED TO SHUT OFF SERVICE IN YOUR HOME?: NO
HOW OFTEN DO YOU GET TOGETHER WITH FRIENDS OR RELATIVES?: MORE THAN THREE TIMES A WEEK

## 2025-03-08 ASSESSMENT — LIFESTYLE VARIABLES
HOW OFTEN DO YOU HAVE A DRINK CONTAINING ALCOHOL: 4 OR MORE TIMES A WEEK
HOW OFTEN DO YOU HAVE SIX OR MORE DRINKS ON ONE OCCASION: NEVER
SKIP TO QUESTIONS 9-10: 1
AUDIT-C TOTAL SCORE: 4
HOW MANY STANDARD DRINKS CONTAINING ALCOHOL DO YOU HAVE ON A TYPICAL DAY: 1 OR 2

## 2025-03-09 PROBLEM — I70.0 AORTIC ATHEROSCLEROSIS (HCC): Status: ACTIVE | Noted: 2024-07-18

## 2025-03-10 ENCOUNTER — APPOINTMENT (OUTPATIENT)
Dept: MEDICAL GROUP | Facility: MEDICAL CENTER | Age: 85
End: 2025-03-10
Payer: MEDICARE

## 2025-03-10 ENCOUNTER — TELEPHONE (OUTPATIENT)
Dept: MEDICAL GROUP | Facility: MEDICAL CENTER | Age: 85
End: 2025-03-10

## 2025-03-10 VITALS
DIASTOLIC BLOOD PRESSURE: 50 MMHG | BODY MASS INDEX: 25.81 KG/M2 | HEART RATE: 64 BPM | WEIGHT: 136.69 LBS | SYSTOLIC BLOOD PRESSURE: 104 MMHG | TEMPERATURE: 97 F | HEIGHT: 61 IN | OXYGEN SATURATION: 96 %

## 2025-03-10 DIAGNOSIS — I77.819 AORTIC ECTASIA, UNSPECIFIED SITE (HCC): ICD-10-CM

## 2025-03-10 DIAGNOSIS — S22.000S COMPRESSION FRACTURE OF THORACIC VERTEBRA, UNSPECIFIED THORACIC VERTEBRAL LEVEL, SEQUELA: ICD-10-CM

## 2025-03-10 DIAGNOSIS — M79.10 MUSCLE PAIN: ICD-10-CM

## 2025-03-10 DIAGNOSIS — Z00.00 PREVENTATIVE HEALTH CARE: Chronic | ICD-10-CM

## 2025-03-10 DIAGNOSIS — E78.5 DYSLIPIDEMIA: Chronic | ICD-10-CM

## 2025-03-10 DIAGNOSIS — N18.31 CKD STAGE 3A, GFR 45-59 ML/MIN: ICD-10-CM

## 2025-03-10 DIAGNOSIS — E55.9 VITAMIN D DEFICIENCY: ICD-10-CM

## 2025-03-10 DIAGNOSIS — M54.9 BACK PAIN, UNSPECIFIED BACK LOCATION, UNSPECIFIED BACK PAIN LATERALITY, UNSPECIFIED CHRONICITY: ICD-10-CM

## 2025-03-10 DIAGNOSIS — R91.1 PULMONARY NODULE: ICD-10-CM

## 2025-03-10 DIAGNOSIS — Z00.00 MEDICARE ANNUAL WELLNESS VISIT, SUBSEQUENT: ICD-10-CM

## 2025-03-10 DIAGNOSIS — R73.09 IMPAIRED GLUCOSE METABOLISM: ICD-10-CM

## 2025-03-10 DIAGNOSIS — M85.80 OSTEOPENIA, UNSPECIFIED LOCATION: Chronic | ICD-10-CM

## 2025-03-10 PROCEDURE — 3074F SYST BP LT 130 MM HG: CPT | Performed by: INTERNAL MEDICINE

## 2025-03-10 PROCEDURE — 1170F FXNL STATUS ASSESSED: CPT | Performed by: INTERNAL MEDICINE

## 2025-03-10 PROCEDURE — 1158F ADVNC CARE PLAN TLK DOCD: CPT | Performed by: INTERNAL MEDICINE

## 2025-03-10 PROCEDURE — G0439 PPPS, SUBSEQ VISIT: HCPCS | Performed by: INTERNAL MEDICINE

## 2025-03-10 PROCEDURE — 3078F DIAST BP <80 MM HG: CPT | Performed by: INTERNAL MEDICINE

## 2025-03-10 ASSESSMENT — FIBROSIS 4 INDEX: FIB4 SCORE: 3.32

## 2025-03-10 ASSESSMENT — ENCOUNTER SYMPTOMS: GENERAL WELL-BEING: POOR

## 2025-03-10 ASSESSMENT — ACTIVITIES OF DAILY LIVING (ADL): BATHING_REQUIRES_ASSISTANCE: 0

## 2025-03-10 ASSESSMENT — PATIENT HEALTH QUESTIONNAIRE - PHQ9: CLINICAL INTERPRETATION OF PHQ2 SCORE: 0

## 2025-03-10 NOTE — PROGRESS NOTES
Chief Complaint   Patient presents with    Annual Exam       HPI:  Katerina Torres is a 84 y.o. here for Medicare Annual Wellness Visit     Patient Active Problem List    Diagnosis Date Noted    Aortic atherosclerosis (HCC) 07/18/2024    Hypertension 07/18/2024    History of thoracic compression fracture 12/05/2023    CAD (coronary artery disease) 10/31/2023    S/P TAVR (transcatheter aortic valve replacement) 10/23/2023    S/P drug eluting coronary stent placement 10/23/2023    Pulmonary nodule 10/02/2023    Impaired glucose metabolism 07/11/2023    Stage 3a chronic kidney disease 05/02/2023    Low back pain 12/02/2020    History of palpitations 06/05/2020    Esophageal dysmotility 07/18/2017    S/P knee replacement 06/20/2017    Colon polyp 03/13/2017    Decreased GFR 08/20/2015    History of polymyalgia rheumatica 07/28/2015    Knee pain, left 08/01/2013    Glaucoma 06/29/2010    History of basal cell carcinoma of skin 06/29/2010    History of breast cancer s/p lumpectomy left 1980s 06/03/2009    Osteopenia 06/03/2009    Dyslipidemia 06/03/2009    History of neutropenia 06/03/2009    Preventative health care 06/03/2009    S/p TAVR for aortic stenosis 06/03/2009       Current Outpatient Medications   Medication Sig Dispense Refill    clopidogrel (PLAVIX) 75 MG Tab Take 1 Tablet by mouth every 48 hours. Take 1 tablet every other day 50 Tablet 3    Cholecalciferol (VITAMIN D3 PO) Take  by mouth every day.      amoxicillin (AMOXIL) 500 MG Cap TAKE FOUR CAPSULES BY MOUTH ONE TIME FOR ONE DOSE. TAKE 30 TO 60 MINUTES BEFORE DENTAL CLEANING. 4 Capsule 0    rosuvastatin (CRESTOR) 10 MG Tab TAKE ONE TABLET BY MOUTH EVERY EVENING 100 Tablet 2    lisinopril (PRINIVIL) 40 MG tablet TAKE 1 TABLET BY MOUTH DAILY 100 Tablet 2    bimatoprost (LUMIGAN) 0.01 % Solution Administer 1 Drop into both eyes at bedtime. 7.5 mL 3    bimatoprost (LUMIGAN) 0.01 % Solution Administer 1 drop into both eyes at bedtime. 7.5 mL 3     acetaminophen (TYLENOL) 500 MG Tab Take 1,000 mg by mouth every 6 hours as needed for Mild Pain.       No current facility-administered medications for this visit.          Current supplements as per medication list.     Allergies: Hydrocodone, Atorvastatin, Cymbalta [duloxetine hcl], Naproxen, and Neurontin [gabapentin]    Current social contact/activities:      She  reports that she quit smoking about 26 years ago. Her smoking use included cigarettes. She started smoking about 36 years ago. She has a 5 pack-year smoking history. She has never used smokeless tobacco. She reports current alcohol use of about 4.2 oz of alcohol per week. She reports that she does not use drugs.  Counseling given: Not Answered      ROS:    Gait: Uses no assistive device  Ostomy: No  Other tubes: No  Amputations: No  Chronic oxygen use: No  Last eye exam: 6 months ago   Wears hearing aids: No   : Reports urinary leakage during the last 6 months that has not interfered at all with their daily activities or sleep.    Screening:    Depression Screening  Little interest or pleasure in doing things?  0 - not at all  Feeling down, depressed , or hopeless? 0 - not at all  Patient Health Questionnaire Score: 0     If depressive symptoms identified deferred to follow up visit unless specifically addressed in assessment and plan.    Interpretation of PHQ-9 Total Score   Score Severity   1-4 No Depression   5-9 Mild Depression   10-14 Moderate Depression   15-19 Moderately Severe Depression   20-27 Severe Depression    Screening for Cognitive Impairment  Do you or any of your friends or family members have any concern about your memory? Yes  Three Minute Recall (Village, Kitchen, Baby) 1/3    Marvin clock face with all 12 numbers and set the hands to show 10 minutes past 11.  No    Cognitive concerns identified deferred for follow up unless specifically addressed in assessment and plan.    Fall Risk Assessment  Has the patient had two or more  falls in the last year or any fall with injury in the last year?  No    Safety Assessment  Do you always wear your seatbelt?  Yes  Any changes to home needed to function safely? No  Difficulty hearing.  No  Patient counseled about all safety risks that were identified.    Functional Assessment ADLs  Are there any barriers preventing you from cooking for yourself or meeting nutritional needs?  No.    Are there any barriers preventing you from driving safely or obtaining transportation?  No.    Are there any barriers preventing you from using a telephone or calling for help?  No    Are there any barriers preventing you from shopping?  No.    Are there any barriers preventing you from taking care of your own finances?  No    Are there any barriers preventing you from managing your medications?  No    Are there any barriers preventing you from showering, bathing or dressing yourself? No    Are there any barriers preventing you from doing housework or laundry? No  Are there any barriers preventing you from using the toilet?No  Are you currently engaging in any exercise or physical activity?  Yes. Walking 1 hr per day       Self-Assessment of Health  What is your perception of your health? Poor    Do you sleep more than six hours a night? Yes    In the past 7 days, how much did pain keep you from doing your normal work? A lot    Do you spend quality time with family or friends (virtually or in person)? No    Do you usually eat a heart healthy diet that constists of a variety of fruits, vegetables, whole grains and fiber? Yes    Do you eat foods high in fat and/or Fast Food more than three times per week? No    How concerned are you that your medical conditions are not being well managed? Not at all    Are you worried that in the next 2 months, you may not have stable housing that you own, rent, or stay in as part of a household? No      Advance Care Planning  Do you have an Advance Directive, Living Will, Durable Power of  , or POLST? Yes  Advance Directive   Durable Power of    is on file      Health Maintenance Summary            Current Care Gaps       Annual Wellness Visit (Yearly) Overdue since 6/29/2022 06/29/2021  Visit Dx: Medicare annual wellness visit, subsequent    06/09/2020  Visit Dx: Medicare annual wellness visit, subsequent    04/29/2019  Visit Dx: Medicare annual wellness visit, subsequent    06/20/2017  Visit Dx: Medicare annual wellness visit, subsequent    06/16/2016  Visit Dx: Medicare annual wellness visit, subsequent     Only the first 5 history entries have been loaded, but more history exists.            Influenza Vaccine (1) Overdue since 9/1/2024      10/24/2023  Imm Admin: Influenza Vaccine Adult HD    09/26/2022  Imm Admin: Influenza Vaccine Adult HD    10/18/2021  Imm Admin: Influenza Vaccine Adult HD    11/11/2020  Imm Admin: Influenza Vaccine Adult HD    10/25/2019  Imm Admin: Influenza Vaccine Adult HD      Only the first 5 history entries have been loaded, but more history exists.              COVID-19 Vaccine (6 - 2024-25 season) Overdue since 9/1/2024 01/02/2024  Imm Admin: Covid-19 Mrna (Spikevax) Moderna 12+ Years    06/21/2022  Imm Admin: MODERNA SARS-COV-2 VACCINE (12+)    10/25/2021  Imm Admin: MODERNA SARS-COV-2 VACCINE (12+)    02/06/2021  Imm Admin: MODERNA SARS-COV-2 VACCINE (12+)    01/09/2021  Imm Admin: MODERNA SARS-COV-2 VACCINE (12+)      Only the first 5 history entries have been loaded, but more history exists.                      Upcoming       Bone Density Scan (Every 5 Years) Next due on 1/11/2029 01/11/2024  DS-BONE DENSITY STUDY (DEXA)    08/23/2021  DS-BONE DENSITY STUDY (DEXA)    05/24/2019  DS-BONE DENSITY STUDY (DEXA)    08/19/2015  DS-BONE DENSITY STUDY (DEXA)    08/06/2013  DS-BONE DENSITY STUDY (DEXA)      Only the first 5 history entries have been loaded, but more history exists.              IMM DTaP/Tdap/Td Vaccine (3 - Td or Tdap) Next  due on 4/28/2032 04/28/2022  Imm Admin: Tdap Vaccine    07/23/2012  Imm Admin: Tdap Vaccine                      Completed or No Longer Recommended       Zoster (Shingles) Vaccines (Series Information) Completed      09/18/2020  Imm Admin: Zoster Vaccine Recombinant (RZV) (SHINGRIX)    06/09/2020  Imm Admin: Zoster Vaccine Recombinant (RZV) (SHINGRIX)    07/29/2014  Imm Admin: Zoster Vaccine Live (ZVL) (Zostavax) - HISTORICAL DATA              Pneumococcal Vaccine: 50+ Years (Series Information) Completed      09/26/2022  Imm Admin: Pneumococcal Conjugate Vaccine (PCV20)    08/06/2015  Imm Admin: Pneumococcal Conjugate Vaccine (Prevnar/PCV-13)    01/10/2013  Imm Admin: Pneumococcal polysaccharide vaccine (PPSV-23)              Hepatitis A Vaccine (Hep A) (Series Information) Aged Out      No completion history exists for this topic.              HPV Vaccines (Series Information) Aged Out     No completion history exists for this topic.              Polio Vaccine (Inactivated Polio) (Series Information) Aged Out     No completion history exists for this topic.              Meningococcal Immunization (Series Information) Aged Out     No completion history exists for this topic.              Mammogram  Discontinued        Frequency changed to Never automatically (Topic No Longer Applies)    01/11/2024  MA-SCREENING MAMMO BILAT W/TOMOSYNTHESIS W/CAD    11/17/2022  MA-SCREENING MAMMO BILAT W/TOMOSYNTHESIS W/CAD    08/23/2021  MA-SCREENING MAMMO BILAT W/TOMOSYNTHESIS W/CAD    06/18/2020  MA-DIAGNOSTIC MAMMO RIGHT W/TOMOSYNTHESIS W/O CAD     Only the first 5 history entries have been loaded, but more history exists.            Colorectal Cancer Screening  Discontinued        Frequency changed to Never automatically (Topic No Longer Applies)    03/07/2017  REFERRAL TO GI FOR COLONOSCOPY    07/31/2014  FIT    07/22/2013  OCCULT BLOOD FECES IMMUNOASSAY    07/26/2012  OCCULT BLOOD FECES IMMUNOASSAY      Only the first 5  history entries have been loaded, but more history exists.              Hepatitis B Vaccine (Hep B)  Discontinued      No completion history exists for this topic.                            Patient Care Team:  Master Suarez M.D. as PCP - General  Master Suarez M.D. as PCP - [facility] Paneled  Chris Bernstein M.D. as Consulting Physician (Ophthalmology)  Amanda Rodriguez M.D. as Consulting Physician (Ophthalmology)  Manoj Tidwell M.D. (Inactive) as Consulting Physician (Orthopedic Surgery)        Social History     Tobacco Use    Smoking status: Former     Current packs/day: 0.00     Average packs/day: 0.5 packs/day for 10.0 years (5.0 ttl pk-yrs)     Types: Cigarettes     Start date: 1989     Quit date: 1999     Years since quittin.2    Smokeless tobacco: Never   Vaping Use    Vaping status: Never Used   Substance Use Topics    Alcohol use: Yes     Alcohol/week: 4.2 oz     Types: 7 Standard drinks or equivalent per week     Comment: 1 glass wine/day    Drug use: No     Family History   Problem Relation Age of Onset    Heart Disease Father         CAD MI    Stroke Father     Cancer Sister         breast    Cancer Mother         breast     She  has a past medical history of Arrhythmia (Year ago), Breast cancer (HCC), Breath shortness, Bruises easily, Cancer (HCC) (), Cardiac murmur (2009), Chest pain (2023), Chronic kidney disease, stage III (moderate) (2015), Coronary artery disease involving native coronary artery of native heart without angina pectoris (10/31/2023), Dyslipidemia (2009), Dyspepsia (2023), Glaucoma, Heart valve disease (year ago), Hepatitis A (), Hiatus hernia syndrome, breast cancer (2009), Hypertension (2017), MEDICAL HOME, Neutropenia (2009), Osteopenia (2009), PONV (postoperative nausea and vomiting), Preventative health care (2009), Snoring, and Unspecified cataract.    She has no past medical history of Acute  nasopharyngitis, Anesthesia, Anginal syndrome (HCC), Arthritis, Asthma, Blood clotting disorder (HCC), Bowel habit changes, Bronchitis, Carcinoma in situ of respiratory system, Congestive heart failure (HCC), Continuous ambulatory peritoneal dialysis status (HCC), COPD, Coughing blood, Dental disorder, Dialysis patient (HCC), Disorder of thyroid, Emphysema of lung (HCC), Gynecological disorder, Heart burn, Hemorrhagic disorder (HCC), Hepatitis B, Hepatitis C, High cholesterol, Jaundice, Liver disease, Myocardial infarct (HCC), Pacemaker, Pneumonia, Pregnant, Psychiatric problem, Rheumatic fever, Seizure (HCC), Seizure disorder (HCC), Sleep apnea, Stroke (HCC), Tuberculosis, Urinary bladder disorder, or Urinary incontinence.   Past Surgical History:   Procedure Laterality Date    TRANSCATHETER AORTIC VALVE REPLACEMENT Bilateral 10/23/2023    Procedure: TRANSCATHETER AORTIC VALVE REPLACEMENT;  Surgeon: Robert Nunez M.D.;  Location: [facility];  Service: Cardiac    ECHOCARDIOGRAM, TRANSESOPHAGEAL, INTRAOPERATIVE N/A 10/23/2023    Procedure: ECHOCARDIOGRAM, TRANSESOPHAGEAL, INTRAOPERATIVE;  Surgeon: Robert Nunez M.D.;  Location: [facility];  Service: Cardiac    ANGIOGRAM, WITH ANGIOPLASTY, AND STENT INSERTION IF INDICATED Bilateral 10/23/2023    Procedure: STENT INSERTION RIGHT CORONARY ARTERY;  Surgeon: Robert Nunez M.D.;  Location: [facility];  Service: Cardiac    LUMBAR TRANSFORAMINAL EPIDURAL STEROID INJECTION Right 09/01/2022    Procedure: RIGHT lumbar four and lumbar five transforaminal epidural steroid injection;  Surgeon: Roni Choi M.D.;  [facility];  Service: Pain Management    LUMBAR TRANSFORAMINAL EPIDURAL STEROID INJECTION Right 01/06/2021    Procedure: INJECTION, STEROID, SPINE, LUMBAR, EPIDURAL, TRANSFORAMINAL APPROACH;  Surgeon: Roni Choi M.D.;  [facility];  Service: Pain Management    KNEE  ARTHROPLASTY TOTAL Right 05/08/2018    Procedure: KNEE ARTHROPLASTY TOTAL;  Surgeon: Thanh Hall M.D.;  Location: SURGERY [facility];  Service: Orthopedics    VITRECTOMY POSTERIOR Left 10/25/2016    Procedure: VITRECTOMY POSTERIOR membrane peel cryotherapy infusion of SF6 intraocular gas;  Surgeon: Amanda Rodriguez M.D.;  Location: SURGERY SAME DAY [facility];  Service:     RECOVERY  03/31/2014    Performed by [facility] at SURGERY SAME DAY [facility]    CATARACT PHACO WITH IOL  05/28/2009    Performed by GERA BREAUX at SURGERY SAME DAY [facility]    CATARACT PHACO WITH IOL  05/14/2009    Performed by GERA BREAUX at SURGERY SAME DAY [facility]    LUMPECTOMY      LYMPH NODE EXCISION Left     H/O Breast CA    NO PERTINENT PAST SURGICAL HISTORY  Breast Cancer    OTHER      OTHER ORTHOPEDIC SURGERY  2017    Knee    RI RADIATION THERAPY PLAN SIMPLE         Exam:   There were no vitals taken for this visit. There is no height or weight on file to calculate BMI.    Hearing excellent.    Dentition good  Alert, oriented in no acute distress.  Eye contact is good, speech goal directed, affect calm    Assessment and Plan. The following treatment and monitoring plan is recommended:    There are no diagnoses linked to this encounter.    Services suggested: No services needed at this time  Health Care Screening: Age-appropriate preventive services recommended by USPTF and ACIP covered by Medicare were discussed today. Services ordered if indicated and agreed upon by the patient.  Referrals offered: Community-based lifestyle interventions to reduce health risks and promote self-management and wellness, fall prevention, nutrition, physical activity, tobacco-use cessation, weight loss, and mental health services as per orders if indicated.    Discussion today about general wellness and lifestyle habits:    Prevent falls and reduce trip hazards; Cautioned about securing or removing  rugs.  Have a working fire alarm and carbon monoxide detector;   Engage in regular physical activity and social activities     Follow-up: No follow-ups on file.

## 2025-03-10 NOTE — LETTER
[facility]  Master Suarez M.D.  [address]  [address]  [place] [address]  Fax: [phone]   Authorization for Release/Disclosure of   Protected Health Information   Name: GAEL DIALLO : 1940 SSN: [id number]   Address: [address] [place] [address]  [place] [address] Phone:    [phone] (home)    I authorize the entity listed below to release/disclose the PHI below to:   [facility]/Master Suarez M.D. and Master Suarez M.D.   Provider or Entity Name:  [facility]    Address   City, [facility], [facility]   Phone:      Fax:  [phone]     Reason for request: continuity of care   Information to be released:    [  ] LAST COLONOSCOPY,  including any PATH REPORT and follow-up  [  ] LAST FIT/COLOGUARD RESULT [  ] LAST DEXA  [  ] LAST MAMMOGRAM  [  ] LAST PAP  [  ] LAST LABS [  ] RETINA EXAM REPORT  [  ] IMMUNIZATION RECORDS  [X  ] Release all info      [  ] Check here and initial the line next to each item to release ALL health information INCLUDING  _____ Care and treatment for drug and / or alcohol abuse  _____ HIV testing, infection status, or AIDS  _____ Genetic Testing    DATES OF SERVICE OR TIME PERIOD TO BE DISCLOSED: _____________  I understand and acknowledge that:  * This Authorization may be revoked at any time by you in writing, except if your health information has already been used or disclosed.  * Your health information that will be used or disclosed as a result of you signing this authorization could be re-disclosed by the recipient. If this occurs, your re-disclosed health information may no longer be protected by State or Federal laws.  * You may refuse to sign this Authorization. Your refusal will not affect your ability to obtain treatment.  * This Authorization becomes effective upon signing and will  on (date) __________.      If no date is indicated, this Authorization will  one (1) year from the signature date.    Name: Gael Diallo  Signature:  Date:   3/10/2025     PLEASE FAX REQUESTED RECORDS BACK TO: [phone]

## 2025-03-10 NOTE — LETTER
[facility]  Master Suarez M.D.  [address]  [address]  [place] [address]  Fax: [phone]   Authorization for Release/Disclosure of   Protected Health Information   Name: GAEL DIALLO : 1940 SSN: [id number]   Address: [address] [place] [address]  [place] [address] Phone:    [phone] (home)    I authorize the entity listed below to release/disclose the PHI below to:   [facility]/Master Suarez M.D. and Master Suarez M.D.   Provider or Entity Name:  [facility]   City, [facility]   Phone:       Fax:[phone]     Reason for request: continuity of care   Information to be released:    [  ] LAST COLONOSCOPY,  including any PATH REPORT and follow-up  [  ] LAST FIT/COLOGUARD RESULT [  ] LAST DEXA  [  ] LAST MAMMOGRAM  [  ] LAST PAP  [  ] LAST LABS [  ] RETINA EXAM REPORT  [  ] IMMUNIZATION RECORDS  [X  ] Release the last 12 monthss       [  ] Check here and initial the line next to each item to release ALL health information INCLUDING  _____ Care and treatment for drug and / or alcohol abuse  _____ HIV testing, infection status, or AIDS  _____ Genetic Testing    DATES OF SERVICE OR TIME PERIOD TO BE DISCLOSED: _____________  I understand and acknowledge that:  * This Authorization may be revoked at any time by you in writing, except if your health information has already been used or disclosed.  * Your health information that will be used or disclosed as a result of you signing this authorization could be re-disclosed by the recipient. If this occurs, your re-disclosed health information may no longer be protected by State or Federal laws.  * You may refuse to sign this Authorization. Your refusal will not affect your ability to obtain treatment.  * This Authorization becomes effective upon signing and will  on (date) __________.      If no date is indicated, this Authorization will  one (1) year from the signature date.    Name: Gael Sims  Brian  Signature: Date:   3/10/2025     PLEASE FAX REQUESTED RECORDS BACK TO: [phone]

## 2025-03-10 NOTE — PROGRESS NOTES
Subjective     Katerina Torres is a 84 y.o. female who presents with medicare wellness               HPI          Medicare wellness assessment  Current supplements: Reviewed  Chronic narcotic pain medicines: No  Allergies:  reviewed  Sees  eye exam   Sees cardiology   Sees ramon haley most recent ablation done in january   Screening: Reviewed  Depressive Symptoms: Denies feeling down, depressed or hopeless. Denies loss of interest or pleasure in doing things   ADLs: Denies needing help with using telephone, transportation, shopping, preparing meals, housework, laundry, or managing medication or money.    Independent with bathing, hygiene, feeding, toileting, dressing    Memory concerns: Denies difficulty remembering details of conversations, events and upcoming appointments. Does forget some things like where she puts her glasses but does not lose purse or wallet or phone.keeps active socially spends time with friends and neighbors and [employer]  Driving no accidents and no tickets, no night driving and avoids freeways   Hearing problems: Denies   Recent falls no  Check blood pressure running 125 sbp on the lisinopril and dbp 70s and has cut back on the plavix to qod per cardiology recommendation and no motrin or aleve or other over-the-counter NSAIDs, no asa  Takes tylenol 2 in am and 2 in pm   Still keeps active at home does work in the yard and does house work as well but limited due to chronic back pain, history of thoracic compression fracture 2023, seen physical therapy, followed by pain management at [place], has had various injections without benefit, no falls, has declined Fosamax or Reclast, no opiate therapy also with occasional shoulder and neck pain  Walks daily 45 minutes daily, no falls, did give up skiing   No tobacco  No etoh   FH family lives in [facility], no family in town, , had power of  updated          Current Outpatient Medications    Medication Sig Dispense Refill    clopidogrel (PLAVIX) 75 MG Tab Take 1 Tablet by mouth every 48 hours. Take 1 tablet every other day 50 Tablet 3    Cholecalciferol (VITAMIN D3 PO) Take  by mouth every day.      amoxicillin (AMOXIL) 500 MG Cap TAKE FOUR CAPSULES BY MOUTH ONE TIME FOR ONE DOSE. TAKE 30 TO 60 MINUTES BEFORE DENTAL CLEANING. 4 Capsule 0    rosuvastatin (CRESTOR) 10 MG Tab TAKE ONE TABLET BY MOUTH EVERY EVENING 100 Tablet 2    lisinopril (PRINIVIL) 40 MG tablet TAKE 1 TABLET BY MOUTH DAILY 100 Tablet 2    bimatoprost (LUMIGAN) 0.01 % Solution Administer 1 Drop into both eyes at bedtime. 7.5 mL 3    bimatoprost (LUMIGAN) 0.01 % Solution Administer 1 drop into both eyes at bedtime. 7.5 mL 3    acetaminophen (TYLENOL) 500 MG Tab Take 1,000 mg by mouth every 6 hours as needed for Mild Pain.       No current facility-administered medications for this visit.         s/p tavr   10/06 echo mild mitral regurgitation, normal LV  6/09 stress echo negative  8/12 echo normal LV function, EF 65%, mild aortic stenosis, peak gradient 24  7/24/13 normal LV function, EF 60%, mild LVH, mild aortic stenosis, peak gradient 25  3/28/14 cardiology note, abnormal EKG, recommend coronary angiogram continue metoprolol, aspirin  3/31/14 cardiac catheterization; left main and LAD patent, circumflex free of disease, RCA free of disease, normal LV function, mild aortic stenosis  7/9/17 echo normal LV size and function, EF 60%, grade 1 diastolic dysfunction, moderate aortic stenosis peak gradient 50, mean 29, valve area 0.8-1.0 and aortic regurgitation  7/9/17 persantine thallium small fixed perfusion abnormality distal anterior and anteroapical segments, no ischemia is noted to represent mild prior infarct or variant normal apical thinning  4/27/18 echo normal LV size and function, EF 65%, moderate aortic stenosis peak gradient 46, valve area 0.9, moderate aortic insufficiency, no change compared to previous  5/27/19 echo normal  LV size and function, EF 65%, normal diastolic function, calcified aortic valve moderate aortic stenosis peak gradient 53, moderate aortic insufficiency  7/11/19 patient feels more intense heart rate and heartbeat at night, can hear her heartbeat, only occurs at night, question anxiety component trial of buspar, 24-hour holter, cardiology referral  7/18/19 24-hour holter monitor, sinus rhythm asymptomatic average heart rate 65 PAC burden 1.3%, PVC burden 1.8%, 2 atrial runs longest 7 beats maximal heart rate 105, fastest run 4 beats maximum heart rate 129  9/24/21 echo EF 65%, indeterminate diastolic function, moderate aortic stenosis, peak gradient 56, moderate aortic insufficiency  10/1/21 cardiology note will likely need valve replacement next 1 to 2 years, follow-up 6 months  2/14/22  cardiology note moderate to severe aortic stenosis, able to walk 4 miles with no limitations, will order repeat echo follow-up 2 months  5/11/22 echo EF 55%,severe aortic stenosis peak gradient 70  5/13/22 cardiology note asymptomatic severe aortic stenosis, continue surveillance follow-up 6 months  11/7/22 cardiology note asymptomatic severe aortic stenosis, continue surveillance, repeat echo, follow-up 6 months  11/10/22 echo severe aortic stenosis, peak gradient 65, EF 60%, normal diastolic function  5/24/23  cardiothoracic surgery note recommend TAVR  5/24/23 cardiology note aortic stenosis with preserved ejection fraction stage C, recommend stress EKG, echocardiogram, BNP, follow-up 3 months  9/7/23  cardiology structural heart follow up discussed risks and benefits and alternatives to TAVR, will require preoperative work-up including CT and cardiac catheterization, patient agrees, follow-up 6 weeks  9/20/23 CT-TAVR tricuspid aortic root with moderate aortic calcification, nonspecific 7 mm left lower lobe lung nodule with pleural tach suggesting postinflammatory process, no evidence of adenopathy  chest, abdomen, pelvis, simple 3.2 cm cyst left adnexa  10/9/23 cardiac catheterization prior to TAVR procedure note, left main 20% disease, LAD proximal 40% and mid 30% disease, RCA mid 30% disease, mild to moderate aortic regurgitation, ascending aorta 2.9 cm, severe transaortic pressure gradient, borderline elevated left heart filling pressures  10/31/23 cardiology note status post TAVR doing well, continue aspirin indefinitely, plavix 1 year post stent placement, echo 1 month, SBE prophylaxis,crestor 5 mg qpm, cardiac rehab referral  10/23/23  interventional cardiology operative note TAVR 23 shearer nik, RCA MARCELLUS stent, left ventricular end-diastolic pressure 28  10/23/23 SHARIFA during TAVR, baseline images systolic function EF 55%, calcified aortic valve, severe aortic stenosis, mild aortic insufficiency, post deployment images showed preserved function, 23 mm shearer TAVR valve seen in the aortic position with normal leaflet motion, no paravalvular leak, mean gradient of 6, calculated effective orifice area 2.2 cm  11/2/23 echo TAVR present, normal LV size and function, EF 70%, indeterminate diastolic function, no TAVR aortic valve functioning normally with normal    11/20/23 echo normal LV size, thickness, function, EF 64%, indeterminate diastolic function, trace MR, known TAVR aortic valve functioning normally, peak gradient 15  11/29/23 cardiology note can stop metoprolol, continue asa and plavix, next visit consider change to plavix monotherapy  2/15/24  cardiology note doing well status post transcatheter aortic valve replacement with RCA protection by snorkel stenting, discontinue aspirin, continue plavix continue dental prophylaxis with antibiotics, continue rosuvastatin, lisinopril, follow-up 9 months  8/8/24  cardiology note continue plavix follow-up 6 months  10/3/24 echo normal LV size and function, hyperdynamic left ventricular systolic function, EF 75%, normal wall motion,  normal diastolic function, normal TAVR valve functioning normally, transvalvular gradient peak 19 mmHg, no evidence of paravalvular leak, no aortic insufficiency, RVSP 34  2/6/25  cardiology note has been experiencing easy bruising, recommend change plavix to qod, follow-up 6 months    s/p knee replacement  3/3/17 MRI right knee abnormal right lateral meniscus with peripheral extrusion, maceration, and complex tear involving primarily the posterior horn and body but also the anterior horn, abnormal medial meniscus with vertical tear of the peripheral zone of body and posterior horn, probable meniscal root tear, and degenerative fraying of the free edge, severe lateral femorotibial compartment osteoarthritis with significant cartilage loss and moderate to severe subchondral edema within the lateral femoral condyle and lateral tibial plateau, mild medial femorotibial and the patellofemoral compartment osteoarthritis, moderate sized joint effusion with associated popliteal cyst  6/20/17 sees  orthopedics  1/18/18  orthopedic note right knee end-stage arthritis, x-rays bone-on-bone right knee arthritis lateral compartment, right knee steroid injection performed, planned for total right knee arthroplasty after winter  5/2/18  orthopedic note, right knee osteoathritis, failed conservative measures, scheduled for right knee surgery  5/8/18  operative note right knee arthroplasty   5/23/18  orthopedic note 2 weeks s/p right total knee  8/6/18  orthopedic note 3 months status post right knee replacement doing well, x-ray shows stable right total knee  5/22/19  orthopedic note x-ray stable total knee replacement, follow-up 1 year     pulmonary nodule  9/20/23 CT-TAVR tricuspid aortic root with moderate aortic calcification, nonspecific 7 mm left lower lobe lung nodule with pleural tag suggesting postinflammatory process, no evidence of adenopathy chest, abdomen,  pelvis   3/28/24 CT thorax without, mild apical scarring consistent with old granulomatous disease, 8 mm subpleural nodule anterior right middle lobe, 7 mm subpleural nodule left lung base, no change from previous, recommend repeat 12 months     Preventative health  3/7/17 colon per [facility] 2 polyps, grade 2 internal hemorrhoids, angioectasias ascending colon,pathology one hyperplastic polyp and one sessile serrated polyp, repeat colonoscopy 5 years   9/19/20 shingrix second  4/28/22 tdap   9/26/22 prevnar 20  1/11/24 dexa LS-0.5,hip-1.6,frax 20.4% major,5.5% hip  1/11/24 mammogram heterogeneously dense breast tissue offered screening breast ultrasound  8/24/24 vit d 109 recommend cutting dose in half     Osteopenia  9/06 dexa LS -1.2,hip -1.1  6/09 dexa LS -1.4,hip -1.0  7/11 vit d 43  7/11 dexa LS -1.1,hip -0.9  712 vit d 30 start 1000 units  7/24/13 vit d 72 on 1000 units  8/6/13 dexa LS -1.1,hip -1.1  8/5/14 vit d 67  8/19/15 dexa LS -1.4,hip -1.3;FRAX 40% major,27% hip only on prednisone one month, does not need bisphosphonate therapy  8/19/15 vit d 43  6/16/16 off prednisone x 3 weeks  6/21/16 vit d 50  6/27/17 vit d 48  4/27/18 vit d 53   5/14/19 vit d 46  5/28/19 dexa LS-1.0,hip-1.4  6/12/20 vit d 88 on 1000 units decrease to qod   8/23/21 dexa LS-0.9,hip-1.4  1/14/22 vit d 68  7/6/23 x-ray rib series bilaterally and chest x-ray questionable healing fracture left anterior fourth rib  7/6/23 xray thoracic spine chronic severe compression deformity T5, no definite acute fracture seen, mild arthritis  7/11/23 vit d 82  12/4/23 MRI thoracic spine, acute subacute compression fractures T5 with 50 to 60% loss of height, T7 with 30% loss of height, T8 with 50 to 70% loss of height, no significant retropulsion  1/11/24 dexa LS-0.5,hip-1.6,frax 20.4% major,5.5% hip recommend fosamax  1/15/24 declines fosamax, she would like to discuss with her dentist first  3/21/24 start fosamax 70 mg weekly  7/18/24 off fosamax due to  diarrhea, declines reclast   8/24/24 vit d 109 recommend cutting dose in half     low back pain  11/30/20 right-sided buttock pain with radiation, referral to physiatry, x-ray, MRI lumbar spine, trial of naproxen short-term plus acetaminophen follow-up 3 weeks  11/30/20 x-ray lumbar spine moderate spondylosis, L2-L3 asymmetric disc height loss on the left resulting in slight right curvature and degenerative retrolisthesis  12/2/20 MRI lumbar spine mild superior endplate compression fracture at L3, no marrow edema consistent with chronic process, L2-L3 moderate central narrowing, moderate right and severe left neuroforaminal narrowing, L3-4 moderate central narrowing, moderate bilateral neuroforaminal narrowing, L4-5 moderate to severe central canal narrowing with severe right and moderate left neuroforaminal narrowing, L5-S1 moderate bilateral neuroforaminal narrowing  1/6/21  physiatry procedure note right lumbar epidural steroid injection L4-S1 under fluoroscopy  1/26/21  physieliot note good response to right lumbar L4-L5 epidural steroid injections, she has returned to activities such as skiing, consider physical therapy and home program for residual pain, consider right SI joint versus repeat lumbar steroid injection if necessary, follow-up 2 months  2/8/21 [facility] note   2/17/21 [facility] therapy note   8/31/22 dr.storm physiatry note plan right L4-L5 epidural steroid injection under fluoroscopy and physical therapy as tolerated  9/1/22 dr.storm physieliot procedure note epidural L4-L5 L5-S1 steroid injection  9/6/22 x-ray sacrum degenerative changes SI joint unchanged from prior  9/6/22 x-ray right hip stable mild bilateral osteoarthritis  9/6/22 dr.storm physibrocky note SI joint dysfunction, obtain x-ray lumbar spine, sacrum and coccyx, right hip, trial of tramadol every 6 hours, follow-up physical therapy  9/16/22  physieloit note low back pain, making improvements  with physical therapy, continues to have sacral dysfunction with radicular symptoms, discontinue tramadol, trial of acetaminophen 1300 mg tid ,avoid nsaids  10/10/23 low back pain after trimming branches, no trauma, no imaging necessary, trial of tramadol 50 mg qid  11/28/23 stopped cymbalta due to dizziness  12/4/23 MRI thoracic spine, acute subacute compression fractures T5 with 50 to 60% loss of height, T7 with 30% loss of height, T8 with 50 to 70% loss of height, no significant retropulsion  12/8/23 dr.ydra navarro pain note provided tylenol #3 schedule paraspinal trigger point injections under ultrasound guidance, consider physical therapy after injections, consider referral to interventional radiology for kyphoplasty  12/13/23 dr.ydra navarro pain procedure note ultrasound-guided trigger point injections T8-T9 level  3/7/24 [place] pain note ultrasound guided trigger point injections six total sites bilateral iliocostal thoracic areas  3/29/24  at [place] pain note ongoing thoracic pain likely related T5-T8 compression fracture, doing well status post trigger point injections, can repeat trigger point injections as needed, recommend physical therapy moving forward, patient would like to avoid opiate therapy  6/6/24 physical therapy note no improvement in symptoms after physical therapy, will discharge   7/25/24 dr.baumgartner navarro pain procedure note ultrasound-guided trigger point injection left iliocostalis thoracis, latissimus dorsi and thoracic multifidus  8/16/24 dr.young navarro pain note schedule bilateral T5-8 medial branch blocks/radiofrequency ablation  9/26/24 dr.young navarro pain procedure note thoracic T5-T8 medial branch block      knee pain left  8/1/13 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, lateral meniscus mildly extruded, ruptured hopper's cyst  8/12/13  ortho note pain improved on followup, consider injection if pain recurs     impaired  glucose metabolism  7/9/17 A1c 5.4%  4/27/18 A1c 5.4%  7/11/23 A1c 5.8%     hypertension  3/31/14 cardiac catheterization; left main mild plaquing, LAD patent, circumflex free of disease, RCA free of disease, normal LV function, mild aortic stenosis  7/9/17 echo normal LV size and function, EF 60%, grade 1 diastolic dysfunction, moderate aortic stenosis peak gradient 50, mean 29, valve area 0.8-1.0 and aortic regurgitation  7/9/17 persantine thallium small fixed perfusion abnormality distal anterior and anteroapical segments, no ischemia is noted to represent mild prior infarct or variant normal apical thinning  7/18/17 start diltiazem 120 mg daily (also has esophageal dysmotility by barium swallow)  4/26/18 off diltiazem  4/27/18 echo normal LV size and function, EF 65%, moderate aortic stenosis peak gradient 46, valve area 0.9,, moderate aortic insufficiency, no change compared to previous  5/27/19 echo normal LV size and function, EF 65%, normal diastolic function, calcified aortic valve moderate aortic stenosis peak gradient 53, moderate aortic insufficiency  10/1/21 cardiology note will likely need valve replacement next 1 to 2 years, follow-up 6 months  2/14/22  cardiology note moderate to severe aortic stenosis, able to walk 4 miles with no limitations, will order repeat echo follow-up 2 months  5/11/22 echo EF 55%,severe aortic stenosis peak gradient 70  5/13/22 cardiology note asymptomatic severe aortic stenosis, continue surveillance follow-up 6 months  11/7/22 cardiology note asymptomatic severe aortic stenosis, continue surveillance, repeat echo, follow-up 6 months  11/10/22 echo severe aortic stenosis, peak gradient 65, EF 60%, normal diastolic function  5/24/23 cardiology note aortic stenosis with preserved ejection fraction stage C, recommend stress EKG, echocardiogram, BNP, follow-up 3 months  9/7/23  cardiology structural heart follow up discussed risks and benefits and alternatives  to TAVR, will require preoperative work-up including CT and cardiac catheterization, patient agrees, follow-up 6 weeks  10/9/23 cardiac catheterization prior to TAVR procedure note, left main 20% disease, LAD proximal 40% and mid 30% disease, RCA mid 30% disease, mild to moderate aortic regurgitation, ascending aorta 2.9 cm, severe transaortic pressure gradient, borderline elevated left heart filling pressures, successful implantation of a 23 shearer nik S3 ultra resilia, acute decompensated diastolic heart failure with LVEDP of 28 mmHg, successful protection of the RCA with snorkel stent (3.5x24 Synergy XD MARCELLUS)  10/23/23  interventional cardiology operative note TAVR 23 shearer nik, RCA MARCELLUS stent, left ventricular end-diastolic pressure 28  11/2/23 echo TAVR present, normal LV size and function, EF 70%, indeterminate diastolic function, no TAVR aortic valve functioning normally with normal   11/3/23 started lisinopril 10 mg in hospital  11/20/23 echo normal LV size, thickness, function, EF 64%, indeterminate diastolic function, trace MR, known TAVR aortic valve functioning normally, peak gradient 15  11/29/23 cardiology note can stop metoprolol, continue asa and plavix, next visit consider change to plavix monotherapy  12/14/23 increase lisinopril to 20 mg per cardiology  12/22/23 increase lisinopril to 40 mg per cardiology   2/15/24  cardiology note doing well status post transcatheter aortic valve replacement with RCA protection by snorkel stenting, discontinue aspirin, continue plavix continue dental prophylaxis with antibiotics, continue rosuvastatin, lisinopril, follow-up 9 months  8/8/24  cardiology note continue plavix follow-up 6 months  10/3/24 echo normal LV size and function, hyperdynamic left ventricular systolic function, EF 75%, normal wall motion, normal diastolic function, normal TAVR valve functioning normally, transvalvular gradient peak 19 mmHg, no evidence of  paravalvular leak, no aortic insufficiency, RVSP 34  2/6/25  cardiology note has been experiencing easy bruising, recommend change plavix to qod, follow-up 6 months     history thoracic compression fracture  6/22/23 musculoskeletal back pain trial of zanaflex and gabapentin  6/29/23 on neurontin, zanaflex not covered, declines baclofen, going to chiropractor at [place]  7/7/23 off neurontin, trial of mobic 7.5 mg qday x 15 days  7/6/23 x-ray rib series bilaterally and chest x-ray questionable healing fracture left anterior fourth rib  7/6/23 xray thoracic spine chronic severe compression deformity T5, no definite acute fracture seen, mild arthritis  11/9/23 trial of cymbalta 30 mg has tried lidocaine, voltaren topical, ultram, gabapentin, tizanidine, unable to use NSAIDs oral  11/28/23 stopped cymbalta due to dizziness  12/4/23 MRI thoracic spine without, T5 50% to 60% loss of height with bone marrow edema, T6 superior endplate contour deformity with remote compression fracture approximately 20% loss of height, T7 bone marrow edema with 30% loss of height, T8 50% loss of height with bone marrow edema, T7-T8 right paracentral disc protrusion with bilateral mild facet degeneration, compared to previous radiographs 7/6/23 T5 lesion may have worsened while the T7 and T8 lesions are new  12/8/23 dr.ydra navarro pain note provided tylenol #3 schedule paraspinal trigger point injections under ultrasound guidance, consider physical therapy after injections, consider referral to interventional radiology for kyphoplasty  12/13/23 dr.ydra navarro pain procedure note ultrasound-guided trigger point injections T8-T9 level  1/11/24 dexa LS-0.5,hip-1.6,frax 20.4% major,5.5% hip recommend fosamax  3/7/24 [place] pain note ultrasound guided trigger point injections six total sites bilateral iliocostal thoracic areas  3/21/24 start fosamax 70 mg weekly  6/6/24 physical therapy note no improvement in symptoms after  physical therapy, will discharge    7/18/24 off fosamax due to diarrhea, declines reclast   10/24/24 dr.young navarro pain note procedure note thoracic radiofrequency ablation T5-T8    History polymyalgia  4/20/15 physical therapy, trial celebrex and zanaflex  5/7/15 physical therapy evaluation today recommended right hip x-ray and pelvic x-ray, ordered in computer  5/8/15 xray hip negative  6/29/15 seen by bridgette diagnosed with polymyalgia rheumatica  6/29/15 CRP 9.4,ESR 32 started on prednisone 20 mg    7/28/15 on prednisone 10 mg will continue 10 mg x 2 weeks, then decrease to 5 mg daily  8/19/15 hgb 14,hct 43, CRP 0.3, ESR 14, tapered off prednisone but developed mouth irritation, has resumed prednisone 5 mg daily, will continue x2 weeks then taper  11/17/15 refill prednisone 5 mg #30 tabs x one  6/21/16 off prednisone; CRP 0.1,ESR 17  6/27/17 CRP 0.09,ESR 12,cpk 66     history palpitations  7/11/19 patient feels more intense heart rate and heartbeat at night, can hear her heartbeat, only occurs at night, question anxiety component trial of buspar, 24-hour holter, cardiology referral  7/18/19 24-hour holter monitor, sinus rhythm asymptomatic average heart rate 65 PAC burden 1.3%, PVC burden 1.8%, 2 atrial runs longest 7 beats maximal heart rate 105, fastest run 4 beats maximum heart rate 129  2/14/22  cardiology note moderate to severe aortic stenosis, able to walk 4 miles with no limitations, will order repeat echo follow-up 2 months  5/11/22 echo EF 55%,severe aortic stenosis peak gradient 70  5/13/22 cardiology note asymptomatic severe aortic stenosis, continue surveillance follow-up 6 months  11/7/22 cardiology note asymptomatic severe aortic stenosis, continue surveillance, repeat echo, follow-up 6 months  11/10/22 echo severe aortic stenosis, peak gradient 65, EF 60%, normal diastolic function  5/24/23 cardiology note aortic stenosis with preserved ejection fraction stage C, recommend lower  stress EKG, echocardiogram, BNP, follow-up 3 months  9/7/23  cardiology structural heart follow up discussed risks and benefits and alternatives to TAVR, will require preoperative work-up including CT and cardiac catheterization, patient agrees, follow-up 6 weeks  10/23/23  interventional cardiology operative note TAVR 23 shearer nik, RCA MARCELLUS stent, left ventricular end-diastolic pressure 28  11/20/23 echo normal LV size, thickness, function, EF 64%, indeterminate diastolic function, trace MR, known TAVR aortic valve functioning normally, peak gradient 15  11/29/23 cardiology note can stop metoprolol  8/8/24  cardiology note continue plavix follow-up 6 months  10/3/24 echo normal LV size and function, hyperdynamic left ventricular systolic function, EF 75%, normal wall motion, normal diastolic function, normal TAVR valve functioning normally, transvalvular gradient peak 19 mmHg, no evidence of paravalvular leak, no aortic insufficiency, RVSP 34  2/6/25  cardiology note has been experiencing easy bruising, recommend change plavix to qod, follow-up 6 months     history neutropenia  8/06 wbc 3.4  3/08 wbc 3.9  6/09 wbc 3.9  7/10 wbc 4.4  7/11 wbc 4.5  7/12 wbc 3.8 (55%N,35%L)  7/24/13 wbc 4.3  3/20/14 wbc 3.1 (52%N,36%L)  3/31/14 wbc 3.7  8/19/15 wbc 5.3  6/21/16 wbc 4.4 (50%N,40%L)  6/27/17 wbc 4.2   4/27/18 wbc 4.4  5/14/19 wbc 4.5  6/12/20 wbc 4.5  6/30/21 wbc 4.3  1/14/22 wbc 5.3  10/20/22 wbc 5.6  7/11/23 wbc 5.7  11/2/23 wbc 7.4  8/24/24 wbc 4.1      History of breast cancer  1980s left sided s/p lumpectomy and radiation therapy, no old records  7/11 mammogram  8/12 mammogram  8/13 mammogram  8/18/14 mammogram  9/1/16 mammogram heterogeneously dense breast tissue recommend screening breast ultrasound  9/1/16 sonocine negative  11/18/22 mammogram heterogeneously dense breast tissue, declines screening breast ultrasound   1/11/24 mammogram heterogeneously dense breast tissue  offered screening breast ultrasound     History of basal cell skin cancer  10/22/19 [facility]     Glaucoma  4/29/21  eye exam   1/13/22  eye exam primary open-angle glaucoma  9/16/22  eye exam    1/12/23  eye exam   5/12/23  ophthalmology note primary open angle glaucoma moderate, follow-up 6 months  11/16/23  ophthalmology note continues on lumigan, timolol  5/16/24  eye exam      Esophageal dysmotility  7/9/17 barium swallow moderate esophageal dysmotility, small volume silent aspiration  7/18/17 start diltiazem 120 mg daily  7/18/17 referral [facility], speech pathology for esophageal dysmotility, small amount of aspiration on barium swallow, try low-dose diltiazem 120 mg for esophageal dysmotility and elevated blood pressure  7/26/17 [facility] evaluation dyspepsia, obtain cardiology clearance and then proceed EGD, initiate pantoprazole 20 mg, trial of miralax and colace     Dyslipidemia  3/08 chol 175,trig 73,hdl 45,ldl 115  6/09 chol 174,trig 89,hdl 47,ldl 109  7/10 chol 182,trig 61,hdl 55,ldl 114  7/11 chol 175,trig 69,hdl 45,ldl 116  7/12 chol 164,trig 64,hdl 43,ldl 108  7/24/13 chol 186,trig 66,hdl 54,ldl 119 no meds  8/5/14 chol 165,trig 93,hdl 48,ldl 98  8/19/15 chol 192,trig 109,hdl 58,ldl 112; 10 year risk 20% declines statin therapy  6/21/16 chol 137,trig 75,hdl 47,ldl 75   6/27/17 hcol 153,trig 94,hdl 54,ldl 80  5/14/19 chol 164,trig 82,hdl 48,ldl 100   6/12/20 chol 176,trig 80,hdl 57,ldl 103   6/30/21 chol 170,trig 100,hdl 43,ldl 107  10/20/22 chol 175,trig 81,hdl 61,ldl 98  10/31/23 start crestor 5 mg  11/29/23 cardiology note increase crestor to 10 mg  12/23/23 chol 134,trig 96,hdl 65,ldl 50 on crestor 10 mg  8/24/24 chol 116,trig 75,hdl 52,ldl 49 on crestor 10 mg     Decrease GFR  7/7/11 bun 16,creat 1.0,GFR 50  7/24/13 bun 14,creat 1.1,GFR 45  3/20/14 bun 15,creat 0.9,GFR 59  8/5/14 bun 14,creat 1.1,GFR 46  2/19/15 bun 13,creat 0.8,GFR  >60  8/19/15 bun 10,creat 1.1,GFR 48  6/21/16 bun 19,creat 1.0,GFR 50  6/27/17 bun 14,creat 0.5,GFR 51  4/28/18 bun 15,creat 0.9,GFR 60  5/14/19 bun 18,creat 1.13,GFR 46  6/12/20 bun 15,creat 1.07,GFR 49  6/30/21 bun 14,creat 1.07,GFR 49,PTH 36,urine mac<1.2,SPEP negative  1/14/22 bun 19,creat 1.18,GFR 44,PTH 25,calcium 9.6,phos 3.7  10/20/22 bun 20,creat 1.09,GFR 50,PTH 31  5/17/23 bun 18,creat 1.0,GFR 52  7/11/23 bun 16,creat 1.17,GFR 46,PTH 24  10/9/23 bun 15,creat 1.09,GFR 50  11/2/23 bun 18,creat 1.1,GFR,GFR 49  2/12/24 bun 20,creat 1.05,GFR 52  8/24/24 bun 23,creat 1.12,GFR 48 on lisinopril      colon polyp  3/7/17 colon per [facility] 2 polyps, grade 2 internal hemorrhoids, angioectasias ascending colon,pathology one hyperplastic polyp and onesessile serrated polyp, repeat colonoscopy 5 years       cad   10/06 echo mild mitral regurgitation, normal LV  6/09 stress echo negative  8/12 echo normal LV function, EF 65%, mild aortic stenosis, peak gradient 24  7/24/13 normal LV function, EF 60%, mild LVH, mild aortic stenosis, peak gradient 25  3/24/14 exercise treadmill carla protocol 5 minutes 5 seconds, 1 mm mild upsloping ST segment depression in V6, flat ST segment depression V4 and V5 compatible with ischemia, no arrhythmia noted  3/28/14 cardiology note, scheduled for cardiac catheterization, cont asa, metoprolol 25 mg bid, crestor 5 mg samples  3/31/14 cardiac catheterization; left main mild plaquing, LAD patent, circumflex free of disease, RCA free of disease, normal LV function, mild aortic stenosis  7/9/17 echo normal LV size and function, EF 60%, grade 1 diastolic dysfunction, moderate aortic stenosis peak gradient 50, mean 29, valve area 0.8-1.0 and aortic regurgitation  7/9/17 persantine thallium small fixed perfusion abnormality distal anterior and anteroapical segments, no ischemia is noted to represent mild prior infarct or variant normal apical thinning  4/27/18 echo normal LV size and function, EF 65%,  moderate aortic stenosis peak gradient 46, valve area 0.9, moderate aortic insufficiency, no change compared to previous  7/18/19 24-hour holter monitor, sinus rhythm asymptomatic average heart rate 65 PAC burden 1.3%, PVC burden 1.8%, 2 atrial runs longest 7 beats maximal heart rate 105, fastest run 4 beats maximum heart rate 129  9/24/21 echo EF 65%, indeterminate diastolic function, moderate aortic stenosis, peak gradient 56, moderate aortic insufficiency  10/1/21 cardiology note will likely need valve replacement next 1 to 2 years, follow-up 6 months  2/14/22  cardiology note moderate to severe aortic stenosis, able to walk 4 miles with no limitations, will order repeat echo follow-up 2 months  5/11/22 echo EF 55%,severe aortic stenosis peak gradient 70  11/7/22 cardiology note asymptomatic severe aortic stenosis, continue surveillance, repeat echo, follow-up 6 months  11/10/22 echo severe aortic stenosis, peak gradient 65, EF 60%, normal diastolic function  5/24/23 cardiology note aortic stenosis with preserved ejection fraction stage C, recommend stress EKG, echocardiogram, BNP, follow-up 3 months  9/7/23  cardiology structural heart follow up discussed risks and benefits and alternatives to TAVR, will require preoperative work-up including CT and cardiac catheterization, patient agrees, follow-up 6 weeks  10/9/23 cardiac catheterization prior to TAVR procedure note, left main 20% disease, LAD proximal 40% and mid 30% disease, RCA mid 30% disease, mild to moderate aortic regurgitation, ascending aorta 2.9 cm, severe transaortic pressure gradient, borderline elevated left heart filling pressures, successful implantation of a 23 shearer nik S3 ultra resilia, acute decompensated diastolic heart failure with LVEDP of 28 mmHg, successful protection of the RCA with snorkel stent (3.5x24 Synergy XD MARCELLUS)  10/23/23  interventional cardiology operative note TAVR 23 shearer nik, RCA MARCELLUS  stent, left ventricular end-diastolic pressure 28  11/2/23 echo TAVR present, normal LV size and function, EF 70%, indeterminate diastolic function, no TAVR aortic valve functioning normally with normal   11/3/23 started lisinopril 10 mg in hospital  11/20/23 echo normal LV size, thickness, function, EF 64%, indeterminate diastolic function, trace MR, known TAVR aortic valve functioning normally, peak gradient 15  11/29/23 cardiology note can stop metoprolol, continue asa and plavix, next visit consider change to plavix monotherapy  12/14/23 increase lisinopril to 20 mg per cardiology  12/22/23 increase lisinopril to 40 mg per cardiology   2/15/24  cardiology note doing well status post transcatheter aortic valve replacement with RCA protection by snorkel stenting, discontinue aspirin, continue plavix continue dental prophylaxis with antibiotics, continue rosuvastatin, lisinopril, follow-up 9 months  8/8/24  cardiology note continue plavix follow-up 6 months  10/3/24 echo normal LV size and function, hyperdynamic left ventricular systolic function, EF 75%, normal wall motion, normal diastolic function, normal TAVR valve functioning normally, transvalvular gradient peak 19 mmHg, no evidence of paravalvular leak, no aortic insufficiency, RVSP 34  2/6/25  cardiology note has been experiencing easy bruising, recommend change plavix to qod, follow-up 6 months    Aortic atherosclerosis  3/28/24 CT thorax without, moderate atherosclerotic calcification aortic arch          Patient Active Problem List   Diagnosis    History of breast cancer s/p lumpectomy left 1980s    Osteopenia    Dyslipidemia    History of neutropenia    Preventative health care    S/p TAVR for aortic stenosis    Glaucoma    History of basal cell carcinoma of skin    Knee pain, left    History of polymyalgia rheumatica    Decreased GFR    Colon polyp    S/P knee replacement    Esophageal dysmotility    History of palpitations     Low back pain    Stage 3a chronic kidney disease    Impaired glucose metabolism    Pulmonary nodule    S/P TAVR (transcatheter aortic valve replacement)    S/P drug eluting coronary stent placement    CAD (coronary artery disease)    History of thoracic compression fracture    Aortic atherosclerosis (HCC)    Hypertension         Patient reports that she quit smoking about 26 years ago. Her smoking use included cigarettes. She started smoking about 36 years ago. She has a 5 pack-year smoking history. She has never used smokeless tobacco. She reports current alcohol use of about 4.2 oz of alcohol per week. She reports that she does not use drugs.  Counseling given: Not Answered        ROS:    Gait: Uses no assistive device  Ostomy: No  Other tubes: No  Amputations: No  Chronic oxygen use: No  Last eye exam: 6 months ago   Wears hearing aids: No   : Reports urinary leakage during the last 6 months that has not interfered at all with their daily activities or sleep.      Depression Screening  Little interest or pleasure in doing things?  0 - not at all  Feeling down, depressed , or hopeless? 0 - not at all  Patient Health Questionnaire Score: 0     If depressive symptoms identified deferred to follow up visit unless specifically addressed in assessment and plan.    Interpretation of PHQ-9 Total Score   Score Severity   1-4 No Depression   5-9 Mild Depression   10-14 Moderate Depression   15-19 Moderately Severe Depression   20-27 Severe Depression    Screening for Cognitive Impairment  Do you or any of your friends or family members have any concern about your memory? Yes  Three Minute Recall (Village, Kitchen, Baby) 1/3    Marvin clock face with all 12 numbers and set the hands to show 10 minutes past 11.  No    Cognitive concerns identified deferred for follow up unless specifically addressed in assessment and plan.    Fall Risk Assessment  Has the patient had two or more falls in the last year or any fall with injury  in the last year?  No    Safety Assessment  Do you always wear your seatbelt?  Yes  Any changes to home needed to function safely? No  Difficulty hearing.  No  Patient counseled about all safety risks that were identified.    Functional Assessment ADLs  Are there any barriers preventing you from cooking for yourself or meeting nutritional needs?  No.    Are there any barriers preventing you from driving safely or obtaining transportation?  No.    Are there any barriers preventing you from using a telephone or calling for help?  No    Are there any barriers preventing you from shopping?  No.    Are there any barriers preventing you from taking care of your own finances?  No    Are there any barriers preventing you from managing your medications?  No    Are there any barriers preventing you from showering, bathing or dressing yourself? No    Are there any barriers preventing you from doing housework or laundry? No  Are there any barriers preventing you from using the toilet?No  Are you currently engaging in any exercise or physical activity?  Yes. Walking 1 hr per day       Self-Assessment of Health  What is your perception of your health? Poor    Do you sleep more than six hours a night? Yes    In the past 7 days, how much did pain keep you from doing your normal work? A lot    Do you spend quality time with family or friends (virtually or in person)? No    Do you usually eat a heart healthy diet that constists of a variety of fruits, vegetables, whole grains and fiber? Yes    Do you eat foods high in fat and/or Fast Food more than three times per week? No    How concerned are you that your medical conditions are not being well managed? Not at all    Are you worried that in the next 2 months, you may not have stable housing that you own, rent, or stay in as part of a household? No      Advance Care Planning  Do you have an Advance Directive, Living Will, Durable Power of , or POLST? Yes  Advance Directive    Durable Power of    is on file      Health Maintenance Summary            Current Care Gaps       Annual Wellness Visit (Yearly) Overdue since 6/29/2022 06/29/2021  Visit Dx: Medicare annual wellness visit, subsequent    06/09/2020  Visit Dx: Medicare annual wellness visit, subsequent    04/29/2019  Visit Dx: Medicare annual wellness visit, subsequent    06/20/2017  Visit Dx: Medicare annual wellness visit, subsequent    06/16/2016  Visit Dx: Medicare annual wellness visit, subsequent     Only the first 5 history entries have been loaded, but more history exists.            Influenza Vaccine (1) Overdue since 9/1/2024      10/24/2023  Imm Admin: Influenza Vaccine Adult HD    09/26/2022  Imm Admin: Influenza Vaccine Adult HD    10/18/2021  Imm Admin: Influenza Vaccine Adult HD    11/11/2020  Imm Admin: Influenza Vaccine Adult HD    10/25/2019  Imm Admin: Influenza Vaccine Adult HD      Only the first 5 history entries have been loaded, but more history exists.              COVID-19 Vaccine (6 - 2024-25 season) Overdue since 9/1/2024 01/02/2024  Imm Admin: Covid-19 Mrna (Spikevax) Moderna 12+ Years    06/21/2022  Imm Admin: MODERNA SARS-COV-2 VACCINE (12+)    10/25/2021  Imm Admin: MODERNA SARS-COV-2 VACCINE (12+)    02/06/2021  Imm Admin: MODERNA SARS-COV-2 VACCINE (12+)    01/09/2021  Imm Admin: MODERNA SARS-COV-2 VACCINE (12+)      Only the first 5 history entries have been loaded, but more history exists.                      Upcoming       Bone Density Scan (Every 5 Years) Next due on 1/11/2029 01/11/2024  DS-BONE DENSITY STUDY (DEXA)    08/23/2021  DS-BONE DENSITY STUDY (DEXA)    05/24/2019  DS-BONE DENSITY STUDY (DEXA)    08/19/2015  DS-BONE DENSITY STUDY (DEXA)    08/06/2013  DS-BONE DENSITY STUDY (DEXA)      Only the first 5 history entries have been loaded, but more history exists.              IMM DTaP/Tdap/Td Vaccine (3 - Td or Tdap) Next due on 4/28/2032 04/28/2022  Imm Admin:  Tdap Vaccine    07/23/2012  Imm Admin: Tdap Vaccine                      Completed or No Longer Recommended       Zoster (Shingles) Vaccines (Series Information) Completed      09/18/2020  Imm Admin: Zoster Vaccine Recombinant (RZV) (SHINGRIX)    06/09/2020  Imm Admin: Zoster Vaccine Recombinant (RZV) (SHINGRIX)    07/29/2014  Imm Admin: Zoster Vaccine Live (ZVL) (Zostavax) - HISTORICAL DATA              Pneumococcal Vaccine: 50+ Years (Series Information) Completed      09/26/2022  Imm Admin: Pneumococcal Conjugate Vaccine (PCV20)    08/06/2015  Imm Admin: Pneumococcal Conjugate Vaccine (Prevnar/PCV-13)    01/10/2013  Imm Admin: Pneumococcal polysaccharide vaccine (PPSV-23)              Hepatitis A Vaccine (Hep A) (Series Information) Aged Out      No completion history exists for this topic.              HPV Vaccines (Series Information) Aged Out     No completion history exists for this topic.              Polio Vaccine (Inactivated Polio) (Series Information) Aged Out     No completion history exists for this topic.              Meningococcal Immunization (Series Information) Aged Out     No completion history exists for this topic.              Mammogram  Discontinued        Frequency changed to Never automatically (Topic No Longer Applies)    01/11/2024  MA-SCREENING MAMMO BILAT W/TOMOSYNTHESIS W/CAD    11/17/2022  MA-SCREENING MAMMO BILAT W/TOMOSYNTHESIS W/CAD    08/23/2021  MA-SCREENING MAMMO BILAT W/TOMOSYNTHESIS W/CAD    06/18/2020  MA-DIAGNOSTIC MAMMO RIGHT W/TOMOSYNTHESIS W/O CAD     Only the first 5 history entries have been loaded, but more history exists.            Colorectal Cancer Screening  Discontinued        Frequency changed to Never automatically (Topic No Longer Applies)    03/07/2017  REFERRAL TO GI FOR COLONOSCOPY    07/31/2014  FIT    07/22/2013  OCCULT BLOOD FECES IMMUNOASSAY    07/26/2012  OCCULT BLOOD FECES IMMUNOASSAY      Only the first 5 history entries have been loaded, but more  history exists.              Hepatitis B Vaccine (Hep B)  Discontinued      No completion history exists for this topic.                            Patient Care Team:  Master Suarez M.D. as PCP - General  Master Suarez M.D. as PCP - [facility] Paneled  Chris Bernstein M.D. as Consulting Physician (Ophthalmology)  Amanda Rodriguez M.D. as Consulting Physician (Ophthalmology)  Manoj Tidwell M.D. (Inactive) as Consulting Physician (Orthopedic Surgery)        Health Care Screening recommendations reviewed with patient today and updated or ordered.  DPA/Advanced directive: Completed/Information provided.   Referrals for PT/OT/Nutrition counseling/Behavioral Health/Smoking cessation as above if indicated  Discussion today about general wellness and lifestyle habits:    Prevent falls and reduce trip hazards;   Have a working fire alarm and carbon monoxide detector;   Engage in regular physical activity and social activities;   Use sun protection when outdoors.       ROS           Objective       Physical Exam  Vitals and nursing note reviewed.   Constitutional:       Appearance: Normal appearance.   HENT:      Head: Normocephalic and atraumatic.      Right Ear: External ear normal.      Left Ear: External ear normal.      Nose: Nose normal.      Mouth/Throat:      Mouth: Mucous membranes are moist.      Pharynx: Oropharynx is clear. No oropharyngeal exudate.   Eyes:      Conjunctiva/sclera: Conjunctivae normal.   Cardiovascular:      Rate and Rhythm: Normal rate and regular rhythm.      Heart sounds: Normal heart sounds.   Pulmonary:      Effort: No respiratory distress.      Breath sounds: Normal breath sounds.   Abdominal:      General: There is no distension.   Musculoskeletal:         General: No swelling.      Cervical back: Neck supple.   Skin:     Coloration: Skin is not jaundiced.      Findings: No bruising.   Neurological:      General: No focal deficit present.      Mental Status: She is alert. Mental status is  at baseline.   Psychiatric:         Mood and Affect: Mood normal.         Behavior: Behavior normal.     No lower extremity edema      Mmse 26 out of 30, normal affect, insight, judgment no hallucinations or delusions         Assessment & Plan       Assessment  #!  Medicare wellness assessment    #2 CAD cardiac catheterization October 2023 RCA stent, asymptomatic no chest pain, shortness of breath, or palpitations, remains on Plavix not taking every other day per cardiology recommendation    #3 TAVR for aortic stenosis October 2023 stable followed by cardiology most recent echo in October normal EF, normal transvalvular peak, no paravalvular leak    #4 hypertension, stable blood pressures at home on lisinopril    #5 dyslipidemia 8/24/24 chol 116,trig 75,hdl 52,ldl 49 on crestor 10 mg    #6 CKD 3A GFR 48 on lisinopril 8/24/24 bun 23,creat 1.12,GFR 48, no NSAIDs     #7 chronic back pain related to thoracic compression fracture 2023, followed by pain management, has had various trigger point injections, as well as ablation, has seen physical therapy, patient still has chronic back pain typically worse with activity or if she overdoes things at home as she lives independently,  so she does everything on her own but she does have help from neighbors and friends, also some neck and shoulder pain likely related to her thoracic compression fracture, no history of polymyalgia or temporal arteritis    #8 Osteopena 1/11/24 dexa LS-0.5,hip-1.6,frax 20.4% major,5.5% has declined Reclast and Fosamax    #9 pulmonary nodule 3/28/24 CT thorax without, mild apical scarring consistent with old granulomatous disease, 8 mm subpleural nodule anterior right middle lobe, 7 mm subpleural nodule left lung base, no change from previous, recommend repeat 12 months    #10 aortic ectasia no aneurysm    #11 skin lesions followed by dermatology    #12 vitamin D deficiency    #13 impaired glucose metabolism    #14 history of breast cancer  1980s, declines further mammogram    #15 history of polymyalgia rheumatica 2015    Plan   #!  Health maintenance reviewed and updated    #2 old records [facility]     #3 referral to [facility] for back pain, she would like to try different group for pain management    #4 continue with regular exercise, avoid heavy lifting as that may aggravate her back    #5 declines bisphosphonate therapy for postmenopausal bone loss and history of fracture understanding that bisphosphonate therapy may decrease risk of future fractures    #6 continue with a good nutrition program limiting sweets, candies, processed foods    #7 declines physical therapy, falling precautions    #8 continue avoid NSAIDs    #9 check blood pressure regularly, follow-up cardiology    #10 old records dermatology, continue sunscreen use outdoors    #11 patient will update her power of  and advanced directive and drop that off here    #12 patient declines future mammograms    #13 repeat labs    #14 CT thorax without follow-up pulmonary nodule    #15 declines COVID-vaccine, flu vaccine    #16 follow-up 6 months

## 2025-03-10 NOTE — TELEPHONE ENCOUNTER
(1) Need old records [facility]    (2) need old records [facility] for the past 12 months

## 2025-03-15 NOTE — OP THERAPY DAILY TREATMENT
Outpatient Physical Therapy  DAILY TREATMENT     [facility] [address]  [address]  [place] [address]  Phone:  [phone]  Fax:  [phone]    Date: 2017    Patient: Katerina Torres  YOB: 1940  MRN: [id number]       Subjective      Time Calculation  Start time: 919  Stop time: 1000 Time Calculation (min): 41 minutes     Chief Complaint: No chief complaint on file.    Visit #: 3  No knee pain but I have not skied lately  Pain:     Current pain ratin/10   Objective    Exercises/Treatment  Objective  There ex: x10min.    Manual: 15  E-stim: 15 min.    Treatments:    Treatment Summary:   Ball squat//x 10 increased pain lateral knee     CFM: pr ox fibular head and lateral tibia  Iastm: ant. Tibialis, VL and lateral jt. line  --a/p fib mobs at end range flexion--gd 4  --lateral jt line cfm w/ tool  --Dutch to vmo/ant tib. 10/10 mhp balloon bounce  0/10 no pain  Assessment/Response/Plan     Assessment, Response and Plan:   -noted ttp lateral jt. line    P: increase posterior chain strength, iastm , cfm      
- - -

## 2025-03-26 PROCEDURE — RXMED WILLOW AMBULATORY MEDICATION CHARGE: Performed by: OPHTHALMOLOGY

## 2025-04-01 ENCOUNTER — PHARMACY VISIT (OUTPATIENT)
Dept: PHARMACY | Facility: MEDICAL CENTER | Age: 85
End: 2025-04-01
Payer: COMMERCIAL

## 2025-04-01 PROCEDURE — RXMED WILLOW AMBULATORY MEDICATION CHARGE: Performed by: OPHTHALMOLOGY

## 2025-04-02 ENCOUNTER — PHARMACY VISIT (OUTPATIENT)
Dept: PHARMACY | Facility: MEDICAL CENTER | Age: 85
End: 2025-04-02
Payer: COMMERCIAL

## 2025-04-19 ENCOUNTER — TELEPHONE (OUTPATIENT)
Dept: MEDICAL GROUP | Facility: MEDICAL CENTER | Age: 85
End: 2025-04-19
Payer: MEDICARE

## 2025-04-19 DIAGNOSIS — G56.00 CARPAL TUNNEL SYNDROME, UNSPECIFIED LATERALITY: ICD-10-CM

## 2025-04-20 NOTE — Clinical Note
REFERRAL APPROVAL NOTICE         Sent on April 19, 2025                   Katerina Torres  [address]  [facility] [address]                   Dear Ms. Torres,    After a careful review of the medical information and benefit coverage, [facility] has processed your referral. See below for additional details.    If applicable, you must be actively enrolled with your insurance for coverage of the authorized service. If you have any questions regarding your coverage, please contact your insurance directly.    REFERRAL INFORMATION   Referral #:  [id number]  Referred-To Provider    Referred-By Provider:  Orthopedic Surgery    Master BONIFACIO Suarez M.D.   [facility]      [address]   [address]  [place] [address]  [phone] [address]  [address]  [facility] [address]  [phone]    Referral Start Date:  04/19/2025  Referral End Date:   04/19/2026             SCHEDULING  If you do not already have an appointment, please call [phone] to make an appointment.     MORE INFORMATION  If you do not already have a [employer] account, sign up at: [email or web address]  You can access your medical information, make appointments, see lab results, billing information, and more.  If you have questions regarding this referral, please contact  the [facility] Referrals department at:             [phone]. Monday - Friday 8:00AM - 5:00PM.     Sincerely,    [facility]

## 2025-04-24 PROBLEM — M25.532 WRIST PAIN, LEFT: Status: ACTIVE | Noted: 2025-04-24

## 2025-05-08 ENCOUNTER — TELEPHONE (OUTPATIENT)
Dept: CARDIOLOGY | Facility: MEDICAL CENTER | Age: 85
End: 2025-05-08
Payer: MEDICARE

## 2025-05-08 NOTE — TELEPHONE ENCOUNTER
BE        Caller: Katerina Torres      Topic/issue: Patient was asking for a call back regarding to see if a referral could be placed for her to establish with a Dr. Richard at [facility] and she was asking for a call back. Please advise      Callback Number: [phone]      Thank you    -Pavel SEYMOUR

## 2025-05-08 NOTE — TELEPHONE ENCOUNTER
Phone Number Called: [phone]     Call outcome: Did not leave a detailed message. Requested patient to call back.    Message: Called to inquire about request for ortho referral. Unable to reach. Left VM for call back.

## 2025-05-09 PROBLEM — M18.9 CMC ARTHRITIS, THUMB, DEGENERATIVE: Status: ACTIVE | Noted: 2025-05-09

## 2025-05-09 PROBLEM — G56.02 CARPAL TUNNEL SYNDROME ON LEFT: Status: ACTIVE | Noted: 2025-05-09

## 2025-05-14 ENCOUNTER — APPOINTMENT (OUTPATIENT)
Dept: ADMISSIONS | Facility: MEDICAL CENTER | Age: 85
End: 2025-05-14
Attending: ORTHOPAEDIC SURGERY
Payer: MEDICARE

## 2025-05-15 ENCOUNTER — PRE-ADMISSION TESTING (OUTPATIENT)
Dept: ADMISSIONS | Facility: MEDICAL CENTER | Age: 85
End: 2025-05-15
Attending: ORTHOPAEDIC SURGERY
Payer: MEDICARE

## 2025-05-15 ENCOUNTER — TELEPHONE (OUTPATIENT)
Dept: CARDIOLOGY | Facility: MEDICAL CENTER | Age: 85
End: 2025-05-15

## 2025-05-15 VITALS — HEIGHT: 61 IN | BODY MASS INDEX: 25.83 KG/M2

## 2025-05-15 DIAGNOSIS — Z01.810 PRE-OPERATIVE CARDIOVASCULAR EXAMINATION: ICD-10-CM

## 2025-05-15 DIAGNOSIS — Z01.812 PRE-OPERATIVE LABORATORY EXAMINATION: Primary | ICD-10-CM

## 2025-05-15 LAB
ALBUMIN SERPL BCP-MCNC: 4.1 G/DL (ref 3.2–4.9)
ALBUMIN/GLOB SERPL: 1.5 G/DL
ALP SERPL-CCNC: 63 U/L (ref 30–99)
ALT SERPL-CCNC: 11 U/L (ref 2–50)
ANION GAP SERPL CALC-SCNC: 12 MMOL/L (ref 7–16)
AST SERPL-CCNC: 21 U/L (ref 12–45)
BILIRUB SERPL-MCNC: 0.3 MG/DL (ref 0.1–1.5)
BUN SERPL-MCNC: 23 MG/DL (ref 8–22)
CALCIUM ALBUM COR SERPL-MCNC: 9.5 MG/DL (ref 8.5–10.5)
CALCIUM SERPL-MCNC: 9.6 MG/DL (ref 8.5–10.5)
CHLORIDE SERPL-SCNC: 104 MMOL/L (ref 96–112)
CO2 SERPL-SCNC: 23 MMOL/L (ref 20–33)
CREAT SERPL-MCNC: 1.19 MG/DL (ref 0.5–1.4)
EKG IMPRESSION: NORMAL
ERYTHROCYTE [DISTWIDTH] IN BLOOD BY AUTOMATED COUNT: 48.7 FL (ref 35.9–50)
GFR SERPLBLD CREATININE-BSD FMLA CKD-EPI: 44 ML/MIN/1.73 M 2
GLOBULIN SER CALC-MCNC: 2.8 G/DL (ref 1.9–3.5)
GLUCOSE SERPL-MCNC: 141 MG/DL (ref 65–99)
HCT VFR BLD AUTO: 38.9 % (ref 37–47)
HGB BLD-MCNC: 12.5 G/DL (ref 12–16)
MCH RBC QN AUTO: 31.4 PG (ref 27–33)
MCHC RBC AUTO-ENTMCNC: 32.1 G/DL (ref 32.2–35.5)
MCV RBC AUTO: 97.7 FL (ref 81.4–97.8)
PLATELET # BLD AUTO: 243 K/UL (ref 164–446)
PMV BLD AUTO: 10.6 FL (ref 9–12.9)
POTASSIUM SERPL-SCNC: 4.7 MMOL/L (ref 3.6–5.5)
PROT SERPL-MCNC: 6.9 G/DL (ref 6–8.2)
RBC # BLD AUTO: 3.98 M/UL (ref 4.2–5.4)
SODIUM SERPL-SCNC: 139 MMOL/L (ref 135–145)
WBC # BLD AUTO: 6.3 K/UL (ref 4.8–10.8)

## 2025-05-15 PROCEDURE — 36415 COLL VENOUS BLD VENIPUNCTURE: CPT

## 2025-05-15 PROCEDURE — 80053 COMPREHEN METABOLIC PANEL: CPT

## 2025-05-15 PROCEDURE — 93005 ELECTROCARDIOGRAM TRACING: CPT | Mod: TC

## 2025-05-15 PROCEDURE — 85027 COMPLETE CBC AUTOMATED: CPT

## 2025-05-15 PROCEDURE — 93010 ELECTROCARDIOGRAM REPORT: CPT | Performed by: INTERNAL MEDICINE

## 2025-05-15 NOTE — PREADMIT AVS NOTE
Current Medications   Medication Instructions    clopidogrel (PLAVIX) 75 MG Tab Follow instructions from surgeon or specialist.    Cholecalciferol (VITAMIN D3 PO) Stop 7 days before surgery    amoxicillin (AMOXIL) 500 MG Cap Follow instructions from surgeon or specialist.    rosuvastatin (CRESTOR) 10 MG Tab Continue taking as prescribed.    lisinopril (PRINIVIL) 40 MG tablet Stop 24 hours before surgery    bimatoprost (LUMIGAN) 0.01 % Solution Follow instructions from surgeon or specialist.    acetaminophen (TYLENOL) 500 MG Tab As needed medication, may take if needed, including morning of procedure

## 2025-05-15 NOTE — TELEPHONE ENCOUNTER
Phone Number Called: [phone]      Call outcome: Left detailed message for patient. Informed to call back with any additional questions.    Message: Called to inform patient about cardiac clearance process. Requested that the patient request a clearance form from the surgeons office provided patient with our fax number.

## 2025-05-15 NOTE — TELEPHONE ENCOUNTER
BE    Caller: Katerina Torres    PT would like to know when she should stop taking her Plavix before surgery?     Procedure Name: Carpel Tunnel Surgery     Procedure Scheduled Date: 05/23/25    Callback Number: [phone]    Thank you,  Brittney ELIZABETH

## 2025-05-23 ENCOUNTER — HOSPITAL ENCOUNTER (OUTPATIENT)
Facility: MEDICAL CENTER | Age: 85
End: 2025-05-23
Attending: ORTHOPAEDIC SURGERY | Admitting: ORTHOPAEDIC SURGERY
Payer: MEDICARE

## 2025-05-23 ENCOUNTER — ANESTHESIA (OUTPATIENT)
Dept: SURGERY | Facility: MEDICAL CENTER | Age: 85
End: 2025-05-23
Payer: MEDICARE

## 2025-05-23 ENCOUNTER — ANESTHESIA EVENT (OUTPATIENT)
Dept: SURGERY | Facility: MEDICAL CENTER | Age: 85
End: 2025-05-23
Payer: MEDICARE

## 2025-05-23 VITALS
HEART RATE: 68 BPM | TEMPERATURE: 97.3 F | OXYGEN SATURATION: 96 % | RESPIRATION RATE: 16 BRPM | BODY MASS INDEX: 25.16 KG/M2 | WEIGHT: 133.16 LBS | DIASTOLIC BLOOD PRESSURE: 62 MMHG | SYSTOLIC BLOOD PRESSURE: 126 MMHG

## 2025-05-23 PROCEDURE — 160002 HCHG RECOVERY MINUTES (STAT): Performed by: ORTHOPAEDIC SURGERY

## 2025-05-23 PROCEDURE — 160015 HCHG STAT PREOP MINUTES: Performed by: ORTHOPAEDIC SURGERY

## 2025-05-23 PROCEDURE — 700111 HCHG RX REV CODE 636 W/ 250 OVERRIDE (IP): Mod: JZ | Performed by: ANESTHESIOLOGY

## 2025-05-23 PROCEDURE — 160025 RECOVERY II MINUTES (STATS): Performed by: ORTHOPAEDIC SURGERY

## 2025-05-23 PROCEDURE — 700105 HCHG RX REV CODE 258: Performed by: ORTHOPAEDIC SURGERY

## 2025-05-23 PROCEDURE — 160035 HCHG PACU - 1ST 60 MINS PHASE I: Performed by: ORTHOPAEDIC SURGERY

## 2025-05-23 PROCEDURE — 160048 HCHG OR STATISTICAL LEVEL 1-5: Performed by: ORTHOPAEDIC SURGERY

## 2025-05-23 PROCEDURE — 700111 HCHG RX REV CODE 636 W/ 250 OVERRIDE (IP): Performed by: ORTHOPAEDIC SURGERY

## 2025-05-23 PROCEDURE — 160029 HCHG SURGERY MINUTES - 1ST 30 MINS LEVEL 4: Performed by: ORTHOPAEDIC SURGERY

## 2025-05-23 PROCEDURE — 700101 HCHG RX REV CODE 250: Performed by: ORTHOPAEDIC SURGERY

## 2025-05-23 PROCEDURE — 160009 HCHG ANES TIME/MIN: Performed by: ORTHOPAEDIC SURGERY

## 2025-05-23 PROCEDURE — 160046 HCHG PACU - 1ST 60 MINS PHASE II: Performed by: ORTHOPAEDIC SURGERY

## 2025-05-23 PROCEDURE — 700105 HCHG RX REV CODE 258: Performed by: ANESTHESIOLOGY

## 2025-05-23 RX ORDER — OXYCODONE HCL 5 MG/5 ML
5 SOLUTION, ORAL ORAL
Status: DISCONTINUED | OUTPATIENT
Start: 2025-05-23 | End: 2025-05-23 | Stop reason: HOSPADM

## 2025-05-23 RX ORDER — HYDROMORPHONE HYDROCHLORIDE 1 MG/ML
0.2 INJECTION, SOLUTION INTRAMUSCULAR; INTRAVENOUS; SUBCUTANEOUS
Status: DISCONTINUED | OUTPATIENT
Start: 2025-05-23 | End: 2025-05-23 | Stop reason: HOSPADM

## 2025-05-23 RX ORDER — MIDAZOLAM HYDROCHLORIDE 1 MG/ML
1 INJECTION INTRAMUSCULAR; INTRAVENOUS
Status: DISCONTINUED | OUTPATIENT
Start: 2025-05-23 | End: 2025-05-23 | Stop reason: HOSPADM

## 2025-05-23 RX ORDER — SODIUM CHLORIDE, SODIUM LACTATE, POTASSIUM CHLORIDE, CALCIUM CHLORIDE 600; 310; 30; 20 MG/100ML; MG/100ML; MG/100ML; MG/100ML
INJECTION, SOLUTION INTRAVENOUS
Status: DISCONTINUED | OUTPATIENT
Start: 2025-05-23 | End: 2025-05-23 | Stop reason: SURG

## 2025-05-23 RX ORDER — HYDROMORPHONE HYDROCHLORIDE 1 MG/ML
0.5 INJECTION, SOLUTION INTRAMUSCULAR; INTRAVENOUS; SUBCUTANEOUS
Status: DISCONTINUED | OUTPATIENT
Start: 2025-05-23 | End: 2025-05-23 | Stop reason: HOSPADM

## 2025-05-23 RX ORDER — HYDROMORPHONE HYDROCHLORIDE 1 MG/ML
0.4 INJECTION, SOLUTION INTRAMUSCULAR; INTRAVENOUS; SUBCUTANEOUS
Status: DISCONTINUED | OUTPATIENT
Start: 2025-05-23 | End: 2025-05-23 | Stop reason: HOSPADM

## 2025-05-23 RX ORDER — OXYCODONE HCL 5 MG/5 ML
10 SOLUTION, ORAL ORAL
Status: DISCONTINUED | OUTPATIENT
Start: 2025-05-23 | End: 2025-05-23 | Stop reason: HOSPADM

## 2025-05-23 RX ORDER — ALBUTEROL SULFATE 5 MG/ML
2.5 SOLUTION RESPIRATORY (INHALATION)
Status: DISCONTINUED | OUTPATIENT
Start: 2025-05-23 | End: 2025-05-23 | Stop reason: HOSPADM

## 2025-05-23 RX ORDER — MEPERIDINE HYDROCHLORIDE 25 MG/ML
12.5 INJECTION INTRAMUSCULAR; INTRAVENOUS; SUBCUTANEOUS
Status: DISCONTINUED | OUTPATIENT
Start: 2025-05-23 | End: 2025-05-23 | Stop reason: HOSPADM

## 2025-05-23 RX ORDER — SODIUM CHLORIDE, SODIUM LACTATE, POTASSIUM CHLORIDE, CALCIUM CHLORIDE 600; 310; 30; 20 MG/100ML; MG/100ML; MG/100ML; MG/100ML
INJECTION, SOLUTION INTRAVENOUS CONTINUOUS
Status: DISCONTINUED | OUTPATIENT
Start: 2025-05-23 | End: 2025-05-23 | Stop reason: HOSPADM

## 2025-05-23 RX ORDER — EPHEDRINE SULFATE 50 MG/ML
5 INJECTION, SOLUTION INTRAVENOUS
Status: DISCONTINUED | OUTPATIENT
Start: 2025-05-23 | End: 2025-05-23 | Stop reason: HOSPADM

## 2025-05-23 RX ORDER — LABETALOL HYDROCHLORIDE 5 MG/ML
5 INJECTION, SOLUTION INTRAVENOUS
Status: DISCONTINUED | OUTPATIENT
Start: 2025-05-23 | End: 2025-05-23 | Stop reason: HOSPADM

## 2025-05-23 RX ORDER — HYDRALAZINE HYDROCHLORIDE 20 MG/ML
5 INJECTION INTRAMUSCULAR; INTRAVENOUS
Status: DISCONTINUED | OUTPATIENT
Start: 2025-05-23 | End: 2025-05-23 | Stop reason: HOSPADM

## 2025-05-23 RX ORDER — HALOPERIDOL 5 MG/ML
1 INJECTION INTRAMUSCULAR
Status: DISCONTINUED | OUTPATIENT
Start: 2025-05-23 | End: 2025-05-23 | Stop reason: HOSPADM

## 2025-05-23 RX ORDER — BUPIVACAINE HYDROCHLORIDE 5 MG/ML
INJECTION, SOLUTION EPIDURAL; INTRACAUDAL; PERINEURAL
Status: DISCONTINUED | OUTPATIENT
Start: 2025-05-23 | End: 2025-05-23 | Stop reason: HOSPADM

## 2025-05-23 RX ORDER — ONDANSETRON 2 MG/ML
4 INJECTION INTRAMUSCULAR; INTRAVENOUS
Status: DISCONTINUED | OUTPATIENT
Start: 2025-05-23 | End: 2025-05-23 | Stop reason: HOSPADM

## 2025-05-23 RX ORDER — DIPHENHYDRAMINE HYDROCHLORIDE 50 MG/ML
12.5 INJECTION, SOLUTION INTRAMUSCULAR; INTRAVENOUS
Status: DISCONTINUED | OUTPATIENT
Start: 2025-05-23 | End: 2025-05-23 | Stop reason: HOSPADM

## 2025-05-23 RX ADMIN — SODIUM CHLORIDE, POTASSIUM CHLORIDE, SODIUM LACTATE AND CALCIUM CHLORIDE: 600; 310; 30; 20 INJECTION, SOLUTION INTRAVENOUS at 12:10

## 2025-05-23 RX ADMIN — PROPOFOL 20 MG: 10 INJECTION, EMULSION INTRAVENOUS at 13:11

## 2025-05-23 RX ADMIN — FENTANYL CITRATE 25 MCG: 50 INJECTION, SOLUTION INTRAMUSCULAR; INTRAVENOUS at 13:02

## 2025-05-23 RX ADMIN — PROPOFOL 20 MG: 10 INJECTION, EMULSION INTRAVENOUS at 13:08

## 2025-05-23 RX ADMIN — PROPOFOL 50 MG: 10 INJECTION, EMULSION INTRAVENOUS at 13:02

## 2025-05-23 RX ADMIN — SODIUM CHLORIDE, POTASSIUM CHLORIDE, SODIUM LACTATE AND CALCIUM CHLORIDE: 600; 310; 30; 20 INJECTION, SOLUTION INTRAVENOUS at 12:59

## 2025-05-23 ASSESSMENT — PAIN DESCRIPTION - PAIN TYPE
TYPE: ACUTE PAIN;SURGICAL PAIN
TYPE: SURGICAL PAIN;ACUTE PAIN
TYPE: SURGICAL PAIN
TYPE: ACUTE PAIN

## 2025-05-23 ASSESSMENT — FIBROSIS 4 INDEX: FIB4 SCORE: 2.24

## 2025-05-23 NOTE — OP REPORT
OP Note    PreOp Diagnosis: Left carpal tunnel syndrome      PostOp Diagnosis: Same      Procedure(s):  LEFT ENDOSCOPIC CARPAL TUNNEL RELEASE - Wound Class: Clean    Surgeon(s):  Robb Marin M.D.    Anesthesiologist/Type of Anesthesia:  Anesthesiologist: Arthur Briseno M.D./AVILA    Surgical Staff:  Circulator: Dana Lujan; Denise Hansen R.N.  Scrub Person: Nathaniel MACI Thompson    Specimens removed if any:  * No specimens in log *    Estimated Blood Loss: Less than 10 cc    Findings: Thickened transverse carpal ligament    Complications: None    Operative indication: Katerina is an 84-year-old female presented with numbness tingling paresthesias of her left hand.  She failed conservative management including bracing.  Therefore discussed with her an endoscopic versus open carpal tunnel release.  Risks and benefits of the procedure including but not limited to damage to surrounding nerves, arteries, veins, infection, incomplete symptom relief, need for further operation, pillar pain, CRPS.  Despite these risks the patient did consent.    Operation in detail:    On the day of surgery, the patient was seen in the preoperative area.  The left wrist was marked.  All the questions were answered and consent was obtained.  The patient was brought to the operating room placed in the supine position.  MAC anesthesia was administered.  A formal timeout was performed identifying the left wrist as the correct wrist as well as correct procedure.  Once sedated 10 cc 1% lidocaine half percent bupivacaine with epinephrine was injected over the volar wrist.  The left arm was then prepped marked and draped in the normal sterile fashion.  The limb was elevated, exsanguinated with an Esmarch, and the tourniquet inflated to 250 mmHg.  Using a 2 cm transverse incision 1 cm proximal to the volar wrist crease the skin was incised.  Full-thickness flaps were kept to subcutaneous tissue.  The palmaris longus was identified  and retracted radially.  The antebrachial fascia was identified elevated and opened longitudinally.  A Ragnell was placed under the fascia.  A synovial scraper was passed in line with the ring finger under the transverse carpal ligament.  Serial dilators were then passed in the same line.  With the wrist in hyperextension, the Micro[employer]ire Rudolph camera was passed under the transverse carpal ligament.  Excellent visualization was able to be obtained.  There were no traversing nerves.  Fat was able to be ballotted distally.  The distal third of the ligament was released, followed by the middle third, followed by the proximal third.  There was excellent fat drop.  There was excellent retraction of the leaflets.  There was no remaining transverse fibers.  The camera was removed and the proximal most fibers were released under direct visualization.  Fibers proximal to my incision in the forearm were released under direct visualization.  The wound was copiously irrigated, the tourniquet was deflated, and hemostasis was obtained with bipolar cautery.  The wound was closed with 4-0 Monocryl, benzoin, Steri-Strips, 4 x 4's and a Tegaderm.    Patient tolerated the procedure well.  Patient will follow up with me in 2 weeks for reassessment.      5/23/2025 1:23 PM Robb Marin M.D.

## 2025-05-23 NOTE — ANESTHESIA TIME REPORT
Anesthesia Start and Stop Event Times       Date Time Event    5/23/2025 1202 Ready for Procedure     1259 Anesthesia Start     1324 Anesthesia Stop          Responsible Staff  05/23/25      Name Role Begin End    Arthur Briseno M.D. Anesth 1259 1324          Overtime Reason:  no overtime (within assigned shift)    Comments:

## 2025-05-23 NOTE — ANESTHESIA POSTPROCEDURE EVALUATION
Patient: Katerina Torres    Procedure Summary       Date: 05/23/25 Room / Location:  OR 02 / SURGERY [facility]    Anesthesia Start: 1259 Anesthesia Stop: 1324    Procedure: LEFT ENDOSCOPIC CARPAL TUNNEL RELEASE (Left: Wrist) Diagnosis: (LEFT CARPAL TUNNEL SYNDROME - LEFT)    Surgeons: Robb Marin M.D. Responsible Provider: Arthur Briseno M.D.    Anesthesia Type: MAC ASA Status: 3            Final Anesthesia Type: MAC  Last vitals  BP   Blood Pressure : 101/51    Temp   36.1 °C (97 °F)    Pulse   63   Resp   16    SpO2   93 %      Anesthesia Post Evaluation    Patient location during evaluation: PACU  Patient participation: complete - patient participated  Level of consciousness: awake and alert    Airway patency: patent  Anesthetic complications: no  Cardiovascular status: hemodynamically stable  Respiratory status: acceptable  Hydration status: euvolemic    PONV: none          No notable events documented.     Nurse Pain Score: 0 (NPRS)

## 2025-05-23 NOTE — ANESTHESIA PREPROCEDURE EVALUATION
Case: [id number] Date/Time: 05/23/25 1300    Procedure: LEFT ENDOSCOPIC CARPAL TUNNEL RELEASE    Pre-op diagnosis: LEFT CARPAL TUNNEL SYNDROME - LEFT    Location: [facility] OR 02 / SURGERY [facility]    Surgeons: Robb Marin M.D.            Relevant Problems   CARDIAC   (positive) Aortic atherosclerosis (HCC)   (positive) CAD (coronary artery disease)   (positive) Hypertension   (positive) S/p TAVR for aortic stenosis         (positive) Stage 3a chronic kidney disease      Other   (positive) Thumb left and right arthritis       Physical Exam    Airway   Mallampati: II  TM distance: >3 FB  Neck ROM: full       Cardiovascular - normal exam  Rhythm: regular  Rate: normal    (-) murmur     Dental - normal exam           Pulmonary - normal examBreath sounds clear to auscultation     Abdominal    Neurological - normal exam                   Anesthesia Plan    ASA 3       Plan - MAC               Induction: intravenous    Postoperative Plan: Postoperative administration of opioids is intended.    Pertinent diagnostic labs and testing reviewed    Informed Consent:    Anesthetic plan and risks discussed with patient.

## 2025-05-23 NOTE — DISCHARGE INSTRUCTIONS
If any questions arise, call your provider.  If your provider is not available, please feel free to call the Surgical Center at [phone].    MEDICATIONS: Resume taking daily medication.  Take prescribed pain medication with food.  If no medication is prescribed, you may take non-aspirin pain medication if needed.  PAIN MEDICATION CAN BE VERY CONSTIPATING.  Take a stool softener or laxative such as senokot, pericolace, or milk of magnesia if needed.    Last pain medication given at     What to Expect Post Anesthesia    Rest and take it easy for the first 24 hours.  A responsible adult is recommended to remain with you during that time.  It is normal to feel sleepy.  We encourage you to not do anything that requires balance, judgment or coordination.    FOR 24 HOURS DO NOT:  Drive, operate machinery or run household appliances.  Drink beer or alcoholic beverages.  Make important decisions or sign legal documents.    To avoid nausea, slowly advance diet as tolerated, avoiding spicy or greasy foods for the first day.  Add more substantial food to your diet according to your provider's instructions.  Babies can be fed formula or breast milk as soon as they are hungry.  INCREASE FLUIDS AND FIBER TO AVOID CONSTIPATION.    MILD FLU-LIKE SYMPTOMS ARE NORMAL.  YOU MAY EXPERIENCE GENERALIZED MUSCLE ACHES, THROAT IRRITATION, HEADACHE AND/OR SOME NAUSEA.    Diet    Resume pre-operative diet upon discharge from the hospital. Depending on how you are feeling and whether you have nausea or not, you may wish to stay with a bland diet for the first few days. However, you can advance your diet as quickly as you feel ready.

## 2025-05-23 NOTE — OR NURSING
1322 Pt arrived to PACU from OR via gurney. Report received from anesthesia and RN. Respirations are spontaneous and unlabored. VSS on 4L. Dressing is CDI. Cold pack applied. Pt is awake and oriented, No c/o pain.     1341 Family updated.    1345 Pt meets criteria for transfer to stage 2.      
1356: Patient arrived to phase II from PACU 1 via gurney. Report received from Eliud BARR. Respirations are spontaneous and unlabored.     1400: Family at bedside.     1438: Patient education completed, family denies further questions. IV removed with tip intact. DC'd to care of responsible adult.    
oral

## 2025-05-27 NOTE — Clinical Note
[facility]  [address] [place] [address]    [id number]    Katerina Torres  [address] [place] [address]    May 27, 2025    Member Name: Katerina Torres   Member Number: [id number]   Reference Number: [id number]   Approved Services: MRI/CAT Scan   Approved Service Dates: 05/27/2025 - 09/24/2025   Requesting Provider: Master Suarez   Requested Provider: [facility]     Dear Katerina Torres:    The following medical service(s) requested by Master Suarez have been approved:    Procedure Code Procedure Code Name Requested Quantity Approved Quantity Status   88971 (CPT®) CHG DIAGNOSTIC COMPUTED TOMOGRAPHY THORAX W/CONTRAST 1 1 Authorized       Approved Quantity means the number of visits approved for medication treatments and/or medical services.    The services should be provided by [facility] no later than 09/24/2025. Please contact the provider listed below with any questions.     Provider Information:  [facility]  [phone]    Your plan benefit may require a deductible, co-payment or coinsurance for these services. This authorization does not guarantee [facility] will pay the claim for services that you receive. Payment by [facility] for these services is subject to the terms of your Evidence of Coverage, your eligibility at the time of service, and confirmation of benefit coverage.    For any questions or additional information, please contact Customer Service:    [facility] Toll Free: [phone]  TTY users dial: 711   Call Center Hours:  Oct 1 - Mar 31, Mon - Fri 7 AM to 8 PM PST  Oct 1 - Mar 31, Sat - Sun 8 AM to 8 PM PST  Apr 1 - Sep 30, Mon - Fri 7 AM to 8 PM PST   Office Hours: Mon - Fri 8 AM to 5 PM PST   E-mail: [email or web address]   Website:  [email or web address]      This information is available for free in other languages. Please  contact Customer Service at the phone number above for more information. [facility] complies with applicable Federal civil rights laws and does not discriminate on the basis of race, color, national origin, age, disability or sex.    Sincerely,     Healthcare Utilization Management Department     Cc: [facility]   Master BONIFACIO Suarez    Multi-Language Insert  Multi- Services  English: We have free  services to answer any questions you may have about our health or drug plan.  To get an , just call us at [phone].  Someone who speaks English/Language can help you.  This is a free service.  Pakistani: Tenemos servicios de intérprete sin costo alguno  para responder cualquier pregunta que pueda tener sobre nuestro plan de lou o medicamentos. Para hablar con un intérprete, por favor llame al [phone]. Alguien que hable español le podrá ayudar. Argenis es un servicio gratuito.  Chinese Mandarin: ?????????????????????????????? ???????????????? [phone]????????????????? ?????????  Chinese Cantonese: ?????????????????????????????? ????????????? [phone]???????????????????? ????????  Tagalog:  Mayroon kaming libreng serbisymadonna sherwoodsasalerin middletongnba o panggamot.  anupam Bryan  [phone]. Jacky Viramontes.  Alfred rush.  Persian:  Nous proposons jose luis services gratuits d'interprétation pour répondre à toutes lina questions relatives à notre régime de santé ou d'assurance-médicaments. Pour accéder au service d'interprétation, il vous suffit de nous appeler au [phone]. Un interlocuteur parAurora Sheboygan Memorial Medical Centernain Françs pourra vous aider. Ce service est gratuit.  Icelandic:  Laina manzanares có d?ch v? thông d?ch mi?n phí ð? tr? l?i các câu h?i v? chýõng s?c kh?e và chýõng [facility]  thu?c men. N?u quí v? c?n thông d?ch viên kaylyn g?i [phone] s? có nhân viên nói ti?ng Vi?t giúp ð? quí v?. Ðây là d?ch v? mi?n phí .  Hebrew:  Unser kostenloser Dolmetscherservice beantwortet Ihren Fragen zu unserem Gesundheits- und Arzneimittelplan. Unsere Dolmetscher erreichen Sie [phone]. Man wird Ihnen lawanda auf [facility]. Dieser Service ist [facility].  Yi:  ??? ?? ?? ?? ?? ??? ?? ??? ?? ???? ?? ?? ???? ???? ????. ?? ???? ????? ?? [phone] ??? ??? ????.  ???? ?? ???? ?? ?? ????. ? ???? ??? ?????.   Turkish: Åñëè ó âàñ âîçíèêíóò âîïðîñû îòíîñèòåëüíî ñòðàõîâîãî èëè ìåäèêàìåíòíîãî ïëàíà, âû ìîæåòå âîñïîëüçîâàòüñÿ íàøèìè áåñïëàòíûìè óñëóãàìè ïåðåâîä÷èêîâ. ×òîáû âîñïîëüçîâàòüñÿ óñëóãàìè ïåðåâîä÷èêà, ïîçâîíèòå íàì ïî òåëåôîíó [phone]. Âàì îêàæåò ïîìîùü ñîòðóäíèê, êîòîðûé ãîâîðèò ïî-póññêè. Äàííàÿ óñëóãà áåñïëàòíàÿ.  Danish: ÅääÇ äÞÏã ÎÏãÇÊ ÇáãÊÑÌã ÇáÝæÑí ÇáãÌÇäíÉ ááÅÌÇÈÉ Úä Ãí ÃÓÆáÉ ÊÊÚáÞ ÈÇáÕÍÉ Ãæ ÌÏæá ÇáÃÏæíÉ áÏíäÇ. ááÍÕæá Úáì ãÊÑÌã ÝæÑí¡ áíÓ Úáíß Óæì ÇáÇÊÕÇá ÈäÇ Úáì [phone] . ÓíÞæã ÔÎÕ ãÇ íÊÍÏË ÇáÚÑÈíÉ ÈãÓÇÚÏÊß. åÐå ÎÏãÉ ãÌÇäíÉ.  Trinidad: ????? ????????? ?? ??? ?? ????? ?? ???? ??? ???? ???? ?? ?????? ?? ???? ???? ?? ??? ????? ??? ????? ???????? ?????? ?????? ???. ?? ???????? ??????? ???? ?? ???, ?? ???? [phone] ?? ??? ????. ??? ??????? ?? ?????? ????? ?? ???? ??? ?? ???? ??. ?? ?? ????? ???? ??.   Haitian:  È disponibile un servizio di interpretariato gratuito per rispondere a eventuali domande sul nostro piano sanitario e farmaceutico. Per un interprete, contattare il alejandra [phone]. Un nostro incaricato teresa parla Italianovi fornirà l'assistenza necessaria. È un servizio gratuito.  Portugués:  Dispomos de serviços de interpretação gratuitos para responder a qualquer questão que tenha acerca do nosso plano de saúde ou de medicação. Para obter um intérprete, contacte-nos através do número [phone]. Irá encontrar alguém que fale o idioma  Português para  o ajudar. Argenis serviço é gratuito.  Guinean Creole:  Nou genyen sèvis entèprèt gratis mil reponn tout kesyon ou ta genyen konsènan plan medikal oswa dwòg nou an.  Mil jwenn yon entèprèt, jis rele nou nan [phone]. Yon moun ki pale Kreyòl kapab toma w.  Sa a se yon sèvis ki gratis.  Polish:  Umo¿liwiamy bezp³atne skorzystanie z us³ug t³umacza ustnego, który pomo¿e w uzyskaniu odpowiedzi na temat planu zdrowotnego lub dawkowania lejuniorw. Quita skorzystaæ z pomocy t³umacza znaj¹cego jailene devlin¿y zadzwoniæ pod numer [phone]. Ta us³uga jest bezp³atna.  Telugu: ????? ??????? ????????????????????? ??????????????????????????????????[phone] ???????????????? ? ????????????????? ?????

## 2025-06-02 ENCOUNTER — PATIENT MESSAGE (OUTPATIENT)
Dept: CARDIOLOGY | Facility: MEDICAL CENTER | Age: 85
End: 2025-06-02
Payer: MEDICARE

## 2025-06-02 PROCEDURE — RXMED WILLOW AMBULATORY MEDICATION CHARGE: Performed by: INTERNAL MEDICINE

## 2025-06-03 PROCEDURE — RXMED WILLOW AMBULATORY MEDICATION CHARGE: Performed by: OPHTHALMOLOGY

## 2025-06-04 ENCOUNTER — PHARMACY VISIT (OUTPATIENT)
Dept: PHARMACY | Facility: MEDICAL CENTER | Age: 85
End: 2025-06-04
Payer: COMMERCIAL

## 2025-06-08 PROBLEM — Z98.890 S/P CARPAL TUNNEL RELEASE: Status: ACTIVE | Noted: 2025-05-09

## 2025-06-09 ENCOUNTER — RESULTS FOLLOW-UP (OUTPATIENT)
Dept: MEDICAL GROUP | Facility: MEDICAL CENTER | Age: 85
End: 2025-06-09
Payer: MEDICARE

## 2025-06-09 ENCOUNTER — HOSPITAL ENCOUNTER (OUTPATIENT)
Dept: RADIOLOGY | Facility: MEDICAL CENTER | Age: 85
End: 2025-06-09
Attending: INTERNAL MEDICINE
Payer: MEDICARE

## 2025-06-09 DIAGNOSIS — R91.1 PULMONARY NODULE: ICD-10-CM

## 2025-06-09 PROCEDURE — 700117 HCHG RX CONTRAST REV CODE 255: Performed by: INTERNAL MEDICINE

## 2025-06-09 PROCEDURE — 71260 CT THORAX DX C+: CPT

## 2025-06-09 RX ADMIN — IOHEXOL 80 ML: 350 INJECTION, SOLUTION INTRAVENOUS at 10:45

## 2025-06-11 PROBLEM — G56.02 CARPAL TUNNEL SYNDROME ON LEFT: Status: ACTIVE | Noted: 2025-06-11

## 2025-07-11 ENCOUNTER — TELEPHONE (OUTPATIENT)
Dept: FAMILY PLANNING/WOMEN'S HEALTH CLINIC | Facility: PHYSICIAN GROUP | Age: 85
End: 2025-07-11
Payer: MEDICARE

## 2025-07-17 DIAGNOSIS — Z95.5 S/P DRUG ELUTING CORONARY STENT PLACEMENT: ICD-10-CM

## 2025-07-17 DIAGNOSIS — E78.5 DYSLIPIDEMIA: Chronic | ICD-10-CM

## 2025-07-17 DIAGNOSIS — I50.30 DIASTOLIC HEART FAILURE, UNSPECIFIED HF CHRONICITY (HCC): ICD-10-CM

## 2025-07-17 RX ORDER — CLOPIDOGREL BISULFATE 75 MG/1
75 TABLET ORAL
Qty: 50 TABLET | Refills: 3 | OUTPATIENT
Start: 2025-07-17

## 2025-07-17 RX ORDER — ROSUVASTATIN CALCIUM 10 MG/1
10 TABLET, COATED ORAL EVERY EVENING
Qty: 100 TABLET | Refills: 1 | OUTPATIENT
Start: 2025-07-17

## 2025-07-17 RX ORDER — LISINOPRIL 40 MG/1
40 TABLET ORAL DAILY
Qty: 100 TABLET | Refills: 1 | OUTPATIENT
Start: 2025-07-17

## 2025-08-03 DIAGNOSIS — Z86.2 HISTORY OF NEUTROPENIA: ICD-10-CM

## 2025-08-03 DIAGNOSIS — Z00.00 PREVENTATIVE HEALTH CARE: Chronic | ICD-10-CM

## 2025-08-03 DIAGNOSIS — Z98.890 S/P CARPAL TUNNEL RELEASE: ICD-10-CM

## 2025-08-03 DIAGNOSIS — M25.532 WRIST PAIN, LEFT: Primary | ICD-10-CM

## 2025-08-03 PROBLEM — S32.599A PUBIC RAMUS FRACTURE (HCC): Status: ACTIVE | Noted: 2025-08-03

## 2025-08-04 ENCOUNTER — APPOINTMENT (OUTPATIENT)
Dept: MEDICAL GROUP | Facility: MEDICAL CENTER | Age: 85
End: 2025-08-04
Payer: MEDICARE

## 2025-08-04 PROCEDURE — RXMED WILLOW AMBULATORY MEDICATION CHARGE: Performed by: INTERNAL MEDICINE

## 2025-08-04 ASSESSMENT — FIBROSIS 4 INDEX: FIB4 SCORE: 2.27

## 2025-08-05 ENCOUNTER — PHARMACY VISIT (OUTPATIENT)
Dept: PHARMACY | Facility: MEDICAL CENTER | Age: 85
End: 2025-08-05
Payer: COMMERCIAL

## 2025-08-05 PROCEDURE — RXMED WILLOW AMBULATORY MEDICATION CHARGE: Performed by: OPHTHALMOLOGY

## 2025-08-07 ENCOUNTER — HOME CARE VISIT (OUTPATIENT)
Dept: HOME HEALTH SERVICES | Facility: HOME HEALTHCARE | Age: 85
End: 2025-08-07
Payer: MEDICARE

## 2025-08-07 ENCOUNTER — DOCUMENTATION (OUTPATIENT)
Dept: MEDICAL GROUP | Facility: PHYSICIAN GROUP | Age: 85
End: 2025-08-07
Payer: MEDICARE

## 2025-08-07 VITALS
OXYGEN SATURATION: 93 % | BODY MASS INDEX: 25.09 KG/M2 | HEIGHT: 60 IN | HEART RATE: 60 BPM | RESPIRATION RATE: 16 BRPM | SYSTOLIC BLOOD PRESSURE: 100 MMHG | WEIGHT: 127.8 LBS | DIASTOLIC BLOOD PRESSURE: 56 MMHG | TEMPERATURE: 99.2 F

## 2025-08-07 PROCEDURE — 665998 HH PPS REVENUE CREDIT

## 2025-08-07 PROCEDURE — 665999 HH PPS REVENUE DEBIT

## 2025-08-07 PROCEDURE — 665001 SOC-HOME HEALTH

## 2025-08-07 PROCEDURE — G0493 RN CARE EA 15 MIN HH/HOSPICE: HCPCS

## 2025-08-07 PROCEDURE — G0180 MD CERTIFICATION HHA PATIENT: HCPCS | Performed by: INTERNAL MEDICINE

## 2025-08-07 ASSESSMENT — ENCOUNTER SYMPTOMS
HIGHEST PAIN SEVERITY IN PAST 24 HOURS: 8/10
LOWEST PAIN SEVERITY IN PAST 24 HOURS: 6/10
PAIN SEVERITY GOAL: 0/10
DESCRIPTION OF MEMORY LOSS: SHORT TERM
VOMITING: DENIES
NAUSEA: DENIES
CONTUSION: 1
BOWEL PATTERN NORMAL: 1
DEBILITATING PAIN: 1
PAIN LOCATION - RELIEVING FACTORS: REST
LAST BOWEL MOVEMENT: 67423
PAIN LOCATION: LEFT HIP
PAIN LOCATION - PAIN DURATION: ACUTE
PAIN LOCATION - EXACERBATING FACTORS: ACTIVITY
FORGETFULNESS: 1
SUBJECTIVE PAIN PROGRESSION: GRADUALLY IMPROVING
PAIN: 1
PAIN LOCATION - PAIN FREQUENCY: CONSTANT
PAIN LOCATION - PAIN SEVERITY: 7/10
PAIN LOCATION - PAIN QUALITY: ACHE

## 2025-08-07 ASSESSMENT — FIBROSIS 4 INDEX: FIB4 SCORE: 2.27

## 2025-08-08 PROCEDURE — 665999 HH PPS REVENUE DEBIT

## 2025-08-08 PROCEDURE — 665998 HH PPS REVENUE CREDIT

## 2025-08-09 PROCEDURE — 665999 HH PPS REVENUE DEBIT

## 2025-08-09 PROCEDURE — 665998 HH PPS REVENUE CREDIT

## 2025-08-10 PROCEDURE — 665999 HH PPS REVENUE DEBIT

## 2025-08-10 PROCEDURE — 665998 HH PPS REVENUE CREDIT

## 2025-08-11 PROCEDURE — 665999 HH PPS REVENUE DEBIT

## 2025-08-11 PROCEDURE — 665998 HH PPS REVENUE CREDIT

## 2025-08-12 ENCOUNTER — HOME CARE VISIT (OUTPATIENT)
Dept: HOME HEALTH SERVICES | Facility: HOME HEALTHCARE | Age: 85
End: 2025-08-12
Payer: MEDICARE

## 2025-08-12 VITALS
RESPIRATION RATE: 16 BRPM | HEART RATE: 78 BPM | DIASTOLIC BLOOD PRESSURE: 60 MMHG | SYSTOLIC BLOOD PRESSURE: 100 MMHG | TEMPERATURE: 98 F | OXYGEN SATURATION: 93 %

## 2025-08-12 PROCEDURE — 665999 HH PPS REVENUE DEBIT

## 2025-08-12 PROCEDURE — G0151 HHCP-SERV OF PT,EA 15 MIN: HCPCS

## 2025-08-12 PROCEDURE — 665998 HH PPS REVENUE CREDIT

## 2025-08-12 ASSESSMENT — ENCOUNTER SYMPTOMS
PAIN LOCATION - PAIN QUALITY: SHARP
ARTHRALGIAS: 1
PAIN SEVERITY GOAL: 0/10
HIGHEST PAIN SEVERITY IN PAST 24 HOURS: 7/10
PAIN LOCATION - PAIN FREQUENCY: WITH ACTIVITY
PAIN LOCATION - EXACERBATING FACTORS: PHYSICAL ACTIVITY
PAIN LOCATION - PAIN DURATION: FEW HOURS
PAIN LOCATION - RELIEVING FACTORS: REST AND TYLENOL
PAIN LOCATION - PAIN SEVERITY: 5/10
MUSCLE WEAKNESS: 1
PAIN: 1
SUBJECTIVE PAIN PROGRESSION: GRADUALLY IMPROVING
LOWEST PAIN SEVERITY IN PAST 24 HOURS: 5/10

## 2025-08-12 ASSESSMENT — ACTIVITIES OF DAILY LIVING (ADL)
GROOMING_WITHIN_DEFINED_LIMITS: 1
AMBULATION ASSISTANCE: CONTACT GUARD ASSIST
AMBULATION ASSISTANCE: 1
CURRENT_FUNCTION: STAND BY ASSIST
PHYSICAL_TRANSFERS_DEVICES: FWW
BATHING_WITHIN_DEFINED_LIMITS: 1
PHYSICAL TRANSFERS ASSESSED: 1
FEEDING_WITHIN_DEFINED_LIMITS: 1

## 2025-08-12 ASSESSMENT — PATIENT HEALTH QUESTIONNAIRE - PHQ9: CLINICAL INTERPRETATION OF PHQ2 SCORE: 0

## 2025-08-13 ENCOUNTER — APPOINTMENT (OUTPATIENT)
Dept: RADIOLOGY | Facility: MEDICAL CENTER | Age: 85
End: 2025-08-13
Attending: INTERNAL MEDICINE
Payer: MEDICARE

## 2025-08-13 PROCEDURE — 665998 HH PPS REVENUE CREDIT

## 2025-08-13 PROCEDURE — 665999 HH PPS REVENUE DEBIT

## 2025-08-13 ASSESSMENT — ACTIVITIES OF DAILY LIVING (ADL): OASIS_M1830: 05

## 2025-08-14 ENCOUNTER — HOME CARE VISIT (OUTPATIENT)
Dept: HOME HEALTH SERVICES | Facility: HOME HEALTHCARE | Age: 85
End: 2025-08-14
Payer: MEDICARE

## 2025-08-14 PROCEDURE — G0151 HHCP-SERV OF PT,EA 15 MIN: HCPCS

## 2025-08-14 PROCEDURE — 665999 HH PPS REVENUE DEBIT

## 2025-08-14 PROCEDURE — 665998 HH PPS REVENUE CREDIT

## 2025-08-14 PROCEDURE — G0155 HHCP-SVS OF CSW,EA 15 MIN: HCPCS

## 2025-08-14 ASSESSMENT — ACTIVITIES OF DAILY LIVING (ADL): SHOPPING_REQUIRES_ASSISTANCE: 1

## 2025-08-14 ASSESSMENT — ENCOUNTER SYMPTOMS
PAIN: 1
DEPRESSED MOOD: 1
PERSON REPORTING PAIN: PATIENT

## 2025-08-14 ASSESSMENT — PATIENT HEALTH QUESTIONNAIRE - PHQ9
5. POOR APPETITE OR OVEREATING: 1 - SEVERAL DAYS
CLINICAL INTERPRETATION OF PHQ2 SCORE: 6
SUM OF ALL RESPONSES TO PHQ QUESTIONS 1-9: 8

## 2025-08-15 VITALS
SYSTOLIC BLOOD PRESSURE: 100 MMHG | HEART RATE: 76 BPM | TEMPERATURE: 98.2 F | RESPIRATION RATE: 16 BRPM | DIASTOLIC BLOOD PRESSURE: 60 MMHG | OXYGEN SATURATION: 95 %

## 2025-08-15 PROCEDURE — 665998 HH PPS REVENUE CREDIT

## 2025-08-15 PROCEDURE — 665999 HH PPS REVENUE DEBIT

## 2025-08-15 ASSESSMENT — ENCOUNTER SYMPTOMS
LOWEST PAIN SEVERITY IN PAST 24 HOURS: 4/10
HIGHEST PAIN SEVERITY IN PAST 24 HOURS: 5/10
PAIN LOCATION - PAIN FREQUENCY: WITH ACTIVITY
PAIN: 1
PAIN LOCATION - PAIN DURATION: FEW HOURS
PAIN LOCATION: L PELVIS
SUBJECTIVE PAIN PROGRESSION: GRADUALLY IMPROVING
PAIN LOCATION - EXACERBATING FACTORS: PHYSICAL ACTIVITY
PAIN LOCATION - PAIN SEVERITY: 4/10
PAIN SEVERITY GOAL: 0/10
PAIN LOCATION - PAIN QUALITY: SHARP

## 2025-08-16 PROCEDURE — 665998 HH PPS REVENUE CREDIT

## 2025-08-16 PROCEDURE — 665999 HH PPS REVENUE DEBIT

## 2025-08-17 PROCEDURE — 665998 HH PPS REVENUE CREDIT

## 2025-08-17 PROCEDURE — 665999 HH PPS REVENUE DEBIT

## 2025-08-18 ENCOUNTER — HOME CARE VISIT (OUTPATIENT)
Dept: HOME HEALTH SERVICES | Facility: HOME HEALTHCARE | Age: 85
End: 2025-08-18
Payer: MEDICARE

## 2025-08-18 PROCEDURE — G0155 HHCP-SVS OF CSW,EA 15 MIN: HCPCS

## 2025-08-18 PROCEDURE — 665998 HH PPS REVENUE CREDIT

## 2025-08-18 PROCEDURE — G0151 HHCP-SERV OF PT,EA 15 MIN: HCPCS

## 2025-08-18 PROCEDURE — 665999 HH PPS REVENUE DEBIT

## 2025-08-18 ASSESSMENT — PATIENT HEALTH QUESTIONNAIRE - PHQ9
CLINICAL INTERPRETATION OF PHQ2 SCORE: 6
5. POOR APPETITE OR OVEREATING: 1 - SEVERAL DAYS
SUM OF ALL RESPONSES TO PHQ QUESTIONS 1-9: 10

## 2025-08-18 ASSESSMENT — ENCOUNTER SYMPTOMS
PAIN: 1
PERSON REPORTING PAIN: PATIENT

## 2025-08-18 ASSESSMENT — ACTIVITIES OF DAILY LIVING (ADL): SHOPPING_REQUIRES_ASSISTANCE: 1

## 2025-08-19 VITALS
HEART RATE: 76 BPM | SYSTOLIC BLOOD PRESSURE: 104 MMHG | RESPIRATION RATE: 16 BRPM | TEMPERATURE: 97.7 F | OXYGEN SATURATION: 96 % | DIASTOLIC BLOOD PRESSURE: 60 MMHG

## 2025-08-19 PROCEDURE — 665998 HH PPS REVENUE CREDIT

## 2025-08-19 PROCEDURE — 665999 HH PPS REVENUE DEBIT

## 2025-08-19 ASSESSMENT — ENCOUNTER SYMPTOMS
SUBJECTIVE PAIN PROGRESSION: GRADUALLY IMPROVING
PAIN: 1
HIGHEST PAIN SEVERITY IN PAST 24 HOURS: 6/10
PAIN LOCATION: L PELVIS
PAIN SEVERITY GOAL: 0/10
PAIN LOCATION - PAIN FREQUENCY: WITH ACTIVITY
PAIN LOCATION - PAIN DURATION: FEW HOURS
PAIN LOCATION - EXACERBATING FACTORS: PHYSICAL ACTIVITY
PAIN LOCATION - PAIN SEVERITY: 5/10
PAIN LOCATION - PAIN QUALITY: ACHY
LOWEST PAIN SEVERITY IN PAST 24 HOURS: 2/10
PAIN LOCATION - RELIEVING FACTORS: REST AND PAIN MEDS

## 2025-08-20 ENCOUNTER — HOME CARE VISIT (OUTPATIENT)
Dept: HOME HEALTH SERVICES | Facility: HOME HEALTHCARE | Age: 85
End: 2025-08-20
Payer: MEDICARE

## 2025-08-20 PROCEDURE — 665999 HH PPS REVENUE DEBIT

## 2025-08-20 PROCEDURE — 665998 HH PPS REVENUE CREDIT

## 2025-08-21 ENCOUNTER — HOME CARE VISIT (OUTPATIENT)
Dept: HOME HEALTH SERVICES | Facility: HOME HEALTHCARE | Age: 85
End: 2025-08-21
Payer: MEDICARE

## 2025-08-21 PROCEDURE — 665999 HH PPS REVENUE DEBIT

## 2025-08-21 PROCEDURE — G0151 HHCP-SERV OF PT,EA 15 MIN: HCPCS

## 2025-08-21 PROCEDURE — 665998 HH PPS REVENUE CREDIT

## 2025-08-22 PROCEDURE — 665998 HH PPS REVENUE CREDIT

## 2025-08-22 PROCEDURE — 665999 HH PPS REVENUE DEBIT

## 2025-08-23 VITALS
SYSTOLIC BLOOD PRESSURE: 120 MMHG | DIASTOLIC BLOOD PRESSURE: 60 MMHG | HEART RATE: 79 BPM | OXYGEN SATURATION: 95 % | TEMPERATURE: 97.8 F | RESPIRATION RATE: 16 BRPM

## 2025-08-23 PROCEDURE — 665998 HH PPS REVENUE CREDIT

## 2025-08-23 PROCEDURE — 665999 HH PPS REVENUE DEBIT

## 2025-08-23 ASSESSMENT — ENCOUNTER SYMPTOMS
PAIN: 1
PAIN LOCATION - EXACERBATING FACTORS: PHYSICAL ACTIVITY
PAIN LOCATION - PAIN QUALITY: SHARP
PAIN SEVERITY GOAL: 0/10
PAIN LOCATION: L PELVIS
PAIN LOCATION - PAIN SEVERITY: 0/10
PAIN LOCATION - RELIEVING FACTORS: REST AND PAIN MEDS
PAIN LOCATION - PAIN FREQUENCY: WITH ACTIVITY
HIGHEST PAIN SEVERITY IN PAST 24 HOURS: 4/10
SUBJECTIVE PAIN PROGRESSION: GRADUALLY IMPROVING
LOWEST PAIN SEVERITY IN PAST 24 HOURS: 0/10

## 2025-08-24 PROCEDURE — 665998 HH PPS REVENUE CREDIT

## 2025-08-24 PROCEDURE — 665999 HH PPS REVENUE DEBIT

## 2025-08-25 ENCOUNTER — HOME CARE VISIT (OUTPATIENT)
Dept: HOME HEALTH SERVICES | Facility: HOME HEALTHCARE | Age: 85
End: 2025-08-25
Payer: MEDICARE

## 2025-08-25 ENCOUNTER — APPOINTMENT (OUTPATIENT)
Dept: MEDICAL GROUP | Facility: MEDICAL CENTER | Age: 85
End: 2025-08-25
Payer: MEDICARE

## 2025-08-25 ENCOUNTER — HOSPITAL ENCOUNTER (OUTPATIENT)
Dept: LAB | Facility: MEDICAL CENTER | Age: 85
End: 2025-08-25
Attending: INTERNAL MEDICINE
Payer: MEDICARE

## 2025-08-25 DIAGNOSIS — N18.31 CKD STAGE 3A, GFR 45-59 ML/MIN: ICD-10-CM

## 2025-08-25 DIAGNOSIS — R73.09 IMPAIRED GLUCOSE METABOLISM: ICD-10-CM

## 2025-08-25 DIAGNOSIS — E78.5 DYSLIPIDEMIA: Chronic | ICD-10-CM

## 2025-08-25 DIAGNOSIS — I35.0 NONRHEUMATIC AORTIC VALVE STENOSIS: ICD-10-CM

## 2025-08-25 DIAGNOSIS — M79.10 MUSCLE PAIN: ICD-10-CM

## 2025-08-25 DIAGNOSIS — E55.9 VITAMIN D DEFICIENCY: ICD-10-CM

## 2025-08-25 LAB
BASOPHILS # BLD AUTO: 0.5 % (ref 0–1.8)
BASOPHILS # BLD: 0.03 K/UL (ref 0–0.12)
EOSINOPHIL # BLD AUTO: 0.11 K/UL (ref 0–0.51)
EOSINOPHIL NFR BLD: 2 % (ref 0–6.9)
ERYTHROCYTE [DISTWIDTH] IN BLOOD BY AUTOMATED COUNT: 50.7 FL (ref 35.9–50)
ERYTHROCYTE [SEDIMENTATION RATE] IN BLOOD BY WESTERGREN METHOD: 26 MM/HOUR (ref 0–25)
EST. AVERAGE GLUCOSE BLD GHB EST-MCNC: 117 MG/DL
HBA1C MFR BLD: 5.7 % (ref 4–5.6)
HCT VFR BLD AUTO: 38.6 % (ref 37–47)
HGB BLD-MCNC: 12.4 G/DL (ref 12–16)
IMM GRANULOCYTES # BLD AUTO: 0.02 K/UL (ref 0–0.11)
IMM GRANULOCYTES NFR BLD AUTO: 0.4 % (ref 0–0.9)
LYMPHOCYTES # BLD AUTO: 1.31 K/UL (ref 1–4.8)
LYMPHOCYTES NFR BLD: 23.5 % (ref 22–41)
MCH RBC QN AUTO: 31.3 PG (ref 27–33)
MCHC RBC AUTO-ENTMCNC: 32.1 G/DL (ref 32.2–35.5)
MCV RBC AUTO: 97.5 FL (ref 81.4–97.8)
MONOCYTES # BLD AUTO: 0.41 K/UL (ref 0–0.85)
MONOCYTES NFR BLD AUTO: 7.4 % (ref 0–13.4)
NEUTROPHILS # BLD AUTO: 3.69 K/UL (ref 1.82–7.42)
NEUTROPHILS NFR BLD: 66.2 % (ref 44–72)
NRBC # BLD AUTO: 0 K/UL
NRBC BLD-RTO: 0 /100 WBC (ref 0–0.2)
PLATELET # BLD AUTO: 279 K/UL (ref 164–446)
PMV BLD AUTO: 10.5 FL (ref 9–12.9)
PTH-INTACT SERPL-MCNC: 26.6 PG/ML (ref 14–72)
RBC # BLD AUTO: 3.96 M/UL (ref 4.2–5.4)
WBC # BLD AUTO: 5.6 K/UL (ref 4.8–10.8)

## 2025-08-25 PROCEDURE — 36415 COLL VENOUS BLD VENIPUNCTURE: CPT

## 2025-08-25 PROCEDURE — 85652 RBC SED RATE AUTOMATED: CPT

## 2025-08-25 PROCEDURE — 85025 COMPLETE CBC W/AUTO DIFF WBC: CPT

## 2025-08-25 PROCEDURE — 80061 LIPID PANEL: CPT

## 2025-08-25 PROCEDURE — 665999 HH PPS REVENUE DEBIT

## 2025-08-25 PROCEDURE — 84443 ASSAY THYROID STIM HORMONE: CPT

## 2025-08-25 PROCEDURE — 86140 C-REACTIVE PROTEIN: CPT

## 2025-08-25 PROCEDURE — 82172 ASSAY OF APOLIPOPROTEIN: CPT

## 2025-08-25 PROCEDURE — 83036 HEMOGLOBIN GLYCOSYLATED A1C: CPT

## 2025-08-25 PROCEDURE — 84443 ASSAY THYROID STIM HORMONE: CPT | Mod: 91

## 2025-08-25 PROCEDURE — 80053 COMPREHEN METABOLIC PANEL: CPT

## 2025-08-25 PROCEDURE — 83970 ASSAY OF PARATHORMONE: CPT

## 2025-08-25 PROCEDURE — 83695 ASSAY OF LIPOPROTEIN(A): CPT

## 2025-08-25 PROCEDURE — G0151 HHCP-SERV OF PT,EA 15 MIN: HCPCS

## 2025-08-25 PROCEDURE — 82306 VITAMIN D 25 HYDROXY: CPT

## 2025-08-25 PROCEDURE — 80053 COMPREHEN METABOLIC PANEL: CPT | Mod: 91

## 2025-08-25 PROCEDURE — 82550 ASSAY OF CK (CPK): CPT

## 2025-08-25 PROCEDURE — 80061 LIPID PANEL: CPT | Mod: 91

## 2025-08-25 PROCEDURE — 665998 HH PPS REVENUE CREDIT

## 2025-08-26 ENCOUNTER — HOME CARE VISIT (OUTPATIENT)
Dept: HOME HEALTH SERVICES | Facility: HOME HEALTHCARE | Age: 85
End: 2025-08-26
Payer: MEDICARE

## 2025-08-26 VITALS
HEART RATE: 77 BPM | OXYGEN SATURATION: 92 % | TEMPERATURE: 97.9 F | RESPIRATION RATE: 16 BRPM | DIASTOLIC BLOOD PRESSURE: 62 MMHG | SYSTOLIC BLOOD PRESSURE: 110 MMHG

## 2025-08-26 LAB
25(OH)D3 SERPL-MCNC: 107 NG/ML (ref 30–100)
ALBUMIN SERPL BCP-MCNC: 4.2 G/DL (ref 3.2–4.9)
ALBUMIN SERPL BCP-MCNC: 4.2 G/DL (ref 3.2–4.9)
ALBUMIN/GLOB SERPL: 1.4 G/DL
ALBUMIN/GLOB SERPL: 1.5 G/DL
ALP SERPL-CCNC: 118 U/L (ref 30–99)
ALP SERPL-CCNC: 119 U/L (ref 30–99)
ALT SERPL-CCNC: 11 U/L (ref 2–50)
ALT SERPL-CCNC: 12 U/L (ref 2–50)
ANION GAP SERPL CALC-SCNC: 13 MMOL/L (ref 7–16)
ANION GAP SERPL CALC-SCNC: 14 MMOL/L (ref 7–16)
AST SERPL-CCNC: 22 U/L (ref 12–45)
AST SERPL-CCNC: 22 U/L (ref 12–45)
BILIRUB SERPL-MCNC: 0.4 MG/DL (ref 0.1–1.5)
BILIRUB SERPL-MCNC: 0.4 MG/DL (ref 0.1–1.5)
BUN SERPL-MCNC: 19 MG/DL (ref 8–22)
BUN SERPL-MCNC: 19 MG/DL (ref 8–22)
CALCIUM ALBUM COR SERPL-MCNC: 9.4 MG/DL (ref 8.5–10.5)
CALCIUM ALBUM COR SERPL-MCNC: 9.5 MG/DL (ref 8.5–10.5)
CALCIUM SERPL-MCNC: 9.6 MG/DL (ref 8.5–10.5)
CALCIUM SERPL-MCNC: 9.7 MG/DL (ref 8.5–10.5)
CHLORIDE SERPL-SCNC: 106 MMOL/L (ref 96–112)
CHLORIDE SERPL-SCNC: 107 MMOL/L (ref 96–112)
CHOLEST SERPL-MCNC: 114 MG/DL (ref 100–199)
CHOLEST SERPL-MCNC: 115 MG/DL (ref 100–199)
CK SERPL-CCNC: 33 U/L (ref 0–154)
CO2 SERPL-SCNC: 20 MMOL/L (ref 20–33)
CO2 SERPL-SCNC: 21 MMOL/L (ref 20–33)
CREAT SERPL-MCNC: 1.2 MG/DL (ref 0.5–1.4)
CREAT SERPL-MCNC: 1.23 MG/DL (ref 0.5–1.4)
CRP SERPL HS-MCNC: <0.3 MG/DL (ref 0–0.75)
GFR SERPLBLD CREATININE-BSD FMLA CKD-EPI: 42 ML/MIN/1.73 M 2
GFR SERPLBLD CREATININE-BSD FMLA CKD-EPI: 44 ML/MIN/1.73 M 2
GLOBULIN SER CALC-MCNC: 2.8 G/DL (ref 1.9–3.5)
GLOBULIN SER CALC-MCNC: 2.9 G/DL (ref 1.9–3.5)
GLUCOSE SERPL-MCNC: 105 MG/DL (ref 65–99)
GLUCOSE SERPL-MCNC: 107 MG/DL (ref 65–99)
HDLC SERPL-MCNC: 49 MG/DL
HDLC SERPL-MCNC: 50 MG/DL
LDLC SERPL CALC-MCNC: 46 MG/DL
LDLC SERPL CALC-MCNC: 48 MG/DL
POTASSIUM SERPL-SCNC: 4.8 MMOL/L (ref 3.6–5.5)
POTASSIUM SERPL-SCNC: 5 MMOL/L (ref 3.6–5.5)
PROT SERPL-MCNC: 7 G/DL (ref 6–8.2)
PROT SERPL-MCNC: 7.1 G/DL (ref 6–8.2)
SODIUM SERPL-SCNC: 140 MMOL/L (ref 135–145)
SODIUM SERPL-SCNC: 141 MMOL/L (ref 135–145)
TRIGL SERPL-MCNC: 90 MG/DL (ref 0–149)
TRIGL SERPL-MCNC: 91 MG/DL (ref 0–149)
TSH SERPL-ACNC: 0.76 UIU/ML (ref 0.38–5.33)
TSH SERPL-ACNC: 0.76 UIU/ML (ref 0.38–5.33)

## 2025-08-26 PROCEDURE — 665999 HH PPS REVENUE DEBIT

## 2025-08-26 PROCEDURE — 665998 HH PPS REVENUE CREDIT

## 2025-08-26 ASSESSMENT — ENCOUNTER SYMPTOMS: DENIES PAIN: 1

## 2025-08-27 ENCOUNTER — HOME CARE VISIT (OUTPATIENT)
Dept: HOME HEALTH SERVICES | Facility: HOME HEALTHCARE | Age: 85
End: 2025-08-27
Payer: MEDICARE

## 2025-08-27 LAB
APO B100 SERPL-MCNC: 52 MG/DL (ref 60–117)
LPA SERPL-MCNC: 11 MG/DL

## 2025-08-27 PROCEDURE — 665998 HH PPS REVENUE CREDIT

## 2025-08-27 PROCEDURE — G0151 HHCP-SERV OF PT,EA 15 MIN: HCPCS

## 2025-08-27 PROCEDURE — 665999 HH PPS REVENUE DEBIT

## 2025-08-28 VITALS
RESPIRATION RATE: 16 BRPM | SYSTOLIC BLOOD PRESSURE: 112 MMHG | DIASTOLIC BLOOD PRESSURE: 60 MMHG | HEART RATE: 79 BPM | OXYGEN SATURATION: 97 % | TEMPERATURE: 97.6 F

## 2025-08-28 PROCEDURE — 665998 HH PPS REVENUE CREDIT

## 2025-08-28 PROCEDURE — 665999 HH PPS REVENUE DEBIT

## 2025-08-28 ASSESSMENT — PATIENT HEALTH QUESTIONNAIRE - PHQ9: CLINICAL INTERPRETATION OF PHQ2 SCORE: 0

## 2025-08-28 ASSESSMENT — ACTIVITIES OF DAILY LIVING (ADL)
HOME_HEALTH_OASIS: 00
OASIS_M1830: 01

## 2025-08-28 ASSESSMENT — ENCOUNTER SYMPTOMS: DENIES PAIN: 1

## 2025-08-29 PROCEDURE — 665998 HH PPS REVENUE CREDIT

## 2025-08-29 PROCEDURE — 665999 HH PPS REVENUE DEBIT

## 2025-08-30 PROCEDURE — 665999 HH PPS REVENUE DEBIT

## 2025-08-30 PROCEDURE — 665998 HH PPS REVENUE CREDIT

## (undated) DEVICE — SPONGE GAUZESTER. 2X2 4-PL - (2/PK 50PK/BX 30BX/CS)

## (undated) DEVICE — SUTURE GENERAL

## (undated) DEVICE — PAD PREP 24 X 48 CUFFED - (100/CA)

## (undated) DEVICE — GLOVE BIOGEL SZ 8.5 SURGICAL PF LTX - (50PR/BX 4BX/CA)

## (undated) DEVICE — PADDING CAST 6 IN X 4 YDS - SOF-ROLL (6RL/BG 6BG/CA)

## (undated) DEVICE — COVER LIGHT HANDLE FLEXIBLE - SOFT (2EA/PK 80PK/CA)

## (undated) DEVICE — CANISTER SUCTION 3000ML MECHANICAL FILTER AUTO SHUTOFF MEDI-VAC NONSTERILE LF DISP  (40EA/CA)

## (undated) DEVICE — SYSTEM DELIVERY COMMANDER TAVR KIT 23MM COMPONENT (1EA)

## (undated) DEVICE — DRESSING TRANSPARENT FILM TEGADERM 2.375 X 2.75" (100EA/BX)"

## (undated) DEVICE — COVER PROBE INTRAOPERATIVE KIT (10EA/CA)

## (undated) DEVICE — STOCKINET TUBULAR 6IN STERILE - 6 X 48YDS (25/CA)

## (undated) DEVICE — PACK UPPER EXTREMITY SM OR - (3/CA)

## (undated) DEVICE — NEPTUNE 4 PORT MANIFOLD - (20/PK)

## (undated) DEVICE — SUTURE 4-0 ETHILON PS-2 18 (12PK/BX)"

## (undated) DEVICE — SUTURE DEVICE CLOSURE REPAIR SYSTEM PERCLOSE PROSTYLE (10EA/BX)

## (undated) DEVICE — PROTECTOR ULNA NERVE - (36PR/CA)

## (undated) DEVICE — SENSOR OXIMETER ADULT SPO2 RD SET (20EA/BX)

## (undated) DEVICE — CRIMPER CATHETER EDWARDS DISPOSABLE (1EA)

## (undated) DEVICE — GLOVE BIOGEL INDICATOR SZ 8 SURGICAL PF LTX - (50/BX 4BX/CA)

## (undated) DEVICE — SUTURE 2-0 VICRYL PLUS CT-1 36 (36PK/BX)"

## (undated) DEVICE — DECANTER FLD BLS - (50/CA)

## (undated) DEVICE — LACTATED RINGERS INJ 1000 ML - (14EA/CA 60CA/PF)

## (undated) DEVICE — SODIUM CHL IRRIGATION 0.9% 1000ML (12EA/CA)

## (undated) DEVICE — CHLORAPREP 26 ML APPLICATOR - ORANGE TINT(25/CA)

## (undated) DEVICE — ELECTRODE RADIOLUCNT SOLID GEL DEFIB PADS (12EA/CA)

## (undated) DEVICE — SUTURE  0 ETHIBOND CT-1 30 IN (36PK/BX)

## (undated) DEVICE — PAD UNIVERSAL MULTI USE (1/EA)

## (undated) DEVICE — CABLE TEMPORARY PACING

## (undated) DEVICE — ELECTRODE DUAL RETURN W/ CORD - (50/PK)

## (undated) DEVICE — TIP INTPLS HFLO ML ORFC BTRY - (12/CS)  FOR SURGILAV

## (undated) DEVICE — GLOVE BIOGEL SZ 8 SURGICAL PF LTX - (50PR/BX 4BX/CA)

## (undated) DEVICE — INTRODUCER CATHETER  DILATOR PROTRUDING 8FR 2.5CM (10EA/BX)

## (undated) DEVICE — GLOVE BIOGEL PI INDICATOR SZ 8.0 SURGICAL PF LF -(50/BX 4BX/CA)

## (undated) DEVICE — TOWEL STOP TIMEOUT SAFETY FLAG (40EA/CA)

## (undated) DEVICE — SODIUM CHL. IRRIGATION 0.9% 3000ML (4EA/CA 65CA/PF)

## (undated) DEVICE — GUIDEWIRE STARTER STRAIGHT FIXED CORE .035 150CM 4 STRAIGHT PTFE/HEPARIN COATED (10/BX)

## (undated) DEVICE — Device

## (undated) DEVICE — GLOVE BIOGEL PI INDICATOR SZ 6.5 SURGICAL PF LF - (50/BX 4BX/CA)

## (undated) DEVICE — TOURNIQUET, STERILE 24 (YELLOW)

## (undated) DEVICE — GLIDESHEATH SLENDER NITINOL KIT .021 GW 6FR 10CM SINGLE WALL

## (undated) DEVICE — PINNING SYSTEM

## (undated) DEVICE — MIXER BONE CEMENT REVOLUTION - W/FEMORAL PRESSURIZER (6/CA)

## (undated) DEVICE — GLOVE BIOGEL SZ 7.5 SURGICAL PF LTX - (50PR/BX 4BX/CA)

## (undated) DEVICE — BLADE 90X18X1.27MM SAW SAGITTAL

## (undated) DEVICE — SUCTION INSTRUMENT YANKAUER BULBOUS TIP W/O VENT (50EA/CA)

## (undated) DEVICE — DEVICE INFLATION NOVAFLEX ATRION LOCK SYRINGE 29MM 38ML (1EA)

## (undated) DEVICE — GLOVE BIOGEL PI INDICATOR SZ 8.5 SURGICAL PF LF - (50PR/BX 4BX/CA)

## (undated) DEVICE — COVER FOOT UNIVERSAL DISP. - (25EA/CA)

## (undated) DEVICE — GLOVE BIOGEL SZ 6.5 SURGICAL PF LTX (50PR/BX 4BX/CA)

## (undated) DEVICE — BANDAGE ELASTIC STERILE VELCRO 6 X 5 YDS (25EA/CA)

## (undated) DEVICE — DRAPE LARGE 3 QUARTER - (20/CA)

## (undated) DEVICE — SYR ANGIO CNRST INJ HI-PRS 3W 65 - (10EA/CA)"

## (undated) DEVICE — SENSOR SPO2 NEO LNCS ADHESIVE (20/BX) SEE USER NOTES

## (undated) DEVICE — INTRODUCER SHEATH 6FR 2.5CM - DILATOR PROTRUDING (10/BX)

## (undated) DEVICE — LENS/HOOD FOR SPACESUIT - (32/PK) PEEL AWAY FACE

## (undated) DEVICE — GOWN SURGEONS LARGE - (32/CA)

## (undated) DEVICE — SUTURE 1 VICRYL PLUSCT-1 27IN - (36/BX)

## (undated) DEVICE — CATHETER PIGTAIL 6FR 145 (5EA/BX)

## (undated) DEVICE — AGEE CARPAL TUNNEL RELEASE (6EA/PK)

## (undated) DEVICE — CANISTER SUCTION RIGID RED 1500CC (40EA/CA)

## (undated) DEVICE — MASK AIRWAY SIZE 4 UNIQUE SILICON (10EA/BX)

## (undated) DEVICE — BLANKET UNDERBODY FULL ACCES - (5/CA)

## (undated) DEVICE — GOWN WARMING STANDARD FLEX - (30/CA)

## (undated) DEVICE — CATHETER 6FR AL1 100CM (5/BX)

## (undated) DEVICE — DRESSING ABDOMINAL PAD STERILE 8 X 10" (360EA/CA)"

## (undated) DEVICE — SET EXTENSION WITH 2 PORTS (48EA/CA) ***PART #2C8610 IS A SUBSTITUTE*****

## (undated) DEVICE — TUBE E-T HI-LO CUFF 6.5MM (10EA/BX)

## (undated) DEVICE — BAG, SPONGE COUNT 50600

## (undated) DEVICE — TUBING CLEARLINK DUO-VENT - C-FLO (48EA/CA)

## (undated) DEVICE — ELECTRODE 850 FOAM ADHESIVE - HYDROGEL RADIOTRNSPRNT (50/PK)

## (undated) DEVICE — WIRE GUIDE AES .035 260CM WITH 3MM J TIP"

## (undated) DEVICE — HUMID-VENT HEAT AND MOISTURE EXCHANGE- (50/BX)

## (undated) DEVICE — KIT ANESTHESIA W/CIRCUIT & 3/LT BAG W/FILTER (20EA/CA)

## (undated) DEVICE — KIT RETROFIT PROBE COVERS (24EA/EA)

## (undated) DEVICE — CATHETER EP 6FR 90CM FEM BP J CRV (J-TIP)

## (undated) DEVICE — DRAPE MAYO STAND - (30/CA)

## (undated) DEVICE — KIT ROOM DECONTAMINATION

## (undated) DEVICE — DEVICE INFLATION ATRION NOVALFEX TRANSFEMORAL SYSTEM (1EA)

## (undated) DEVICE — SHEATH RO 6F 10CM (10EA/BX)

## (undated) DEVICE — GLOVE SURGICAL PROTEXIS PI 8.0 LF - (50PR/BX)

## (undated) DEVICE — SPONGE GAUZE NON-STERILE 2X2 - 4-PLY (200/PK 40PK/CA)

## (undated) DEVICE — COVER LIGHT HANDLE ALC PLUS DISP (18EA/BX)

## (undated) DEVICE — WIRE GUIDE LUNDQST.035X180 - TSMG-35-180-4-LES ORDER BY BOX (5EA/BX)

## (undated) DEVICE — STOPCOCK IV 400 PSI 3W ROT (50EA/BX)

## (undated) DEVICE — DRAPE CLEAR W/ELASTIC BAND RAD CARM 40 X40" (20EA/CA)"

## (undated) DEVICE — PACK TOTAL KNEE  (1/CA)

## (undated) DEVICE — BLADE SURGICAL #11 - (50/BX)

## (undated) DEVICE — ARM BAND RADIAL TR BAND (5EA/BX)

## (undated) DEVICE — IV TUBING HI-FLO RATE W/CLAMP (50/CA)

## (undated) DEVICE — GOWN SURGICAL XX-LARGE - (28EA/CA) SIRUS NON REINFORCED

## (undated) DEVICE — GLOVE, LITE (PAIR)

## (undated) DEVICE — TUBE CONNECTING SUCTION - CLEAR PLASTIC STERILE 72 IN (50EA/CA)

## (undated) DEVICE — WATER IRRIGATION STERILE 1000ML (12EA/CA)

## (undated) DEVICE — DEVICE INFLATION DIGITAL BLUE DIAMOND (5EA/BX)

## (undated) DEVICE — HEAD HOLDER JUNIOR/ADULT

## (undated) DEVICE — TOWELS CLOTH SURGICAL - (4/PK 20PK/CA)

## (undated) DEVICE — PACK TAVR (3EA/CA)

## (undated) DEVICE — MASK ANESTHESIA ADULT  - (100/CA)

## (undated) DEVICE — DRESSING AQUACEL AG ADVANTAGE 3.5 X 10" (10EA/BX)"

## (undated) DEVICE — CLOSURE SKIN STRIP 1/2 X 4 IN - (STERI STRIP) (50/BX 4BX/CA)

## (undated) DEVICE — GLOVE BIOGEL PI ULTRATOUCH SZ 6.5 SURGICAL PF LF - (50/BX)

## (undated) DEVICE — GUIDEWIRE RUNTHROUGH NS 180CM

## (undated) DEVICE — BLOCK

## (undated) DEVICE — HANDPIECE 10FT INTPLS SCT PLS IRRIGATION HAND CONTROL SET (6/PK)